# Patient Record
Sex: FEMALE | Race: WHITE | NOT HISPANIC OR LATINO | Employment: OTHER | URBAN - METROPOLITAN AREA
[De-identification: names, ages, dates, MRNs, and addresses within clinical notes are randomized per-mention and may not be internally consistent; named-entity substitution may affect disease eponyms.]

---

## 2017-05-01 ENCOUNTER — TRANSCRIBE ORDERS (OUTPATIENT)
Dept: ADMINISTRATIVE | Facility: HOSPITAL | Age: 82
End: 2017-05-01

## 2017-05-01 ENCOUNTER — ALLSCRIPTS OFFICE VISIT (OUTPATIENT)
Dept: OTHER | Facility: OTHER | Age: 82
End: 2017-05-01

## 2017-05-01 DIAGNOSIS — I65.29 OCCLUSION AND STENOSIS OF UNSPECIFIED CAROTID ARTERY: ICD-10-CM

## 2017-05-01 DIAGNOSIS — I25.10 ATHEROSCLEROTIC HEART DISEASE OF NATIVE CORONARY ARTERY WITHOUT ANGINA PECTORIS: ICD-10-CM

## 2017-05-01 DIAGNOSIS — E78.5 HYPERLIPIDEMIA, UNSPECIFIED HYPERLIPIDEMIA TYPE: Primary | ICD-10-CM

## 2017-05-01 DIAGNOSIS — I10 ESSENTIAL HYPERTENSION, BENIGN: ICD-10-CM

## 2017-05-09 ENCOUNTER — ALLSCRIPTS OFFICE VISIT (OUTPATIENT)
Dept: OTHER | Facility: OTHER | Age: 82
End: 2017-05-09

## 2017-05-11 ENCOUNTER — HOSPITAL ENCOUNTER (OUTPATIENT)
Dept: NON INVASIVE DIAGNOSTICS | Facility: CLINIC | Age: 82
Discharge: HOME | End: 2017-05-11
Payer: COMMERCIAL

## 2017-05-11 DIAGNOSIS — E78.5 HYPERLIPIDEMIA, UNSPECIFIED HYPERLIPIDEMIA TYPE: ICD-10-CM

## 2017-05-11 DIAGNOSIS — I10 ESSENTIAL HYPERTENSION, BENIGN: ICD-10-CM

## 2017-05-11 PROCEDURE — 93880 EXTRACRANIAL BILAT STUDY: CPT

## 2017-06-23 ENCOUNTER — GENERIC CONVERSION - ENCOUNTER (OUTPATIENT)
Dept: OTHER | Facility: OTHER | Age: 82
End: 2017-06-23

## 2017-06-23 LAB
LEFT EYE DIABETIC RETINOPATHY: NORMAL
RIGHT EYE DIABETIC RETINOPATHY: NORMAL

## 2017-07-18 ENCOUNTER — GENERIC CONVERSION - ENCOUNTER (OUTPATIENT)
Dept: OTHER | Facility: OTHER | Age: 82
End: 2017-07-18

## 2017-07-25 ENCOUNTER — GENERIC CONVERSION - ENCOUNTER (OUTPATIENT)
Dept: OTHER | Facility: OTHER | Age: 82
End: 2017-07-25

## 2017-08-21 ENCOUNTER — GENERIC CONVERSION - ENCOUNTER (OUTPATIENT)
Dept: OTHER | Facility: OTHER | Age: 82
End: 2017-08-21

## 2017-09-29 ENCOUNTER — HOSPITAL ENCOUNTER (INPATIENT)
Facility: HOSPITAL | Age: 82
LOS: 2 days | Discharge: HOME | DRG: 377 | End: 2017-10-02
Attending: EMERGENCY MEDICINE | Admitting: COLON & RECTAL SURGERY
Payer: COMMERCIAL

## 2017-09-29 ENCOUNTER — APPOINTMENT (EMERGENCY)
Dept: RADIOLOGY | Facility: HOSPITAL | Age: 82
DRG: 377 | End: 2017-09-29
Payer: COMMERCIAL

## 2017-09-29 DIAGNOSIS — J96.92 HYPERCAPNIC RESPIRATORY FAILURE (HCC): ICD-10-CM

## 2017-09-29 DIAGNOSIS — K92.2 GI BLEED: Primary | ICD-10-CM

## 2017-09-29 DIAGNOSIS — J44.9 CHRONIC OBSTRUCTIVE PULMONARY DISEASE, UNSPECIFIED COPD TYPE (HCC): ICD-10-CM

## 2017-09-29 DIAGNOSIS — R41.0 CONFUSION: ICD-10-CM

## 2017-09-29 DIAGNOSIS — K25.4 GASTROINTESTINAL HEMORRHAGE ASSOCIATED WITH GASTRIC ULCER: ICD-10-CM

## 2017-09-29 DIAGNOSIS — K92.2 GASTROINTESTINAL HEMORRHAGE, UNSPECIFIED GASTROINTESTINAL HEMORRHAGE TYPE: ICD-10-CM

## 2017-09-29 PROCEDURE — 85730 THROMBOPLASTIN TIME PARTIAL: CPT | Performed by: EMERGENCY MEDICINE

## 2017-09-29 PROCEDURE — 85025 COMPLETE CBC W/AUTO DIFF WBC: CPT | Performed by: EMERGENCY MEDICINE

## 2017-09-29 PROCEDURE — 83690 ASSAY OF LIPASE: CPT | Performed by: EMERGENCY MEDICINE

## 2017-09-29 PROCEDURE — 85610 PROTHROMBIN TIME: CPT | Performed by: EMERGENCY MEDICINE

## 2017-09-29 PROCEDURE — 86901 BLOOD TYPING SEROLOGIC RH(D): CPT | Performed by: EMERGENCY MEDICINE

## 2017-09-29 PROCEDURE — 93005 ELECTROCARDIOGRAM TRACING: CPT | Performed by: EMERGENCY MEDICINE

## 2017-09-29 PROCEDURE — 36415 COLL VENOUS BLD VENIPUNCTURE: CPT | Performed by: EMERGENCY MEDICINE

## 2017-09-29 PROCEDURE — 86850 RBC ANTIBODY SCREEN: CPT | Performed by: EMERGENCY MEDICINE

## 2017-09-29 PROCEDURE — 86923 COMPATIBILITY TEST ELECTRIC: CPT

## 2017-09-29 PROCEDURE — 84484 ASSAY OF TROPONIN QUANT: CPT

## 2017-09-29 PROCEDURE — 80053 COMPREHEN METABOLIC PANEL: CPT | Performed by: EMERGENCY MEDICINE

## 2017-09-29 PROCEDURE — 84484 ASSAY OF TROPONIN QUANT: CPT | Performed by: EMERGENCY MEDICINE

## 2017-09-29 PROCEDURE — 86900 BLOOD TYPING SEROLOGIC ABO: CPT | Performed by: EMERGENCY MEDICINE

## 2017-09-29 PROCEDURE — 71020 HB CHEST X-RAY 2VW FRONTAL&LATL: CPT

## 2017-09-30 ENCOUNTER — ANESTHESIA EVENT (INPATIENT)
Dept: PERIOP | Facility: HOSPITAL | Age: 82
DRG: 377 | End: 2017-09-30
Payer: COMMERCIAL

## 2017-09-30 ENCOUNTER — ANESTHESIA (INPATIENT)
Dept: PERIOP | Facility: HOSPITAL | Age: 82
DRG: 377 | End: 2017-09-30
Payer: COMMERCIAL

## 2017-09-30 ENCOUNTER — APPOINTMENT (EMERGENCY)
Dept: RADIOLOGY | Facility: HOSPITAL | Age: 82
DRG: 377 | End: 2017-09-30
Payer: COMMERCIAL

## 2017-09-30 PROBLEM — K92.1 HEMATOCHEZIA: Status: ACTIVE | Noted: 2017-09-30

## 2017-09-30 LAB
ABO GROUP BLD: NORMAL
ALBUMIN SERPL BCP-MCNC: 2.7 G/DL (ref 3.5–5)
ALP SERPL-CCNC: 129 U/L (ref 46–116)
ALT SERPL W P-5'-P-CCNC: <6 U/L (ref 12–78)
ANION GAP SERPL CALCULATED.3IONS-SCNC: 5 MMOL/L (ref 4–13)
APTT PPP: 29 SECONDS (ref 23–35)
AST SERPL W P-5'-P-CCNC: 10 U/L (ref 5–45)
ATRIAL RATE: 85 BPM
BASOPHILS # BLD AUTO: 0.07 THOUSANDS/ΜL (ref 0–0.1)
BASOPHILS NFR BLD AUTO: 1 % (ref 0–1)
BILIRUB SERPL-MCNC: 0.39 MG/DL (ref 0.2–1)
BLD GP AB SCN SERPL QL: NEGATIVE
BUN SERPL-MCNC: 26 MG/DL (ref 5–25)
CALCIUM SERPL-MCNC: 8.4 MG/DL (ref 8.3–10.1)
CHLORIDE SERPL-SCNC: 103 MMOL/L (ref 100–108)
CO2 SERPL-SCNC: 36 MMOL/L (ref 21–32)
CREAT SERPL-MCNC: 0.97 MG/DL (ref 0.6–1.3)
EOSINOPHIL # BLD AUTO: 0.41 THOUSAND/ΜL (ref 0–0.61)
EOSINOPHIL NFR BLD AUTO: 4 % (ref 0–6)
ERYTHROCYTE [DISTWIDTH] IN BLOOD BY AUTOMATED COUNT: 16.6 % (ref 11.6–15.1)
GFR SERPL CREATININE-BSD FRML MDRD: 55 ML/MIN/1.73SQ M
GLUCOSE SERPL-MCNC: 114 MG/DL (ref 65–140)
HCT VFR BLD AUTO: 30.9 % (ref 34.8–46.1)
HCT VFR BLD AUTO: 33.6 % (ref 34.8–46.1)
HCT VFR BLD AUTO: 33.8 % (ref 34.8–46.1)
HCT VFR BLD AUTO: 38 % (ref 34.8–46.1)
HCT VFR BLD AUTO: 41.4 % (ref 34.8–46.1)
HCT VFR BLD AUTO: 42.6 % (ref 34.8–46.1)
HGB BLD-MCNC: 10.7 G/DL (ref 11.5–15.4)
HGB BLD-MCNC: 11.9 G/DL (ref 11.5–15.4)
HGB BLD-MCNC: 12 G/DL (ref 11.5–15.4)
HGB BLD-MCNC: 8.8 G/DL (ref 11.5–15.4)
HGB BLD-MCNC: 9.6 G/DL (ref 11.5–15.4)
HGB BLD-MCNC: 9.6 G/DL (ref 11.5–15.4)
INR PPP: 1.04 (ref 0.86–1.16)
LACTATE SERPL-SCNC: 1.2 MMOL/L (ref 0.5–2)
LIPASE SERPL-CCNC: 123 U/L (ref 73–393)
LYMPHOCYTES # BLD AUTO: 1.82 THOUSANDS/ΜL (ref 0.6–4.47)
LYMPHOCYTES NFR BLD AUTO: 19 % (ref 14–44)
MCH RBC QN AUTO: 24.6 PG (ref 26.8–34.3)
MCHC RBC AUTO-ENTMCNC: 27.9 G/DL (ref 31.4–37.4)
MCV RBC AUTO: 88 FL (ref 82–98)
MONOCYTES # BLD AUTO: 0.65 THOUSAND/ΜL (ref 0.17–1.22)
MONOCYTES NFR BLD AUTO: 7 % (ref 4–12)
NEUTROPHILS # BLD AUTO: 6.62 THOUSANDS/ΜL (ref 1.85–7.62)
NEUTS SEG NFR BLD AUTO: 69 % (ref 43–75)
NRBC BLD AUTO-RTO: 0 /100 WBCS
P AXIS: 58 DEGREES
PLATELET # BLD AUTO: 395 THOUSANDS/UL (ref 149–390)
PMV BLD AUTO: 10.1 FL (ref 8.9–12.7)
POTASSIUM SERPL-SCNC: 4 MMOL/L (ref 3.5–5.3)
PR INTERVAL: 166 MS
PROT SERPL-MCNC: 6.6 G/DL (ref 6.4–8.2)
PROTHROMBIN TIME: 13.6 SECONDS (ref 12.1–14.4)
QRS AXIS: 109 DEGREES
QRSD INTERVAL: 148 MS
QT INTERVAL: 396 MS
QTC INTERVAL: 467 MS
RBC # BLD AUTO: 4.83 MILLION/UL (ref 3.81–5.12)
RH BLD: POSITIVE
SODIUM SERPL-SCNC: 144 MMOL/L (ref 136–145)
SPECIMEN EXPIRATION DATE: NORMAL
T WAVE AXIS: 39 DEGREES
TROPONIN I SERPL-MCNC: <0.02 NG/ML
VENTRICULAR RATE: 84 BPM
WBC # BLD AUTO: 9.59 THOUSAND/UL (ref 4.31–10.16)

## 2017-09-30 PROCEDURE — 74178 CT ABD&PLV WO CNTR FLWD CNTR: CPT

## 2017-09-30 PROCEDURE — 85014 HEMATOCRIT: CPT | Performed by: STUDENT IN AN ORGANIZED HEALTH CARE EDUCATION/TRAINING PROGRAM

## 2017-09-30 PROCEDURE — 99285 EMERGENCY DEPT VISIT HI MDM: CPT

## 2017-09-30 PROCEDURE — 85014 HEMATOCRIT: CPT | Performed by: EMERGENCY MEDICINE

## 2017-09-30 PROCEDURE — 85018 HEMOGLOBIN: CPT | Performed by: COLON & RECTAL SURGERY

## 2017-09-30 PROCEDURE — 85014 HEMATOCRIT: CPT | Performed by: COLON & RECTAL SURGERY

## 2017-09-30 PROCEDURE — 85018 HEMOGLOBIN: CPT | Performed by: EMERGENCY MEDICINE

## 2017-09-30 PROCEDURE — 85018 HEMOGLOBIN: CPT | Performed by: STUDENT IN AN ORGANIZED HEALTH CARE EDUCATION/TRAINING PROGRAM

## 2017-09-30 PROCEDURE — 96360 HYDRATION IV INFUSION INIT: CPT

## 2017-09-30 PROCEDURE — 83605 ASSAY OF LACTIC ACID: CPT | Performed by: STUDENT IN AN ORGANIZED HEALTH CARE EDUCATION/TRAINING PROGRAM

## 2017-09-30 PROCEDURE — 96365 THER/PROPH/DIAG IV INF INIT: CPT

## 2017-09-30 PROCEDURE — 0W3P8ZZ CONTROL BLEEDING IN GASTROINTESTINAL TRACT, VIA NATURAL OR ARTIFICIAL OPENING ENDOSCOPIC: ICD-10-PCS | Performed by: COLON & RECTAL SURGERY

## 2017-09-30 PROCEDURE — 36415 COLL VENOUS BLD VENIPUNCTURE: CPT | Performed by: EMERGENCY MEDICINE

## 2017-09-30 RX ORDER — ATORVASTATIN CALCIUM 80 MG/1
80 TABLET, FILM COATED ORAL
Status: DISCONTINUED | OUTPATIENT
Start: 2017-09-30 | End: 2017-10-02 | Stop reason: HOSPADM

## 2017-09-30 RX ORDER — ACETAMINOPHEN 325 MG/1
650 TABLET ORAL EVERY 6 HOURS PRN
Status: DISCONTINUED | OUTPATIENT
Start: 2017-09-30 | End: 2017-10-02 | Stop reason: HOSPADM

## 2017-09-30 RX ORDER — ONDANSETRON 2 MG/ML
4 INJECTION INTRAMUSCULAR; INTRAVENOUS EVERY 6 HOURS PRN
Status: DISCONTINUED | OUTPATIENT
Start: 2017-09-30 | End: 2017-10-02 | Stop reason: HOSPADM

## 2017-09-30 RX ORDER — PROPOFOL 10 MG/ML
INJECTION, EMULSION INTRAVENOUS AS NEEDED
Status: DISCONTINUED | OUTPATIENT
Start: 2017-09-30 | End: 2017-09-30 | Stop reason: SURG

## 2017-09-30 RX ORDER — PANTOPRAZOLE SODIUM 40 MG/1
40 TABLET, DELAYED RELEASE ORAL
Status: DISCONTINUED | OUTPATIENT
Start: 2017-09-30 | End: 2017-10-02 | Stop reason: HOSPADM

## 2017-09-30 RX ORDER — DIAZEPAM 5 MG/1
5 TABLET ORAL EVERY EVENING
Status: DISCONTINUED | OUTPATIENT
Start: 2017-09-30 | End: 2017-10-01

## 2017-09-30 RX ORDER — ZOLPIDEM TARTRATE 5 MG/1
10 TABLET ORAL
Status: DISCONTINUED | OUTPATIENT
Start: 2017-09-30 | End: 2017-10-01

## 2017-09-30 RX ORDER — PRIMIDONE 250 MG/1
250 TABLET ORAL
Status: DISCONTINUED | OUTPATIENT
Start: 2017-09-30 | End: 2017-10-01

## 2017-09-30 RX ORDER — LEVOTHYROXINE SODIUM 0.07 MG/1
75 TABLET ORAL
Status: DISCONTINUED | OUTPATIENT
Start: 2017-09-30 | End: 2017-10-02 | Stop reason: HOSPADM

## 2017-09-30 RX ORDER — GABAPENTIN 100 MG/1
100 CAPSULE ORAL 3 TIMES DAILY
Status: DISCONTINUED | OUTPATIENT
Start: 2017-09-30 | End: 2017-10-01

## 2017-09-30 RX ORDER — LABETALOL HYDROCHLORIDE 5 MG/ML
10 INJECTION, SOLUTION INTRAVENOUS EVERY 6 HOURS PRN
Status: DISCONTINUED | OUTPATIENT
Start: 2017-09-30 | End: 2017-10-02 | Stop reason: HOSPADM

## 2017-09-30 RX ORDER — SODIUM CHLORIDE 9 MG/ML
INJECTION, SOLUTION INTRAVENOUS CONTINUOUS PRN
Status: DISCONTINUED | OUTPATIENT
Start: 2017-09-30 | End: 2017-09-30 | Stop reason: SURG

## 2017-09-30 RX ORDER — SODIUM CHLORIDE 9 MG/ML
125 INJECTION, SOLUTION INTRAVENOUS CONTINUOUS
Status: DISCONTINUED | OUTPATIENT
Start: 2017-09-30 | End: 2017-10-01

## 2017-09-30 RX ORDER — VILAZODONE HYDROCHLORIDE 40 MG/1
40 TABLET ORAL
Status: DISCONTINUED | OUTPATIENT
Start: 2017-09-30 | End: 2017-10-02 | Stop reason: HOSPADM

## 2017-09-30 RX ADMIN — PROPOFOL 20 MG: 10 INJECTION, EMULSION INTRAVENOUS at 14:47

## 2017-09-30 RX ADMIN — SODIUM CHLORIDE 125 ML/HR: 0.9 INJECTION, SOLUTION INTRAVENOUS at 04:08

## 2017-09-30 RX ADMIN — GABAPENTIN 100 MG: 100 CAPSULE ORAL at 08:01

## 2017-09-30 RX ADMIN — GABAPENTIN 100 MG: 100 CAPSULE ORAL at 17:02

## 2017-09-30 RX ADMIN — SODIUM CHLORIDE 1000 ML: 0.9 INJECTION, SOLUTION INTRAVENOUS at 01:35

## 2017-09-30 RX ADMIN — PROPOFOL 20 MG: 10 INJECTION, EMULSION INTRAVENOUS at 14:42

## 2017-09-30 RX ADMIN — DESMOPRESSIN ACETATE 20 MCG: 4 SOLUTION INTRAVENOUS at 01:49

## 2017-09-30 RX ADMIN — PROPOFOL 20 MG: 10 INJECTION, EMULSION INTRAVENOUS at 14:55

## 2017-09-30 RX ADMIN — SODIUM CHLORIDE 125 ML/HR: 0.9 INJECTION, SOLUTION INTRAVENOUS at 12:30

## 2017-09-30 RX ADMIN — POLYETHYLENE GLYCOL 3350, SODIUM SULFATE ANHYDROUS, SODIUM BICARBONATE, SODIUM CHLORIDE, POTASSIUM CHLORIDE 4000 ML: 236; 22.74; 6.74; 5.86; 2.97 POWDER, FOR SOLUTION ORAL at 04:52

## 2017-09-30 RX ADMIN — PROPOFOL 40 MG: 10 INJECTION, EMULSION INTRAVENOUS at 14:34

## 2017-09-30 RX ADMIN — DIAZEPAM 5 MG: 5 TABLET ORAL at 18:09

## 2017-09-30 RX ADMIN — SODIUM CHLORIDE 125 ML/HR: 0.9 INJECTION, SOLUTION INTRAVENOUS at 22:55

## 2017-09-30 RX ADMIN — METOPROLOL TARTRATE 12.5 MG: 25 TABLET ORAL at 22:00

## 2017-09-30 RX ADMIN — IOHEXOL 100 ML: 350 INJECTION, SOLUTION INTRAVENOUS at 01:26

## 2017-09-30 RX ADMIN — PROPOFOL 20 MG: 10 INJECTION, EMULSION INTRAVENOUS at 14:51

## 2017-09-30 RX ADMIN — PANTOPRAZOLE SODIUM 40 MG: 40 TABLET, DELAYED RELEASE ORAL at 06:14

## 2017-09-30 RX ADMIN — GABAPENTIN 100 MG: 100 CAPSULE ORAL at 22:00

## 2017-09-30 RX ADMIN — METOPROLOL TARTRATE 12.5 MG: 25 TABLET ORAL at 08:01

## 2017-09-30 RX ADMIN — ATORVASTATIN CALCIUM 80 MG: 80 TABLET, FILM COATED ORAL at 22:03

## 2017-09-30 RX ADMIN — SODIUM CHLORIDE: 0.9 INJECTION, SOLUTION INTRAVENOUS at 14:25

## 2017-09-30 RX ADMIN — PRIMIDONE 250 MG: 250 TABLET ORAL at 22:01

## 2017-09-30 RX ADMIN — LEVOTHYROXINE SODIUM 75 MCG: 75 TABLET ORAL at 06:14

## 2017-09-30 NOTE — ED PROVIDER NOTES
History  Chief Complaint   Patient presents with    Rectal Bleeding     Pt present to ed with c/o episode of abdominal pain and rectal bleeding  80-year-old with history of prior MI, COPD on aspirin and Plavix presents complaining of rectal bleeding  The patient reports that earlier today she had 1 small episode of bleeding and that this evening she had a large bowel movement that was grossly bloody  Also reports having some mild periumbilical pain  Pain is nonradiating and dull in nature  Denies have any fevers, chills, vomiting  Denies history of prior GI bleed  She has never had a colonoscopy  Prior to Admission Medications   Prescriptions Last Dose Informant Patient Reported? Taking? Acetaminophen 650 MG TABS   Yes No   Sig: Take 650 mg by mouth  Cholecalciferol (VITAMIN D) 2000 UNITS tablet   Yes No   Sig: Take 2,000 Units by mouth daily  Olmesartan Medoxomil (BENICAR PO)   Yes No   Sig: Take 40 mg by mouth  Zolpidem Tartrate (AMBIEN PO)   Yes No   Sig: Take 10 mg by mouth daily at bedtime as needed  ascorbic acid (VITAMIN C) 500 mg tablet   Yes No   Sig: Take 500 mg by mouth 2 (two) times a day  aspirin 81 mg chewable tablet   Yes No   Sig: Chew 81 mg  To check with md re when to stop    atorvastatin (LIPITOR) 40 mg tablet   Yes No   Sig: Take 80 mg by mouth daily at bedtime  clopidogrel (PLAVIX) 75 mg tablet   Yes No   Sig: Take 75 mg by mouth  Pt instructed to stop 7 days prior to surgery    diazepam (VALIUM) 5 mg tablet   Yes No   Sig: Take 5 mg by mouth every evening  fluticasone-salmeterol (ADVAIR) 250-50 mcg/dose   Yes No   Sig: Inhale 1 puff every 12 (twelve) hours  gabapentin (NEURONTIN) 100 mg capsule   Yes No   Sig: Take 100 mg by mouth 3 (three) times a day  levothyroxine 75 mcg tablet   Yes No   Sig: Take by mouth    metoprolol tartrate (LOPRESSOR) 12 5 mg tablet   Yes No   Sig: Take 12 5 mg by mouth 2 (two) times a day     oxyCODONE (ROXICODONE) 5 mg immediate release tablet   No No   Si-3 tablets (5-15 mg) every 4 hours as needed for pain   pantoprazole (PROTONIX) 40 mg tablet   Yes No   Sig: Take 40 mg by mouth daily  primidone (MYSOLINE) 250 mg tablet   Yes No   Sig: Take 250 mg by mouth daily at bedtime  vilazodone (VIIBRYD) 40 mg tablet   Yes No   Sig: Take 40 mg by mouth daily with breakfast       Facility-Administered Medications: None       Past Medical History:   Diagnosis Date    Anxiety     Arthritis     COPD (chronic obstructive pulmonary disease)     Coronary artery disease     Essential tremor     Hx of arterial ischemic stroke     Hypertension     Hypothyroid     Myocardial infarction         Personal history of kidney stones     Use of cane as ambulatory aid     or walker    Wears glasses        Past Surgical History:   Procedure Laterality Date    CAROTID ENDARTERECTOMY Right     CATARACT EXTRACTION W/ INTRAOCULAR LENS  IMPLANT, BILATERAL      CORONARY ARTERY BYPASS GRAFT      CABG X3    JOINT REPLACEMENT      left TKR    KIDNEY STONE SURGERY      HI TOTAL KNEE ARTHROPLASTY Right 2016    Procedure: ARTHROPLASTY KNEE TOTAL;  Surgeon: Estelle Amor MD;  Location: [facility] OR;  Service: Orthopedics       History reviewed  No pertinent family history  I have reviewed and agree with the history as documented  Social History   Substance Use Topics    Smoking status: Former Smoker     Quit date: 2/3/1976    Smokeless tobacco: Never Used    Alcohol use No        Review of Systems   Constitutional: Negative for chills and fever  HENT: Negative for congestion and sore throat  Eyes: Negative for pain and redness  Respiratory: Negative for shortness of breath and wheezing  Cardiovascular: Negative for chest pain and palpitations  Gastrointestinal: Positive for abdominal pain, anal bleeding and blood in stool  Negative for diarrhea and vomiting  Endocrine: Negative for polydipsia and polyphagia  Genitourinary: Negative for dysuria and flank pain  Musculoskeletal: Negative for arthralgias and back pain  Skin: Negative for rash and wound  Neurological: Negative for seizures and headaches  Psychiatric/Behavioral: Negative for agitation and behavioral problems  All other systems reviewed and are negative  Physical Exam  ED Triage Vitals [09/29/17 2309]   Temperature Pulse Respirations Blood Pressure SpO2   97 9 °F (36 6 °C) 86 21 165/71 (!) 75 %      Temp Source Heart Rate Source Patient Position - Orthostatic VS BP Location FiO2 (%)   Oral Monitor Lying Right arm --      Pain Score       No Pain           Physical Exam   Constitutional: She is oriented to person, place, and time  She appears well-developed and well-nourished  HENT:   Head: Normocephalic and atraumatic  Right Ear: External ear normal    Left Ear: External ear normal    Mouth/Throat: Oropharynx is clear and moist    Eyes: EOM are normal  Pupils are equal, round, and reactive to light  Neck: Normal range of motion  Cardiovascular: Normal rate, regular rhythm and normal heart sounds  Exam reveals no friction rub  No murmur heard  Pulmonary/Chest: Effort normal  No respiratory distress  She has no wheezes  Abdominal: Soft  Bowel sounds are normal  She exhibits no distension  There is no tenderness  There is no rebound and no guarding  Genitourinary:   Genitourinary Comments: External hemorrhoids  Active rectal bleeding   Musculoskeletal: Normal range of motion  She exhibits no edema  Neurological: She is alert and oriented to person, place, and time  No cranial nerve deficit  Coordination normal    Skin: Skin is warm  No erythema  Psychiatric: She has a normal mood and affect  Her behavior is normal    Nursing note and vitals reviewed        ED Medications  Medications   atorvastatin (LIPITOR) tablet 80 mg (80 mg Oral Given 9/30/17 2203)   diazepam (VALIUM) tablet 5 mg (5 mg Oral Given 9/30/17 1809) fluticasone-salmeterol (ADVAIR) 250-50 mcg/dose inhaler 1 puff (1 puff Inhalation Not Given 10/1/17 0923)   gabapentin (NEURONTIN) capsule 100 mg (100 mg Oral Given 10/1/17 0923)   levothyroxine tablet 75 mcg (75 mcg Oral Given 10/1/17 0617)   metoprolol tartrate (LOPRESSOR) partial tablet 12 5 mg (12 5 mg Oral Given 10/1/17 0923)   pantoprazole (PROTONIX) EC tablet 40 mg (40 mg Oral Given 10/1/17 0617)   primidone (MYSOLINE) tablet 250 mg (250 mg Oral Given 9/30/17 2201)   vilazodone (VIIBRYD) tablet 40 mg (40 mg Oral Given 10/1/17 0923)   ondansetron (ZOFRAN) injection 4 mg ( Intravenous MAR Unhold 9/30/17 1619)   acetaminophen (TYLENOL) tablet 650 mg ( Oral MAR Unhold 9/30/17 1619)   labetalol (NORMODYNE) injection 10 mg (not administered)   zolpidem (AMBIEN) tablet 10 mg (0 mg Oral Return to [facility] 10/1/17 0256)   sodium chloride 0 9 % bolus 1,000 mL (0 mL Intravenous Stopped 9/30/17 0235)   desmopressin (DDAVP) 20 mcg in sodium chloride 0 9 % 50 mL IVPB (0 mcg Intravenous Stopped 9/30/17 0220)   iohexol (OMNIPAQUE) 350 MG/ML injection (MULTI-DOSE) 100 mL (100 mL Intravenous Given 9/30/17 0126)   polyethylene glycol (GOLYTELY) bowel prep 4,000 mL (4,000 mL Oral Given 9/30/17 0452)       Diagnostic Studies  Labs Reviewed   CBC AND DIFFERENTIAL - Abnormal        Result Value Ref Range Status    MCH 24 6 (*) 26 8 - 34 3 pg Final    MCHC 27 9 (*) 31 4 - 37 4 g/dL Final    RDW 16 6 (*) 11 6 - 15 1 % Final    Platelets 865 (*) 512 - 390 Thousands/uL Final    WBC 9 59  4 31 - 10 16 Thousand/uL Final    RBC 4 83  3 81 - 5 12 Million/uL Final    Hemoglobin 11 9  11 5 - 15 4 g/dL Final    Hematocrit 42 6  34 8 - 46 1 % Final    MCV 88  82 - 98 fL Final    MPV 10 1  8 9 - 12 7 fL Final    nRBC 0  /100 WBCs Final    Neutrophils Relative 69  43 - 75 % Final    Lymphocytes Relative 19  14 - 44 % Final    Monocytes Relative 7  4 - 12 % Final    Eosinophils Relative 4  0 - 6 % Final    Basophils Relative 1  0 - 1 % Final Neutrophils Absolute 6 62  1 85 - 7 62 Thousands/µL Final    Lymphocytes Absolute 1 82  0 60 - 4 47 Thousands/µL Final    Monocytes Absolute 0 65  0 17 - 1 22 Thousand/µL Final    Eosinophils Absolute 0 41  0 00 - 0 61 Thousand/µL Final    Basophils Absolute 0 07  0 00 - 0 10 Thousands/µL Final   COMPREHENSIVE METABOLIC PANEL - Abnormal     CO2 36 (*) 21 - 32 mmol/L Final    BUN 26 (*) 5 - 25 mg/dL Final    ALT <6 (*) 12 - 78 U/L Final    Comment: Verified by repeat analysis  Specimen collection should occur prior to Sulfasalazine and/or Sulfapyridine administration due to the potential for falsely depressed results  Alkaline Phosphatase 129 (*) 46 - 116 U/L Final    Albumin 2 7 (*) 3 5 - 5 0 g/dL Final    Sodium 144  136 - 145 mmol/L Final    Potassium 4 0  3 5 - 5 3 mmol/L Final    Chloride 103  100 - 108 mmol/L Final    Anion Gap 5  4 - 13 mmol/L Final    Creatinine 0 97  0 60 - 1 30 mg/dL Final    Comment: Standardized to IDMS reference method    Glucose 114  65 - 140 mg/dL Final    Comment:   If the patient is fasting, the ADA then defines impaired fasting glucose as > 100 mg/dL and diabetes as > or equal to 123 mg/dL  Specimen collection should occur prior to Sulfasalazine administration due to the potential for falsely depressed results  Specimen collection should occur prior to Sulfapyridine administration due to the potential for falsely elevated results  Calcium 8 4  8 3 - 10 1 mg/dL Final    AST 10  5 - 45 U/L Final    Comment:   Specimen collection should occur prior to Sulfasalazine administration due to the potential for falsely depressed results       Total Protein 6 6  6 4 - 8 2 g/dL Final    Total Bilirubin 0 39  0 20 - 1 00 mg/dL Final    eGFR 55  ml/min/1 73sq m Final    Narrative:     National Kidney Disease Education Program recommendations are as follows:  GFR calculation is accurate only with a steady state creatinine  Chronic Kidney disease less than 60 ml/min/1 73 sq  meters  Kidney failure less than 15 ml/min/1 73 sq  meters  PROTIME-INR - Normal    Protime 13 6  12 1 - 14 4 seconds Final    INR 1 04  0 86 - 1 16 Final   APTT - Normal    PTT 29  23 - 35 seconds Final    Narrative: Therapeutic Heparin Range = 60-90 seconds   LIPASE - Normal    Lipase 123  73 - 393 u/L Final   TROPONIN I - Normal    Troponin I <0 02  <=0 04 ng/mL Final    Narrative:     [employer] Chemistry analyzer 99% cutoff is > 0 04 ng/mL in network labs    o cTnI 99% cutoff is useful only when applied to patients in the clinical setting of myocardial ischemia  o cTnI 99% cutoff should be interpreted in the context of clinical history, ECG findings and possibly cardiac imaging to establish correct diagnosis  o cTnI 99% cutoff may be suggestive but clearly not indicative of a coronary event without the clinical setting of myocardial ischemia  HEMOGLOBIN AND HEMATOCRIT, BLOOD - Normal    Hemoglobin 12 0  11 5 - 15 4 g/dL Final    Hematocrit 41 4  34 8 - 46 1 % Final   LACTIC ACID, PLASMA - Normal    LACTIC ACID 1 2  0 5 - 2 0 mmol/L Final    Narrative:     Result may be elevated if tourniquet was used during collection  TYPE AND SCREEN    ABO Grouping A   Final    Rh Factor Positive   Final    Antibody Screen Negative   Final    Specimen Expiration Date [id number]   Final   PREPARE [facility]    Unit Product Code Q3407K26   Final    Unit Number [id number]   Final    Unit ABO A   Final    Unit [facility] POS   Final    Unit Dispense Status Crossmatched   Final    Unit Product Code Y0316S58   Final    Unit Number [id number]   Final    Unit ABO A   Final    Unit [facility] POS   Final    Unit Dispense Status Crossmatched   Final       XR chest 2 views   ED Interpretation   No acute findings      Final Result      No active pulmonary disease  As per comments in the PACS workstation, findings are concordant with preliminary interpretation provided by the emergency room physician           Workstation performed: [id number]         CT high volume lower GI bleed   Final Result      There is a focus of active contrast extravasation into the bowel lumen at the junction of the descending and sigmoid colon (series 3 image 106 and series 3 image 263 )  Exact etiology is unclear, although because there is diffuse left-sided colonic    diverticulosis, diverticular bleed would be most likely  Cholelithiasis without CT evidence of acute cholecystitis  Multiple nonobstructing renal calyceal stones bilaterally  There is a different interpretation from the [facility] preliminary report which was provided shortly after completion of the exam  The finding of active GI bleed was therefore verbally discussed with referring clinicians as soon as possible  ##cfslh   I personally discussed this result with Dr Renetta Mcgill on 9/30/2017 7:51 AM    ##         Workstation performed: [id number]             Procedures  Procedures      Phone Consults  ED Phone Contact    ED Course  ED Course                                MDM  Number of Diagnoses or Management Options  Diagnosis management comments: Impression:  GI bleed with active bleeding and abdominal pain  Hypoxia 75% improved to 97% with 2 L nasal cannula  Plan:  CT abdomen pelvis, labs, admission for endoscopy    CritCare Time    Disposition  Final diagnoses:   GI bleed     ED Disposition     ED Disposition Condition Comment    Admit  Case was discussed with colorectal surgery and the patient's admission status was agreed to be Admission Status: inpatient status to the service of Dr Gisel Sr   Follow-up Information    None       Current Discharge Medication List      CONTINUE these medications which have NOT CHANGED    Details   Acetaminophen 650 MG TABS Take 650 mg by mouth  ascorbic acid (VITAMIN C) 500 mg tablet Take 500 mg by mouth 2 (two) times a day  aspirin 81 mg chewable tablet Chew 81 mg   To check with md sung when to stop       atorvastatin (LIPITOR) 40 mg tablet Take 80 mg by mouth daily at bedtime  Cholecalciferol (VITAMIN D) 2000 UNITS tablet Take 2,000 Units by mouth daily  clopidogrel (PLAVIX) 75 mg tablet Take 75 mg by mouth  Pt instructed to stop 7 days prior to surgery       diazepam (VALIUM) 5 mg tablet Take 5 mg by mouth every evening  fluticasone-salmeterol (ADVAIR) 250-50 mcg/dose Inhale 1 puff every 12 (twelve) hours  gabapentin (NEURONTIN) 100 mg capsule Take 100 mg by mouth 3 (three) times a day  levothyroxine 75 mcg tablet Take by mouth       metoprolol tartrate (LOPRESSOR) 12 5 mg tablet Take 12 5 mg by mouth 2 (two) times a day  Olmesartan Medoxomil (BENICAR PO) Take 40 mg by mouth  oxyCODONE (ROXICODONE) 5 mg immediate release tablet 1-3 tablets (5-15 mg) every 4 hours as needed for pain  Qty: 60 tablet, Refills: 0      pantoprazole (PROTONIX) 40 mg tablet Take 40 mg by mouth daily  primidone (MYSOLINE) 250 mg tablet Take 250 mg by mouth daily at bedtime  vilazodone (VIIBRYD) 40 mg tablet Take 40 mg by mouth daily with breakfast       Zolpidem Tartrate (AMBIEN PO) Take 10 mg by mouth daily at bedtime as needed  No discharge procedures on file  ED Provider  Attending physically available and evaluated [address]  I managed the patient along with the ED Attending      Electronically Signed by       Clemente Parham MD  Resident  10/01/17 5599

## 2017-09-30 NOTE — PLAN OF CARE
CARDIOVASCULAR - ADULT     Maintains optimal cardiac output and hemodynamic stability Progressing        GASTROINTESTINAL - ADULT     Minimal or absence of nausea and/or vomiting Progressing     Maintains or returns to baseline bowel function Progressing        HEMATOLOGIC - ADULT     Maintains hematologic stability Progressing        Potential for Falls     Patient will remain free of falls Progressing        RESPIRATORY - ADULT     Achieves optimal ventilation and oxygenation Progressing

## 2017-09-30 NOTE — PROGRESS NOTES
Pt hgb dropped again from 10 7 to 9 6 in 4 hrs, pt is still having bloody BM, White Sx Gotsch notified, no new orders, pt will go for and EGD today, will continue to monitor pt

## 2017-09-30 NOTE — OP NOTE
**** GI/ENDOSCOPY REPORT ****     PATIENT NAME: ANA PAULA MIGUEL ------ VISIT ID:  Patient ID:   [id number] YOB: 1935     INTRODUCTION: Colonoscopy - A 80 female patient presents for an inpatient   Colonoscopy at [address] [place] [address]  PREVIOUS COLONOSCOPY:     INDICATIONS: Rectal bleeding  CONSENT:  The benefits, risks, and alternatives to the procedure were   discussed and informed consent was obtained from the patient  PREPARATION: EKG, pulse, pulse oximetry and blood pressure were monitored   throughout the procedure  The patient was identified by myself both   verbally and by visual inspection of ID band  MEDICATIONS: Anesthesia-check records     PROCEDURE:  The endoscope was passed without difficulty through the anus   under direct visualization and advanced to the cecum, confirmed by   appendiceal orifice and ileocecal valve  The scope was withdrawn and the   mucosa was carefully examined  The quality of the preparation was   sub-optimal  Cecal Intubation Time: Minute(s) Scope Withdrawal Time:   Minute(s)     RECTAL EXAM: Normal rectal exam      FINDINGS:  Old blood intoright colon  Activebleed   seenindiverticuluminproximal sigmoid  Controlledw [id number]  There was evidence of severe diverticulosis in the ascending colon,   transverse colon, descending colon, sigmoid colon, and rectosigmoid   junction  To control bleeding, clips was applied  Otherwise, the   colon appeared to be normal      COMPLICATIONS: There were no complications  IMPRESSIONS: Old blood intoright colon  Activebleed   seenindiverticuluminproximal sigmoid  Controlledw [id number]  Severe diverticulosis found in the ascending colon, transverse colon,   descending colon, sigmoid colon, and rectosigmoid junction  Clips were   applied to control bleeding  RECOMMENDATIONS: Colonoscopy recommended in as needed per clinical   indication in the future  Return to floor  No aspirin and coumadin     ESTIMATED BLOOD LOSS: None  PATHOLOGY SPECIMENS: No     PROCEDURE CODES: Colonoscopy with control of bleeding     ICD-9 Codes: 569 3 Hemorrhage of rectum and anus 562 10 Diverticulosis of   colon (without mention of hemorrhage)     ICD-10 Codes: K62 5 Hemorrhage of anus and rectum K57 Diverticular disease   of intestine     PERFORMED BY: ALEXI Huertas  on 09/30/2017  Version 1, electronically signed by ALEXI Mazariegos  on 09/30/2017   at 15:05

## 2017-09-30 NOTE — CASE MANAGEMENT
Initial Clinical Review    Admission: Date/Time/Statement: 9/30/17 @ 0231     Orders Placed This Encounter   Procedures    Inpatient Admission     Standing Status:   Standing     Number of Occurrences:   1     Order Specific Question:   Admitting Physician     Answer:   Bita Dangelo [[id number]]     Order Specific Question:   Level of Care     Answer:   Level 1 Stepdown [13]     Order Specific Question:   Estimated length of stay     Answer:   More than 2 Midnights     Order Specific Question:   Certification     Answer:   I certify that inpatient services are medically necessary for this patient for a duration of greater than two midnights  See H&P and MD Progress Notes for additional information about the patient's course of treatment  ED: Date/Time/Mode of Arrival:   ED Arrival Information     Expected Arrival Acuity Means of Arrival Escorted By Service Admission Type    - 9/29/2017 23:03 [facility]) Colorectal Emergency    Arrival Complaint    blood in stool           Chief Complaint:   Chief Complaint   Patient presents with    Rectal Bleeding     Pt present to ed with c/o episode of abdominal pain and rectal bleeding  History of Illness:   Edilberto Schwartz is a 80 y o  female who presents with bloody bowel movements  She was on her way home from [place] with her son when she felt some rectal pressure like she needed to have a bowel movement  They pulled over at a rest stop around 5:30pm and she went to the bathroom and had what she thought was a large liquidy bowel movement; however, when she looked in the toilet it was all red, with no stool/clot/melena  They arrived home around 7:00pm and she immediately had another bowel movement of all blood  She denies having any abdominal pain with these episodes  She also denies other associated symptoms including fevers/chills, shortness of breath, lightheadedness/dizziness    She has never had any blood in her stool previously  Of note, she has never had a colonoscopy  She is on aspirin and plavix for CAD  She denies any recent weight loss      Past medical history is significant for MI/CAD s/p CABG, nephrolithiasis, HTN, stroke, COPD, arthritis, anxiety/depression  Past surgical history is significant for CABG, right carotid endarterectomy, bilateral knee replacements  She is allergic to penicillin  Family history is positive for hypertension and cardiac disease, but negative for colon cancer or any other type of gastrointestinal disease  She does not smoke tobacco or drink alcohol at all      Additionally, the patient did have a mechanical fall 3 weeks ago where she tripped over a stool and fell on a hardwood floor  She is complaining of some left-sided chest wall tenderness  ED Vital Signs:   ED Triage Vitals [09/29/17 2309]   Temperature Pulse Respirations Blood Pressure SpO2   97 9 °F (36 6 °C) 86 21 165/71 (!) 75 %      Temp Source Heart Rate Source Patient Position - Orthostatic VS BP Location FiO2 (%)   Oral Monitor Lying Right arm --      Pain Score       No Pain        Wt Readings from Last 1 Encounters:   09/29/17 93 9 kg (207 lb)       Vital Signs (abnormal):    above    Abnormal Labs/Diagnostic Test Results:    Co2  36  BUN  26  Alk phos   129  Albumin   2 7  Platelets    046  H/H   11 9/42 6   ( adm)             10 7/38     (  9/30       0652)              9  6/33 6     (    9/30       1056)  CXR:  NAD  CT  Abd/pelvis: There is a focus of active contrast extravasation into the bowel lumen at the junction of the descending and sigmoid colon (series 3 image 106 and series 3 image 263 )  Exact etiology is unclear, although because there is diffuse left-sided colonic   diverticulosis, diverticular bleed would be most likely  Cholelithiasis without CT evidence of acute cholecystitis  Multiple nonobstructing renal calyceal stones bilaterally      ED Treatment:   Medication Administration from 09/29/2017 2303 to 09/30/2017 0358       Date/Time Order Dose Route Action Action by Comments     09/30/2017 0235 sodium chloride 0 9 % bolus 1,000 mL 0 mL Intravenous Stopped Afshin Hanna RN      09/30/2017 0135 sodium chloride 0 9 % bolus 1,000 mL 1,000 mL Intravenous New Bag Afshin Hanna RN      09/30/2017 0220 desmopressin (DDAVP) 20 mcg in sodium chloride 0 9 % 50 mL IVPB 0 mcg Intravenous Stopped Afshin Hanna RN      09/30/2017 0149 desmopressin (DDAVP) 20 mcg in sodium chloride 0 9 % 50 mL IVPB 20 mcg Intravenous [address] Afshin Hanna RN      09/30/2017 0126 iohexol (OMNIPAQUE) 350 MG/ML injection (MULTI-DOSE) 100 mL 100 mL Intravenous Given Dawson Irons           Past Medical/Surgical History: Active Ambulatory Problems     Diagnosis Date Noted    COPD (chronic obstructive pulmonary disease)     Anxiety     Arthritis     Coronary artery disease     Essential tremor     Hypertension     Hypothyroid     Myocardial infarction     Ischemic stroke     Hx of arterial ischemic stroke     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    Fall 02/06/2015    Hypoxia 06/28/2015    Primary localized osteoarthrosis 03/01/2016    Tremor 03/01/2016     Resolved Ambulatory Problems     Diagnosis Date Noted    No Resolved Ambulatory Problems     Past Medical History:   Diagnosis Date    Anxiety     Arthritis     COPD (chronic obstructive pulmonary disease)     Coronary artery disease     Essential tremor     Hx of arterial ischemic stroke     Hypertension     Hypothyroid     Myocardial infarction     Personal history of kidney stones     Use of cane as ambulatory aid     Wears glasses        Admitting Diagnosis: Blood in stool [K92 1]  GI bleed [K92 2]    Age/Sex: 80 y o  female    Assessment/Plan:    81F w/GI bleed   No active extravasation on CTA GI bleed protocol per [facility], our read still pending   - received 1u DDAVP for ASA reversal     Plan:  - admit to [facility] surgery SD1  - clears  - golytely for bowel prep  - likely c-scope tomorrow 10/1  - NS @ 125  - q4 h&h  - scds  - PT/OT    Admission Orders:   IP  9/30  @    0242  Scheduled Meds:   atorvastatin 80 mg Oral HS   diazepam 5 mg Oral QPM   fluticasone-salmeterol 1 puff Inhalation Q12H MODESTO   gabapentin 100 mg Oral TID   levothyroxine 75 mcg Oral Early Morning   metoprolol tartrate 12 5 mg Oral BID   pantoprazole 40 mg Oral Early Morning   primidone 250 mg Oral HS   vilazodone 40 mg Oral Daily With Breakfast     Continuous Infusions:   sodium chloride 125 mL/hr Last Rate: 125 mL/hr (09/30/17 0408)     PRN Meds:   acetaminophen    ondansetron     PT/OT  H/H  Q 4 hrs  Tele  Cl liq  Diet  Cons  Colon rectal surgery  NPO  9/30  Plan  Colonoscopy    Colon  Rectal  Note  9/30  [facility] read of CTA incorrect, per our radiologist there is an area of active contrast extravasation at the junction of the descending and sigmoid colon  We will make her NPO and plan to scope today  [address] [place] [address] in the [facility] by Eduar Cabezas for 2017  Network Utilization Review Department  Phone: [phone]; Fax [phone]  ATTENTION: The Network Utilization Review Department is now centralized for our 7 Facilities  Make a note that we have a new phone and fax numbers for our Department  Please call with any questions or concerns to [phone] and carefully follow the prompts so that you are directed to the right person  All voicemails are confidential  Fax any determinations, approvals, denials, and requests for initial or continue stay review clinical to [phone]  Due to HIGH CALL volume, it would be easier if you could please send faxed requests to expedite your requests and in part, help us provide discharge notifications faster

## 2017-09-30 NOTE — PROGRESS NOTES
[facility] read of CTA incorrect, per our radiologist there is an area of active contrast extravasation at the junction of the descending and sigmoid colon  We will make her NPO and plan to scope today

## 2017-09-30 NOTE — ED ATTENDING ATTESTATION
Antonette Sanchez MD, saw and evaluated the patient  I have discussed the patient with the resident/non-physician practitioner and agree with the resident's/non-physician practitioner's findings, Plan of Care, and MDM as documented in the resident's/non-physician practitioner's note, except where noted  All available labs and Radiology studies were reviewed  At this point I agree with the current assessment done in the Emergency Department  I have conducted an independent evaluation of this patient a history and physical is as follows:   Pt is on asa and plavix Pt noted small amount of rectal bleeding this am then tonight had an explosion of bleeding when had bm Pt has some periumbilical cramping Pt had recent fall with some chest wall pain none now  Per family at 4 pm had episode of bloody BM   Then drove to [place] and had large bloody bmno lightheadedness and some cramping of adb when about to have pmMDM: will check labs admit due to blood thinners large volume of bleeding reported    Critical Care Time  CritCare Time

## 2017-09-30 NOTE — H&P
H&P Exam - Colorectal Surgery  Mone Gonzales 80 y o  female MRN: [id number]  Unit/Bed#: ED 27 Encounter: [id number]    Assessment/Plan     Assessment:  81F w/GI bleed  No active extravasation on CTA GI bleed protocol per [facility], our read still pending   - received 1u DDAVP for ASA reversal    Plan:  - admit to [facility] surgery SD1  - clears  - golytely for bowel prep  - likely c-scope tomorrow 10/1  - NS @ 125  - q4 h&h  - scds  - PT/OT      History of Present Illness     HPI:  Nahum Sanon is a 80 y o  female who presents with bloody bowel movements  She was on her way home from [place] with her son when she felt some rectal pressure like she needed to have a bowel movement  They pulled over at a rest stop around 5:30pm and she went to the bathroom and had what she thought was a large liquidy bowel movement; however, when she looked in the toilet it was all red, with no stool/clot/melena  They arrived home around 7:00pm and she immediately had another bowel movement of all blood  She denies having any abdominal pain with these episodes  She also denies other associated symptoms including fevers/chills, shortness of breath, lightheadedness/dizziness  She has never had any blood in her stool previously  Of note, she has never had a colonoscopy  She is on aspirin and plavix for CAD  She denies any recent weight loss  Past medical history is significant for MI/CAD s/p CABG, nephrolithiasis, HTN, stroke, COPD, arthritis, anxiety/depression  Past surgical history is significant for CABG, right carotid endarterectomy, bilateral knee replacements  She is allergic to penicillin  Family history is positive for hypertension and cardiac disease, but negative for colon cancer or any other type of gastrointestinal disease  She does not smoke tobacco or drink alcohol at all  Additionally, the patient did have a mechanical fall 3 weeks ago where she tripped over a stool and fell on a hardwood floor    She is complaining of some left-sided chest wall tenderness  Review of Systems   Constitutional: Negative for chills and fever  HENT: Negative  Eyes: Negative  Respiratory: Negative for cough, shortness of breath and wheezing  Cardiovascular: Negative for chest pain and palpitations  Gastrointestinal: Positive for blood in stool (bright red blood)  Negative for abdominal pain, constipation, diarrhea, nausea and vomiting  Genitourinary: Negative for difficulty urinating and dysuria  Musculoskeletal:        L sided chest wall tenderness s/p fall 3 weeks ago     Skin: Negative for pallor  Neurological: Negative for dizziness, weakness, light-headedness and headaches  Historical Information   Past Medical History:   Diagnosis Date    Anxiety     Arthritis     COPD (chronic obstructive pulmonary disease)     Coronary artery disease     Essential tremor     Hx of arterial ischemic stroke     Hypertension     Hypothyroid     Myocardial infarction     2015    Personal history of kidney stones     Use of cane as ambulatory aid     or walker    Wears glasses      Past Surgical History:   Procedure Laterality Date    CAROTID ENDARTERECTOMY Right     CATARACT EXTRACTION W/ INTRAOCULAR LENS  IMPLANT, BILATERAL      CORONARY ARTERY BYPASS GRAFT      CABG X3    JOINT REPLACEMENT      left TKR    KIDNEY STONE SURGERY      FL TOTAL KNEE ARTHROPLASTY Right 2/25/2016    Procedure: ARTHROPLASTY KNEE TOTAL;  Surgeon: Ayden Hudson MD;  Location: [facility];  Service: Orthopedics     Social History   History   Alcohol Use No     History   Drug Use No     History   Smoking Status    Former Smoker    Quit date: 2/3/1976   Smokeless Tobacco    Never Used     Family History: non-contributory    Meds/Allergies   PTA meds:   Prior to Admission Medications   Prescriptions Last Dose Informant Patient Reported? Taking? Acetaminophen 650 MG TABS   Yes No   Sig: Take 650 mg by mouth     Cholecalciferol (VITAMIN D) 2000 UNITS tablet   Yes No   Sig: Take 2,000 Units by mouth daily  Olmesartan Medoxomil (BENICAR PO)   Yes No   Sig: Take 40 mg by mouth  Zolpidem Tartrate (AMBIEN PO)   Yes No   Sig: Take 10 mg by mouth daily at bedtime as needed  ascorbic acid (VITAMIN C) 500 mg tablet   Yes No   Sig: Take 500 mg by mouth 2 (two) times a day  aspirin 81 mg chewable tablet   Yes No   Sig: Chew 81 mg  To check with md sung when to stop    atorvastatin (LIPITOR) 40 mg tablet   Yes No   Sig: Take 80 mg by mouth daily at bedtime  clopidogrel (PLAVIX) 75 mg tablet   Yes No   Sig: Take 75 mg by mouth  Pt instructed to stop 7 days prior to surgery    diazepam (VALIUM) 5 mg tablet   Yes No   Sig: Take 5 mg by mouth every evening  fluticasone-salmeterol (ADVAIR) 250-50 mcg/dose   Yes No   Sig: Inhale 1 puff every 12 (twelve) hours  gabapentin (NEURONTIN) 100 mg capsule   Yes No   Sig: Take 100 mg by mouth 3 (three) times a day  levothyroxine 75 mcg tablet   Yes No   Sig: Take by mouth    metoprolol tartrate (LOPRESSOR) 12 5 mg tablet   Yes No   Sig: Take 12 5 mg by mouth 2 (two) times a day  oxyCODONE (ROXICODONE) 5 mg immediate release tablet   No No   Si-3 tablets (5-15 mg) every 4 hours as needed for pain   pantoprazole (PROTONIX) 40 mg tablet   Yes No   Sig: Take 40 mg by mouth daily  primidone (MYSOLINE) 250 mg tablet   Yes No   Sig: Take 250 mg by mouth daily at bedtime     vilazodone (VIIBRYD) 40 mg tablet   Yes No   Sig: Take 40 mg by mouth daily with breakfast       Facility-Administered Medications: None     Allergies   Allergen Reactions    Penicillins Rash       Objective   First Vitals:   Blood Pressure: 165/71 (17)  Pulse: 86 (17)  Temperature: 97 9 °F (36 6 °C) (17)  Temp Source: Oral (17)  Respirations: 21 (17)  Height: 5' 4" (162 6 cm) (17)  Weight - Scale: 93 9 kg (207 lb) (17 6939)  SpO2: (!) 75 % (pt placed on 3L O2 sats increased to 98%) (09/29/17 2309)    Current Vitals:   Blood Pressure: 166/71 (09/30/17 0145)  Pulse: 77 (09/30/17 0145)  Temperature: 97 9 °F (36 6 °C) (09/29/17 2309)  Temp Source: Oral (09/29/17 2309)  Respirations: 22 (09/30/17 0145)  Height: 5' 4" (162 6 cm) (09/29/17 2309)  Weight - Scale: 93 9 kg (207 lb) (09/29/17 2309)  SpO2: 99 % (09/30/17 0145)    No intake or output data in the 24 hours ending 09/30/17 0203    Invasive Devices     Peripheral Intravenous Line            Peripheral IV 09/29/17 Left Hand less than 1 day    Peripheral IV 09/29/17 Right Antecubital less than 1 day    Peripheral IV 09/30/17 Left Antecubital less than 1 day                Physical Exam   Constitutional: She is oriented to person, place, and time  She appears well-developed and well-nourished  No distress  HENT:   Head: Normocephalic and atraumatic  Eyes: EOM are normal  Pupils are equal, round, and reactive to light  Neck: No JVD present  Cardiovascular: Normal rate, regular rhythm, normal heart sounds and intact distal pulses  Exam reveals no gallop and no friction rub  No murmur heard  Pulmonary/Chest: Effort normal and breath sounds normal  No respiratory distress  She exhibits tenderness (min tender to palp at L lower ribs)  Abdominal: Soft  She exhibits no distension  There is no tenderness  There is no rebound and no guarding  Genitourinary:   Genitourinary Comments: Rectal exam with normal tone, +bright red blood, no stool   Neurological: She is alert and oriented to person, place, and time  No cranial nerve deficit  Skin: Skin is warm and dry  She is not diaphoretic  No pallor         Lab Results:   CBC:   Lab Results   Component Value Date    WBC 9 59 09/29/2017    HGB 12 0 09/30/2017    HCT 41 4 09/30/2017    MCV 88 09/29/2017     (H) 09/29/2017    MCH 24 6 (L) 09/29/2017    MCHC 27 9 (L) 09/29/2017    RDW 16 6 (H) 09/29/2017    MPV 10 1 09/29/2017    NRBC 0 09/29/2017   , CMP:   Lab Results   Component Value Date     09/29/2017    K 4 0 09/29/2017     09/29/2017    CO2 36 (H) 09/29/2017    ANIONGAP 5 09/29/2017    BUN 26 (H) 09/29/2017    CREATININE 0 97 09/29/2017    GLUCOSE 114 09/29/2017    CALCIUM 8 4 09/29/2017    AST 10 09/29/2017    ALT <6 (L) 09/29/2017    ALKPHOS 129 (H) 09/29/2017    PROT 6 6 09/29/2017    ALBUMIN 2 7 (L) 09/29/2017    BILITOT 0 39 09/29/2017    EGFR 55 09/29/2017   , Coagulation:   Lab Results   Component Value Date    INR 1 04 09/29/2017     Imaging: I have personally reviewed pertinent reports  EKG, Pathology, and Other Studies: I have personally reviewed pertinent reports  Code Status: Prior  Advance Directive and Living Will:      Power of :    POLST:      Counseling / Coordination of Care  Total floor / unit time spent today 30 minutes  Greater than 50% of total time was spent with the patient and / or family counseling and / or coordination of care  A description of the counseling / coordination of care: discussion with patient and ED care provider

## 2017-09-30 NOTE — PROGRESS NOTES
Colonoscopy completed the cecum  Old blood throughout  The worst in the transverse descending sigmoid and rectum  Bleeding diverticulum seen at proximal sigmoid distal descending  Controlled with epinephrine injection and clips  Marked a single injection of spot in the ink  No bleeding conclusion the procedure  Colon copiously irrigated with approximately 2 L of sterile saline  No other gross mass lesions noted, however prep would be considered inadequate for polyp detection  Patient was transferred to recovery room in stable condition    Will return to floor with plans for continued observation for the next 24-48 hours to monitor for rebleed

## 2017-10-01 LAB
HCT VFR BLD AUTO: 28 % (ref 34.8–46.1)
HCT VFR BLD AUTO: 31.6 % (ref 34.8–46.1)
HCT VFR BLD AUTO: 32 % (ref 34.8–46.1)
HGB BLD-MCNC: 7.9 G/DL (ref 11.5–15.4)
HGB BLD-MCNC: 8.8 G/DL (ref 11.5–15.4)
HGB BLD-MCNC: 9 G/DL (ref 11.5–15.4)

## 2017-10-01 PROCEDURE — 85018 HEMOGLOBIN: CPT | Performed by: COLON & RECTAL SURGERY

## 2017-10-01 PROCEDURE — 85014 HEMATOCRIT: CPT | Performed by: COLON & RECTAL SURGERY

## 2017-10-01 RX ORDER — ZOLPIDEM TARTRATE 5 MG/1
5 TABLET ORAL
Status: DISCONTINUED | OUTPATIENT
Start: 2017-10-01 | End: 2017-10-02 | Stop reason: HOSPADM

## 2017-10-01 RX ORDER — DIAZEPAM 2 MG/1
2 TABLET ORAL DAILY PRN
Status: DISCONTINUED | OUTPATIENT
Start: 2017-10-01 | End: 2017-10-02 | Stop reason: HOSPADM

## 2017-10-01 RX ADMIN — ATORVASTATIN CALCIUM 80 MG: 80 TABLET, FILM COATED ORAL at 20:58

## 2017-10-01 RX ADMIN — LEVOTHYROXINE SODIUM 75 MCG: 75 TABLET ORAL at 06:17

## 2017-10-01 RX ADMIN — METOPROLOL TARTRATE 12.5 MG: 25 TABLET ORAL at 20:57

## 2017-10-01 RX ADMIN — METOPROLOL TARTRATE 12.5 MG: 25 TABLET ORAL at 09:23

## 2017-10-01 RX ADMIN — GABAPENTIN 100 MG: 100 CAPSULE ORAL at 09:23

## 2017-10-01 RX ADMIN — SODIUM CHLORIDE 125 ML/HR: 0.9 INJECTION, SOLUTION INTRAVENOUS at 06:38

## 2017-10-01 RX ADMIN — VILAZODONE HYDROCHLORIDE 40 MG: 40 TABLET ORAL at 09:23

## 2017-10-01 RX ADMIN — PANTOPRAZOLE SODIUM 40 MG: 40 TABLET, DELAYED RELEASE ORAL at 06:17

## 2017-10-01 NOTE — CONSULTS
Inpatient Medical Consultation - [address] [place] [address] Internal Medicine    Patient Information: Juan Diego Gonzales 80 y o  female MRN: [id number]  Unit/Bed#: [facility] 510-01 Encounter: [id number]  PCP: Dru Watters DO  Date of Admission:  9/29/2017  Date of Consultation: 10/01/17  Requesting Physician: Evelyn Brown MD    Reason For Consultation:     Confusion    Assessment/Plan:    1  Mild acute metabolic encephalopathy , already improving- multifactorial secondary to GI bleed, anesthesia, worsening anemia , recent colonoscopy and polypharmacy- rule out underlying mild cognitive impairment  Neuro exam is nonfocal , no need for head imaging-  continue with supportive care,  change Valium to p r n  D/C Neurontin , continue to monitor  2  Benign essential tremor-patient not Primidone any more, she is on Neupro patch at home- stable- monitor  3  CAD s/p CABG ,aspirin and Plavix on hold due to diverticular bleed ,continue statin  4  Hypertension blood pressure is acceptable- monitor on beta-blocker  5  Hypothyroidism- continue levothyroxine-check TSH  6  COPD without exacerbation-negative chest x-ray, continue Advair  7  Depression on Viibryd  8  History of CVA status post right CEA  9  Acute hypoxic respiratory failure on admission, unclear etiology, improving     Thank you will follow        VTE Prophylaxis: Reason for no pharmacologic prophylaxis GI bleed  / sequential compression device         Counseling / Coordination of Care Time: 45 minutes  Greater than 50% of total time spent on patient counseling and coordination of care  Collaboration of Care: Were Recommendations Directly Discussed with Primary Treatment Team? - No     History of Present Illness:    Rodrick Kramer is a 80 y o  female who is originally admitted to colorectal service on 9/29/2017 due to GI bleed     We are consulted for confusion  Patient was admitted with GI bleed was found to have diverticular bleed status post colonoscopy yesterday with clipping, injection  Early this morning patient was found attempting to exit her bed while pulling at her IV line, she was slow to answer questions and  appeared forgetful and disoriented  Currently she is awake alert oriented x 3  I spoke with patient's son who she lives with, he reported that she has been more lethargic in the past couple of days with progressive dyspnea on exertion , no chest pain or headache no nausea vomiting no fever or chills no cough  Patient was taken off Primidone for benign essential tremor and was placed on in Neupro patch ,she only takes diazepam p r n  Review of Systems:    Review of Systems   Constitutional: Negative for chills and fever  HENT: Negative for sore throat  Respiratory: Positive for shortness of breath  Negative for chest tightness  Cardiovascular: Negative for chest pain and leg swelling  Gastrointestinal: Positive for blood in stool  Negative for abdominal pain, diarrhea, nausea and vomiting  Endocrine: Negative for polyuria  Genitourinary: Negative for difficulty urinating and dysuria  Neurological: Negative for dizziness, speech difficulty and headaches  Psychiatric/Behavioral: Positive for confusion, decreased concentration and sleep disturbance  All other systems reviewed and are negative        Past Medical and Surgical History:     Past Medical History:   Diagnosis Date    Anxiety     Arthritis     COPD (chronic obstructive pulmonary disease)     Coronary artery disease     Essential tremor     Hx of arterial ischemic stroke     Hypertension     Hypothyroid     Myocardial infarction     2015    Personal history of kidney stones     Use of cane as ambulatory aid     or walker    Wears glasses        Past Surgical History:   Procedure Laterality Date    CAROTID ENDARTERECTOMY Right     CATARACT EXTRACTION W/ INTRAOCULAR LENS  IMPLANT, BILATERAL      CORONARY ARTERY BYPASS GRAFT      CABG X3    JOINT REPLACEMENT      left TKR  KIDNEY STONE SURGERY      [place] TOTAL KNEE ARTHROPLASTY Right 2/25/2016    Procedure: ARTHROPLASTY KNEE TOTAL;  Surgeon: Sheila Kirkpatrick MD;  Location: [facility];  Service: Orthopedics       Meds/Allergies:    all medications and allergies reviewed    Allergies: Allergies   Allergen Reactions    Penicillins Rash       Social History:     Marital Status:     Substance Use History:   History   Alcohol Use No     History   Smoking Status    Former Smoker    Quit date: 2/3/1976   Smokeless Tobacco    Never Used     History   Drug Use No       Family History:    non-contributory    Physical Exam:     Vitals:   Blood Pressure: 107/53 (10/01/17 1133)  Pulse: 61 (10/01/17 1133)  Temperature: 98 7 °F (37 1 °C) (10/01/17 1133)  Temp Source: Oral (10/01/17 1133)  Respirations: 21 (10/01/17 1133)  Height: 5' 4" (162 6 cm) (09/29/17 2309)  Weight - Scale: 101 kg (222 lb) (10/01/17 0500)  SpO2: 99 % (10/01/17 1133)    Physical Exam   Constitutional: She is oriented to person, place, and time  She appears well-developed  HENT:   Head: Normocephalic and atraumatic  Mouth/Throat: Oropharynx is clear and moist  No oropharyngeal exudate  Eyes: Conjunctivae and EOM are normal  No scleral icterus  Neck: Normal range of motion  Neck supple  No JVD present  Cardiovascular: Normal rate, regular rhythm, normal heart sounds and intact distal pulses  No murmur heard  Pulmonary/Chest: Effort normal and breath sounds normal  No respiratory distress  She has no wheezes  Abdominal: Soft  Bowel sounds are normal  She exhibits no distension  There is no tenderness  There is no rebound  Obese   Musculoskeletal: Normal range of motion  She exhibits edema (Trace lower extremity edema)  She exhibits no tenderness  Lymphadenopathy:     She has no cervical adenopathy  Neurological: She is alert and oriented to person, place, and time  No cranial nerve deficit  Skin: Skin is warm and dry  No rash noted  Additional Data:     Lab Results: I have personally reviewed pertinent reports  Results from last 7 days  Lab Units 10/01/17  0749  09/29/17  2345   WBC Thousand/uL  --   --  9 59   HEMOGLOBIN g/dL 8 8*  < > 11 9   HEMATOCRIT % 31 6*  < > 42 6   PLATELETS Thousands/uL  --   --  395*   NEUTROS PCT %  --   --  69   LYMPHS PCT %  --   --  19   MONOS PCT %  --   --  7   EOS PCT %  --   --  4   < > = values in this interval not displayed  Results from last 7 days  Lab Units 09/29/17  2345   SODIUM mmol/L 144   POTASSIUM mmol/L 4 0   CHLORIDE mmol/L 103   CO2 mmol/L 36*   BUN mg/dL 26*   CREATININE mg/dL 0 97   CALCIUM mg/dL 8 4   TOTAL PROTEIN g/dL 6 6   BILIRUBIN TOTAL mg/dL 0 39   ALK PHOS U/L 129*   ALT U/L <6*   AST U/L 10   GLUCOSE RANDOM mg/dL 114       Results from last 7 days  Lab Units 09/29/17  2345   INR  1 04       Imaging: I have personally reviewed pertinent reports  Xr Chest 2 Views    Result Date: 9/30/2017  Narrative: CHEST - DUAL ENERGY INDICATION:  Left chest pain COMPARISON:  8/8/2015 VIEWS:  PA (including soft tissue/bone algorithms) and lateral projections IMAGES:  4 FINDINGS:     Heart shadow is enlarged but unchanged from prior exam   Sternotomy wires  Mildly increased density in the lower lung fields, likely related to positioning and scoliosis  Otherwise no consolidation  No pneumothorax or pleural effusion  Stable dextroscoliosis  Impression: No active pulmonary disease  As per comments in the PACS workstation, findings are concordant with preliminary interpretation provided by the emergency room physician  Workstation performed: [id number]     Ct High Volume Lower Gi Bleed    Result Date: 9/30/2017  Narrative: CT ABDOMEN AND PELVIS - WITHOUT AND WITH IV CONTRAST INDICATION:  Rectal bleeding, abdominal pain  COMPARISON: Relevant images of the chest CT from 6/28/2015   TECHNIQUE:  CT examination of the abdomen and pelvis was performed both prior to and after the administration of intravenous contrast      Reformatted images were created in axial, sagittal, and coronal planes  Radiation dose length product (DLP) for this visit:  2912  21 mGy-cm   This examination, like all CT scans performed in the [facility], was performed utilizing techniques to minimize radiation dose exposure, including the use of iterative reconstruction and automated exposure control  IV Contrast:  100 mL of iohexol (OMNIPAQUE)     Enteric Contrast:  Enteric contrast was not administered  FINDINGS: ABDOMEN  BOWEL:  There is a focus of active contrast extravasation into the bowel lumen at the junction of the descending and sigmoid colon (series 3 image 106 and series 3 image 263 )  Exact etiology is unclear, although because there is diffuse left-sided colonic diverticulosis, diverticular bleed would be most likely  LOWER CHEST:  No significant abnormality in the lung bases  LIVER/BILIARY TREE: One or more subcentimeter sharply circumscribed low-density hepatic lesion(s) are noted, too small to accurately characterize, but statistically most likely to represent subcentimeter hepatic cysts  No suspicious solid hepatic lesion  is identified  Hepatic contours are normal   No biliary dilatation  GALLBLADDER:  There is a 3 4 cm gallstone in the gallbladder  No CT evidence of acute cholecystitis  SPLEEN:  Unremarkable  PANCREAS:  Unremarkable  ADRENAL GLANDS: Unremarkable  KIDNEYS/URETERS: Multiple nonobstructing renal calyceal stones bilaterally  PELVIS REPRODUCTIVE ORGANS:  Unremarkable for patient's age  URINARY BLADDER:  Unremarkable  APPENDIX: No findings to suggest appendicitis  ADDITIONAL ABDOMINAL AND PELVIC STRUCTURES ABDOMINOPELVIC CAVITY:  No ascites or free intraperitoneal air  No lymphadenopathy  ABDOMINAL WALL/INGUINAL REGIONS:  Unremarkable  OSSEOUS STRUCTURES:  No acute fracture or destructive osseous lesion       Impression: There is a focus of active contrast extravasation into the bowel lumen at the junction of the descending and sigmoid colon (series 3 image 106 and series 3 image 263 )  Exact etiology is unclear, although because there is diffuse left-sided colonic diverticulosis, diverticular bleed would be most likely  Cholelithiasis without CT evidence of acute cholecystitis  Multiple nonobstructing renal calyceal stones bilaterally  There is a different interpretation from the [facility] preliminary report which was provided shortly after completion of the exam  The finding of active GI bleed was therefore verbally discussed with referring clinicians as soon as possible  ##cfslh I personally discussed this result with Dr Jim Gutiérrez on 9/30/2017 7:51 AM  ## Workstation performed: [id number]       EKG, Pathology, and Other Studies Reviewed on Admission:   · yes    ** Please Note: This note has been constructed using a voice recognition system   **

## 2017-10-01 NOTE — SOCIAL WORK
Cm met with pt to discuss DCP and cm role  Pt lives with family in a ranch home 1[place]  Prior to admission pt used a cane with ambulation and was independent with ADLS  Prior hx of [facility]  Pt's preference for pharmacy is [facility] [address]  CM reviewed d/c planning process including the following: identifying help at home, patient preference for d/c planning needs, Discharge Lounge, [facility] Meds to Bed program, availability of treatment team to discuss questions or concerns patient and/or family may have regarding understanding medications and recognizing signs and symptoms once discharged  CM also encouraged patient to follow up with all recommended appointments after discharge  Patient advised of importance for patient and family to participate in managing patients medical well being

## 2017-10-01 NOTE — PROGRESS NOTES
Patient awoke with urge to urinate and was found attempting to exit her bed while pulling at her IV lines  Multiple attempts were made on bedpan, patient unable to pass urine  Bladder scan showed 41mL in bladder despite 125mL continuous fluids since last urination at 9pm   Patient assessed to be slow to answer questions, preferring to gesture rather than speak  She appeared forgetful and slow to catch on to instructions--sitting up rather than rolling to her side when asked to roll, for example  This is a change from earlier in the evening when she was alert and oriented  [facility] team notified of change  Concern for supplemental O2 at 3-5L over the last day discussed, nursing communication ordered for OK to titrate supplemental O2 to keep patient's O2 level at 90%  Will continue to monitor

## 2017-10-01 NOTE — PROGRESS NOTES
Progress Note - Colorectal Surgery   Magali Jackson 80 y o  female MRN: [id number]  Unit/Bed#: [facility] 510-01 Encounter: [id number]    Assessment:  80 F with GI bleed, s/p colonoscopy with clipping, injection, and marking    Plan:  Serial H/H  Monitor for rebleeding  Continue clears  IVF  Hold ASA/plavix    Subjective/Objective     Subjective: No acute events  Unable to void overnight, but had large incontinent episode this morning  No bowel movements yet  Objective:    Blood pressure 134/61, pulse 71, temperature 98 8 °F (37 1 °C), resp  rate 18, height 5' 4" (1 626 m), weight 101 kg (222 lb), SpO2 93 %, not currently breastfeeding  ,Body mass index is 38 11 kg/m²  Intake/Output Summary (Last 24 hours) at 10/01/17 0650  Last data filed at 10/01/17 8489   Gross per 24 hour   Intake          4115 83 ml   Output              450 ml   Net          3665 83 ml       Invasive Devices     Peripheral Intravenous Line            Peripheral IV 09/29/17 Left Hand 1 day    Peripheral IV 09/30/17 Left Antecubital 1 day                Physical Exam:   General: NAD, AAOx3  CV: RRR +S1/S2  Chest: breath sounds bilaterally  Abdomen: soft, NT ND  Extremities: atraumatic, no edema        Results from last 7 days  Lab Units 10/01/17  0031 09/30/17  1640 09/30/17  1354  09/29/17  2345   WBC Thousand/uL  --   --   --   --  9 59   HEMOGLOBIN g/dL 9 0* 9 6* 8 8*  < > 11 9   HEMATOCRIT % 32 0* 33 8* 30 9*  < > 42 6   PLATELETS Thousands/uL  --   --   --   --  395*   < > = values in this interval not displayed      Results from last 7 days  Lab Units 09/29/17  2345   SODIUM mmol/L 144   POTASSIUM mmol/L 4 0   CHLORIDE mmol/L 103   CO2 mmol/L 36*   BUN mg/dL 26*   CREATININE mg/dL 0 97   GLUCOSE RANDOM mg/dL 114   CALCIUM mg/dL 8 4       Results from last 7 days  Lab Units 09/29/17  2345   INR  1 04   PTT seconds 29

## 2017-10-02 VITALS
HEART RATE: 60 BPM | SYSTOLIC BLOOD PRESSURE: 124 MMHG | HEIGHT: 64 IN | BODY MASS INDEX: 37.03 KG/M2 | WEIGHT: 216.9 LBS | TEMPERATURE: 98.5 F | RESPIRATION RATE: 18 BRPM | DIASTOLIC BLOOD PRESSURE: 54 MMHG | OXYGEN SATURATION: 96 %

## 2017-10-02 LAB
ANION GAP SERPL CALCULATED.3IONS-SCNC: 3 MMOL/L (ref 4–13)
ARTERIAL PATENCY WRIST A: YES
ARTERIAL PATENCY WRIST A: YES
BASE EXCESS BLDA CALC-SCNC: 5.4 MMOL/L
BASE EXCESS BLDA CALC-SCNC: 8.5 MMOL/L
BUN SERPL-MCNC: 18 MG/DL (ref 5–25)
CALCIUM SERPL-MCNC: 8.1 MG/DL (ref 8.3–10.1)
CHLORIDE SERPL-SCNC: 105 MMOL/L (ref 100–108)
CO2 SERPL-SCNC: 34 MMOL/L (ref 21–32)
CREAT SERPL-MCNC: 0.9 MG/DL (ref 0.6–1.3)
ERYTHROCYTE [DISTWIDTH] IN BLOOD BY AUTOMATED COUNT: 16.7 % (ref 11.6–15.1)
GFR SERPL CREATININE-BSD FRML MDRD: 60 ML/MIN/1.73SQ M
GLUCOSE SERPL-MCNC: 99 MG/DL (ref 65–140)
HCO3 BLDA-SCNC: 33.9 MMOL/L (ref 22–28)
HCO3 BLDA-SCNC: 36.6 MMOL/L (ref 22–28)
HCT VFR BLD AUTO: 33.5 % (ref 34.8–46.1)
HGB BLD-MCNC: 9.2 G/DL (ref 11.5–15.4)
MAGNESIUM SERPL-MCNC: 2.1 MG/DL (ref 1.6–2.6)
MCH RBC QN AUTO: 24.2 PG (ref 26.8–34.3)
MCHC RBC AUTO-ENTMCNC: 27.5 G/DL (ref 31.4–37.4)
MCV RBC AUTO: 88 FL (ref 82–98)
NASAL CANNULA: 2
NASAL CANNULA: 2
O2 CT BLDA-SCNC: 11.9 ML/DL (ref 16–23)
O2 CT BLDA-SCNC: 12.7 ML/DL (ref 16–23)
OXYHGB MFR BLDA: 91.1 % (ref 94–97)
OXYHGB MFR BLDA: 96.7 % (ref 94–97)
PCO2 BLDA: 76.3 MM HG (ref 36–44)
PCO2 BLDA: 76.5 MM HG (ref 36–44)
PH BLDA: 7.26 [PH] (ref 7.35–7.45)
PH BLDA: 7.3 [PH] (ref 7.35–7.45)
PLATELET # BLD AUTO: 367 THOUSANDS/UL (ref 149–390)
PMV BLD AUTO: 10.2 FL (ref 8.9–12.7)
PO2 BLDA: 118.5 MM HG (ref 75–129)
PO2 BLDA: 70.8 MM HG (ref 75–129)
POTASSIUM SERPL-SCNC: 4.1 MMOL/L (ref 3.5–5.3)
RBC # BLD AUTO: 3.8 MILLION/UL (ref 3.81–5.12)
SODIUM SERPL-SCNC: 142 MMOL/L (ref 136–145)
SPECIMEN SOURCE: ABNORMAL
SPECIMEN SOURCE: ABNORMAL
TSH SERPL DL<=0.05 MIU/L-ACNC: 0.69 UIU/ML (ref 0.36–3.74)
WBC # BLD AUTO: 10.59 THOUSAND/UL (ref 4.31–10.16)

## 2017-10-02 PROCEDURE — 80048 BASIC METABOLIC PNL TOTAL CA: CPT | Performed by: STUDENT IN AN ORGANIZED HEALTH CARE EDUCATION/TRAINING PROGRAM

## 2017-10-02 PROCEDURE — G8988 SELF CARE GOAL STATUS: HCPCS

## 2017-10-02 PROCEDURE — 36600 WITHDRAWAL OF ARTERIAL BLOOD: CPT

## 2017-10-02 PROCEDURE — 82805 BLOOD GASES W/O2 SATURATION: CPT | Performed by: SURGERY

## 2017-10-02 PROCEDURE — G8987 SELF CARE CURRENT STATUS: HCPCS

## 2017-10-02 PROCEDURE — 85027 COMPLETE CBC AUTOMATED: CPT | Performed by: STUDENT IN AN ORGANIZED HEALTH CARE EDUCATION/TRAINING PROGRAM

## 2017-10-02 PROCEDURE — G8979 MOBILITY GOAL STATUS: HCPCS

## 2017-10-02 PROCEDURE — 82805 BLOOD GASES W/O2 SATURATION: CPT | Performed by: INTERNAL MEDICINE

## 2017-10-02 PROCEDURE — 83735 ASSAY OF MAGNESIUM: CPT | Performed by: STUDENT IN AN ORGANIZED HEALTH CARE EDUCATION/TRAINING PROGRAM

## 2017-10-02 PROCEDURE — 97167 OT EVAL HIGH COMPLEX 60 MIN: CPT

## 2017-10-02 PROCEDURE — 84443 ASSAY THYROID STIM HORMONE: CPT | Performed by: INTERNAL MEDICINE

## 2017-10-02 PROCEDURE — G8978 MOBILITY CURRENT STATUS: HCPCS

## 2017-10-02 PROCEDURE — 97163 PT EVAL HIGH COMPLEX 45 MIN: CPT

## 2017-10-02 RX ADMIN — LEVOTHYROXINE SODIUM 75 MCG: 75 TABLET ORAL at 05:17

## 2017-10-02 RX ADMIN — METOPROLOL TARTRATE 12.5 MG: 25 TABLET ORAL at 08:22

## 2017-10-02 RX ADMIN — PANTOPRAZOLE SODIUM 40 MG: 40 TABLET, DELAYED RELEASE ORAL at 05:17

## 2017-10-02 RX ADMIN — VILAZODONE HYDROCHLORIDE 40 MG: 40 TABLET ORAL at 08:22

## 2017-10-02 NOTE — SOCIAL WORK
FARZANA made a DME referral to [facility] for home o2 with a request for a portable tank to be delivered to Pt's room prior her d/c today  FARZANA made [address] aware

## 2017-10-02 NOTE — CASE MANAGEMENT
Initial Clinical Review     Admission: Date/Time/Statement: 9/30/17 @ 0231            Orders Placed This Encounter   Procedures    Inpatient Admission       Standing Status:   Standing       Number of Occurrences:   1       Order Specific Question:   Admitting Physician       Answer:   Misbah Díaz [[id number]]       Order Specific Question:   Level of Care       Answer:   Level 1 Stepdown [13]       Order Specific Question:   Estimated length of stay       Answer:   More than 2 Midnights       Order Specific Question:   Certification       Answer:   I certify that inpatient services are medically necessary for this patient for a duration of greater than two midnights   See H&P and MD Progress Notes for additional information about the patient's course of treatment             ED: Date/Time/Mode of Arrival:             ED Arrival Information      Expected Arrival Acuity Means of Arrival Escorted By Service Admission Type     - 9/29/2017 23:03 Emergent Ambulance [facility]) Colorectal Emergency     Arrival Complaint     blood in stool              Chief Complaint:        Chief Complaint   Patient presents with    Rectal Bleeding       Pt present to ed with c/o episode of abdominal pain and rectal bleeding          History of Illness:   Carmen Gonzales is a 80 y o  female who presents with bloody bowel movements   She was on her way home from [place] with her son when she felt some rectal pressure like she needed to have a bowel movement   They pulled over at a rest stop around 5:30pm and she went to the bathroom and had what she thought was a large liquidy bowel movement; however, when she looked in the toilet it was all red, with no stool/clot/melena   They arrived home around 7:00pm and she immediately had another bowel movement of all blood   She denies having any abdominal pain with these episodes   She also denies other associated symptoms including fevers/chills, shortness of breath, lightheadedness/dizziness   She has never had any blood in her stool previously   Of note, she has never had a colonoscopy   She is on aspirin and plavix for CAD   She denies any recent weight loss      Past medical history is significant for MI/CAD s/p CABG, nephrolithiasis, HTN, stroke, COPD, arthritis, anxiety/depression   Past surgical history is significant for CABG, right carotid endarterectomy, bilateral knee replacements   She is allergic to penicillin   Family history is positive for hypertension and cardiac disease, but negative for colon cancer or any other type of gastrointestinal disease   She does not smoke tobacco or drink alcohol at all      Additionally, the patient did have a mechanical fall 3 weeks ago where she tripped over a stool and fell on a hardwood floor   She is complaining of some left-sided chest wall tenderness      ED Vital Signs:            ED Triage Vitals [09/29/17 2309]   Temperature Pulse Respirations Blood Pressure SpO2   97 9 °F (36 6 °C) 86 21 165/71 (!) 75 %       Temp Source Heart Rate Source Patient Position - Orthostatic VS BP Location FiO2 (%)   Oral Monitor Lying Right arm --       Pain Score           No Pain                Wt Readings from Last 1 Encounters:   09/29/17 93 9 kg (207 lb)         Vital Signs (abnormal):    above     Abnormal Labs/Diagnostic Test Results:    Co2  36  BUN  26  Alk phos   129  Albumin   2 7  Platelets    990  H/H   11 9/42 6   ( adm)             10 7/38     (  9/30       0652)              9  6/33 6     (    9/30       1056)  CXR:  NAD  CT  Abd/pelvis: There is a focus of active contrast extravasation into the bowel lumen at the junction of the descending and sigmoid colon (series 3 image 106 and series 3 image 263 )  Exact etiology is unclear, although because there is diffuse left-sided colonic   diverticulosis, diverticular bleed would be most likely  Cholelithiasis without CT evidence of acute cholecystitis      Multiple nonobstructing renal calyceal stones bilaterally      ED Treatment:              Medication Administration from 09/29/2017 2303 to 09/30/2017 5322        Date/Time Order Dose Route Action Action by Comments       09/30/2017 0235 sodium chloride 0 9 % bolus 1,000 mL 0 mL Intravenous Stopped Jacquelyn Nicole, RN         09/30/2017 0135 sodium chloride 0 9 % bolus 1,000 mL 1,000 mL Intravenous New Bag Jacquelyn Nicole, RN         09/30/2017 0220 desmopressin (DDAVP) 20 mcg in sodium chloride 0 9 % 50 mL IVPB 0 mcg Intravenous Stopped Jacquelyn Townsends, RN         09/30/2017 0149 desmopressin (DDAVP) 20 mcg in sodium chloride 0 9 % 50 mL IVPB 20 mcg Intravenous New Bag Jacquelyn Townsends, RN         09/30/2017 0126 iohexol (OMNIPAQUE) 350 MG/ML injection (MULTI-DOSE) 100 mL 100 mL Intravenous Given Mart Standard               Past Medical/Surgical History: Active Ambulatory Problems     Diagnosis Date Noted    COPD (chronic obstructive pulmonary disease)      Anxiety      Arthritis      Coronary artery disease      Essential tremor      Hypertension      Hypothyroid      Myocardial infarction      Ischemic stroke      Hx of arterial ischemic stroke      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    Fall 02/06/2015    Hypoxia 06/28/2015    Primary localized osteoarthrosis 03/01/2016    Tremor 03/01/2016           Resolved Ambulatory Problems     Diagnosis Date Noted    No Resolved Ambulatory Problems           Past Medical History:   Diagnosis Date    Anxiety      Arthritis      COPD (chronic obstructive pulmonary disease)      Coronary artery disease      Essential tremor      Hx of arterial ischemic stroke      Hypertension      Hypothyroid      Myocardial infarction      Personal history of kidney stones      Use of cane as ambulatory aid      Wears glasses           Admitting Diagnosis: Blood in stool [K92 1]  GI bleed [K92 2]     Age/Sex: 80 y o  female     Assessment/Plan:    81F w/GI bleed  No active extravasation on CTA GI bleed protocol per [facility], our read still pending   - received 1u DDAVP for ASA reversal     Plan:  - admit to [facility]  - clears  - golytely for bowel prep  - likely c-scope tomorrow 10/1  - NS @ 125  - q4 h&h  - scds  - PT/OT     Admission Orders:   IP  9/30  @    0242  Scheduled Meds:   atorvastatin 80 mg Oral HS   diazepam 5 mg Oral QPM   fluticasone-salmeterol 1 puff Inhalation Q12H [address]   gabapentin 100 mg Oral TID   levothyroxine 75 mcg Oral Early Morning   metoprolol tartrate 12 5 mg Oral BID   pantoprazole 40 mg Oral Early Morning   primidone 250 mg Oral HS   vilazodone 40 mg Oral Daily With Breakfast      Continuous Infusions:   sodium chloride 125 mL/hr Last Rate: 125 mL/hr (09/30/17 0408)      PRN Meds:   acetaminophen    ondansetron      PT/OT  H/H  Q 4 hrs  Tele  Cl liq  Diet  Cons  Colon rectal surgery  NPO  9/30  Plan  Colonoscopy     Colon  Rectal  Note  9/30  [facility] read of CTA incorrect, per our radiologist there is an area of active contrast extravasation at the junction of the descending and sigmoid colon  We will make her NPO and plan to scope today      [address] [place] [address] in the [facility] by Eduar Cabezas for 2017  Network Utilization Review Department  Phone: [phone]; Fax [phone]  ATTENTION: The Network Utilization Review Department is now centralized for our 7 Facilities  Make a note that we have a new phone and fax numbers for our Department  Please call with any questions or concerns to [phone] and carefully follow the prompts so that you are directed to the right person  All voicemails are confidential  Fax any determinations, approvals, denials, and requests for initial or continue stay review clinical to [phone]   Due to HIGH CALL volume, it would be easier if you could please send faxed requests to expedite your requests and in part, help us provide discharge notifications faster

## 2017-10-02 NOTE — DISCHARGE SUMMARY
Discharge Summary - Colorectal Surgery   Bruce Gonzales 80 y o  female MRN: [id number]  Unit/Bed#: [facility] 510-01 Encounter: [id number]        Admitting Diagnosis: Rectal bleeding    Admit Date: 9/30/17    Discharge Diagnosis: Proximal sigmoid colon diverticular bleed    Discharge Date: 10/2/17    HPI: Yessi Colby is a 80 y o  female who presents with bloody bowel movements  She was on her way home from [place] with her son when she felt some rectal pressure like she needed to have a bowel movement  They pulled over at a rest stop around 5:30pm and she went to the bathroom and had what she thought was a large liquidy bowel movement; however, when she looked in the toilet it was all red, with no stool/clot/melena  They arrived home around 7:00pm and she immediately had another bowel movement of all blood  She denies having any abdominal pain with these episodes  She also denies other associated symptoms including fevers/chills, shortness of breath, lightheadedness/dizziness  She has never had any blood in her stool previously  Of note, she has never had a colonoscopy  She is on aspirin and plavix for CAD  She denies any recent weight loss      Past medical history is significant for MI/CAD s/p CABG, nephrolithiasis, HTN, stroke, COPD, arthritis, anxiety/depression  Past surgical history is significant for CABG, right carotid endarterectomy, bilateral knee replacements  She is allergic to penicillin  Family history is positive for hypertension and cardiac disease, but negative for colon cancer or any other type of gastrointestinal disease  She does not smoke tobacco or drink alcohol at all  Procedures Performed: 9/30/17 Colonoscopy with injection and clipping of proximal sigmoid colon diverticular bleed - Dr Chaim Tirado: Patient was admitted and had a CTA performed revealing an active bleed at the junction of the descending colon and sigmoid colon   Patient them was immediately scoped by Dr Joe Downing and a bleeding diverticulum of the proximal sigmoid colon was seen, injected and clipped x 2  Patient has had a stable hemoglobin since  Patient has a Hbg of 9 2 on discharge  Her lowest Hgb was 8 8  Patient is tolerating a house diet and having bowel movements  No further rectal bleeding occurring  Prior to discharge, it was noted that the patient was hypoxic and required supplemental O2  SLIM was consulted and an arterial blood gas was obtained demonstrating hypercarbia  Pulmonology was subsequently consulted as patient has known COPD  They determined she was stable for discharge without oxygen  She will follow up with pulmonology as an outpatient  Complications: none      Condition at Discharge: good     Discharge instructions/Information to patient and family:   See after visit summary for information provided to patient and family  Provisions for Follow-Up Care:  See after visit summary for information related to follow-up care and any pertinent home health orders  Disposition: Home    Planned Readmission: No    Discharge Statement   I spent 30 minutes discharging the patient  This time was spent on the day of discharge  I had direct contact with the patient on the day of discharge  Additional documentation is required if more than 30 minutes were spent on discharge  Discharge Medications:  See after visit summary for reconciled discharge medications provided to patient and family

## 2017-10-02 NOTE — PLAN OF CARE
Problem: PHYSICAL THERAPY ADULT  Goal: Performs mobility at highest level of function for planned discharge setting  See evaluation for individualized goals  Treatment/Interventions: Functional transfer training, LE strengthening/ROM, Elevations, Therapeutic exercise, Endurance training, Equipment eval/education, Bed mobility, Gait training, Spoke to nursing, Spoke to case management, OT  Equipment Recommended: Jaida Licea (w/ (S))       See flowsheet documentation for full assessment, interventions and recommendations  Prognosis: Good  Problem List: Decreased strength, Decreased endurance, Impaired balance, Decreased mobility, Decreased safety awareness, Obesity  Assessment: Pt is 80 y o  admitted with hx of bloody bowel movements and Dx of Hematochezia; other Dx included: mild acute metabolic encephalopathy, acute hypoxic respiratory failure; pt underwent colonoscopy on 9/30/2017  Pt 's comorbidities affecting POC include:  MI/CAD s/p CABG, essential tremors, HTN, stroke, COPD, arthritis, (R) CEA, (B) TKR w/ self reported (R) knee issues, anxiety/depression and personal factors of: advanced age and DIAN  Pt's clinical presentation is currently unstable/unpredictable which is evident in need for input for task focus and mobility technique, need for stand by assist w/ all phases of observed OOB mobility on level surfaces when usually mobilizing independently, ongoing telemetry monitoring, decreased O2 sat to 80s % when mobilizing on RA, and occasional bouts of mild disorientation; ABGs pending  Pt presents w/ generalized weakness, incl decreased LE strength, decreased functional endurance, decreased activity tolerance, min impaired balance, min gait deviations and fall risk  Will cont to follow pt in PT for progressive mobilization to address above functional deficits and to max level of (I), endurance, and safety   Currently recommend to consider rehab upon D/C in light of limited support at home and especially if pt requires suppl O2 upon D/C; will otherwise cont to follow and make appropriate changes to D/C recommendations as needed based on progress  Will cont to follow until then  Barriers to Discharge: Decreased caregiver support     Recommendation: Post acute IP rehab (at this time)          See flowsheet documentation for full assessment

## 2017-10-02 NOTE — OCCUPATIONAL THERAPY NOTE
[address]  Evaluation     Patient Name: Estella Ortiz  [id number]'X Date: 10/2/2017  Problem List  Patient Active Problem List   Diagnosis    COPD (chronic obstructive pulmonary disease)    Anxiety    Arthritis    Coronary artery disease    Essential tremor    Hypertension    Hypothyroid    Myocardial infarction    Ischemic stroke    Hx of arterial ischemic stroke    Occluded coronary artery stent    Fall    Hypoxia    Primary localized osteoarthrosis    Tremor    Hematochezia     Past Medical History  Past Medical History:   Diagnosis Date    Anxiety     Arthritis     COPD (chronic obstructive pulmonary disease)     Coronary artery disease     Essential tremor     Hx of arterial ischemic stroke     Hypertension     Hypothyroid     Myocardial infarction     2015    Personal history of kidney stones     Use of cane as ambulatory aid     or walker    Wears glasses      Past Surgical History  Past Surgical History:   Procedure Laterality Date    CAROTID ENDARTERECTOMY Right     CATARACT EXTRACTION W/ INTRAOCULAR LENS  IMPLANT, BILATERAL      COLONOSCOPY N/A 9/30/2017    Procedure: COLONOSCOPY FOR CONTROL OF BLEEDING;  Surgeon: Laila Cutler MD;  Location:  MAIN OR;  Service: Colorectal    CORONARY ARTERY BYPASS GRAFT      CABG X3    JOINT REPLACEMENT      left TKR    KIDNEY STONE SURGERY      [place] TOTAL KNEE ARTHROPLASTY Right 2/25/2016    Procedure: ARTHROPLASTY KNEE TOTAL;  Surgeon: Carlyn No MD;  [facility];  Service: Orthopedics         10/02/17 1131   Note Type   Note type Eval/Treat   Restrictions/Precautions   Weight Bearing Precautions Per Order No   Other Precautions Cognitive; Chair Alarm; Bed Alarm; Impulsive;O2;Telemetry; Fall Risk;Pain   Pain Assessment   Pain Assessment No/denies pain   Pain Score No Pain   Home Living   Type of [address] [place] [address] One level;Stairs to enter with rails  (1 DIAN )   Bathroom Shower/Tub Tub/shower unit   [place] Toilet Standard   Bathroom Equipment Shower chair;Commode   Bathroom Accessibility Accessible   Home Equipment Walker   Additional Comments PT REPORTS USE OF ROLLATOR AT BASELINE  NOT USING SC PTA  Prior Function   Level of [place] Independent with ADLs and functional mobility   Lives With Son;Family   Receives Help From Family   ADL Assistance Independent   IADLs Needs assistance   Falls in the last 6 months (1+)   Vocational Retired   Lifestyle   Autonomy  [address] PTA  Reciprocal Relationships LIVES WITH SON AND 17 YO GRANDDAUGHTER HOWEVER, BOTH ARE OUT OF THE HOUSE DURING THE DAY AND UNABLE TO PROVIDE ASSISTANCE  Service to Others RETIRED   Intrinsic Gratification ENJOYS SPENDING TIME WITH FAMILY    Psychosocial   Psychosocial (WDL) WDL   ADL   Eating Assistance 7  Independent   Grooming Assistance 6  Modified Independent   UB Bathing Assistance 4  Minimal Assistance   LB Bathing Assistance 3  Moderate Assistance   UB Dressing Assistance 4  Minimal Assistance   LB Dressing Assistance 3  Moderate Assistance   Toileting Assistance  4  Minimal Assistance   Functional Assistance 4  Minimal Assistance   Additional Comments OVERALL MIN-MOD A FOR ADLS    Bed Mobility   Supine to Sit 4  Minimal assistance   Additional items Assist x 1;HOB elevated; Increased time required;Verbal cues   Sit to Supine Unable to assess  (PT LEFT OOB WITH CHAIR ALARM ON FOLLOWING SESSION )   Transfers   Sit to Stand 5  Supervision   Additional items Assist x 1;Verbal cues; Impulsive   Stand to Sit 5  Supervision   Additional items Assist x 1; Impulsive;Verbal cues   Additional Comments REQUESTED BY RN TO COLLECT O2 STATS ON RA  UPON ARRIVAL, PT ON 2L O2 AT 95% AT REST  PT DROPPED TO 83% ON RA WHILE SEATED EOB  PT AT 90-94% ON 2L O2 WITH ACTIVITY  WITH O2 REMOVED, PT DROPPED TO 80-83% WITH ACTIVITY  PT RETURNED TO 2L O2 AT 94% FOLLOWING OT SESSION  Hammad WHITMAN Mess  Functional Mobility   Functional Mobility 4  Minimal assistance   Additional Comments OVERALL SUPERVISION WITH +LOB, REQUIRING MIN A TO CORRECT  PT REQUIRED CUES FOR SAFE USE OF RW/SAFE TRANSFER TECHNIQUE  Additional items Rolling walker   Balance   Static Sitting Fair +   Static Standing Fair -   Ambulatory Fair -   Activity Tolerance   Activity Tolerance Patient limited by fatigue;Treatment limited secondary to medical complications (Comment)   Nurse Made Aware APPROPRIATE TO SEE PER RNRachel AWARE OF PT'S O2 STATS  RUE Assessment   RUE Assessment WFL   LUE Assessment   LUE Assessment WFL   Hand Function   Gross Motor Coordination Functional   Fine Motor Coordination Functional   Sensation   Light Touch No apparent deficits   Cognition   Overall Cognitive Status WFL  ((?) FURTHER ASSESSMENT RECOMMENDED )   Arousal/Participation Responsive; Cooperative   Attention Attends with cues to redirect   Orientation Level Oriented to person;Oriented to place;Oriented to situation   Memory Decreased recall of precautions;Decreased short term memory   Following Commands Follows one step commands without difficulty   Comments PT IS PLEASANT AND COOPERATIVE  SLOW TO PROCESS/RESPOND AT TIMES  LIMITED SAFETY AWARENESS/INSIGHT/JUDGEMENT  RECOMMEND ALARM ON PT  Assessment   Limitation Decreased ADL status; Decreased Safe judgement during ADL;Decreased cognition;Decreased endurance;Decreased high-level ADLs; Decreased self-care trans   Prognosis Good   Assessment 82 YO FEMALE SEEN FOR INITIAL OT EVAL FOLLOWING ADMISSION TO [facility] WITH GI BLEED S/P COLONOSCOPY WITH CLIPPING  PT WITH ACUTE METABOLIC ENCEPHALOPATHY  PT WITH PMH INCLUDING MI, CAD, HTN, STROKE, ARTHRITIS, COPD, AND ANXIETY/DEPRESSION  PT IS FROM HOME WITH FAMILY WHERE SHE REPORTS BEING I WITH ADLS PTA   PT CURRENTLY REQUIRES OVERALL MIN-MOD A FOR ADLS, SUPERVISION FOR TRANSFERS AND SUPERVISION-MIN A FOR FUNCTIONAL MOBILITY WITH USE OF RW  PT IS LIMITED 2' FATIGUE, SOB/LABORED BREATHING (SEE ABOVE FOR O2 STATS), IMPAIRED BALANCE WITH LOB, LIMITED SAFETY AWARENESS/INSIGHT/JUDGEMENT, SLOW TO PROCESS/RESPOND AT TIMES, CUES FOR CARRY OVER OF LEARNED PRECAUTIONS AND LIMITED ACTIVITY TOLERANCE  FROM AN OT PERSPECTIVE, PT WOULD BENEFIT FROM CONT OT SERVICES IN AN INPT REHAB SETTING UPON D/C  IT IS BELIEVED THAT IF PT WERE TO NEED HOME O2, THERE WOULD BE INCREASED CONCERNS FOR SAFETY WITH O2 LINE/MANAGING O2  WILL CONT TO FOLLOW TO ADDRESS THE BELOW DESCRIBED GOALS  Goals   Patient Goals NONE EXPRESSED    LTG Time Frame 7-10   Long Term Goal #1 SEE BELOW    Plan   Treatment Interventions ADL retraining;Functional transfer training; Endurance training;Cognitive reorientation;Patient/family training;Equipment evaluation/education; Compensatory technique education; Energy conservation; Activityengagement   Goal Expiration Date 10/12/17   OT Frequency 3-5x/wk   Recommendation   Discharge Recommendation Short Term Rehab   OT - OK to Discharge Yes  (WHEN MEDICALLY CLEARED  )   Barthel Index   Feeding 10   Bathing 0   Grooming Score 5   Dressing Score 5   Bladder Score 10   Bowels Score 10   Toilet Use Score 5   Transfers (Bed/Chair) Score 10   Mobility (Level Surface) Score 0   Stairs Score 5   Barthel Index Score 60   Modified [place] Scale   Modified [place] Scale 3       OCCUPATIONAL THERAPY GOALS TO BE MET WITHIN 7-10 DAYS:    -Pt will complete additional cognitive assessment with 100% attention to task in order to assist with safe d/c plan  -Pt will follow 100% simple 2-step commands and be A&O x4 consistently with environmental cues to increase participation in functional activities  -Pt will increase bed mobility to MOD I to participate in functional activities with G tolerance and balance    -Pt will improve functional mobility and transfers to MOD I on/off all surfaces w/ G balance/safety including toileting w/ G safety awareness of O2 line   -Pt will participate in lt grooming task with MOD I after set-up standing at sink ~3-5 minutes with G safety and balance  -Pt will increase independence in all ADLS to MOD I with G balance sitting upright in chair   -Pt will improve activity tolerance to G for 30 min txment sessions w/ G carry over of learned energy conservation techniques   -Pt will improve independence in lt homemaking activities to MOD I without requiring cues for safety   -Pt will demonstrate G carryover of learned safety techniques and proper body mechanics in functional and leisure activities with use of DME        Documentation completed by Sepideh Hall, MOT, OTR/L

## 2017-10-02 NOTE — CONSULTS
Consultation - Pulmonary Medicine   Malaika August Gonzales 80 y o  female MRN: [id number]  Unit/Bed#: [facility] 510-01 Encounter: [id number]      Assessment:  1  Overlap syndrome due to COPD and undiagnosed obstructive sleep apnea  2   Chronic hypercapnic respiratory failure with acute worsening most likely related to respiratory drive suppression after recent procedure  3   Obesity hypoventilation syndrome  Plan:   1  Minimize oxygen as the patient PO2 is 118 only on 2 liters oxygen  The patient respiratory drive has been already suppressed chronically  Further oxygen would result in further retention of CO2  This is mostly central rather than peripheral acute on chronic hypercapnia  2   No need for repeating ABG  3   Home sleep study which can be arranged as an outpatient  I have given the name to the office in that regard  4   Ambulate the patient to evaluate for oxygen need on the with exercise and sleep  5   Minimize sedatives  6   No need for BiPAP at this point  The patient has been mentating very well  She is chronically CO2 retainer  7   Continue with Advair, and albuterol on as needed basis  8   Patient can be discharged home to follow up with pulmonary as an outpatient  Physician Requesting Consult: Antonio Serrano MD  Reason for Consult / Principal Problem:  Acute on chronic hypercapnia  Hx and PE limited by: The patient was somewhat lethargic however her son was at the bedside and given all the information  He has excellent informant  HPI: Mart Soriano is a 80y o  year old female who presents with lower GI bleed underwent colonoscopy under moderate sedation  The patient however was noted to be lethargic for the past 24 hours  The patient does have morbid obesity with BMI above than 35    She does have history of COPD and she quit smoking at the age of 27 however she does have secondhand exposure to smoking via her  in the past   The patient recently returned to [facility] from a trip to [place]  The patient denies any feeling illness while she was in [place]  The patient denies any cough no sputum production  She does not feel any benefit from using Advair according to her  She has not been also adhered to the treatment with bronchodilators  She does have normalized at home  The patient did not have oxygen at home and she never used it  She has no history of COPD exacerbation requiring admissions to the hospital   No pulmonary function test done on her also  The patient was admitted to the hospital and treated for GI bleeding and underwent evaluation which revealed colonic source of GI bleed  To mention, the patient had a history of coronary artery disease status post CABG, history of CVA, hypertension in addition to morbid obesity and COPD  The rest of her review of system was unremarkable except for the above      Consults    Review of Systems    Historical Information   Past Medical History:   Diagnosis Date    Anxiety     Arthritis     COPD (chronic obstructive pulmonary disease)     Coronary artery disease     Essential tremor     Hx of arterial ischemic stroke     Hypertension     Hypothyroid     Myocardial infarction     2015    Personal history of kidney stones     Use of cane as ambulatory aid     or walker    Wears glasses      Past Surgical History:   Procedure Laterality Date    CAROTID ENDARTERECTOMY Right     CATARACT EXTRACTION W/ INTRAOCULAR LENS  IMPLANT, BILATERAL      COLONOSCOPY N/A 9/30/2017    Procedure: COLONOSCOPY FOR CONTROL OF BLEEDING;  Surgeon: Anais Romero MD;  Location:  MAIN OR;  Service: Colorectal    CORONARY ARTERY BYPASS GRAFT      CABG X3    JOINT REPLACEMENT      left TKR    KIDNEY STONE SURGERY      [place] TOTAL KNEE ARTHROPLASTY Right 2/25/2016    Procedure: ARTHROPLASTY KNEE TOTAL;  Surgeon: Polly Trinidad MD;  [facility];  Service: Orthopedics     Social History   History   Alcohol Use No     History Drug Use No     History   Smoking Status    Former Smoker    Quit date: 2/3/1976   Smokeless Tobacco    Never Used     Occupational History:  The patient has 15 pack year history of smoking, and 50  year history of secondhand exposure to smoking  Family History: Other family members with COPD  Meds/Allergies   all current active meds have been reviewed    Allergies   Allergen Reactions    Penicillins Rash       Objective   Vitals: Blood pressure 124/54, pulse 60, temperature 98 5 °F (36 9 °C), temperature source Oral, resp  rate 18, height 5' 4" (1 626 m), weight 98 4 kg (216 lb 14 4 oz), SpO2 96 %, not currently breastfeeding  ,Body mass index is 37 23 kg/m²  Intake/Output Summary (Last 24 hours) at 10/02/17 1727  Last data filed at 10/02/17 1359   Gross per 24 hour   Intake              118 ml   Output             1320 ml   Net            -1202 ml     Invasive Devices     Peripheral Intravenous Line            Peripheral IV 09/29/17 Left Hand 2 days    Peripheral IV 09/30/17 Left Antecubital 2 days                Physical Exam  her vital signs are stable  Her O2 saturation remains 99 percent  No JVP, her neck size is increased to 18 inches, heart examination S1-S2 regular rate and rhythm, distant breath sounds bilaterally, abdomen is benign, trace edema in the periphery  Neurologically she is the heart rate but she is able to answer questions and follow commands  She has no neurologic deficit  Lab Results: Her labs has been reviewed which showed CO2 elevated to 76 while her PO2 is 118 likely the oxygen is contributing to her exacerbation of CO2 retention  Her pH remains comparable  Imaging Studies: I have personally reviewed pertinent reports  Her chest x-ray was reviewed showing no significant volume loss to account for acute hypercapnia  EKG, Pathology, and Other Studies: I have personally reviewed pertinent reports      VTE Prophylaxis: Enoxaparin (Lovenox)    Code Status: Level 1 - Full Code  Advance Directive and Living Will:      Power of :    POLST:      None

## 2017-10-02 NOTE — SOCIAL WORK
Informed [facility], that patient's son would like to take her home and oxygen has been ordered  Pulmonary consult has been ordered and needs to clear patient for discharge

## 2017-10-02 NOTE — PHYSICAL THERAPY NOTE
Physical Therapy Evaluation    Patient's Name: Benedict Gonzales    Admitting Diagnosis  Blood in stool [K92 1]  GI bleed [K92 2]    Problem List  Patient Active Problem List   Diagnosis    COPD (chronic obstructive pulmonary disease)    Anxiety    Arthritis    Coronary artery disease    Essential tremor    Hypertension    Hypothyroid    Myocardial infarction    Ischemic stroke    Hx of arterial ischemic stroke    Occluded coronary artery stent    Fall    Hypoxia    Primary localized osteoarthrosis    Tremor    Hematochezia       Past Medical History  Past Medical History:   Diagnosis Date    Anxiety     Arthritis     COPD (chronic obstructive pulmonary disease)     Coronary artery disease     Essential tremor     Hx of arterial ischemic stroke     Hypertension     Hypothyroid     Myocardial infarction     2015    Personal history of kidney stones     Use of cane as ambulatory aid     or walker    Wears glasses        Past Surgical History  Past Surgical History:   Procedure Laterality Date    CAROTID ENDARTERECTOMY Right     CATARACT EXTRACTION W/ INTRAOCULAR LENS  IMPLANT, BILATERAL      COLONOSCOPY N/A 9/30/2017    Procedure: COLONOSCOPY FOR CONTROL OF BLEEDING;  Surgeon: Aniyah Castro MD;  Location:  MAIN OR;  Service: Colorectal    CORONARY ARTERY BYPASS GRAFT      CABG X3    JOINT REPLACEMENT      left TKR    KIDNEY STONE SURGERY      KS TOTAL KNEE ARTHROPLASTY Right 2/25/2016    Procedure: ARTHROPLASTY KNEE TOTAL;  Surgeon: Eduar Eagle MD;  Location: [facility];  Service: Orthopedics            10/02/17 1130   Note Type   Note type Eval only   Pain Assessment   Pain Assessment No/denies pain   Home Living   Type of [facility] One level  (1 DIAN)   Home Equipment Walker;Cane;Wheelchair-manual  [facility]; shower chair)   Prior Function   Level of [place] Independent with ADLs and functional mobility  (amb w/ rollator)   Lives With Son  (works)   Falls in the last 6 months (1 recent fall as per pt)   Restrictions/Precautions   Braces or Orthoses (denies)   Other Precautions Fall Risk;Telemetry;O2;Cognitive  (chair alarm placed and activated at the end of session)   General   Additional Pertinent History Cleared for assessment (spoke to nsg); nsg also requested to check pulse ox on RA; pt was initially on 2 L of O2 --> resting O2 sat = 95 %; on RA, pt's O2 sat was noted to be at 83 % after transition to edge of bed; pt was placed back on suppl O2 prior to OOB mobilization (nsg aware); during amb on 2 L of O2, O2 sat is at 94 % w/ decline to 90 % post steps; pt then ambulated on RA back to room and O2 sat was noted down to 80-83 %; pt was placed back on 2 L of O2 and O2 sat = 94 %; RN is aware  Cognition   Overall Cognitive Status WFL  (occasional bouts of diffuse disorientation)   Arousal/Participation Responsive   Orientation Level Oriented to person;Oriented to place;Oriented to situation  (near time)   Memory Decreased recall of recent events   Following Commands Follows one step commands without difficulty   Comments Pt is observed in bed; responds to questions appropriately; pleasant and cooperative; agreeable to try mobilization; appears to be somewhat fatigued;   RUE Assessment   RUE Assessment WFL  (AROM)   LUE Assessment   LUE Assessment WFL  (AROM)   RLE Assessment   RLE Assessment WFL  (AROM)   Strength RLE   RLE Overall Strength 3+/5   LLE Assessment   LLE Assessment WFL  (AROM)   Strength LLE   LLE Overall Strength 3+/5   Bed Mobility   Supine to Sit 4  Minimal assistance   Additional items Assist x 1   Transfers   Sit to Stand 5  Supervision   Additional items Assist x 1;Verbal cues   Stand to Sit 5  Supervision   Additional items Assist x 1;Verbal cues   Ambulation/Elevation   Gait pattern Excessively slow; Inconsistent fuentes; Short stride  (occasional lateral swaying requiring close guarding)   Gait Assistance 5  Supervision  (w/ occasional close guarding/min (A) required)   Additional items Assist x 1  (stand by (A) of 2nd for lines)   Assistive Device Rolling walker   Distance 2 x 50 ft w/ step negotiation in between   Stair Management Assistance 4  Minimal assist   Additional items Assist x 1;Verbal cues   Stair Management Technique One rail R;Foreward;Backward; Step to pattern;Nonreciprocal;Other (Comment)  ((L) hand hold (A) and (R) hand rail)   Number of Stairs 1   Balance   Static Sitting Fair +   Static Standing Fair -  (supported)   Ambulatory Fair -  (w/ rw)   Activity Tolerance   Activity Tolerance Patient limited by fatigue;Treatment limited secondary to medical complications (Comment)   Nurse Made Aware spoke to Denise Mitchell RN   Assessment   Prognosis Good   Problem List Decreased strength;Decreased endurance; Impaired balance;Decreased mobility; Decreased safety awareness; Obesity   Assessment Pt is 80 y o  admitted with hx of bloody bowel movements and Dx of Hematochezia; other Dx included: mild acute metabolic encephalopathy, acute hypoxic respiratory failure; pt underwent colonoscopy on 9/30/2017  Pt 's comorbidities affecting POC include:  MI/CAD s/p CABG, essential tremors, HTN, stroke, COPD, arthritis, (R) CEA, (B) TKR w/ self reported (R) knee issues, anxiety/depression and personal factors of: advanced age and DIAN  Pt's clinical presentation is currently unstable/unpredictable which is evident in need for input for task focus and mobility technique, need for stand by assist w/ all phases of observed OOB mobility on level surfaces when usually mobilizing independently, ongoing telemetry monitoring, decreased O2 sat to 80s % when mobilizing on RA, and occasional bouts of mild disorientation; ABGs pending  Pt presents w/ generalized weakness, incl decreased LE strength, decreased functional endurance, decreased activity tolerance, min impaired balance, min gait deviations and fall risk   Will cont to follow pt in PT for progressive mobilization to address above functional deficits and to max level of (I), endurance, and safety  Currently recommend to consider rehab upon D/C in light of limited support at home and especially if pt requires suppl O2 upon D/C; will otherwise cont to follow and make appropriate changes to D/C recommendations as needed based on progress  Will cont to follow until then  Barriers to Discharge Decreased caregiver support   Goals   Patient Goals none expressed   STG Expiration Date 10/12/17   Short Term Goal #1 7-10 days  Pt will amb 150 ft w/ rw, mod (I) and O2 sat remaining > 90 % on suppl O2 if required  Pt will negotiate 1 step w/ appropriate assistive device/technique, (S)x1  Pt will achieve (I) level w/ bed mob  Pt will perform transfers w/ mod (I)  Plan   Treatment/Interventions Functional transfer training;LE strengthening/ROM; Elevations; Therapeutic exercise; Endurance training;Equipment eval/education; Bed mobility;Gait training;Spoke to nursing;Spoke to case management;OT   PT Frequency 5x/wk   Recommendation   Recommendation Post acute IP rehab  (at this time)   Equipment Recommended Walker  (w/ (S))   Modified Shaun Scale   Modified Saxton Scale 3   Barthel Index   Feeding 10   Bathing 0   Grooming Score 5   Dressing Score 5   Bladder Score 10   Bowels Score 10   Toilet Use Score 5   Transfers (Bed/Chair) Score 10   Mobility (Level Surface) Score 0   Stairs Score 5   Barthel Index Score 60       John Hernandez, PT

## 2017-10-02 NOTE — PLAN OF CARE
Problem: OCCUPATIONAL THERAPY ADULT  Goal: Performs self-care activities at highest level of function for planned discharge setting  See evaluation for individualized goals  Treatment Interventions: ADL retraining, Functional transfer training, Endurance training, Cognitive reorientation, Patient/family training, Equipment evaluation/education, Compensatory technique education, Energy conservation, Activityengagement          See flowsheet documentation for full assessment, interventions and recommendations  Limitation: Decreased ADL status, Decreased Safe judgement during ADL, Decreased cognition, Decreased endurance, Decreased high-level ADLs, Decreased self-care trans  Prognosis: Good  Assessment: 80 YO FEMALE SEEN FOR INITIAL OT EVAL FOLLOWING ADMISSION TO \[facility]\"" WITH GI BLEED S/P COLONOSCOPY WITH CLIPPING  PT WITH ACUTE METABOLIC ENCEPHALOPATHY  PT WITH PMH INCLUDING MI, CAD, HTN, STROKE, ARTHRITIS, COPD, AND ANXIETY/DEPRESSION  PT IS FROM HOME WITH FAMILY WHERE SHE REPORTS BEING I WITH ADLS PTA  PT CURRENTLY REQUIRES OVERALL MIN-MOD A FOR ADLS, SUPERVISION FOR TRANSFERS AND SUPERVISION-MIN A FOR FUNCTIONAL MOBILITY WITH USE OF RW  PT IS LIMITED 2' FATIGUE, SOB/LABORED BREATHING (SEE ABOVE FOR O2 STATS), IMPAIRED BALANCE WITH LOB, LIMITED SAFETY AWARENESS/INSIGHT/JUDGEMENT, SLOW TO PROCESS/RESPOND AT TIMES, CUES FOR CARRY OVER OF LEARNED PRECAUTIONS AND LIMITED ACTIVITY TOLERANCE  FROM AN OT PERSPECTIVE, PT WOULD BENEFIT FROM CONT OT SERVICES IN AN INPT REHAB SETTING UPON D/C  IT IS BELIEVED THAT IF PT WERE TO NEED HOME O2, THERE WOULD BE INCREASED CONCERNS FOR SAFETY WITH O2 LINE/MANAGING O2  WILL CONT TO FOLLOW TO ADDRESS THE BELOW DESCRIBED GOALS        Discharge Recommendation: Short Term Rehab  OT - OK to Discharge: Yes ([address] [place] [address] Osteopathy  )

## 2017-10-02 NOTE — PROGRESS NOTES
[facility] Internal Medicine Progress Note  Patient: Evin Gonzales 80 y o  female   MRN: [id number]  PCP: Franck Pedro DO  Unit/Bed#: [facility] 510-01 Encounter: [id number]  Date Of Visit: 10/02/17    Assessment:    Principal Problem:    Hematochezia      Plan:    1  Mild acute metabolic encephalopathy, resolved- multifactorial secondary to GI bleed, anesthesia, worsening anemia , recent colonoscopy and polypharmacy-medications were adjusted,  continue with supportive care   2  Benign essential tremor-patient not Primidone any more, she is on Neupro patch at home- stable- monitor  3  CAD s/p CABG ,restart aspirin and Plavix when ok with Colorectal, continue statin  4  HTN, blood pressure is acceptable- monitor on beta-blocker  5  Hypothyroidism- continue levothyroxine  6  COPD without exacerbation-negative chest x-ray, continue Advair  7  Depression on Viibryd  8  History of CVA S/P right CEA  9  Acute hypoxic respiratory failure on admission, unclear etiology,  continued to desat during sleeping, negative chest x-ray, check ABG, may need outpatient sleep study       VTE Pharmacologic Prophylaxis:   Pharmacologic: Pharmacologic VTE Prophylaxis contraindicated due to GI bleed  Mechanical VTE Prophylaxis in Place: Yes    Patient Centered Rounds: I have performed bedside rounds with nursing staff today  Discussions with Specialists or Other Care Team Provider:  Colorectal    Education and Discussions with Family / Patient:  Patient    Time Spent for Care: 30 minutes  More than 50% of total time spent on counseling and coordination of care as described above      Current Length of Stay: 2 day(s)    Current Patient Status: Inpatient       Discharge Plan / Estimated Discharge Date: per colorectal    Code Status: Level 1 - Full Code      Subjective:   Patient seen and examined  Comfortable in bed  O2 desat during sleep  No chest pain or shortness of breath    Objective:     Vitals:   Temp (24hrs), Av 6 °F (37 °C), Min:98 1 °F (36 7 °C), Max:98 9 °F (37 2 °C)    HR:  [60-83] 83  Resp:  [20-24] 24  BP: (107-151)/(50-67) 151/67  SpO2:  [92 %-99 %] 92 %  Body mass index is 37 23 kg/m²  Input and Output Summary (last 24 hours): Intake/Output Summary (Last 24 hours) at 10/02/17 1028  Last data filed at 10/02/17 0921   Gross per 24 hour   Intake           551 33 ml   Output             1370 ml   Net          -818 67 ml       Physical Exam:     Physical Exam     Patient is awake alert in no acute distress  Lung clear to auscultation bilateral  Heart positive S1-S2 no murmur  Abdomen soft nontender positive bowel sounds  Lower extremity trace edema      Additional Data:     Labs:      Results from last 7 days  Lab Units 10/02/17  0442  09/29/17  2345   WBC Thousand/uL 10 59*  --  9 59   HEMOGLOBIN g/dL 9 2*  < > 11 9   HEMATOCRIT % 33 5*  < > 42 6   PLATELETS Thousands/uL 367  --  395*   NEUTROS PCT %  --   --  69   LYMPHS PCT %  --   --  19   MONOS PCT %  --   --  7   EOS PCT %  --   --  4   < > = values in this interval not displayed  Results from last 7 days  Lab Units 10/02/17  0442 09/29/17  2345   SODIUM mmol/L 142 144   POTASSIUM mmol/L 4 1 4 0   CHLORIDE mmol/L 105 103   CO2 mmol/L 34* 36*   BUN mg/dL 18 26*   CREATININE mg/dL 0 90 0 97   CALCIUM mg/dL 8 1* 8 4   TOTAL PROTEIN g/dL  --  6 6   BILIRUBIN TOTAL mg/dL  --  0 39   ALK PHOS U/L  --  129*   ALT U/L  --  <6*   AST U/L  --  10   GLUCOSE RANDOM mg/dL 99 114       Results from last 7 days  Lab Units 09/29/17  2345   INR  1 04       * I Have Reviewed All Lab Data Listed Above  * Additional Pertinent Lab Tests Reviewed:  Jessica Cavazos Admission Reviewed    Imaging:    Imaging Reports Reviewed Today Include:   Imaging Personally Reviewed by Myself Includes:      Recent Cultures (last 7 days):           Last 24 Hours Medication List:     atorvastatin 80 mg Oral HS   fluticasone-salmeterol 1 puff Inhalation Q12H [address]   levothyroxine 75 mcg Oral Early Morning   metoprolol tartrate 12 5 mg Oral BID   pantoprazole 40 mg Oral Early Morning   vilazodone 40 mg Oral Daily With Breakfast        Today, Patient Was Seen By: Hussein Corona DO    ** Please Note: This note has been constructed using a voice recognition system   **

## 2017-10-02 NOTE — PROGRESS NOTES
Progress Note - Colorectal   Mino Finder Christian 80 y o  female MRN: [id number]  Unit/Bed#: [facility] 510-01 Encounter: [id number]    Assessment:  80 F with GI bleed, s/p colonoscopy with clipping, injection, and marking    Plan:  - f/u AM Hgb  - regular diet  - med changes for encephalopathy per SLIM  - analgesia  - hold antiplatelets  - discharge planning    Subjective/Objective     Subjective: Patient had a non-bloody bowel movement yesterday  Feels that she needs to have another BM currently  Objective:     Vitals: Blood pressure 133/60, pulse 72, temperature 98 9 °F (37 2 °C), resp  rate 20, height 5' 4" (1 626 m), weight 98 4 kg (216 lb 14 4 oz), SpO2 97 %, not currently breastfeeding  ,Body mass index is 37 23 kg/m²  I/O       09/30 0701 - 10/01 0700 10/01 0701 - 10/02 0700    P  O  820 298    I V  (mL/kg) 3195 8 (31 6) 731 3 (7 4)    Total Intake(mL/kg) 4015 8 (39 8) 1029 3 (10 5)    Urine (mL/kg/hr) 0 (0) 1345 (0 6)    Stool 450 (0 2) 0 (0)    Total Output 450 1345    Net +3565 8 -315 8          Unmeasured Urine Occurrence 2 x     Unmeasured Stool Occurrence  1 x          Physical Exam:  GEN: NAD  HEENT: MMM  CV: RRR  Lung: Normal effort  Ab: Soft, NT/ND  Extrem: No CCE  Neuro:  A+Ox3    Lab, Imaging and other studies:   CBC with diff:   Lab Results   Component Value Date    HGB 7 9 (L) 10/01/2017    HCT 28 0 (L) 10/01/2017   , BMP/CMP: No results found for: NA, K, CL, CO2, ANIONGAP, BUN, CREATININE, GLUCOSE, CALCIUM, AST, ALT, ALKPHOS, PROT, ALBUMIN, BILITOT, EGFR, Magnesium: No components found for: MAG  VTE Pharmacologic Prophylaxis: Sequential compression device (Venodyne)   VTE Mechanical Prophylaxis: sequential compression device

## 2017-10-03 ENCOUNTER — TRANSCRIBE ORDERS (OUTPATIENT)
Dept: ADMINISTRATIVE | Facility: HOSPITAL | Age: 82
End: 2017-10-03

## 2017-10-03 DIAGNOSIS — E66.2 PICKWICKIAN SYNDROME (HCC): Primary | ICD-10-CM

## 2017-10-03 LAB
ABO GROUP BLD BPU: NORMAL
ABO GROUP BLD BPU: NORMAL
BPU ID: NORMAL
BPU ID: NORMAL
SPECIMEN SOURCE: NORMAL
TROPONIN I BLD-MCNC: 0 NG/ML (ref 0–0.08)
UNIT DISPENSE STATUS: NORMAL
UNIT DISPENSE STATUS: NORMAL
UNIT PRODUCT CODE: NORMAL
UNIT PRODUCT CODE: NORMAL
UNIT RH: NORMAL
UNIT RH: NORMAL

## 2017-10-04 ENCOUNTER — GENERIC CONVERSION - ENCOUNTER (OUTPATIENT)
Dept: OTHER | Facility: OTHER | Age: 82
End: 2017-10-04

## 2017-10-05 ENCOUNTER — GENERIC CONVERSION - ENCOUNTER (OUTPATIENT)
Dept: OTHER | Facility: OTHER | Age: 82
End: 2017-10-05

## 2017-10-10 ENCOUNTER — ALLSCRIPTS OFFICE VISIT (OUTPATIENT)
Dept: OTHER | Facility: OTHER | Age: 82
End: 2017-10-10

## 2017-10-14 DIAGNOSIS — I10 ESSENTIAL (PRIMARY) HYPERTENSION: ICD-10-CM

## 2017-10-14 DIAGNOSIS — E78.5 HYPERLIPIDEMIA: ICD-10-CM

## 2017-10-14 DIAGNOSIS — E03.9 HYPOTHYROIDISM: ICD-10-CM

## 2017-10-14 DIAGNOSIS — E11.9 TYPE 2 DIABETES MELLITUS WITHOUT COMPLICATIONS (HCC): ICD-10-CM

## 2017-11-21 ENCOUNTER — GENERIC CONVERSION - ENCOUNTER (OUTPATIENT)
Dept: OTHER | Facility: OTHER | Age: 82
End: 2017-11-21

## 2017-11-21 ENCOUNTER — GENERIC CONVERSION - ENCOUNTER (OUTPATIENT)
Dept: FAMILY MEDICINE CLINIC | Facility: CLINIC | Age: 82
End: 2017-11-21

## 2017-11-27 ENCOUNTER — HOSPITAL ENCOUNTER (OUTPATIENT)
Dept: SLEEP CENTER | Facility: CLINIC | Age: 82
Discharge: HOME | End: 2017-11-27
Payer: COMMERCIAL

## 2017-11-27 DIAGNOSIS — G47.33 OBSTRUCTIVE SLEEP APNEA: ICD-10-CM

## 2017-11-27 DIAGNOSIS — E66.2 PICKWICKIAN SYNDROME (HCC): ICD-10-CM

## 2017-11-27 PROCEDURE — G0399 HOME SLEEP TEST/TYPE 3 PORTA: HCPCS

## 2017-12-08 ENCOUNTER — GENERIC CONVERSION - ENCOUNTER (OUTPATIENT)
Dept: OTHER | Facility: OTHER | Age: 82
End: 2017-12-08

## 2017-12-12 ENCOUNTER — GENERIC CONVERSION - ENCOUNTER (OUTPATIENT)
Dept: OTHER | Facility: OTHER | Age: 82
End: 2017-12-12

## 2017-12-12 DIAGNOSIS — I50.32 CHRONIC DIASTOLIC HEART FAILURE (HCC): ICD-10-CM

## 2017-12-20 ENCOUNTER — APPOINTMENT (OUTPATIENT)
Dept: LAB | Facility: CLINIC | Age: 82
End: 2017-12-20
Payer: COMMERCIAL

## 2017-12-20 ENCOUNTER — TRANSCRIBE ORDERS (OUTPATIENT)
Dept: LAB | Facility: CLINIC | Age: 82
End: 2017-12-20

## 2017-12-20 DIAGNOSIS — I50.32 CHRONIC DIASTOLIC HEART FAILURE (HCC): ICD-10-CM

## 2017-12-20 LAB
ANION GAP SERPL CALCULATED.3IONS-SCNC: 1 MMOL/L (ref 4–13)
BUN SERPL-MCNC: 20 MG/DL (ref 5–25)
CALCIUM SERPL-MCNC: 9.3 MG/DL (ref 8.3–10.1)
CHLORIDE SERPL-SCNC: 98 MMOL/L (ref 100–108)
CO2 SERPL-SCNC: 42 MMOL/L (ref 21–32)
CREAT SERPL-MCNC: 0.96 MG/DL (ref 0.6–1.3)
GFR SERPL CREATININE-BSD FRML MDRD: 55 ML/MIN/1.73SQ M
GLUCOSE P FAST SERPL-MCNC: 102 MG/DL (ref 65–99)
MAGNESIUM SERPL-MCNC: 2.2 MG/DL (ref 1.6–2.6)
POTASSIUM SERPL-SCNC: 4.4 MMOL/L (ref 3.5–5.3)
SODIUM SERPL-SCNC: 141 MMOL/L (ref 136–145)

## 2017-12-20 PROCEDURE — 36415 COLL VENOUS BLD VENIPUNCTURE: CPT

## 2017-12-20 PROCEDURE — 83735 ASSAY OF MAGNESIUM: CPT

## 2017-12-20 PROCEDURE — 80048 BASIC METABOLIC PNL TOTAL CA: CPT

## 2018-01-10 NOTE — MISCELLANEOUS
Message   Recorded as Task   Date: 03/31/2016 11:06 AM, Created By: Doimnic Arreola   Task Name: Call Back   Assigned To: Dominic Arreola   Regarding Patient: Opal Nunez, Status: Active   Comment:    Dominic Arreola - 31 Mar 2016 11:06 AM     TASK CREATED  Caller: Nona Vasquez  Message left at [phone] requesting a call back for pathology results  The cyst excised from her left upper arm on 3/24/16 was negative for malignancy  The results confirmed a cyst    Dominic Arreola - 31 Mar 2016 4:38 PM     TASK EDITED  Patient's son called for the results and he was informed that the findings were negative for cancer  Active Problems    1  Abdominal wall bulge (789 30) (R19 00)   2  Acute URI (465 9) (J06 9)   3  Anxiety (300 00) (F41 9)   4  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)   5  Burn of abdomen wall (942 03) (T21 02XA)   6  Cerebral infarct (434 91) (I63 9)   7  Clinical depression (311) (F32 9)   8  COPD (chronic obstructive pulmonary disease) (496) (J44 9)   9  Coronary arteriosclerosis (414 00) (I25 10)   10  Edema (782 3) (R60 9)   11  Esophageal reflux (530 81) (K21 9)   12  Family disruption (V61 09) (Z63 8)   13  Female Stress Incontinence (625 6)   14  Generalized anxiety disorder (300 02) (F41 1)   15  Generalized osteoarthritis (715 00) (M15 9)   16  Glaucoma (365 9) (H40 9)   17  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) (Z95 1)   18  Hyperlipidemia (272 4) (E78 5)   19  Hypertension (401 9) (I10)   20  Hypothyroidism (244 9) (E03 9)   21  Infected open wound (879 9) (T14 8,L08 9)   22  Insomnia (780 52) (G47 00)   23  Need for influenza vaccination (V04 81) (Z23)   24  Nontraumatic hematoma of soft tissue (729 92) (M79 81)   25  Osteoarthritis of knee (715 36) (M17 9)   26  Postural imbalance (729 90) (R29 3)   27  Primary osteoarthritis of right knee (715 16) (M17 11)   28  Right knee DJD (715 96) (M17 9)   29  Sebaceous cyst (706 2) (L72 3)   30   Status post left knee replacement (V43 65) (Z96 652)   31  Stroke syndrome (434 91) (I63 9)   32  Type 2 diabetes mellitus (250 00) (E11 9)   33  Visit for pre-operative examination (V72 84) (Z01 818)   34  Vitamin D deficiency (268 9) (E55 9)    Current Meds   1  Advair Diskus 250-50 MCG/DOSE Inhalation Aerosol Powder Breath Activated; INHALE   1 PUFF TWICE DAILY  RINSE MOUTH AFTER USE; Therapy: 09TRJ1692 to (Geovany Noe)  Requested for: 37UPB3312; Last   Rx:22Jan2016 Ordered   2  Albuterol Sulfate (2 5 MG/3ML) 0 083% Inhalation Nebulization Solution; USE 1 UNIT   DOSE IN NEBULIZER EVERY 4 TO 6 HOURS AS NEEDED; Therapy: 29AGD3995 to (Evaluate:14Nov2014)  Requested for: 53CNP2861; Last   Rx:15Oct2014 Ordered   3  AmLODIPine Besylate 5 MG Oral Tablet; TAKE ONE TABLET BY MOUTH ONCE DAILY; Therapy: 00XPV1467 to (Last Rx:20Pun4832)  Requested for: 86Evz3907 Ordered   4  Aspirin 81 MG Oral Tablet; Therapy: (Recorded:12Jun2015) to Recorded   5  Atorvastatin Calcium 40 MG Oral Tablet; TAKE 1 TABLET DAILY AS DIRECTED; Therapy: 21CIY1872 to (Evaluate:14Hbd7894)  Requested for: 55KVX3211; Last   Rx:73Plr4125 Ordered   6  Atorvastatin Calcium 80 MG Oral Tablet; TAKE 1 TABLET AT BEDTIME; Therapy: 20OOG8449 to (Evaluate:21Feb2016)  Requested for: 52WXE8739; Last   Rx:41Qag6753; Status: ACTIVE - Renewal Denied Ordered   7  Azithromycin 250 MG Oral Tablet; TAKE 2 TABLETS ON DAY 1 THEN TAKE 1 TABLET A   DAY FOR 4 DAYS; Therapy: 73OKW0350 to (Last Rx:14Mar2016)  Requested for: 28ZLE0393 Ordered   8  Bactroban Nasal 2 % Nasal Ointment; USE AS DIRECTED; Therapy: 08JBY1869 to (Last Rx:22Jan2016)  Requested for: 77ZUV0628 Ordered   9  Clopidogrel Bisulfate 75 MG Oral Tablet; TAKE ONE TABLET BY MOUTH ONCE DAILY; Therapy: 75Bem3575 to (Last Rx:06Jan2016)  Requested for: 10PXG1012 Ordered   10  Diazepam 5 MG Oral Tablet; TAKE 1 TABLET 3 times daily PRN;     Therapy: 84TLF6921 to (Evaluate:18Mar2016)  Requested for: 71YZZ6747; Last    Rx:31Clv3769 Ordered   11  Ferrous Sulfate 325 (65 Fe) MG Oral Tablet; TAKE 1 TABLET TWICE DAILY WITH    MEALS; Therapy: 52SIL8198 to (Sarah Hart)  Requested for: 00LMT0801; Last    Rx:22Jan2016 Ordered   12  Folic Acid 1 MG Oral Tablet; TAKE 1 TABLET DAILY; Therapy: 92OUD4502 to (Evaluate:44Uof6298)  Requested for: 81WAR4906; Last    Rx:17Feb2016 Ordered   13  Gabapentin 100 MG Oral Capsule; TAKE THREE CAPSULE BY MOUTH DAILY AS    DIRECTED; Therapy: 97Vzp8538 to (Last Rx:51Jyq8978)  Requested for: 75JKE9616 Ordered   14  Levothyroxine Sodium 75 MCG Oral Tablet; TAKE ONE TABLET BY MOUTH ONCE    DAILY; Therapy: 61ZQO3653 to (Last Rx:25Nov2015)  Requested for: 24GTE8040 Ordered   15  Metoprolol Tartrate 25 MG Oral Tablet; 1/2 tab twice daily; Therapy: 89Bnx9587 to (Evaluate:99Kjg8937)  Requested for: 69EMF6171; Last    Rx:17Feb2016 Ordered   16  OxyCODONE HCl - 5 MG Oral Tablet; TAKE 1 TO 2 TABLETS EVERY 6 HOURS AS    NEEDED FOR PAIN;    Therapy: 70JZW5986 to (Evaluate:16Mar2016); Last Rx:08Mar2016 Ordered   17  Oxycodone-Acetaminophen 5-325 MG Oral Tablet; TAKE 1 TABLET Every 6 hours PRN    pain; Therapy: 12MGT4311 to (Evaluate:11Mar2016); Last Rx:47Egi1428 Ordered   18  Pantoprazole Sodium 40 MG Oral Tablet Delayed Release; TAKE ONE TABLET BY    MOUTH ONCE DAILY; Therapy: 65Dek2602 to (Last Rx:05Oct2015)  Requested for: 12Oct2015 Ordered   19  Primidone 250 MG Oral Tablet; TAKE 1 TABLET AT BEDTIME; Therapy: 47APY3976 to (Evaluate:33Gxt1478)  Requested for: 85Jcs7596; Last    Rx:29Giu6897 Ordered   20  Torsemide 10 MG Oral Tablet; TAKE ONE TABLET BY MOUTH DAILY AS NEEDED; Therapy: 95FYB7538 to (Last Rx:05Oct2015)  Requested for: 12Oct2015 Ordered   21  Venlafaxine HCl ER 37 5 MG Oral Capsule Extended Release 24 Hour; TAKE 1    CAPSULE DAILY; Therapy: 97ONQ8056 to Recorded   22  Viibryd 40 MG Oral Tablet; TAKE ONE TABLET BY MOUTH ONCE DAILY;     Therapy: 81TVZ3223 to (Last Rx:65Aoa9015)  Requested for: 37EGJ0665 Ordered   23  Vitamin C 500 MG Oral Tablet; Take 1 tablet twice daily; Therapy: 99LHA0531 to (Nikolay Arvizu)  Requested for: 62HDM0577; Last    Rx:22Jan2016 Ordered   24  Zolpidem Tartrate 10 MG Oral Tablet; TAKE 1 TABLET AT  BEDTIME AS NEEDED FOR    INSOMNIA; Therapy: 15VWV9700 to (Evaluate:51Lft6882); Last Rx:61Eaq8029 Ordered    Allergies    1   Penicillins    Signatures   Electronically signed by : Vero Howell, ; Mar 31 2016  4:39PM EST                       (Author)

## 2018-01-10 NOTE — MISCELLANEOUS
Message   Recorded as Task   Date: 03/31/2016 11:06 AM, Created By: Lyric Amor   Task Name: Call Back   Assigned To: Lyric Amor   Regarding Patient: Rajni Lovell, Status: Active   Comment:    Lyric Amor - 31 Mar 2016 11:06 AM     TASK CREATED  Caller: Chirag Galindo  Message left at [phone] requesting a call back for pathology results  The cyst excised from her left upper arm on 3/24/16 was negative for malignancy  The results confirmed a cyst    Lyric Amor - 31 Mar 2016 4:38 PM     TASK EDITED  Patient's son called for the results and he was informed that the findings were negative for cancer  Active Problems    1  Abdominal wall bulge (789 30) (R19 00)   2  Acute URI (465 9) (J06 9)   3  Anxiety (300 00) (F41 9)   4  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)   5  Burn of abdomen wall (942 03) (T21 02XA)   6  Cerebral infarct (434 91) (I63 9)   7  Clinical depression (311) (F32 9)   8  COPD (chronic obstructive pulmonary disease) (496) (J44 9)   9  Coronary arteriosclerosis (414 00) (I25 10)   10  Edema (782 3) (R60 9)   11  Esophageal reflux (530 81) (K21 9)   12  Family disruption (V61 09) (Z63 8)   13  Female Stress Incontinence (625 6)   14  Generalized anxiety disorder (300 02) (F41 1)   15  Generalized osteoarthritis (715 00) (M15 9)   16  Glaucoma (365 9) (H40 9)   17  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) (Z95 1)   18  Hyperlipidemia (272 4) (E78 5)   19  Hypertension (401 9) (I10)   20  Hypothyroidism (244 9) (E03 9)   21  Infected open wound (879 9) (T14 8,L08 9)   22  Insomnia (780 52) (G47 00)   23  Need for influenza vaccination (V04 81) (Z23)   24  Nontraumatic hematoma of soft tissue (729 92) (M79 81)   25  Osteoarthritis of knee (715 36) (M17 9)   26  Postural imbalance (729 90) (R29 3)   27  Primary osteoarthritis of right knee (715 16) (M17 11)   28  Right knee DJD (715 96) (M17 9)   29  Sebaceous cyst (706 2) (L72 3)   30   Status post left knee replacement (V43 65) (Z96 652)   31  Stroke syndrome (434 91) (I63 9)   32  Type 2 diabetes mellitus (250 00) (E11 9)   33  Visit for pre-operative examination (V72 84) (Z01 818)   34  Vitamin D deficiency (268 9) (E55 9)    Current Meds   1  Advair Diskus 250-50 MCG/DOSE Inhalation Aerosol Powder Breath Activated; INHALE   1 PUFF TWICE DAILY  RINSE MOUTH AFTER USE; Therapy: 92FLS2938 to (Elida Side)  Requested for: 51SVF1117; Last   Rx:22Jan2016 Ordered   2  Albuterol Sulfate (2 5 MG/3ML) 0 083% Inhalation Nebulization Solution; USE 1 UNIT   DOSE IN NEBULIZER EVERY 4 TO 6 HOURS AS NEEDED; Therapy: 80NXQ7976 to (Evaluate:14Nov2014)  Requested for: 67MFX7695; Last   Rx:15Oct2014 Ordered   3  AmLODIPine Besylate 5 MG Oral Tablet; TAKE ONE TABLET BY MOUTH ONCE DAILY; Therapy: 17SGP7315 to (Last Rx:57Kpa2325)  Requested for: 39Zwv1275 Ordered   4  Aspirin 81 MG Oral Tablet; Therapy: (Recorded:12Jun2015) to Recorded   5  Atorvastatin Calcium 40 MG Oral Tablet; TAKE 1 TABLET DAILY AS DIRECTED; Therapy: 29WWN6658 to (Evaluate:45Gdn3323)  Requested for: 29AQN4448; Last   Rx:09Khz9789 Ordered   6  Atorvastatin Calcium 80 MG Oral Tablet; TAKE 1 TABLET AT BEDTIME; Therapy: 21XDY4409 to (Evaluate:81Rdu1319)  Requested for: 36QKC5220; Last   Rx:08Zyw0364; Status: ACTIVE - Renewal Denied Ordered   7  Azithromycin 250 MG Oral Tablet; TAKE 2 TABLETS ON DAY 1 THEN TAKE 1 TABLET A   DAY FOR 4 DAYS; Therapy: 67VYE4954 to (Last Rx:14Mar2016)  Requested for: 50EDP5174 Ordered   8  Bactroban Nasal 2 % Nasal Ointment; USE AS DIRECTED; Therapy: 02BWE6499 to (Last Rx:22Jan2016)  Requested for: 82FNA5596 Ordered   9  Clopidogrel Bisulfate 75 MG Oral Tablet; TAKE ONE TABLET BY MOUTH ONCE DAILY; Therapy: 42Ekf1828 to (Last Rx:06Jan2016)  Requested for: 21KCX5935 Ordered   10  Diazepam 5 MG Oral Tablet; TAKE 1 TABLET 3 times daily PRN;     Therapy: 08MXS3880 to (Evaluate:18Mar2016)  Requested for: 18INY0529; Last    Rx:19Fbe1980 Ordered   11  Ferrous Sulfate 325 (65 Fe) MG Oral Tablet; TAKE 1 TABLET TWICE DAILY WITH    MEALS; Therapy: 49JHX6725 to (Joan Urbina)  Requested for: 75MJQ8820; Last    Rx:21Eof7800 Ordered   12  Folic Acid 1 MG Oral Tablet; TAKE 1 TABLET DAILY; Therapy: 60TQW6671 to (Evaluate:73Cog3744)  Requested for: 69LII7016; Last    Rx:31Stg5286 Ordered   13  Gabapentin 100 MG Oral Capsule; TAKE THREE CAPSULE BY MOUTH DAILY AS    DIRECTED; Therapy: 64Gze7997 to (Last Rx:83Hpt7337)  Requested for: 85JNZ5973 Ordered   14  Levothyroxine Sodium 75 MCG Oral Tablet; TAKE ONE TABLET BY MOUTH ONCE    DAILY; Therapy: 76UYM4140 to (Last Rx:25Nov2015)  Requested for: 31BHK4295 Ordered   15  Metoprolol Tartrate 25 MG Oral Tablet; 1/2 tab twice daily; Therapy: 50Yid1344 to (Evaluate:57Cip6876)  Requested for: 61VTD9761; Last    Rx:38Zxx3337 Ordered   16  OxyCODONE HCl - 5 MG Oral Tablet; TAKE 1 TO 2 TABLETS EVERY 6 HOURS AS    NEEDED FOR PAIN;    Therapy: 08PME6203 to (Evaluate:16Mar2016); Last Rx:08Mar2016 Ordered   17  Oxycodone-Acetaminophen 5-325 MG Oral Tablet; TAKE 1 TABLET Every 6 hours PRN    pain; Therapy: 55ZLR3649 to (Evaluate:11Mar2016); Last Rx:13Uvb7669 Ordered   18  Pantoprazole Sodium 40 MG Oral Tablet Delayed Release; TAKE ONE TABLET BY    MOUTH ONCE DAILY; Therapy: 59Udd2929 to (Last Rx:05Oct2015)  Requested for: 12Oct2015 Ordered   19  Primidone 250 MG Oral Tablet; TAKE 1 TABLET AT BEDTIME; Therapy: 23QGT1489 to (Evaluate:86Srp7545)  Requested for: 47Tpt8303; Last    Rx:32Caq5274 Ordered   20  Torsemide 10 MG Oral Tablet; TAKE ONE TABLET BY MOUTH DAILY AS NEEDED; Therapy: 22NGV9967 to (Last Rx:05Oct2015)  Requested for: 12Oct2015 Ordered   21  Venlafaxine HCl ER 37 5 MG Oral Capsule Extended Release 24 Hour; TAKE 1    CAPSULE DAILY; Therapy: 45VCO7250 to Recorded   22  Viibryd 40 MG Oral Tablet; TAKE ONE TABLET BY MOUTH ONCE DAILY;     Therapy: 02PMX0282 to (Last Rx:31Ckk5073)  Requested for: 54RUH4299 Ordered   23  Vitamin C 500 MG Oral Tablet; Take 1 tablet twice daily; Therapy: 11XWZ5628 to (Connie Hoang)  Requested for: 75EPI1500; Last    Rx:22Jan2016 Ordered   24  Zolpidem Tartrate 10 MG Oral Tablet; TAKE 1 TABLET AT  BEDTIME AS NEEDED FOR    INSOMNIA; Therapy: 95HXB4554 to (Evaluate:95Njq4225); Last Rx:22Paz5740 Ordered    Allergies    1   Penicillins    Signatures   Electronically signed by : Stacie Patterson, ; Mar 31 2016  4:38PM EST                       (Author)

## 2018-01-10 NOTE — MISCELLANEOUS
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)5      1 Amended By: Christopher Javed; Oct 11 2017 9:25 AM EST   2 Amended By: Christopher Javed; Oct 11 2017 9:25 AM EST   3 Amended By: Christopher Javed; Oct 11 2017 9:25 AM EST   4 Amended By: Christopher Javed; Oct 11 2017 9:26 AM EST   5 Amended By: Christopher Javed; Oct 11 2017 9:26 AM EST    Plan  Anxiety    · Continue: DiazePAM 5 MG Oral Tablet; TAKE 1 TABLET 3 times daily PRN  Rx By: Christopher Javed; Dispense: 30 Days ; #:90 Tablet; Refill: 0;For: Anxiety; CHRIS = N;   Call Rx   · Continue: Viibryd 40 MG Oral Tablet; TAKE ONE TABLET BY MOUTH ONCE DAILY  Rx By: Christopher Javed; Dispense: 0 Days ; #:30 Tablet; Refill: 3;For: Anxiety; CHRIS = N;   [facility]  Benign familial tremor    · Temporarily Stop: Neupro 4 MG/24HR Transdermal Patch 24 Hour  Rx By: Christopher Javed; Dispense: 0 Days ; #:30 Unit; Refill: 0;For: Benign familial tremor;   CHRIS = N; [facility]  Carotid stenosis    · Continue: AmLODIPine Besylate 5 MG Oral Tablet; Take 1 tablet daily  Rx By: Heriberto Ambriz; Dispense: 30 Days ; #:30 Tablet; Refill: 5;For: Carotid stenosis; CHRIS   = N; [facility]; Last Updated By: Christopher Javed;   43/55/5243 9:24:07 AM  Cerebral infarct, Coronary arteriosclerosis    · Continue: Clopidogrel Bisulfate 75 MG Oral Tablet; TAKE ONE TABLET BY MOUTH ONCE  DAILY  Rx By: Christopher Javed; Dispense: 0 Days ; #:30 Tablet;  Refill: 0;For: Cerebral infarct,   Coronary arteriosclerosis; CHRIS = N; [facility]  COPD (chronic obstructive pulmonary disease)    · Continue: Advair Diskus 250-50 MCG/DOSE Inhalation Aerosol Powder Breath Activated;  INHALE 1 PUFF TWICE DAILY  RINSE MOUTH AFTER USE  Rx By: Prabhjot Nova; Dispense: 30 Days ; #:1 X 60 Aerosol Powder Breath Activated   Disp Pack; Refill: 1;For: COPD (chronic obstructive pulmonary disease); CHRIS = N;   [facility]  Coronary arteriosclerosis, Stroke syndrome    · Continue: Aspirin 81 MG Oral Tablet Delayed Release; Take 1 tablet daily  Dispense: 90 Days ; #:90 Tablet Delayed Release; Refill: 3;For: Coronary   arteriosclerosis, Stroke syndrome; CHRIS = N; Record; Last Updated By: Prabhjot Nova;   75/77/8947 9:24:07 AM  Esophageal reflux    · Continue: Pantoprazole Sodium 40 MG Oral Tablet Delayed Release; TAKE ONE TABLET  BY MOUTH ONCE DAILY  Rx By: Prabhjot Nova; Dispense: 0 Days ; #:30 TAB; Refill: 4;For: Esophageal reflux;   CHRIS = N; [facility]  Flu vaccine need    · Administered: Fluzone High-Dose 0 5 ML Intramuscular Suspension Prefilled Syringe  For: Flu vaccine need; Ordered By:Clotilde Maharaj; Effective Date:10Oct2017;   Administered by: Gracia Mendez: 10/10/2017 4:36:00 PM  Generalized osteoarthritis    · Renew: Oxycodone-Acetaminophen 5-325 MG Oral Tablet; TAKE 1 TABLET Every 6 hours  PRN pain  Rx By: Prabhjot Nova; Dispense: 23 Days ; #:90 Tablet; Refill: 0;For: Generalized   osteoarthritis; CHRIS = N; Print Rx  Hyperlipidemia    · Continue: Atorvastatin Calcium 40 MG Oral Tablet; TAKE ONE TABLET BY MOUTH ONCE  DAILY AS DIRECTED  Rx By: Prabhjot Nova; Dispense: 0 Days ; #:30 TAB; Refill: 1;For: Hyperlipidemia; CHRIS =   N; [facility]  Hypothyroidism    · Continue: Levothyroxine Sodium 75 MCG Oral Tablet; TAKE ONE TABLET BY MOUTH  ONCE DAILY  Rx By: Donna Mathews; Dispense: 0 Days ; #:30 TAB;  Refill: 2;For: Hypothyroidism;   CHRIS = N; Verified Transmission to [address] [place] [address]; Last Updated By: Prabhjot Nova;   43/58/1453 9:24:07 AM  Insomnia    · Renew: Zolpidem Tartrate 10 MG Oral Tablet; TAKE 1 TABLET AT  BEDTIME AS NEEDED  FOR INSOMNIA  Rx By: Sindy Garber; Dispense: 30 Days ; #:30 Tablet; Refill: 1;For: Insomnia; CHRIS = N;   Print Rx  Right knee DJD    · Temporarily Stop: Gabapentin 100 MG Oral Capsule  Rx By: Sindy Garber; Dispense: 0 Days ; #:90 Capsule; Refill: 0;For: Right knee DJD;   CHRIS = N; [facility]    Discussion/Summary  Discussion Summary:   Patient was admitted with lower GI bleed which was addressed and no further bleeding noted  Patient was briefly taken off of her aspirin and Plavix but has since been restarted on same  There was some discussion with Pulmonary that O2 was not needed  The hospitalist however felt that it would be prudent  Patient is a chronic CO2 retainer  She is post being pulmonary exercises at home  She has the Advair and albuterol  He pulmonary follow-up with sleep study  Patient was evaluated here in 4-6 weeks  We will keep her off of the gabapentin and the Neupro patch for now and re-evaluate  She was put back on her metoprolol and we will keep that as is at 12 5 mg once daily  Patient with multiple other comorbidities including history of coronary artery disease status post bypass surgery, history of CVA, hypertension, and hypothyroidism  Flu shot given today  Counseling Documentation With Imm: The patient, patient's family was counseled regarding diagnostic results, instructions for management, prognosis, impressions, importance of compliance with treatment  total time of encounter was 30 minutes and 50% minutes was spent counseling  Medication SE Review and Pt Understands Tx: The treatment plan was reviewed with the patient/guardian  The patient/guardian understands and agrees with the treatment plan      Chief Complaint  Chief Complaint Free Text Note Form: pt was admitted at [facility] on September 29th until October 2nd   Pt had diverticulitis   stopped gabapentin and neupro patch   started back on metoprolol   hospital put pt on oxygen, sleep study scheduled for in home on Nov 27  following up with pulmonary in December [address]: Patient is here today for follow up of chronic conditions described in HPI  History of Present Illness  TCM Communication [facility]: The patient is being contacted for follow-up after hospitalization  She was hospitalized at [facility]  The date of admission: 9/30/2017, date of discharge: 10/02/2017  Diagnosis: proximal sigmoid colon diverticular bleed and exacerbation of COPD  She was discharged to home  Medications reviewed and updated today  She scheduled a follow up appointment  Follow-up appointments with other specialists: Pulmonary  Symptoms: fatigue and shortness of breath  Counseling was provided to patient's family  Son  Communication performed and completed by [facility]   HPI: Patient is evaluated for ATIYA  Was admitted at [facility] with a lower GI bleed  She was traveling back from [place] and had multiple bloody stools  She immediately went to the ER when she got back to [facility]  Colonoscopy was done by Dr Usman Berrios and the bleeding diverticuli were injected and clipped  She has had no further bleeding  She was discharged without oxygen initially but it has since been delivered to the house  She is using it mostly at night  She is a sleep study pending  Review of Systems  Complete-Female:   Constitutional: feeling tired  Eyes: no purulent discharge from the eyes  ENT: no nasal discharge  Cardiovascular: no chest pain and no palpitations  Respiratory: shortness of breath during exertion, but no cough  Gastrointestinal: no constipation, no diarrhea and no blood in stools  Genitourinary: Urge  Musculoskeletal: arthralgias and myalgias  Neurological: She is off all of her tremor meds and no real changes noted yet  , but as noted in HPI  Psychiatric: sleep disturbances  ROS Reviewed:   ROS reviewed  Active Problems   1   Acute URI (895 4) (J06 9)  2  Anxiety (300 00) (F41 9)  3  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)  4  Carotid stenosis (433 10) (I65 29)  5  Cerebral infarct (434 91) (I63 9)  6  Clinical depression (311) (F32 9)  7  COPD (chronic obstructive pulmonary disease) (496) (J44 9)  8  Coronary arteriosclerosis (414 00) (I25 10)  9  Edema (782 3) (R60 9)  10  Esophageal reflux (530 81) (K21 9)  11  Female Stress Incontinence (625 6)  12  Generalized anxiety disorder (300 02) (F41 1)  13  Generalized osteoarthritis (715 00) (M15 9)  14  Glaucoma (365 9) (H40 9)  15  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) (Z95 1)  16  Hyperlipidemia (272 4) (E78 5)  17  Hypertension (401 9) (I10)  18  Hypothyroidism (244 9) (E03 9)  19  Hypoventilation associated with obesity syndrome (278 03) (E66 2)  20  Insomnia (780 52) (G47 00)  21  Need for influenza vaccination (V04 81) (Z23)  22  Need for pneumococcal vaccination (V03 82) (Z23)  23  Need for zoster vaccination (V04 89) (Z23)  24  Osteoarthritis of knee (715 36) (M17 10)  25  Post-nasal drip (784 91) (R09 82)  26  Postural imbalance (729 90) (R29 3)  27  Primary osteoarthritis of right knee (715 16) (M17 11)  28  Right knee DJD (715 96) (M17 11)  29  Status post left knee replacement (V43 65) (Z96 652)  30  Status post total right knee replacement (V43 65) (Z96 651)  31  Stroke syndrome (434 91) (I63 9)  32  Type 2 diabetes mellitus (250 00) (E11 9)  33  Visit for pre-operative examination (V72 84) (Z01 818)  34  Vitamin D deficiency (268 9) (E55 9)    Past Medical History    1  History of Chest congestion (786 9) (R09 89)  2  History of Encounter for screening colonoscopy (V76 51) (Z12 11)  3  History of Glaucoma screening (V80 1) (Z13 5)  4  History of arthritis (V13 4) (Z87 39)  5  History of cataract (V12 49) (Z86 69)  6  History of depression (V11 8) (Z86 59)  7  History of low back pain (V13 59) (Z87 39)  8  History of sciatica (V12 49) (Z86 69)  9   History of sebaceous cyst (V13 3) (Z87 2)  10  History of stroke (V12 54) (Z86 73)  11  History of thyroid disease (V12 29) (Z86 39)  12  History of urinary stone (V13 01) (Z87 442)  13  History of Need for influenza vaccination (V04 81) (Z23)  14  History of Palpitations (785 1) (R00 2)  15  History of Right knee DJD (715 96) (M17 9)  16  History of Sore throat (462) (J02 9)  17  History of Visit for pre-operative examination (V72 84) (Z01 818)  18  History of Visit for pre-operative examination (V72 84) (Z01 818)  19  History of Visit for screening mammogram (V76 12) (Z12 31)    History of Hypoxia (799 02) (R09 02)     - s/p cataract          Surgical History   1  History of CABG  2  History of Carotid Thromboendarterectomy  3  History of Cataract Surgery  4  History of Nephrostomy With Drainage Irrigation  5  History of Total Knee Replacement Left  6  History of Total Knee Replacement Right  Surgical History Reviewed: The surgical history was reviewed and updated today  Family History  Mother   1  Family history of malignant neoplasm (V16 9) (Z80 9)  2  Family history of malignant neoplasm of cervix uteri (V16 49) (Z80 49)  Father   3  Family history of myocardial infarction (V17 3) (Z82 49)  Sibling   4  Family history of aortic aneurysm (V17 49) (Z82 49)  5  Family history of malignant neoplasm (V16 9) (Z80 9)  6  Family history of myocardial infarction (V17 3) (Z82 49)  Family History Reviewed: The family history was reviewed and updated today  Social History    · Denied: History of Alcohol Use (History)   · Completed 10th grade   · Denied: Drug use (305 90) (F19 90)   · Former smoker (V15 82) (I00 362)   · No caffeine use   ·   Social History Reviewed: The social history was reviewed and updated today  Current Meds  1  Advair Diskus 250-50 MCG/DOSE Inhalation Aerosol Powder Breath Activated; INHALE 1   PUFF TWICE DAILY  RINSE MOUTH AFTER USE;    Therapy: 53XIJ5987 to (Luiza Hines)  Requested for: 26VXM7513; Last   ZP:94QNZ3852 Ordered  2  AmLODIPine Besylate 5 MG Oral Tablet; Take 1 tablet daily; Therapy: 63SPD6196 to [phone])  Requested for: 82MWW4892; Last   EL:16VSE5198 Ordered  3  Aspirin 81 MG Oral Tablet Delayed Release; Take 1 tablet daily; Therapy: 14CKQ4626 to (Evaluate:26Apr2018) Recorded  4  Atorvastatin Calcium 40 MG Oral Tablet; TAKE ONE TABLET BY MOUTH ONCE DAILY AS   DIRECTED; Therapy: 83IGO8988 to (Last Rx:57Rri4736)  Requested for: 99Str3058 Ordered  5  Clopidogrel Bisulfate 75 MG Oral Tablet; TAKE ONE TABLET BY MOUTH ONCE DAILY; Therapy: 77Znf4882 to (Last Rx:41Mmj2870)  Requested for: 57Nhv3409 Ordered  6  DiazePAM 5 MG Oral Tablet; TAKE 1 TABLET 3 times daily PRN; Therapy: 33JGG7121 to [phone])  Requested for: 55Xan9293; Last   Rx:34Bjz7090 Ordered  7  Gabapentin 100 MG Oral Capsule; TAKE THREE CAPSULE BY MOUTH DAILY AS   DIRECTED; Therapy: 92Jjy3414 to (Last Rx:67Ghs1800)  Requested for: 20Dfc6181 Ordered  8  Levothyroxine Sodium 75 MCG Oral Tablet; TAKE ONE TABLET BY MOUTH ONCE DAILY; Therapy: 30LSC0537 to (Last Rx:04Jun2017)  Requested for: 36DMY2961 Ordered  9  Lumigan 0 01 % Ophthalmic Solution; INSTILL 1 DROP INTO BOTH EYES ONCE DAILY   IN THE EVENING; Therapy: 89Tvp0496 to Recorded  10  Neupro 4 MG/24HR Transdermal Patch 24 Hour; APPLY 1 PATCH DAILY AS DIRECTED; Therapy: 53MVJ4930 to (Last Rx:15Dpb7531)  Requested for: 96Ngt8933 Ordered  11  Oxycodone-Acetaminophen 5-325 MG Oral Tablet; TAKE 1 TABLET Every 6 hours PRN    pain; Therapy: 04NEV9203 to (Evaluate:43Hzq8377); Last Rx:22Mww3328 Ordered  12  Pantoprazole Sodium 40 MG Oral Tablet Delayed Release; TAKE ONE TABLET BY    MOUTH ONCE DAILY; Therapy: 24Ojp9844 to (Last Rx:19Jun2017)  Requested for: 01BFE5769 Ordered  13  Viibryd 40 MG Oral Tablet; TAKE ONE TABLET BY MOUTH ONCE DAILY; Therapy: 63OFJ9955 to (Last Rx:93Yfn8163)  Requested for: 38Hcm6723 Ordered  14   Zolpidem Tartrate 10 MG Oral Tablet; TAKE 1 TABLET AT  BEDTIME AS NEEDED FOR    INSOMNIA; Therapy: 33VOX8565 to (Evaluate:09Oct2017); Last Rx:10Aug2017 Ordered  Medication List Reviewed: The medication list was reviewed and updated today  Allergies   1  Penicillins    Vitals  Signs   Recorded: 54RZB6942 88:83DP   Systolic: 499  Diastolic: 76  Recorded: 61HHA3938 03:35PM   Height: 5 ft 3 in  Weight: 198 lb 8 oz  BMI Calculated: 35 16  BSA Calculated: 1 93    Physical Exam    Constitutional   General appearance: No acute distress, well appearing and well nourished  chronically ill, obese and appearance reflects stated age  Ears, Nose, Mouth, and Throat   Oropharynx: Normal with no erythema, edema, exudate or lesions  Pulmonary   Auscultation of lungs: Clear to auscultation  Auscultation of the lungs revealed decreased breath sounds diffusely and But clear  Cardiovascular   Auscultation of heart: Normal rate and rhythm, normal S1 and S2, without murmurs  Examination of extremities for edema and/or varicosities: Normal     Abdomen   Abdomen: Abnormal   soft nontender  Musculoskeletal   Gait and station: Abnormal   Not tested  Neurologic   Cranial nerves: Abnormal   Slight tremor noted  Speech is clear  Sensation: No sensory loss  Psychiatric   Orientation to person, place, and time: Normal     Mood and affect: Normal          Future Appointments    Date/Time Provider Specialty Site   12/12/2017 02:20 ALEXI Barrios   Pulmonary Medicine [facility] [place]   14/72/9874 65:18 PM Letty Maharaj DO Family Medicine [facility]     Signatures   Electronically signed by : Chang Aviles DO; Oct 11 5228  9:24AM EST                       (Author)    Electronically signed by : Chang Aviles DO; Oct 11 0788  9:26AM EST                       (Author)

## 2018-01-12 NOTE — MISCELLANEOUS
History of Present Illness  TCM Communication [facility]: The patient is being contacted for follow-up after hospitalization  Hospital records were reviewed  She was hospitalized at [facility]  The date of admission: 02/25/2016, date of discharge: 03/01/2016  Diagnosis: osteoarthritis knee - knee replacement surgery  She was discharged to home, Home Care ordered as well as in home physical therapy  Medications reviewed and updated today  She did not schedule a follow up appointment  Follow-up appointments with other specialists: Appt with Dr Shan Shaikh, follow up here after therapy completed and patient able to get out of the home  The patient is currently asymptomatic  Counseling was provided to patient's family  Spoke to son Nithya Tellez, he will call if any problems arise  Communication performed and completed by Arnold Marquis MA      Active Problems    1  Abdominal wall bulge (789 30) (R19 00)   2  Acute URI (465 9) (J06 9)   3  Anxiety (300 00) (F41 9)   4  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)   5  Burn of abdomen wall (942 03) (T21 02XA)   6  Cerebral infarct (434 91) (I63 9)   7  Clinical depression (311) (F32 9)   8  COPD (chronic obstructive pulmonary disease) (496) (J44 9)   9  Coronary arteriosclerosis (414 00) (I25 10)   10  Edema (782 3) (R60 9)   11  Esophageal reflux (530 81) (K21 9)   12  Family disruption (V61 09) (Z63 8)   13  Female Stress Incontinence (625 6)   14  Generalized anxiety disorder (300 02) (F41 1)   15  Generalized osteoarthritis (715 00) (M15 9)   16  Glaucoma (365 9) (H40 9)   17  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) (Z95 1)   18  Hyperlipidemia (272 4) (E78 5)   19  Hypertension (401 9) (I10)   20  Hypothyroidism (244 9) (E03 9)   21  Insomnia (780 52) (G47 00)   22  Need for influenza vaccination (V04 81) (Z23)   23  Nontraumatic hematoma of soft tissue (729 92) (M79 81)   24  Osteoarthritis of knee (715 36) (M17 9)   25   Postural imbalance (729 90) (R29 3)   26  Primary osteoarthritis of right knee (715 16) (M17 11)   27  Right knee DJD (715 96) (M17 9)   28  Status post left knee replacement (V43 65) (Z96 652)   29  Stroke syndrome (434 91) (I63 9)   30  Type 2 diabetes mellitus (250 00) (E11 9)   31  Visit for pre-operative examination (V72 84) (Z01 818)   32  Vitamin D deficiency (268 9) (E55 9)    Past Medical History    1  History of Chest congestion (786 9) (R09 89)   2  History of Encounter for screening colonoscopy (V76 51) (Z12 11)   3  History of Glaucoma screening (V80 1) (Z13 5)   4  History of arthritis (V13 4) (Z87 39)   5  History of cataract (V12 49) (Z86 69)   6  History of depression (V11 8) (Z86 59)   7  History of low back pain (V13 59) (Z87 39)   8  History of sciatica (V12 49) (Z86 69)   9  History of stroke (V12 54) (Z86 73)   10  History of thyroid disease (V12 29) (Z86 39)   11  History of urinary stone (V13 01) (Z87 442)   12  History of Hypoxia (799 02) (R09 02)   13  History of Need for influenza vaccination (V04 81) (Z23)   14  History of Palpitations (785 1) (R00 2)   15  History of Right knee DJD (715 96) (M17 9)   16  History of Sore throat (462) (J02 9)   17  History of Visit for pre-operative examination (V72 84) (Z01 818)   18  History of Visit for pre-operative examination (V72 84) (Z01 818)   19  History of Visit for screening mammogram (V76 12) (Z12 31)    Surgical History    1  History of CABG   2  History of Carotid Thromboendarterectomy   3  History of Cataract Surgery   4  History of Nephrostomy With Drainage Irrigation   5  History of Total Knee Replacement Left    Family History    1  Family history of malignant neoplasm (V16 9) (Z80 9)   2  Family history of malignant neoplasm of cervix uteri (V16 49) (Z80 49)    3  Family history of myocardial infarction (V17 3) (Z82 49)    4  Family history of aortic aneurysm (V17 49) (Z82 49)   5  Family history of malignant neoplasm (V16 9) (Z80 9)   6   Family history of myocardial infarction (V17 3) (Z82 49)    Social History    · Denied: History of Alcohol Use (History)   · Completed 10th grade   · Denied: Drug use (305 90) (F19 90)   · Family disruption (V61 09) (Z63 8)   · Former smoker (V15 82) (T70 644)   · No caffeine use   ·     Current Meds   1  Advair Diskus 250-50 MCG/DOSE Inhalation Aerosol Powder Breath Activated; INHALE 1   PUFF TWICE DAILY  RINSE MOUTH AFTER USE; Therapy: 83RVA7734 to (Any Hidalgo)  Requested for: 39SFH1962; Last   Rx:22Jan2016 Ordered   2  Albuterol Sulfate (2 5 MG/3ML) 0 083% Inhalation Nebulization Solution; USE 1 UNIT   DOSE IN NEBULIZER EVERY 4 TO 6 HOURS AS NEEDED; Therapy: 08HAM3814 to (Evaluate:14Nov2014)  Requested for: 61RRR0567; Last   Rx:15Oct2014 Ordered   3  AmLODIPine Besylate 5 MG Oral Tablet; TAKE ONE TABLET BY MOUTH ONCE DAILY; Therapy: 91FWF0566 to (Last Rx:33Gxc8764)  Requested for: 24Enp9212 Ordered   4  Aspirin 81 MG Oral Tablet; Therapy: (Recorded:12Jun2015) to Recorded   5  Atorvastatin Calcium 40 MG Oral Tablet; TAKE 1 TABLET DAILY AS DIRECTED; Therapy: 55PWG9818 to (Evaluate:68Ldl9306)  Requested for: 40DWB6809; Last   Rx:75Eaa1444 Ordered   6  Atorvastatin Calcium 80 MG Oral Tablet; TAKE 1 TABLET AT BEDTIME; Therapy: 38ZSQ1799 to (Evaluate:63Hpw3548)  Requested for: 40Qub7854; Last   Rx:99Pph0989; Status: ACTIVE - Renewal Denied Ordered   7  Azithromycin 250 MG Oral Tablet; TAKE 2 TABLETS ON DAY 1 THEN TAKE 1 TABLET A   DAY FOR 4 DAYS; Therapy: 57EQF7819 to (Last Rx:18Feb2016)  Requested for: 80ODW9338 Ordered   8  Bactroban Nasal 2 % Nasal Ointment; USE AS DIRECTED; Therapy: 27OVW8607 to (Last Rx:22Jan2016)  Requested for: 38LAC0995 Ordered   9  Clopidogrel Bisulfate 75 MG Oral Tablet; TAKE ONE TABLET BY MOUTH ONCE DAILY; Therapy: 71Hrj9374 to (Last Rx:06Jan2016)  Requested for: 84JKD2030 Ordered   10  Diazepam 5 MG Oral Tablet; TAKE 1 TABLET 3 times daily PRN;     Therapy: 19LQN1355 to (Evaluate:18Mar2016)  Requested for: 07HFJ5240; Last    Rx:51Mpu5491 Ordered   11  Ferrous Sulfate 325 (65 Fe) MG Oral Tablet; TAKE 1 TABLET TWICE DAILY WITH MEALS; Therapy: 18RSY3507 to (Alvaro Hector)  Requested for: 57YTW8418; Last    Rx:22Jan2016 Ordered   12  Folic Acid 1 MG Oral Tablet; TAKE 1 TABLET DAILY; Therapy: 57JVK2695 to (Evaluate:24Uhk8273)  Requested for: 58BRY9844; Last    Rx:85Igp1460 Ordered   13  Gabapentin 100 MG Oral Capsule; TAKE THREE CAPSULE BY MOUTH DAILY AS    DIRECTED; Therapy: 07Wyp5410 to (Last Rx:42Iou7373)  Requested for: 90YPU8441 Ordered   14  Levothyroxine Sodium 75 MCG Oral Tablet; TAKE ONE TABLET BY MOUTH ONCE DAILY; Therapy: 31IFY4191 to (Last Rx:25Nov2015)  Requested for: 30BOO4772 Ordered   15  Metoprolol Tartrate 25 MG Oral Tablet; 1/2 tab twice daily; Therapy: 07Mtu3790 to (Evaluate:54Vyp5194)  Requested for: 50RBB7816; Last    Rx:45Ryj4462 Ordered   16  Oxycodone-Acetaminophen 5-325 MG Oral Tablet; TAKE 1 TABLET Every 6 hours PRN    pain; Therapy: 72RJB3195 to (Evaluate:11Mar2016); Last Rx:66Qmj3977 Ordered   17  Pantoprazole Sodium 40 MG Oral Tablet Delayed Release; TAKE ONE TABLET BY    MOUTH ONCE DAILY; Therapy: 18Kbu5742 to (Last Rx:05Oct2015)  Requested for: 12Oct2015 Ordered   18  Primidone 250 MG Oral Tablet; TAKE 1 TABLET AT BEDTIME; Therapy: 18ZDR5100 to (Evaluate:53Flm4365)  Requested for: 78Ldo9030; Last    Rx:07Rse9235 Ordered   19  Torsemide 10 MG Oral Tablet; TAKE ONE TABLET BY MOUTH DAILY AS NEEDED; Therapy: 66PIX1457 to (Last Rx:05Oct2015)  Requested for: 12Oct2015 Ordered   20  Venlafaxine HCl ER 37 5 MG Oral Capsule Extended Release 24 Hour; TAKE 1    CAPSULE DAILY; Therapy: 02YQL9364 to Recorded   21  Viibryd 40 MG Oral Tablet; TAKE ONE TABLET BY MOUTH ONCE DAILY; Therapy: 36GQI5069 to (Last Rx:27Mxk9820)  Requested for: 65LGH1740 Ordered   22  Vitamin C 500 MG Oral Tablet; Take 1 tablet twice daily;     Therapy: 99TOR4513 to (Jani Thapa)  Requested for: 12WIY4207; Last    Rx:22Jan2016 Ordered   23  Zolpidem Tartrate 10 MG Oral Tablet; TAKE 1 TABLET AT  BEDTIME AS NEEDED FOR    INSOMNIA; Therapy: 95VAF4531 to (Evaluate:34Lka1654); Last Rx:27Bcs0619 Ordered    Allergies    1  Penicillins    Future Appointments    Date/Time Provider Specialty Site   03/08/2016 01:00 PM ALEXI Brown   Orthopedic Surgery [facility] [place]   04/20/2016 04:00 PM Ramon Randolph DO Cardiology  CARDIOLOGY  [place]   06/27/2016 03:45 PM Yaquelin Mendoza Neurology [address]     Signatures   Electronically signed by : Adebayo Cruz, ; Mar  2 2016 11:45AM EST                       (Author)    Electronically signed by : Davy Rivera DO; Mar  2 1863  5:14PM EST                       (Author)

## 2018-01-12 NOTE — MISCELLANEOUS
Message  Return to work or school:        Our records indicate that you are overdue for an eye evalation, foot evaluation, and bloodwork  Please contact your eye doctor and foot doctor to schedule an appointment in their office  Attached is a script for bloodwork please get this done as soon as possible  If you have any questions please contact our office at [phone]  Thank you     Ethan Anne Do/am       Signatures   Electronically signed by : Ivory Prater, ; Sep 21 2016  9:58AM EST                       (Author)

## 2018-01-13 VITALS
RESPIRATION RATE: 17 BRPM | SYSTOLIC BLOOD PRESSURE: 150 MMHG | BODY MASS INDEX: 36.76 KG/M2 | HEART RATE: 81 BPM | WEIGHT: 207.5 LBS | DIASTOLIC BLOOD PRESSURE: 80 MMHG

## 2018-01-13 NOTE — RESULT NOTES
Message  Our records indicate that you are overdue for a diabetic eye exam  Please contact your eye doctor and schedule an appointment with their office  If you do not have an eye doctor, please contact our office and we will recommend a physician to you  We can be reached at [phone]   Thank you      Signatures   Electronically signed by : Bita Clinton, ; Jul 18 2017  3:02PM EST                       (Author)

## 2018-01-14 VITALS
DIASTOLIC BLOOD PRESSURE: 76 MMHG | WEIGHT: 198.5 LBS | HEIGHT: 63 IN | BODY MASS INDEX: 35.17 KG/M2 | SYSTOLIC BLOOD PRESSURE: 128 MMHG

## 2018-01-14 NOTE — PROGRESS NOTES
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)    Plan  Benign familial tremor    · Neupro 1 MG/24HR Transdermal Patch 24 Hour; 1 patch daily  Generalized osteoarthritis    · Oxycodone-Acetaminophen 5-325 MG Oral Tablet; TAKE 1 TABLET Every 6  hours PRN pain  Hyperlipidemia, Hypertension, Hypothyroidism, Type 2 diabetes mellitus    · (1) COMPREHENSIVE METABOLIC PANEL; Status:Active - Retrospective By Protocol  Authorization; Requested for:14Oct2017;    · (1) HEMOGLOBIN A1C; Status:Active - Retrospective By Protocol Authorization; Requested for:14Oct2017;    · (1) LIPID PANEL, FASTING; Status:Active - Retrospective By Protocol Authorization; Requested for:14Oct2017;    · (1) MICROALBUMIN CREATININE RATIO, RANDOM URINE; Status:Active -  Retrospective By Protocol Authorization; Requested for:14Oct2017;    · (1) TSH; Status:Active - Retrospective By Protocol Authorization; Requested  for:14Oct2017;     Discussion/Summary    Start the Neupro patch 1 mg and stay on primidone for now with the idea of weaning OF primidone and increasing Neupro  Pt will call weekly with updates  Impression: Initial Annual Wellness Visit  Chief Complaint  AWV  History of Present Illness  HPI: Stress with son Dunia Gamez  Welcome to [employer] and Wellness Visits: The patient is being seen for the initial annual wellness visit  Medicare Screening and Risk Factors   Hospitalizations: no previous hospitalizations  Medicare Screening Tests Risk Questions   Drug and Alcohol Use: The patient is a former cigarette smoker  The patient reports never drinking alcohol  She has never used illicit drugs  Diet and Physical Activity: Current diet includes low carbohydrate food choices, low salt food choices, limited junk food, 3 servings of fruit per day, 3 servings of vegetables per day, 1 servings of whole grains per day, 1 cups of coffee per day, 1 cans of regular soda per day and 1 glass of water/daily  She exercises infrequently  Exercise: walking 30 minutes per day  Mood Disorder and Cognitive Impairment Screening: She denies feeling down, depressed, or hopeless over the past two weeks  She denies feeling little interest or pleasure in doing things over the past two weeks  Cognitive impairment screening: denies difficulty learning/retaining new information, denies difficulty handling complex tasks, denies difficulty with reasoning, denies difficulty with spatial ability and orientation, denies difficulty with language and denies difficulty with behavior  Functional Ability/Level of Safety: Hearing is normal bilaterally, normal in the right ear, normal in the left ear and a hearing aid is not used  She denies hearing difficulties  Activities of daily living details: transportation help needed, needs help shopping, meal preparation help needed and needs help doing housework, but does not need help using the phone, does not need help doing laundry, does not need help managing medications and does not need help managing money  Fall risk factors: The patient fell 1 times in the past 12 months  Home safety risk factors:  no unfamiliar surroundings, no loose rugs, no poor household lighting, no uneven floors, no household clutter, grab bars in the bathroom and handrails on the stairs  Advance Directives: Advance directives: durable power of  for health care directives, but no living will and no advance directives  Co-Managers and Medical Equipment/Suppliers: See Patient Care Team   Preventive Quality Program 65 and Older: Falls Risk: The patient fell 1 times in the past 12 months  The patient is currently experiencing urinary symptoms  Urinary Incontinence Symptoms includes: urinary incontinence   2014      Patient Care Team    Care Team Member Role Specialty Office Number   Marylou Lizama MD  Vascular Surgery [phone]   [facility] [phone]   Teresa HARVEY   Specialist Orthopedic Surgery [phone]   [address]  Cardiac Surgery [phone]   [facility]  Cardiology [phone]     Active Problems    1  Acute URI (465 9) (J06 9)   2  Anxiety (300 00) (F41 9)   3  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)   4  Carotid stenosis (433 10) (I65 29)   5  Cerebral infarct (434 91) (I63 9)   6  Clinical depression (311) (F32 9)   7  COPD (chronic obstructive pulmonary disease) (496) (J44 9)   8  Coronary arteriosclerosis (414 00) (I25 10)   9  Edema (782 3) (R60 9)   10  Esophageal reflux (530 81) (K21 9)   11  Female Stress Incontinence (625 6)   12  Generalized anxiety disorder (300 02) (F41 1)   13  Generalized osteoarthritis (715 00) (M15 9)   14  Glaucoma (365 9) (H40 9)   15  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) (Z95 1)   16  Hyperlipidemia (272 4) (E78 5)   17  Hypertension (401 9) (I10)   18  Hypothyroidism (244 9) (E03 9)   19  Insomnia (780 52) (G47 00)   20  Need for influenza vaccination (V04 81) (Z23)   21  Need for pneumococcal vaccination (V03 82) (Z23)   22  Need for zoster vaccination (V04 89) (Z23)   23  Osteoarthritis of knee (715 36) (M17 10)   24  Postural imbalance (729 90) (R29 3)   25  Primary osteoarthritis of right knee (715 16) (M17 11)   26  Right knee DJD (715 96) (M17 11)   27  Status post left knee replacement (V43 65) (Z96 652)   28  Status post total right knee replacement (V43 65) (Z96 651)   29  Stroke syndrome (434 91) (I63 9)   30  Type 2 diabetes mellitus (250 00) (E11 9)   31  Visit for pre-operative examination (V72 84) (Z01 818)   32   Vitamin D deficiency (268 9) (E55 9)    Past Medical History    · History of Chest congestion (786 9) (R09 89)   · History of Encounter for screening colonoscopy (V76 51) (Z12 11)   · History of Glaucoma screening (V80 1) (Z13 5)   · History of arthritis (V13 4) (Z87 39)   · History of cataract (V12 49) (Z86 69)   · History of depression (V11 8) (Z86 59)   · History of low back pain (V13 59) (Z87 39)   · History of sciatica (V12 49) (Z86 69)   · History of sebaceous cyst (V13 3) (Z87 2)   · History of stroke (V12 54) (Z86 73)   · History of thyroid disease (V12 29) (Z86 39)   · History of urinary stone (V13 01) (Z87 442)   · History of Hypoxia (799 02) (R09 02)   · History of Need for influenza vaccination (V04 81) (Z23)   · History of Palpitations (785 1) (R00 2)   · History of Right knee DJD (715 96) (M17 9)   · History of Sore throat (462) (J02 9)   · History of Visit for pre-operative examination (V72 84) (Z01 818)   · History of Visit for pre-operative examination (V72 84) (Z01 818)   · History of Visit for screening mammogram (V76 12) (Z12 31)    The active problems and past medical history were reviewed and updated today  Surgical History    · History of CABG   · History of Carotid Thromboendarterectomy   · History of Cataract Surgery   · History of Nephrostomy With Drainage Irrigation   · History of Total Knee Replacement Left   · History of Total Knee Replacement Right    The surgical history was reviewed and updated today  Family History  Mother    · Family history of malignant neoplasm (V16 9) (Z80 9)   · Family history of malignant neoplasm of cervix uteri (V16 49) (Z80 49)  Father    · Family history of myocardial infarction (V17 3) (Z82 49)  Sibling    · Family history of aortic aneurysm (V17 49) (Z82 49)   · Family history of malignant neoplasm (V16 9) (Z80 9)   · Family history of myocardial infarction (V17 3) (Z82 49)    The family history was reviewed and updated today  Social History    · Denied: History of Alcohol Use (History)   · Completed 10th grade   · Denied: Drug use (305 90) (F19 90)   · Former smoker (V15 82) (T54 680)   · No caffeine use   ·   The social history was reviewed and updated today  Current Meds   1  Advair Diskus 250-50 MCG/DOSE Inhalation Aerosol Powder Breath Activated; INHALE   1 PUFF TWICE DAILY  RINSE MOUTH AFTER USE;    Therapy: 88WYC4462 to (Angelkiki iMchel)  Requested for: 69JYJ5454; Last   Rx:22Jan2016 Ordered   2  AmLODIPine Besylate 5 MG Oral Tablet; Take 1 tablet daily; Therapy: 65UJW0489 to (21 )  Requested for: 91ZJT4943; Last   Polish:18IOE1651 Ordered   3  Aspirin 81 MG Oral Tablet Delayed Release; Take 1 tablet daily; Therapy: 68KKK7989 to (Evaluate:26Apr2018) Recorded   4  Atorvastatin Calcium 40 MG Oral Tablet; TAKE ONE TABLET BY MOUTH ONCE DAILY   AS DIRECTED; Therapy: 93FNZ2293 to (Last Rx:11Eyv2706)  Requested for: 18ORH0198 Ordered   5  Azithromycin 250 MG Oral Tablet; TAKE 2 TABLETS ON DAY 1 THEN TAKE 1 TABLET A   DAY FOR 4 DAYS; Therapy: 51EIW4191 to (Last Rx:14Fcx3089)  Requested for: 26Qqu9734 Ordered   6  Clopidogrel Bisulfate 75 MG Oral Tablet; TAKE ONE TABLET BY MOUTH ONCE DAILY; Therapy: 91Hks8427 to (Last Rx:29Sld0954)  Requested for: 29Xay4565 Ordered   7  DiazePAM 5 MG Oral Tablet; TAKE 1 TABLET 3 times daily PRN; Therapy: 18Lig0988 to (Evaluate:37Ala0599)  Requested for: 73Zvx2954; Last   Rx:00Snm4210; Status: ACTIVE - Retrospective By Protocol Authorization Ordered   8  Gabapentin 100 MG Oral Capsule; TAKE THREE CAPSULE BY MOUTH DAILY AS   DIRECTED; Therapy: 41Ewo5425 to (Last Rx:12Mar2017)  Requested for: 84SFB4776 Ordered   9  Levothyroxine Sodium 75 MCG Oral Tablet; TAKE ONE TABLET BY MOUTH ONCE   DAILY; Therapy: 15HZN4638 to (Last HR:25JHF0659)  Requested for: 75POX4922 Ordered   10  Oxycodone-Acetaminophen 5-325 MG Oral Tablet; TAKE 1 TABLET Every 6 hours PRN    pain; Therapy: 30WNL1381 to (Evaluate:10Mar2017); Last Rx:11Mdm4002 Ordered   11  Pantoprazole Sodium 40 MG Oral Tablet Delayed Release; TAKE ONE TABLET BY    MOUTH ONCE DAILY; Therapy: 57Rui8933 to (Last Rx:97Fss8323)  Requested for: 47XLD3556 Ordered   12  Primidone 250 MG Oral Tablet; TAKE 1 TABLET AT BEDTIME;     Therapy: 13DYW1573 to (Evaluate:27Pup1751)  Requested for: 45NXG2663; Last    Mireille Laundry Ordered   13  Primidone 50 MG Oral Tablet; Take 1 tablet at bedtime along with Primidone 250 mg    tablet; Therapy: 64Fbh0636 to (Evaluate:2017)  Requested for: 81BKO8693; Last    Rx:08Uya9741 Ordered   14  Viibryd 40 MG Oral Tablet; TAKE ONE TABLET BY MOUTH ONCE DAILY; Therapy: 37GEL8231 to (Last Rx:48Ccd8223)  Requested for: 11Tjy7989; Status:    ACTIVE - Retrospective By Protocol Authorization Ordered   15  Zolpidem Tartrate 10 MG Oral Tablet; TAKE 1 TABLET AT  BEDTIME AS NEEDED FOR    INSOMNIA; Therapy: 51DAM1545 to (Evaluate:50Tjp5965); Last Rx:19Zkk1945 Ordered   16  Zostavax 24111 UNT/0 65ML Subcutaneous Solution Reconstituted; inject subq; Therapy: 89IUY8352 to (Last XK:13KAW7210) Ordered    The medication list was reviewed and updated today  Allergies    1  Penicillins    Immunizations   ** Printed in Appendix #1 below       Vitals  Signs    Systolic: 905  Diastolic: 80   Height: 5 ft 3 in  Weight: 207 lb   BMI Calculated: 36 67  BSA Calculated: 1 96    Future Appointments    Date/Time Provider Specialty Site   2017 08:20 AM Karina Santos DO Cardiology  CARDIOLOGY  [place]    61:70 AM [facility]     Signatures   Electronically signed by : Ilan Obrien DO; May 10 5645  6:04AM EST                       (Author)    Appendix #1     Patient: Kal Westbrook ; : 1935; MRN: [id number]      4 2 1 2 3 6    Influenza  06-Dec-2011  (76y) 15-Oct-2012  (76y) 15-Oct-2013  (77y) 15-Oct-2014  (78y) 28-Oct-2015  (79y) 2016  (80y)    PCV  [id number]  (80y)         PPSV  2015  (79y)         Zoster  01-May-2017  (81y)

## 2018-01-17 NOTE — MISCELLANEOUS
Message  Rec'd call from Dr Mortimer Fonder office requesting appt ASAP for patient due to infected cyst on left arm  Appointment was scheduled for today, in the [place] office, with TB  Karlos Yoonz stated that she'd return call after she spoke to patients son for scheduling  Received return call from Dr Mortimer Fonder office - appointment has been rescheduled for Friday AM in the [place] office  In completing chart prep     there is no notation for cyst  (No office visit note, no telephone no, no task   Juju Cutting Juju Cutting )      Active Problems    1  Abdominal wall bulge (789 30) (R19 00)   2  Acute URI (465 9) (J06 9)   3  Anxiety (300 00) (F41 9)   4  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)   5  Burn of abdomen wall (942 03) (T21 02XA)   6  Cerebral infarct (434 91) (I63 9)   7  Clinical depression (311) (F32 9)   8  COPD (chronic obstructive pulmonary disease) (496) (J44 9)   9  Coronary arteriosclerosis (414 00) (I25 10)   10  Edema (782 3) (R60 9)   11  Esophageal reflux (530 81) (K21 9)   12  Family disruption (V61 09) (Z63 8)   13  Female Stress Incontinence (625 6)   14  Generalized anxiety disorder (300 02) (F41 1)   15  Generalized osteoarthritis (715 00) (M15 9)   16  Glaucoma (365 9) (H40 9)   17  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) (Z95 1)   18  Hyperlipidemia (272 4) (E78 5)   19  Hypertension (401 9) (I10)   20  Hypothyroidism (244 9) (E03 9)   21  Infected open wound (879 9) (T14 8,L08 9)   22  Insomnia (780 52) (G47 00)   23  Need for influenza vaccination (V04 81) (Z23)   24  Nontraumatic hematoma of soft tissue (729 92) (M79 81)   25  Osteoarthritis of knee (715 36) (M17 9)   26  Postural imbalance (729 90) (R29 3)   27  Primary osteoarthritis of right knee (715 16) (M17 11)   28  Right knee DJD (715 96) (M17 9)   29  Status post left knee replacement (V43 65) (Z96 652)   30  Stroke syndrome (434 91) (I63 9)   31  Type 2 diabetes mellitus (250 00) (E11 9)   32   Visit for pre-operative examination (V72 84) (Z01 818)   33  Vitamin D deficiency (268 9) (E55 9)    Current Meds   1  Advair Diskus 250-50 MCG/DOSE Inhalation Aerosol Powder Breath Activated; INHALE   1 PUFF TWICE DAILY  RINSE MOUTH AFTER USE; Therapy: 84OPH8414 to (53 583 09 76)  Requested for: 82PCY3180; Last   Rx:22Jan2016 Ordered   2  Albuterol Sulfate (2 5 MG/3ML) 0 083% Inhalation Nebulization Solution; USE 1 UNIT   DOSE IN NEBULIZER EVERY 4 TO 6 HOURS AS NEEDED; Therapy: 68TDK4411 to (Evaluate:14Nov2014)  Requested for: 72QZE5480; Last   Rx:15Oct2014 Ordered   3  AmLODIPine Besylate 5 MG Oral Tablet; TAKE ONE TABLET BY MOUTH ONCE DAILY; Therapy: 81DYA6541 to (Last Rx:41Wdl9456)  Requested for: 08Oba5330 Ordered   4  Aspirin 81 MG Oral Tablet; Therapy: (Recorded:12Jun2015) to Recorded   5  Atorvastatin Calcium 40 MG Oral Tablet; TAKE 1 TABLET DAILY AS DIRECTED; Therapy: 27NPC1696 to (Evaluate:09Jnd9432)  Requested for: 04VNK6342; Last   Rx:22Swd4161 Ordered   6  Atorvastatin Calcium 80 MG Oral Tablet; TAKE 1 TABLET AT BEDTIME; Therapy: 94AVE5614 to (Evaluate:20Yue6813)  Requested for: 55Uvd5217; Last   Rx:70Eah0692; Status: ACTIVE - Renewal Denied Ordered   7  Azithromycin 250 MG Oral Tablet; TAKE 2 TABLETS ON DAY 1 THEN TAKE 1 TABLET A   DAY FOR 4 DAYS; Therapy: 25NLV7214 to (Last Rx:14Mar2016)  Requested for: 71IUD0193 Ordered   8  Azithromycin 250 MG Oral Tablet; TAKE 2 TABLETS ON DAY 1 THEN TAKE 1 TABLET A   DAY FOR 4 DAYS; Therapy: 06RSE4196 to (Last Rx:18Feb2016)  Requested for: 86PZL3346 Ordered   9  Bactroban Nasal 2 % Nasal Ointment; USE AS DIRECTED; Therapy: 53EFF7196 to (Last Rx:22Jan2016)  Requested for: 19MWM8043 Ordered   10  Clopidogrel Bisulfate 75 MG Oral Tablet; TAKE ONE TABLET BY MOUTH ONCE DAILY; Therapy: 52Zfk4348 to (Last Rx:06Jan2016)  Requested for: 63PNU1983 Ordered   11  Diazepam 5 MG Oral Tablet; TAKE 1 TABLET 3 times daily PRN;     Therapy: 27Apr2015 to (Evaluate:18Mar2016)  Requested for: 22XWN3604; Last    Rx:38Zbw0897 Ordered   12  Ferrous Sulfate 325 (65 Fe) MG Oral Tablet; TAKE 1 TABLET TWICE DAILY WITH    MEALS; Therapy: 26XUS0567 to (Claudia Villeda)  Requested for: 17OXH1323; Last    Rx:22Jan2016 Ordered   13  Folic Acid 1 MG Oral Tablet; TAKE 1 TABLET DAILY; Therapy: 00RGS1504 to (Evaluate:98Jif5983)  Requested for: 26MSS7373; Last    Rx:82Wcn8258 Ordered   14  Gabapentin 100 MG Oral Capsule; TAKE THREE CAPSULE BY MOUTH DAILY AS    DIRECTED; Therapy: 73Crd9187 to (Last Rx:49Jin2175)  Requested for: 21ONL1777 Ordered   15  Levothyroxine Sodium 75 MCG Oral Tablet; TAKE ONE TABLET BY MOUTH ONCE    DAILY; Therapy: 45OPI7709 to (Last Rx:25Nov2015)  Requested for: 76TDX6943 Ordered   16  Metoprolol Tartrate 25 MG Oral Tablet; 1/2 tab twice daily; Therapy: 23Qfn2945 to (Evaluate:28Ciy7935)  Requested for: 26IDE3758; Last    Rx:51Czp2425 Ordered   17  OxyCODONE HCl - 5 MG Oral Tablet; TAKE 1 TO 2 TABLETS EVERY 6 HOURS AS    NEEDED FOR PAIN;    Therapy: 80CAG6354 to (Evaluate:16Mar2016); Last Rx:08Mar2016 Ordered   18  Oxycodone-Acetaminophen 5-325 MG Oral Tablet; TAKE 1 TABLET Every 6 hours PRN    pain; Therapy: 31XCD9634 to (Evaluate:11Mar2016); Last Rx:26Kwq2016 Ordered   19  Pantoprazole Sodium 40 MG Oral Tablet Delayed Release; TAKE ONE TABLET BY    MOUTH ONCE DAILY; Therapy: 92Rej7730 to (Last Rx:05Oct2015)  Requested for: 12Oct2015 Ordered   20  Primidone 250 MG Oral Tablet; TAKE 1 TABLET AT BEDTIME; Therapy: 59FOB5876 to (Evaluate:47Byq7108)  Requested for: 11Khl0530; Last    Rx:65Zrf3557 Ordered   21  Torsemide 10 MG Oral Tablet; TAKE ONE TABLET BY MOUTH DAILY AS NEEDED; Therapy: 51DID6742 to (Last Rx:05Oct2015)  Requested for: 84Gqb2318 Ordered   22  Venlafaxine HCl ER 37 5 MG Oral Capsule Extended Release 24 Hour; TAKE 1    CAPSULE DAILY; Therapy: 75YBY8858 to Recorded   23  Viibryd 40 MG Oral Tablet; TAKE ONE TABLET BY MOUTH ONCE DAILY;     Therapy: 66LPZ3288 to (Last Rx:50Eej3452)  Requested for: 21QNY8570 Ordered   24  Vitamin C 500 MG Oral Tablet; Take 1 tablet twice daily; Therapy: 01AYS7792 to (Andrew Stanley)  Requested for: 90MXK4556; Last    Rx:22Jan2016 Ordered   25  Zolpidem Tartrate 10 MG Oral Tablet; TAKE 1 TABLET AT  BEDTIME AS NEEDED FOR    INSOMNIA; Therapy: 28ATF7321 to (Evaluate:62Jkn1122); Last Rx:75Rhy1280 Ordered    Allergies    1   Penicillins    Signatures   Electronically signed by : Jaren Veloz, ; Mar 15 2016 11:12AM EST                       (Author)

## 2018-01-17 NOTE — PROGRESS NOTES
Assessment  Assessed    1  Coronary arteriosclerosis (414 00) (I25 10)   2  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) (Z95 1)   3  Hypertension (401 9) (I10)   4  Hyperlipidemia (272 4) (E78 5)   5  Stroke syndrome (434 91) (I63 9)    Plan  Stroke syndrome    · Follow-up visit in 3 months Evaluation and Treatment  Follow-up  Status: Hold For -  Scheduling  Requested for: 20Jan2016   Ordered; For: Stroke syndrome; Ordered By: Navi Pérez Performed:  Due: 13PJJ8608    Discussion/Summary  Cardiology Discussion Summary Free Text Note Form [facility]:   #1  Coronary disease status post CABG x3: Stable, no symptoms of angina, continue aspirin, statin, Plavix x one year post non-STEMI  Okay to hold Plavix 7 days prior to knee surgery, suspect patient will be low to intermediate cardiac risk, acceptable without need for further testing at this time  Left ventricular systolic function normal with no signs or symptoms of congestive heart failure or ongoing dysrhythmia  Patient and son very much prefer surgery, as she states her pain is uncontrolled, as is her mobility  #2  Hypertension: Controlled  #3  Carotid artery disease status post right CEA, less than 50% stenosis in the left ICA  Chief Complaint  Chief Complaint Free Text Note Form: 6 month follow up with ECHO      History of Present Illness  Cardiology HPI Free Text Note Form  Luke: This is a 51-year-old female with a significant history of CVA in February 2015 at which time she presented with fall and ambulatory dysfunction  MRI of the brain had revealed acute right temporoparietal infarction with MRA of the neck revealing greater than 70% bilateral internal carotid artery stenosis with diminished flow in the right MCA  CT revealed greater than 90% high-grade stenosis of the right internal carotid artery  The patient thereafter underwent right carotid endarterectomy with vascular surgery, and was thereafter discharged   Thereafter, on 6/22/15 she underwent left total knee replacement which she tolerated well  3 days after discharge she had chest pain and was admitted at [facility] with non-ST elevation myocardial infarction  Cardiac catheterization revealed three-vessel CAD including significant left main disease  thereafter, she underwent CABG x3 with LIMA to LAD, SVG to OM, and SVG to RCA  She had no post operative complications, and was discharged on 7/8/15 to inpatient rehabilitation, and thereafter went home to live with her son on 7/18/15  The patient presents today for follow up with her son  He states that she's been quite sluggish and fatigue since discharge  Specifically she does not have complaints of chest discomfort, shortness of breath, lower cavity edema, significant weight gain, orthopnea, palpitations, or lightheadedness since discharge  She has been on aspirin and Plavix since her stroke, and this has been continued since discharge from rehabilitation  She had one fall at home where she felt up to her wheelchair resulting in some bruising  She has decided not to do cardiac rehabilitation secondary to distance/transportation/financial issues, and has not continued physical therapy for the same reason  Her son tells me she has significant right knee pain, and is anticipating right total knee replacement in the near future and will followup with Dr Sandra simms  Review of Systems  Cardiology Female ROS:     Cardiac: No complaints of chest pain, no palpitations, no fainting  Skin: No complaints of nonhealing sores or skin rash  Genitourinary: No complaints of recurrent urinary tract infections, frequent urination at night, difficult urination, blood in urine, kidney stones, loss of bladder control, kidney problems, denies any birth control or hormone replacement, is not post menopausal, not currently pregnant     Psychological: No complaints of feeling depressed, anxiety, panic attacks, or difficulty concentrating  General: No complaints of trouble sleeping, lack of energy, fatigue, appetite changes, weight changes, fever, frequent infections, or night sweats  Respiratory: No complaints of shortness of breath, cough with sputum, or wheezing  HEENT: No complaints of serious problems, hearing problems, nose problems, throat problems, or snoring  Gastrointestinal: No complaints of liver problems, nausea, vomiting, heartburn, constipation, bloody stools, diarrhea, problems swallowing, adbominal pain, or rectal bleeding  Hematologic: No complaints of bleeding disorders, anemia, blood clots, or excessive brusing  Neurological: No complaints of numbness, tingling, dizziness, weakness, seizures, headaches, syncope or fainting, AM fatigue, daytime sleepiness, no witnessed apnea episodes  Musculoskeletal: No complaints of arthritis, back pain, or painfull swelling  Active Problems  Problems    1  Abdominal wall bulge (789 30) (R19 00)   2  Anxiety (300 00) (F41 9)   3  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)   4  Burn of abdomen wall (942 03) (T21 02XA)   5  Cerebral infarct (434 91) (I63 9)   6  Clinical depression (311) (F32 9)   7  COPD (chronic obstructive pulmonary disease) (496) (J44 9)   8  Coronary arteriosclerosis (414 00) (I25 10)   9  Edema (782 3) (R60 9)   10  Esophageal reflux (530 81) (K21 9)   11  Family disruption (V61 09) (Z63 8)   12  Female Stress Incontinence (625 6)   13  Generalized anxiety disorder (300 02) (F41 1)   14  Generalized osteoarthritis (715 00) (M15 9)   15  Glaucoma (365 9) (H40 9)   16  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) (Z95 1)   17  Hyperlipidemia (272 4) (E78 5)   18  Hypertension (401 9) (I10)   19  Hypothyroidism (244 9) (E03 9)   20  Insomnia (780 52) (G47 00)   21  Need for influenza vaccination (V04 81) (Z23)   22  Nontraumatic hematoma of soft tissue (729 92) (M79 81)   23  Osteoarthritis of knee (715 36) (M17 9)   24   Postural imbalance (729 90) (R29 3)   25  Right knee DJD (715 96) (M17 9)   26  Status post left knee replacement (V43 65) (Z96 652)   27  Stroke syndrome (434 91) (I63 9)   28  Type 2 diabetes mellitus (250 00) (E11 9)   29  Vitamin D deficiency (268 9) (E55 9)    Past Medical History  Problems    1  History of Chest congestion (786 9) (R09 89)   2  History of Encounter for screening colonoscopy (V76 51) (Z12 11)   3  History of Glaucoma screening (V80 1) (Z13 5)   4  History of arthritis (V13 4) (Z87 39)   5  History of cataract (V12 49) (Z86 69)   6  History of depression (V11 8) (Z86 59)   7  History of low back pain (V13 59) (Z87 39)   8  History of sciatica (V12 49) (Z86 69)   9  History of stroke (V12 54) (Z86 73)   10  History of thyroid disease (V12 29) (Z86 39)   11  History of urinary stone (V13 01) (Z87 442)   12  History of Hypoxia (799 02) (R09 02)   13  History of Need for influenza vaccination (V04 81) (Z23)   14  History of Palpitations (785 1) (R00 2)   15  History of Right knee DJD (715 96) (M17 9)   16  History of Sore throat (462) (J02 9)   17  History of Visit for pre-operative examination (V72 84) (Z01 818)   18  History of Visit for pre-operative examination (V72 84) (Z01 818)   19  History of Visit for screening mammogram (G06 03) (Z12 31)  Active Problems And Past Medical History Reviewed: The active problems and past medical history were reviewed and updated today  Surgical History  Problems    1  History of CABG   2  History of Carotid Thromboendarterectomy   3  History of Cataract Surgery   4  History of Nephrostomy With Drainage Irrigation    Family History  Mother    1  Family history of malignant neoplasm (V16 9) (Z80 9)   2  Family history of malignant neoplasm of cervix uteri (V16 49) (Z80 49)  Father    3  Family history of myocardial infarction (V17 3) (Z82 49)  Sibling    4  Family history of aortic aneurysm (V17 49) (Z82 49)   5  Family history of malignant neoplasm (V16 9) (Z80 9)   6  Family history of myocardial infarction (V17 3) (Z82 49)  Family History Reviewed: The family history was reviewed and updated today  Social History  Problems    · Denied: History of Alcohol Use (History)   · Completed 10th grade   · Denied: Drug use (305 90) (F19 90)   · Family disruption (V61 09) (Z63 8)   · Former smoker (A09 41) (G80 647)   · No caffeine use   ·   Social History Reviewed: The social history was reviewed and updated today  Current Meds   1  Advair Diskus 500-50 MCG/DOSE Inhalation Aerosol Powder Breath Activated; INHALE 1   PUFF TWICE DAILY; Therapy: 82CPR3129 to Recorded   2  Albuterol Sulfate (2 5 MG/3ML) 0 083% Inhalation Nebulization Solution; USE 1 UNIT   DOSE IN NEBULIZER EVERY 4 TO 6 HOURS AS NEEDED; Therapy: 03ZHA0318 to (Evaluate:14Nov2014)  Requested for: 49UCJ4924; Last   Rx:15Oct2014 Ordered   3  AmLODIPine Besylate 5 MG Oral Tablet; TAKE ONE TABLET BY MOUTH ONCE DAILY; Therapy: 61SMF7978 to (Last Rx:16Nts4605)  Requested for: 19Fdz3702 Ordered   4  Aspirin 81 MG Oral Tablet; Therapy: (Recorded:12Jun2015) to Recorded   5  Atorvastatin Calcium 80 MG Oral Tablet; TAKE 1 TABLET AT BEDTIME; Therapy: 61EXP4980 to (Evaluate:20Ctx1880)  Requested for: 03Dso2837; Last   Rx:00Jzu9749; Status: ACTIVE - Renewal Denied Ordered   6  Clopidogrel Bisulfate 75 MG Oral Tablet; TAKE ONE TABLET BY MOUTH ONCE DAILY; Therapy: 59Lak9319 to (Last Rx:06Jan2016)  Requested for: 27UIV7638 Ordered   7  Diazepam 5 MG Oral Tablet; TAKE 1 TABLET 3 times daily PRN; Therapy: 46RYG1259 to (Evaluate:07Jan2016)  Requested for: 14NVF8432; Last   Rx:35Hka6055 Ordered   8  Folic Acid 1 MG Oral Tablet; TAKE ONE TABLET BY MOUTH ONCE DAILY; Therapy: 18ZQQ4908 to (Last Rx:73Kke8537)  Requested for: 15HHV8174 Ordered   9  Gabapentin 100 MG Oral Capsule; TAKE THREE CAPSULE BY MOUTH DAILY AS   DIRECTED; Therapy: 62Xyu2526 to (Last Rx:05Oct2015)  Requested for: 12Oct2015 Ordered   10  Levothyroxine Sodium 75 MCG Oral Tablet; TAKE ONE TABLET BY MOUTH ONCE DAILY; Therapy: 71ZML9735 to (Last Rx:25Nov2015)  Requested for: 53JNW1597 Ordered   11  Metoprolol Tartrate 25 MG Oral Tablet; 1/2 tab twice daily; Therapy: 27Ctj0027 to (Jacey Arnett)  Requested for: 35Mur0441; Last    Rx:14Syx5133 Ordered   12  Oxycodone-Acetaminophen 5-325 MG Oral Tablet; TAKE 1 TABLET Every 6 hours PRN    pain; Therapy: 10PNO4795 to (Evaluate:20Nov2015); Last Rx:47Jep7788 Ordered   13  Pantoprazole Sodium 40 MG Oral Tablet Delayed Release; TAKE ONE TABLET BY    MOUTH ONCE DAILY; Therapy: 32Xbl0949 to (Last Rx:05Oct2015)  Requested for: 12Oct2015 Ordered   14  Primidone 250 MG Oral Tablet; TAKE 1 TABLET AT BEDTIME; Therapy: 38RFL6421 to (Evaluate:93Kfe6540)  Requested for: 02Gmf3308; Last    Rx:17Cek7757 Ordered   15  Torsemide 10 MG Oral Tablet; TAKE ONE TABLET BY MOUTH DAILY AS NEEDED; Therapy: 33ZEY4568 to (Last Rx:24Xsz5316)  Requested for: 19Moi9537 Ordered   16  Venlafaxine HCl ER 37 5 MG Oral Capsule Extended Release 24 Hour; TAKE 1    CAPSULE DAILY; Therapy: 85BNI5509 to Recorded   17  Viibryd 40 MG Oral Tablet; TAKE ONE TABLET BY MOUTH ONCE DAILY; Therapy: 55FID1893 to (Last Rx:25Nov2015)  Requested for: 00HAQ4929 Ordered   18  Zolpidem Tartrate 10 MG Oral Tablet; TAKE 1 TABLET AT  BEDTIME AS NEEDED FOR    INSOMNIA; Therapy: 83QUV6007 to (Evaluate:09Oct2015); Last Rx:70Qjx9384 Ordered  Medication List Reviewed: The medication list was reviewed and updated today  Allergies  Medication    1   Penicillins    Vitals  Vital Signs [Data Includes: Current Encounter]    Recorded: 50XTA4645 02:02PM   Heart Rate 64, R Radial   Pulse Quality Regular, R Radial   Systolic 430, RUE, Standing   Diastolic 80, RUE, Standing   BP Cuff Size Large   Height 5 ft 3 in   Weight 181 lb    BMI Calculated 32 06   BSA Calculated 1 85     Physical Exam    Constitutional   General appearance: No acute distress, well appearing and well nourished  Eyes   Conjunctiva and Sclera examination: Conjunctiva pink, sclera anicteric  Ears, Nose, Mouth, and Throat - External inspection of ears and nose: Normal without deformities or discharge  Neck   Neck and thyroid: Normal, supple, trachea midline, no thyromegaly  Pulmonary   Respiratory effort: No increased work of breathing or signs of respiratory distress  Auscultation of lungs: Clear to auscultation, no rales, no rhonchi, no wheezing, good air movement  Cardiovascular   Auscultation of heart: Normal rate and rhythm, normal S1 and S2, no murmurs  Carotid pulses: Abnormal   Left carotid bruit  Peripheral vascular exam: Abnormal   Diminished right radial pulse  Pedal pulses: Normal, 2+ bilaterally  Examination of extremities for edema and/or varicosities: Normal   No edema  Abdomen   Abdomen: Non-tender and no distention  Liver and spleen: No hepatomegaly or splenomegaly  Musculoskeletal Gait and station: Abnormal  Patient relates with cane/walker  Digits and nails: Abnormal    Skin - Skin and subcutaneous tissue: Abnormal  Arthritis changes in both hands  Neurologic - Cranial nerves: II - XII intact  Speech: Normal     Psychiatric - Orientation to person, place, and time: Abnormal  Flat affect, appears fatigued  Mood and affect: Abnormal       Results/Data  Diagnostic Studies Reviewed Cardio:   Lab Review: Lab data reviewed from lab data from 7/16/15 with WBC 13 5, hemoglobin 9 5, platelet 494, creatinine 1 0, BUN 32, potassium 3 9   Echocardiogram/TESS: TESS report reviewed from 7/2/15, ejection fraction preserved with mild apical hypokinesis transthoracic echocardiogram reviewed from 6/28/15 with ejection fraction 50%  Echocardiogram personally reviewed today, ejection fraction 60%, improved        Future Appointments    Date/Time Provider Specialty Site   04/20/2016 04:00 PM García Alvarado DO Cardiology [facility]  Claudia Mason 95/[birth year]60 07:11 PM Prabhjot Nova DO Family Medicine [facility]   06/27/2016 03:45 PM Randy Cassidy Neurology [address]     Signatures   Electronically signed by : Ellen Rodríguez DO; Jan 20 2016  2:38PM EST                       (Author)

## 2018-01-22 VITALS
BODY MASS INDEX: 36.68 KG/M2 | WEIGHT: 207 LBS | SYSTOLIC BLOOD PRESSURE: 140 MMHG | HEIGHT: 63 IN | DIASTOLIC BLOOD PRESSURE: 80 MMHG

## 2018-01-22 VITALS — DIASTOLIC BLOOD PRESSURE: 78 MMHG | SYSTOLIC BLOOD PRESSURE: 122 MMHG

## 2018-01-22 VITALS
HEART RATE: 60 BPM | RESPIRATION RATE: 16 BRPM | SYSTOLIC BLOOD PRESSURE: 132 MMHG | DIASTOLIC BLOOD PRESSURE: 68 MMHG | HEIGHT: 63 IN | BODY MASS INDEX: 37.23 KG/M2 | WEIGHT: 210.13 LBS

## 2018-01-22 VITALS — BODY MASS INDEX: 36.54 KG/M2 | WEIGHT: 206.25 LBS | HEIGHT: 63 IN

## 2018-01-23 VITALS
SYSTOLIC BLOOD PRESSURE: 144 MMHG | DIASTOLIC BLOOD PRESSURE: 66 MMHG | HEART RATE: 82 BPM | WEIGHT: 206 LBS | BODY MASS INDEX: 36.49 KG/M2 | RESPIRATION RATE: 18 BRPM | OXYGEN SATURATION: 82 %

## 2018-01-23 NOTE — MISCELLANEOUS
Message  Called patient and spoke with her son who is POA  We discussed the need for a second sleep study, due to the first test coming back positive for Sleep Apnea  Patient's son stated that his mother has an appointment next week and they would like to keep this appointment so they can talk with the doctor more in depth about the results of her sleep study  The son said he will schedule the second test once he comes in next week and talks to the doctor  Active Problems    1  Anxiety (300 00) (F41 9)   2  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)   3  Clinical depression (311) (F32 9)   4  COPD (chronic obstructive pulmonary disease) (496) (J44 9)   5  Coronary arteriosclerosis (414 00) (I25 10)   6  Edema (782 3) (R60 9)   7  Esophageal reflux (530 81) (K21 9)   8  Female Stress Incontinence (625 6)   9  Flu vaccine need (V04 81) (Z23)   10  Generalized anxiety disorder (300 02) (F41 1)   11  Generalized osteoarthritis (715 00) (M15 9)   12  Glaucoma (365 9) (H40 9)   13  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) (Z95 1)   14  History of gastrointestinal diverticular hemorrhage (V12 79) (Z87 19)   15  Hyperlipidemia (272 4) (E78 5)   16  Hypertension (401 9) (I10)   17  Hypothyroidism (244 9) (E03 9)   18  Hypoventilation associated with obesity syndrome (278 03) (E66 2)   19  Hypoxia (799 02) (R09 02)   20  Insomnia (780 52) (G47 00)   21  Need for influenza vaccination (V04 81) (Z23)   22  Need for pneumococcal vaccination (V03 82) (Z23)   23  Need for zoster vaccination (V04 89) (Z23)   24  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,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,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 9)   25  Osteoarthritis of knee (715 36) (M17 10)   26  Post-nasal drip (784 91) (R09 82)   27  Postural imbalance (729 90) (R29 3)   28  Primary osteoarthritis of right knee (715 16) (M17 11)   29  Right knee DJD (715 96) (M17 11)   30  S/P carotid endarterectomy (V45 89) (Z98 890)   31  Status post left knee replacement (V43 65) (Z96 657)   32   Status post total right knee replacement (V43 65) (Z96 651)   33  Stroke syndrome (434 91) (I63 9)   34  Type 2 diabetes mellitus (250 00) (E11 9)   35  Visit for pre-operative examination (V72 84) (Z01 818)   36  Vitamin D deficiency (268 9) (E55 9)    Current Meds   1  Advair Diskus 250-50 MCG/DOSE Inhalation Aerosol Powder Breath Activated; INHALE   1 PUFF TWICE DAILY  RINSE MOUTH AFTER USE; Therapy: 22LRU3044 to (Evaluate:49Uap5137)  Requested for: 61BQB8158; Last   Rx:84Xmj8142 Ordered   2  AmLODIPine Besylate 5 MG Oral Tablet; Take 1 tablet daily; Therapy: 36CRI6002 to (Last Rx:01Nov2017)  Requested for: 90WJM9715 Ordered   3  Aspirin 81 MG Oral Tablet Delayed Release; Take 1 tablet daily; Therapy: 42TUI0157 to (Evaluate:26Apr2018) Recorded   4  Atorvastatin Calcium 40 MG Oral Tablet; TAKE ONE TABLET BY MOUTH ONCE DAILY   AS DIRECTED; Therapy: 61GIT9426 to (Evaluate:80Zzb6844)  Requested for: 82KCZ6084; Last   Rx:01Nov2017 Ordered   5  Clopidogrel Bisulfate 75 MG Oral Tablet; TAKE ONE TABLET BY MOUTH ONCE DAILY; Therapy: 83Szi1924 to (Last Rx:21Nov2017)  Requested for: 22Nov2017 Ordered   6  DiazePAM 5 MG Oral Tablet; TAKE 1 TABLET 3 times daily PRN; Therapy: 51YQO1431 to (Evaluate:03Iuo5791)  Requested for: 82LTV5909; Last   Rx:21Nov2017 Ordered   7  Levothyroxine Sodium 75 MCG Oral Tablet; TAKE ONE TABLET BY MOUTH ONCE   DAILY; Therapy: 02KFT0809 to (Last Rx:21Nov2017)  Requested for: 22Nov2017 Ordered   8  Lumigan 0 01 % Ophthalmic Solution; INSTILL 1 DROP INTO BOTH EYES ONCE DAILY   IN THE EVENING; Therapy: 57Eik5155 to Recorded   9  Oxycodone-Acetaminophen 5-325 MG Oral Tablet; TAKE 1 TABLET Every 6 hours PRN   pain; Therapy: 16KND3540 to (Evaluate:74Wde8072); Last Rx:21Nov2017 Ordered   10  Pantoprazole Sodium 40 MG Oral Tablet Delayed Release; TAKE ONE TABLET BY    MOUTH ONCE DAILY; Therapy: 27Fgs7572 to (Last Rx:21Nov2017)  Requested for: 22Nov2017 Ordered   11   Viibryd 40 MG Oral Tablet; TAKE ONE TABLET BY MOUTH ONCE DAILY; Therapy: 25NKN3640 to (Last Rx:21Nov2017)  Requested for: 22Nov2017 Ordered   12  Zolpidem Tartrate 10 MG Oral Tablet; TAKE 1 TABLET AT  BEDTIME AS NEEDED FOR    INSOMNIA; Therapy: 55BOT2060 to (Evaluate:82Mnd7748); Last Rx:10Oct2017 Ordered    Allergies    1   Penicillins    Signatures   Electronically signed by : Dary Coronado, ; Dec  8 2017  2:27PM EST                       (Author)

## 2018-01-24 NOTE — PROGRESS NOTES
Assessment   1  Osteoarthritis of knee (715 36) (M17 9)    Plan  Osteoarthritis of knee    · Apply an ice pack as needed for pain twice a day for 20 minutes ; Status:Complete;    Done: 86GRX4228   · Follow Up After Surgery Evaluation and Treatment  Follow-up  Status: Hold For -  Scheduling  Requested for: 77NAZ0675   · Home Exercise Program Evaluation and Treatment  Follow-up  Status: Complete  Done:  78ORF0149    Discussion/Summary    Assessment  #1 right knee arthritis  #2 status post left total knee arthroscopy    Plan  #1  Reviewed risks and benefits of surgical treatment patient elected to proceed with right total knee consent was obtained  #2  Ice, heat and inflammatory as needed  #3, follow-up 2 weeks postop, call sooner if symptoms worsen  The treatment plan was reviewed with the patient/guardian  The patient/guardian understands and agrees with the treatment plan      Chief Complaint   1  Knee Pain    History of Present Illness  HPI: 77-year-old female here for followup of her left total knee arthroplasty  That was performed on 6/22/15 has been treating her right knee with cortisone injections that only last 1-2 weeks  She is interested in proceeding with total joint arthroplasty and now has clearance from her cardiologist following her CABG  I have personally reviewed and updated the patient's past medical history, past surgical history, family history, social history, current medications, allergies, and vital signs today  Review of Systems    Constitutional: No fever, no chills, feels well, no tiredness, no recent weight gain or loss  Eyes: No complaints of eyesight problems, no red eyes  ENT: no loss of hearing, no nosebleeds, no sore throat  Cardiovascular: No complaints of chest pain, no palpitations, no leg claudication or lower extremity edema  Respiratory: no compliants of shortness of breath, no wheezing, no cough     Gastrointestinal: no complaints of abdominal pain, no constipation, no nausea or diarrhea, no vomiting, no bloody stools  Genitourinary: no complaints of dysuria, no incontinence  Musculoskeletal: as noted in HPI  Integumentary: no complaints of skin rash or lesion, no itching or dry skin, no skin wounds  Neurological: no complaints of headache, no confusion, no numbness or tingling, no dizziness  Endocrine: No complaints of muscle weakness, no feelings of weakness, no frequent urination, no excessive thirst    Psychiatric: No suicidal thoughts, no anxiety, no feelings of depression  ROS reviewed  Active Problems   1  Abdominal wall bulge (789 30) (R19 00)  2  Anxiety (300 00) (F41 9)  3  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)  4  Burn of abdomen wall (942 03) (T21 02XA)  5  Cerebral infarct (434 91) (I63 9)  6  Clinical depression (311) (F32 9)  7  COPD (chronic obstructive pulmonary disease) (496) (J44 9)  8  Coronary arteriosclerosis (414 00) (I25 10)  9  Edema (782 3) (R60 9)  10  Esophageal reflux (530 81) (K21 9)  11  Family disruption (V61 09) (Z63 8)  12  Female Stress Incontinence (625 6)  13  Generalized anxiety disorder (300 02) (F41 1)  14  Generalized osteoarthritis (715 00) (M15 9)  15  Glaucoma (365 9) (H40 9)  16  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) (Z95 1)  17  Hyperlipidemia (272 4) (E78 5)  18  Hypertension (401 9) (I10)  19  Hypothyroidism (244 9) (E03 9)  20  Insomnia (780 52) (G47 00)  21  Need for influenza vaccination (V04 81) (Z23)  22  Nontraumatic hematoma of soft tissue (729 92) (M79 81)  23  Osteoarthritis of knee (715 36) (M17 9)  24  Postural imbalance (729 90) (R29 3)  25  Right knee DJD (715 96) (M17 9)  26  Status post left knee replacement (V43 65) (Z96 652)  27  Stroke syndrome (434 91) (I63 9)  28  Type 2 diabetes mellitus (250 00) (E11 9)  29   Vitamin D deficiency (268 9) (E55 9)    Past Medical History    · History of Chest congestion (786 9) (G73 67)   · History of Encounter for screening colonoscopy (V76 51) (Z12 11)   · History of Glaucoma screening (V80 1) (Z13 5)   · History of arthritis (V13 4) (Z87 39)   · History of cataract (V12 49) (Z86 69)   · History of depression (V11 8) (Z86 59)   · History of low back pain (V13 59) (Z87 39)   · History of sciatica (V12 49) (Z86 69)   · History of stroke (V12 54) (Z86 73)   · History of thyroid disease (V12 29) (Z86 39)   · History of urinary stone (V13 01) (Z87 442)   · History of Hypoxia (799 02) (R09 02)   · History of Need for influenza vaccination (V04 81) (Z23)   · History of Palpitations (785 1) (R00 2)   · History of Right knee DJD (715 96) (M17 9)   · History of Sore throat (462) (J02 9)   · History of Visit for pre-operative examination (V72 84) (Z01 818)   · History of Visit for pre-operative examination (V72 84) (Z01 818)   · History of Visit for screening mammogram (V76 12) (Z12 31)    Surgical History    · History of CABG   · History of Carotid Thromboendarterectomy   · History of Cataract Surgery   · History of Nephrostomy With Drainage Irrigation    Family History    · Family history of malignant neoplasm (V16 9) (Z80 9)   · Family history of malignant neoplasm of cervix uteri (V16 49) (Z80 49)    · Family history of myocardial infarction (V17 3) (Z82 49)    · Family history of aortic aneurysm (V17 49) (Z82 49)   · Family history of malignant neoplasm (V16 9) (Z80 9)   · Family history of myocardial infarction (V17 3) (Z82 49)    Social History    · Denied: History of Alcohol Use (History)   · Completed 10th grade   · Denied: Drug use (305 90) (F19 90)   · Family disruption (V61 09) (Z63 8)   · Former smoker (V15 82) (I39 797)   · No caffeine use   ·     Current Meds  1  Advair Diskus 250-50 MCG/DOSE Inhalation Aerosol Powder Breath Activated; INHALE   1 PUFF TWICE DAILY  RINSE MOUTH AFTER USE; Therapy: 17BGJ6426 to (Emy Aguirre)  Requested for: 91WSU6457; Last   Rx:22Jan2016 Ordered  2   Albuterol Sulfate (2 5 MG/3ML) 0 083% Inhalation Nebulization Solution; USE 1 UNIT   DOSE IN NEBULIZER EVERY 4 TO 6 HOURS AS NEEDED; Therapy: 91DYN3697 to (Evaluate:14Nov2014)  Requested for: 27AFH9475; Last   Rx:15Oct2014 Ordered  3  AmLODIPine Besylate 5 MG Oral Tablet; TAKE ONE TABLET BY MOUTH ONCE DAILY; Therapy: 36AUS7022 to (Last Rx:29Dec2014)  Requested for: 61Xzw9696 Ordered  4  Aspirin 81 MG Oral Tablet; Therapy: (Recorded:12Jun2015) to Recorded  5  Atorvastatin Calcium 80 MG Oral Tablet; TAKE 1 TABLET AT BEDTIME; Therapy: 74OHW9544 to (Evaluate:21Feb2016)  Requested for: 71Jpj8174; Last   Rx:99Omx2175; Status: ACTIVE - Renewal Denied Ordered  6  Bactroban Nasal 2 % Nasal Ointment; USE AS DIRECTED; Therapy: 17UKA9632 to (Last Rx:22Jan2016)  Requested for: 73MNO9611 Ordered  7  Clopidogrel Bisulfate 75 MG Oral Tablet; TAKE ONE TABLET BY MOUTH ONCE DAILY; Therapy: 64Mee1696 to (Last Rx:06Jan2016)  Requested for: 35CCA1872 Ordered  8  Diazepam 5 MG Oral Tablet; TAKE 1 TABLET 3 times daily PRN; Therapy: 88IHZ3442 to (Evaluate:07Jan2016)  Requested for: 93XTT0564; Last   Rx:69Xay8050 Ordered  9  Ferrous Sulfate 325 (65 Fe) MG Oral Tablet; TAKE 1 TABLET TWICE DAILY WITH   MEALS; Therapy: 61JEQ8992 to (David Rigvidal)  Requested for: 71UCJ6206; Last   Rx:22Jan2016 Ordered  10  Folic Acid 1 MG Oral Tablet; TAKE 1 TABLET DAILY; Therapy: 97BKC4148 to ([facility] Rig[facility]iance [facility])  Requested for: 20UEV9535; Last    Rx:22Jan2016 Ordered  11  Gabapentin 100 MG Oral Capsule; TAKE THREE CAPSULE BY MOUTH DAILY AS    DIRECTED; Therapy: 34Hrg1393 to (Last Rx:05Oct2015)  Requested for: 12Oct2015 Ordered  12  Levothyroxine Sodium 75 MCG Oral Tablet; TAKE ONE TABLET BY MOUTH ONCE    DAILY; Therapy: 52OQH0609 to (Last Rx:25Nov2015)  Requested for: 77AOI4602 Ordered  13  Metoprolol Tartrate 25 MG Oral Tablet; 1/2 tab twice daily; Therapy: 65Luh3417 to (Jackson Chand)  Requested for: 35Zfr3909; Last    Rx:53Jdz5912 Ordered  14  Oxycodone-Acetaminophen 5-325 MG Oral Tablet; TAKE 1 TABLET Every 6 hours PRN    pain; Therapy: 39OVB6860 to (Evaluate:20Nov2015); Last Rx:28Oct2015 Ordered  15  Pantoprazole Sodium 40 MG Oral Tablet Delayed Release; TAKE ONE TABLET BY    MOUTH ONCE DAILY; Therapy: 58Kjo6877 to (Last Rx:05Oct2015)  Requested for: 12Oct2015 Ordered  16  Primidone 250 MG Oral Tablet; TAKE 1 TABLET AT BEDTIME; Therapy: 28AXH4865 to (Evaluate:16Sep2016)  Requested for: 76Cqt9474; Last    Rx:06Bnl3630 Ordered  17  Torsemide 10 MG Oral Tablet; TAKE ONE TABLET BY MOUTH DAILY AS NEEDED; Therapy: 92GHI5319 to (Last Rx:05Oct2015)  Requested for: 12Oct2015 Ordered  18  Venlafaxine HCl ER 37 5 MG Oral Capsule Extended Release 24 Hour; TAKE 1    CAPSULE DAILY; Therapy: 04TKW9316 to Recorded  19  Viibryd 40 MG Oral Tablet; TAKE ONE TABLET BY MOUTH ONCE DAILY; Therapy: 07MTP4665 to (Last Rx:25Nov2015)  Requested for: 34NIT1097 Ordered  20  Vitamin C 500 MG Oral Tablet; Take 1 tablet twice daily; Therapy: 56MCK1208 to (Tianna Woodson)  Requested for: 18YQS1209; Last    Rx:22Jan2016 Ordered  21  Zolpidem Tartrate 10 MG Oral Tablet; TAKE 1 TABLET AT  BEDTIME AS NEEDED FOR    INSOMNIA; Therapy: 35QSU8609 to (Evaluate:09Oct2015); Last Rx:10Aug2015 Ordered    Allergies   1  Penicillins    Vitals  Signs [Data Includes: Current Encounter]    Heart Rate: 85  Systolic: 355, LUE, Sitting  Diastolic: 78, LUE, Sitting  Height: 5 ft 3 in  Weight: 181 lb   BMI Calculated: 32 06  BSA Calculated: 1 86    Physical Exam  (Midline incision appears well-healed  Range of motion 0-120 minimal opening with valgus or varus stress)    (Grade 2 effusion, to the right knee   Tender to the medial and lateral joint lines stable to valgus varus anterior, posterior, drawer)      Constitutional - General appearance: Normal    Musculoskeletal - Gait and station: Normal  Digits and nails: Normal  Muscle strength/tone: Normal    Cardiovascular - Pulses: Normal  Examination of extremities for edema and/or varicosities: Normal  Heart - RRR, no murmurs, no rubs, no gallops  Respiratory - Lungs - Clear to auscultation bilaterally, no rales, no rhonci, no wheezes  Skin - Skin and subcutaneous tissue: Normal    Neurologic - Sensation: Normal    Psychiatric - Orientation to person, place, and time: Normal  Mood and affect: Normal    Eyes   Conjunctiva and lids: Normal     Pupils and irises: Normal        Results/Data  I personally reviewed the films/images/results in the office today  My interpretation follows  X-ray Review Reviewed  Bone length scanogram for preoperative planning  Attending Note  Collaborating Physician Note: Collaborating Note: I interviewed and examined the patient, I supervised the Advanced Practitioner and I agree with the Advanced Practitioner note  Surgery Scheduling Form  Surgery Schedule Form [facility] Standard:   Location: [facility]   Confirmation Number:   Procedure Date:1 02/25/20161    Requested Time: No Preference   Surgeon: Marium Vaca   Co-Surgeon:   [facility] Required: No   Bed: Med/Surg Bed Required   Anesthesia: Spinal     PROCEDURE DETAILS   Procedure: Right total knee arthroplasty   Laterality/Level: Right  Anticipated frozen section: NO    Procedure Codes: 77271   Pre-op diagnosis: Right knee arthritis   Diagnosis Code(s): M17 9   Case Length: 120  Equipment:   Equipment Needs: Total knee arthroplasty tray   Implants: depuy sigma   Is the patient able to walk up a flight of stairs, walk up a hill or do heavy housework WITHOUT having chest pain or shortness of breath?  NO      REGISTRATION & FINANCIAL CLEARANCE   FA Initials:   Insurance:1  [email or web address]    Policy Number:1  [id number]  Group Number:[id number]  [id number]    Insurance Contact:Jim Wooten &/or Referral number:2  [id number]      PRE-ADMISSION TESTING/CLINICAL INFORMATION   PAT Location: [place]     CONSULTS NEEDED:   Anesthesia Consult: YES, Anesthesia consult with    Medical Consult: YES,Medical consult with    Cardiac Consult: YES, Cardiac consult with       ALLERGIES AND ALERTS   Latex Allergy: NO   Penicillin Allergy: YES   Malignant Hyperthermia: NO   Diabetic Patient: NO     COMMENTS   Scheduling Information Provided By:     CASE MANAGEMENT:       1 Amended By: Alyson Pratt; Jan 28 2016 11:16 AM EST   2 Amended By: Alyson Pratt; Feb 03 2016 10:16 AM EST    Future Appointments    Date/Time Provider Specialty Site   02/25/2016 07:30 AM ALEXI Adames   [employer]   04/20/2016 04:00 PM Mario Dominguez DO Cardiology  CARDIOLOGY  [place]   48/15/9570 94:50 AM Sindy Garber DO Family Medicine [facility]   06/27/2016 03:45 PM Wade Humphryes Neurology [facility] [address]     Signatures   Electronically signed by : Emily Merlos, [facility]; Jan 27 2016 12:06PM EST                       (Author)    Electronically signed by : ALEXI Soriano ; Feb  3 2016  2:10PM EST                       (Author)

## 2018-01-29 DIAGNOSIS — F41.8 ANXIETY WITH DEPRESSION: Primary | ICD-10-CM

## 2018-01-29 DIAGNOSIS — I25.118 CORONARY ARTERY DISEASE OF NATIVE HEART WITH STABLE ANGINA PECTORIS, UNSPECIFIED VESSEL OR LESION TYPE (HCC): Primary | ICD-10-CM

## 2018-01-29 DIAGNOSIS — E78.2 MIXED HYPERLIPIDEMIA: ICD-10-CM

## 2018-01-29 RX ORDER — VILAZODONE HYDROCHLORIDE 40 MG/1
TABLET ORAL
Qty: 30 TABLET | Refills: 2 | Status: SHIPPED | OUTPATIENT
Start: 2018-01-29 | End: 2018-01-30 | Stop reason: SDUPTHER

## 2018-01-29 RX ORDER — ATORVASTATIN CALCIUM 40 MG/1
TABLET, FILM COATED ORAL
Qty: 30 TABLET | Refills: 0 | Status: SHIPPED | OUTPATIENT
Start: 2018-01-29 | End: 2018-04-02 | Stop reason: SDUPTHER

## 2018-01-30 DIAGNOSIS — F41.8 ANXIETY WITH DEPRESSION: ICD-10-CM

## 2018-01-31 RX ORDER — VILAZODONE HYDROCHLORIDE 40 MG/1
40 TABLET ORAL DAILY
Qty: 30 TABLET | Refills: 3 | Status: SHIPPED | OUTPATIENT
Start: 2018-01-31 | End: 2018-05-30 | Stop reason: SDUPTHER

## 2018-02-13 DIAGNOSIS — F51.04 CHRONIC INSOMNIA: Primary | ICD-10-CM

## 2018-02-13 RX ORDER — ZOLPIDEM TARTRATE 10 MG/1
TABLET ORAL
Qty: 30 TABLET | Refills: 0 | Status: SHIPPED | OUTPATIENT
Start: 2018-02-13 | End: 2019-05-31 | Stop reason: SDUPTHER

## 2018-02-20 DIAGNOSIS — I73.9 PAD (PERIPHERAL ARTERY DISEASE) (HCC): ICD-10-CM

## 2018-02-20 DIAGNOSIS — E03.9 ACQUIRED HYPOTHYROIDISM: Primary | ICD-10-CM

## 2018-02-20 RX ORDER — CLOPIDOGREL BISULFATE 75 MG/1
75 TABLET ORAL DAILY
Qty: 30 TABLET | Refills: 3 | Status: SHIPPED | OUTPATIENT
Start: 2018-02-20 | End: 2018-05-30 | Stop reason: SDUPTHER

## 2018-02-20 RX ORDER — LEVOTHYROXINE SODIUM 0.07 MG/1
75 TABLET ORAL
Qty: 30 TABLET | Refills: 3 | Status: SHIPPED | OUTPATIENT
Start: 2018-02-20 | End: 2018-05-30 | Stop reason: SDUPTHER

## 2018-02-20 NOTE — TELEPHONE ENCOUNTER
Patient needs a refill of Levothyroxine 75 mcg and Clopidogrel 75 mg sent to [facility] on [address]

## 2018-02-21 RX ORDER — FUROSEMIDE 20 MG/1
1 TABLET ORAL AS NEEDED
COMMUNITY
Start: 2017-12-12 | End: 2020-05-29 | Stop reason: SDUPTHER

## 2018-02-21 RX ORDER — AMLODIPINE BESYLATE 5 MG/1
1 TABLET ORAL DAILY
COMMUNITY
Start: 2017-05-01 | End: 2018-04-16 | Stop reason: SDUPTHER

## 2018-02-21 RX ORDER — OXYCODONE HYDROCHLORIDE AND ACETAMINOPHEN 5; 325 MG/1; MG/1
1 TABLET ORAL EVERY 6 HOURS PRN
COMMUNITY
Start: 2015-07-27 | End: 2018-02-22 | Stop reason: SDUPTHER

## 2018-02-22 ENCOUNTER — OFFICE VISIT (OUTPATIENT)
Dept: FAMILY MEDICINE CLINIC | Facility: CLINIC | Age: 83
End: 2018-02-22
Payer: COMMERCIAL

## 2018-02-22 VITALS
DIASTOLIC BLOOD PRESSURE: 60 MMHG | WEIGHT: 200 LBS | HEIGHT: 64 IN | BODY MASS INDEX: 34.15 KG/M2 | SYSTOLIC BLOOD PRESSURE: 104 MMHG

## 2018-02-22 DIAGNOSIS — J96.11 CHRONIC RESPIRATORY FAILURE WITH HYPOXIA AND HYPERCAPNIA (HCC): ICD-10-CM

## 2018-02-22 DIAGNOSIS — E78.01 FAMILIAL HYPERCHOLESTEROLEMIA: ICD-10-CM

## 2018-02-22 DIAGNOSIS — M15.9 GENERALIZED OSTEOARTHRITIS: ICD-10-CM

## 2018-02-22 DIAGNOSIS — I10 ESSENTIAL HYPERTENSION: Primary | ICD-10-CM

## 2018-02-22 DIAGNOSIS — E03.9 ACQUIRED HYPOTHYROIDISM: ICD-10-CM

## 2018-02-22 DIAGNOSIS — R25.1 TREMOR: ICD-10-CM

## 2018-02-22 DIAGNOSIS — I50.32 CHRONIC DIASTOLIC CONGESTIVE HEART FAILURE (HCC): ICD-10-CM

## 2018-02-22 DIAGNOSIS — J96.12 CHRONIC RESPIRATORY FAILURE WITH HYPOXIA AND HYPERCAPNIA (HCC): ICD-10-CM

## 2018-02-22 DIAGNOSIS — G47.33 OSA (OBSTRUCTIVE SLEEP APNEA): ICD-10-CM

## 2018-02-22 DIAGNOSIS — E11.9 TYPE 2 DIABETES MELLITUS WITHOUT COMPLICATION, WITHOUT LONG-TERM CURRENT USE OF INSULIN (HCC): ICD-10-CM

## 2018-02-22 LAB — SL AMB POCT HEMOGLOBIN AIC: 5.9

## 2018-02-22 PROCEDURE — 83036 HEMOGLOBIN GLYCOSYLATED A1C: CPT | Performed by: FAMILY MEDICINE

## 2018-02-22 PROCEDURE — 99214 OFFICE O/P EST MOD 30 MIN: CPT | Performed by: FAMILY MEDICINE

## 2018-02-22 RX ORDER — DIAZEPAM 5 MG/1
5 TABLET ORAL EVERY EVENING
Qty: 90 TABLET | Refills: 0 | Status: SHIPPED | OUTPATIENT
Start: 2018-02-22 | End: 2018-05-30 | Stop reason: SDUPTHER

## 2018-02-22 RX ORDER — DIAZEPAM 5 MG/1
5 TABLET ORAL EVERY EVENING
Qty: 30 TABLET | Refills: 0 | Status: SHIPPED | OUTPATIENT
Start: 2018-02-22 | End: 2018-02-22 | Stop reason: SDUPTHER

## 2018-02-22 RX ORDER — OXYCODONE HYDROCHLORIDE AND ACETAMINOPHEN 5; 325 MG/1; MG/1
1 TABLET ORAL EVERY 6 HOURS PRN
Qty: 90 TABLET | Refills: 0 | Status: SHIPPED | OUTPATIENT
Start: 2018-02-22 | End: 2018-04-12 | Stop reason: SDUPTHER

## 2018-02-22 NOTE — PROGRESS NOTES
Assessment/Plan:  Patient Instructions    Patient is a 51-year-old female with multiple complex comorbidities including generalized osteoarthritis, hypothyroidism, chronic respiratory failure with hypoxia and hypercapnia, controlled chronic diastolic congestive heart failure hypertension, and pre diabetes  All parameters are discuss today refills are done  Lab work will be due in May  Generally the patient is stable  Continue current regimen and follow up here in 3 months      Hypertension  Blood pressure is actually slightly lower today  Patient is not on Benicar per the son  Currently is on amlodipine and metoprolol  Type 2 diabetes mellitus (HCC)    Hemoglobin A1c today 5 9% which is controlled patient is not on any  Diabetic meds  Discussed portion control carbohydrate controlled    Chronic respiratory failure with hypoxia and hypercapnia (HCC)   Patient currently on chronic oxygen  Patient is supposed to be on Atrovent inhalation nebulizer  Requested the meds again which will probably need prior authorization  Hypothyroidism    Current medications stable labs due in May    Generalized osteoarthritis   Patient is currently on Percocet p r n  Tremor   Patient uses p r n  diazepam    Chronic diastolic congestive heart failure [facility])   Patient needs to recheck in with Cardiology  Son will make an appointment  Continue current medication patient is stable with no signs today of CHF  Diagnoses and all orders for this visit:    Essential hypertension  -     Comprehensive metabolic panel; Future    Type 2 diabetes mellitus without complication, without long-term current use of insulin (HCC)  -     Comprehensive metabolic panel; Future  -     Microalbumin / creatinine urine ratio  -     POCT hemoglobin A1c    Chronic diastolic congestive heart failure (HCC)  -     Discontinue: ipratropium (ATROVENT) 0 02 % nebulizer solution;  Take 2 5 mL (0 5 mg total) by nebulization 4 (four) times a day  - ipratropium (ATROVENT) 0 02 % nebulizer solution; Take 2 5 mL (0 5 mg total) by nebulization 4 (four) times a day    JAMEEL (obstructive sleep apnea)  -     Discontinue: ipratropium (ATROVENT) 0 02 % nebulizer solution; Take 2 5 mL (0 5 mg total) by nebulization 4 (four) times a day  -     ipratropium (ATROVENT) 0 02 % nebulizer solution; Take 2 5 mL (0 5 mg total) by nebulization 4 (four) times a day    Chronic respiratory failure with hypoxia and hypercapnia (HCC)  -     Discontinue: ipratropium (ATROVENT) 0 02 % nebulizer solution; Take 2 5 mL (0 5 mg total) by nebulization 4 (four) times a day  -     ipratropium (ATROVENT) 0 02 % nebulizer solution; Take 2 5 mL (0 5 mg total) by nebulization 4 (four) times a day  -     CBC and Platelet; Future    Familial hypercholesterolemia  -     Lipid Panel with Direct LDL reflex; Future    Acquired hypothyroidism  -     TSH, 3rd generation; Future    Tremor  -     Discontinue: diazepam (VALIUM) 5 mg tablet; Take 1 tablet (5 mg total) by mouth every evening  -     diazepam (VALIUM) 5 mg tablet; Take 1 tablet (5 mg total) by mouth every evening    Generalized osteoarthritis  -     oxyCODONE-acetaminophen (PERCOCET) 5-325 mg per tablet; Take 1 tablet by mouth every 6 (six) hours as needed (pain) Max Daily Amount: 4 tablets    Other orders  -     amLODIPine (NORVASC) 5 mg tablet; Take 1 tablet by mouth daily  -     furosemide (LASIX) 20 mg tablet; Take 1 tablet by mouth daily  -     Discontinue: ipratropium (ATROVENT) 0 02 % nebulizer solution; Inhale 1 each 4 (four) times a day  -     bimatoprost (LUMIGAN) 0 01 % ophthalmic drops; Apply 1 drop to eye Daily  -     Discontinue: oxyCODONE-acetaminophen (PERCOCET) 5-325 mg per tablet; Take 1 tablet by mouth every 6 (six) hours as needed          Subjective:      Patient ID: Diya Johnston is a 80 y o  female  Subjective    Diya Johnston is an 80 y o  female who presents for follow up of diabetes    Pre diabetes Current symptoms include: none  Patient denies hyperglycemia   Evaluation to date has included: hemoglobin A1C  Fingerstick hemoglobin A1c today is 5  9%Home sugars: patient does not check sugars  Current treatments: no recent interventions  Last dilated eye exam  Patient has regular checkups with eye doctor due to "coma  The following portions of the patient's history were reviewed and updated as appropriate: allergies, current medications, past family history, past medical history, past social history, past surgical history and problem list     Review of Systems  A comprehensive review of systems was negative except for: Respiratory: positive for Symptoms;  Respiratory  Chronic O2    Objective    /60   Ht 5' 4" (1 626 m)   Wt 90 7 kg (200 lb)   BMI 34 33 kg/m²     General Appearance:    Alert, cooperative, no distress, appears stated age with elevated BMI  Head:    Normocephalic, without obvious abnormality, atraumatic  Eyes:    PERRL, conjunctiva/corneas clear, EOM's intact, fundi     benign, both eyes  Ears:    Normal TM's and external ear canals, both ears  Nose:   Nares normal, septum midline, mucosa normal, no drainage    or sinus tenderness  Throat:   Lips, mucosa, and tongue normal; teeth and gums normal  Neck:   Supple, symmetrical, trachea midline, no adenopathy;     thyroid:  no enlargement/tenderness/nodules; no carotid    bruit or JVD  Back:     Symmetric, no curvature, ROM normal, no CVA tenderness  Lungs:     Clear to auscultation bilaterally, respirations unlabored  Chest Wall:    No tenderness or deformity   Heart:    Regular rate and rhythm, S1 and S2 normal, no murmur, rub   or gallop  Abdomen:     Soft, non-tender, bowel sounds active all four quadrants,     no masses, no organomegaly  Extremities:   Extremities normal, atraumatic, negative edema  Pulses:   2+ and symmetric all extremities  Skin:   Skin color, texture, turgor normal, no rashes or lesions  Lymph nodes:   Cervical, supraclavicular, and axillary nodes normal  Neurologic:   CNII-XII intact, normal strength, sensation and reflexes     throughout  Lab  Lab Results rzkjkrhaosP7C 5 9% today       Component                Value               Date                       HGBA1C                   6 0                 10/14/2016                 HGBA1C                   6 1 (H)             06/29/2015              Assessment/Plan    Diabetes mellitus Type II, under good control  Pre diabetes    Discussed general issues about diabetes pathophysiology and management  Addressed ADA diet  Suggested low cholesterol diet  Discussed foot care  Reminded to get yearly retinal exam   Follow up in 3 month or as needed                 Review of Systems   See above    Objective:  /60   Ht 5' 4" (1 626 m)   Wt 90 7 kg (200 lb)   BMI 34 33 kg/m²        Physical Exam   See above

## 2018-02-22 NOTE — ASSESSMENT & PLAN NOTE
Patient needs to recheck in with Cardiology  Son will make an appointment  Continue current medication patient is stable with no signs today of CHF

## 2018-02-22 NOTE — PATIENT INSTRUCTIONS
Patient is a 59-year-old female with multiple complex comorbidities including generalized osteoarthritis, hypothyroidism, chronic respiratory failure with hypoxia and hypercapnia, controlled chronic diastolic congestive heart failure hypertension, and pre diabetes  All parameters are discuss today refills are done  Lab work will be due in May  Generally the patient is stable  Continue current regimen and follow up here in 3 months   patient here for greater than 25 minutes and greater than 50% counseling done

## 2018-02-22 NOTE — ASSESSMENT & PLAN NOTE
Blood pressure is actually slightly lower today  Patient is not on Benicar per the son  Currently is on amlodipine and metoprolol

## 2018-02-22 NOTE — ASSESSMENT & PLAN NOTE
Hemoglobin A1c today 5 9% which is controlled patient is not on any  Diabetic meds    Discussed portion control carbohydrate controlled

## 2018-02-22 NOTE — ASSESSMENT & PLAN NOTE
Patient currently on chronic oxygen  Patient is supposed to be on Atrovent inhalation nebulizer  Requested the meds again which will probably need prior authorization

## 2018-03-14 ENCOUNTER — OFFICE VISIT (OUTPATIENT)
Dept: PULMONOLOGY | Facility: CLINIC | Age: 83
End: 2018-03-14
Payer: COMMERCIAL

## 2018-03-14 VITALS
OXYGEN SATURATION: 99 % | DIASTOLIC BLOOD PRESSURE: 66 MMHG | HEIGHT: 64 IN | BODY MASS INDEX: 34.66 KG/M2 | SYSTOLIC BLOOD PRESSURE: 148 MMHG | WEIGHT: 203 LBS | TEMPERATURE: 97.6 F | RESPIRATION RATE: 18 BRPM | HEART RATE: 59 BPM

## 2018-03-14 DIAGNOSIS — J96.11 CHRONIC RESPIRATORY FAILURE WITH HYPOXIA AND HYPERCAPNIA (HCC): Primary | ICD-10-CM

## 2018-03-14 DIAGNOSIS — G47.33 OSA (OBSTRUCTIVE SLEEP APNEA): ICD-10-CM

## 2018-03-14 DIAGNOSIS — I50.32 CHRONIC DIASTOLIC CONGESTIVE HEART FAILURE (HCC): ICD-10-CM

## 2018-03-14 DIAGNOSIS — J44.9 CHRONIC OBSTRUCTIVE PULMONARY DISEASE, UNSPECIFIED COPD TYPE (HCC): ICD-10-CM

## 2018-03-14 DIAGNOSIS — J96.12 CHRONIC RESPIRATORY FAILURE WITH HYPOXIA AND HYPERCAPNIA (HCC): Primary | ICD-10-CM

## 2018-03-14 DIAGNOSIS — E66.2 HYPOVENTILATION ASSOCIATED WITH OBESITY SYNDROME (HCC): ICD-10-CM

## 2018-03-14 DIAGNOSIS — R09.02 HYPOXIA: ICD-10-CM

## 2018-03-14 PROCEDURE — 99214 OFFICE O/P EST MOD 30 MIN: CPT | Performed by: INTERNAL MEDICINE

## 2018-03-14 RX ORDER — LEVALBUTEROL INHALATION SOLUTION 0.63 MG/3ML
0.63 SOLUTION RESPIRATORY (INHALATION) EVERY 8 HOURS PRN
Qty: 300 ML | Refills: 6 | Status: SHIPPED | OUTPATIENT
Start: 2018-03-14 | End: 2018-03-16 | Stop reason: SDUPTHER

## 2018-03-14 NOTE — ASSESSMENT & PLAN NOTE
This is secondary to JAMEEL/OHS and also chronic diastolic CHF, I will continue oxygen and notified her to use the minimal oxygen to keep her pulse ox between 88 and 92%, usually she is on 1-2 L

## 2018-03-14 NOTE — PROGRESS NOTES
Progress note - Pulmonary Medicine   Maykel Gonzales 80 y o  female MRN: [id number]       Impression & Plan:     COPD (chronic obstructive pulmonary disease) ([facility] )  Although her spirometry is normal and she has minimal history of smoking remotely and I still believe she does not have significant COPD if she has any  I will prescribe a Spiriva Handihaler once daily because it is easier for her to use instead of other inhalers that require coordination  I also prescribed Xopenex at a lower dose for her nebulizer machine to use kaykya Burks She expressed in the past that albuterol makes her tremor worse and she does not want to do that  Atrovent was not approved by his insurance  Chronic respiratory failure with hypoxia and hypercapnia (HCC)  This is secondary to JAMEEL/OHS and also chronic diastolic CHF, I will continue oxygen and notified her to use the minimal oxygen to keep her pulse ox between 88 and 92%, usually she is on 1-2 L    Chronic diastolic congestive heart failure ([facility] )  Continue her daily Lasix and try to avoid salt    JAMEEL (obstructive sleep apnea)  She has mild obstructive sleep apnea but more she has obesity hypoventilation syndrome  We spoke about trying BiPAP auto titration but she declined completely and also her son insisted that she would never keep the mask on  She understands the risks of not treating this and she still does not want to use the BiPAP or CPAP at night  Hypoventilation associated with obesity syndrome (HCC)  Ideally patient should be on BiPAP at night and during the day with naps but she declines completely  ______________________________________________________________________    HPI:    Ruba Phillips presents today for follow-up of chronic hypoxic and hypercapnic respiratory failure on 2-3 L of oxygen nasal cannula, patient also has obstructive sleep apnea based on home study and has significant nocturnal hypoxia but she is not on CPAP yet    She has chronic diastolic CHF with improved legs edema on Lasix  In general she lives at home with her son, she does not go out the house but on rare occasions, she has her oxygen at home and she walks a little bit inside the house  She has mild dyspnea on exertion and denies any significant cough or chest pain, denies any fever or chills or night sweats  She did lose weight since and saw her last time probably 2 lb  She continues to have excessive daytime sleepiness but she is sleeping better at night currently with oxygen today she denies any wheezing or any exacerbation of COPD  As per her son she is still mildly forgetful  Review of Systems:  Review of Systems   Constitutional: Negative  HENT: Negative  Eyes: Negative  Respiratory:        As HPI   Cardiovascular: Negative  Gastrointestinal: Negative  Endocrine: Negative  Genitourinary: Negative  Musculoskeletal: Negative  Skin: Negative  Allergic/Immunologic: Negative  Neurological: Negative  Hematological: Negative  Psychiatric/Behavioral: Negative            Past medical history, surgical history, and family history were reviewed and updated as appropriate    Social history updates:  History   Smoking Status    Former Smoker    Quit date: 2/3/1976   Smokeless Tobacco    Never Used       PhysicalExamination:  Vitals:   /66   Pulse 59   Temp 97 6 °F (36 4 °C)   Resp 18   Ht 5' 4" (1 626 m)   Wt 92 1 kg (203 lb)   SpO2 99%   BMI 34 84 kg/m²     General: alert, not in acute distress  HEENT: PERRL, no icteric sclera or cyanosis, no thrush  Neck:  Supple, no lymphadenopathy or thyromegaly, no JVD  Lungs:  Equal breath sounds and clear auscultations bilaterally, no wheezing or crackles  Heart: S1S2 regular, no murmures or gallops  Abdomen: soft, non-tender, bowel sounds  present  Extrimities: no edema, no clubbing or cyanosis  Neuro: Alert and oriented x 3, no focal neurodeficits   Skin: intact, no rashes    Diagnostic Data:  Labs: I personally reviewed the most recent laboratory data pertinent to today's visit    Lab Results   Component Value Date    WBC 10 59 (H) 10/02/2017    HGB 9 2 (L) 10/02/2017    HCT 33 5 (L) 10/02/2017    MCV 88 10/02/2017     10/02/2017     Lab Results   Component Value Date    GLUCOSE 99 10/02/2017    CALCIUM 9 3 12/20/2017     12/20/2017    K 4 4 12/20/2017    CO2 42 (H) 12/20/2017    CL 98 (L) 12/20/2017    BUN 20 12/20/2017    CREATININE 0 96 12/20/2017     No results found for: IGE  Lab Results   Component Value Date    ALT <6 (L) 09/29/2017    AST 10 09/29/2017    ALKPHOS 129 (H) 09/29/2017    BILITOT 0 39 09/29/2017       PFT results: The most recent pulmonary function tests were reviewed  Last spirometry showed no obstruction, mild decrease in vital capacity and total lung capacity and residual volume indicating mild restrictive pattern, moderate decrease in diffusion capacity    Imaging:  I personally reviewed the images on the [facility] system pertinent to today's visit  Last chest x-ray from September 2017 reviewed on PACs:  Clear lungs    Other studies:  Home sleep study showed AHI 9 8 which is consistent with mild obstructive sleep apnea but patient also had significant nocturnal hypoxia unrelated to respiratory events consistent most likely with hypoventilation syndrome      Echocardiogram:  LVEF 81-53%, grade 1 diastolic dysfunction, normal RV    Ryan Beltran MD

## 2018-03-14 NOTE — ASSESSMENT & PLAN NOTE
Ideally patient should be on BiPAP at night and during the day with naps but she declines completely

## 2018-03-14 NOTE — ASSESSMENT & PLAN NOTE
Although her spirometry is normal and she has minimal history of smoking remotely and I still believe she does not have significant COPD if she has any  I will prescribe a Spiriva Handihaler once daily because it is easier for her to use instead of other inhalers that require coordination  I also prescribed Xopenex at a lower dose for her nebulizer machine to use p r n Karrie Nissen She expressed in the past that albuterol makes her tremor worse and she does not want to do that  Atrovent was not approved by his insurance

## 2018-03-14 NOTE — ASSESSMENT & PLAN NOTE
She has mild obstructive sleep apnea but more she has obesity hypoventilation syndrome  We spoke about trying BiPAP auto titration but she declined completely and also her son insisted that she would never keep the mask on  She understands the risks of not treating this and she still does not want to use the BiPAP or CPAP at night

## 2018-03-16 DIAGNOSIS — J44.9 CHRONIC OBSTRUCTIVE PULMONARY DISEASE, UNSPECIFIED COPD TYPE (HCC): ICD-10-CM

## 2018-03-16 NOTE — TELEPHONE ENCOUNTER
Patient's son Dayanna Fonseca called for pt: [employer]  Prescription for Levalbuterol Nebulizer cannot be filled because of wrong DX code  The correct Dx code should be COPD  Need new script   Please advise

## 2018-03-27 ENCOUNTER — TELEPHONE (OUTPATIENT)
Dept: FAMILY MEDICINE CLINIC | Facility: CLINIC | Age: 83
End: 2018-03-27

## 2018-03-27 NOTE — TELEPHONE ENCOUNTER
BRAT diet and vomiting protocol  Clear liquids , broth    NOT H2O   Can do immodium if stool frequency > 5-6/day

## 2018-03-27 NOTE — TELEPHONE ENCOUNTER
Pt called c/o diarrhea since Sunday and vomiting this morning, afebrile - -pt has been drinking water but after taking a few swallows has to go to the bathroom  Pt would like to know what she could do  Please advise

## 2018-03-28 RX ORDER — LEVALBUTEROL INHALATION SOLUTION 0.63 MG/3ML
0.63 SOLUTION RESPIRATORY (INHALATION)
Qty: 300 ML | Refills: 3 | Status: SHIPPED | OUTPATIENT
Start: 2018-03-28 | End: 2018-05-30 | Stop reason: SDUPTHER

## 2018-04-02 DIAGNOSIS — E78.2 MIXED HYPERLIPIDEMIA: ICD-10-CM

## 2018-04-02 DIAGNOSIS — I25.118 CORONARY ARTERY DISEASE OF NATIVE HEART WITH STABLE ANGINA PECTORIS, UNSPECIFIED VESSEL OR LESION TYPE (HCC): ICD-10-CM

## 2018-04-02 RX ORDER — ATORVASTATIN CALCIUM 40 MG/1
TABLET, FILM COATED ORAL
Qty: 30 TABLET | Refills: 0 | Status: SHIPPED | OUTPATIENT
Start: 2018-04-02 | End: 2018-04-24 | Stop reason: SDUPTHER

## 2018-04-12 DIAGNOSIS — M15.9 GENERALIZED OSTEOARTHRITIS: ICD-10-CM

## 2018-04-12 RX ORDER — OXYCODONE HYDROCHLORIDE AND ACETAMINOPHEN 5; 325 MG/1; MG/1
1 TABLET ORAL EVERY 6 HOURS PRN
Qty: 90 TABLET | Refills: 0 | Status: SHIPPED | OUTPATIENT
Start: 2018-04-12 | End: 2018-05-24 | Stop reason: SDUPTHER

## 2018-04-16 DIAGNOSIS — I65.29 STENOSIS OF CAROTID ARTERY, UNSPECIFIED LATERALITY: Primary | ICD-10-CM

## 2018-04-17 RX ORDER — AMLODIPINE BESYLATE 5 MG/1
5 TABLET ORAL DAILY
Qty: 30 TABLET | Refills: 8 | Status: SHIPPED | OUTPATIENT
Start: 2018-04-17 | End: 2018-12-18 | Stop reason: SDUPTHER

## 2018-04-24 ENCOUNTER — OFFICE VISIT (OUTPATIENT)
Dept: CARDIOLOGY CLINIC | Facility: CLINIC | Age: 83
End: 2018-04-24
Payer: COMMERCIAL

## 2018-04-24 VITALS
HEIGHT: 64 IN | BODY MASS INDEX: 34.49 KG/M2 | DIASTOLIC BLOOD PRESSURE: 68 MMHG | RESPIRATION RATE: 18 BRPM | HEART RATE: 72 BPM | WEIGHT: 202 LBS | SYSTOLIC BLOOD PRESSURE: 128 MMHG

## 2018-04-24 DIAGNOSIS — I10 BENIGN ESSENTIAL HTN: ICD-10-CM

## 2018-04-24 DIAGNOSIS — I25.118 CORONARY ARTERY DISEASE OF NATIVE HEART WITH STABLE ANGINA PECTORIS, UNSPECIFIED VESSEL OR LESION TYPE (HCC): ICD-10-CM

## 2018-04-24 DIAGNOSIS — E78.2 MIXED HYPERLIPIDEMIA: ICD-10-CM

## 2018-04-24 DIAGNOSIS — I25.10 CORONARY ARTERY DISEASE INVOLVING NATIVE CORONARY ARTERY OF NATIVE HEART WITHOUT ANGINA PECTORIS: Primary | ICD-10-CM

## 2018-04-24 DIAGNOSIS — E78.5 DYSLIPIDEMIA: ICD-10-CM

## 2018-04-24 PROCEDURE — 99214 OFFICE O/P EST MOD 30 MIN: CPT | Performed by: INTERNAL MEDICINE

## 2018-04-24 RX ORDER — ATORVASTATIN CALCIUM 40 MG/1
TABLET, FILM COATED ORAL
Qty: 30 TABLET | Refills: 0 | Status: SHIPPED | OUTPATIENT
Start: 2018-04-24 | End: 2018-05-22 | Stop reason: SDUPTHER

## 2018-04-24 RX ORDER — PEDI MULTIVIT NO.25/FOLIC ACID 300 MCG
1 TABLET,CHEWABLE ORAL DAILY
COMMUNITY

## 2018-04-24 NOTE — PROGRESS NOTES
Cardiology Follow Up        [facility]      1935      [id number]      Discussion/Summary:    1  CAD status post CABG x3:  No symptoms of angina, continue aspirin, Plavix, statin  No recent lipid panel which should be checked with routine blood per PCP in the near future  2   Hypertension:  Controlled, continue amlodipine, metoprolol    3  Dyslipidemia:  Continue atorvastatin 40 mg daily  Routine lipid panel per PCP with other blood work in the future    4  Bright red blood per rectum:  Patient states she saw this in her stool yesterday for the 1st time with no recurrence today  She has had no hematochezia otherwise  Her son states she did not tell her of this  Have cautioned her to look out for any recurrence, and if any new symptoms of fatigue worsening shortness of breath, or fatty recurrent hematochezia, have asked that she call her PCP and gastroenterologist     Follow-up in 6 months      Interval History: This is a very pleasant 66-year-old female with a history of CVA 02/2015 with MRA neck revealing greater than 70% bilateral internal carotid artery stenosis with diminished flow in the right MCA  CTA revealed greater than 90% high-grade stenosis on the right and she thereafter underwent right CEA  She had a NSTEMI 06/2015 with cardiac catheterization revealing three-vessel CAD  Thereafter she underwent CABG x3 with LIMA to LAD, SVG to OM, SVG to RCA  She lives at with her son  She presents today for follow-up  She complains of knee pain and fatigue  She has chronic but stable exertional dyspnea, denies chest pain, lower extremity edema, orthopnea  She does tell me that she saw 1 episode of very slight amount of blood in her stool yesterday which she has not noticed before and without recurrence today  Her son states he did not know of this           Vitals:  Vitals:    04/24/18 1541   BP: 128/68   BP Location: Right arm   Patient Position: Sitting   Cuff Size: Standard   Pulse: 72   Resp: 18   Weight: 91 6 kg (202 lb)   Height: 5' 4" (1 626 m)         Past Medical History:   Diagnosis Date    Anxiety     Arthritis     Cataract, bilateral     Last Assessed:3/19/2014    COPD (chronic obstructive pulmonary disease) (HCC)     Coronary artery disease     Depression     Essential tremor     Hx of arterial ischemic stroke     Hypertension     Hypothyroid     Kidney stone     Myocardial infarction [facility])     2015    Personal history of kidney stones     Right knee DJD     Resolved:8/25/2015    Stroke [facility])     Use of cane as ambulatory aid     or walker    Wears glasses      Social History     Social History    Marital status:      Spouse name: N/A    Number of children: N/A    Years of education: 8     Occupational History    Not on file  Social History Main Topics    Smoking status: Former Smoker     Quit date: 2/3/1976    Smokeless tobacco: Never Used    Alcohol use No    Drug use: No    Sexual activity: Not on file     Other Topics Concern    Not on file     Social History Narrative    No caffeine use      Family History   Problem Relation Age of Onset    Cancer Mother      malignant neoplasm    Heart attack Father     Aneurysm Other      Aortic    Cancer Other      malignant neoplasm     Past Surgical History:   Procedure Laterality Date    CAROTID ENDARTERECTOMY Right     CATARACT EXTRACTION W/ INTRAOCULAR LENS  IMPLANT, BILATERAL      COLONOSCOPY N/A 9/30/2017    Procedure: COLONOSCOPY FOR CONTROL OF BLEEDING;  Surgeon: Ronal López MD;  Location: [facility];  Service: Colorectal    CORONARY ARTERY BYPASS GRAFT      CABG X3 with LIMA to LAD,SVG to OM,SVG to distal  RCA ; Last Assessed:7/13/2015    JOINT REPLACEMENT      left TKR    KIDNEY STONE SURGERY      NEPHROSTOMY Left     with Drainage Irrigation ; [id number]    LA TOTAL KNEE ARTHROPLASTY Right 2/25/2016    Procedure: ARTHROPLASTY KNEE TOTAL;  Surgeon: Cassy Lindquist MD;  Location: [facility];  Service: Orthopedics       Current Outpatient Prescriptions:     Acetaminophen 650 MG TABS, Take 650 mg by mouth , Disp: , Rfl:     amLODIPine (NORVASC) 5 mg tablet, Take 1 tablet (5 mg total) by mouth daily, Disp: 30 tablet, Rfl: 8    ascorbic acid (VITAMIN C) 500 mg tablet, Take 500 mg by mouth 2 (two) times a day , Disp: , Rfl:     aspirin 81 mg chewable tablet, Chew 81 mg  To check with md re when to stop , Disp: , Rfl:     atorvastatin (LIPITOR) 40 mg tablet, TAKE ONE TABLET BY MOUTH ONCE DAILY AS DIRECTED, Disp: 30 tablet, Rfl: 0    bimatoprost (LUMIGAN) 0 01 % ophthalmic drops, Apply 1 drop to eye Daily, Disp: , Rfl:     clopidogrel (PLAVIX) 75 mg tablet, Take 1 tablet (75 mg total) by mouth daily, Disp: 30 tablet, Rfl: 3    diazepam (VALIUM) 5 mg tablet, Take 1 tablet (5 mg total) by mouth every evening, Disp: 90 tablet, Rfl: 0    furosemide (LASIX) 20 mg tablet, Take 1 tablet by mouth as needed  , Disp: , Rfl:     levothyroxine 75 mcg tablet, Take 1 tablet (75 mcg total) by mouth daily in the early morning, Disp: 30 tablet, Rfl: 3    metoprolol tartrate (LOPRESSOR) 12 5 mg tablet, Take 12 5 mg by mouth 2 (two) times a day , Disp: , Rfl:     oxyCODONE-acetaminophen (PERCOCET) 5-325 mg per tablet, Take 1 tablet by mouth every 6 (six) hours as needed (pain) Max Daily Amount: 4 tablets, Disp: 90 tablet, Rfl: 0    pantoprazole (PROTONIX) 40 mg tablet, Take 40 mg by mouth daily  , Disp: , Rfl:     Pediatric Multiple Vit-C-FA (PEDIATRIC MULTIVITAMIN) chewable tablet, Chew 1 tablet daily, Disp: , Rfl:     tiotropium (SPIRIVA) 18 mcg inhalation capsule, Place 1 capsule (18 mcg total) into inhaler and inhale daily, Disp: 30 capsule, Rfl: 6    vilazodone (VIIBRYD) 40 mg tablet, Take 1 tablet (40 mg total) by mouth daily for 120 days, Disp: 30 tablet, Rfl: 3    zolpidem (AMBIEN) 10 mg tablet, TAKE ONE TABLET BY MOUTH AT BEDTIME AS NEEDED FOR INSOMNIA, Disp: 30 tablet, Rfl: 0    Cholecalciferol (VITAMIN D) 2000 UNITS tablet, Take 2,000 Units by mouth daily  , Disp: , Rfl:     levalbuterol (XOPENEX) 0 63 mg/3 mL nebulizer solution, Take 3 mL (0 63 mg total) by nebulization 3 (three) times a day, Disp: 300 mL, Rfl: 3        Review of Systems:  Review of Systems   Constitutional: Positive for fatigue  Negative for activity change, fever and unexpected weight change  HENT: Negative for facial swelling, nosebleeds and voice change  Respiratory: Negative for chest tightness, shortness of breath and wheezing  Cardiovascular: Negative for chest pain, palpitations and leg swelling  Gastrointestinal: Negative for abdominal distention  Genitourinary: Negative for hematuria  Musculoskeletal: Positive for arthralgias  Skin: Negative for color change, pallor, rash and wound  Neurological: Negative for dizziness, seizures and syncope  Psychiatric/Behavioral: Negative for agitation  Physical Exam:  Physical Exam   Constitutional: She is oriented to person, place, and time  She appears well-developed and well-nourished  HENT:   Head: Normocephalic and atraumatic  Eyes: EOM are normal    Neck: Normal range of motion  Neck supple  Cardiovascular: Normal rate, regular rhythm, normal heart sounds and intact distal pulses  Pulmonary/Chest: Effort normal and breath sounds normal    Abdominal: Soft  Musculoskeletal: Normal range of motion  Neurological: She is alert and oriented to person, place, and time  Skin: Skin is warm and dry  Psychiatric: She has a normal mood and affect  Her behavior is normal  Judgment and thought content normal    Vitals reviewed

## 2018-05-14 DIAGNOSIS — R12 HEARTBURN: Primary | ICD-10-CM

## 2018-05-14 RX ORDER — PANTOPRAZOLE SODIUM 40 MG/1
40 TABLET, DELAYED RELEASE ORAL DAILY
Qty: 30 TABLET | Refills: 5 | Status: SHIPPED | OUTPATIENT
Start: 2018-05-14 | End: 2018-10-16 | Stop reason: SDUPTHER

## 2018-05-15 ENCOUNTER — DOCUMENTATION (OUTPATIENT)
Dept: PULMONOLOGY | Facility: CLINIC | Age: 83
End: 2018-05-15

## 2018-05-15 NOTE — PROGRESS NOTES
[facility]

## 2018-05-22 DIAGNOSIS — I25.118 CORONARY ARTERY DISEASE OF NATIVE HEART WITH STABLE ANGINA PECTORIS, UNSPECIFIED VESSEL OR LESION TYPE (HCC): ICD-10-CM

## 2018-05-22 DIAGNOSIS — E78.2 MIXED HYPERLIPIDEMIA: ICD-10-CM

## 2018-05-22 RX ORDER — ATORVASTATIN CALCIUM 40 MG/1
TABLET, FILM COATED ORAL
Qty: 30 TABLET | Refills: 5 | Status: SHIPPED | OUTPATIENT
Start: 2018-05-22 | End: 2018-10-16 | Stop reason: SDUPTHER

## 2018-05-24 DIAGNOSIS — M15.9 GENERALIZED OSTEOARTHRITIS: ICD-10-CM

## 2018-05-24 RX ORDER — OXYCODONE HYDROCHLORIDE AND ACETAMINOPHEN 5; 325 MG/1; MG/1
1 TABLET ORAL EVERY 6 HOURS PRN
Qty: 90 TABLET | Refills: 0 | Status: SHIPPED | OUTPATIENT
Start: 2018-05-24 | End: 2018-07-06 | Stop reason: SDUPTHER

## 2018-05-24 NOTE — TELEPHONE ENCOUNTER
Pt's son called in requesting a refill on Ashley's percocet 5-325 mg to [facility]# [phone]

## 2018-05-30 ENCOUNTER — OFFICE VISIT (OUTPATIENT)
Dept: FAMILY MEDICINE CLINIC | Facility: CLINIC | Age: 83
End: 2018-05-30
Payer: COMMERCIAL

## 2018-05-30 VITALS
DIASTOLIC BLOOD PRESSURE: 72 MMHG | SYSTOLIC BLOOD PRESSURE: 128 MMHG | HEIGHT: 64 IN | WEIGHT: 197.8 LBS | BODY MASS INDEX: 33.77 KG/M2

## 2018-05-30 DIAGNOSIS — E03.9 ACQUIRED HYPOTHYROIDISM: ICD-10-CM

## 2018-05-30 DIAGNOSIS — J44.9 CHRONIC OBSTRUCTIVE PULMONARY DISEASE, UNSPECIFIED COPD TYPE (HCC): ICD-10-CM

## 2018-05-30 DIAGNOSIS — I73.9 PAD (PERIPHERAL ARTERY DISEASE) (HCC): ICD-10-CM

## 2018-05-30 DIAGNOSIS — F41.8 ANXIETY WITH DEPRESSION: ICD-10-CM

## 2018-05-30 DIAGNOSIS — L98.9 SKIN LESION OF FACE: Primary | ICD-10-CM

## 2018-05-30 DIAGNOSIS — R25.1 TREMOR: ICD-10-CM

## 2018-05-30 PROCEDURE — 1101F PT FALLS ASSESS-DOCD LE1/YR: CPT | Performed by: FAMILY MEDICINE

## 2018-05-30 PROCEDURE — 17110 DESTRUCTION B9 LES UP TO 14: CPT | Performed by: FAMILY MEDICINE

## 2018-05-30 PROCEDURE — 99214 OFFICE O/P EST MOD 30 MIN: CPT | Performed by: FAMILY MEDICINE

## 2018-05-30 RX ORDER — LEVALBUTEROL INHALATION SOLUTION 0.63 MG/3ML
0.63 SOLUTION RESPIRATORY (INHALATION)
Qty: 300 ML | Refills: 0 | Status: ON HOLD | OUTPATIENT
Start: 2018-05-30 | End: 2021-02-15 | Stop reason: SDUPTHER

## 2018-05-30 RX ORDER — LEVOTHYROXINE SODIUM 0.07 MG/1
75 TABLET ORAL
Qty: 30 TABLET | Refills: 5 | Status: SHIPPED | OUTPATIENT
Start: 2018-05-30 | End: 2018-06-26 | Stop reason: SDUPTHER

## 2018-05-30 RX ORDER — VILAZODONE HYDROCHLORIDE 40 MG/1
40 TABLET ORAL DAILY
Qty: 30 TABLET | Refills: 3 | Status: SHIPPED | OUTPATIENT
Start: 2018-05-30 | End: 2018-10-09 | Stop reason: SDUPTHER

## 2018-05-30 RX ORDER — LEVALBUTEROL INHALATION SOLUTION 0.63 MG/3ML
0.63 SOLUTION RESPIRATORY (INHALATION)
Qty: 300 ML | Refills: 3 | Status: SHIPPED | OUTPATIENT
Start: 2018-05-30 | End: 2018-05-30 | Stop reason: SDUPTHER

## 2018-05-30 RX ORDER — DIAZEPAM 5 MG/1
TABLET ORAL
Qty: 90 TABLET | Refills: 0 | Status: SHIPPED | OUTPATIENT
Start: 2018-05-30 | End: 2018-07-23 | Stop reason: SDUPTHER

## 2018-05-30 RX ORDER — CLOPIDOGREL BISULFATE 75 MG/1
75 TABLET ORAL DAILY
Qty: 30 TABLET | Refills: 5 | Status: SHIPPED | OUTPATIENT
Start: 2018-05-30 | End: 2018-12-18 | Stop reason: SDUPTHER

## 2018-05-30 NOTE — PROGRESS NOTES
Assessment/Plan:       Diagnoses and all orders for this visit:    Skin lesion of face    Chronic obstructive pulmonary disease, unspecified COPD type ([facility] )  -     Discontinue: levalbuterol (XOPENEX) 0 63 mg/3 mL nebulizer solution; Take 3 mL (0 63 mg total) by nebulization 3 (three) times a day  -     levalbuterol (XOPENEX) 0 63 mg/3 mL nebulizer solution; Take 3 mL (0 63 mg total) by nebulization 3 (three) times a day    PAD (peripheral artery disease) ([facility]  -     clopidogrel (PLAVIX) 75 mg tablet; Take 1 tablet (75 mg total) by mouth daily    Tremor  -     diazepam (VALIUM) 5 mg tablet; May take 1 pill 3 times a day as needed for tremors    Acquired hypothyroidism  -     levothyroxine 75 mcg tablet; Take 1 tablet (75 mcg total) by mouth daily in the early morning    Anxiety with depression  -     vilazodone (VIIBRYD) 40 mg tablet; Take 1 tablet (40 mg total) by mouth daily for 120 days    Other orders  -     Lesion Destruction          Subjective:   Chief Complaint   Patient presents with    Follow-up    Procedure     shave biopsy mole on right side of face      Patient ID: Rocio Romero is a 80 y o  female      HPI    The following portions of the patient's history were reviewed and updated as appropriate: allergies, current medications, past family history, past medical history, past social history, past surgical history and problem list       Review of Systems      Objective:  /72   Ht 5' 4" (1 626 m)   Wt 89 7 kg (197 lb 12 8 oz)   BMI 33 95 kg/m²       Lesion Destruction  Date/Time: 5/30/2018 4:31 PM  Performed by: Freida Quesada by: Maurene Kehr     Procedure Details - Lesion Destruction:     Number of Lesions:  1  Lesion 1:     Body area:  [address]    Head/neck location:  R cheek    Initial size (mm):  5    Final defect size (mm):  5    Malignancy: benign lesion      Destruction method: electrosurgery    Lesion 6:       Patient is face was prepped with Hibiclens and alcohol  Lidocaine 1% 0 5 cc infiltrated subcu the lesion  Electrocautery was used to shave the lesion off  Hemostasis was obtained  Patient had no complications and tolerated the procedure well  Antibiotic ointment was placed and Band-Aid  Patient will keep it clean and dry for 48 hours and when she does wash she should do so with soap and water    No specimens sent to pathology       Physical Exam

## 2018-06-26 DIAGNOSIS — E03.9 ACQUIRED HYPOTHYROIDISM: ICD-10-CM

## 2018-06-26 RX ORDER — LEVOTHYROXINE SODIUM 0.07 MG/1
75 TABLET ORAL
Qty: 30 TABLET | Refills: 11 | Status: SHIPPED | OUTPATIENT
Start: 2018-06-26 | End: 2019-07-09 | Stop reason: SDUPTHER

## 2018-07-06 ENCOUNTER — TELEPHONE (OUTPATIENT)
Dept: FAMILY MEDICINE CLINIC | Facility: CLINIC | Age: 83
End: 2018-07-06

## 2018-07-06 DIAGNOSIS — M15.9 GENERALIZED OSTEOARTHRITIS: ICD-10-CM

## 2018-07-06 DIAGNOSIS — J06.9 ACUTE URI: Primary | ICD-10-CM

## 2018-07-06 RX ORDER — AZITHROMYCIN 250 MG/1
TABLET, FILM COATED ORAL
Qty: 6 TABLET | Refills: 0 | Status: SHIPPED | OUTPATIENT
Start: 2018-07-06 | End: 2018-07-10

## 2018-07-06 RX ORDER — OXYCODONE HYDROCHLORIDE AND ACETAMINOPHEN 5; 325 MG/1; MG/1
1 TABLET ORAL EVERY 6 HOURS PRN
Qty: 90 TABLET | Refills: 0 | Status: SHIPPED | OUTPATIENT
Start: 2018-07-06 | End: 2018-07-29

## 2018-07-06 NOTE — TELEPHONE ENCOUNTER
[facility] prescription drug monitoring program was checked and verified for refill

## 2018-07-06 NOTE — TELEPHONE ENCOUNTER
I can send in antibiotic  If she is using ventolin 3 times a day she is using it too often  Spiriva, oxygen, and ventolin are fine to be taken together  She should be on daily maintenance inhaler in addition to spiriva and only use ventolin PRN  Is she able to come in to be seen? Or to  sample?

## 2018-07-06 NOTE — TELEPHONE ENCOUNTER
Patient called and stated she has a sore throat and Dr Tommy Sanchez always prescribes her a zpack  Asking if she could get that called into her pharm  Also states she is taking albutrol 3 puffs daily, spirvia 1 puff, and on oxygen at night  Asking if she can take those 3 together

## 2018-07-23 DIAGNOSIS — R25.1 TREMOR: ICD-10-CM

## 2018-07-23 RX ORDER — DIAZEPAM 5 MG/1
TABLET ORAL
Qty: 90 TABLET | Refills: 0 | Status: SHIPPED | OUTPATIENT
Start: 2018-07-23 | End: 2018-08-20 | Stop reason: SDUPTHER

## 2018-08-17 ENCOUNTER — TELEPHONE (OUTPATIENT)
Dept: FAMILY MEDICINE CLINIC | Facility: CLINIC | Age: 83
End: 2018-08-17

## 2018-08-17 DIAGNOSIS — M15.9 GENERALIZED OSTEOARTHRITIS: Primary | ICD-10-CM

## 2018-08-17 RX ORDER — OXYCODONE HYDROCHLORIDE AND ACETAMINOPHEN 5; 325 MG/1; MG/1
1 TABLET ORAL EVERY 6 HOURS PRN
Qty: 90 TABLET | Refills: 0 | Status: SHIPPED | OUTPATIENT
Start: 2018-08-17 | End: 2018-09-09

## 2018-08-17 NOTE — TELEPHONE ENCOUNTER
Pt son, Jessica Shen left message on refill line requesting percoset 5-325 mg # 90 to [address] [facility] in behalf of pt  I do not see on current med list  [facility] verified last fill 7/6/18 ; 23 day supply  Please advise

## 2018-08-17 NOTE — TELEPHONE ENCOUNTER
Sorry I misunderstood that this is for the mother  [place] prescription drug monitoring program was checked and verified for refill  Please call patient herself that it is refilled

## 2018-08-20 DIAGNOSIS — R25.1 TREMOR: ICD-10-CM

## 2018-08-21 RX ORDER — DIAZEPAM 5 MG/1
TABLET ORAL
Qty: 90 TABLET | Refills: 0 | Status: SHIPPED | OUTPATIENT
Start: 2018-08-21 | End: 2018-10-01 | Stop reason: SDUPTHER

## 2018-09-19 DIAGNOSIS — M15.9 GENERALIZED OSTEOARTHRITIS: Primary | ICD-10-CM

## 2018-09-19 RX ORDER — OXYCODONE HYDROCHLORIDE AND ACETAMINOPHEN 5; 325 MG/1; MG/1
1 TABLET ORAL EVERY 4 HOURS PRN
Qty: 40 TABLET | Refills: 0 | Status: SHIPPED | OUTPATIENT
Start: 2018-09-19 | End: 2018-10-16 | Stop reason: SDUPTHER

## 2018-10-01 DIAGNOSIS — R25.1 TREMOR: ICD-10-CM

## 2018-10-01 RX ORDER — DIAZEPAM 5 MG/1
5 TABLET ORAL 3 TIMES DAILY
Qty: 90 TABLET | Refills: 0 | Status: SHIPPED | OUTPATIENT
Start: 2018-10-01 | End: 2018-11-19 | Stop reason: SDUPTHER

## 2018-10-01 NOTE — TELEPHONE ENCOUNTER
Patient needs a refill of Diazepam 5 mg #90 sent to [facility] on [address]

## 2018-10-09 DIAGNOSIS — F41.8 ANXIETY WITH DEPRESSION: ICD-10-CM

## 2018-10-09 RX ORDER — VILAZODONE HYDROCHLORIDE 40 MG/1
40 TABLET ORAL DAILY
Qty: 30 TABLET | Refills: 2 | Status: SHIPPED | OUTPATIENT
Start: 2018-10-09 | End: 2018-12-18 | Stop reason: SDUPTHER

## 2018-10-10 ENCOUNTER — APPOINTMENT (OUTPATIENT)
Dept: LAB | Facility: CLINIC | Age: 83
End: 2018-10-10
Payer: COMMERCIAL

## 2018-10-10 DIAGNOSIS — E03.9 ACQUIRED HYPOTHYROIDISM: ICD-10-CM

## 2018-10-10 DIAGNOSIS — J96.11 CHRONIC RESPIRATORY FAILURE WITH HYPOXIA AND HYPERCAPNIA (HCC): ICD-10-CM

## 2018-10-10 DIAGNOSIS — E11.9 TYPE 2 DIABETES MELLITUS WITHOUT COMPLICATION, WITHOUT LONG-TERM CURRENT USE OF INSULIN (HCC): ICD-10-CM

## 2018-10-10 DIAGNOSIS — E78.01 FAMILIAL HYPERCHOLESTEROLEMIA: ICD-10-CM

## 2018-10-10 DIAGNOSIS — J96.12 CHRONIC RESPIRATORY FAILURE WITH HYPOXIA AND HYPERCAPNIA (HCC): ICD-10-CM

## 2018-10-10 DIAGNOSIS — I10 ESSENTIAL HYPERTENSION: ICD-10-CM

## 2018-10-10 LAB
ALBUMIN SERPL BCP-MCNC: 2.8 G/DL (ref 3.5–5)
ALP SERPL-CCNC: 126 U/L (ref 46–116)
ALT SERPL W P-5'-P-CCNC: 10 U/L (ref 12–78)
ANION GAP SERPL CALCULATED.3IONS-SCNC: -1 MMOL/L (ref 4–13)
AST SERPL W P-5'-P-CCNC: 7 U/L (ref 5–45)
BILIRUB SERPL-MCNC: 0.42 MG/DL (ref 0.2–1)
BUN SERPL-MCNC: 26 MG/DL (ref 5–25)
CALCIUM SERPL-MCNC: 8.9 MG/DL (ref 8.3–10.1)
CHLORIDE SERPL-SCNC: 102 MMOL/L (ref 100–108)
CHOLEST SERPL-MCNC: 127 MG/DL (ref 50–200)
CO2 SERPL-SCNC: 36 MMOL/L (ref 21–32)
CREAT SERPL-MCNC: 1.12 MG/DL (ref 0.6–1.3)
CREAT UR-MCNC: 141 MG/DL
ERYTHROCYTE [DISTWIDTH] IN BLOOD BY AUTOMATED COUNT: 14.4 % (ref 11.6–15.1)
GFR SERPL CREATININE-BSD FRML MDRD: 46 ML/MIN/1.73SQ M
GLUCOSE P FAST SERPL-MCNC: 109 MG/DL (ref 65–99)
HCT VFR BLD AUTO: 49.9 % (ref 34.8–46.1)
HDLC SERPL-MCNC: 45 MG/DL (ref 40–60)
HGB BLD-MCNC: 14.1 G/DL (ref 11.5–15.4)
LDLC SERPL CALC-MCNC: 61 MG/DL (ref 0–100)
MCH RBC QN AUTO: 26.7 PG (ref 26.8–34.3)
MCHC RBC AUTO-ENTMCNC: 28.3 G/DL (ref 31.4–37.4)
MCV RBC AUTO: 95 FL (ref 82–98)
MICROALBUMIN UR-MCNC: 41.7 MG/L (ref 0–20)
MICROALBUMIN/CREAT 24H UR: 30 MG/G CREATININE (ref 0–30)
PLATELET # BLD AUTO: 322 THOUSANDS/UL (ref 149–390)
PMV BLD AUTO: 10.6 FL (ref 8.9–12.7)
POTASSIUM SERPL-SCNC: 4.5 MMOL/L (ref 3.5–5.3)
PROT SERPL-MCNC: 7.3 G/DL (ref 6.4–8.2)
RBC # BLD AUTO: 5.28 MILLION/UL (ref 3.81–5.12)
SODIUM SERPL-SCNC: 137 MMOL/L (ref 136–145)
TRIGL SERPL-MCNC: 104 MG/DL
TSH SERPL DL<=0.05 MIU/L-ACNC: 1.37 UIU/ML (ref 0.36–3.74)
WBC # BLD AUTO: 9.75 THOUSAND/UL (ref 4.31–10.16)

## 2018-10-10 PROCEDURE — 82570 ASSAY OF URINE CREATININE: CPT | Performed by: FAMILY MEDICINE

## 2018-10-10 PROCEDURE — 36415 COLL VENOUS BLD VENIPUNCTURE: CPT

## 2018-10-10 PROCEDURE — 80053 COMPREHEN METABOLIC PANEL: CPT

## 2018-10-10 PROCEDURE — 80061 LIPID PANEL: CPT

## 2018-10-10 PROCEDURE — 85027 COMPLETE CBC AUTOMATED: CPT

## 2018-10-10 PROCEDURE — 82043 UR ALBUMIN QUANTITATIVE: CPT | Performed by: FAMILY MEDICINE

## 2018-10-10 PROCEDURE — 84443 ASSAY THYROID STIM HORMONE: CPT

## 2018-10-16 ENCOUNTER — OFFICE VISIT (OUTPATIENT)
Dept: FAMILY MEDICINE CLINIC | Facility: CLINIC | Age: 83
End: 2018-10-16
Payer: COMMERCIAL

## 2018-10-16 VITALS
WEIGHT: 201.2 LBS | DIASTOLIC BLOOD PRESSURE: 58 MMHG | BODY MASS INDEX: 35.65 KG/M2 | SYSTOLIC BLOOD PRESSURE: 144 MMHG | HEIGHT: 63 IN

## 2018-10-16 DIAGNOSIS — Z23 NEED FOR INFLUENZA VACCINATION: ICD-10-CM

## 2018-10-16 DIAGNOSIS — N32.81 OVERACTIVE BLADDER: ICD-10-CM

## 2018-10-16 DIAGNOSIS — I10 ESSENTIAL HYPERTENSION: ICD-10-CM

## 2018-10-16 DIAGNOSIS — R12 HEARTBURN: ICD-10-CM

## 2018-10-16 DIAGNOSIS — I25.118 CORONARY ARTERY DISEASE OF NATIVE HEART WITH STABLE ANGINA PECTORIS, UNSPECIFIED VESSEL OR LESION TYPE (HCC): ICD-10-CM

## 2018-10-16 DIAGNOSIS — E03.9 ACQUIRED HYPOTHYROIDISM: ICD-10-CM

## 2018-10-16 DIAGNOSIS — Z99.81 OXYGEN DEPENDENT: ICD-10-CM

## 2018-10-16 DIAGNOSIS — M15.9 GENERALIZED OSTEOARTHRITIS: ICD-10-CM

## 2018-10-16 DIAGNOSIS — E78.2 MIXED HYPERLIPIDEMIA: ICD-10-CM

## 2018-10-16 DIAGNOSIS — R73.03 PRE-DIABETES: Primary | ICD-10-CM

## 2018-10-16 LAB — SL AMB POCT HEMOGLOBIN AIC: 6

## 2018-10-16 PROCEDURE — 83036 HEMOGLOBIN GLYCOSYLATED A1C: CPT | Performed by: FAMILY MEDICINE

## 2018-10-16 PROCEDURE — 1036F TOBACCO NON-USER: CPT | Performed by: FAMILY MEDICINE

## 2018-10-16 PROCEDURE — 90662 IIV NO PRSV INCREASED AG IM: CPT | Performed by: FAMILY MEDICINE

## 2018-10-16 PROCEDURE — 99214 OFFICE O/P EST MOD 30 MIN: CPT | Performed by: FAMILY MEDICINE

## 2018-10-16 PROCEDURE — G0008 ADMIN INFLUENZA VIRUS VAC: HCPCS | Performed by: FAMILY MEDICINE

## 2018-10-16 PROCEDURE — 3008F BODY MASS INDEX DOCD: CPT | Performed by: FAMILY MEDICINE

## 2018-10-16 PROCEDURE — 1160F RVW MEDS BY RX/DR IN RCRD: CPT | Performed by: FAMILY MEDICINE

## 2018-10-16 RX ORDER — ATORVASTATIN CALCIUM 40 MG/1
40 TABLET, FILM COATED ORAL DAILY
Qty: 30 TABLET | Refills: 11 | Status: SHIPPED | OUTPATIENT
Start: 2018-10-16 | End: 2019-10-05 | Stop reason: SDUPTHER

## 2018-10-16 RX ORDER — OXYCODONE HYDROCHLORIDE AND ACETAMINOPHEN 5; 325 MG/1; MG/1
1 TABLET ORAL EVERY 4 HOURS PRN
Qty: 90 TABLET | Refills: 0 | Status: SHIPPED | OUTPATIENT
Start: 2018-10-16 | End: 2018-11-19 | Stop reason: SDUPTHER

## 2018-10-16 RX ORDER — PANTOPRAZOLE SODIUM 40 MG/1
40 TABLET, DELAYED RELEASE ORAL DAILY
Qty: 30 TABLET | Refills: 11 | Status: SHIPPED | OUTPATIENT
Start: 2018-10-16 | End: 2019-10-02 | Stop reason: SDUPTHER

## 2018-10-16 NOTE — PROGRESS NOTES
Assessment/Plan:     Diagnoses and all orders for this visit:    Pre-diabetes  Comments:  POCT hemoglobin A1C:6    Orders:  -     POCT hemoglobin A1c    Essential hypertension  -     metoprolol tartrate (LOPRESSOR) 25 mg tablet; 1/2 tablet twice daily    Acquired hypothyroidism    Generalized osteoarthritis  -     oxyCODONE-acetaminophen (PERCOCET) 5-325 mg per tablet; Take 1 tablet by mouth every 4 (four) hours as needed for moderate pain Max Daily Amount: 6 tablets    Need for influenza vaccination  -     influenza vaccine, 2015-7450, high-dose, PF 0 5 mL, for patients 65 yr+ (FLUZONE HIGH-DOSE)    Heartburn  -     pantoprazole (PROTONIX) 40 mg tablet; Take 1 tablet (40 mg total) by mouth daily    Coronary artery disease of native heart with stable angina pectoris, unspecified vessel or lesion type (HCC)  -     atorvastatin (LIPITOR) 40 mg tablet; Take 1 tablet (40 mg total) by mouth daily    Mixed hyperlipidemia  -     atorvastatin (LIPITOR) 40 mg tablet; Take 1 tablet (40 mg total) by mouth daily    Overactive bladder  -     Mirabegron ER (MYRBETRIQ) 25 MG TB24; Take 25 mg by mouth once for 1 dose    Oxygen dependent  Comments:   JAMEEL/CHF      1  Pre-diabetes: Fasting glucose: 109 Hemoglobin A1C: 6  Will continue to monitor due to pre-diabetes state but no further interventions necessary at this time  Discussed this plan of care with patient and she agreed  2  Hypertension: At goal, will continue metoprolol  Script filled  3  Mixed hyperlipemia/ CAD: At goal, with Lipitor and within appropriate range for diagnosis of coronary artery disease  Patient to see cardiology in  November 4  Generalized arthritis: Continue same pain regimen, script filled  Pain controlled with percocet  5  Hypothyroidism: Within normal range, 1 37  Will continue with 75mcg of levothyroxine  6  Overactive bladder: Specifically occurring at night, with no pain with urination or foul smelling urine   We discussed therapy with Mirabegron ER bc of low side effect profile  Patient agrees to try it  7  Oxygen dependence likely due to JAMEEL and  Chronic CHF: Patient was seen by pulmonologist who noted a normal spirometry reading, and does not classify patient with COPD  Patient does take Spiriva daily and xopenex PRN  Patient does require 2L of nasal cannula oxygen  Previous xrays and CTs discussed today regarding diagnosis of COPD  Flu shot given in office  Meds filled  Return visit in late March at request of son  Certainly we are available at any time for any acute issues  Labs UTD  >25 minutes spent with patient with >50% counseling  I attest that the nurse practitioner student's  Documentation has been prepared under my direction and personally reviewed by me  I confirm that the note above accurately reflects the work and medical decision making performed by me  Jackelyn Kawasaki D O  Subjective:        Patient ID: Donavan Buchanan is a 80 y o  female  Chief Complaint   Patient presents with    Follow-up    Flu Vaccine     would like vaccine today       Patient is a pleasant 81 yo female who presents today with her son  Patient has been fairly healthy since seen in office last time and has had not acute complications requiring hospitalizations  Patient here today for follow up regarding her chronic diagnosis  Patient continues to use walker to ambulate, son assist patient at home with ADLs and lives with her, and patient is on oxygen 24/7 at 2L NC  Thyroid Problem   Presents for follow-up visit  Patient reports no cold intolerance, constipation, depressed mood, dry skin or heat intolerance  (Tremors are chronic and not related to thyroid ) The symptoms have been stable  Hypertension   This is a chronic problem  The current episode started more than 1 year ago  The problem is controlled  Pertinent negatives include no chest pain, headaches, neck pain or shortness of breath   There are no associated agents to hypertension  Risk factors for coronary artery disease include dyslipidemia (prediabetes )  Past treatments include beta blockers  The current treatment provides moderate improvement  There are no compliance problems  Hypertensive end-organ damage includes CAD/MI  Identifiable causes of hypertension include a thyroid problem  The following portions of the patient's history were reviewed and updated as appropriate: allergies, current medications, past family history, past social history and problem list     Review of Systems   Constitutional: Negative for appetite change and unexpected weight change  HENT: Negative for congestion and postnasal drip  Eyes: Positive for visual disturbance  Patient has a appointment with eye doctor, this vision change is gradual  Patient believes she needs a new prescription  Respiratory: Negative for cough, chest tightness, shortness of breath and wheezing  Cardiovascular: Negative for chest pain  Gastrointestinal: Negative for constipation  Endocrine: Negative for cold intolerance and heat intolerance  Genitourinary: Positive for urgency  Negative for dyspareunia, dysuria, flank pain and frequency  Urgency at night only  Musculoskeletal: Negative for neck pain  Skin: Negative for pallor  Neurological: Negative for headaches  Psychiatric/Behavioral:        Cognitive decline as verbalized by patient          Objective:  /58   Ht 5' 2 74" (1 594 m)   Wt 91 3 kg (201 lb 3 2 oz)   BMI 35 94 kg/m²      Physical Exam   Constitutional: She is oriented to person, place, and time  She appears well-developed and well-nourished  BMI 35 94   Cardiovascular: Normal rate  Pulses are no weak pulses  Pulses:       Dorsalis pedis pulses are 2+ on the right side, and 2+ on the left side  Posterior tibial pulses are 2+ on the right side, and 2+ on the left side     67 with occasional PVCs   Pulmonary/Chest: Effort normal and breath sounds normal  No respiratory distress  She has no wheezes  She has no rales  She exhibits no tenderness  97% SP02 on room air   Abdominal: Soft  Bowel sounds are normal  She exhibits no distension  Scar like density noted on left lower abdomen s/p abdominal procedure    Musculoskeletal: Normal range of motion  She exhibits no edema  Feet:   Right Foot:   Skin Integrity: Negative for ulcer, skin breakdown, erythema, warmth, callus or dry skin  Left Foot:   Skin Integrity: Negative for ulcer, skin breakdown, erythema, warmth, callus or dry skin  Neurological: She is alert and oriented to person, place, and time  Skin: Skin is warm and dry  No rash noted  Psychiatric: She has a normal mood and affect  Her behavior is normal  Judgment and thought content normal        Patient's shoes and socks removed  Right Foot/Ankle   Right Foot Inspection  Skin Exam: skin normal and skin intact no dry skin, no warmth, no callus, no erythema, no maceration, no abnormal color, no pre-ulcer, no ulcer and no callus                          Toe Exam: ROM and strength within normal limits  Sensory       Monofilament testing: diminished  Vascular    The right DP pulse is 2+  The right PT pulse is 2+  Left Foot/Ankle  Left Foot Inspection  Skin Exam: skin normal and skin intactno dry skin, no warmth, no erythema, no maceration, normal color, no pre-ulcer, no ulcer and no callus                         Toe Exam: ROM and strength within normal limits                   Sensory       Monofilament: diminished  Vascular    The left DP pulse is 2+  The left PT pulse is 2+  Assign Risk Category:  No deformity present; Loss of protective sensation;  No weak pulses       Risk: 1

## 2018-10-16 NOTE — PROGRESS NOTES
Assessment/Plan:   Diagnoses and all orders for this visit:    Pre-diabetes  -     POCT hemoglobin A1c    Essential hypertension  -     metoprolol tartrate (LOPRESSOR) 25 mg tablet; 1/2 tablet twice daily    Acquired hypothyroidism    Need for influenza vaccination  -     influenza vaccine, 0178-1624, high-dose, PF 0 5 mL, for patients 65 yr+ (FLUZONE HIGH-DOSE)    Generalized osteoarthritis  -     oxyCODONE-acetaminophen (PERCOCET) 5-325 mg per tablet; Take 1 tablet by mouth every 4 (four) hours as needed for moderate pain Max Daily Amount: 6 tablets    Heartburn  -     pantoprazole (PROTONIX) 40 mg tablet; Take 1 tablet (40 mg total) by mouth daily    Coronary artery disease of native heart with stable angina pectoris, unspecified vessel or lesion type (HCC)  -     atorvastatin (LIPITOR) 40 mg tablet; Take 1 tablet (40 mg total) by mouth daily    Mixed hyperlipidemia  -     atorvastatin (LIPITOR) 40 mg tablet; Take 1 tablet (40 mg total) by mouth daily    Overactive bladder  -     Mirabegron ER (MYRBETRIQ) 25 MG TB24; Take 25 mg by mouth once for 1 dose          Subjective:   Chief Complaint   Patient presents with    Follow-up    Flu Vaccine     would like vaccine today      Patient ID: Jose Rodriguez is a 80 y o  female  Patient is 81 yo female here for follow up for chronic diseases including diabetes, hypertension, hypothyroid, chronic pain due to arthritis and joint injuries, and breathing difficulties requiring 2L of oxygen at all times (which is referred to as COPD but with normal spirometry reading)  Patient has no complaints currently  Son accompanied her at her visit  Diabetes   She presents for her follow-up diabetic visit  She has type 2 diabetes mellitus         The following portions of the patient's history were reviewed and updated as appropriate: allergies, current medications, past family history, past medical history, past social history, past surgical history and problem list       Review of Systems      Objective:  /58   Ht 5' 2 74" (1 594 m)   Wt 91 3 kg (201 lb 3 2 oz)   BMI 35 94 kg/m²        Physical Exam

## 2018-11-19 DIAGNOSIS — M15.9 GENERALIZED OSTEOARTHRITIS: ICD-10-CM

## 2018-11-19 DIAGNOSIS — N32.81 OVERACTIVE BLADDER: ICD-10-CM

## 2018-11-19 DIAGNOSIS — R25.1 TREMOR: ICD-10-CM

## 2018-11-19 RX ORDER — DIAZEPAM 5 MG/1
5 TABLET ORAL 3 TIMES DAILY
Qty: 90 TABLET | Refills: 0 | Status: SHIPPED | OUTPATIENT
Start: 2018-11-19 | End: 2018-12-18 | Stop reason: SDUPTHER

## 2018-11-19 RX ORDER — OXYCODONE HYDROCHLORIDE AND ACETAMINOPHEN 5; 325 MG/1; MG/1
1 TABLET ORAL EVERY 4 HOURS PRN
Qty: 90 TABLET | Refills: 0 | Status: SHIPPED | OUTPATIENT
Start: 2018-11-19 | End: 2018-12-18 | Stop reason: SDUPTHER

## 2018-12-10 ENCOUNTER — TELEPHONE (OUTPATIENT)
Dept: FAMILY MEDICINE CLINIC | Facility: CLINIC | Age: 83
End: 2018-12-10

## 2018-12-10 DIAGNOSIS — J06.9 UPPER RESPIRATORY TRACT INFECTION, UNSPECIFIED TYPE: Primary | ICD-10-CM

## 2018-12-10 RX ORDER — AZITHROMYCIN 250 MG/1
TABLET, FILM COATED ORAL
Qty: 6 TABLET | Refills: 0 | Status: SHIPPED | OUTPATIENT
Start: 2018-12-10 | End: 2018-12-10

## 2018-12-10 NOTE — TELEPHONE ENCOUNTER
Patient has a sore throat and has tried OTC meds and they did not help    She is asking if a zpack can be called to [facility]

## 2018-12-11 ENCOUNTER — TELEPHONE (OUTPATIENT)
Dept: FAMILY MEDICINE CLINIC | Facility: CLINIC | Age: 83
End: 2018-12-11

## 2018-12-11 NOTE — TELEPHONE ENCOUNTER
Patient called - She has had a sore throat for the past week with coughing at night  She is asking if an antibiotic can be sent to [facility] on [facility] for her?

## 2018-12-12 ENCOUNTER — TELEPHONE (OUTPATIENT)
Dept: FAMILY MEDICINE CLINIC | Facility: CLINIC | Age: 83
End: 2018-12-12

## 2018-12-12 DIAGNOSIS — J06.9 UPPER RESPIRATORY TRACT INFECTION, UNSPECIFIED TYPE: Primary | ICD-10-CM

## 2018-12-12 NOTE — TELEPHONE ENCOUNTER
Patient still has a bad cough and yellow discharge  Suggestions/  She was just on the zpack      [facility]

## 2018-12-13 RX ORDER — PREDNISONE 10 MG/1
TABLET ORAL
Qty: 21 EACH | Refills: 0 | Status: SHIPPED | OUTPATIENT
Start: 2018-12-13 | End: 2019-01-25 | Stop reason: ALTCHOICE

## 2018-12-14 ENCOUNTER — OFFICE VISIT (OUTPATIENT)
Dept: CARDIOLOGY CLINIC | Facility: CLINIC | Age: 83
End: 2018-12-14
Payer: COMMERCIAL

## 2018-12-14 VITALS
SYSTOLIC BLOOD PRESSURE: 140 MMHG | HEIGHT: 64 IN | HEART RATE: 72 BPM | WEIGHT: 204.4 LBS | BODY MASS INDEX: 34.89 KG/M2 | DIASTOLIC BLOOD PRESSURE: 74 MMHG

## 2018-12-14 DIAGNOSIS — I25.10 CORONARY ARTERY DISEASE INVOLVING NATIVE CORONARY ARTERY OF NATIVE HEART WITHOUT ANGINA PECTORIS: Primary | ICD-10-CM

## 2018-12-14 DIAGNOSIS — E78.5 DYSLIPIDEMIA: ICD-10-CM

## 2018-12-14 DIAGNOSIS — I10 BENIGN ESSENTIAL HTN: ICD-10-CM

## 2018-12-14 PROCEDURE — 4040F PNEUMOC VAC/ADMIN/RCVD: CPT | Performed by: INTERNAL MEDICINE

## 2018-12-14 PROCEDURE — 99214 OFFICE O/P EST MOD 30 MIN: CPT | Performed by: INTERNAL MEDICINE

## 2018-12-14 RX ORDER — AZITHROMYCIN 250 MG/1
TABLET, FILM COATED ORAL
Refills: 0 | COMMUNITY
Start: 2018-12-10 | End: 2019-01-25 | Stop reason: ALTCHOICE

## 2018-12-14 NOTE — PROGRESS NOTES
Cardiology Follow Up        Joseph Nuñez      1935      [id number]      Discussion/Summary:    1  CAD status post CABG x3:  No symptoms of angina, continue aspirin, Plavix, statin  Lipid panel reviewed 10/10/2018, well controlled, continue atorvastatin  2   Hypertension:  Blood pressure mildly elevated, but patient does currently have an upper respiratory infection with a significant cough  Will maintain current regimen and see her in 3 months  Consideration given to purchasing blood pressure monitor and check blood pressures at home  3   Dyslipidemia:  Reviewed lipid panel 10/10/2018, well controlled, continue atorvastatin      Follow-up in 3 months      Interval History: This is a very pleasant 26-year-old female with a history of CVA 02/2015 with MRA neck revealing greater than 70% bilateral internal carotid artery stenosis with diminished flow in the right MCA  CTA revealed greater than 90% high-grade stenosis on the right and she thereafter underwent right CEA  She had a NSTEMI 06/2015 with cardiac catheterization revealing three-vessel CAD  Thereafter she underwent CABG x3 with LIMA to LAD, SVG to OM, SVG to RCA in 2015  She lives at with her son  She presents today for follow-up  She is currently battling an upper respiratory infection  She denies shortness of breath, chest tightness, lower extremity edema, orthopnea, hematochezia or melena               Vitals:  Vitals:    12/14/18 0815   BP: 140/74   BP Location: Right arm   Patient Position: Sitting   Cuff Size: Large   Pulse: 72   Weight: 92 7 kg (204 lb 6 4 oz)   Height: 5' 4" (1 626 m)         Past Medical History:   Diagnosis Date    Anxiety     Arthritis     Cataract, bilateral     Last Assessed:3/19/2014    COPD (chronic obstructive pulmonary disease) (HCC)     Coronary artery disease     Depression     Essential tremor     Hx of arterial ischemic stroke     Hypertension  Hypothyroid     Kidney stone     [facility]ocardial infarction [facility])     2015    Personal history of kidney stones     Right knee DJD     Resolved:8/25/2015    Stroke [facility])     Use of cane as ambulatory aid     or walker    Wears glasses      Social History     Social History    Marital status:      Spouse name: N/A    Number of children: N/A    Years of education: 8     Occupational History    Not on file  Social History Main Topics    Smoking status: Former Smoker     Quit date: 2/3/1976    Smokeless tobacco: Never Used    Alcohol use No    Drug use: No    Sexual activity: Not on file     Other Topics Concern    Not on file     Social History Narrative    No caffeine use      Family History   Problem Relation Age of Onset    Cancer Mother         malignant neoplasm    Heart attack Father     Aneurysm Other         Aortic    Cancer Other         malignant neoplasm     Past Surgical History:   Procedure Laterality Date    CAROTID ENDARTERECTOMY Right     CATARACT EXTRACTION W/ INTRAOCULAR LENS  IMPLANT, BILATERAL      COLONOSCOPY N/A 9/30/2017    Procedure: COLONOSCOPY FOR CONTROL OF BLEEDING;  Surgeon: Rebeka Dunham MD;  Location:  MAIN OR;  Service: Colorectal    CORONARY ARTERY BYPASS GRAFT      CABG X3 with LIMA to LAD,SVG to OM,SVG to distal  RCA ; Last Assessed:7/13/2015    JOINT REPLACEMENT      left TKR    KIDNEY STONE SURGERY      NEPHROSTOMY Left     with Drainage Irrigation ; [id number]    MT TOTAL KNEE ARTHROPLASTY Right 2/25/2016    Procedure: ARTHROPLASTY KNEE TOTAL;  Surgeon: Lloyd Purcell MD;  Location: [facility];  Service: Orthopedics       Current Outpatient Prescriptions:     Acetaminophen 650 MG TABS, Take 650 mg by mouth , Disp: , Rfl:     amLODIPine (NORVASC) 5 mg tablet, Take 1 tablet (5 mg total) by mouth daily, Disp: 30 tablet, Rfl: 8    ascorbic acid (VITAMIN C) 500 mg tablet, Take 500 mg by mouth 2 (two) times a day , Disp: , Rfl:     aspirin 81 mg chewable tablet, Chew 81 mg  To check with md re when to stop , Disp: , Rfl:     atorvastatin (LIPITOR) 40 mg tablet, Take 1 tablet (40 mg total) by mouth daily, Disp: 30 tablet, Rfl: 11    azithromycin (ZITHROMAX) 250 mg tablet, TAKE 2 TABLETS TODAY THEN 1 TABLET DAILY X 4 DAYS, Disp: , Rfl: 0    bimatoprost (LUMIGAN) 0 01 % ophthalmic drops, Apply 1 drop to eye Daily, Disp: , Rfl:     Cholecalciferol (VITAMIN D) 2000 UNITS tablet, Take 2,000 Units by mouth daily  , Disp: , Rfl:     clopidogrel (PLAVIX) 75 mg tablet, Take 1 tablet (75 mg total) by mouth daily, Disp: 30 tablet, Rfl: 5    diazepam (VALIUM) 5 mg tablet, Take 1 tablet (5 mg total) by mouth 3 (three) times a day, Disp: 90 tablet, Rfl: 0    furosemide (LASIX) 20 mg tablet, Take 1 tablet by mouth as needed  , Disp: , Rfl:     levalbuterol (XOPENEX) 0 63 mg/3 mL nebulizer solution, Take 3 mL (0 63 mg total) by nebulization 3 (three) times a day (Patient taking differently: Take 0 63 mg by nebulization as needed  ), Disp: 300 mL, Rfl: 0    levothyroxine 75 mcg tablet, TAKE 1 TABLET (75 MCG TOTAL) BY MOUTH DAILY IN THE EARLY MORNING, Disp: 30 tablet, Rfl: 11    metoprolol tartrate (LOPRESSOR) 25 mg tablet, 1/2 tablet twice daily, Disp: 30 tablet, Rfl: 11    Mirabegron ER (MYRBETRIQ) 25 MG TB24, Take one po daily, Disp: 30 tablet, Rfl: 11    oxyCODONE-acetaminophen (PERCOCET) 5-325 mg per tablet, Take 1 tablet by mouth every 4 (four) hours as needed for moderate pain Max Daily Amount: 6 tablets, Disp: 90 tablet, Rfl: 0    pantoprazole (PROTONIX) 40 mg tablet, Take 1 tablet (40 mg total) by mouth daily, Disp: 30 tablet, Rfl: 11    Pediatric Multiple Vit-C-FA (PEDIATRIC MULTIVITAMIN) chewable tablet, Chew 1 tablet daily, Disp: , Rfl:     PredniSONE 10 MG (21) TBPK, As directed take 6-5-4-3-2-1, Disp: 21 each, Rfl: 0    tiotropium (SPIRIVA) 18 mcg inhalation capsule, Place 1 capsule (18 mcg total) into inhaler and inhale daily, Disp: 30 capsule, Rfl: 6    vilazodone (VIIBRYD) 40 mg tablet, Take 1 tablet (40 mg total) by mouth daily for 120 days, Disp: 30 tablet, Rfl: 2    zolpidem (AMBIEN) 10 mg tablet, TAKE ONE TABLET BY MOUTH AT BEDTIME AS NEEDED FOR INSOMNIA, Disp: 30 tablet, Rfl: 0        Review of Systems:  Review of Systems   Constitutional: Positive for fatigue  Negative for activity change, fever and unexpected weight change  HENT: Negative for facial swelling, nosebleeds and voice change  Respiratory: Negative for chest tightness, shortness of breath and wheezing  Cardiovascular: Negative for chest pain, palpitations and leg swelling  Gastrointestinal: Negative for abdominal distention  Genitourinary: Negative for hematuria  Musculoskeletal: Positive for arthralgias  Skin: Negative for color change, pallor, rash and wound  Neurological: Negative for dizziness, seizures and syncope  Psychiatric/Behavioral: Negative for agitation  Physical Exam:  Physical Exam   Constitutional: She is oriented to person, place, and time  She appears well-developed and well-nourished  HENT:   Head: Normocephalic and atraumatic  Eyes: EOM are normal    Neck: Normal range of motion  Neck supple  Cardiovascular: Normal rate, regular rhythm, normal heart sounds and intact distal pulses  Pulmonary/Chest: Effort normal and breath sounds normal    Abdominal: Soft  Musculoskeletal: Normal range of motion  Neurological: She is alert and oriented to person, place, and time  Skin: Skin is warm and dry  Psychiatric: She has a normal mood and affect  Her behavior is normal  Judgment and thought content normal    Vitals reviewed

## 2018-12-18 DIAGNOSIS — I65.29 STENOSIS OF CAROTID ARTERY, UNSPECIFIED LATERALITY: ICD-10-CM

## 2018-12-18 DIAGNOSIS — M15.9 GENERALIZED OSTEOARTHRITIS: ICD-10-CM

## 2018-12-18 DIAGNOSIS — I73.9 PAD (PERIPHERAL ARTERY DISEASE) (HCC): ICD-10-CM

## 2018-12-18 DIAGNOSIS — R25.1 TREMOR: ICD-10-CM

## 2018-12-18 DIAGNOSIS — F41.8 ANXIETY WITH DEPRESSION: ICD-10-CM

## 2018-12-18 RX ORDER — OXYCODONE HYDROCHLORIDE AND ACETAMINOPHEN 5; 325 MG/1; MG/1
1 TABLET ORAL EVERY 4 HOURS PRN
Qty: 90 TABLET | Refills: 0 | Status: SHIPPED | OUTPATIENT
Start: 2018-12-18 | End: 2019-01-25 | Stop reason: SDUPTHER

## 2018-12-18 RX ORDER — DIAZEPAM 5 MG/1
5 TABLET ORAL 3 TIMES DAILY
Qty: 90 TABLET | Refills: 0 | Status: SHIPPED | OUTPATIENT
Start: 2018-12-18 | End: 2019-01-25 | Stop reason: SDUPTHER

## 2018-12-18 RX ORDER — VILAZODONE HYDROCHLORIDE 40 MG/1
40 TABLET ORAL DAILY
Qty: 30 TABLET | Refills: 0 | Status: SHIPPED | OUTPATIENT
Start: 2018-12-18 | End: 2019-02-11 | Stop reason: SDUPTHER

## 2018-12-18 RX ORDER — CLOPIDOGREL BISULFATE 75 MG/1
75 TABLET ORAL DAILY
Qty: 30 TABLET | Refills: 3 | Status: SHIPPED | OUTPATIENT
Start: 2018-12-18 | End: 2019-04-10 | Stop reason: SDUPTHER

## 2018-12-18 RX ORDER — AMLODIPINE BESYLATE 5 MG/1
5 TABLET ORAL DAILY
Qty: 30 TABLET | Refills: 3 | Status: SHIPPED | OUTPATIENT
Start: 2018-12-18 | End: 2019-04-22 | Stop reason: SDUPTHER

## 2019-01-25 ENCOUNTER — OFFICE VISIT (OUTPATIENT)
Dept: FAMILY MEDICINE CLINIC | Facility: CLINIC | Age: 84
End: 2019-01-25
Payer: COMMERCIAL

## 2019-01-25 VITALS
SYSTOLIC BLOOD PRESSURE: 142 MMHG | DIASTOLIC BLOOD PRESSURE: 74 MMHG | WEIGHT: 195 LBS | BODY MASS INDEX: 33.29 KG/M2 | HEIGHT: 64 IN

## 2019-01-25 DIAGNOSIS — R25.1 TREMOR: ICD-10-CM

## 2019-01-25 DIAGNOSIS — M15.9 GENERALIZED OSTEOARTHRITIS: ICD-10-CM

## 2019-01-25 DIAGNOSIS — T14.8XXA BLOOD BLISTER: Primary | ICD-10-CM

## 2019-01-25 PROCEDURE — 99213 OFFICE O/P EST LOW 20 MIN: CPT | Performed by: NURSE PRACTITIONER

## 2019-01-25 RX ORDER — DIAZEPAM 5 MG/1
5 TABLET ORAL 3 TIMES DAILY
Qty: 90 TABLET | Refills: 0 | Status: SHIPPED | OUTPATIENT
Start: 2019-01-25 | End: 2019-03-08 | Stop reason: SDUPTHER

## 2019-01-25 RX ORDER — OXYCODONE HYDROCHLORIDE AND ACETAMINOPHEN 5; 325 MG/1; MG/1
1 TABLET ORAL EVERY 4 HOURS PRN
Qty: 90 TABLET | Refills: 0 | Status: SHIPPED | OUTPATIENT
Start: 2019-01-25 | End: 2019-03-08 | Stop reason: SDUPTHER

## 2019-01-25 NOTE — PROGRESS NOTES
Assessment/Plan:    Blood Blister  All fluid drained without difficulty or any complications  Patient tolerated well  Antibacterial ointment applied along with non stick dressing and gauze  Advised to use Bactroban ointment 2-3 times a day and clean wound clean and dry and monitor closely for any signs of infection  Advised to call if any worsening pain/redness/swelling  Injury appears just soft tissue  If any worsening / persisting pain may need x ray of left great toe  Please call the office if you are experiencing any worsening of symptoms or no symptom improvement  Medications refilled  Please call the office if you are experiencing any worsening of symptoms or no symptom improvement  Diagnoses and all orders for this visit:    Tremor  -     diazepam (VALIUM) 5 mg tablet; Take 1 tablet (5 mg total) by mouth 3 (three) times a day    Generalized osteoarthritis  -     oxyCODONE-acetaminophen (PERCOCET) 5-325 mg per tablet; Take 1 tablet by mouth every 4 (four) hours as needed for moderate pain Max Daily Amount: 6 tablets      Patient verbalizes understand and agrees with treatment plan  Subjective:        Patient ID: Cameron Blizzard is a 80 y o  female  Chief Complaint   Patient presents with    Blood Blister     pt dropped a "small safe" on her left foot on Tuesday; blood blister formed on her L big toe       Presents to office for evaluation of left great toe blister/growth that occurred Tuesday  A small safe on her counter landed on her toe and then this blister formed  Toe feels okay but just painful and tender from pressure from blister  Denies fever or chills  Denies foot pain  Tenderness when putting pressure on toe  The following portions of the patient's history were reviewed and updated as appropriate: allergies, current medications, past family history, past social history and problem list     Review of Systems   Constitutional: Negative for chills and fever     HENT: Negative for congestion  Eyes: Negative for pain and visual disturbance  Respiratory: Negative for cough and shortness of breath  Cardiovascular: Negative for chest pain, palpitations and leg swelling  Gastrointestinal: Negative for abdominal pain, diarrhea, nausea and vomiting  Genitourinary: Negative for difficulty urinating and dysuria  Musculoskeletal: Negative for arthralgias and myalgias  Toe pain   Skin: Negative for color change and rash  Neurological: Negative for dizziness, syncope, numbness and headaches  Hematological: Negative for adenopathy  Psychiatric/Behavioral: Negative for agitation and behavioral problems  The patient is not nervous/anxious  Objective:  /74 (BP Location: Right arm, Patient Position: Sitting, Cuff Size: Large)   Ht 5' 4" (1 626 m)   Wt 88 5 kg (195 lb)   BMI 33 47 kg/m²   Incision and Drainage  Date/Time: 1/26/2019 3:52 PM  Performed by: Kellie Iyer  Authorized by: Kellie Iyer     Patient location:  Clinic  Other Assisting Provider: No    Consent:     Consent obtained:  Verbal    Consent given by:  Patient    Risks discussed:  Bleeding, damage to other organs, infection, incomplete drainage and pain  Universal protocol:     Procedure explained and questions answered to patient or proxy's satisfaction: yes      Patient identity confirmed:  Verbally with patient  Location:     Type:  Fluid collection    Size:  1 8 x 1 mm     Location: Left 1st toe  Pre-procedure details:     Procedure prep: alcohol  Procedure details:     Complexity:  Simple    Needle aspiration: no      Incision types:  Stab incision    Scalpel size: 27 guage needle  Approach:  Puncture    Incision depth:  Skin    Techniques: drained  Drainage:  Serosanguinous    Drainage amount: Moderate    Wound treatment:  Wound left open    Packing materials:  None  Post-procedure details:     Patient tolerance of procedure:   Tolerated well, no immediate complications  Comments: All fluid drained without difficulty or any complications  Patient tolerated well  Antibacterial ointment applied along with non stick dressing and gauze  Physical Exam   Constitutional: She is oriented to person, place, and time  She appears well-developed  No distress  HENT:   Head: Normocephalic and atraumatic  Right Ear: External ear normal    Left Ear: External ear normal    Nose: Nose normal    Eyes: Conjunctivae and lids are normal  Right eye exhibits no discharge  Left eye exhibits no discharge  Neck: Normal range of motion  Neck supple  No tracheal deviation present  Cardiovascular: Normal rate and regular rhythm  No murmur heard  Pulmonary/Chest: Effort normal and breath sounds normal  No respiratory distress  She has no wheezes  Abdominal: Soft  Bowel sounds are normal  She exhibits no distension  There is no tenderness  There is no guarding  Musculoskeletal: She exhibits edema and tenderness  She exhibits no deformity  Feet:    Lymphadenopathy:     She has no cervical adenopathy  Neurological: She is alert and oriented to person, place, and time  Skin: Skin is warm and dry  No rash noted  She is not diaphoretic  No erythema  Psychiatric: She has a normal mood and affect  Her speech is normal and behavior is normal  Judgment and thought content normal  Cognition and memory are normal    Nursing note and vitals reviewed

## 2019-01-25 NOTE — PATIENT INSTRUCTIONS
Keep toe clean and dry  Use topical Bactroban ointment  Monitor for any signs of infection and call if any increased redness/ swelling/ fever / or chills  Please call the office if you are experiencing any worsening of symptoms or no symptom improvement  Elevate your foot

## 2019-01-26 PROCEDURE — 10140 I&D HMTMA SEROMA/FLUID COLLJ: CPT | Performed by: NURSE PRACTITIONER

## 2019-02-11 DIAGNOSIS — F41.8 ANXIETY WITH DEPRESSION: ICD-10-CM

## 2019-02-11 RX ORDER — VILAZODONE HYDROCHLORIDE 40 MG/1
TABLET ORAL
Qty: 30 TABLET | Refills: 5 | Status: SHIPPED | OUTPATIENT
Start: 2019-02-11 | End: 2019-08-26 | Stop reason: SDUPTHER

## 2019-02-11 NOTE — TELEPHONE ENCOUNTER
Not sure what [facility] will allow (or insurace for that matter)  So I would call [facility] and ask what we need to do

## 2019-02-11 NOTE — TELEPHONE ENCOUNTER
Krunal Thao called and states that she is getting a lot of back pain from the hospital bed mattress  Can a new script for a better mattress be faxed to [facility]  The bed is fine and works  It is just the mattress      Krunal Thao [phone]

## 2019-02-12 DIAGNOSIS — Z99.81 OXYGEN DEPENDENT: ICD-10-CM

## 2019-02-12 DIAGNOSIS — G47.33 OSA (OBSTRUCTIVE SLEEP APNEA): ICD-10-CM

## 2019-02-12 DIAGNOSIS — R29.3 POSTURAL IMBALANCE: Primary | ICD-10-CM

## 2019-02-12 NOTE — TELEPHONE ENCOUNTER
[facility] no longer handle durable medical equipment  Who else should I try?      LANA

## 2019-02-12 NOTE — TELEPHONE ENCOUNTER
Spoke with  which is now taking over for [employer] and they informed me to fax new rx for [facility] to [phone]    Please auth to print and once signed will fax    LANA

## 2019-02-14 ENCOUNTER — TELEPHONE (OUTPATIENT)
Dept: FAMILY MEDICINE CLINIC | Facility: CLINIC | Age: 84
End: 2019-02-14

## 2019-02-14 NOTE — TELEPHONE ENCOUNTER
Spoke with Wally and he asked me if the patient wanted a firm mattress? I informed him that he could call the patient and verify this with her         MBD

## 2019-02-14 NOTE — TELEPHONE ENCOUNTER
Morena Mckeon from [facility] called and asked for a call back regarding the hospital bed that was ordered for pt      Cb# [phone] ext [phone]

## 2019-02-25 DIAGNOSIS — Z71.89 COMPLEX CARE COORDINATION: Primary | ICD-10-CM

## 2019-02-27 ENCOUNTER — PATIENT OUTREACH (OUTPATIENT)
Dept: FAMILY MEDICINE CLINIC | Facility: CLINIC | Age: 84
End: 2019-02-27

## 2019-02-27 NOTE — PROGRESS NOTES
Called and spoke with Ashley's son, Melvina Alba, introducing myself and my services as an [address], but he declined services   This is a [facility]-eligible patient and it was [facility] who identified this as a potential patient to be able to assist

## 2019-03-08 DIAGNOSIS — M15.9 GENERALIZED OSTEOARTHRITIS: ICD-10-CM

## 2019-03-08 DIAGNOSIS — R25.1 TREMOR: ICD-10-CM

## 2019-03-08 RX ORDER — DIAZEPAM 5 MG/1
5 TABLET ORAL 3 TIMES DAILY
Qty: 90 TABLET | Refills: 0 | Status: SHIPPED | OUTPATIENT
Start: 2019-03-08 | End: 2019-05-31 | Stop reason: ALTCHOICE

## 2019-03-08 RX ORDER — OXYCODONE HYDROCHLORIDE AND ACETAMINOPHEN 5; 325 MG/1; MG/1
1 TABLET ORAL EVERY 4 HOURS PRN
Qty: 90 TABLET | Refills: 0 | Status: SHIPPED | OUTPATIENT
Start: 2019-03-08 | End: 2019-04-23 | Stop reason: SDUPTHER

## 2019-03-08 NOTE — TELEPHONE ENCOUNTER
Rhona Magaña called the script line asking for refills       PMED last filled 1/25/19 by Franck Baxter

## 2019-03-15 ENCOUNTER — OFFICE VISIT (OUTPATIENT)
Dept: CARDIOLOGY CLINIC | Facility: CLINIC | Age: 84
End: 2019-03-15
Payer: COMMERCIAL

## 2019-03-15 VITALS
BODY MASS INDEX: 34.49 KG/M2 | HEIGHT: 64 IN | HEART RATE: 80 BPM | SYSTOLIC BLOOD PRESSURE: 140 MMHG | WEIGHT: 202 LBS | DIASTOLIC BLOOD PRESSURE: 60 MMHG

## 2019-03-15 DIAGNOSIS — I10 ESSENTIAL HYPERTENSION: ICD-10-CM

## 2019-03-15 PROCEDURE — 99214 OFFICE O/P EST MOD 30 MIN: CPT | Performed by: INTERNAL MEDICINE

## 2019-03-27 ENCOUNTER — OFFICE VISIT (OUTPATIENT)
Dept: FAMILY MEDICINE CLINIC | Facility: CLINIC | Age: 84
End: 2019-03-27
Payer: COMMERCIAL

## 2019-03-27 VITALS
WEIGHT: 199 LBS | HEIGHT: 64 IN | BODY MASS INDEX: 33.97 KG/M2 | DIASTOLIC BLOOD PRESSURE: 70 MMHG | SYSTOLIC BLOOD PRESSURE: 122 MMHG

## 2019-03-27 DIAGNOSIS — R73.03 PRE-DIABETES: ICD-10-CM

## 2019-03-27 DIAGNOSIS — I50.32 CHRONIC DIASTOLIC CONGESTIVE HEART FAILURE (HCC): ICD-10-CM

## 2019-03-27 DIAGNOSIS — Z00.00 MEDICARE ANNUAL WELLNESS VISIT, SUBSEQUENT: ICD-10-CM

## 2019-03-27 DIAGNOSIS — M17.11 PRIMARY OSTEOARTHRITIS OF RIGHT KNEE: ICD-10-CM

## 2019-03-27 DIAGNOSIS — I10 ESSENTIAL HYPERTENSION: Primary | ICD-10-CM

## 2019-03-27 PROCEDURE — 3074F SYST BP LT 130 MM HG: CPT | Performed by: FAMILY MEDICINE

## 2019-03-27 PROCEDURE — 1160F RVW MEDS BY RX/DR IN RCRD: CPT | Performed by: FAMILY MEDICINE

## 2019-03-27 PROCEDURE — 3078F DIAST BP <80 MM HG: CPT | Performed by: FAMILY MEDICINE

## 2019-03-27 PROCEDURE — G0439 PPPS, SUBSEQ VISIT: HCPCS | Performed by: FAMILY MEDICINE

## 2019-03-27 PROCEDURE — 99214 OFFICE O/P EST MOD 30 MIN: CPT | Performed by: FAMILY MEDICINE

## 2019-03-27 PROCEDURE — 1125F AMNT PAIN NOTED PAIN PRSNT: CPT | Performed by: FAMILY MEDICINE

## 2019-03-27 PROCEDURE — 1170F FXNL STATUS ASSESSED: CPT | Performed by: FAMILY MEDICINE

## 2019-04-03 ENCOUNTER — TELEPHONE (OUTPATIENT)
Dept: FAMILY MEDICINE CLINIC | Facility: CLINIC | Age: 84
End: 2019-04-03

## 2019-04-10 DIAGNOSIS — I73.9 PAD (PERIPHERAL ARTERY DISEASE) (HCC): ICD-10-CM

## 2019-04-10 RX ORDER — CLOPIDOGREL BISULFATE 75 MG/1
TABLET ORAL
Qty: 30 TABLET | Refills: 3 | Status: SHIPPED | OUTPATIENT
Start: 2019-04-10 | End: 2019-08-23 | Stop reason: SDUPTHER

## 2019-04-22 DIAGNOSIS — I65.29 STENOSIS OF CAROTID ARTERY, UNSPECIFIED LATERALITY: ICD-10-CM

## 2019-04-22 RX ORDER — AMLODIPINE BESYLATE 5 MG/1
TABLET ORAL
Qty: 30 TABLET | Refills: 11 | Status: SHIPPED | OUTPATIENT
Start: 2019-04-22 | End: 2020-02-06

## 2019-04-23 DIAGNOSIS — M15.9 GENERALIZED OSTEOARTHRITIS: ICD-10-CM

## 2019-04-23 RX ORDER — OXYCODONE HYDROCHLORIDE AND ACETAMINOPHEN 5; 325 MG/1; MG/1
1 TABLET ORAL EVERY 4 HOURS PRN
Qty: 90 TABLET | Refills: 0 | Status: SHIPPED | OUTPATIENT
Start: 2019-04-23 | End: 2019-05-31

## 2019-05-01 ENCOUNTER — HOSPITAL ENCOUNTER (INPATIENT)
Facility: HOSPITAL | Age: 84
LOS: 6 days | Discharge: HOME HEALTH | DRG: 551 | End: 2019-05-07
Attending: SURGERY | Admitting: SURGERY
Payer: COMMERCIAL

## 2019-05-01 ENCOUNTER — APPOINTMENT (EMERGENCY)
Dept: RADIOLOGY | Facility: HOSPITAL | Age: 84
DRG: 551 | End: 2019-05-01
Payer: COMMERCIAL

## 2019-05-01 ENCOUNTER — APPOINTMENT (OUTPATIENT)
Dept: RADIOLOGY | Facility: HOSPITAL | Age: 84
DRG: 551 | End: 2019-05-01
Payer: COMMERCIAL
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
## 2019-05-01 DIAGNOSIS — S12.9XXA CLOSED FRACTURE OF TRANSVERSE PROCESS OF CERVICAL VERTEBRA, INITIAL ENCOUNTER (HCC): ICD-10-CM

## 2019-05-01 DIAGNOSIS — S02.2XXA NASAL FRACTURE: ICD-10-CM

## 2019-05-01 DIAGNOSIS — S15.109A: ICD-10-CM

## 2019-05-01 DIAGNOSIS — S02.2XXA CLOSED FRACTURE OF NASAL BONE, INITIAL ENCOUNTER: Primary | ICD-10-CM

## 2019-05-01 DIAGNOSIS — J44.9 COPD (CHRONIC OBSTRUCTIVE PULMONARY DISEASE) (HCC): ICD-10-CM

## 2019-05-01 DIAGNOSIS — S12.9XXA: ICD-10-CM

## 2019-05-01 PROBLEM — R06.89 ACUTE RESPIRATORY INSUFFICIENCY: Status: ACTIVE | Noted: 2019-05-01

## 2019-05-01 PROBLEM — Z86.73 HISTORY OF STROKE: Status: ACTIVE | Noted: 2019-05-01

## 2019-05-01 PROBLEM — J96.12 CHRONIC HYPERCAPNIC RESPIRATORY FAILURE (HCC): Status: ACTIVE | Noted: 2019-05-01

## 2019-05-01 PROBLEM — R11.0 NAUSEA: Status: ACTIVE | Noted: 2019-05-01

## 2019-05-01 PROBLEM — Z95.1 S/P CABG (CORONARY ARTERY BYPASS GRAFT): Status: ACTIVE | Noted: 2019-05-01

## 2019-05-01 LAB
AEROSOL MASK ROOM AIR FIO2: 60 %
ALBUMIN SERPL BCP-MCNC: 3.1 G/DL (ref 3.5–5)
ALP SERPL-CCNC: 140 U/L (ref 46–116)
ALT SERPL W P-5'-P-CCNC: 10 U/L (ref 12–78)
ANION GAP SERPL CALCULATED.3IONS-SCNC: -1 MMOL/L (ref 4–13)
ARTERIAL PATENCY WRIST A: YES
AST SERPL W P-5'-P-CCNC: 13 U/L (ref 5–45)
BASE EXCESS BLDA CALC-SCNC: 12 MMOL/L (ref -2–3)
BASE EXCESS BLDA CALC-SCNC: 8.1 MMOL/L
BASOPHILS # BLD AUTO: 0.06 THOUSANDS/ΜL (ref 0–0.1)
BASOPHILS NFR BLD AUTO: 1 % (ref 0–1)
BILIRUB SERPL-MCNC: 0.57 MG/DL (ref 0.2–1)
BUN SERPL-MCNC: 21 MG/DL (ref 5–25)
CA-I BLD-SCNC: 1.12 MMOL/L (ref 1.12–1.32)
CA-I BLD-SCNC: 1.27 MMOL/L (ref 1.12–1.32)
CALCIUM SERPL-MCNC: 8.5 MG/DL (ref 8.3–10.1)
CHLORIDE SERPL-SCNC: 102 MMOL/L (ref 100–108)
CO2 SERPL-SCNC: 40 MMOL/L (ref 21–32)
CREAT SERPL-MCNC: 1.03 MG/DL (ref 0.6–1.3)
EOSINOPHIL # BLD AUTO: 0.14 THOUSAND/ΜL (ref 0–0.61)
EOSINOPHIL NFR BLD AUTO: 2 % (ref 0–6)
ERYTHROCYTE [DISTWIDTH] IN BLOOD BY AUTOMATED COUNT: 13.9 % (ref 11.6–15.1)
GFR SERPL CREATININE-BSD FRML MDRD: 50 ML/MIN/1.73SQ M
GLUCOSE SERPL-MCNC: 100 MG/DL (ref 65–140)
GLUCOSE SERPL-MCNC: 114 MG/DL (ref 65–140)
GLUCOSE SERPL-MCNC: 126 MG/DL (ref 65–140)
GLUCOSE SERPL-MCNC: 94 MG/DL (ref 65–140)
HCO3 BLDA-SCNC: 39.6 MMOL/L (ref 22–28)
HCO3 BLDA-SCNC: 43.2 MMOL/L (ref 24–30)
HCT VFR BLD AUTO: 49.5 % (ref 34.8–46.1)
HCT VFR BLD CALC: 42 % (ref 34.8–46.1)
HCT VFR BLD CALC: 48 % (ref 34.8–46.1)
HGB BLD-MCNC: 14 G/DL (ref 11.5–15.4)
HGB BLDA-MCNC: 14.3 G/DL (ref 11.5–15.4)
HGB BLDA-MCNC: 16.3 G/DL (ref 11.5–15.4)
HOLD SPECIMEN: NORMAL
IMM GRANULOCYTES # BLD AUTO: 0.03 THOUSAND/UL (ref 0–0.2)
IMM GRANULOCYTES NFR BLD AUTO: 0 % (ref 0–2)
LYMPHOCYTES # BLD AUTO: 1.29 THOUSANDS/ΜL (ref 0.6–4.47)
LYMPHOCYTES NFR BLD AUTO: 14 % (ref 14–44)
MAGNESIUM SERPL-MCNC: 1.9 MG/DL (ref 1.6–2.6)
MCH RBC QN AUTO: 27.1 PG (ref 26.8–34.3)
MCHC RBC AUTO-ENTMCNC: 28.3 G/DL (ref 31.4–37.4)
MCV RBC AUTO: 96 FL (ref 82–98)
MONOCYTES # BLD AUTO: 0.59 THOUSAND/ΜL (ref 0.17–1.22)
MONOCYTES NFR BLD AUTO: 7 % (ref 4–12)
NEUTROPHILS # BLD AUTO: 6.83 THOUSANDS/ΜL (ref 1.85–7.62)
NEUTS SEG NFR BLD AUTO: 76 % (ref 43–75)
NON VENT TYPE-AEROSOL MASK: ABNORMAL
NRBC BLD AUTO-RTO: 0 /100 WBCS
O2 CT BLDA-SCNC: 17.8 ML/DL (ref 16–23)
OXYHGB MFR BLDA: 94.2 % (ref 94–97)
PCO2 BLD: 85 MM HG (ref 42–50)
PCO2 BLD: >103 MM HG (ref 36–44)
PCO2 BLD: >45 MMOL/L (ref 21–32)
PCO2 BLDA: 99.5 MM HG (ref 36–44)
PH BLD: 7.2 [PH] (ref 7.35–7.45)
PH BLD: 7.31 [PH] (ref 7.3–7.4)
PH BLDA: 7.22 [PH] (ref 7.35–7.45)
PHOSPHATE SERPL-MCNC: 2.9 MG/DL (ref 2.3–4.1)
PLATELET # BLD AUTO: 171 THOUSANDS/UL (ref 149–390)
PMV BLD AUTO: 10.6 FL (ref 8.9–12.7)
PO2 BLD: 29 MM HG (ref 35–45)
PO2 BLD: 80 MM HG (ref 75–129)
PO2 BLDA: 91.1 MM HG (ref 75–129)
POTASSIUM BLD-SCNC: 3.8 MMOL/L (ref 3.5–5.3)
POTASSIUM BLD-SCNC: 4 MMOL/L (ref 3.5–5.3)
POTASSIUM SERPL-SCNC: 4.1 MMOL/L (ref 3.5–5.3)
PROT SERPL-MCNC: 7 G/DL (ref 6.4–8.2)
RBC # BLD AUTO: 5.16 MILLION/UL (ref 3.81–5.12)
SAO2 % BLD FROM PO2: 46 % (ref 95–98)
SODIUM BLD-SCNC: 143 MMOL/L (ref 136–145)
SODIUM BLD-SCNC: 144 MMOL/L (ref 136–145)
SODIUM SERPL-SCNC: 141 MMOL/L (ref 136–145)
SPECIMEN SOURCE: ABNORMAL
WBC # BLD AUTO: 8.94 THOUSAND/UL (ref 4.31–10.16)

## 2019-05-01 PROCEDURE — 85014 HEMATOCRIT: CPT

## 2019-05-01 PROCEDURE — G8988 SELF CARE GOAL STATUS: HCPCS

## 2019-05-01 PROCEDURE — 99222 1ST HOSP IP/OBS MODERATE 55: CPT | Performed by: INTERNAL MEDICINE

## 2019-05-01 PROCEDURE — 82330 ASSAY OF CALCIUM: CPT

## 2019-05-01 PROCEDURE — 82803 BLOOD GASES ANY COMBINATION: CPT

## 2019-05-01 PROCEDURE — 84295 ASSAY OF SERUM SODIUM: CPT

## 2019-05-01 PROCEDURE — 36600 WITHDRAWAL OF ARTERIAL BLOOD: CPT

## 2019-05-01 PROCEDURE — 82805 BLOOD GASES W/O2 SATURATION: CPT | Performed by: PHYSICIAN ASSISTANT

## 2019-05-01 PROCEDURE — 99222 1ST HOSP IP/OBS MODERATE 55: CPT | Performed by: NEUROLOGICAL SURGERY

## 2019-05-01 PROCEDURE — 84132 ASSAY OF SERUM POTASSIUM: CPT

## 2019-05-01 PROCEDURE — NC001 PR NO CHARGE: Performed by: EMERGENCY MEDICINE

## 2019-05-01 PROCEDURE — 83735 ASSAY OF MAGNESIUM: CPT | Performed by: PHYSICIAN ASSISTANT

## 2019-05-01 PROCEDURE — 70450 CT HEAD/BRAIN W/O DYE: CPT

## 2019-05-01 PROCEDURE — G8979 MOBILITY GOAL STATUS: HCPCS

## 2019-05-01 PROCEDURE — 99356 PR PROLONGED SVC I/P OR OBS SETTING 1ST HOUR: CPT | Performed by: SURGERY

## 2019-05-01 PROCEDURE — 72125 CT NECK SPINE W/O DYE: CPT

## 2019-05-01 PROCEDURE — 80053 COMPREHEN METABOLIC PANEL: CPT | Performed by: PHYSICIAN ASSISTANT

## 2019-05-01 PROCEDURE — NC001 PR NO CHARGE: Performed by: PHYSICIAN ASSISTANT

## 2019-05-01 PROCEDURE — 82947 ASSAY GLUCOSE BLOOD QUANT: CPT

## 2019-05-01 PROCEDURE — 82948 REAGENT STRIP/BLOOD GLUCOSE: CPT

## 2019-05-01 PROCEDURE — 97163 PT EVAL HIGH COMPLEX 45 MIN: CPT

## 2019-05-01 PROCEDURE — 82805 BLOOD GASES W/O2 SATURATION: CPT | Performed by: EMERGENCY MEDICINE

## 2019-05-01 PROCEDURE — 99291 CRITICAL CARE FIRST HOUR: CPT | Performed by: SURGERY

## 2019-05-01 PROCEDURE — G8978 MOBILITY CURRENT STATUS: HCPCS

## 2019-05-01 PROCEDURE — G8987 SELF CARE CURRENT STATUS: HCPCS

## 2019-05-01 PROCEDURE — 97167 OT EVAL HIGH COMPLEX 60 MIN: CPT

## 2019-05-01 PROCEDURE — 99204 OFFICE O/P NEW MOD 45 MIN: CPT | Performed by: INTERNAL MEDICINE

## 2019-05-01 PROCEDURE — 85025 COMPLETE CBC W/AUTO DIFF WBC: CPT | Performed by: PHYSICIAN ASSISTANT

## 2019-05-01 PROCEDURE — 94760 N-INVAS EAR/PLS OXIMETRY 1: CPT

## 2019-05-01 PROCEDURE — 70486 CT MAXILLOFACIAL W/O DYE: CPT

## 2019-05-01 PROCEDURE — 84100 ASSAY OF PHOSPHORUS: CPT | Performed by: PHYSICIAN ASSISTANT

## 2019-05-01 PROCEDURE — 99285 EMERGENCY DEPT VISIT HI MDM: CPT

## 2019-05-01 PROCEDURE — 94660 CPAP INITIATION&MGMT: CPT

## 2019-05-01 PROCEDURE — 36415 COLL VENOUS BLD VENIPUNCTURE: CPT | Performed by: SURGERY

## 2019-05-01 PROCEDURE — 99223 1ST HOSP IP/OBS HIGH 75: CPT | Performed by: SURGERY

## 2019-05-01 PROCEDURE — 70498 CT ANGIOGRAPHY NECK: CPT

## 2019-05-01 RX ORDER — ASPIRIN 81 MG/1
81 TABLET ORAL DAILY
COMMUNITY

## 2019-05-01 RX ORDER — ZOLPIDEM TARTRATE 10 MG/1
10 TABLET ORAL
COMMUNITY
End: 2019-05-07 | Stop reason: HOSPADM

## 2019-05-01 RX ORDER — ATORVASTATIN CALCIUM 40 MG/1
40 TABLET, FILM COATED ORAL
Status: DISCONTINUED | OUTPATIENT
Start: 2019-05-01 | End: 2019-05-07 | Stop reason: HOSPADM

## 2019-05-01 RX ORDER — DIAZEPAM 2 MG/1
2 TABLET ORAL
Status: DISCONTINUED | OUTPATIENT
Start: 2019-05-01 | End: 2019-05-07 | Stop reason: HOSPADM

## 2019-05-01 RX ORDER — AMLODIPINE BESYLATE 5 MG/1
5 TABLET ORAL DAILY
COMMUNITY
End: 2019-05-31 | Stop reason: SDUPTHER

## 2019-05-01 RX ORDER — LEVALBUTEROL 1.25 MG/.5ML
1.25 SOLUTION, CONCENTRATE RESPIRATORY (INHALATION)
Status: DISCONTINUED | OUTPATIENT
Start: 2019-05-01 | End: 2019-05-01

## 2019-05-01 RX ORDER — DIAZEPAM 5 MG/1
5 TABLET ORAL
Status: DISCONTINUED | OUTPATIENT
Start: 2019-05-01 | End: 2019-05-01

## 2019-05-01 RX ORDER — ONDANSETRON 4 MG/1
4 TABLET, ORALLY DISINTEGRATING ORAL EVERY 8 HOURS SCHEDULED
Status: DISCONTINUED | OUTPATIENT
Start: 2019-05-01 | End: 2019-05-02

## 2019-05-01 RX ORDER — VILAZODONE HYDROCHLORIDE 40 MG/1
40 TABLET ORAL DAILY
COMMUNITY
End: 2019-05-31 | Stop reason: SDUPTHER

## 2019-05-01 RX ORDER — OXYCODONE HYDROCHLORIDE AND ACETAMINOPHEN 5; 325 MG/1; MG/1
1 TABLET ORAL EVERY 6 HOURS PRN
COMMUNITY
End: 2019-05-07 | Stop reason: HOSPADM

## 2019-05-01 RX ORDER — ZOLPIDEM TARTRATE 5 MG/1
10 TABLET ORAL
Status: DISCONTINUED | OUTPATIENT
Start: 2019-05-01 | End: 2019-05-01

## 2019-05-01 RX ORDER — CLOPIDOGREL BISULFATE 75 MG/1
75 TABLET ORAL DAILY
Status: DISCONTINUED | OUTPATIENT
Start: 2019-05-01 | End: 2019-05-07 | Stop reason: HOSPADM

## 2019-05-01 RX ORDER — ALBUTEROL SULFATE 2.5 MG/3ML
2.5 SOLUTION RESPIRATORY (INHALATION) ONCE
Status: DISCONTINUED | OUTPATIENT
Start: 2019-05-01 | End: 2019-05-01

## 2019-05-01 RX ORDER — PANTOPRAZOLE SODIUM 40 MG/1
40 TABLET, DELAYED RELEASE ORAL
Status: DISCONTINUED | OUTPATIENT
Start: 2019-05-01 | End: 2019-05-07 | Stop reason: HOSPADM

## 2019-05-01 RX ORDER — VILAZODONE HYDROCHLORIDE 40 MG/1
40 TABLET ORAL DAILY
Status: DISCONTINUED | OUTPATIENT
Start: 2019-05-01 | End: 2019-05-07 | Stop reason: HOSPADM

## 2019-05-01 RX ORDER — ACETAMINOPHEN 325 MG/1
975 TABLET ORAL EVERY 8 HOURS SCHEDULED
Status: DISCONTINUED | OUTPATIENT
Start: 2019-05-01 | End: 2019-05-07

## 2019-05-01 RX ORDER — ATORVASTATIN CALCIUM 40 MG/1
40 TABLET, FILM COATED ORAL
COMMUNITY
End: 2019-05-31 | Stop reason: SDUPTHER

## 2019-05-01 RX ORDER — DIAZEPAM 5 MG/1
5 TABLET ORAL 3 TIMES DAILY
COMMUNITY
End: 2019-05-07 | Stop reason: HOSPADM

## 2019-05-01 RX ORDER — ASPIRIN 81 MG/1
81 TABLET ORAL DAILY
Status: DISCONTINUED | OUTPATIENT
Start: 2019-05-01 | End: 2019-05-07 | Stop reason: HOSPADM

## 2019-05-01 RX ORDER — CLOPIDOGREL BISULFATE 75 MG/1
75 TABLET ORAL DAILY
COMMUNITY
End: 2019-05-31 | Stop reason: SDUPTHER

## 2019-05-01 RX ORDER — PANTOPRAZOLE SODIUM 40 MG/1
40 TABLET, DELAYED RELEASE ORAL DAILY
COMMUNITY
End: 2019-05-31 | Stop reason: SDUPTHER

## 2019-05-01 RX ORDER — LEVOTHYROXINE SODIUM 0.07 MG/1
75 TABLET ORAL
Status: DISCONTINUED | OUTPATIENT
Start: 2019-05-01 | End: 2019-05-07 | Stop reason: HOSPADM

## 2019-05-01 RX ORDER — ALBUTEROL SULFATE 2.5 MG/3ML
2.5 SOLUTION RESPIRATORY (INHALATION) EVERY 4 HOURS PRN
Status: DISCONTINUED | OUTPATIENT
Start: 2019-05-01 | End: 2019-05-07 | Stop reason: HOSPADM

## 2019-05-01 RX ORDER — LEVOTHYROXINE SODIUM 0.07 MG/1
75 TABLET ORAL DAILY
COMMUNITY
End: 2019-05-31 | Stop reason: SDUPTHER

## 2019-05-01 RX ORDER — AMOXICILLIN 250 MG
1 CAPSULE ORAL
Status: DISCONTINUED | OUTPATIENT
Start: 2019-05-01 | End: 2019-05-07 | Stop reason: HOSPADM

## 2019-05-01 RX ORDER — ACETAMINOPHEN 325 MG/1
650 TABLET ORAL EVERY 6 HOURS PRN
Status: DISCONTINUED | OUTPATIENT
Start: 2019-05-01 | End: 2019-05-01

## 2019-05-01 RX ORDER — AMLODIPINE BESYLATE 5 MG/1
5 TABLET ORAL DAILY
Status: DISCONTINUED | OUTPATIENT
Start: 2019-05-01 | End: 2019-05-07 | Stop reason: HOSPADM

## 2019-05-01 RX ADMIN — IODIXANOL 85 ML: 320 INJECTION, SOLUTION INTRAVASCULAR at 11:53

## 2019-05-01 RX ADMIN — METOPROLOL TARTRATE 12.5 MG: 25 TABLET ORAL at 08:21

## 2019-05-01 RX ADMIN — ENOXAPARIN SODIUM 30 MG: 30 INJECTION SUBCUTANEOUS at 12:20

## 2019-05-01 RX ADMIN — ACETAMINOPHEN 650 MG: 325 TABLET ORAL at 05:06

## 2019-05-01 RX ADMIN — ACETAMINOPHEN 975 MG: 325 TABLET, FILM COATED ORAL at 14:54

## 2019-05-01 RX ADMIN — AMLODIPINE BESYLATE 5 MG: 5 TABLET ORAL at 08:21

## 2019-05-01 RX ADMIN — ASPIRIN 81 MG: 81 TABLET, COATED ORAL at 08:21

## 2019-05-01 RX ADMIN — LEVOTHYROXINE SODIUM 75 MCG: 75 TABLET ORAL at 08:21

## 2019-05-01 RX ADMIN — PANTOPRAZOLE SODIUM 40 MG: 40 TABLET, DELAYED RELEASE ORAL at 08:21

## 2019-05-01 RX ADMIN — CLOPIDOGREL BISULFATE 75 MG: 75 TABLET ORAL at 08:21

## 2019-05-01 RX ADMIN — VILAZODONE HYDROCHLORIDE 40 MG: 40 TABLET ORAL at 08:22

## 2019-05-01 RX ADMIN — ONDANSETRON 4 MG: 4 TABLET, ORALLY DISINTEGRATING ORAL at 14:54

## 2019-05-02 ENCOUNTER — APPOINTMENT (INPATIENT)
Dept: RADIOLOGY | Facility: HOSPITAL | Age: 84
DRG: 551 | End: 2019-05-02
Payer: COMMERCIAL

## 2019-05-02 LAB
ANION GAP SERPL CALCULATED.3IONS-SCNC: -3 MMOL/L (ref 4–13)
ARTERIAL PATENCY WRIST A: YES
BASE EXCESS BLDA CALC-SCNC: 10 MMOL/L
BASE EXCESS BLDA CALC-SCNC: 10.2 MMOL/L
BASE EXCESS BLDA CALC-SCNC: 10.2 MMOL/L
BASE EXCESS BLDA CALC-SCNC: 11.7 MMOL/L
BASE EXCESS BLDA CALC-SCNC: 13.7 MMOL/L
BASOPHILS # BLD AUTO: 0.04 THOUSANDS/ΜL (ref 0–0.1)
BASOPHILS NFR BLD AUTO: 1 % (ref 0–1)
BUN SERPL-MCNC: 19 MG/DL (ref 5–25)
CALCIUM SERPL-MCNC: 8.3 MG/DL (ref 8.3–10.1)
CHLORIDE SERPL-SCNC: 104 MMOL/L (ref 100–108)
CO2 SERPL-SCNC: 42 MMOL/L (ref 21–32)
CREAT SERPL-MCNC: 0.99 MG/DL (ref 0.6–1.3)
EOSINOPHIL # BLD AUTO: 0.21 THOUSAND/ΜL (ref 0–0.61)
EOSINOPHIL NFR BLD AUTO: 4 % (ref 0–6)
ERYTHROCYTE [DISTWIDTH] IN BLOOD BY AUTOMATED COUNT: 14.2 % (ref 11.6–15.1)
GFR SERPL CREATININE-BSD FRML MDRD: 53 ML/MIN/1.73SQ M
GLUCOSE SERPL-MCNC: 103 MG/DL (ref 65–140)
GLUCOSE SERPL-MCNC: 80 MG/DL (ref 65–140)
GLUCOSE SERPL-MCNC: 87 MG/DL (ref 65–140)
GLUCOSE SERPL-MCNC: 90 MG/DL (ref 65–140)
HCO3 BLDA-SCNC: 37.2 MMOL/L (ref 22–28)
HCO3 BLDA-SCNC: 40.6 MMOL/L (ref 22–28)
HCO3 BLDA-SCNC: 41.2 MMOL/L (ref 22–28)
HCO3 BLDA-SCNC: 41.4 MMOL/L (ref 22–28)
HCO3 BLDA-SCNC: 44 MMOL/L (ref 22–28)
HCT VFR BLD AUTO: 46.5 % (ref 34.8–46.1)
HGB BLD-MCNC: 13 G/DL (ref 11.5–15.4)
IMM GRANULOCYTES # BLD AUTO: 0.02 THOUSAND/UL (ref 0–0.2)
IMM GRANULOCYTES NFR BLD AUTO: 0 % (ref 0–2)
IPAP: 18
IPAP: 20
IPAP: 22
LYMPHOCYTES # BLD AUTO: 1.15 THOUSANDS/ΜL (ref 0.6–4.47)
LYMPHOCYTES NFR BLD AUTO: 19 % (ref 14–44)
MAGNESIUM SERPL-MCNC: 2 MG/DL (ref 1.6–2.6)
MCH RBC QN AUTO: 27.3 PG (ref 26.8–34.3)
MCHC RBC AUTO-ENTMCNC: 28 G/DL (ref 31.4–37.4)
MCV RBC AUTO: 98 FL (ref 82–98)
MONOCYTES # BLD AUTO: 0.66 THOUSAND/ΜL (ref 0.17–1.22)
MONOCYTES NFR BLD AUTO: 11 % (ref 4–12)
NEUTROPHILS # BLD AUTO: 3.91 THOUSANDS/ΜL (ref 1.85–7.62)
NEUTS SEG NFR BLD AUTO: 65 % (ref 43–75)
NON VENT- BIPAP: ABNORMAL
NRBC BLD AUTO-RTO: 0 /100 WBCS
O2 CT BLDA-SCNC: 16.9 ML/DL (ref 16–23)
O2 CT BLDA-SCNC: 17.7 ML/DL (ref 16–23)
O2 CT BLDA-SCNC: 18.6 ML/DL (ref 16–23)
OXYHGB MFR BLDA: 91.7 % (ref 94–97)
OXYHGB MFR BLDA: 93.6 % (ref 94–97)
OXYHGB MFR BLDA: 94.5 % (ref 94–97)
OXYHGB MFR BLDA: 95.6 % (ref 94–97)
OXYHGB MFR BLDA: 96.8 % (ref 94–97)
PCO2 BLDA: 60.3 MM HG (ref 36–44)
PCO2 BLDA: 83.4 MM HG (ref 36–44)
PCO2 BLDA: 89.2 MM HG (ref 36–44)
PCO2 BLDA: 91 MM HG (ref 36–44)
PCO2 BLDA: 95.7 MM HG (ref 36–44)
PEEP MAX SETTING VENT: 6 CM[H2O]
PH BLDA: 7.25 [PH] (ref 7.35–7.45)
PH BLDA: 7.27 [PH] (ref 7.35–7.45)
PH BLDA: 7.31 [PH] (ref 7.35–7.45)
PH BLDA: 7.31 [PH] (ref 7.35–7.45)
PH BLDA: 7.41 [PH] (ref 7.35–7.45)
PLATELET # BLD AUTO: 209 THOUSANDS/UL (ref 149–390)
PMV BLD AUTO: 10.9 FL (ref 8.9–12.7)
PO2 BLDA: 100.9 MM HG (ref 75–129)
PO2 BLDA: 126 MM HG (ref 75–129)
PO2 BLDA: 73.6 MM HG (ref 75–129)
PO2 BLDA: 79.5 MM HG (ref 75–129)
PO2 BLDA: 86 MM HG (ref 75–129)
POTASSIUM SERPL-SCNC: 4 MMOL/L (ref 3.5–5.3)
RBC # BLD AUTO: 4.76 MILLION/UL (ref 3.81–5.12)
SODIUM SERPL-SCNC: 143 MMOL/L (ref 136–145)
SPECIMEN SOURCE: ABNORMAL
VENT BIPAP FIO2: 35 %
VENT BIPAP FIO2: 40 %
VENT BIPAP FIO2: 40 %
VENT BIPAP FIO2: 45 %
VENT BIPAP FIO2: 45 %
WBC # BLD AUTO: 5.99 THOUSAND/UL (ref 4.31–10.16)

## 2019-05-02 PROCEDURE — 94660 CPAP INITIATION&MGMT: CPT

## 2019-05-02 PROCEDURE — 36600 WITHDRAWAL OF ARTERIAL BLOOD: CPT

## 2019-05-02 PROCEDURE — 94640 AIRWAY INHALATION TREATMENT: CPT

## 2019-05-02 PROCEDURE — 80048 BASIC METABOLIC PNL TOTAL CA: CPT | Performed by: PHYSICIAN ASSISTANT

## 2019-05-02 PROCEDURE — 99232 SBSQ HOSP IP/OBS MODERATE 35: CPT | Performed by: INTERNAL MEDICINE

## 2019-05-02 PROCEDURE — 82948 REAGENT STRIP/BLOOD GLUCOSE: CPT

## 2019-05-02 PROCEDURE — 83735 ASSAY OF MAGNESIUM: CPT | Performed by: PHYSICIAN ASSISTANT

## 2019-05-02 PROCEDURE — 99233 SBSQ HOSP IP/OBS HIGH 50: CPT | Performed by: SURGERY

## 2019-05-02 PROCEDURE — 82805 BLOOD GASES W/O2 SATURATION: CPT | Performed by: PHYSICIAN ASSISTANT

## 2019-05-02 PROCEDURE — 82805 BLOOD GASES W/O2 SATURATION: CPT | Performed by: EMERGENCY MEDICINE

## 2019-05-02 PROCEDURE — 94760 N-INVAS EAR/PLS OXIMETRY 1: CPT

## 2019-05-02 PROCEDURE — 99233 SBSQ HOSP IP/OBS HIGH 50: CPT | Performed by: INTERNAL MEDICINE

## 2019-05-02 PROCEDURE — 85025 COMPLETE CBC W/AUTO DIFF WBC: CPT | Performed by: PHYSICIAN ASSISTANT

## 2019-05-02 PROCEDURE — 71045 X-RAY EXAM CHEST 1 VIEW: CPT

## 2019-05-02 RX ORDER — LEVALBUTEROL 1.25 MG/.5ML
1.25 SOLUTION, CONCENTRATE RESPIRATORY (INHALATION) EVERY 6 HOURS PRN
Status: DISCONTINUED | OUTPATIENT
Start: 2019-05-02 | End: 2019-05-02

## 2019-05-02 RX ORDER — OLANZAPINE 10 MG/1
2.5 INJECTION, POWDER, LYOPHILIZED, FOR SOLUTION INTRAMUSCULAR EVERY 8 HOURS PRN
Status: DISCONTINUED | OUTPATIENT
Start: 2019-05-02 | End: 2019-05-07 | Stop reason: HOSPADM

## 2019-05-02 RX ORDER — ONDANSETRON 4 MG/1
4 TABLET, ORALLY DISINTEGRATING ORAL EVERY 8 HOURS PRN
Status: DISCONTINUED | OUTPATIENT
Start: 2019-05-02 | End: 2019-05-02

## 2019-05-02 RX ORDER — SODIUM CHLORIDE, SODIUM GLUCONATE, SODIUM ACETATE, POTASSIUM CHLORIDE, MAGNESIUM CHLORIDE, SODIUM PHOSPHATE, DIBASIC, AND POTASSIUM PHOSPHATE .53; .5; .37; .037; .03; .012; .00082 G/100ML; G/100ML; G/100ML; G/100ML; G/100ML; G/100ML; G/100ML
75 INJECTION, SOLUTION INTRAVENOUS CONTINUOUS
Status: DISCONTINUED | OUTPATIENT
Start: 2019-05-02 | End: 2019-05-02

## 2019-05-02 RX ORDER — LEVALBUTEROL INHALATION SOLUTION 0.63 MG/3ML
0.63 SOLUTION RESPIRATORY (INHALATION) 3 TIMES DAILY
COMMUNITY
End: 2019-05-07 | Stop reason: HOSPADM

## 2019-05-02 RX ORDER — ONDANSETRON 2 MG/ML
4 INJECTION INTRAMUSCULAR; INTRAVENOUS EVERY 8 HOURS PRN
Status: DISCONTINUED | OUTPATIENT
Start: 2019-05-02 | End: 2019-05-07 | Stop reason: HOSPADM

## 2019-05-02 RX ORDER — HYDRALAZINE HYDROCHLORIDE 20 MG/ML
5 INJECTION INTRAMUSCULAR; INTRAVENOUS EVERY 6 HOURS PRN
Status: DISCONTINUED | OUTPATIENT
Start: 2019-05-02 | End: 2019-05-07 | Stop reason: HOSPADM

## 2019-05-02 RX ORDER — FUROSEMIDE 10 MG/ML
20 INJECTION INTRAMUSCULAR; INTRAVENOUS ONCE
Status: COMPLETED | OUTPATIENT
Start: 2019-05-02 | End: 2019-05-02

## 2019-05-02 RX ORDER — LEVALBUTEROL 1.25 MG/.5ML
1.25 SOLUTION, CONCENTRATE RESPIRATORY (INHALATION)
Status: DISCONTINUED | OUTPATIENT
Start: 2019-05-02 | End: 2019-05-05

## 2019-05-02 RX ADMIN — IPRATROPIUM BROMIDE 0.5 MG: 0.5 SOLUTION RESPIRATORY (INHALATION) at 20:01

## 2019-05-02 RX ADMIN — ATORVASTATIN CALCIUM 40 MG: 40 TABLET, FILM COATED ORAL at 21:08

## 2019-05-02 RX ADMIN — SENNOSIDES AND DOCUSATE SODIUM 1 TABLET: 8.6; 5 TABLET ORAL at 21:07

## 2019-05-02 RX ADMIN — DIAZEPAM 2 MG: 2 TABLET ORAL at 21:07

## 2019-05-02 RX ADMIN — FUROSEMIDE 20 MG: 10 INJECTION, SOLUTION INTRAMUSCULAR; INTRAVENOUS at 13:26

## 2019-05-02 RX ADMIN — LEVALBUTEROL HYDROCHLORIDE 1.25 MG: 1.25 SOLUTION, CONCENTRATE RESPIRATORY (INHALATION) at 16:14

## 2019-05-02 RX ADMIN — ENOXAPARIN SODIUM 30 MG: 30 INJECTION SUBCUTANEOUS at 21:07

## 2019-05-02 RX ADMIN — ACETAMINOPHEN 975 MG: 325 TABLET, FILM COATED ORAL at 21:08

## 2019-05-02 RX ADMIN — ENOXAPARIN SODIUM 30 MG: 30 INJECTION SUBCUTANEOUS at 11:46

## 2019-05-02 RX ADMIN — SODIUM CHLORIDE, SODIUM GLUCONATE, SODIUM ACETATE, POTASSIUM CHLORIDE, MAGNESIUM CHLORIDE, SODIUM PHOSPHATE, DIBASIC, AND POTASSIUM PHOSPHATE 75 ML/HR: .53; .5; .37; .037; .03; .012; .00082 INJECTION, SOLUTION INTRAVENOUS at 09:55

## 2019-05-02 RX ADMIN — LEVALBUTEROL HYDROCHLORIDE 1.25 MG: 1.25 SOLUTION, CONCENTRATE RESPIRATORY (INHALATION) at 20:02

## 2019-05-02 RX ADMIN — IPRATROPIUM BROMIDE 0.5 MG: 0.5 SOLUTION RESPIRATORY (INHALATION) at 16:14

## 2019-05-03 ENCOUNTER — APPOINTMENT (INPATIENT)
Dept: RADIOLOGY | Facility: HOSPITAL | Age: 84
DRG: 551 | End: 2019-05-03
Payer: COMMERCIAL

## 2019-05-03 ENCOUNTER — TELEPHONE (OUTPATIENT)
Dept: NEUROSURGERY | Facility: CLINIC | Age: 84
End: 2019-05-03

## 2019-05-03 LAB
ANION GAP SERPL CALCULATED.3IONS-SCNC: 2 MMOL/L (ref 4–13)
ARTERIAL PATENCY WRIST A: YES
BASE EXCESS BLDA CALC-SCNC: 15.6 MMOL/L
BASE EXCESS BLDA CALC-SCNC: 16.9 MMOL/L
BASE EXCESS BLDA CALC-SCNC: 19.1 MMOL/L
BASOPHILS # BLD AUTO: 0.05 THOUSANDS/ΜL (ref 0–0.1)
BASOPHILS NFR BLD AUTO: 1 % (ref 0–1)
BUN SERPL-MCNC: 19 MG/DL (ref 5–25)
CALCIUM SERPL-MCNC: 8.4 MG/DL (ref 8.3–10.1)
CHLORIDE SERPL-SCNC: 97 MMOL/L (ref 100–108)
CO2 SERPL-SCNC: 43 MMOL/L (ref 21–32)
CREAT SERPL-MCNC: 0.92 MG/DL (ref 0.6–1.3)
EOSINOPHIL # BLD AUTO: 0.2 THOUSAND/ΜL (ref 0–0.61)
EOSINOPHIL NFR BLD AUTO: 4 % (ref 0–6)
ERYTHROCYTE [DISTWIDTH] IN BLOOD BY AUTOMATED COUNT: 14.2 % (ref 11.6–15.1)
GFR SERPL CREATININE-BSD FRML MDRD: 58 ML/MIN/1.73SQ M
GLUCOSE SERPL-MCNC: 111 MG/DL (ref 65–140)
GLUCOSE SERPL-MCNC: 115 MG/DL (ref 65–140)
GLUCOSE SERPL-MCNC: 79 MG/DL (ref 65–140)
HCO3 BLDA-SCNC: 44.2 MMOL/L (ref 22–28)
HCO3 BLDA-SCNC: 45.4 MMOL/L (ref 22–28)
HCO3 BLDA-SCNC: 48.1 MMOL/L (ref 22–28)
HCT VFR BLD AUTO: 45.5 % (ref 34.8–46.1)
HGB BLD-MCNC: 12.8 G/DL (ref 11.5–15.4)
IMM GRANULOCYTES # BLD AUTO: 0.02 THOUSAND/UL (ref 0–0.2)
IMM GRANULOCYTES NFR BLD AUTO: 0 % (ref 0–2)
IPAP: 18
LYMPHOCYTES # BLD AUTO: 1.13 THOUSANDS/ΜL (ref 0.6–4.47)
LYMPHOCYTES NFR BLD AUTO: 20 % (ref 14–44)
MCH RBC QN AUTO: 26.8 PG (ref 26.8–34.3)
MCHC RBC AUTO-ENTMCNC: 28.1 G/DL (ref 31.4–37.4)
MCV RBC AUTO: 95 FL (ref 82–98)
MONOCYTES # BLD AUTO: 0.7 THOUSAND/ΜL (ref 0.17–1.22)
MONOCYTES NFR BLD AUTO: 12 % (ref 4–12)
NASAL CANNULA: 3
NASAL CANNULA: 3
NEUTROPHILS # BLD AUTO: 3.66 THOUSANDS/ΜL (ref 1.85–7.62)
NEUTS SEG NFR BLD AUTO: 63 % (ref 43–75)
NON VENT- BIPAP: ABNORMAL
NRBC BLD AUTO-RTO: 0 /100 WBCS
O2 CT BLDA-SCNC: 16.9 ML/DL (ref 16–23)
O2 CT BLDA-SCNC: 17.2 ML/DL (ref 16–23)
O2 CT BLDA-SCNC: 18.3 ML/DL (ref 16–23)
OXYHGB MFR BLDA: 89 % (ref 94–97)
OXYHGB MFR BLDA: 92.3 % (ref 94–97)
OXYHGB MFR BLDA: 93.7 % (ref 94–97)
PCO2 BLDA: 73.8 MM HG (ref 36–44)
PCO2 BLDA: 74.9 MM HG (ref 36–44)
PCO2 BLDA: 76.4 MM HG (ref 36–44)
PEEP MAX SETTING VENT: 6 CM[H2O]
PH BLDA: 7.39 [PH] (ref 7.35–7.45)
PH BLDA: 7.41 [PH] (ref 7.35–7.45)
PH BLDA: 7.42 [PH] (ref 7.35–7.45)
PHOSPHATE SERPL-MCNC: 2.5 MG/DL (ref 2.3–4.1)
PLATELET # BLD AUTO: 203 THOUSANDS/UL (ref 149–390)
PMV BLD AUTO: 10.4 FL (ref 8.9–12.7)
PO2 BLDA: 61.7 MM HG (ref 75–129)
PO2 BLDA: 70.1 MM HG (ref 75–129)
PO2 BLDA: 77.9 MM HG (ref 75–129)
POTASSIUM SERPL-SCNC: 4.2 MMOL/L (ref 3.5–5.3)
RBC # BLD AUTO: 4.78 MILLION/UL (ref 3.81–5.12)
SODIUM SERPL-SCNC: 142 MMOL/L (ref 136–145)
SPECIMEN SOURCE: ABNORMAL
VENT BIPAP FIO2: 40 %
WBC # BLD AUTO: 5.76 THOUSAND/UL (ref 4.31–10.16)

## 2019-05-03 PROCEDURE — 94760 N-INVAS EAR/PLS OXIMETRY 1: CPT

## 2019-05-03 PROCEDURE — 80048 BASIC METABOLIC PNL TOTAL CA: CPT | Performed by: PHYSICIAN ASSISTANT

## 2019-05-03 PROCEDURE — 82805 BLOOD GASES W/O2 SATURATION: CPT | Performed by: PHYSICIAN ASSISTANT

## 2019-05-03 PROCEDURE — 36600 WITHDRAWAL OF ARTERIAL BLOOD: CPT

## 2019-05-03 PROCEDURE — 97116 GAIT TRAINING THERAPY: CPT

## 2019-05-03 PROCEDURE — 97535 SELF CARE MNGMENT TRAINING: CPT

## 2019-05-03 PROCEDURE — 82948 REAGENT STRIP/BLOOD GLUCOSE: CPT

## 2019-05-03 PROCEDURE — 85025 COMPLETE CBC W/AUTO DIFF WBC: CPT | Performed by: PHYSICIAN ASSISTANT

## 2019-05-03 PROCEDURE — 97530 THERAPEUTIC ACTIVITIES: CPT

## 2019-05-03 PROCEDURE — 94640 AIRWAY INHALATION TREATMENT: CPT

## 2019-05-03 PROCEDURE — 71045 X-RAY EXAM CHEST 1 VIEW: CPT

## 2019-05-03 PROCEDURE — 99232 SBSQ HOSP IP/OBS MODERATE 35: CPT | Performed by: SURGERY

## 2019-05-03 PROCEDURE — 99232 SBSQ HOSP IP/OBS MODERATE 35: CPT | Performed by: INTERNAL MEDICINE

## 2019-05-03 PROCEDURE — 84100 ASSAY OF PHOSPHORUS: CPT | Performed by: PHYSICIAN ASSISTANT

## 2019-05-03 PROCEDURE — 82805 BLOOD GASES W/O2 SATURATION: CPT | Performed by: OBSTETRICS & GYNECOLOGY

## 2019-05-03 PROCEDURE — 94660 CPAP INITIATION&MGMT: CPT

## 2019-05-03 RX ORDER — FUROSEMIDE 10 MG/ML
20 INJECTION INTRAMUSCULAR; INTRAVENOUS ONCE
Status: COMPLETED | OUTPATIENT
Start: 2019-05-03 | End: 2019-05-03

## 2019-05-03 RX ADMIN — LEVALBUTEROL HYDROCHLORIDE 1.25 MG: 1.25 SOLUTION, CONCENTRATE RESPIRATORY (INHALATION) at 01:01

## 2019-05-03 RX ADMIN — LEVALBUTEROL HYDROCHLORIDE 1.25 MG: 1.25 SOLUTION, CONCENTRATE RESPIRATORY (INHALATION) at 07:34

## 2019-05-03 RX ADMIN — ACETAMINOPHEN 975 MG: 325 TABLET, FILM COATED ORAL at 13:23

## 2019-05-03 RX ADMIN — IPRATROPIUM BROMIDE 0.5 MG: 0.5 SOLUTION RESPIRATORY (INHALATION) at 01:01

## 2019-05-03 RX ADMIN — AMLODIPINE BESYLATE 5 MG: 5 TABLET ORAL at 08:56

## 2019-05-03 RX ADMIN — DIAZEPAM 2 MG: 2 TABLET ORAL at 21:53

## 2019-05-03 RX ADMIN — CLOPIDOGREL BISULFATE 75 MG: 75 TABLET ORAL at 08:55

## 2019-05-03 RX ADMIN — IPRATROPIUM BROMIDE 0.5 MG: 0.5 SOLUTION RESPIRATORY (INHALATION) at 20:16

## 2019-05-03 RX ADMIN — ACETAMINOPHEN 975 MG: 325 TABLET, FILM COATED ORAL at 21:53

## 2019-05-03 RX ADMIN — VILAZODONE HYDROCHLORIDE 40 MG: 40 TABLET ORAL at 08:56

## 2019-05-03 RX ADMIN — METOPROLOL TARTRATE 12.5 MG: 25 TABLET ORAL at 08:55

## 2019-05-03 RX ADMIN — ENOXAPARIN SODIUM 30 MG: 30 INJECTION SUBCUTANEOUS at 08:56

## 2019-05-03 RX ADMIN — ATORVASTATIN CALCIUM 40 MG: 40 TABLET, FILM COATED ORAL at 21:53

## 2019-05-03 RX ADMIN — PANTOPRAZOLE SODIUM 40 MG: 40 TABLET, DELAYED RELEASE ORAL at 06:10

## 2019-05-03 RX ADMIN — LEVALBUTEROL HYDROCHLORIDE 1.25 MG: 1.25 SOLUTION, CONCENTRATE RESPIRATORY (INHALATION) at 20:15

## 2019-05-03 RX ADMIN — SENNOSIDES AND DOCUSATE SODIUM 1 TABLET: 8.6; 5 TABLET ORAL at 21:53

## 2019-05-03 RX ADMIN — IPRATROPIUM BROMIDE 0.5 MG: 0.5 SOLUTION RESPIRATORY (INHALATION) at 07:34

## 2019-05-03 RX ADMIN — IPRATROPIUM BROMIDE 0.5 MG: 0.5 SOLUTION RESPIRATORY (INHALATION) at 14:22

## 2019-05-03 RX ADMIN — LEVALBUTEROL HYDROCHLORIDE 1.25 MG: 1.25 SOLUTION, CONCENTRATE RESPIRATORY (INHALATION) at 14:22

## 2019-05-03 RX ADMIN — LEVOTHYROXINE SODIUM 75 MCG: 75 TABLET ORAL at 06:10

## 2019-05-03 RX ADMIN — FUROSEMIDE 20 MG: 10 INJECTION, SOLUTION INTRAMUSCULAR; INTRAVENOUS at 08:56

## 2019-05-03 RX ADMIN — ASPIRIN 81 MG: 81 TABLET, COATED ORAL at 08:56

## 2019-05-03 RX ADMIN — ACETAMINOPHEN 975 MG: 325 TABLET, FILM COATED ORAL at 06:10

## 2019-05-04 LAB
ANION GAP SERPL CALCULATED.3IONS-SCNC: -1 MMOL/L (ref 4–13)
ARTERIAL PATENCY WRIST A: YES
BASE EXCESS BLDA CALC-SCNC: 15.9 MMOL/L
BASOPHILS # BLD AUTO: 0.04 THOUSANDS/ΜL (ref 0–0.1)
BASOPHILS NFR BLD AUTO: 1 % (ref 0–1)
BUN SERPL-MCNC: 25 MG/DL (ref 5–25)
CA-I BLD-SCNC: 1.11 MMOL/L (ref 1.12–1.32)
CALCIUM SERPL-MCNC: 8.5 MG/DL (ref 8.3–10.1)
CHLORIDE SERPL-SCNC: 95 MMOL/L (ref 100–108)
CO2 SERPL-SCNC: 44 MMOL/L (ref 21–32)
CREAT SERPL-MCNC: 1.12 MG/DL (ref 0.6–1.3)
EOSINOPHIL # BLD AUTO: 0.27 THOUSAND/ΜL (ref 0–0.61)
EOSINOPHIL NFR BLD AUTO: 4 % (ref 0–6)
ERYTHROCYTE [DISTWIDTH] IN BLOOD BY AUTOMATED COUNT: 14.4 % (ref 11.6–15.1)
GFR SERPL CREATININE-BSD FRML MDRD: 46 ML/MIN/1.73SQ M
GLUCOSE SERPL-MCNC: 113 MG/DL (ref 65–140)
GLUCOSE SERPL-MCNC: 115 MG/DL (ref 65–140)
GLUCOSE SERPL-MCNC: 120 MG/DL (ref 65–140)
GLUCOSE SERPL-MCNC: 120 MG/DL (ref 65–140)
GLUCOSE SERPL-MCNC: 154 MG/DL (ref 65–140)
HCO3 BLDA-SCNC: 43.9 MMOL/L (ref 22–28)
HCT VFR BLD AUTO: 44.7 % (ref 34.8–46.1)
HGB BLD-MCNC: 12.9 G/DL (ref 11.5–15.4)
IMM GRANULOCYTES # BLD AUTO: 0.02 THOUSAND/UL (ref 0–0.2)
IMM GRANULOCYTES NFR BLD AUTO: 0 % (ref 0–2)
IPAP: 18
LYMPHOCYTES # BLD AUTO: 1.08 THOUSANDS/ΜL (ref 0.6–4.47)
LYMPHOCYTES NFR BLD AUTO: 16 % (ref 14–44)
MAGNESIUM SERPL-MCNC: 2 MG/DL (ref 1.6–2.6)
MCH RBC QN AUTO: 26.8 PG (ref 26.8–34.3)
MCHC RBC AUTO-ENTMCNC: 28.9 G/DL (ref 31.4–37.4)
MCV RBC AUTO: 93 FL (ref 82–98)
MONOCYTES # BLD AUTO: 0.76 THOUSAND/ΜL (ref 0.17–1.22)
MONOCYTES NFR BLD AUTO: 11 % (ref 4–12)
NEUTROPHILS # BLD AUTO: 4.54 THOUSANDS/ΜL (ref 1.85–7.62)
NEUTS SEG NFR BLD AUTO: 68 % (ref 43–75)
NON VENT- BIPAP: ABNORMAL
NRBC BLD AUTO-RTO: 0 /100 WBCS
O2 CT BLDA-SCNC: 18.3 ML/DL (ref 16–23)
OXYHGB MFR BLDA: 94.7 % (ref 94–97)
PCO2 BLDA: 69.4 MM HG (ref 36–44)
PEEP MAX SETTING VENT: 6 CM[H2O]
PH BLDA: 7.42 [PH] (ref 7.35–7.45)
PHOSPHATE SERPL-MCNC: 2.3 MG/DL (ref 2.3–4.1)
PLATELET # BLD AUTO: 224 THOUSANDS/UL (ref 149–390)
PMV BLD AUTO: 10.6 FL (ref 8.9–12.7)
PO2 BLDA: 87.4 MM HG (ref 75–129)
POTASSIUM SERPL-SCNC: 3.3 MMOL/L (ref 3.5–5.3)
RBC # BLD AUTO: 4.81 MILLION/UL (ref 3.81–5.12)
SODIUM SERPL-SCNC: 138 MMOL/L (ref 136–145)
SPECIMEN SOURCE: ABNORMAL
VENT BIPAP FIO2: 40 %
WBC # BLD AUTO: 6.71 THOUSAND/UL (ref 4.31–10.16)

## 2019-05-04 PROCEDURE — 36600 WITHDRAWAL OF ARTERIAL BLOOD: CPT

## 2019-05-04 PROCEDURE — 84100 ASSAY OF PHOSPHORUS: CPT | Performed by: OBSTETRICS & GYNECOLOGY

## 2019-05-04 PROCEDURE — 80048 BASIC METABOLIC PNL TOTAL CA: CPT | Performed by: OBSTETRICS & GYNECOLOGY

## 2019-05-04 PROCEDURE — 99232 SBSQ HOSP IP/OBS MODERATE 35: CPT | Performed by: INTERNAL MEDICINE

## 2019-05-04 PROCEDURE — 99232 SBSQ HOSP IP/OBS MODERATE 35: CPT | Performed by: SURGERY

## 2019-05-04 PROCEDURE — 82330 ASSAY OF CALCIUM: CPT | Performed by: OBSTETRICS & GYNECOLOGY

## 2019-05-04 PROCEDURE — 85025 COMPLETE CBC W/AUTO DIFF WBC: CPT | Performed by: OBSTETRICS & GYNECOLOGY

## 2019-05-04 PROCEDURE — 94640 AIRWAY INHALATION TREATMENT: CPT

## 2019-05-04 PROCEDURE — 82948 REAGENT STRIP/BLOOD GLUCOSE: CPT

## 2019-05-04 PROCEDURE — 83735 ASSAY OF MAGNESIUM: CPT | Performed by: OBSTETRICS & GYNECOLOGY

## 2019-05-04 PROCEDURE — 82805 BLOOD GASES W/O2 SATURATION: CPT | Performed by: OBSTETRICS & GYNECOLOGY

## 2019-05-04 PROCEDURE — 97116 GAIT TRAINING THERAPY: CPT

## 2019-05-04 PROCEDURE — 97530 THERAPEUTIC ACTIVITIES: CPT

## 2019-05-04 PROCEDURE — 94760 N-INVAS EAR/PLS OXIMETRY 1: CPT

## 2019-05-04 RX ADMIN — ENOXAPARIN SODIUM 30 MG: 30 INJECTION SUBCUTANEOUS at 08:11

## 2019-05-04 RX ADMIN — LEVALBUTEROL HYDROCHLORIDE 1.25 MG: 1.25 SOLUTION, CONCENTRATE RESPIRATORY (INHALATION) at 00:38

## 2019-05-04 RX ADMIN — ASPIRIN 81 MG: 81 TABLET, COATED ORAL at 08:11

## 2019-05-04 RX ADMIN — ACETAMINOPHEN 975 MG: 325 TABLET, FILM COATED ORAL at 05:39

## 2019-05-04 RX ADMIN — ATORVASTATIN CALCIUM 40 MG: 40 TABLET, FILM COATED ORAL at 21:22

## 2019-05-04 RX ADMIN — VILAZODONE HYDROCHLORIDE 40 MG: 40 TABLET ORAL at 11:21

## 2019-05-04 RX ADMIN — DIAZEPAM 2 MG: 2 TABLET ORAL at 21:22

## 2019-05-04 RX ADMIN — IPRATROPIUM BROMIDE 0.5 MG: 0.5 SOLUTION RESPIRATORY (INHALATION) at 00:37

## 2019-05-04 RX ADMIN — SENNOSIDES AND DOCUSATE SODIUM 1 TABLET: 8.6; 5 TABLET ORAL at 21:22

## 2019-05-04 RX ADMIN — ENOXAPARIN SODIUM 30 MG: 30 INJECTION SUBCUTANEOUS at 21:22

## 2019-05-04 RX ADMIN — LEVALBUTEROL HYDROCHLORIDE 1.25 MG: 1.25 SOLUTION, CONCENTRATE RESPIRATORY (INHALATION) at 08:39

## 2019-05-04 RX ADMIN — LEVOTHYROXINE SODIUM 75 MCG: 75 TABLET ORAL at 05:39

## 2019-05-04 RX ADMIN — CLOPIDOGREL BISULFATE 75 MG: 75 TABLET ORAL at 08:11

## 2019-05-04 RX ADMIN — METOPROLOL TARTRATE 12.5 MG: 25 TABLET ORAL at 08:11

## 2019-05-04 RX ADMIN — IPRATROPIUM BROMIDE 0.5 MG: 0.5 SOLUTION RESPIRATORY (INHALATION) at 19:37

## 2019-05-04 RX ADMIN — LEVALBUTEROL HYDROCHLORIDE 1.25 MG: 1.25 SOLUTION, CONCENTRATE RESPIRATORY (INHALATION) at 13:58

## 2019-05-04 RX ADMIN — ACETAMINOPHEN 975 MG: 325 TABLET, FILM COATED ORAL at 21:22

## 2019-05-04 RX ADMIN — PANTOPRAZOLE SODIUM 40 MG: 40 TABLET, DELAYED RELEASE ORAL at 05:39

## 2019-05-04 RX ADMIN — LEVALBUTEROL HYDROCHLORIDE 1.25 MG: 1.25 SOLUTION, CONCENTRATE RESPIRATORY (INHALATION) at 19:37

## 2019-05-04 RX ADMIN — IPRATROPIUM BROMIDE 0.5 MG: 0.5 SOLUTION RESPIRATORY (INHALATION) at 13:58

## 2019-05-04 RX ADMIN — IPRATROPIUM BROMIDE 0.5 MG: 0.5 SOLUTION RESPIRATORY (INHALATION) at 08:39

## 2019-05-04 RX ADMIN — AMLODIPINE BESYLATE 5 MG: 5 TABLET ORAL at 08:11

## 2019-05-05 LAB
ARTERIAL PATENCY WRIST A: YES
BASE EXCESS BLDA CALC-SCNC: 15.6 MMOL/L
GLUCOSE SERPL-MCNC: 125 MG/DL (ref 65–140)
GLUCOSE SERPL-MCNC: 224 MG/DL (ref 65–140)
HCO3 BLDA-SCNC: 44.6 MMOL/L (ref 22–28)
NASAL CANNULA: 2
O2 CT BLDA-SCNC: 17.5 ML/DL (ref 16–23)
OXYHGB MFR BLDA: 88.5 % (ref 94–97)
PCO2 BLDA: 75.7 MM HG (ref 36–44)
PH BLDA: 7.39 [PH] (ref 7.35–7.45)
PO2 BLDA: 60.2 MM HG (ref 75–129)
SPECIMEN SOURCE: ABNORMAL

## 2019-05-05 PROCEDURE — 97535 SELF CARE MNGMENT TRAINING: CPT

## 2019-05-05 PROCEDURE — 94760 N-INVAS EAR/PLS OXIMETRY 1: CPT | Performed by: SOCIAL WORKER

## 2019-05-05 PROCEDURE — 94762 N-INVAS EAR/PLS OXIMTRY CONT: CPT

## 2019-05-05 PROCEDURE — 94640 AIRWAY INHALATION TREATMENT: CPT | Performed by: SOCIAL WORKER

## 2019-05-05 PROCEDURE — 99232 SBSQ HOSP IP/OBS MODERATE 35: CPT | Performed by: INTERNAL MEDICINE

## 2019-05-05 PROCEDURE — 97530 THERAPEUTIC ACTIVITIES: CPT

## 2019-05-05 PROCEDURE — 82948 REAGENT STRIP/BLOOD GLUCOSE: CPT

## 2019-05-05 PROCEDURE — 99232 SBSQ HOSP IP/OBS MODERATE 35: CPT | Performed by: SURGERY

## 2019-05-05 PROCEDURE — 82805 BLOOD GASES W/O2 SATURATION: CPT | Performed by: INTERNAL MEDICINE

## 2019-05-05 PROCEDURE — 36600 WITHDRAWAL OF ARTERIAL BLOOD: CPT

## 2019-05-05 RX ORDER — LEVALBUTEROL 1.25 MG/.5ML
1.25 SOLUTION, CONCENTRATE RESPIRATORY (INHALATION) EVERY 6 HOURS PRN
Status: DISCONTINUED | OUTPATIENT
Start: 2019-05-05 | End: 2019-05-07 | Stop reason: HOSPADM

## 2019-05-05 RX ADMIN — PANTOPRAZOLE SODIUM 40 MG: 40 TABLET, DELAYED RELEASE ORAL at 06:17

## 2019-05-05 RX ADMIN — LEVOTHYROXINE SODIUM 75 MCG: 75 TABLET ORAL at 06:17

## 2019-05-05 RX ADMIN — ENOXAPARIN SODIUM 30 MG: 30 INJECTION SUBCUTANEOUS at 08:45

## 2019-05-05 RX ADMIN — IPRATROPIUM BROMIDE 0.5 MG: 0.5 SOLUTION RESPIRATORY (INHALATION) at 08:18

## 2019-05-05 RX ADMIN — ACETAMINOPHEN 975 MG: 325 TABLET, FILM COATED ORAL at 14:16

## 2019-05-05 RX ADMIN — ASPIRIN 81 MG: 81 TABLET, COATED ORAL at 08:45

## 2019-05-05 RX ADMIN — METOPROLOL TARTRATE 12.5 MG: 25 TABLET ORAL at 08:45

## 2019-05-05 RX ADMIN — ACETAMINOPHEN 975 MG: 325 TABLET, FILM COATED ORAL at 22:37

## 2019-05-05 RX ADMIN — ACETAMINOPHEN 975 MG: 325 TABLET, FILM COATED ORAL at 06:16

## 2019-05-05 RX ADMIN — SENNOSIDES AND DOCUSATE SODIUM 1 TABLET: 8.6; 5 TABLET ORAL at 22:37

## 2019-05-05 RX ADMIN — CLOPIDOGREL BISULFATE 75 MG: 75 TABLET ORAL at 08:45

## 2019-05-05 RX ADMIN — LEVALBUTEROL HYDROCHLORIDE 1.25 MG: 1.25 SOLUTION, CONCENTRATE RESPIRATORY (INHALATION) at 08:18

## 2019-05-05 RX ADMIN — ENOXAPARIN SODIUM 30 MG: 30 INJECTION SUBCUTANEOUS at 22:35

## 2019-05-05 RX ADMIN — ATORVASTATIN CALCIUM 40 MG: 40 TABLET, FILM COATED ORAL at 22:37

## 2019-05-05 RX ADMIN — VILAZODONE HYDROCHLORIDE 40 MG: 40 TABLET ORAL at 08:48

## 2019-05-05 RX ADMIN — DIAZEPAM 2 MG: 2 TABLET ORAL at 22:37

## 2019-05-05 RX ADMIN — AMLODIPINE BESYLATE 5 MG: 5 TABLET ORAL at 08:45

## 2019-05-06 PROBLEM — R11.0 NAUSEA: Status: RESOLVED | Noted: 2019-05-01 | Resolved: 2019-05-06

## 2019-05-06 LAB
ARTERIAL PATENCY WRIST A: YES
BASE EXCESS BLDA CALC-SCNC: 9.9 MMOL/L
HCO3 BLDA-SCNC: 37 MMOL/L (ref 22–28)
NASAL CANNULA: 5
O2 CT BLDA-SCNC: 19 ML/DL (ref 16–23)
OXYHGB MFR BLDA: 94.3 % (ref 94–97)
PCO2 BLDA: 60.6 MM HG (ref 36–44)
PH BLDA: 7.4 [PH] (ref 7.35–7.45)
PO2 BLDA: 87.6 MM HG (ref 75–129)
SPECIMEN SOURCE: ABNORMAL

## 2019-05-06 PROCEDURE — 97535 SELF CARE MNGMENT TRAINING: CPT

## 2019-05-06 PROCEDURE — 97116 GAIT TRAINING THERAPY: CPT

## 2019-05-06 PROCEDURE — 36600 WITHDRAWAL OF ARTERIAL BLOOD: CPT

## 2019-05-06 PROCEDURE — 99232 SBSQ HOSP IP/OBS MODERATE 35: CPT | Performed by: PHYSICIAN ASSISTANT

## 2019-05-06 PROCEDURE — 82805 BLOOD GASES W/O2 SATURATION: CPT | Performed by: PHYSICIAN ASSISTANT

## 2019-05-06 PROCEDURE — 94760 N-INVAS EAR/PLS OXIMETRY 1: CPT

## 2019-05-06 PROCEDURE — 99232 SBSQ HOSP IP/OBS MODERATE 35: CPT | Performed by: INTERNAL MEDICINE

## 2019-05-06 RX ADMIN — ACETAMINOPHEN 975 MG: 325 TABLET, FILM COATED ORAL at 21:45

## 2019-05-06 RX ADMIN — LEVOTHYROXINE SODIUM 75 MCG: 75 TABLET ORAL at 05:49

## 2019-05-06 RX ADMIN — ATORVASTATIN CALCIUM 40 MG: 40 TABLET, FILM COATED ORAL at 21:46

## 2019-05-06 RX ADMIN — ENOXAPARIN SODIUM 30 MG: 30 INJECTION SUBCUTANEOUS at 21:46

## 2019-05-06 RX ADMIN — PANTOPRAZOLE SODIUM 40 MG: 40 TABLET, DELAYED RELEASE ORAL at 05:49

## 2019-05-06 RX ADMIN — AMLODIPINE BESYLATE 5 MG: 5 TABLET ORAL at 08:12

## 2019-05-06 RX ADMIN — METOPROLOL TARTRATE 12.5 MG: 25 TABLET ORAL at 08:12

## 2019-05-06 RX ADMIN — ACETAMINOPHEN 975 MG: 325 TABLET, FILM COATED ORAL at 05:49

## 2019-05-06 RX ADMIN — DIAZEPAM 2 MG: 2 TABLET ORAL at 21:53

## 2019-05-06 RX ADMIN — ACETAMINOPHEN 975 MG: 325 TABLET, FILM COATED ORAL at 13:40

## 2019-05-06 RX ADMIN — VILAZODONE HYDROCHLORIDE 40 MG: 40 TABLET ORAL at 08:13

## 2019-05-06 RX ADMIN — ASPIRIN 81 MG: 81 TABLET, COATED ORAL at 08:12

## 2019-05-06 RX ADMIN — CLOPIDOGREL BISULFATE 75 MG: 75 TABLET ORAL at 08:12

## 2019-05-06 RX ADMIN — ENOXAPARIN SODIUM 30 MG: 30 INJECTION SUBCUTANEOUS at 08:12

## 2019-05-07 VITALS
OXYGEN SATURATION: 95 % | HEIGHT: 64 IN | WEIGHT: 196.43 LBS | DIASTOLIC BLOOD PRESSURE: 86 MMHG | RESPIRATION RATE: 16 BRPM | HEART RATE: 74 BPM | BODY MASS INDEX: 33.54 KG/M2 | TEMPERATURE: 99 F | SYSTOLIC BLOOD PRESSURE: 144 MMHG

## 2019-05-07 PROBLEM — F07.81 POST CONCUSSION SYNDROME: Status: ACTIVE | Noted: 2019-05-07

## 2019-05-07 PROBLEM — R06.89 ACUTE RESPIRATORY INSUFFICIENCY: Status: RESOLVED | Noted: 2019-05-01 | Resolved: 2019-05-07

## 2019-05-07 LAB
PLATELET # BLD AUTO: 237 THOUSANDS/UL (ref 149–390)
PMV BLD AUTO: 10.4 FL (ref 8.9–12.7)

## 2019-05-07 PROCEDURE — 85049 AUTOMATED PLATELET COUNT: CPT | Performed by: PHYSICIAN ASSISTANT

## 2019-05-07 PROCEDURE — 99238 HOSP IP/OBS DSCHRG MGMT 30/<: CPT | Performed by: PHYSICIAN ASSISTANT

## 2019-05-07 PROCEDURE — NC001 PR NO CHARGE: Performed by: PHYSICIAN ASSISTANT

## 2019-05-07 PROCEDURE — 94760 N-INVAS EAR/PLS OXIMETRY 1: CPT

## 2019-05-07 RX ORDER — ACETAMINOPHEN 325 MG/1
650 TABLET ORAL EVERY 6 HOURS PRN
Qty: 30 TABLET | Refills: 0 | Status: SHIPPED | OUTPATIENT
Start: 2019-05-07 | End: 2021-02-15 | Stop reason: HOSPADM

## 2019-05-07 RX ORDER — ALBUTEROL SULFATE 2.5 MG/3ML
2.5 SOLUTION RESPIRATORY (INHALATION) EVERY 4 HOURS PRN
Refills: 0 | Status: SHIPPED | OUTPATIENT
Start: 2019-05-07 | End: 2021-02-24

## 2019-05-07 RX ORDER — AMOXICILLIN 250 MG
1 CAPSULE ORAL
Refills: 0 | Status: SHIPPED | OUTPATIENT
Start: 2019-05-07 | End: 2020-04-15 | Stop reason: ALTCHOICE

## 2019-05-07 RX ORDER — ACETAMINOPHEN 325 MG/1
650 TABLET ORAL EVERY 6 HOURS PRN
Status: DISCONTINUED | OUTPATIENT
Start: 2019-05-07 | End: 2019-05-07 | Stop reason: HOSPADM

## 2019-05-07 RX ADMIN — VILAZODONE HYDROCHLORIDE 40 MG: 40 TABLET ORAL at 08:00

## 2019-05-07 RX ADMIN — CLOPIDOGREL BISULFATE 75 MG: 75 TABLET ORAL at 08:00

## 2019-05-07 RX ADMIN — METOPROLOL TARTRATE 12.5 MG: 25 TABLET ORAL at 08:00

## 2019-05-07 RX ADMIN — ACETAMINOPHEN 975 MG: 325 TABLET, FILM COATED ORAL at 06:27

## 2019-05-07 RX ADMIN — AMLODIPINE BESYLATE 5 MG: 5 TABLET ORAL at 08:00

## 2019-05-07 RX ADMIN — ASPIRIN 81 MG: 81 TABLET, COATED ORAL at 08:00

## 2019-05-07 RX ADMIN — PANTOPRAZOLE SODIUM 40 MG: 40 TABLET, DELAYED RELEASE ORAL at 06:28

## 2019-05-07 RX ADMIN — LEVOTHYROXINE SODIUM 75 MCG: 75 TABLET ORAL at 06:27

## 2019-05-07 RX ADMIN — ENOXAPARIN SODIUM 30 MG: 30 INJECTION SUBCUTANEOUS at 08:00

## 2019-05-08 ENCOUNTER — TRANSITIONAL CARE MANAGEMENT (OUTPATIENT)
Dept: FAMILY MEDICINE CLINIC | Facility: CLINIC | Age: 84
End: 2019-05-08

## 2019-05-21 ENCOUNTER — LAB REQUISITION (OUTPATIENT)
Dept: LAB | Facility: HOSPITAL | Age: 84
End: 2019-05-21
Payer: COMMERCIAL

## 2019-05-21 DIAGNOSIS — F07.81 POSTCONCUSSIONAL SYNDROME: ICD-10-CM

## 2019-05-21 DIAGNOSIS — S15.109A: ICD-10-CM

## 2019-05-21 LAB
ANION GAP SERPL CALCULATED.3IONS-SCNC: 5 MMOL/L (ref 4–13)
BUN SERPL-MCNC: 23 MG/DL (ref 5–25)
CALCIUM SERPL-MCNC: 9.3 MG/DL (ref 8.3–10.1)
CHLORIDE SERPL-SCNC: 104 MMOL/L (ref 100–108)
CO2 SERPL-SCNC: 36 MMOL/L (ref 21–32)
CREAT SERPL-MCNC: 1.1 MG/DL (ref 0.6–1.3)
GFR SERPL CREATININE-BSD FRML MDRD: 47 ML/MIN/1.73SQ M
GLUCOSE SERPL-MCNC: 106 MG/DL (ref 65–140)
POTASSIUM SERPL-SCNC: 4.9 MMOL/L (ref 3.5–5.3)
SODIUM SERPL-SCNC: 145 MMOL/L (ref 136–145)

## 2019-05-21 PROCEDURE — 80048 BASIC METABOLIC PNL TOTAL CA: CPT | Performed by: FAMILY MEDICINE

## 2019-05-31 ENCOUNTER — OFFICE VISIT (OUTPATIENT)
Dept: FAMILY MEDICINE CLINIC | Facility: CLINIC | Age: 84
End: 2019-05-31
Payer: COMMERCIAL

## 2019-05-31 VITALS
DIASTOLIC BLOOD PRESSURE: 70 MMHG | TEMPERATURE: 99.9 F | SYSTOLIC BLOOD PRESSURE: 160 MMHG | WEIGHT: 193.8 LBS | OXYGEN SATURATION: 93 % | HEIGHT: 64 IN | HEART RATE: 68 BPM | BODY MASS INDEX: 33.09 KG/M2

## 2019-05-31 DIAGNOSIS — J96.12 CHRONIC RESPIRATORY FAILURE WITH HYPOXIA AND HYPERCAPNIA (HCC): ICD-10-CM

## 2019-05-31 DIAGNOSIS — J96.11 CHRONIC RESPIRATORY FAILURE WITH HYPOXIA AND HYPERCAPNIA (HCC): ICD-10-CM

## 2019-05-31 DIAGNOSIS — F41.9 ANXIETY: ICD-10-CM

## 2019-05-31 DIAGNOSIS — F51.04 CHRONIC INSOMNIA: ICD-10-CM

## 2019-05-31 DIAGNOSIS — S02.2XXD CLOSED FRACTURE OF NASAL BONE WITH ROUTINE HEALING, SUBSEQUENT ENCOUNTER: ICD-10-CM

## 2019-05-31 DIAGNOSIS — S12.9XXD CLOSED FRACTURE OF TRANSVERSE PROCESS OF CERVICAL VERTEBRA, SUBSEQUENT ENCOUNTER: ICD-10-CM

## 2019-05-31 DIAGNOSIS — M15.9 GENERALIZED OSTEOARTHRITIS: ICD-10-CM

## 2019-05-31 DIAGNOSIS — Z09 HOSPITAL DISCHARGE FOLLOW-UP: Primary | ICD-10-CM

## 2019-05-31 DIAGNOSIS — J44.9 CHRONIC OBSTRUCTIVE PULMONARY DISEASE, UNSPECIFIED COPD TYPE (HCC): ICD-10-CM

## 2019-05-31 PROBLEM — G47.33 OSA AND COPD OVERLAP SYNDROME (HCC): Status: ACTIVE | Noted: 2019-05-31

## 2019-05-31 PROBLEM — E11.9 TYPE 2 DIABETES MELLITUS (HCC): Status: ACTIVE | Noted: 2019-05-31

## 2019-05-31 PROBLEM — Z87.19 HISTORY OF GASTROINTESTINAL DIVERTICULAR HEMORRHAGE: Status: ACTIVE | Noted: 2019-05-31

## 2019-05-31 PROBLEM — I63.9 CEREBRAL INFARCTION (HCC): Status: ACTIVE | Noted: 2019-05-31

## 2019-05-31 PROBLEM — Z96.659 HISTORY OF TOTAL KNEE REPLACEMENT: Status: ACTIVE | Noted: 2019-05-31

## 2019-05-31 PROCEDURE — 1111F DSCHRG MED/CURRENT MED MERGE: CPT | Performed by: FAMILY MEDICINE

## 2019-05-31 PROCEDURE — 99214 OFFICE O/P EST MOD 30 MIN: CPT | Performed by: FAMILY MEDICINE

## 2019-05-31 PROCEDURE — 1160F RVW MEDS BY RX/DR IN RCRD: CPT | Performed by: FAMILY MEDICINE

## 2019-05-31 RX ORDER — ZOLPIDEM TARTRATE 5 MG/1
5 TABLET ORAL
Qty: 30 TABLET | Refills: 0 | Status: SHIPPED | OUTPATIENT
Start: 2019-05-31 | End: 2019-08-27 | Stop reason: SDUPTHER

## 2019-05-31 RX ORDER — OXYCODONE HYDROCHLORIDE AND ACETAMINOPHEN 5; 325 MG/1; MG/1
1 TABLET ORAL EVERY 4 HOURS PRN
Qty: 90 TABLET | Refills: 0 | Status: SHIPPED | OUTPATIENT
Start: 2019-05-31 | End: 2019-07-19 | Stop reason: SDUPTHER

## 2019-05-31 RX ORDER — BUSPIRONE HYDROCHLORIDE 5 MG/1
5 TABLET ORAL 3 TIMES DAILY
Qty: 90 TABLET | Refills: 1 | Status: SHIPPED | OUTPATIENT
Start: 2019-05-31 | End: 2019-06-26 | Stop reason: SDUPTHER

## 2019-05-31 NOTE — PROGRESS NOTES
Assessment/Plan:     Diagnoses and all orders for this visit:    Hospital discharge follow-up    Closed fracture of transverse process of cervical vertebra, subsequent encounter    Closed fracture of nasal bone with routine healing, subsequent encounter    Chronic respiratory failure with hypoxia and hypercapnia (HCC)  -     tiotropium (SPIRIVA) 18 mcg inhalation capsule; Place 1 capsule (18 mcg total) into inhaler and inhale daily    Chronic obstructive pulmonary disease, unspecified COPD type (HCC)  -     tiotropium (SPIRIVA) 18 mcg inhalation capsule; Place 1 capsule (18 mcg total) into inhaler and inhale daily  -     Ambulatory referral to Pulmonology; Future    Chronic insomnia  -     zolpidem (AMBIEN) 5 mg tablet; Take 1 tablet (5 mg total) by mouth daily at bedtime as needed for sleep    Generalized osteoarthritis  -     oxyCODONE-acetaminophen (PERCOCET) 5-325 mg per tablet; Take 1 tablet by mouth every 4 (four) hours as needed for moderate painMax Daily Amount: 6 tablets    Anxiety  -     Discontinue: busPIRone (BUSPAR) 5 mg tablet; Take 1 tablet (5 mg total) by mouth 3 (three) times a day          Subjective:   Chief Complaint   Patient presents with    Transition of Care Management     patiamari seen at [facility] due to a fall 05/01/2019-05/07/2019    Medication Refill     needs refills also      Patient ID: Kailey Duke is a 80 y o  female  HPI  Patient is seen for hospital follow-up several weeks after  Had fall at home sustaining a C5 transverse process fracture and nasal bone fracture  Nonsurgical approach was recommended  Insurance denied acute facility rehab  She was discharged home with home PT/OT  Patient and son are not inclined to follow up with a CTA of the neck  Patient continued to refuse home BiPAP at night  Patient and son also declined pulmonary follow-up  They do have a follow-up with Cardiology in October    The following portions of the patient's history were reviewed and updated as appropriate: allergies, current medications, past family history, past medical history, past social history, past surgical history and problem list     Review of Systems      Objective:      /70   Pulse 68   Temp 99 9 °F (37 7 °C)   Ht 5' 4" (1 626 m)   Wt 87 9 kg (193 lb 12 8 oz)   SpO2 93%   BMI 33 27 kg/m²          Physical Exam

## 2019-05-31 NOTE — PROGRESS NOTES
TCM Call (since 4/30/2019)     Date and time call was made  5/9/2019 11:59 AM    Hospital care reviewed  Records reviewed    Patient was hospitialized at  [facility]    Date of Admission  05/01/19    Date of discharge  05/07/19    Diagnosis  fall    Disposition  Home    Were the patients medications reviewed and updated  Yes    Current Symptoms  None      TCM Call (since 4/30/2019)     Scheduled for follow up? Yes    Did you obtain your prescribed medications  Yes    Do you need help managing your prescriptions or medications  No    Is transportation to your appointment needed  No    I have advised the patient to call PCP with any new or worsening symptoms  Helene Billings Arrangements  Family members    Counseling  Family        BMI Counseling: Body mass index is 33 27 kg/m²  Discussed the patient's BMI with her  The BMI is above average  BMI counseling and education was provided to the patient  Nutrition recommendations include reducing portion sizes, decreasing overall calorie intake and 3-5 servings of fruits/vegetables daily  Exercise recommendations include exercising 3-5 times per week

## 2019-06-07 ENCOUNTER — TELEPHONE (OUTPATIENT)
Dept: FAMILY MEDICINE CLINIC | Facility: CLINIC | Age: 84
End: 2019-06-07

## 2019-06-26 DIAGNOSIS — F41.9 ANXIETY: ICD-10-CM

## 2019-06-26 RX ORDER — BUSPIRONE HYDROCHLORIDE 5 MG/1
TABLET ORAL
Qty: 90 TABLET | Refills: 1 | Status: SHIPPED | OUTPATIENT
Start: 2019-06-26 | End: 2019-07-22 | Stop reason: SDUPTHER

## 2019-07-09 DIAGNOSIS — E03.9 ACQUIRED HYPOTHYROIDISM: ICD-10-CM

## 2019-07-09 RX ORDER — LEVOTHYROXINE SODIUM 0.07 MG/1
TABLET ORAL
Qty: 90 TABLET | Refills: 2 | Status: SHIPPED | OUTPATIENT
Start: 2019-07-09 | End: 2020-03-27

## 2019-07-19 DIAGNOSIS — M15.9 GENERALIZED OSTEOARTHRITIS: ICD-10-CM

## 2019-07-19 RX ORDER — OXYCODONE HYDROCHLORIDE AND ACETAMINOPHEN 5; 325 MG/1; MG/1
1 TABLET ORAL EVERY 4 HOURS PRN
Qty: 90 TABLET | Refills: 0 | Status: SHIPPED | OUTPATIENT
Start: 2019-07-19 | End: 2019-08-26 | Stop reason: SDUPTHER

## 2019-07-20 DIAGNOSIS — F41.9 ANXIETY: ICD-10-CM

## 2019-07-22 DIAGNOSIS — F41.9 ANXIETY: ICD-10-CM

## 2019-07-22 RX ORDER — BUSPIRONE HYDROCHLORIDE 5 MG/1
TABLET ORAL
Qty: 90 TABLET | Refills: 1 | OUTPATIENT
Start: 2019-07-22

## 2019-07-22 RX ORDER — BUSPIRONE HYDROCHLORIDE 5 MG/1
5 TABLET ORAL 3 TIMES DAILY
Qty: 90 TABLET | Refills: 1 | Status: SHIPPED | OUTPATIENT
Start: 2019-07-22 | End: 2019-10-15 | Stop reason: ALTCHOICE

## 2019-08-22 DIAGNOSIS — F41.9 ANXIETY: ICD-10-CM

## 2019-08-22 RX ORDER — BUSPIRONE HYDROCHLORIDE 5 MG/1
TABLET ORAL
Qty: 90 TABLET | Refills: 1 | Status: SHIPPED | OUTPATIENT
Start: 2019-08-22 | End: 2019-09-18 | Stop reason: SDUPTHER

## 2019-08-23 DIAGNOSIS — I73.9 PAD (PERIPHERAL ARTERY DISEASE) (HCC): ICD-10-CM

## 2019-08-23 RX ORDER — CLOPIDOGREL BISULFATE 75 MG/1
TABLET ORAL
Qty: 90 TABLET | Refills: 1 | Status: SHIPPED | OUTPATIENT
Start: 2019-08-23 | End: 2020-02-21 | Stop reason: SDUPTHER

## 2019-08-26 DIAGNOSIS — M15.9 GENERALIZED OSTEOARTHRITIS: ICD-10-CM

## 2019-08-26 DIAGNOSIS — F41.8 ANXIETY WITH DEPRESSION: ICD-10-CM

## 2019-08-26 RX ORDER — OXYCODONE HYDROCHLORIDE AND ACETAMINOPHEN 5; 325 MG/1; MG/1
1 TABLET ORAL EVERY 4 HOURS PRN
Qty: 90 TABLET | Refills: 0 | Status: SHIPPED | OUTPATIENT
Start: 2019-08-26 | End: 2019-10-09 | Stop reason: SDUPTHER

## 2019-08-26 RX ORDER — VILAZODONE HYDROCHLORIDE 40 MG/1
40 TABLET ORAL DAILY
Qty: 90 TABLET | Refills: 1 | Status: SHIPPED | OUTPATIENT
Start: 2019-08-26 | End: 2020-02-26

## 2019-08-27 DIAGNOSIS — F51.04 CHRONIC INSOMNIA: ICD-10-CM

## 2019-08-27 RX ORDER — ZOLPIDEM TARTRATE 5 MG/1
5 TABLET ORAL
Qty: 30 TABLET | Refills: 0 | Status: SHIPPED | OUTPATIENT
Start: 2019-08-27 | End: 2019-09-25 | Stop reason: SDUPTHER

## 2019-08-29 DIAGNOSIS — J44.9 CHRONIC OBSTRUCTIVE PULMONARY DISEASE, UNSPECIFIED COPD TYPE (HCC): ICD-10-CM

## 2019-08-29 DIAGNOSIS — J96.11 CHRONIC RESPIRATORY FAILURE WITH HYPOXIA AND HYPERCAPNIA (HCC): ICD-10-CM

## 2019-08-29 DIAGNOSIS — J96.12 CHRONIC RESPIRATORY FAILURE WITH HYPOXIA AND HYPERCAPNIA (HCC): ICD-10-CM

## 2019-08-29 RX ORDER — TIOTROPIUM BROMIDE 18 UG/1
CAPSULE ORAL; RESPIRATORY (INHALATION)
Qty: 3 EACH | Refills: 3 | Status: SHIPPED | OUTPATIENT
Start: 2019-08-29 | End: 2020-08-23

## 2019-09-18 DIAGNOSIS — F41.9 ANXIETY: ICD-10-CM

## 2019-09-18 RX ORDER — BUSPIRONE HYDROCHLORIDE 5 MG/1
TABLET ORAL
Qty: 90 TABLET | Refills: 1 | Status: SHIPPED | OUTPATIENT
Start: 2019-09-18 | End: 2019-10-15 | Stop reason: ALTCHOICE

## 2019-09-25 DIAGNOSIS — F51.04 CHRONIC INSOMNIA: ICD-10-CM

## 2019-09-25 RX ORDER — ZOLPIDEM TARTRATE 5 MG/1
5 TABLET ORAL
Qty: 30 TABLET | Refills: 0 | Status: SHIPPED | OUTPATIENT
Start: 2019-09-25 | End: 2019-10-31 | Stop reason: SDUPTHER

## 2019-10-02 DIAGNOSIS — R12 HEARTBURN: ICD-10-CM

## 2019-10-02 RX ORDER — PANTOPRAZOLE SODIUM 40 MG/1
TABLET, DELAYED RELEASE ORAL
Qty: 90 TABLET | Refills: 3 | Status: SHIPPED | OUTPATIENT
Start: 2019-10-02 | End: 2020-09-11

## 2019-10-05 DIAGNOSIS — I25.118 CORONARY ARTERY DISEASE OF NATIVE HEART WITH STABLE ANGINA PECTORIS, UNSPECIFIED VESSEL OR LESION TYPE (HCC): ICD-10-CM

## 2019-10-05 DIAGNOSIS — E78.2 MIXED HYPERLIPIDEMIA: ICD-10-CM

## 2019-10-06 RX ORDER — ATORVASTATIN CALCIUM 40 MG/1
TABLET, FILM COATED ORAL
Qty: 90 TABLET | Refills: 3 | Status: SHIPPED | OUTPATIENT
Start: 2019-10-06 | End: 2020-09-11

## 2019-10-09 DIAGNOSIS — M15.9 GENERALIZED OSTEOARTHRITIS: ICD-10-CM

## 2019-10-10 RX ORDER — OXYCODONE HYDROCHLORIDE AND ACETAMINOPHEN 5; 325 MG/1; MG/1
1 TABLET ORAL EVERY 4 HOURS PRN
Qty: 90 TABLET | Refills: 0 | Status: SHIPPED | OUTPATIENT
Start: 2019-10-10 | End: 2019-12-11 | Stop reason: SDUPTHER

## 2019-10-15 ENCOUNTER — OFFICE VISIT (OUTPATIENT)
Dept: FAMILY MEDICINE CLINIC | Facility: CLINIC | Age: 84
End: 2019-10-15
Payer: COMMERCIAL

## 2019-10-15 VITALS
WEIGHT: 193.8 LBS | HEIGHT: 64 IN | HEART RATE: 61 BPM | SYSTOLIC BLOOD PRESSURE: 128 MMHG | OXYGEN SATURATION: 95 % | DIASTOLIC BLOOD PRESSURE: 72 MMHG | RESPIRATION RATE: 20 BRPM | BODY MASS INDEX: 33.09 KG/M2 | TEMPERATURE: 98.2 F

## 2019-10-15 DIAGNOSIS — I10 ESSENTIAL HYPERTENSION: Primary | ICD-10-CM

## 2019-10-15 DIAGNOSIS — Z23 ENCOUNTER FOR IMMUNIZATION: ICD-10-CM

## 2019-10-15 DIAGNOSIS — R73.03 PRE-DIABETES: ICD-10-CM

## 2019-10-15 DIAGNOSIS — F41.1 GENERALIZED ANXIETY DISORDER: ICD-10-CM

## 2019-10-15 DIAGNOSIS — E03.9 ACQUIRED HYPOTHYROIDISM: ICD-10-CM

## 2019-10-15 DIAGNOSIS — E11.9 TYPE 2 DIABETES MELLITUS WITHOUT COMPLICATION, WITHOUT LONG-TERM CURRENT USE OF INSULIN (HCC): Primary | ICD-10-CM

## 2019-10-15 DIAGNOSIS — G25.0 BENIGN FAMILIAL TREMOR: ICD-10-CM

## 2019-10-15 DIAGNOSIS — J44.9 CHRONIC OBSTRUCTIVE PULMONARY DISEASE, UNSPECIFIED COPD TYPE (HCC): ICD-10-CM

## 2019-10-15 LAB — SL AMB POCT HEMOGLOBIN AIC: 5.8 (ref ?–6.5)

## 2019-10-15 PROCEDURE — 83036 HEMOGLOBIN GLYCOSYLATED A1C: CPT | Performed by: FAMILY MEDICINE

## 2019-10-15 PROCEDURE — 99214 OFFICE O/P EST MOD 30 MIN: CPT | Performed by: FAMILY MEDICINE

## 2019-10-15 PROCEDURE — 3078F DIAST BP <80 MM HG: CPT | Performed by: FAMILY MEDICINE

## 2019-10-15 PROCEDURE — 90662 IIV NO PRSV INCREASED AG IM: CPT | Performed by: FAMILY MEDICINE

## 2019-10-15 PROCEDURE — 3074F SYST BP LT 130 MM HG: CPT | Performed by: FAMILY MEDICINE

## 2019-10-15 PROCEDURE — G0008 ADMIN INFLUENZA VIRUS VAC: HCPCS | Performed by: FAMILY MEDICINE

## 2019-10-15 RX ORDER — ALPRAZOLAM 0.5 MG/1
0.5 TABLET ORAL
Qty: 30 TABLET | Refills: 0 | Status: SHIPPED | OUTPATIENT
Start: 2019-10-15 | End: 2020-04-15 | Stop reason: ALTCHOICE

## 2019-10-15 NOTE — PROGRESS NOTES
Assessment/Plan:     Diagnoses and all orders for this visit:    Essential hypertension    Chronic obstructive pulmonary disease, unspecified COPD type ([facility] [address] )    Acquired hypothyroidism    Benign familial tremor  -     ALPRAZolam (XANAX) 0 5 mg tablet; Take 1 tablet (0 5 mg total) by mouth daily at bedtime as needed for anxiety    Generalized anxiety disorder  -     ALPRAZolam (XANAX) 0 5 mg tablet; Take 1 tablet (0 5 mg total) by mouth daily at bedtime as needed for anxiety    Encounter for immunization  -     influenza vaccine, [id number], high-dose, PF 0 5 mL (FLUZONE HIGH-DOSE)    Pre-diabetes  -     POCT hemoglobin A1c          Subjective:   Chief Complaint   Patient presents with    Follow-up     6 month followup       Patient ID: Diana Flores is a 80 y o  female  Patient is a pleasant 20-year-old female who is here for follow-up of blood pressure, blood sugar, lipidemia, COPD, thyroid, anxiety and tremor  Occasionally doing alprazolam   Lab work is due  Son is taking her tomorrow a m  Olga Garza The following portions of the patient's history were reviewed and updated as appropriate: allergies, current medications, past family history, past medical history, past social history, past surgical history and problem list     Review of Systems   Constitutional: Positive for fatigue and unexpected weight change  HENT: Positive for rhinorrhea  Eyes: Negative for visual disturbance  Respiratory: Negative for cough and shortness of breath  Gastrointestinal: Negative for constipation and diarrhea  Genitourinary: Negative for difficulty urinating  Musculoskeletal: Positive for arthralgias  Neurological: Positive for tremors  Negative for headaches  Psychiatric/Behavioral: Positive for sleep disturbance  The patient is nervous/anxious            Objective:  Hemoglobin A1c 5 8%    /72 (BP Location: Left arm)   Pulse 61   Temp 98 2 °F (36 8 °C) (Temporal)   Resp 20   Ht 5' 4" (1 626 m)   Wt 87 9 kg (193 lb 12 8 oz)   SpO2 95%   BMI 33 27 kg/m²          Physical Exam   Cardiovascular: Pulses are no weak pulses  Pulses:       Dorsalis pedis pulses are 2+ on the right side, and 2+ on the left side  Posterior tibial pulses are 2+ on the right side, and 2+ on the left side  Musculoskeletal:        Feet:    Feet:   Right Foot:   Skin Integrity: Negative for ulcer, skin breakdown, erythema, warmth, callus or dry skin  Left Foot:   Skin Integrity: Negative for ulcer, skin breakdown, erythema, warmth, callus or dry skin  Patient's shoes and socks removed  Right Foot/Ankle   Right Foot Inspection  Skin Exam: skin normal and skin intact no dry skin, no warmth, no callus, no erythema, no maceration, no abnormal color, no pre-ulcer, no ulcer and no callus                          Toe Exam: ROM and strength within normal limits  Sensory       Monofilament testing: diminished  Vascular    The right DP pulse is 2+  The right PT pulse is 2+  Left Foot/Ankle  Left Foot Inspection  Skin Exam: skin normal and skin intactno dry skin, no warmth, no erythema, no maceration, normal color, no pre-ulcer, no ulcer and no callus                         Toe Exam: ROM and strength within normal limits                   Sensory       Monofilament: diminished  Vascular    The left DP pulse is 2+  The left PT pulse is 2+  Assign Risk Category:  No deformity present; Loss of protective sensation;  No weak pulses       Risk: 1

## 2019-10-16 ENCOUNTER — TRANSCRIBE ORDERS (OUTPATIENT)
Dept: LAB | Facility: CLINIC | Age: 84
End: 2019-10-16

## 2019-10-16 ENCOUNTER — APPOINTMENT (OUTPATIENT)
Dept: LAB | Facility: CLINIC | Age: 84
End: 2019-10-16
Payer: COMMERCIAL

## 2019-10-16 DIAGNOSIS — R73.03 PRE-DIABETES: ICD-10-CM

## 2019-10-16 DIAGNOSIS — I10 ESSENTIAL HYPERTENSION: ICD-10-CM

## 2019-10-16 LAB
ALBUMIN SERPL BCP-MCNC: 3.3 G/DL (ref 3.5–5)
ALP SERPL-CCNC: 130 U/L (ref 46–116)
ALT SERPL W P-5'-P-CCNC: 9 U/L (ref 12–78)
ANION GAP SERPL CALCULATED.3IONS-SCNC: 4 MMOL/L (ref 4–13)
AST SERPL W P-5'-P-CCNC: 10 U/L (ref 5–45)
BILIRUB SERPL-MCNC: 0.55 MG/DL (ref 0.2–1)
BUN SERPL-MCNC: 22 MG/DL (ref 5–25)
CALCIUM SERPL-MCNC: 9.2 MG/DL (ref 8.3–10.1)
CHLORIDE SERPL-SCNC: 107 MMOL/L (ref 100–108)
CHOLEST SERPL-MCNC: 127 MG/DL (ref 50–200)
CO2 SERPL-SCNC: 33 MMOL/L (ref 21–32)
CREAT SERPL-MCNC: 0.99 MG/DL (ref 0.6–1.3)
CREAT UR-MCNC: 115 MG/DL
GFR SERPL CREATININE-BSD FRML MDRD: 53 ML/MIN/1.73SQ M
GLUCOSE P FAST SERPL-MCNC: 99 MG/DL (ref 65–99)
HDLC SERPL-MCNC: 44 MG/DL (ref 40–60)
LDLC SERPL CALC-MCNC: 63 MG/DL (ref 0–100)
MICROALBUMIN UR-MCNC: 41.9 MG/L (ref 0–20)
MICROALBUMIN/CREAT 24H UR: 36 MG/G CREATININE (ref 0–30)
POTASSIUM SERPL-SCNC: 4 MMOL/L (ref 3.5–5.3)
PROT SERPL-MCNC: 7 G/DL (ref 6.4–8.2)
SODIUM SERPL-SCNC: 144 MMOL/L (ref 136–145)
TRIGL SERPL-MCNC: 101 MG/DL
TSH SERPL DL<=0.05 MIU/L-ACNC: 0.49 UIU/ML (ref 0.36–3.74)

## 2019-10-16 PROCEDURE — 80053 COMPREHEN METABOLIC PANEL: CPT

## 2019-10-16 PROCEDURE — 82043 UR ALBUMIN QUANTITATIVE: CPT

## 2019-10-16 PROCEDURE — 84443 ASSAY THYROID STIM HORMONE: CPT

## 2019-10-16 PROCEDURE — 36415 COLL VENOUS BLD VENIPUNCTURE: CPT

## 2019-10-16 PROCEDURE — 80061 LIPID PANEL: CPT

## 2019-10-16 PROCEDURE — 82570 ASSAY OF URINE CREATININE: CPT

## 2019-10-18 DIAGNOSIS — F41.9 ANXIETY: ICD-10-CM

## 2019-10-18 RX ORDER — BUSPIRONE HYDROCHLORIDE 5 MG/1
TABLET ORAL
Qty: 90 TABLET | Refills: 1 | Status: SHIPPED | OUTPATIENT
Start: 2019-10-18 | End: 2019-12-31 | Stop reason: SDUPTHER

## 2019-10-22 ENCOUNTER — TELEPHONE (OUTPATIENT)
Dept: FAMILY MEDICINE CLINIC | Facility: CLINIC | Age: 84
End: 2019-10-22

## 2019-10-22 NOTE — TELEPHONE ENCOUNTER
----- Message from Catina Guzman DO sent at [phone]/[id number]  6:39 PM EDT -----  Recent labs are satisfactory for age gender and medical diagnoses

## 2019-10-25 ENCOUNTER — TELEPHONE (OUTPATIENT)
Dept: FAMILY MEDICINE CLINIC | Facility: CLINIC | Age: 84
End: 2019-10-25

## 2019-10-25 DIAGNOSIS — J01.90 ACUTE NON-RECURRENT SINUSITIS, UNSPECIFIED LOCATION: Primary | ICD-10-CM

## 2019-10-25 RX ORDER — AZITHROMYCIN 250 MG/1
TABLET, FILM COATED ORAL
Qty: 6 TABLET | Refills: 0 | Status: SHIPPED | OUTPATIENT
Start: 2019-10-25 | End: 2019-10-30

## 2019-10-25 NOTE — TELEPHONE ENCOUNTER
Patient is getting the same thing as her son  She has a sore throat, cough, and sneezing    Can something be sent to [facility]

## 2019-10-29 DIAGNOSIS — F51.04 CHRONIC INSOMNIA: ICD-10-CM

## 2019-10-29 RX ORDER — ZOLPIDEM TARTRATE 5 MG/1
5 TABLET ORAL
Qty: 30 TABLET | Refills: 0 | Status: CANCELLED | OUTPATIENT
Start: 2019-10-29

## 2019-10-31 DIAGNOSIS — F51.04 CHRONIC INSOMNIA: ICD-10-CM

## 2019-10-31 RX ORDER — ZOLPIDEM TARTRATE 5 MG/1
5 TABLET ORAL
Qty: 30 TABLET | Refills: 0 | Status: SHIPPED | OUTPATIENT
Start: 2019-10-31 | End: 2020-04-15 | Stop reason: ALTCHOICE

## 2019-11-04 ENCOUNTER — TELEPHONE (OUTPATIENT)
Dept: FAMILY MEDICINE CLINIC | Facility: CLINIC | Age: 84
End: 2019-11-04

## 2019-11-04 NOTE — TELEPHONE ENCOUNTER
Started prior auth through cover my meds:     [phone]   ID# [id number]  BIN# [id number]  VALENTE Talavera HCDJL

## 2019-11-12 DIAGNOSIS — F51.01 PRIMARY INSOMNIA: Primary | ICD-10-CM

## 2019-11-12 RX ORDER — DOXEPIN HYDROCHLORIDE 3 MG/1
TABLET ORAL
Qty: 30 TABLET | Refills: 0 | Status: SHIPPED | OUTPATIENT
Start: 2019-11-12 | End: 2019-12-20 | Stop reason: SDUPTHER

## 2019-11-12 NOTE — TELEPHONE ENCOUNTER
Spoke w/ pt's son Cindy Taylor and they are willing to try Silenor  Can you please send to [facility] ? They will call back in a week w/ an update of how pt is doing   Thanks

## 2019-11-12 NOTE — TELEPHONE ENCOUNTER
Spoke w/ pt's son Magnus Arellano and he states she tried Trazadone years ago and that did not help her

## 2019-11-20 DIAGNOSIS — N32.81 OVERACTIVE BLADDER: ICD-10-CM

## 2019-11-26 ENCOUNTER — OFFICE VISIT (OUTPATIENT)
Dept: CARDIOLOGY CLINIC | Facility: CLINIC | Age: 84
End: 2019-11-26
Payer: COMMERCIAL

## 2019-11-26 VITALS
HEART RATE: 80 BPM | WEIGHT: 202.2 LBS | BODY MASS INDEX: 34.71 KG/M2 | SYSTOLIC BLOOD PRESSURE: 130 MMHG | DIASTOLIC BLOOD PRESSURE: 68 MMHG

## 2019-11-26 DIAGNOSIS — I25.10 CORONARY ARTERY DISEASE INVOLVING NATIVE HEART, ANGINA PRESENCE UNSPECIFIED, UNSPECIFIED VESSEL OR LESION TYPE: Primary | ICD-10-CM

## 2019-11-26 PROCEDURE — 93000 ELECTROCARDIOGRAM COMPLETE: CPT | Performed by: INTERNAL MEDICINE

## 2019-11-26 PROCEDURE — 99214 OFFICE O/P EST MOD 30 MIN: CPT | Performed by: INTERNAL MEDICINE

## 2019-11-26 NOTE — PROGRESS NOTES
Cardiology Follow Up        lEina Patel      1935      [id number]      Discussion/Summary:    1  CAD status post CABG x3  2  Benign essential hypertension  3  Dyslipidemia  4  Chronic right bundle branch block    · No symptoms of angina, she has chronic but stable exertional dyspnea, continue aspirin, clopidogrel  · Blood pressure controlled, continue metoprolol, amlodipine  · Euvolemic by examination, continue low-dose furosemide as needed  · Prior lipid panel reviewed 10/16/2019, at goal, continue high-dose atorvastatin      Follow-up in 6 months      Interval History: This is a very pleasant 66-year-old female with a history of CVA 02/2015 with MRA neck revealing greater than 70% bilateral internal carotid artery stenosis with diminished flow in the right MCA   CTA revealed greater than 90% high-grade stenosis on the right and she thereafter underwent right CEA   She had a NSTEMI 06/2015 with cardiac catheterization revealing three-vessel CAD   Thereafter she underwent CABG x3 with LIMA to LAD, SVG to OM, SVG to RCA in 2015   She lives with her son  She was hospitalized with a fall 05/2019, and had a closed fracture of cervical vertebrae with non operative management  She presents today for follow-up  She states she had 1 fall about 2 weeks ago which was minor without significant trauma  She denies chest pain  She has chronic but stable exertional dyspnea  She denies abnormal bleeding or heavy bruising while on dual antiplatelet therapy          Vitals:  Vitals:    11/26/19 1625   BP: 130/68   BP Location: Left arm   Patient Position: Sitting   Cuff Size: Large   Pulse: 80   Weight: 91 7 kg (202 lb 3 2 oz)         Past Medical History:   Diagnosis Date    Anxiety     Arthritis     Cataract, bilateral     Last Assessed:3/19/2014    COPD (chronic obstructive pulmonary disease) (HCC)     Coronary artery disease     Depression     Essential tremor     Hx of arterial ischemic stroke     Hypertension     Hypothyroid     Kidney stone     Myocardial infarction [facility])     2015    Personal history of kidney stones     Right knee DJD     Resolved:8/25/2015    Stroke [facility])     Use of cane as ambulatory aid     or walker    Wears glasses      Social History     Socioeconomic History    Marital status:       Spouse name: Not on file    Number of children: Not on file    Years of education: 8    Highest education level: Not on file   Occupational History    Not on file   Social Needs    Financial resource strain: Not on file    Food insecurity:     Worry: Not on file     Inability: Not on file    Transportation needs:     Medical: Not on file     Non-medical: Not on file   Tobacco Use    Smoking status: Never Smoker    Smokeless tobacco: Never Used   Substance and Sexual Activity    Alcohol use: Never     Frequency: Never    Drug use: Never    Sexual activity: Not on file   Lifestyle    Physical activity:     Days per week: Not on file     Minutes per session: Not on file    Stress: Not on file   Relationships    Social connections:     Talks on phone: Not on file     Gets together: Not on file     Attends [employer] service: Not on file     Active member of club or organization: Not on file     Attends meetings of clubs or organizations: Not on file     Relationship status: Not on file    Intimate partner violence:     Fear of current or ex partner: Not on file     Emotionally abused: Not on file     Physically abused: Not on file     Forced sexual activity: Not on file   Other Topics Concern    Not on file   Social History Narrative    ** Merged History Encounter **         No caffeine use      Family History   Problem Relation Age of Onset    Cancer Mother         malignant neoplasm    Heart attack Father     Aneurysm Other         Aortic    Cancer Other         malignant neoplasm     Past Surgical History:   Procedure Laterality Date    CAROTID ENDARTERECTOMY Right     CATARACT EXTRACTION W/ INTRAOCULAR LENS  IMPLANT, BILATERAL      COLONOSCOPY N/A 9/30/2017    Procedure: COLONOSCOPY FOR CONTROL OF BLEEDING;  Surgeon: Danielito Parrish MD;  Location: [facility];  Service: Colorectal    CORONARY ARTERY BYPASS GRAFT      CABG X3 with LIMA to LAD,SVG to OM,SVG to distal  RCA ; Last Assessed:7/13/2015    JOINT REPLACEMENT      left TKR    KIDNEY STONE SURGERY      NEPHROSTOMY Left     with Drainage Irrigation ; [id number]    AR TOTAL KNEE ARTHROPLASTY Right 2/25/2016    Procedure: ARTHROPLASTY KNEE TOTAL;  Surgeon: Jameel Jarrett MD;  Location: [facility];  Service: Orthopedics       Current Outpatient Medications:     acetaminophen (TYLENOL) 325 mg tablet, Take 2 tablets (650 mg total) by mouth every 6 (six) hours as needed for mild pain, Disp: 30 tablet, Rfl: 0    albuterol (2 5 mg/3 mL) 0 083 % nebulizer solution, Take 1 vial (2 5 mg total) by nebulization every 4 (four) hours as needed for wheezing or shortness of breath, Disp: , Rfl: 0    ALPRAZolam (XANAX) 0 5 mg tablet, Take 1 tablet (0 5 mg total) by mouth daily at bedtime as needed for anxiety, Disp: 30 tablet, Rfl: 0    amLODIPine (NORVASC) 5 mg tablet, TAKE 1 TABLET BY MOUTH EVERY DAY, Disp: 30 tablet, Rfl: 11    ascorbic acid (VITAMIN C) 500 mg tablet, Take 500 mg by mouth daily , Disp: , Rfl:     aspirin (ECOTRIN LOW STRENGTH) 81 mg EC tablet, Take 81 mg by mouth daily, Disp: , Rfl:     atorvastatin (LIPITOR) 40 mg tablet, TAKE 1 TABLET BY MOUTH EVERY DAY, Disp: 90 tablet, Rfl: 3    bimatoprost (LUMIGAN) 0 01 % ophthalmic drops, Apply 1 drop to eye Daily, Disp: , Rfl:     busPIRone (BUSPAR) 5 mg tablet, TAKE 1 TABLET BY MOUTH THREE TIMES A DAY, Disp: 90 tablet, Rfl: 1    Cholecalciferol (VITAMIN D) 2000 UNITS tablet, Take 2,000 Units by mouth daily  , Disp: , Rfl:     clopidogrel (PLAVIX) 75 mg tablet, TAKE 1 TABLET BY MOUTH EVERY DAY, Disp: 90 tablet, Rfl: 1    Doxepin HCl 3 MG TABS, One PO at hs prn insomnia, Disp: 30 tablet, Rfl: 0    furosemide (LASIX) 20 mg tablet, Take 1 tablet by mouth as needed  , Disp: , Rfl:     levalbuterol (XOPENEX) 0 63 mg/3 mL nebulizer solution, Take 3 mL (0 63 mg total) by nebulization 3 (three) times a day (Patient taking differently: Take 0 63 mg by nebulization as needed  ), Disp: 300 mL, Rfl: 0    levothyroxine 75 mcg tablet, TAKE 1 TABLET BY MOUTH EVERY DAY IN THE MORNING, Disp: 90 tablet, Rfl: 2    metoprolol tartrate (LOPRESSOR) 25 mg tablet, Take 1 tablet (25 mg total) by mouth every 12 (twelve) hours, Disp: 180 tablet, Rfl: 2    Mirabegron ER (MYRBETRIQ) 25 MG TB24, TAKE 1 TABLET BY MOUTH EVERY DAY, Disp: 30 tablet, Rfl: 11    oxyCODONE-acetaminophen (PERCOCET) 5-325 mg per tablet, Take 1 tablet by mouth every 4 (four) hours as needed for moderate painMax Daily Amount: 6 tablets, Disp: 90 tablet, Rfl: 0    pantoprazole (PROTONIX) 40 mg tablet, TAKE 1 TABLET BY MOUTH EVERY DAY, Disp: 90 tablet, Rfl: 3    Pediatric Multiple Vit-C-FA (PEDIATRIC MULTIVITAMIN) chewable tablet, Chew 1 tablet daily, Disp: , Rfl:     SPIRIVA HANDIHALER 18 MCG inhalation capsule, INHALE 1 CAPSULE VIA HANDIHALER ONCE DAILY AT THE SAME TIME EVERY DAY, Disp: 3 each, Rfl: 3    vilazodone (VIIBRYD) 40 mg tablet, Take 1 tablet (40 mg total) by mouth daily, Disp: 90 tablet, Rfl: 1    senna-docusate sodium (SENOKOT S) 8 6-50 mg per tablet, Take 1 tablet by mouth daily at bedtime (Patient not taking: Reported on 5/31/2019), Disp: , Rfl: 0    zolpidem (AMBIEN) 5 mg tablet, Take 1 tablet (5 mg total) by mouth daily at bedtime as needed for sleep (Patient not taking: Reported on 11/26/2019), Disp: 30 tablet, Rfl: 0        Review of Systems:  Review of Systems   Constitutional: Negative for activity change, fever and unexpected weight change  HENT: Negative for facial swelling, nosebleeds and voice change      Respiratory: Negative for chest tightness, shortness of breath and wheezing  Cardiovascular: Negative for chest pain, palpitations and leg swelling  Gastrointestinal: Negative for abdominal distention  Genitourinary: Negative for hematuria  Musculoskeletal: Negative for arthralgias  Skin: Negative for color change, pallor, rash and wound  Neurological: Negative for dizziness, seizures and syncope  Psychiatric/Behavioral: Negative for agitation  Physical Exam:  Physical Exam   Constitutional: She is oriented to person, place, and time  She appears well-developed and well-nourished  HENT:   Head: Normocephalic and atraumatic  Eyes: EOM are normal    Neck: Normal range of motion  Neck supple  Cardiovascular: Normal rate, regular rhythm, normal heart sounds and intact distal pulses  Pulmonary/Chest: Effort normal and breath sounds normal    Abdominal: Soft  Musculoskeletal: Normal range of motion  Neurological: She is alert and oriented to person, place, and time  Skin: Skin is warm and dry  Psychiatric: She has a normal mood and affect  Her behavior is normal  Judgment and thought content normal    Vitals reviewed  This note was completed in part utilizing [employer] Direct Software  Grammatical errors, random word insertions, spelling mistakes, and incomplete sentences can be an occasional consequence of this system secondary to software limitations, ambient noise, and hardware issues  If you have any questions or concerns about the content, text, or information contained within the body of this dictation, please contact the provider for clarification

## 2019-12-11 DIAGNOSIS — M15.9 GENERALIZED OSTEOARTHRITIS: ICD-10-CM

## 2019-12-11 RX ORDER — OXYCODONE HYDROCHLORIDE AND ACETAMINOPHEN 5; 325 MG/1; MG/1
1 TABLET ORAL EVERY 4 HOURS PRN
Qty: 90 TABLET | Refills: 0 | Status: SHIPPED | OUTPATIENT
Start: 2019-12-11 | End: 2020-02-03 | Stop reason: SDUPTHER

## 2019-12-11 NOTE — TELEPHONE ENCOUNTER
Controlled Substance Review    [place] PDMP or [place]  reviewed: No red flags were identified; safe to proceed with prescription  Austen Perera

## 2019-12-20 DIAGNOSIS — F51.01 PRIMARY INSOMNIA: ICD-10-CM

## 2019-12-20 RX ORDER — DOXEPIN HYDROCHLORIDE 3 MG/1
TABLET ORAL
Qty: 30 TABLET | Refills: 3 | Status: SHIPPED | OUTPATIENT
Start: 2019-12-20 | End: 2020-04-15 | Stop reason: ALTCHOICE

## 2019-12-29 DIAGNOSIS — F51.01 PRIMARY INSOMNIA: ICD-10-CM

## 2019-12-30 ENCOUNTER — TELEPHONE (OUTPATIENT)
Dept: FAMILY MEDICINE CLINIC | Facility: CLINIC | Age: 84
End: 2019-12-30

## 2019-12-30 DIAGNOSIS — F41.9 ANXIETY: ICD-10-CM

## 2019-12-30 RX ORDER — DOXEPIN HYDROCHLORIDE 3 MG/1
TABLET ORAL
Qty: 30 TABLET | Refills: 0 | Status: SHIPPED | OUTPATIENT
Start: 2019-12-30 | End: 2020-01-26

## 2019-12-30 NOTE — TELEPHONE ENCOUNTER
Patients son called and stated patient does not feel the buspar was working for her so she stopped the medication   She wants to know if she could go back on the alprazolam 0 5mg

## 2019-12-31 RX ORDER — BUSPIRONE HYDROCHLORIDE 7.5 MG/1
7.5 TABLET ORAL 3 TIMES DAILY
Qty: 90 TABLET | Refills: 1 | Status: SHIPPED | OUTPATIENT
Start: 2019-12-31 | End: 2020-01-23

## 2020-01-05 DIAGNOSIS — I10 ESSENTIAL HYPERTENSION: ICD-10-CM

## 2020-01-23 DIAGNOSIS — F41.9 ANXIETY: ICD-10-CM

## 2020-01-23 RX ORDER — BUSPIRONE HYDROCHLORIDE 7.5 MG/1
7.5 TABLET ORAL 3 TIMES DAILY
Qty: 90 TABLET | Refills: 3 | Status: SHIPPED | OUTPATIENT
Start: 2020-01-23 | End: 2020-04-15 | Stop reason: ALTCHOICE

## 2020-01-26 DIAGNOSIS — F51.01 PRIMARY INSOMNIA: ICD-10-CM

## 2020-01-26 RX ORDER — DOXEPIN HYDROCHLORIDE 3 MG/1
TABLET ORAL
Qty: 30 TABLET | Refills: 5 | Status: SHIPPED | OUTPATIENT
Start: 2020-01-26 | End: 2020-03-03 | Stop reason: SDUPTHER

## 2020-02-03 DIAGNOSIS — M15.9 GENERALIZED OSTEOARTHRITIS: ICD-10-CM

## 2020-02-03 RX ORDER — OXYCODONE HYDROCHLORIDE AND ACETAMINOPHEN 5; 325 MG/1; MG/1
1 TABLET ORAL EVERY 4 HOURS PRN
Qty: 90 TABLET | Refills: 0 | Status: SHIPPED | OUTPATIENT
Start: 2020-02-03 | End: 2020-03-19 | Stop reason: SDUPTHER

## 2020-02-06 DIAGNOSIS — I65.29 STENOSIS OF CAROTID ARTERY, UNSPECIFIED LATERALITY: ICD-10-CM

## 2020-02-06 RX ORDER — AMLODIPINE BESYLATE 5 MG/1
TABLET ORAL
Qty: 90 TABLET | Refills: 3 | Status: ON HOLD | OUTPATIENT
Start: 2020-02-06 | End: 2021-02-07

## 2020-02-21 DIAGNOSIS — I73.9 PAD (PERIPHERAL ARTERY DISEASE) (HCC): ICD-10-CM

## 2020-02-21 RX ORDER — CLOPIDOGREL BISULFATE 75 MG/1
TABLET ORAL
Qty: 90 TABLET | Refills: 1 | Status: SHIPPED | OUTPATIENT
Start: 2020-02-21 | End: 2020-08-23

## 2020-02-26 DIAGNOSIS — F41.8 ANXIETY WITH DEPRESSION: ICD-10-CM

## 2020-02-26 RX ORDER — VILAZODONE HYDROCHLORIDE 40 MG/1
TABLET ORAL
Qty: 90 TABLET | Refills: 1 | Status: SHIPPED | OUTPATIENT
Start: 2020-02-26 | End: 2020-08-23

## 2020-03-03 DIAGNOSIS — F51.01 PRIMARY INSOMNIA: ICD-10-CM

## 2020-03-03 RX ORDER — DOXEPIN HYDROCHLORIDE 3 MG/1
TABLET ORAL
Qty: 30 TABLET | Refills: 5 | Status: SHIPPED | OUTPATIENT
Start: 2020-03-03 | End: 2020-04-02 | Stop reason: SDUPTHER

## 2020-03-19 DIAGNOSIS — M15.9 GENERALIZED OSTEOARTHRITIS: ICD-10-CM

## 2020-03-19 RX ORDER — OXYCODONE HYDROCHLORIDE AND ACETAMINOPHEN 5; 325 MG/1; MG/1
1 TABLET ORAL EVERY 4 HOURS PRN
Qty: 90 TABLET | Refills: 0 | Status: SHIPPED | OUTPATIENT
Start: 2020-03-19 | End: 2020-05-07 | Stop reason: SDUPTHER

## 2020-03-27 DIAGNOSIS — E03.9 ACQUIRED HYPOTHYROIDISM: ICD-10-CM

## 2020-03-27 RX ORDER — LEVOTHYROXINE SODIUM 0.07 MG/1
TABLET ORAL
Qty: 90 TABLET | Refills: 2 | Status: SHIPPED | OUTPATIENT
Start: 2020-03-27 | End: 2020-12-15

## 2020-04-01 DIAGNOSIS — I10 ESSENTIAL HYPERTENSION: ICD-10-CM

## 2020-04-02 DIAGNOSIS — F51.01 PRIMARY INSOMNIA: ICD-10-CM

## 2020-04-02 RX ORDER — DOXEPIN HYDROCHLORIDE 3 MG/1
TABLET ORAL
Qty: 30 TABLET | Refills: 5 | Status: SHIPPED | OUTPATIENT
Start: 2020-04-02 | End: 2020-04-15 | Stop reason: SINTOL

## 2020-04-09 ENCOUNTER — CLINICAL SUPPORT (OUTPATIENT)
Dept: FAMILY MEDICINE CLINIC | Facility: CLINIC | Age: 85
End: 2020-04-09
Payer: COMMERCIAL

## 2020-04-09 ENCOUNTER — TELEPHONE (OUTPATIENT)
Dept: FAMILY MEDICINE CLINIC | Facility: CLINIC | Age: 85
End: 2020-04-09

## 2020-04-09 DIAGNOSIS — R30.0 DYSURIA: Primary | ICD-10-CM

## 2020-04-09 LAB
SL AMB  POCT GLUCOSE, UA: ABNORMAL
SL AMB LEUKOCYTE ESTERASE,UA: ABNORMAL
SL AMB POCT BILIRUBIN,UA: ABNORMAL
SL AMB POCT BLOOD,UA: ABNORMAL
SL AMB POCT CLARITY,UA: CLEAR
SL AMB POCT COLOR,UA: YELLOW
SL AMB POCT KETONES,UA: ABNORMAL
SL AMB POCT NITRITE,UA: ABNORMAL
SL AMB POCT PH,UA: 7
SL AMB POCT SPECIFIC GRAVITY,UA: 1.01
SL AMB POCT URINE PROTEIN: POSITIVE
SL AMB POCT UROBILINOGEN: 0.2

## 2020-04-09 PROCEDURE — 87086 URINE CULTURE/COLONY COUNT: CPT | Performed by: FAMILY MEDICINE

## 2020-04-09 PROCEDURE — 81003 URINALYSIS AUTO W/O SCOPE: CPT | Performed by: FAMILY MEDICINE

## 2020-04-09 PROCEDURE — NC001 PR NO CHARGE

## 2020-04-10 LAB — BACTERIA UR CULT: NORMAL

## 2020-04-15 ENCOUNTER — TELEPHONE (OUTPATIENT)
Dept: FAMILY MEDICINE CLINIC | Facility: CLINIC | Age: 85
End: 2020-04-15

## 2020-04-15 ENCOUNTER — TELEMEDICINE (OUTPATIENT)
Dept: FAMILY MEDICINE CLINIC | Facility: CLINIC | Age: 85
End: 2020-04-15
Payer: COMMERCIAL

## 2020-04-15 VITALS — OXYGEN SATURATION: 91 %

## 2020-04-15 DIAGNOSIS — F32.A DEPRESSION, UNSPECIFIED DEPRESSION TYPE: ICD-10-CM

## 2020-04-15 DIAGNOSIS — G25.0 BENIGN FAMILIAL TREMOR: ICD-10-CM

## 2020-04-15 DIAGNOSIS — J44.9 CHRONIC OBSTRUCTIVE PULMONARY DISEASE, UNSPECIFIED COPD TYPE (HCC): Primary | ICD-10-CM

## 2020-04-15 DIAGNOSIS — F41.1 GENERALIZED ANXIETY DISORDER: ICD-10-CM

## 2020-04-15 DIAGNOSIS — H40.9 GLAUCOMA OF BOTH EYES, UNSPECIFIED GLAUCOMA TYPE: ICD-10-CM

## 2020-04-15 DIAGNOSIS — F51.01 PRIMARY INSOMNIA: ICD-10-CM

## 2020-04-15 DIAGNOSIS — I25.10 CORONARY ARTERIOSCLEROSIS: ICD-10-CM

## 2020-04-15 DIAGNOSIS — I50.32 CHRONIC DIASTOLIC CONGESTIVE HEART FAILURE (HCC): ICD-10-CM

## 2020-04-15 DIAGNOSIS — M19.91 PRIMARY LOCALIZED OSTEOARTHROSIS: ICD-10-CM

## 2020-04-15 DIAGNOSIS — I10 ESSENTIAL HYPERTENSION: ICD-10-CM

## 2020-04-15 PROBLEM — S12.9XXA CLOSED FRACTURE OF TRANSVERSE PROCESS OF CERVICAL VERTEBRA (HCC): Status: RESOLVED | Noted: 2019-05-01 | Resolved: 2020-04-15

## 2020-04-15 PROBLEM — F07.81 POST CONCUSSION SYNDROME: Status: RESOLVED | Noted: 2019-05-07 | Resolved: 2020-04-15

## 2020-04-15 PROBLEM — S02.2XXA NASAL FRACTURE: Status: RESOLVED | Noted: 2019-05-01 | Resolved: 2020-04-15

## 2020-04-15 PROBLEM — K92.1 HEMATOCHEZIA: Status: RESOLVED | Noted: 2017-09-30 | Resolved: 2020-04-15
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
## 2020-04-15 PROCEDURE — 99214 OFFICE O/P EST MOD 30 MIN: CPT | Performed by: FAMILY MEDICINE

## 2020-04-15 RX ORDER — ALPRAZOLAM 0.25 MG/1
0.25 TABLET ORAL DAILY
Qty: 30 TABLET | Refills: 0 | Status: SHIPPED | OUTPATIENT
Start: 2020-04-15 | End: 2020-05-12 | Stop reason: SDUPTHER

## 2020-04-15 RX ORDER — ZOLPIDEM TARTRATE 5 MG/1
5 TABLET ORAL
Qty: 30 TABLET | Refills: 0 | Status: SHIPPED | OUTPATIENT
Start: 2020-04-15 | End: 2020-05-12 | Stop reason: SDUPTHER

## 2020-05-07 DIAGNOSIS — M15.9 GENERALIZED OSTEOARTHRITIS: ICD-10-CM

## 2020-05-07 RX ORDER — OXYCODONE HYDROCHLORIDE AND ACETAMINOPHEN 5; 325 MG/1; MG/1
1 TABLET ORAL EVERY 4 HOURS PRN
Qty: 90 TABLET | Refills: 0 | Status: SHIPPED | OUTPATIENT
Start: 2020-05-07 | End: 2020-06-16 | Stop reason: SDUPTHER

## 2020-05-12 ENCOUNTER — TELEPHONE (OUTPATIENT)
Dept: FAMILY MEDICINE CLINIC | Facility: CLINIC | Age: 85
End: 2020-05-12

## 2020-05-12 DIAGNOSIS — F41.1 GENERALIZED ANXIETY DISORDER: ICD-10-CM

## 2020-05-12 DIAGNOSIS — F51.01 PRIMARY INSOMNIA: ICD-10-CM

## 2020-05-12 DIAGNOSIS — G25.0 BENIGN FAMILIAL TREMOR: Primary | ICD-10-CM

## 2020-05-12 RX ORDER — ALPRAZOLAM 0.25 MG/1
0.25 TABLET ORAL 2 TIMES DAILY PRN
Qty: 60 TABLET | Refills: 0 | Status: SHIPPED | OUTPATIENT
Start: 2020-05-12 | End: 2020-06-29 | Stop reason: SDUPTHER

## 2020-05-12 RX ORDER — ZOLPIDEM TARTRATE 5 MG/1
5 TABLET ORAL
Qty: 30 TABLET | Refills: 0 | Status: SHIPPED | OUTPATIENT
Start: 2020-05-12 | End: 2020-06-29 | Stop reason: SDUPTHER

## 2020-05-29 ENCOUNTER — TELEMEDICINE (OUTPATIENT)
Dept: FAMILY MEDICINE CLINIC | Facility: CLINIC | Age: 85
End: 2020-05-29
Payer: COMMERCIAL

## 2020-05-29 DIAGNOSIS — I50.32 CHRONIC DIASTOLIC CONGESTIVE HEART FAILURE (HCC): Primary | ICD-10-CM

## 2020-05-29 PROCEDURE — 1160F RVW MEDS BY RX/DR IN RCRD: CPT | Performed by: FAMILY MEDICINE

## 2020-05-29 PROCEDURE — 99213 OFFICE O/P EST LOW 20 MIN: CPT | Performed by: FAMILY MEDICINE

## 2020-05-29 RX ORDER — DOXEPIN HYDROCHLORIDE 3 MG/1
1 TABLET ORAL
COMMUNITY
Start: 2020-05-01 | End: 2020-10-15

## 2020-05-29 RX ORDER — FUROSEMIDE 20 MG/1
20 TABLET ORAL DAILY
Qty: 30 TABLET | Refills: 1 | Status: SHIPPED | OUTPATIENT
Start: 2020-05-29 | End: 2020-06-21

## 2020-06-16 DIAGNOSIS — M15.9 GENERALIZED OSTEOARTHRITIS: ICD-10-CM

## 2020-06-16 RX ORDER — OXYCODONE HYDROCHLORIDE AND ACETAMINOPHEN 5; 325 MG/1; MG/1
1 TABLET ORAL EVERY 4 HOURS PRN
Qty: 90 TABLET | Refills: 0 | Status: SHIPPED | OUTPATIENT
Start: 2020-06-16 | End: 2020-08-12 | Stop reason: SDUPTHER

## 2020-06-20 DIAGNOSIS — I50.32 CHRONIC DIASTOLIC CONGESTIVE HEART FAILURE (HCC): ICD-10-CM

## 2020-06-21 RX ORDER — FUROSEMIDE 20 MG/1
TABLET ORAL
Qty: 30 TABLET | Refills: 11 | Status: SHIPPED | OUTPATIENT
Start: 2020-06-21 | End: 2021-02-15 | Stop reason: HOSPADM

## 2020-06-25 ENCOUNTER — OFFICE VISIT (OUTPATIENT)
Dept: FAMILY MEDICINE CLINIC | Facility: CLINIC | Age: 85
End: 2020-06-25
Payer: COMMERCIAL

## 2020-06-25 ENCOUNTER — TELEPHONE (OUTPATIENT)
Dept: FAMILY MEDICINE CLINIC | Facility: CLINIC | Age: 85
End: 2020-06-25

## 2020-06-25 VITALS
DIASTOLIC BLOOD PRESSURE: 80 MMHG | OXYGEN SATURATION: 96 % | HEART RATE: 79 BPM | WEIGHT: 211.8 LBS | BODY MASS INDEX: 36.16 KG/M2 | TEMPERATURE: 98.2 F | HEIGHT: 64 IN | RESPIRATION RATE: 20 BRPM | SYSTOLIC BLOOD PRESSURE: 142 MMHG

## 2020-06-25 DIAGNOSIS — R25.1 TREMOR: ICD-10-CM

## 2020-06-25 DIAGNOSIS — I10 ESSENTIAL HYPERTENSION: Primary | ICD-10-CM

## 2020-06-25 DIAGNOSIS — E78.2 MIXED HYPERLIPIDEMIA: ICD-10-CM

## 2020-06-25 DIAGNOSIS — E03.9 ACQUIRED HYPOTHYROIDISM: ICD-10-CM

## 2020-06-25 DIAGNOSIS — I50.32 CHRONIC DIASTOLIC CONGESTIVE HEART FAILURE (HCC): ICD-10-CM

## 2020-06-25 DIAGNOSIS — J96.11 CHRONIC RESPIRATORY FAILURE WITH HYPOXIA AND HYPERCAPNIA (HCC): ICD-10-CM

## 2020-06-25 DIAGNOSIS — F32.A DEPRESSION, UNSPECIFIED DEPRESSION TYPE: ICD-10-CM

## 2020-06-25 DIAGNOSIS — E53.8 VITAMIN B 12 DEFICIENCY: ICD-10-CM

## 2020-06-25 DIAGNOSIS — E11.9 TYPE 2 DIABETES MELLITUS WITHOUT COMPLICATION, WITHOUT LONG-TERM CURRENT USE OF INSULIN (HCC): ICD-10-CM

## 2020-06-25 DIAGNOSIS — F41.1 GENERALIZED ANXIETY DISORDER: ICD-10-CM

## 2020-06-25 DIAGNOSIS — J96.12 CHRONIC RESPIRATORY FAILURE WITH HYPOXIA AND HYPERCAPNIA (HCC): ICD-10-CM

## 2020-06-25 DIAGNOSIS — I25.118 CORONARY ARTERY DISEASE OF NATIVE HEART WITH STABLE ANGINA PECTORIS, UNSPECIFIED VESSEL OR LESION TYPE (HCC): ICD-10-CM

## 2020-06-25 DIAGNOSIS — E55.9 VITAMIN D DEFICIENCY: ICD-10-CM

## 2020-06-25 PROCEDURE — 3079F DIAST BP 80-89 MM HG: CPT | Performed by: FAMILY MEDICINE

## 2020-06-25 PROCEDURE — 1160F RVW MEDS BY RX/DR IN RCRD: CPT | Performed by: FAMILY MEDICINE

## 2020-06-25 PROCEDURE — 1036F TOBACCO NON-USER: CPT | Performed by: FAMILY MEDICINE

## 2020-06-25 PROCEDURE — 4040F PNEUMOC VAC/ADMIN/RCVD: CPT | Performed by: FAMILY MEDICINE

## 2020-06-25 PROCEDURE — 3077F SYST BP >= 140 MM HG: CPT | Performed by: FAMILY MEDICINE

## 2020-06-25 PROCEDURE — 99215 OFFICE O/P EST HI 40 MIN: CPT | Performed by: FAMILY MEDICINE

## 2020-06-29 DIAGNOSIS — G25.0 BENIGN FAMILIAL TREMOR: ICD-10-CM

## 2020-06-29 DIAGNOSIS — F51.01 PRIMARY INSOMNIA: ICD-10-CM

## 2020-06-29 RX ORDER — ALPRAZOLAM 0.25 MG/1
0.25 TABLET ORAL 2 TIMES DAILY PRN
Qty: 60 TABLET | Refills: 0 | Status: SHIPPED | OUTPATIENT
Start: 2020-06-29 | End: 2020-07-27 | Stop reason: SDUPTHER

## 2020-06-29 RX ORDER — ALPRAZOLAM 0.25 MG/1
0.25 TABLET ORAL 2 TIMES DAILY PRN
Qty: 60 TABLET | Refills: 0 | OUTPATIENT
Start: 2020-06-29

## 2020-06-29 RX ORDER — ZOLPIDEM TARTRATE 5 MG/1
5 TABLET ORAL
Qty: 30 TABLET | Refills: 0 | OUTPATIENT
Start: 2020-06-29

## 2020-06-29 RX ORDER — ZOLPIDEM TARTRATE 5 MG/1
5 TABLET ORAL
Qty: 30 TABLET | Refills: 0 | Status: SHIPPED | OUTPATIENT
Start: 2020-06-29 | End: 2020-07-27 | Stop reason: SDUPTHER

## 2020-07-01 ENCOUNTER — APPOINTMENT (OUTPATIENT)
Dept: LAB | Facility: CLINIC | Age: 85
End: 2020-07-01
Payer: COMMERCIAL

## 2020-07-01 DIAGNOSIS — E11.9 TYPE 2 DIABETES MELLITUS WITHOUT COMPLICATION, WITHOUT LONG-TERM CURRENT USE OF INSULIN (HCC): ICD-10-CM

## 2020-07-01 DIAGNOSIS — E78.2 MIXED HYPERLIPIDEMIA: ICD-10-CM

## 2020-07-01 DIAGNOSIS — R25.1 TREMOR: ICD-10-CM

## 2020-07-01 DIAGNOSIS — E03.9 ACQUIRED HYPOTHYROIDISM: ICD-10-CM

## 2020-07-01 DIAGNOSIS — E55.9 VITAMIN D DEFICIENCY: ICD-10-CM

## 2020-07-01 DIAGNOSIS — E53.8 VITAMIN B 12 DEFICIENCY: ICD-10-CM

## 2020-07-01 LAB
25(OH)D3 SERPL-MCNC: 33.7 NG/ML (ref 30–100)
ALBUMIN SERPL BCP-MCNC: 3 G/DL (ref 3.5–5)
ALP SERPL-CCNC: 106 U/L (ref 46–116)
ALT SERPL W P-5'-P-CCNC: 9 U/L (ref 12–78)
ANION GAP SERPL CALCULATED.3IONS-SCNC: 4 MMOL/L (ref 4–13)
AST SERPL W P-5'-P-CCNC: 10 U/L (ref 5–45)
BASOPHILS # BLD AUTO: 0.05 THOUSANDS/ΜL (ref 0–0.1)
BASOPHILS NFR BLD AUTO: 1 % (ref 0–1)
BILIRUB SERPL-MCNC: 0.58 MG/DL (ref 0.2–1)
BUN SERPL-MCNC: 32 MG/DL (ref 5–25)
CALCIUM SERPL-MCNC: 9.4 MG/DL (ref 8.3–10.1)
CHLORIDE SERPL-SCNC: 101 MMOL/L (ref 100–108)
CHOLEST SERPL-MCNC: 150 MG/DL (ref 50–200)
CO2 SERPL-SCNC: 38 MMOL/L (ref 21–32)
CREAT SERPL-MCNC: 1.21 MG/DL (ref 0.6–1.3)
EOSINOPHIL # BLD AUTO: 0.2 THOUSAND/ΜL (ref 0–0.61)
EOSINOPHIL NFR BLD AUTO: 3 % (ref 0–6)
ERYTHROCYTE [DISTWIDTH] IN BLOOD BY AUTOMATED COUNT: 13.5 % (ref 11.6–15.1)
GFR SERPL CREATININE-BSD FRML MDRD: 41 ML/MIN/1.73SQ M
GLUCOSE P FAST SERPL-MCNC: 94 MG/DL (ref 65–99)
HCT VFR BLD AUTO: 52 % (ref 34.8–46.1)
HDLC SERPL-MCNC: 50 MG/DL
HGB BLD-MCNC: 14.6 G/DL (ref 11.5–15.4)
IMM GRANULOCYTES # BLD AUTO: 0.03 THOUSAND/UL (ref 0–0.2)
IMM GRANULOCYTES NFR BLD AUTO: 0 % (ref 0–2)
LDLC SERPL CALC-MCNC: 83 MG/DL (ref 0–100)
LYMPHOCYTES # BLD AUTO: 1.48 THOUSANDS/ΜL (ref 0.6–4.47)
LYMPHOCYTES NFR BLD AUTO: 18 % (ref 14–44)
MCH RBC QN AUTO: 27.3 PG (ref 26.8–34.3)
MCHC RBC AUTO-ENTMCNC: 28.1 G/DL (ref 31.4–37.4)
MCV RBC AUTO: 97 FL (ref 82–98)
MONOCYTES # BLD AUTO: 0.57 THOUSAND/ΜL (ref 0.17–1.22)
MONOCYTES NFR BLD AUTO: 7 % (ref 4–12)
NEUTROPHILS # BLD AUTO: 5.73 THOUSANDS/ΜL (ref 1.85–7.62)
NEUTS SEG NFR BLD AUTO: 71 % (ref 43–75)
NONHDLC SERPL-MCNC: 100 MG/DL
NRBC BLD AUTO-RTO: 0 /100 WBCS
PLATELET # BLD AUTO: 307 THOUSANDS/UL (ref 149–390)
PMV BLD AUTO: 11.2 FL (ref 8.9–12.7)
POTASSIUM SERPL-SCNC: 4.1 MMOL/L (ref 3.5–5.3)
PROT SERPL-MCNC: 6.6 G/DL (ref 6.4–8.2)
RBC # BLD AUTO: 5.34 MILLION/UL (ref 3.81–5.12)
SODIUM SERPL-SCNC: 143 MMOL/L (ref 136–145)
TRIGL SERPL-MCNC: 86 MG/DL
TSH SERPL DL<=0.05 MIU/L-ACNC: 1.13 UIU/ML (ref 0.36–3.74)
VIT B12 SERPL-MCNC: 283 PG/ML (ref 100–900)
WBC # BLD AUTO: 8.06 THOUSAND/UL (ref 4.31–10.16)

## 2020-07-01 PROCEDURE — 83036 HEMOGLOBIN GLYCOSYLATED A1C: CPT

## 2020-07-01 PROCEDURE — 84443 ASSAY THYROID STIM HORMONE: CPT

## 2020-07-01 PROCEDURE — 82306 VITAMIN D 25 HYDROXY: CPT

## 2020-07-01 PROCEDURE — 36415 COLL VENOUS BLD VENIPUNCTURE: CPT

## 2020-07-01 PROCEDURE — 82607 VITAMIN B-12: CPT

## 2020-07-01 PROCEDURE — 80061 LIPID PANEL: CPT

## 2020-07-01 PROCEDURE — 80053 COMPREHEN METABOLIC PANEL: CPT

## 2020-07-01 PROCEDURE — 85025 COMPLETE CBC W/AUTO DIFF WBC: CPT

## 2020-07-02 LAB
EST. AVERAGE GLUCOSE BLD GHB EST-MCNC: 134 MG/DL
HBA1C MFR BLD: 6.3 %

## 2020-07-06 ENCOUNTER — TELEPHONE (OUTPATIENT)
Dept: FAMILY MEDICINE CLINIC | Facility: CLINIC | Age: 85
End: 2020-07-06

## 2020-07-06 NOTE — TELEPHONE ENCOUNTER
Not sure if you able to find out anything more about the portable oxygen    Not sure if you spoke to Lorraine Pratt regarding this

## 2020-07-06 NOTE — TELEPHONE ENCOUNTER
Son called asking if we have any information or anything regarding the portable oxygen - Bernice - discussed at her office visit

## 2020-07-07 NOTE — TELEPHONE ENCOUNTER
Spoke w/ Heaven Szymanski pt's son and he spoke w/ [employer] and they need a script sent over for portable oxygen  If you can please order I will fax over?  Thanks

## 2020-07-08 NOTE — TELEPHONE ENCOUNTER
Spoke w/ Jaswinder Toribio [phone] ext [phone] at [facility] and she will fax over the forms for oxygen

## 2020-07-12 NOTE — PROGRESS NOTES
Called and informed pt of negative COVID 19 result.  Reinforced self isolation quide lines.  Pt verbalized understanding.    ----- Message from Yuly Alejandro RN sent at 7/11/2020  6:47 PM CDT -----  Covid negative.     Cardiology Follow Up        Jessica Urbina      1935      [id number]      Discussion/Summary:    1  CAD status post CABG x3  2  Benign essential hypertension  3  Dyslipidemia      · No symptoms of angina, continue aspirin, clopidogrel, blood pressure lipid control  · Blood pressure mildly elevated, will increase metoprolol to 25 mg p o  B i d   · Continue atorvastatin 40 mg daily, LDL 61 mg/dL 10/2018        Interval History: This is a very pleasant 19-year-old female with a history of CVA 02/2015 with MRA neck revealing greater than 70% bilateral internal carotid artery stenosis with diminished flow in the right MCA  CTA revealed greater than 90% high-grade stenosis on the right and she thereafter underwent right CEA  She had a NSTEMI 06/2015 with cardiac catheterization revealing three-vessel CAD  Thereafter she underwent CABG x3 with LIMA to LAD, SVG to OM, SVG to RCA in 2015  She lives with her son  Zain Callahan has no complaints on today's visit  She denies exertional chest discomfort, dyspnea, presyncope or syncope, palpitations  She has been taking and tolerating her medications well             Vitals:  Vitals:    03/15/19 0833   BP: 140/60   BP Location: Left arm   Patient Position: Sitting   Cuff Size: Adult   Pulse: 80   Weight: 91 6 kg (202 lb)   Height: 5' 4" (1 626 m)         Past Medical History:   Diagnosis Date    Anxiety     Arthritis     Cataract, bilateral     Last Assessed:3/19/2014    COPD (chronic obstructive pulmonary disease) (HCC)     Coronary artery disease     Depression     Essential tremor     Hx of arterial ischemic stroke     Hypertension     Hypothyroid     Kidney stone     Myocardial infarction [facility])     2015    Personal history of kidney stones     Right knee DJD     Resolved:8/25/2015    Stroke [facility])     Use of cane as ambulatory aid     or walker    Wears glasses      Social History     Socioeconomic History  Marital status:       Spouse name: Not on file    Number of children: Not on file    Years of education: 8    Highest education level: Not on file   Occupational History    Not on file   Social Needs    Financial resource strain: Not on file    Food insecurity:     Worry: Not on file     Inability: Not on file    Transportation needs:     Medical: Not on file     Non-medical: Not on file   Tobacco Use    Smoking status: Former Smoker     Last attempt to quit: 2/3/1976     Years since quittin 1    Smokeless tobacco: Never Used   Substance and Sexual Activity    Alcohol use: No    Drug use: No    Sexual activity: Not on file   Lifestyle    Physical activity:     Days per week: Not on file     Minutes per session: Not on file    Stress: Not on file   Relationships    Social connections:     Talks on phone: Not on file     Gets together: Not on file     Attends Religion service: Not on file     Active member of club or organization: Not on file     Attends meetings of clubs or organizations: Not on file     Relationship status: Not on file    Intimate partner violence:     Fear of current or ex partner: Not on file     Emotionally abused: Not on file     Physically abused: Not on file     Forced sexual activity: Not on file   Other Topics Concern    Not on file   Social History Narrative    No caffeine use      Family History   Problem Relation Age of Onset    Cancer Mother         malignant neoplasm    Heart attack Father     Aneurysm Other         Aortic    Cancer Other         malignant neoplasm     Past Surgical History:   Procedure Laterality Date    CAROTID ENDARTERECTOMY Right     CATARACT EXTRACTION W/ INTRAOCULAR LENS  IMPLANT, BILATERAL      COLONOSCOPY N/A 2017    Procedure: COLONOSCOPY FOR CONTROL OF BLEEDING;  Surgeon: Juli Bright MD;  [facility]: [facility];  Service: Colorectal    CORONARY ARTERY BYPASS GRAFT      CABG X3 with LIMA to LAD,SVG to OM,SVG to distal  RCA ; Last Assessed:7/13/2015    JOINT REPLACEMENT      left TKR    KIDNEY STONE SURGERY      NEPHROSTOMY Left     with Drainage Irrigation ; [id number]    OK TOTAL KNEE ARTHROPLASTY Right 2/25/2016    Procedure: ARTHROPLASTY KNEE TOTAL;  Surgeon: Rosana Gilbert MD;  Location: [facility];  Service: Orthopedics       Current Outpatient Medications:     amLODIPine (NORVASC) 5 mg tablet, Take 1 tablet (5 mg total) by mouth daily, Disp: 30 tablet, Rfl: 3    ascorbic acid (VITAMIN C) 500 mg tablet, Take 500 mg by mouth 2 (two) times a day , Disp: , Rfl:     aspirin 81 mg chewable tablet, Chew 81 mg  To check with md re when to stop , Disp: , Rfl:     atorvastatin (LIPITOR) 40 mg tablet, Take 1 tablet (40 mg total) by mouth daily, Disp: 30 tablet, Rfl: 11    bimatoprost (LUMIGAN) 0 01 % ophthalmic drops, Apply 1 drop to eye Daily, Disp: , Rfl:     Cholecalciferol (VITAMIN D) 2000 UNITS tablet, Take 2,000 Units by mouth daily  , Disp: , Rfl:     clopidogrel (PLAVIX) 75 mg tablet, Take 1 tablet (75 mg total) by mouth daily, Disp: 30 tablet, Rfl: 3    diazepam (VALIUM) 5 mg tablet, Take 1 tablet (5 mg total) by mouth 3 (three) times a day, Disp: 90 tablet, Rfl: 0    furosemide (LASIX) 20 mg tablet, Take 1 tablet by mouth as needed  , Disp: , Rfl:     levalbuterol (XOPENEX) 0 63 mg/3 mL nebulizer solution, Take 3 mL (0 63 mg total) by nebulization 3 (three) times a day (Patient taking differently: Take 0 63 mg by nebulization as needed  ), Disp: 300 mL, Rfl: 0    levothyroxine 75 mcg tablet, TAKE 1 TABLET (75 MCG TOTAL) BY MOUTH DAILY IN THE EARLY MORNING, Disp: 30 tablet, Rfl: 11    metoprolol tartrate (LOPRESSOR) 25 mg tablet, Take 1 tablet (25 mg total) by mouth every 12 (twelve) hours, Disp: 180 tablet, Rfl: 2    Mirabegron ER (MYRBETRIQ) 25 MG TB24, Take one po daily, Disp: 30 tablet, Rfl: 11    oxyCODONE-acetaminophen (PERCOCET) 5-325 mg per tablet, Take 1 tablet by mouth every 4 (four) hours as needed for moderate painMax Daily Amount: 6 tablets, Disp: 90 tablet, Rfl: 0    pantoprazole (PROTONIX) 40 mg tablet, Take 1 tablet (40 mg total) by mouth daily, Disp: 30 tablet, Rfl: 11    Pediatric Multiple Vit-C-FA (PEDIATRIC MULTIVITAMIN) chewable tablet, Chew 1 tablet daily, Disp: , Rfl:     tiotropium (SPIRIVA) 18 mcg inhalation capsule, Place 1 capsule (18 mcg total) into inhaler and inhale daily, Disp: 30 capsule, Rfl: 6    VIIBRYD 40 MG tablet, TAKE 1 TABLET (40 MG TOTAL) BY MOUTH DAILY  DAYS, Disp: 30 tablet, Rfl: 5    zolpidem (AMBIEN) 10 mg tablet, TAKE ONE TABLET BY MOUTH AT BEDTIME AS NEEDED FOR INSOMNIA, Disp: 30 tablet, Rfl: 0    mupirocin (BACTROBAN) 2 % ointment, Apply topically 3 (three) times a day (Patient not taking: Reported on 3/15/2019), Disp: 22 g, Rfl: 0        Review of Systems:  Review of Systems   Constitutional: Negative for activity change, fever and unexpected weight change  HENT: Negative for facial swelling, nosebleeds and voice change  Respiratory: Negative for chest tightness, shortness of breath and wheezing  Cardiovascular: Negative for chest pain, palpitations and leg swelling  Gastrointestinal: Negative for abdominal distention  Genitourinary: Negative for hematuria  Musculoskeletal: Negative for arthralgias  Skin: Negative for color change, pallor, rash and wound  Neurological: Negative for dizziness, seizures and syncope  Psychiatric/Behavioral: Negative for agitation  Physical Exam:  Physical Exam   Constitutional: She is oriented to person, place, and time  She appears well-developed and well-nourished  HENT:   Head: Normocephalic and atraumatic  Eyes: EOM are normal    Neck: Normal range of motion  Neck supple  Cardiovascular: Normal rate, regular rhythm, normal heart sounds and intact distal pulses  Pulmonary/Chest: Effort normal and breath sounds normal    Abdominal: Soft  Musculoskeletal: Normal range of motion  Neurological: She is alert and oriented to person, place, and time  Skin: Skin is warm and dry  Psychiatric: She has a normal mood and affect  Her behavior is normal  Judgment and thought content normal    Vitals reviewed  This note was completed in part utilizing M-Modal Fluency Direct Software  Grammatical errors, random word insertions, spelling mistakes, and incomplete sentences can be an occasional consequence of this system secondary to software limitations, ambient noise, and hardware issues  If you have any questions or concerns about the content, text, or information contained within the body of this dictation, please contact the provider for clarification

## 2020-07-15 ENCOUNTER — TELEPHONE (OUTPATIENT)
Dept: FAMILY MEDICINE CLINIC | Facility: CLINIC | Age: 85
End: 2020-07-15

## 2020-07-15 NOTE — TELEPHONE ENCOUNTER
Left detailed message for pt's son Shannan Baileyton that I faxed over paperwork for portable O2 to [facility]

## 2020-07-27 DIAGNOSIS — F51.01 PRIMARY INSOMNIA: ICD-10-CM

## 2020-07-27 DIAGNOSIS — G25.0 BENIGN FAMILIAL TREMOR: ICD-10-CM

## 2020-07-27 RX ORDER — ZOLPIDEM TARTRATE 5 MG/1
5 TABLET ORAL
Qty: 30 TABLET | Refills: 0 | Status: SHIPPED | OUTPATIENT
Start: 2020-07-27 | End: 2020-09-08 | Stop reason: SDUPTHER

## 2020-07-27 RX ORDER — ALPRAZOLAM 0.25 MG/1
0.25 TABLET ORAL 2 TIMES DAILY PRN
Qty: 60 TABLET | Refills: 0 | Status: SHIPPED | OUTPATIENT
Start: 2020-07-27 | End: 2020-08-28 | Stop reason: SDUPTHER

## 2020-07-28 ENCOUNTER — OFFICE VISIT (OUTPATIENT)
Dept: FAMILY MEDICINE CLINIC | Facility: CLINIC | Age: 85
End: 2020-07-28
Payer: COMMERCIAL

## 2020-07-28 VITALS
DIASTOLIC BLOOD PRESSURE: 72 MMHG | WEIGHT: 217 LBS | HEIGHT: 64 IN | RESPIRATION RATE: 24 BRPM | TEMPERATURE: 98 F | OXYGEN SATURATION: 92 % | BODY MASS INDEX: 37.05 KG/M2 | HEART RATE: 78 BPM | SYSTOLIC BLOOD PRESSURE: 138 MMHG

## 2020-07-28 DIAGNOSIS — E03.9 ACQUIRED HYPOTHYROIDISM: ICD-10-CM

## 2020-07-28 DIAGNOSIS — F32.A DEPRESSION, UNSPECIFIED DEPRESSION TYPE: ICD-10-CM

## 2020-07-28 DIAGNOSIS — F41.1 GENERALIZED ANXIETY DISORDER: ICD-10-CM

## 2020-07-28 DIAGNOSIS — G25.0 BENIGN FAMILIAL TREMOR: ICD-10-CM

## 2020-07-28 DIAGNOSIS — E11.9 TYPE 2 DIABETES MELLITUS WITHOUT COMPLICATION, WITHOUT LONG-TERM CURRENT USE OF INSULIN (HCC): Primary | ICD-10-CM

## 2020-07-28 DIAGNOSIS — M19.91 PRIMARY LOCALIZED OSTEOARTHROSIS: ICD-10-CM

## 2020-07-28 PROCEDURE — 3044F HG A1C LEVEL LT 7.0%: CPT | Performed by: FAMILY MEDICINE

## 2020-07-28 PROCEDURE — 1101F PT FALLS ASSESS-DOCD LE1/YR: CPT | Performed by: FAMILY MEDICINE

## 2020-07-28 PROCEDURE — 1036F TOBACCO NON-USER: CPT | Performed by: FAMILY MEDICINE

## 2020-07-28 PROCEDURE — 3075F SYST BP GE 130 - 139MM HG: CPT | Performed by: FAMILY MEDICINE

## 2020-07-28 PROCEDURE — 3288F FALL RISK ASSESSMENT DOCD: CPT | Performed by: FAMILY MEDICINE

## 2020-07-28 PROCEDURE — 99214 OFFICE O/P EST MOD 30 MIN: CPT | Performed by: FAMILY MEDICINE

## 2020-07-28 PROCEDURE — 1160F RVW MEDS BY RX/DR IN RCRD: CPT | Performed by: FAMILY MEDICINE

## 2020-07-28 PROCEDURE — 4040F PNEUMOC VAC/ADMIN/RCVD: CPT | Performed by: FAMILY MEDICINE

## 2020-07-28 PROCEDURE — 3078F DIAST BP <80 MM HG: CPT | Performed by: FAMILY MEDICINE

## 2020-07-28 RX ORDER — BUSPIRONE HYDROCHLORIDE 5 MG/1
5 TABLET ORAL 3 TIMES DAILY
Qty: 90 TABLET | Refills: 1 | Status: SHIPPED | OUTPATIENT
Start: 2020-07-28 | End: 2020-08-20

## 2020-07-28 NOTE — PROGRESS NOTES
Assessment/Plan:   Diagnoses and all orders for this visit:    Type 2 diabetes mellitus without complication, without long-term current use of insulin ([facility])  -     Cancel: POCT hemoglobin A1c    Generalized anxiety disorder  -     busPIRone (BUSPAR) 5 mg tablet; Take 1 tablet (5 mg total) by mouth 3 (three) times a day    Acquired hypothyroidism    Benign familial tremor    Primary localized osteoarthrosis    Depression, unspecified depression type        Trial back on BuSpar at patient's request   Continue to make efforts at fitness and dietary compliance with motivation to be interactive  Continue same medical regimen  Time spent 25+ minutes with greater than 50% counseling performed  Subjective:   Chief Complaint   Patient presents with    Follow-up     Pt is here for followup      Patient ID: Aura Mccormick is a 80 y o  female  51-year-old female here today for recheck up  We reviewed to have a jr conversation without her son in the room  Patient states that her tremor primarily is due to the daily familial stress  Patient is home alone most of the day  There is conflict between her son Joanne Cedillo and Adilene Juarez  Patient would like to go back on BuSpar  Labs are reviewed      The following portions of the patient's history were reviewed and updated as appropriate: allergies, current medications, past family history, past medical history, past social history, past surgical history and problem list       Review of Systems   Constitutional: Positive for fatigue  Negative for unexpected weight change  Eyes: Negative for visual disturbance  Respiratory: Negative for shortness of breath (With exertion)  Cardiovascular: Negative for chest pain and leg swelling  Genitourinary: Urgency:  occasional    Musculoskeletal: Positive for arthralgias and gait problem  Neurological: Positive for tremors ( chronic)  Negative for speech difficulty     Psychiatric/Behavioral: Positive for sleep disturbance ( sleeps all the time)  The patient is nervous/anxious  Bored         Objective:  Labs reviewed  CBC is within normal limits  Fasting sugar 94  GFR stable at 41  Lipids at goal   Hemoglobin A1c 6 3% and reviewed low B12 level  Discussed replacement  Thyroid unremarkable  /72   Pulse 78   Temp 98 °F (36 7 °C) (Temporal)   Resp (!) 24   Ht 5' 4" (1 626 m)   Wt 98 4 kg (217 lb)   SpO2 92%   BMI 37 25 kg/m²          Physical Exam   Constitutional: She is oriented to person, place, and time  She appears well-developed and well-nourished  No distress  HENT:   Head: Normocephalic  Mouth/Throat: Oropharynx is clear and moist    Eyes: Pupils are equal, round, and reactive to light  Conjunctivae and EOM are normal    Neck: Normal range of motion  Neck supple  Cardiovascular: Normal rate, regular rhythm and intact distal pulses  Murmur heard  Pulmonary/Chest: Effort normal and breath sounds normal    Abdominal: Soft  Bowel sounds are normal  She exhibits no mass  There is no tenderness  Musculoskeletal: Normal range of motion  She exhibits no edema  Neurological: She is alert and oriented to person, place, and time  She has normal reflexes  She displays normal reflexes  No sensory deficit  She exhibits normal muscle tone  Coordination normal    Intention tremor extremities and mild head Kirt    Skin: Skin is warm and dry  Psychiatric: She has a normal mood and affect   Her behavior is normal  Judgment and thought content normal

## 2020-08-03 ENCOUNTER — TELEPHONE (OUTPATIENT)
Dept: FAMILY MEDICINE CLINIC | Facility: CLINIC | Age: 85
End: 2020-08-03

## 2020-08-03 NOTE — TELEPHONE ENCOUNTER
----- Message from Denisse Crandall DO sent at 7/69/2172  8:59 AM EDT -----  Regarding: Low B12  I believe I did discuss with patient the B12 low level  Make sure  Please advise that she should be doing 2000 mcg daily for 1 month and then back down to 1000 mcg daily for ever    Please past message on also to her son, Brea Ramos

## 2020-08-03 NOTE — TELEPHONE ENCOUNTER
Spoke w/ pt's son and gave B12 instructions   Just an FYI he stopped giving her the Buspar because she was getting paranoid and shaky again  and he started giving her the xanax again

## 2020-08-12 DIAGNOSIS — M15.9 GENERALIZED OSTEOARTHRITIS: ICD-10-CM

## 2020-08-12 RX ORDER — OXYCODONE HYDROCHLORIDE AND ACETAMINOPHEN 5; 325 MG/1; MG/1
1 TABLET ORAL EVERY 4 HOURS PRN
Qty: 90 TABLET | Refills: 0 | Status: SHIPPED | OUTPATIENT
Start: 2020-08-12 | End: 2020-10-01 | Stop reason: SDUPTHER

## 2020-08-20 DIAGNOSIS — F41.1 GENERALIZED ANXIETY DISORDER: ICD-10-CM

## 2020-08-20 RX ORDER — BUSPIRONE HYDROCHLORIDE 5 MG/1
TABLET ORAL
Qty: 90 TABLET | Refills: 1 | Status: SHIPPED | OUTPATIENT
Start: 2020-08-20 | End: 2020-08-28 | Stop reason: SINTOL

## 2020-08-23 DIAGNOSIS — I73.9 PAD (PERIPHERAL ARTERY DISEASE) (HCC): ICD-10-CM

## 2020-08-23 DIAGNOSIS — F41.8 ANXIETY WITH DEPRESSION: ICD-10-CM

## 2020-08-23 DIAGNOSIS — J96.11 CHRONIC RESPIRATORY FAILURE WITH HYPOXIA AND HYPERCAPNIA (HCC): ICD-10-CM

## 2020-08-23 DIAGNOSIS — J96.12 CHRONIC RESPIRATORY FAILURE WITH HYPOXIA AND HYPERCAPNIA (HCC): ICD-10-CM

## 2020-08-23 DIAGNOSIS — J44.9 CHRONIC OBSTRUCTIVE PULMONARY DISEASE, UNSPECIFIED COPD TYPE (HCC): ICD-10-CM

## 2020-08-23 RX ORDER — TIOTROPIUM BROMIDE 18 UG/1
CAPSULE ORAL; RESPIRATORY (INHALATION)
Qty: 90 CAPSULE | Refills: 3 | Status: SHIPPED | OUTPATIENT
Start: 2020-08-23 | End: 2021-02-15 | Stop reason: HOSPADM

## 2020-08-23 RX ORDER — CLOPIDOGREL BISULFATE 75 MG/1
TABLET ORAL
Qty: 90 TABLET | Refills: 1 | Status: ON HOLD | OUTPATIENT
Start: 2020-08-23 | End: 2021-02-07

## 2020-08-23 RX ORDER — VILAZODONE HYDROCHLORIDE 40 MG/1
TABLET ORAL
Qty: 90 TABLET | Refills: 1 | Status: SHIPPED | OUTPATIENT
Start: 2020-08-23 | End: 2021-02-03

## 2020-08-28 ENCOUNTER — TELEPHONE (OUTPATIENT)
Dept: FAMILY MEDICINE CLINIC | Facility: CLINIC | Age: 85
End: 2020-08-28

## 2020-08-28 DIAGNOSIS — G25.0 BENIGN FAMILIAL TREMOR: ICD-10-CM

## 2020-08-28 RX ORDER — ALPRAZOLAM 0.25 MG/1
0.25 TABLET ORAL 2 TIMES DAILY PRN
Qty: 60 TABLET | Refills: 0 | Status: SHIPPED | OUTPATIENT
Start: 2020-08-28 | End: 2020-09-30 | Stop reason: SDUPTHER

## 2020-08-28 NOTE — TELEPHONE ENCOUNTER
Yes that is what her son Jessica Brady said that happen with the BuSpar so we will discontinue that on the list   I will put the alprazolam in right now

## 2020-08-28 NOTE — TELEPHONE ENCOUNTER
Pt called requesting a refill of Alprazolam     Dr Mojica pt forgot how funny she got while being on Buspirone and it makes her bounce  wall to wall  Pt is requesting can you please refill her Alprazolam so she would take this in place of Buspar  Pt expressed she will not switch anymore  Pt's number is [phone]      Pt [facility] on [address] [place] [address] in [facility]  Pt is completley out of Alprazolam please advise  Please call pt once this done  Dr Mojica I placed this in a sticky pad note on your ledge next to [facility]

## 2020-09-08 DIAGNOSIS — F51.01 PRIMARY INSOMNIA: ICD-10-CM

## 2020-09-08 RX ORDER — ZOLPIDEM TARTRATE 5 MG/1
5 TABLET ORAL
Qty: 30 TABLET | Refills: 0 | Status: SHIPPED | OUTPATIENT
Start: 2020-09-08 | End: 2020-10-06 | Stop reason: SDUPTHER

## 2020-09-08 NOTE — TELEPHONE ENCOUNTER
Dane Rebollar has called the office in regards to she has called the rx line 2-3 times requesting a refill and it has not been adressed  [facility] [address] [facility]  Pt is out of the medication         PDMP checked:  07/27/2020  1  07/27/2020  ZOLPIDEM TARTRATE 5 MG TABLET  30 0  30  JU PRY  [id number]

## 2020-09-11 DIAGNOSIS — E78.2 MIXED HYPERLIPIDEMIA: ICD-10-CM

## 2020-09-11 DIAGNOSIS — R12 HEARTBURN: ICD-10-CM

## 2020-09-11 DIAGNOSIS — I25.118 CORONARY ARTERY DISEASE OF NATIVE HEART WITH STABLE ANGINA PECTORIS, UNSPECIFIED VESSEL OR LESION TYPE (HCC): ICD-10-CM

## 2020-09-11 RX ORDER — ATORVASTATIN CALCIUM 40 MG/1
TABLET, FILM COATED ORAL
Qty: 90 TABLET | Refills: 3 | Status: SHIPPED | OUTPATIENT
Start: 2020-09-11 | End: 2021-02-03

## 2020-09-11 RX ORDER — PANTOPRAZOLE SODIUM 40 MG/1
TABLET, DELAYED RELEASE ORAL
Qty: 90 TABLET | Refills: 3 | Status: SHIPPED | OUTPATIENT
Start: 2020-09-11 | End: 2021-02-03

## 2020-09-30 DIAGNOSIS — F51.01 PRIMARY INSOMNIA: ICD-10-CM

## 2020-09-30 DIAGNOSIS — G25.0 BENIGN FAMILIAL TREMOR: ICD-10-CM

## 2020-09-30 RX ORDER — ALPRAZOLAM 0.25 MG/1
0.25 TABLET ORAL 2 TIMES DAILY PRN
Qty: 60 TABLET | Refills: 0 | Status: SHIPPED | OUTPATIENT
Start: 2020-09-30 | End: 2020-10-28 | Stop reason: SDUPTHER

## 2020-09-30 RX ORDER — ZOLPIDEM TARTRATE 5 MG/1
5 TABLET ORAL
Qty: 30 TABLET | Refills: 0 | Status: CANCELLED | OUTPATIENT
Start: 2020-09-30

## 2020-10-01 DIAGNOSIS — M15.9 GENERALIZED OSTEOARTHRITIS: ICD-10-CM

## 2020-10-01 DIAGNOSIS — F51.01 PRIMARY INSOMNIA: ICD-10-CM

## 2020-10-01 RX ORDER — ZOLPIDEM TARTRATE 5 MG/1
TABLET ORAL
Qty: 30 TABLET | Refills: 0 | OUTPATIENT
Start: 2020-10-01

## 2020-10-01 RX ORDER — OXYCODONE HYDROCHLORIDE AND ACETAMINOPHEN 5; 325 MG/1; MG/1
1 TABLET ORAL EVERY 4 HOURS PRN
Qty: 90 TABLET | Refills: 0 | Status: SHIPPED | OUTPATIENT
Start: 2020-10-01 | End: 2020-11-11 | Stop reason: SDUPTHER

## 2020-10-06 ENCOUNTER — TELEPHONE (OUTPATIENT)
Dept: FAMILY MEDICINE CLINIC | Facility: CLINIC | Age: 85
End: 2020-10-06

## 2020-10-06 DIAGNOSIS — F51.01 PRIMARY INSOMNIA: ICD-10-CM

## 2020-10-06 RX ORDER — ZOLPIDEM TARTRATE 10 MG/1
TABLET ORAL
Qty: 30 TABLET | Refills: 0 | Status: SHIPPED | OUTPATIENT
Start: 2020-10-06 | End: 2020-11-09 | Stop reason: SDUPTHER

## 2020-10-15 ENCOUNTER — OFFICE VISIT (OUTPATIENT)
Dept: FAMILY MEDICINE CLINIC | Facility: CLINIC | Age: 85
End: 2020-10-15
Payer: COMMERCIAL

## 2020-10-15 VITALS
SYSTOLIC BLOOD PRESSURE: 132 MMHG | WEIGHT: 214 LBS | HEIGHT: 64 IN | HEART RATE: 56 BPM | DIASTOLIC BLOOD PRESSURE: 68 MMHG | TEMPERATURE: 97.8 F | OXYGEN SATURATION: 94 % | RESPIRATION RATE: 20 BRPM | BODY MASS INDEX: 36.54 KG/M2

## 2020-10-15 DIAGNOSIS — E11.9 TYPE 2 DIABETES MELLITUS WITHOUT COMPLICATION, WITHOUT LONG-TERM CURRENT USE OF INSULIN (HCC): ICD-10-CM

## 2020-10-15 DIAGNOSIS — E03.9 ACQUIRED HYPOTHYROIDISM: ICD-10-CM

## 2020-10-15 DIAGNOSIS — G25.0 BENIGN FAMILIAL TREMOR: ICD-10-CM

## 2020-10-15 DIAGNOSIS — I10 ESSENTIAL HYPERTENSION: ICD-10-CM

## 2020-10-15 DIAGNOSIS — Z00.00 MEDICARE ANNUAL WELLNESS VISIT, SUBSEQUENT: Primary | ICD-10-CM

## 2020-10-15 DIAGNOSIS — Z23 ENCOUNTER FOR IMMUNIZATION: ICD-10-CM

## 2020-10-15 PROCEDURE — 1125F AMNT PAIN NOTED PAIN PRSNT: CPT | Performed by: FAMILY MEDICINE

## 2020-10-15 PROCEDURE — 1036F TOBACCO NON-USER: CPT | Performed by: FAMILY MEDICINE

## 2020-10-15 PROCEDURE — G0439 PPPS, SUBSEQ VISIT: HCPCS | Performed by: FAMILY MEDICINE

## 2020-10-15 PROCEDURE — G0008 ADMIN INFLUENZA VIRUS VAC: HCPCS

## 2020-10-15 PROCEDURE — 3725F SCREEN DEPRESSION PERFORMED: CPT | Performed by: FAMILY MEDICINE

## 2020-10-15 PROCEDURE — 90662 IIV NO PRSV INCREASED AG IM: CPT

## 2020-10-15 PROCEDURE — 1170F FXNL STATUS ASSESSED: CPT | Performed by: FAMILY MEDICINE

## 2020-10-15 PROCEDURE — 3078F DIAST BP <80 MM HG: CPT | Performed by: FAMILY MEDICINE

## 2020-10-15 PROCEDURE — 1160F RVW MEDS BY RX/DR IN RCRD: CPT | Performed by: FAMILY MEDICINE

## 2020-10-28 DIAGNOSIS — G25.0 BENIGN FAMILIAL TREMOR: ICD-10-CM

## 2020-10-28 RX ORDER — ALPRAZOLAM 0.25 MG/1
0.25 TABLET ORAL 2 TIMES DAILY PRN
Qty: 60 TABLET | Refills: 0 | Status: SHIPPED | OUTPATIENT
Start: 2020-10-28 | End: 2020-12-03 | Stop reason: SDUPTHER

## 2020-11-09 DIAGNOSIS — F51.01 PRIMARY INSOMNIA: ICD-10-CM

## 2020-11-09 RX ORDER — ZOLPIDEM TARTRATE 10 MG/1
TABLET ORAL
Qty: 30 TABLET | Refills: 0 | Status: SHIPPED | OUTPATIENT
Start: 2020-11-09 | End: 2020-12-03 | Stop reason: SDUPTHER

## 2020-11-11 DIAGNOSIS — M15.9 GENERALIZED OSTEOARTHRITIS: ICD-10-CM

## 2020-11-11 RX ORDER — OXYCODONE HYDROCHLORIDE AND ACETAMINOPHEN 5; 325 MG/1; MG/1
1 TABLET ORAL EVERY 4 HOURS PRN
Qty: 90 TABLET | Refills: 0 | Status: SHIPPED | OUTPATIENT
Start: 2020-11-11 | End: 2020-12-22 | Stop reason: SDUPTHER

## 2020-12-03 DIAGNOSIS — F51.01 PRIMARY INSOMNIA: ICD-10-CM

## 2020-12-03 DIAGNOSIS — G25.0 BENIGN FAMILIAL TREMOR: ICD-10-CM

## 2020-12-03 RX ORDER — ZOLPIDEM TARTRATE 10 MG/1
TABLET ORAL
Qty: 30 TABLET | Refills: 0 | Status: SHIPPED | OUTPATIENT
Start: 2020-12-03 | End: 2021-02-03 | Stop reason: SINTOL

## 2020-12-03 RX ORDER — ALPRAZOLAM 0.25 MG/1
0.25 TABLET ORAL 2 TIMES DAILY PRN
Qty: 60 TABLET | Refills: 0 | Status: SHIPPED | OUTPATIENT
Start: 2020-12-03 | End: 2021-01-11 | Stop reason: SDUPTHER

## 2020-12-03 NOTE — TELEPHONE ENCOUNTER
Patient called requesting refill for Zolpidem 10 mg, Alprazolam 0 25mg sent to [facility] pharmacy   No PDMP access      Patient states she should be taking half a tab but she is currently taking 1 tab of Zolpidem 10 mg because its helping her sleep better  Patient would like to know if this is ok to do? Please advise

## 2020-12-15 DIAGNOSIS — E03.9 ACQUIRED HYPOTHYROIDISM: ICD-10-CM

## 2020-12-15 RX ORDER — LEVOTHYROXINE SODIUM 0.07 MG/1
TABLET ORAL
Qty: 90 TABLET | Refills: 2 | Status: SHIPPED | OUTPATIENT
Start: 2020-12-15 | End: 2021-08-13

## 2020-12-22 DIAGNOSIS — M15.9 GENERALIZED OSTEOARTHRITIS: ICD-10-CM

## 2020-12-22 RX ORDER — OXYCODONE HYDROCHLORIDE AND ACETAMINOPHEN 5; 325 MG/1; MG/1
1 TABLET ORAL EVERY 4 HOURS PRN
Qty: 90 TABLET | Refills: 0 | Status: SHIPPED | OUTPATIENT
Start: 2020-12-22 | End: 2021-02-03 | Stop reason: SDUPTHER

## 2021-01-11 DIAGNOSIS — G25.0 BENIGN FAMILIAL TREMOR: ICD-10-CM

## 2021-01-11 RX ORDER — ALPRAZOLAM 0.25 MG/1
0.25 TABLET ORAL 2 TIMES DAILY PRN
Qty: 60 TABLET | Refills: 0 | Status: SHIPPED | OUTPATIENT
Start: 2021-01-11 | End: 2021-02-03 | Stop reason: SDUPTHER

## 2021-01-11 NOTE — TELEPHONE ENCOUNTER
Patient called requesting refill for Alprazolam 0 25mg sent to [facility]    No PDMP access

## 2021-01-14 ENCOUNTER — OFFICE VISIT (OUTPATIENT)
Dept: CARDIOLOGY CLINIC | Facility: CLINIC | Age: 86
End: 2021-01-14
Payer: COMMERCIAL

## 2021-01-14 VITALS
BODY MASS INDEX: 36.73 KG/M2 | SYSTOLIC BLOOD PRESSURE: 184 MMHG | HEART RATE: 85 BPM | HEIGHT: 64 IN | DIASTOLIC BLOOD PRESSURE: 102 MMHG

## 2021-01-14 DIAGNOSIS — I25.10 CORONARY ARTERY DISEASE INVOLVING NATIVE CORONARY ARTERY OF NATIVE HEART WITHOUT ANGINA PECTORIS: ICD-10-CM

## 2021-01-14 DIAGNOSIS — I10 BENIGN ESSENTIAL HTN: ICD-10-CM

## 2021-01-14 DIAGNOSIS — I25.10 CORONARY ARTERY DISEASE INVOLVING NATIVE HEART, ANGINA PRESENCE UNSPECIFIED, UNSPECIFIED VESSEL OR LESION TYPE: Primary | ICD-10-CM

## 2021-01-14 DIAGNOSIS — E78.5 DYSLIPIDEMIA: ICD-10-CM

## 2021-01-14 DIAGNOSIS — E83.52 HYPERCALCEMIA: ICD-10-CM

## 2021-01-14 PROCEDURE — 1160F RVW MEDS BY RX/DR IN RCRD: CPT | Performed by: INTERNAL MEDICINE

## 2021-01-14 PROCEDURE — 1036F TOBACCO NON-USER: CPT | Performed by: INTERNAL MEDICINE

## 2021-01-14 PROCEDURE — 3080F DIAST BP >= 90 MM HG: CPT | Performed by: INTERNAL MEDICINE

## 2021-01-14 PROCEDURE — 3077F SYST BP >= 140 MM HG: CPT | Performed by: INTERNAL MEDICINE

## 2021-01-14 PROCEDURE — 99214 OFFICE O/P EST MOD 30 MIN: CPT | Performed by: INTERNAL MEDICINE

## 2021-01-14 PROCEDURE — 93000 ELECTROCARDIOGRAM COMPLETE: CPT | Performed by: INTERNAL MEDICINE

## 2021-01-14 NOTE — PROGRESS NOTES
Cardiology   Diannah Hampshire, MD Janifer Moritz, MD Ather Mansoor, MD Areta Keel, DO, Brandy Collins DO, [facility] [place]  -------------------------------------------------------------------  Formerly [facility], [place] [address] [phone]  [address] [place] [address]                   1935                   [id number]          Assessment/Plan:    1  CAD status post CABG x3  2  PVCs  3  Benign essential hypertension  4  Dyslipidemia  5  Chronic right bundle branch block    · No symptoms of angina  She does have significant symptoms of anxiety, and the patient's son states that she will start panicking with even a bit of emotional distress  She will follow-up with her PCP regarding this  · Will continue aspirin, clopidogrel, atorvastatin for CAD  · Significant lower extremity edema at 2+ noted on today's exam   She has not been taking her furosemide regularly  Encouraged her to take furosemide 20 mg daily with over-the-counter potassium  Will check blood work including plasma metanephrines as she does get tremors with her "panic attacks "  · PVCs that seem to be coming from 2 different foci on today's ECG  She has no symptoms of exertional chest discomfort  I will get blood work on her, and increased diuresis as above  · Blood pressure elevated on today's visit, although patient's son states she had a significant panic attack in the waiting room, and has been up all night with anxiety  Follow-up telemedicine visit in 1 month      Interval History:      This is a very pleasant 42-year-old female with a history of CVA 02/2015 with MRA neck revealing greater than 70% bilateral internal carotid artery stenosis with diminished flow in the right MCA   CTA revealed greater than 90% high-grade stenosis on the right and she thereafter underwent right CEA   She had a NSTEMI 06/2015 with cardiac catheterization revealing three-vessel CAD   Thereafter she underwent CABG x3 with LIMA to LAD, SVG to OM, SVG to RCA in 2015   She lives with her son      She was hospitalized with a fall 05/2019, and had a closed fracture of cervical vertebrae with non operative management  She presents today for follow-up  Both she and her son state that she has been having symptoms of anxiety and panic as of late  She admits to shortness of breath only during these episodes, otherwise her shortness of breath has been stable  She does admit to lower extremity edema, but has not been taking her Lasix regularly  Her son states she has not been taking any Lasix over the past 4-5 days  Vitals:  Vitals:    01/14/21 1034   Height: 5' 4" (1 626 m)         Past Medical History:   Diagnosis Date    Anxiety     Cataract, bilateral     Last Assessed:3/19/2014    COPD (chronic obstructive pulmonary disease) ([facility] )     Coronary artery disease     Hx of arterial ischemic stroke     Hypertension     Stroke ([facility] )     Wears glasses      Social History     Socioeconomic History    Marital status:       Spouse name: Not on file    Number of children: Not on file    Years of education: 8    Highest education level: Not on file   Occupational History    Not on file   Social Needs    Financial resource strain: Not on file    Food insecurity     Worry: Not on file     Inability: Not on file    Transportation needs     Medical: Not on file     Non-medical: Not on file   Tobacco Use    Smoking status: Never Smoker    Smokeless tobacco: Never Used   Substance and Sexual Activity    Alcohol use: Never     Frequency: Never    Drug use: Never    Sexual activity: Not on file   Lifestyle    Physical activity     Days per week: Not on file     Minutes per session: Not on file    Stress: Not on file   Relationships    Social connections     Talks on phone: Not on file     Gets together: Not on file     Attends [employer] service: Not on file Active member of club or organization: Not on file     Attends meetings of clubs or organizations: Not on file     Relationship status: Not on file    Intimate partner violence     Fear of current or ex partner: Not on file     Emotionally abused: Not on file     Physically abused: Not on file     Forced sexual activity: Not on file   Other Topics Concern    Not on file   Social History Narrative    ** Merged History Encounter **         No caffeine use      Family History   Problem Relation Age of Onset    Cancer Mother         malignant neoplasm    Heart attack Father     Aneurysm Other         Aortic    Cancer Other         malignant neoplasm     Past Surgical History:   Procedure Laterality Date    CAROTID ENDARTERECTOMY Right     CATARACT EXTRACTION W/ INTRAOCULAR LENS  IMPLANT, BILATERAL      COLONOSCOPY N/A 9/30/2017    Procedure: COLONOSCOPY FOR CONTROL OF BLEEDING;  Surgeon: Ramona Aguilar MD;  [facility];  Service: Colorectal    CORONARY ARTERY BYPASS GRAFT      CABG X3 with LIMA to LAD,SVG to OM,SVG to distal  RCA ; Last Assessed:7/13/2015    JOINT REPLACEMENT      left TKR    KIDNEY STONE SURGERY      NEPHROSTOMY Left     with Drainage Irrigation ; [id number]    [place] TOTAL KNEE ARTHROPLASTY Right 2/25/2016    Procedure: ARTHROPLASTY KNEE TOTAL;  Surgeon: Jeannette Rush MD;  [facility];  Service: Orthopedics       Current Outpatient Medications:     acetaminophen (TYLENOL) 325 mg tablet, Take 2 tablets (650 mg total) by mouth every 6 (six) hours as needed for mild pain, Disp: 30 tablet, Rfl: 0    albuterol (2 5 mg/3 mL) 0 083 % nebulizer solution, Take 1 vial (2 5 mg total) by nebulization every 4 (four) hours as needed for wheezing or shortness of breath, Disp: , Rfl: 0    ALPRAZolam (XANAX) 0 25 mg tablet, Take 1 tablet (0 25 mg total) by mouth 2 (two) times a day as needed (TREMOR), Disp: 60 tablet, Rfl: 0    amLODIPine (NORVASC) 5 mg tablet, TAKE 1 TABLET BY MOUTH EVERY DAY, Disp: 90 tablet, Rfl: 3    ascorbic acid (VITAMIN C) 500 mg tablet, Take 500 mg by mouth daily , Disp: , Rfl:     aspirin (ECOTRIN LOW STRENGTH) 81 mg EC tablet, Take 81 mg by mouth daily, Disp: , Rfl:     atorvastatin (LIPITOR) 40 mg tablet, TAKE 1 TABLET BY MOUTH EVERY DAY, Disp: 90 tablet, Rfl: 3    bimatoprost (LUMIGAN) 0 01 % ophthalmic drops, Apply 1 drop to eye Daily, Disp: , Rfl:     Cholecalciferol (VITAMIN D) 2000 UNITS tablet, Take 2,000 Units by mouth daily  , Disp: , Rfl:     clopidogrel (PLAVIX) 75 mg tablet, TAKE 1 TABLET BY MOUTH EVERY DAY, Disp: 90 tablet, Rfl: 1    furosemide (LASIX) 20 mg tablet, TAKE 1 TABLET BY MOUTH EVERY DAY, Disp: 30 tablet, Rfl: 11    levalbuterol (XOPENEX) 0 63 mg/3 mL nebulizer solution, Take 3 mL (0 63 mg total) by nebulization 3 (three) times a day (Patient taking differently: Take 0 63 mg by nebulization as needed  ), Disp: 300 mL, Rfl: 0    levothyroxine 75 mcg tablet, TAKE 1 TABLET BY MOUTH EVERY DAY IN THE MORNING, Disp: 90 tablet, Rfl: 2    metoprolol tartrate (LOPRESSOR) 25 mg tablet, TAKE 1 TABLET (25 MG TOTAL) BY MOUTH EVERY 12 (TWELVE) HOURS, Disp: 180 tablet, Rfl: 3    Mirabegron ER (MYRBETRIQ) 25 MG TB24, TAKE 1 TABLET BY MOUTH EVERY DAY, Disp: 30 tablet, Rfl: 11    oxyCODONE-acetaminophen (PERCOCET) 5-325 mg per tablet, Take 1 tablet by mouth every 4 (four) hours as needed for moderate painMax Daily Amount: 6 tablets, Disp: 90 tablet, Rfl: 0    pantoprazole (PROTONIX) 40 mg tablet, TAKE 1 TABLET BY MOUTH EVERY DAY, Disp: 90 tablet, Rfl: 3    Pediatric Multiple Vit-C-FA (PEDIATRIC MULTIVITAMIN) chewable tablet, Chew 1 tablet daily, Disp: , Rfl:     Spiriva HandiHaler 18 MCG inhalation capsule, INHALE 1 CAPSULE VIA HANDIHALER ONCE DAILY AT THE SAME TIME EVERY DAY, Disp: 90 capsule, Rfl: 3    Viibryd 40 MG tablet, TAKE 1 TABLET BY MOUTH EVERY DAY, Disp: 90 tablet, Rfl: 1    zolpidem (AMBIEN) 10 mg tablet, ONE PO HS PRN, Disp: 30 tablet, Rfl: 0        Review of Systems:  Review of Systems   Constitutional: Negative for activity change, fever and unexpected weight change  HENT: Negative for facial swelling, nosebleeds and voice change  Respiratory: Positive for shortness of breath  Negative for chest tightness and wheezing  Cardiovascular: Positive for leg swelling  Negative for chest pain and palpitations  Gastrointestinal: Negative for abdominal distention  Genitourinary: Negative for hematuria  Musculoskeletal: Negative for arthralgias  Skin: Negative for color change, pallor, rash and wound  Neurological: Negative for dizziness, seizures and syncope  Psychiatric/Behavioral: Positive for agitation  +anxiety         Physical Exam:  Physical Exam  Vitals signs reviewed  Constitutional:       Appearance: She is well-developed  HENT:      Head: Normocephalic and atraumatic  Neck:      Musculoskeletal: Normal range of motion and neck supple  Cardiovascular:      Rate and Rhythm: Normal rate and regular rhythm  Heart sounds: Normal heart sounds  Pulmonary:      Effort: Pulmonary effort is normal       Breath sounds: Normal breath sounds  Abdominal:      Palpations: Abdomen is soft  Musculoskeletal: Normal range of motion  General: Swelling present  Skin:     General: Skin is warm and dry  Neurological:      Mental Status: She is alert and oriented to person, place, and time  Psychiatric:         Behavior: Behavior normal          Thought Content: Thought content normal          Judgment: Judgment normal          This note was completed in part utilizing [employer] Direct Software  Grammatical errors, random word insertions, spelling mistakes, and incomplete sentences can be an occasional consequence of this system secondary to software limitations, ambient noise, and hardware issues    If you have any questions or concerns about the content, text, or information contained within the body of this dictation, please contact the provider for clarification

## 2021-02-03 ENCOUNTER — TELEMEDICINE (OUTPATIENT)
Dept: FAMILY MEDICINE CLINIC | Facility: CLINIC | Age: 86
End: 2021-02-03
Payer: COMMERCIAL

## 2021-02-03 ENCOUNTER — TELEPHONE (OUTPATIENT)
Dept: FAMILY MEDICINE CLINIC | Facility: CLINIC | Age: 86
End: 2021-02-03

## 2021-02-03 ENCOUNTER — PATIENT OUTREACH (OUTPATIENT)
Dept: FAMILY MEDICINE CLINIC | Facility: CLINIC | Age: 86
End: 2021-02-03

## 2021-02-03 VITALS — OXYGEN SATURATION: 98 %

## 2021-02-03 DIAGNOSIS — F22 DELUSIONS (HCC): Primary | ICD-10-CM

## 2021-02-03 DIAGNOSIS — I50.32 CHRONIC DIASTOLIC CONGESTIVE HEART FAILURE (HCC): ICD-10-CM

## 2021-02-03 DIAGNOSIS — F51.01 PRIMARY INSOMNIA: ICD-10-CM

## 2021-02-03 DIAGNOSIS — F41.1 GENERALIZED ANXIETY DISORDER: ICD-10-CM

## 2021-02-03 DIAGNOSIS — G25.0 BENIGN FAMILIAL TREMOR: ICD-10-CM

## 2021-02-03 DIAGNOSIS — J44.9 CHRONIC OBSTRUCTIVE PULMONARY DISEASE, UNSPECIFIED COPD TYPE (HCC): ICD-10-CM

## 2021-02-03 DIAGNOSIS — M15.9 GENERALIZED OSTEOARTHRITIS: ICD-10-CM

## 2021-02-03 DIAGNOSIS — K21.9 GASTROESOPHAGEAL REFLUX DISEASE WITHOUT ESOPHAGITIS: ICD-10-CM

## 2021-02-03 DIAGNOSIS — F32.A DEPRESSION, UNSPECIFIED DEPRESSION TYPE: ICD-10-CM

## 2021-02-03 PROCEDURE — 1036F TOBACCO NON-USER: CPT | Performed by: FAMILY MEDICINE

## 2021-02-03 PROCEDURE — 99214 OFFICE O/P EST MOD 30 MIN: CPT | Performed by: FAMILY MEDICINE

## 2021-02-03 PROCEDURE — 1160F RVW MEDS BY RX/DR IN RCRD: CPT | Performed by: FAMILY MEDICINE

## 2021-02-03 RX ORDER — OXYCODONE HYDROCHLORIDE AND ACETAMINOPHEN 5; 325 MG/1; MG/1
1 TABLET ORAL EVERY 4 HOURS PRN
Qty: 90 TABLET | Refills: 0 | Status: SHIPPED | OUTPATIENT
Start: 2021-02-03 | End: 2021-03-30 | Stop reason: SDUPTHER

## 2021-02-03 RX ORDER — ALPRAZOLAM 0.5 MG/1
0.5 TABLET ORAL 3 TIMES DAILY PRN
Qty: 90 TABLET | Refills: 0 | Status: SHIPPED | OUTPATIENT
Start: 2021-02-03 | End: 2021-03-15 | Stop reason: SDUPTHER

## 2021-02-03 RX ORDER — FAMOTIDINE 20 MG/1
20 TABLET, FILM COATED ORAL 2 TIMES DAILY
Qty: 60 TABLET | Refills: 5 | Status: SHIPPED | OUTPATIENT
Start: 2021-02-03 | End: 2021-08-01

## 2021-02-03 NOTE — TELEPHONE ENCOUNTER
Pt's son Terrie Goldsmith called and is asking to speak with you sometime today  Pt has been very confused and lethargic  She has been homebound and needs help  Terrie Goldsmith states cardiology has order blood work for Pt to have done but is unable to leave the home  Terrie Goldsmith is asking for palliative care and home draws if possible  Terrie Goldsmith would really like to talk to, if you can please call him anytime today after 10:00 a m he would greatly appreciate it

## 2021-02-03 NOTE — TELEPHONE ENCOUNTER
IS THERE ANYWAY СЕРГЕЙ CAN SCHEDULE A VIRTUAL UNDER SURELY IS NAME AND WE COULD PHYSICALLY LOOK AT HER IF HE HAS GOT AN IPHONE AND CAN DO DOXIMITY  MAYBE WE CAN GET HOME DRAW AND INITIATE PALLIATIVE CARE  OR AT [address] [place] [address]

## 2021-02-03 NOTE — PROGRESS NOTES
Virtual Regular Visit      Assessment/Plan:    Problem List Items Addressed This Visit        Digestive    Esophageal reflux    Relevant Medications    famotidine (PEPCID) 20 mg tablet       Respiratory    COPD (chronic obstructive pulmonary disease) ([facility] )    Relevant Orders    Ambulatory referral to complex care management program       Cardiovascular and Mediastinum    Chronic diastolic congestive heart failure ([facility] )    Relevant Orders    Ambulatory referral to complex care management program       Nervous and Auditory    Benign familial tremor    Relevant Medications    ALPRAZolam (XANAX) 0 5 mg tablet       Musculoskeletal and Integument    Generalized osteoarthritis    Relevant Medications    oxyCODONE-acetaminophen (PERCOCET) 5-325 mg per tablet       Other    Clinical depression    Relevant Medications    ALPRAZolam (XANAX) 0 5 mg tablet    Other Relevant Orders    Ambulatory referral to complex care management program    Generalized anxiety disorder- panic    Relevant Medications    ALPRAZolam (XANAX) 0 5 mg tablet    Other Relevant Orders    Ambulatory referral to complex care management program    Insomnia    Relevant Orders    Ambulatory referral to complex care management program      Other Visit Diagnoses     Delusions [facility])    -  Primary    Relevant Orders    Ambulatory referral to complex care management program    Vitamin D 25 hydroxy    Vitamin B12        Have already reached out to our care coordinator Samanta Pérez RN  Will discuss with her patient's current needs and hopefully facilitate ASAP  outstanding lab orders are in the computer that include CBC comprehensive , metanephrines, thyroid BNP, vitamin-D and B12         Reason for visit is   Chief Complaint   Patient presents with    Virtual Regular Visit        Encounter provider Renald Goltz, DO    Provider located at [address] [address]      Recent Visits  No visits were found meeting these conditions  Showing recent visits within past 7 days and meeting all other requirements     Today's Visits  Date Type Provider Dept   02/03/21 Telemedicine Jacqualin Paget, DO [id number]   02/03/21 Telephone Elena Patel [facility] today's visits and meeting all other requirements     Future Appointments  No visits were found meeting these conditions  Showing future appointments within next 150 days and meeting all other requirements        The patient was identified by name and date of birth  Adilia Mix was informed that this is a telemedicine visit and that the visit is being conducted through [facility] and patient was informed that this is a secure, HIPAA-compliant platform  She agrees to proceed     My office door was closed  No one else was in the room  She acknowledged consent and understanding of privacy and security of the video platform  The patient has agreed to participate and understands they can discontinue the visit at any time  Patient is aware this is a billable service  Subjective  Adilia Mix is a 80 y o  female    77-year-old female with complex medical history who is declining both physically and behavioral wise  She has high anxiety and is having difficulty with sleep  She is afraid for her son to leave the house  She has expressed a fear of dying  She is having some nightmares and these lead to some delusions  During her panic attacks he does have hyperventilation and increased shortness of breath  She recently was to Cardiology but she now does not want to leave the house  Her son Sandra is looking for possible referral for palliative care  Also blood work is due and asking if lab can come to the house to draw  Patient has lifelong chronic anxiety due to dysfunctional family issues over the years  Anxiety, delusion   Panic  Past Medical History:   Diagnosis Date    Anxiety     Cataract, bilateral     Last Assessed:3/19/2014    COPD (chronic obstructive pulmonary disease) (HCC)     Coronary artery disease     Hx of arterial ischemic stroke     Hypertension     Stroke ([address] )     Wears glasses        Past Surgical History:   Procedure Laterality Date    CAROTID ENDARTERECTOMY Right     CATARACT EXTRACTION W/ INTRAOCULAR LENS  IMPLANT, BILATERAL      COLONOSCOPY N/A 9/30/2017    Procedure: COLONOSCOPY FOR CONTROL OF BLEEDING;  Surgeon: Agatha Hollins MD;  [facility];  Service: Colorectal    CORONARY ARTERY BYPASS GRAFT      CABG X3 with LIMA to LAD,SVG to OM,SVG to distal  RCA ; Last Assessed:7/13/2015    JOINT REPLACEMENT      left TKR    KIDNEY STONE SURGERY      NEPHROSTOMY Left     with Drainage Irrigation ; [id number]    [place] TOTAL KNEE ARTHROPLASTY Right 2/25/2016    Procedure: ARTHROPLASTY KNEE TOTAL;  Surgeon: Lavon Hammonds MD;  [facility];  Service: Orthopedics       Current Outpatient Medications   Medication Sig Dispense Refill    acetaminophen (TYLENOL) 325 mg tablet Take 2 tablets (650 mg total) by mouth every 6 (six) hours as needed for mild pain 30 tablet 0    albuterol (2 5 mg/3 mL) 0 083 % nebulizer solution Take 1 vial (2 5 mg total) by nebulization every 4 (four) hours as needed for wheezing or shortness of breath  0    ALPRAZolam (XANAX) 0 5 mg tablet Take 1 tablet (0 5 mg total) by mouth 3 (three) times a day as needed (TREMOR) 90 tablet 0    amLODIPine (NORVASC) 5 mg tablet TAKE 1 TABLET BY MOUTH EVERY DAY 90 tablet 3    ascorbic acid (VITAMIN C) 500 mg tablet Take 500 mg by mouth daily       aspirin (ECOTRIN LOW STRENGTH) 81 mg EC tablet Take 81 mg by mouth daily      bimatoprost (LUMIGAN) 0 01 % ophthalmic drops Apply 1 drop to eye Daily      Cholecalciferol (VITAMIN D) 2000 UNITS tablet Take 2,000 Units by mouth daily        clopidogrel (PLAVIX) 75 mg tablet TAKE 1 TABLET BY MOUTH EVERY DAY 90 tablet 1    famotidine (PEPCID) 20 mg tablet Take 1 tablet (20 mg total) by mouth 2 (two) times a day 60 tablet 5    furosemide (LASIX) 20 mg tablet TAKE 1 TABLET BY MOUTH EVERY DAY 30 tablet 11    levalbuterol (XOPENEX) 0 63 mg/3 mL nebulizer solution Take 3 mL (0 63 mg total) by nebulization 3 (three) times a day (Patient taking differently: Take 0 63 mg by nebulization as needed  ) 300 mL 0    levothyroxine 75 mcg tablet TAKE 1 TABLET BY MOUTH EVERY DAY IN THE MORNING 90 tablet 2    metoprolol tartrate (LOPRESSOR) 25 mg tablet TAKE 1 TABLET (25 MG TOTAL) BY MOUTH EVERY 12 (TWELVE) HOURS 180 tablet 3    oxyCODONE-acetaminophen (PERCOCET) 5-325 mg per tablet Take 1 tablet by mouth every 4 (four) hours as needed for moderate painMax Daily Amount: 6 tablets 90 tablet 0    Pediatric Multiple Vit-C-FA (PEDIATRIC MULTIVITAMIN) chewable tablet Chew 1 tablet daily      Spiriva HandiHaler 18 MCG inhalation capsule INHALE 1 CAPSULE VIA HANDIHALER ONCE DAILY AT THE SAME TIME EVERY DAY 90 capsule 3     No current facility-administered medications for this visit  Allergies   Allergen Reactions    Penicillins Itching    Penicillins Rash       Review of Systems   Constitutional: Positive for fatigue  Psychiatric/Behavioral: Positive for hallucinations and sleep disturbance  The patient is nervous/anxious  Video Exam    Vitals:    02/03/21 1159   SpO2: 98%       Physical Exam  Constitutional:       Appearance: She is ill-appearing  Comments: Patient currently is mentating normally  Speech is clear and logical    Neurological:      Mental Status: She is alert and oriented to person, place, and time  Psychiatric:         Mood and Affect: Mood is anxious  Patient unable to pinpoint for me why she can not sleep  She does note nightmares      I spent 20 minutes with patient today in which greater than 50% of the time was spent in counseling/coordination of care regarding As noted      [place] acknowledges that she has consented to an online visit or consultation  She understands that the online visit is based solely on information provided by her, and that, in the absence of a face-to-face physical evaluation by the physician, the diagnosis she receives is both limited and provisional in terms of accuracy and completeness  This is not intended to replace a full medical face-to-face evaluation by the physician  Kenzie Gonzales understands and accepts these terms

## 2021-02-03 NOTE — PROGRESS NOTES
Outpatient Care Management Note:  RE:Received referral from Dr Nickolas Navarro  Spoke with pts son who is concerned that her behavior has changed in the past week  She has had an increased difficulty in reorienting after awakening and has been hallucinating  She spoke about the Nazis chasing her etc  Pt's son lives with her since his father passed  He works part time in the winter and reports that in the past week she has been having more incidences of panic and anxiety has worsened  She is afraid to be alone and is on O2 @ 2L NC dt hx of COPD  E reports that she was out of the house for an appt a couple weeks ago to [address] but since, she has declined mentally and refuses to leave the home  Son is agreeable to mobile lab [facility] with [facility] and paying fee if he has to  Will look into insurance about.me's [facility] home visit program  Also, will have dept partners inquire about home help regarding qualifying for Waiver help in home with her care  Will f/u tomorrow

## 2021-02-03 NOTE — TELEPHONE ENCOUNTER
I spoke to the patient's son Thaddeus Walker, he does have a smart phone and is able to do a doximity call  He is available at any time today and will be scheduled for a virtual visit today

## 2021-02-04 ENCOUNTER — PATIENT OUTREACH (OUTPATIENT)
Dept: FAMILY MEDICINE CLINIC | Facility: CLINIC | Age: 86
End: 2021-02-04

## 2021-02-04 ENCOUNTER — DOCUMENTATION (OUTPATIENT)
Dept: FAMILY MEDICINE CLINIC | Facility: CLINIC | Age: 86
End: 2021-02-04

## 2021-02-04 ENCOUNTER — PATIENT OUTREACH (OUTPATIENT)
Dept: CASE MANAGEMENT | Facility: HOSPITAL | Age: 86
End: 2021-02-04

## 2021-02-04 ENCOUNTER — TELEPHONE (OUTPATIENT)
Dept: FAMILY MEDICINE CLINIC | Facility: CLINIC | Age: 86
End: 2021-02-04

## 2021-02-04 DIAGNOSIS — R62.7 ADULT FAILURE TO THRIVE: ICD-10-CM

## 2021-02-04 DIAGNOSIS — F41.1 GENERALIZED ANXIETY DISORDER: ICD-10-CM

## 2021-02-04 DIAGNOSIS — G25.0 BENIGN FAMILIAL TREMOR: ICD-10-CM

## 2021-02-04 DIAGNOSIS — J44.9 CHRONIC OBSTRUCTIVE PULMONARY DISEASE, UNSPECIFIED COPD TYPE (HCC): ICD-10-CM

## 2021-02-04 DIAGNOSIS — R64 INANITION (HCC): ICD-10-CM

## 2021-02-04 DIAGNOSIS — I50.32 CHRONIC DIASTOLIC CONGESTIVE HEART FAILURE (HCC): ICD-10-CM

## 2021-02-04 DIAGNOSIS — Z71.89 COMPLEX CARE COORDINATION: Primary | ICD-10-CM

## 2021-02-04 DIAGNOSIS — F22 DELUSIONS (HCC): Primary | ICD-10-CM

## 2021-02-04 DIAGNOSIS — F32.A DEPRESSION, UNSPECIFIED DEPRESSION TYPE: ICD-10-CM

## 2021-02-04 NOTE — PROGRESS NOTES
Outpatient Care Management Note:  RE: [facility] and inquired about gong to home to do lab work  Provided contact information for pt's son and lab staff will outreach for good time and date to set up appt

## 2021-02-04 NOTE — PROGRESS NOTES
Spoke with Fabien Jain, intake with [facility]  Described the situation with pt and her son  She is requesting an order to be placed for pt by PCP though Epic  Called and spoke with PCP office and Suzanne Mcclellan will have provider return call to this CM to discuss placing order  Then Spoke with Dr Anca Kwon and she will place order for evaluation

## 2021-02-04 NOTE — PROGRESS NOTES
Outpatient Care Management Note:  RE: received a call from pt's son and he stated that he need help sooner than he thought  It took him hours to get pt changed and out of bed this morning and she is 'incoherent'  He does not want pt taken to the hospital because of COVID rules of having to leave her  The children do not want her left alone  Yesterday was the anniversary of his father's death and he is very emotional now with how his mother is declining  [facility] is [facility] but has not hear anything yet  Will look into [facility] hospice to determine if pt would be able to be evaluated

## 2021-02-04 NOTE — TELEPHONE ENCOUNTER
SHAUN the referral to [facility] will be discontinued as hospice services will be initiated

## 2021-02-04 NOTE — TELEPHONE ENCOUNTER
Kory Boyle our care coordinator called the office requesting if you can please call her back at [phone] she did not want to interrupt you

## 2021-02-04 NOTE — PROGRESS NOTES
Outpatient Care Management Note:  RE: 1 \[facility]\"" as this CM has collaborated with Psych nurse through this agency in the past  Spoke with Levi Camacho from intake who will have clinical nurse, Zaida Adams call PCP office for referral order  Once agency is in home, they will also be able to determine if pt has other needs to be addressed  Received confirmation from Zadia Adams that office has been contacted  Provider sent an inbasket to make her aware of contacts made on behalf of pt

## 2021-02-04 NOTE — TELEPHONE ENCOUNTER
[facility] referral  Definitely a good idea  So I will put a order in although it will need to be "generic" because I am assuming [facility] will not come up as a provider  I will finish the most recent note which then can be fax

## 2021-02-04 NOTE — TELEPHONE ENCOUNTER
I called [facility] [address] [place] and spoke to Tien she is aware of your message and recommendations  They request the most recent office, note order ,med list and demographic page to be faxed  Dr Mojica sending back to you to please place order  if you can please respond back to me once done  I have all paper owrk to be faxed except for order

## 2021-02-04 NOTE — PROGRESS NOTES
Outpatient Care Management Note:  RE: Called [place] services through [facility] and there will be an intake for services as pt does qualify   They will contact the son for more information for pt enrollment

## 2021-02-04 NOTE — TELEPHONE ENCOUNTER
Lizetkaren Дмитрий called from [facility] in regards to they received some information form Latonya regarding Jose G Avila whom see's Dr Galina Preciado is reporting to them  Jose G Avila  has increased panic  attack,  A mental decline  Dementia and  Other health issues  Not seen psychiatry  [facility] Preciado was recommending that Jose G Avila wound benefit from mental  health nursing in the home care setting  Dr Mojica would you be agreeable for an order for eval and treat? If you are agreeable can they please have pt's most recent visit note and medication list faxed over with the order as well? Please call to let them know the response     Please call [phone]  Please fax order to [phone]

## 2021-02-04 NOTE — PROGRESS NOTES
Gagan Nicely our care coordinator called the office requesting if you can please call her back at [phone]        I did speak to Gagan Nicely just a few minutes ago  Unfortunately the patient is continuing to decline rapidly  The son had talked to Gagan Nicely and apparently took him "5 hours "to get her up and dressed for the day and she will not let him leave  She is weak delusional etc    He is at wit's end      Son has decided to initiate hospice services for more 24/7 assistance with patient's physical and mental decline

## 2021-02-04 NOTE — PROGRESS NOTES
CHW- printed Physician  Cert form for Dr Michell Carrel to sign  [facility]  CHW to contact Mol  CHW will continue to follow with OP FARZANA Tan

## 2021-02-05 ENCOUNTER — PATIENT OUTREACH (OUTPATIENT)
Dept: FAMILY MEDICINE CLINIC | Facility: CLINIC | Age: 86
End: 2021-02-05

## 2021-02-05 ENCOUNTER — APPOINTMENT (EMERGENCY)
Dept: RADIOLOGY | Facility: HOSPITAL | Age: 86
DRG: 291 | End: 2021-02-05
Payer: COMMERCIAL

## 2021-02-05 ENCOUNTER — HOSPITAL ENCOUNTER (INPATIENT)
Facility: HOSPITAL | Age: 86
LOS: 10 days | Discharge: HOME HEALTH | DRG: 291 | End: 2021-02-15
Attending: EMERGENCY MEDICINE | Admitting: INTERNAL MEDICINE
Payer: COMMERCIAL

## 2021-02-05 ENCOUNTER — TELEPHONE (OUTPATIENT)
Dept: CARDIOLOGY CLINIC | Facility: CLINIC | Age: 86
End: 2021-02-05

## 2021-02-05 ENCOUNTER — TELEPHONE (OUTPATIENT)
Dept: FAMILY MEDICINE CLINIC | Facility: CLINIC | Age: 86
End: 2021-02-05

## 2021-02-05 DIAGNOSIS — I50.9 CHF EXACERBATION (HCC): Primary | ICD-10-CM

## 2021-02-05 DIAGNOSIS — F41.8 ANXIETY WITH DEPRESSION: ICD-10-CM

## 2021-02-05 DIAGNOSIS — J44.9 CHRONIC OBSTRUCTIVE PULMONARY DISEASE, UNSPECIFIED COPD TYPE (HCC): ICD-10-CM

## 2021-02-05 DIAGNOSIS — I50.32 CHRONIC DIASTOLIC CONGESTIVE HEART FAILURE (HCC): ICD-10-CM

## 2021-02-05 DIAGNOSIS — R62.7 ADULT FAILURE TO THRIVE: ICD-10-CM

## 2021-02-05 DIAGNOSIS — E11.9 TYPE 2 DIABETES MELLITUS WITHOUT COMPLICATION, WITHOUT LONG-TERM CURRENT USE OF INSULIN (HCC): ICD-10-CM

## 2021-02-05 DIAGNOSIS — R06.00 DYSPNEA: ICD-10-CM

## 2021-02-05 DIAGNOSIS — J96.21 ACUTE ON CHRONIC RESPIRATORY FAILURE WITH HYPOXIA AND HYPERCAPNIA (HCC): ICD-10-CM

## 2021-02-05 DIAGNOSIS — R09.02 HYPOXIA: ICD-10-CM

## 2021-02-05 DIAGNOSIS — E66.2 OBESITY HYPOVENTILATION SYNDROME (HCC): ICD-10-CM

## 2021-02-05 DIAGNOSIS — E53.8 VITAMIN B 12 DEFICIENCY: ICD-10-CM

## 2021-02-05 DIAGNOSIS — E03.9 ACQUIRED HYPOTHYROIDISM: ICD-10-CM

## 2021-02-05 DIAGNOSIS — F22 DELUSIONS (HCC): Primary | ICD-10-CM

## 2021-02-05 DIAGNOSIS — I50.9 ACUTE ON CHRONIC CONGESTIVE HEART FAILURE, UNSPECIFIED HEART FAILURE TYPE (HCC): ICD-10-CM

## 2021-02-05 DIAGNOSIS — G47.33 OSA (OBSTRUCTIVE SLEEP APNEA): ICD-10-CM

## 2021-02-05 DIAGNOSIS — J96.22 ACUTE ON CHRONIC RESPIRATORY FAILURE WITH HYPOXIA AND HYPERCAPNIA (HCC): ICD-10-CM

## 2021-02-05 DIAGNOSIS — I65.29 STENOSIS OF CAROTID ARTERY, UNSPECIFIED LATERALITY: ICD-10-CM

## 2021-02-05 PROBLEM — J96.91 RESPIRATORY FAILURE WITH HYPOXIA (HCC): Status: ACTIVE | Noted: 2021-02-05

## 2021-02-05 PROBLEM — J90 PLEURAL EFFUSION: Status: ACTIVE | Noted: 2021-02-05

## 2021-02-05 LAB
ALBUMIN SERPL BCP-MCNC: 2.8 G/DL (ref 3.5–5)
ALP SERPL-CCNC: 104 U/L (ref 46–116)
ALT SERPL W P-5'-P-CCNC: 16 U/L (ref 12–78)
ANION GAP SERPL CALCULATED.3IONS-SCNC: -3 MMOL/L (ref 4–13)
AST SERPL W P-5'-P-CCNC: 18 U/L (ref 5–45)
ATRIAL RATE: 74 BPM
BASOPHILS # BLD AUTO: 0.05 THOUSANDS/ΜL (ref 0–0.1)
BASOPHILS NFR BLD AUTO: 1 % (ref 0–1)
BILIRUB SERPL-MCNC: 0.74 MG/DL (ref 0.2–1)
BUN SERPL-MCNC: 35 MG/DL (ref 5–25)
CALCIUM ALBUM COR SERPL-MCNC: 10.2 MG/DL (ref 8.3–10.1)
CALCIUM SERPL-MCNC: 9.2 MG/DL (ref 8.3–10.1)
CHLORIDE SERPL-SCNC: 103 MMOL/L (ref 100–108)
CO2 SERPL-SCNC: 44 MMOL/L (ref 21–32)
CREAT SERPL-MCNC: 1.19 MG/DL (ref 0.6–1.3)
EOSINOPHIL # BLD AUTO: 0.08 THOUSAND/ΜL (ref 0–0.61)
EOSINOPHIL NFR BLD AUTO: 1 % (ref 0–6)
ERYTHROCYTE [DISTWIDTH] IN BLOOD BY AUTOMATED COUNT: 14.2 % (ref 11.6–15.1)
FLUAV RNA RESP QL NAA+PROBE: NEGATIVE
FLUBV RNA RESP QL NAA+PROBE: NEGATIVE
GFR SERPL CREATININE-BSD FRML MDRD: 42 ML/MIN/1.73SQ M
GLUCOSE SERPL-MCNC: 107 MG/DL (ref 65–140)
HCT VFR BLD AUTO: 48.5 % (ref 34.8–46.1)
HGB BLD-MCNC: 13.5 G/DL (ref 11.5–15.4)
IMM GRANULOCYTES # BLD AUTO: 0.06 THOUSAND/UL (ref 0–0.2)
IMM GRANULOCYTES NFR BLD AUTO: 1 % (ref 0–2)
LYMPHOCYTES # BLD AUTO: 1.2 THOUSANDS/ΜL (ref 0.6–4.47)
LYMPHOCYTES NFR BLD AUTO: 13 % (ref 14–44)
MCH RBC QN AUTO: 27.8 PG (ref 26.8–34.3)
MCHC RBC AUTO-ENTMCNC: 27.8 G/DL (ref 31.4–37.4)
MCV RBC AUTO: 100 FL (ref 82–98)
MONOCYTES # BLD AUTO: 0.81 THOUSAND/ΜL (ref 0.17–1.22)
MONOCYTES NFR BLD AUTO: 9 % (ref 4–12)
NEUTROPHILS # BLD AUTO: 6.81 THOUSANDS/ΜL (ref 1.85–7.62)
NEUTS SEG NFR BLD AUTO: 75 % (ref 43–75)
NRBC BLD AUTO-RTO: 0 /100 WBCS
NT-PROBNP SERPL-MCNC: 4918 PG/ML
P AXIS: 111 DEGREES
PLATELET # BLD AUTO: 285 THOUSANDS/UL (ref 149–390)
PMV BLD AUTO: 10.4 FL (ref 8.9–12.7)
POTASSIUM SERPL-SCNC: 4.2 MMOL/L (ref 3.5–5.3)
PR INTERVAL: 204 MS
PROT SERPL-MCNC: 6.5 G/DL (ref 6.4–8.2)
QRS AXIS: 113 DEGREES
QRSD INTERVAL: 150 MS
QT INTERVAL: 450 MS
QTC INTERVAL: 499 MS
RBC # BLD AUTO: 4.86 MILLION/UL (ref 3.81–5.12)
RSV RNA RESP QL NAA+PROBE: NEGATIVE
SARS-COV-2 RNA RESP QL NAA+PROBE: NEGATIVE
SODIUM SERPL-SCNC: 144 MMOL/L (ref 136–145)
T WAVE AXIS: 39 DEGREES
TROPONIN I SERPL-MCNC: 0.03 NG/ML
TSH SERPL DL<=0.05 MIU/L-ACNC: 1.06 UIU/ML (ref 0.36–3.74)
VENTRICULAR RATE: 74 BPM
VIT B12 SERPL-MCNC: 526 PG/ML (ref 100–900)
WBC # BLD AUTO: 9.01 THOUSAND/UL (ref 4.31–10.16)

## 2021-02-05 PROCEDURE — 84484 ASSAY OF TROPONIN QUANT: CPT | Performed by: EMERGENCY MEDICINE

## 2021-02-05 PROCEDURE — 99285 EMERGENCY DEPT VISIT HI MDM: CPT | Performed by: EMERGENCY MEDICINE

## 2021-02-05 PROCEDURE — 93005 ELECTROCARDIOGRAM TRACING: CPT

## 2021-02-05 PROCEDURE — G1004 CDSM NDSC: HCPCS

## 2021-02-05 PROCEDURE — 93010 ELECTROCARDIOGRAM REPORT: CPT | Performed by: INTERNAL MEDICINE

## 2021-02-05 PROCEDURE — 85025 COMPLETE CBC W/AUTO DIFF WBC: CPT | Performed by: EMERGENCY MEDICINE

## 2021-02-05 PROCEDURE — 80053 COMPREHEN METABOLIC PANEL: CPT | Performed by: EMERGENCY MEDICINE

## 2021-02-05 PROCEDURE — 0241U HB NFCT DS VIR RESP RNA 4 TRGT: CPT | Performed by: EMERGENCY MEDICINE

## 2021-02-05 PROCEDURE — 94760 N-INVAS EAR/PLS OXIMETRY 1: CPT

## 2021-02-05 PROCEDURE — 72125 CT NECK SPINE W/O DYE: CPT

## 2021-02-05 PROCEDURE — 70450 CT HEAD/BRAIN W/O DYE: CPT

## 2021-02-05 PROCEDURE — 84443 ASSAY THYROID STIM HORMONE: CPT

## 2021-02-05 PROCEDURE — 71045 X-RAY EXAM CHEST 1 VIEW: CPT

## 2021-02-05 PROCEDURE — 93308 TTE F-UP OR LMTD: CPT | Performed by: EMERGENCY MEDICINE

## 2021-02-05 PROCEDURE — 96374 THER/PROPH/DIAG INJ IV PUSH: CPT

## 2021-02-05 PROCEDURE — 99223 1ST HOSP IP/OBS HIGH 75: CPT | Performed by: INTERNAL MEDICINE

## 2021-02-05 PROCEDURE — 82607 VITAMIN B-12: CPT

## 2021-02-05 PROCEDURE — 99285 EMERGENCY DEPT VISIT HI MDM: CPT

## 2021-02-05 PROCEDURE — 36415 COLL VENOUS BLD VENIPUNCTURE: CPT | Performed by: EMERGENCY MEDICINE

## 2021-02-05 PROCEDURE — 83880 ASSAY OF NATRIURETIC PEPTIDE: CPT | Performed by: EMERGENCY MEDICINE

## 2021-02-05 RX ORDER — ASPIRIN 81 MG/1
81 TABLET ORAL DAILY
Status: DISCONTINUED | OUTPATIENT
Start: 2021-02-06 | End: 2021-02-15 | Stop reason: HOSPADM

## 2021-02-05 RX ORDER — LEVOTHYROXINE SODIUM 0.07 MG/1
75 TABLET ORAL
Status: DISCONTINUED | OUTPATIENT
Start: 2021-02-06 | End: 2021-02-15 | Stop reason: HOSPADM

## 2021-02-05 RX ORDER — SODIUM CHLORIDE FOR INHALATION 0.9 %
3 VIAL, NEBULIZER (ML) INHALATION EVERY 4 HOURS PRN
Status: DISCONTINUED | OUTPATIENT
Start: 2021-02-06 | End: 2021-02-06

## 2021-02-05 RX ORDER — FUROSEMIDE 20 MG/1
20 TABLET ORAL DAILY
Status: DISCONTINUED | OUTPATIENT
Start: 2021-02-06 | End: 2021-02-06

## 2021-02-05 RX ORDER — ASCORBIC ACID 500 MG
500 TABLET ORAL DAILY
Status: DISCONTINUED | OUTPATIENT
Start: 2021-02-06 | End: 2021-02-15 | Stop reason: HOSPADM

## 2021-02-05 RX ORDER — CALCIUM CARBONATE 200(500)MG
1000 TABLET,CHEWABLE ORAL DAILY PRN
Status: DISCONTINUED | OUTPATIENT
Start: 2021-02-05 | End: 2021-02-15 | Stop reason: HOSPADM

## 2021-02-05 RX ORDER — SENNOSIDES 8.6 MG
1 TABLET ORAL DAILY
Status: DISCONTINUED | OUTPATIENT
Start: 2021-02-06 | End: 2021-02-15 | Stop reason: HOSPADM

## 2021-02-05 RX ORDER — BUDESONIDE 0.5 MG/2ML
0.5 INHALANT ORAL
Status: DISCONTINUED | OUTPATIENT
Start: 2021-02-05 | End: 2021-02-06

## 2021-02-05 RX ORDER — ALPRAZOLAM 0.5 MG/1
0.5 TABLET ORAL 3 TIMES DAILY PRN
Status: DISCONTINUED | OUTPATIENT
Start: 2021-02-05 | End: 2021-02-15 | Stop reason: HOSPADM

## 2021-02-05 RX ORDER — METHYLPREDNISOLONE SODIUM SUCCINATE 40 MG/ML
40 INJECTION, POWDER, LYOPHILIZED, FOR SOLUTION INTRAMUSCULAR; INTRAVENOUS EVERY 8 HOURS
Status: DISCONTINUED | OUTPATIENT
Start: 2021-02-05 | End: 2021-02-06

## 2021-02-05 RX ORDER — LEVALBUTEROL INHALATION SOLUTION 0.63 MG/3ML
0.63 SOLUTION RESPIRATORY (INHALATION)
Status: DISCONTINUED | OUTPATIENT
Start: 2021-02-05 | End: 2021-02-05

## 2021-02-05 RX ORDER — OXYCODONE HYDROCHLORIDE AND ACETAMINOPHEN 5; 325 MG/1; MG/1
1 TABLET ORAL EVERY 4 HOURS PRN
Status: DISCONTINUED | OUTPATIENT
Start: 2021-02-05 | End: 2021-02-15 | Stop reason: HOSPADM

## 2021-02-05 RX ORDER — FUROSEMIDE 10 MG/ML
40 INJECTION INTRAMUSCULAR; INTRAVENOUS ONCE
Status: COMPLETED | OUTPATIENT
Start: 2021-02-05 | End: 2021-02-05

## 2021-02-05 RX ORDER — FAMOTIDINE 20 MG/1
20 TABLET, FILM COATED ORAL 2 TIMES DAILY
Status: DISCONTINUED | OUTPATIENT
Start: 2021-02-05 | End: 2021-02-15 | Stop reason: HOSPADM

## 2021-02-05 RX ORDER — CLOPIDOGREL BISULFATE 75 MG/1
75 TABLET ORAL DAILY
Status: DISCONTINUED | OUTPATIENT
Start: 2021-02-06 | End: 2021-02-15 | Stop reason: HOSPADM

## 2021-02-05 RX ORDER — DOCUSATE SODIUM 100 MG/1
100 CAPSULE, LIQUID FILLED ORAL 2 TIMES DAILY
Status: DISCONTINUED | OUTPATIENT
Start: 2021-02-05 | End: 2021-02-15 | Stop reason: HOSPADM

## 2021-02-05 RX ORDER — AMLODIPINE BESYLATE 5 MG/1
5 TABLET ORAL DAILY
Status: DISCONTINUED | OUTPATIENT
Start: 2021-02-06 | End: 2021-02-15 | Stop reason: HOSPADM

## 2021-02-05 RX ORDER — SODIUM CHLORIDE FOR INHALATION 0.9 %
3 VIAL, NEBULIZER (ML) INHALATION
Status: DISCONTINUED | OUTPATIENT
Start: 2021-02-05 | End: 2021-02-05

## 2021-02-05 RX ORDER — ACETAMINOPHEN 325 MG/1
650 TABLET ORAL EVERY 6 HOURS PRN
Status: DISCONTINUED | OUTPATIENT
Start: 2021-02-05 | End: 2021-02-05 | Stop reason: SDUPTHER

## 2021-02-05 RX ORDER — MELATONIN
2000 DAILY
Status: DISCONTINUED | OUTPATIENT
Start: 2021-02-06 | End: 2021-02-15 | Stop reason: HOSPADM

## 2021-02-05 RX ORDER — ACETAMINOPHEN 325 MG/1
650 TABLET ORAL EVERY 6 HOURS PRN
Status: DISCONTINUED | OUTPATIENT
Start: 2021-02-05 | End: 2021-02-15 | Stop reason: HOSPADM

## 2021-02-05 RX ORDER — MAGNESIUM HYDROXIDE/ALUMINUM HYDROXICE/SIMETHICONE 120; 1200; 1200 MG/30ML; MG/30ML; MG/30ML
30 SUSPENSION ORAL EVERY 6 HOURS PRN
Status: DISCONTINUED | OUTPATIENT
Start: 2021-02-05 | End: 2021-02-15 | Stop reason: HOSPADM

## 2021-02-05 RX ORDER — ALBUTEROL SULFATE 2.5 MG/3ML
2.5 SOLUTION RESPIRATORY (INHALATION) EVERY 4 HOURS PRN
Status: DISCONTINUED | OUTPATIENT
Start: 2021-02-05 | End: 2021-02-15 | Stop reason: HOSPADM

## 2021-02-05 RX ORDER — LEVALBUTEROL 1.25 MG/.5ML
1.25 SOLUTION, CONCENTRATE RESPIRATORY (INHALATION)
Status: DISCONTINUED | OUTPATIENT
Start: 2021-02-05 | End: 2021-02-06

## 2021-02-05 RX ADMIN — FUROSEMIDE 40 MG: 10 INJECTION, SOLUTION INTRAMUSCULAR; INTRAVENOUS at 21:23

## 2021-02-05 RX ADMIN — FAMOTIDINE 20 MG: 20 TABLET, FILM COATED ORAL at 21:05

## 2021-02-05 RX ADMIN — DOCUSATE SODIUM 100 MG: 100 CAPSULE, LIQUID FILLED ORAL at 21:05

## 2021-02-05 RX ADMIN — LEVALBUTEROL HYDROCHLORIDE 1.25 MG: 1.25 SOLUTION, CONCENTRATE RESPIRATORY (INHALATION) at 21:08

## 2021-02-05 RX ADMIN — BUDESONIDE 0.5 MG: 0.5 INHALANT RESPIRATORY (INHALATION) at 21:08

## 2021-02-05 RX ADMIN — METOPROLOL TARTRATE 25 MG: 25 TABLET, FILM COATED ORAL at 21:05

## 2021-02-05 RX ADMIN — METHYLPREDNISOLONE SODIUM SUCCINATE 40 MG: 40 INJECTION, POWDER, FOR SOLUTION INTRAMUSCULAR; INTRAVENOUS at 21:06

## 2021-02-05 RX ADMIN — FUROSEMIDE 40 MG: 10 INJECTION, SOLUTION INTRAMUSCULAR; INTRAVENOUS at 15:07

## 2021-02-05 NOTE — ED PROCEDURE NOTE
Procedure  POC Cardiac US    Date/Time: 2/5/2021 3:06 PM  Performed by: Chrystine Cranker, MD  Authorized by: Chrystine Cranker, MD     Patient location:  ED  Other Assisting Provider: Yes (comment) (Dr Coolidge Ganser)    Procedure details:     Exam Type:  Diagnostic    Indications: dyspnea      Assessment / Evaluation for: cardiac function, pericardial effusion and right heart strain (suspected pulmonary embolism)      Exam Type: initial exam      Image quality: diagnostic      Image availability:  Images available in PACS  Patient Details:     Cardiac Rhythm:  Irregular    Mechanical ventilation: No    Cardiac findings:     Echo technique: limited 2D and color flow Doppler      Views obtained: parasternal long axis, parasternal short axis and apical      Pericardial effusion: absent      Tamponade physiology: absent      Wall motion: hypodynamic      LV systolic function: normal      RV dilation: none    Pulmonary findings:     Left Lung Findings: left lung sliding      Right lung findings: right lung sliding      B-lines: 1 to 3                       Gustavo Mathews MD  02/05/21 2667

## 2021-02-05 NOTE — TELEPHONE ENCOUNTER
Adelina spoke to the patient's son Praveen Cloud to make him aware the referral was received for hospice  Malena Walker stated his mother was very confused, she fell, it has been taking him a long time to do things with him  Adelina asked if he has a diagnosis of Alzheimer's or dementia which he stated no but somebody needs to diagnose her  Adelina explained the philosophy of hopisce, and Malena Walker states he never told anyone they wanted hospice  Adelina did explain to the patient a physician would not come out to the home to diagnose the patient  Malena Walker is very angry at this point, he wants to have her treated  Adelina did recommend he take his mother to the ER to be evaluated due all of the immediate issues which the son stated his mother is refusing  Adelina did tell Praveen Cloud if he is his mother's POA maybe he needs to make the decision for her if she is confused so a definitive diagnosis can be established and appropriate treatment can be done    Please advise, thanks

## 2021-02-05 NOTE — TELEPHONE ENCOUNTER
Erick Cavazos called saying his mother wasn't feeling well  Sob, dizzyness and cp  He wants you to know he took her to [facility]

## 2021-02-05 NOTE — TELEPHONE ENCOUNTER
Unfortunately, I do recall that John Muñoz did mention hospice concept when we had our virtual  I do not think patient has Alzheimer's The bloodwork orderes MAY help rule out "reversible" causes of her condition  BUT YE, to do a  PROPER work up, patient WOULD need to be evaluated FULLY! That would [facility] [place] ED and admission  That would be my advise recommendation

## 2021-02-05 NOTE — TELEPHONE ENCOUNTER
Latonya Patients care manager wanted to advise Dr Yessenia Del Rosario patients son Terrie Goldsmith at [phone] is requesting if Dr Yessenia Del Rosario may please notify ED that mother will be going in  Patients cyril Goldsmith is concerned he does not want mother to have a panic attack if she has to wait in the ER  Patient is willing to go to the ER to have a full evaluation

## 2021-02-05 NOTE — PROGRESS NOTES
Outpatient Care Management Note:  RE:Received call from pt's son, Malena Walker this morning  Hospice called him to "begin end of life care" as they will not do just an evaluation as per son  Son is upset because he was hoping for an evaluation and now realizes that her fall this morning(noninjury, he states) and her past few days of increased confusion warrants an ED eval Call placed to Dr Maharaj as per Son Suman's requesting provider to call the ER first to let them know pt is coming in  Also if or when he should call EMS to transport her as he would do so after provider speaks with him  Spoke with pt on speaker and she was willing to go and be evaluated  Son was going to help her get dressed

## 2021-02-05 NOTE — ED ATTENDING ATTESTATION
2/5/2021  IAmber MD, saw and evaluated the patient  I have discussed the patient with the resident/non-physician practitioner and agree with the resident's/non-physician practitioner's findings, Plan of Care, and MDM as documented in the resident's/non-physician practitioner's note, except where noted  All available labs and Radiology studies were reviewed  I was present for key portions of any procedure(s) performed by the resident/non-physician practitioner and I was immediately available to provide assistance  At this point I agree with the current assessment done in the Emergency Department  I have conducted an independent evaluation of this patient a history and physical is as follows:  Shortness of breath  Patient has history of COPD as well as CHF  On arrival to the ED, oxygen saturation in the 70s, tachypneic as well  Poor air movement with crackles bilaterally  Oxygen corrects with oxygen therapy, but still tachypneic    Will plan to give Lasix, cardiac evaluation, infectious evaluation, positive pressure ventilation  ED Course         Critical Care Time  CriticalCare Time  Performed by: Amber Martinez MD  Authorized by: Amber Martinez MD     Critical care provider statement:     Critical care time (minutes):  34    Critical care time was exclusive of:  Separately billable procedures and treating other patients and teaching time    Critical care was necessary to treat or prevent imminent or life-threatening deterioration of the following conditions:  Respiratory failure    Critical care was time spent personally by me on the following activities:  Blood draw for specimens, obtaining history from patient or surrogate, development of treatment plan with patient or surrogate, discussions with consultants, discussions with primary provider, evaluation of patient's response to treatment, examination of patient, re-evaluation of patient's condition, review of old charts, ordering and review of radiographic studies, ordering and review of laboratory studies and ordering and performing treatments and interventions

## 2021-02-06 ENCOUNTER — APPOINTMENT (INPATIENT)
Dept: NON INVASIVE DIAGNOSTICS | Facility: HOSPITAL | Age: 86
DRG: 291 | End: 2021-02-06
Payer: COMMERCIAL

## 2021-02-06 DIAGNOSIS — I73.9 PAD (PERIPHERAL ARTERY DISEASE) (HCC): ICD-10-CM

## 2021-02-06 DIAGNOSIS — I65.29 STENOSIS OF CAROTID ARTERY, UNSPECIFIED LATERALITY: ICD-10-CM

## 2021-02-06 LAB
ALBUMIN SERPL BCP-MCNC: 2.9 G/DL (ref 3.5–5)
ALP SERPL-CCNC: 105 U/L (ref 46–116)
ALT SERPL W P-5'-P-CCNC: 14 U/L (ref 12–78)
ARTERIAL PATENCY WRIST A: YES
AST SERPL W P-5'-P-CCNC: 11 U/L (ref 5–45)
BASE EXCESS BLDA CALC-SCNC: 11 MMOL/L
BASOPHILS # BLD AUTO: 0.02 THOUSANDS/ΜL (ref 0–0.1)
BASOPHILS NFR BLD AUTO: 0 % (ref 0–1)
BILIRUB SERPL-MCNC: 0.8 MG/DL (ref 0.2–1)
BUN SERPL-MCNC: 33 MG/DL (ref 5–25)
CALCIUM ALBUM COR SERPL-MCNC: 9.7 MG/DL (ref 8.3–10.1)
CALCIUM SERPL-MCNC: 8.8 MG/DL (ref 8.3–10.1)
CHLORIDE SERPL-SCNC: 99 MMOL/L (ref 100–108)
CO2 SERPL-SCNC: >45 MMOL/L (ref 21–32)
CREAT SERPL-MCNC: 1.22 MG/DL (ref 0.6–1.3)
EOSINOPHIL # BLD AUTO: 0 THOUSAND/ΜL (ref 0–0.61)
EOSINOPHIL NFR BLD AUTO: 0 % (ref 0–6)
ERYTHROCYTE [DISTWIDTH] IN BLOOD BY AUTOMATED COUNT: 14.2 % (ref 11.6–15.1)
GFR SERPL CREATININE-BSD FRML MDRD: 40 ML/MIN/1.73SQ M
GLUCOSE SERPL-MCNC: 139 MG/DL (ref 65–140)
HCO3 BLDA-SCNC: 40.7 MMOL/L (ref 22–28)
HCT VFR BLD AUTO: 49.3 % (ref 34.8–46.1)
HGB BLD-MCNC: 13.5 G/DL (ref 11.5–15.4)
IMM GRANULOCYTES # BLD AUTO: 0.03 THOUSAND/UL (ref 0–0.2)
IMM GRANULOCYTES NFR BLD AUTO: 0 % (ref 0–2)
INR PPP: 1.07 (ref 0.84–1.19)
LYMPHOCYTES # BLD AUTO: 0.61 THOUSANDS/ΜL (ref 0.6–4.47)
LYMPHOCYTES NFR BLD AUTO: 8 % (ref 14–44)
MCH RBC QN AUTO: 27.2 PG (ref 26.8–34.3)
MCHC RBC AUTO-ENTMCNC: 27.4 G/DL (ref 31.4–37.4)
MCV RBC AUTO: 99 FL (ref 82–98)
MONOCYTES # BLD AUTO: 0.11 THOUSAND/ΜL (ref 0.17–1.22)
MONOCYTES NFR BLD AUTO: 2 % (ref 4–12)
NEUTROPHILS # BLD AUTO: 6.8 THOUSANDS/ΜL (ref 1.85–7.62)
NEUTS SEG NFR BLD AUTO: 90 % (ref 43–75)
NRBC BLD AUTO-RTO: 0 /100 WBCS
O2 CT BLDA-SCNC: 17.6 ML/DL (ref 16–23)
OTHER FIO2: ABNORMAL %
OXYHGB MFR BLDA: 91.2 % (ref 94–97)
PCO2 BLDA: 80.9 MM HG (ref 36–44)
PH BLDA: 7.32 [PH] (ref 7.35–7.45)
PLATELET # BLD AUTO: 268 THOUSANDS/UL (ref 149–390)
PMV BLD AUTO: 10.2 FL (ref 8.9–12.7)
PO2 BLDA: 72.1 MM HG (ref 75–129)
POTASSIUM SERPL-SCNC: 3.9 MMOL/L (ref 3.5–5.3)
PROCALCITONIN SERPL-MCNC: <0.05 NG/ML
PROCALCITONIN SERPL-MCNC: <0.05 NG/ML
PROT SERPL-MCNC: 6.4 G/DL (ref 6.4–8.2)
PROTHROMBIN TIME: 13.9 SECONDS (ref 11.6–14.5)
RBC # BLD AUTO: 4.97 MILLION/UL (ref 3.81–5.12)
SODIUM SERPL-SCNC: 145 MMOL/L (ref 136–145)
WBC # BLD AUTO: 7.57 THOUSAND/UL (ref 4.31–10.16)

## 2021-02-06 PROCEDURE — 99223 1ST HOSP IP/OBS HIGH 75: CPT | Performed by: INTERNAL MEDICINE

## 2021-02-06 PROCEDURE — 84145 PROCALCITONIN (PCT): CPT | Performed by: INTERNAL MEDICINE

## 2021-02-06 PROCEDURE — 85025 COMPLETE CBC W/AUTO DIFF WBC: CPT | Performed by: INTERNAL MEDICINE

## 2021-02-06 PROCEDURE — 36415 COLL VENOUS BLD VENIPUNCTURE: CPT | Performed by: INTERNAL MEDICINE

## 2021-02-06 PROCEDURE — 94760 N-INVAS EAR/PLS OXIMETRY 1: CPT

## 2021-02-06 PROCEDURE — 93306 TTE W/DOPPLER COMPLETE: CPT

## 2021-02-06 PROCEDURE — 80053 COMPREHEN METABOLIC PANEL: CPT | Performed by: INTERNAL MEDICINE

## 2021-02-06 PROCEDURE — 99233 SBSQ HOSP IP/OBS HIGH 50: CPT | Performed by: NURSE PRACTITIONER

## 2021-02-06 PROCEDURE — 36600 WITHDRAWAL OF ARTERIAL BLOOD: CPT

## 2021-02-06 PROCEDURE — 85610 PROTHROMBIN TIME: CPT | Performed by: INTERNAL MEDICINE

## 2021-02-06 PROCEDURE — 94640 AIRWAY INHALATION TREATMENT: CPT

## 2021-02-06 PROCEDURE — 82805 BLOOD GASES W/O2 SATURATION: CPT | Performed by: INTERNAL MEDICINE

## 2021-02-06 RX ORDER — LEVALBUTEROL 1.25 MG/.5ML
1.25 SOLUTION, CONCENTRATE RESPIRATORY (INHALATION)
Status: DISCONTINUED | OUTPATIENT
Start: 2021-02-06 | End: 2021-02-15 | Stop reason: HOSPADM

## 2021-02-06 RX ORDER — FUROSEMIDE 10 MG/ML
40 INJECTION INTRAMUSCULAR; INTRAVENOUS DAILY
Status: DISCONTINUED | OUTPATIENT
Start: 2021-02-06 | End: 2021-02-10

## 2021-02-06 RX ADMIN — OXYCODONE HYDROCHLORIDE AND ACETAMINOPHEN 500 MG: 500 TABLET ORAL at 09:30

## 2021-02-06 RX ADMIN — FAMOTIDINE 20 MG: 20 TABLET, FILM COATED ORAL at 17:15

## 2021-02-06 RX ADMIN — DOCUSATE SODIUM 100 MG: 100 CAPSULE, LIQUID FILLED ORAL at 09:29

## 2021-02-06 RX ADMIN — LEVOTHYROXINE SODIUM 75 MCG: 75 TABLET ORAL at 06:36

## 2021-02-06 RX ADMIN — TIOTROPIUM BROMIDE 18 MCG: 18 CAPSULE ORAL; RESPIRATORY (INHALATION) at 09:30

## 2021-02-06 RX ADMIN — ACETAMINOPHEN 650 MG: 325 TABLET ORAL at 09:28

## 2021-02-06 RX ADMIN — LEVALBUTEROL HYDROCHLORIDE 1.25 MG: 1.25 SOLUTION, CONCENTRATE RESPIRATORY (INHALATION) at 19:22

## 2021-02-06 RX ADMIN — ASPIRIN 81 MG: 81 TABLET, COATED ORAL at 13:24

## 2021-02-06 RX ADMIN — DOCUSATE SODIUM 100 MG: 100 CAPSULE, LIQUID FILLED ORAL at 17:15

## 2021-02-06 RX ADMIN — FAMOTIDINE 20 MG: 20 TABLET, FILM COATED ORAL at 09:28

## 2021-02-06 RX ADMIN — METHYLPREDNISOLONE SODIUM SUCCINATE 40 MG: 40 INJECTION, POWDER, FOR SOLUTION INTRAMUSCULAR; INTRAVENOUS at 13:25

## 2021-02-06 RX ADMIN — ISODIUM CHLORIDE 3 ML: 0.03 SOLUTION RESPIRATORY (INHALATION) at 09:49

## 2021-02-06 RX ADMIN — FUROSEMIDE 40 MG: 10 INJECTION, SOLUTION INTRAMUSCULAR; INTRAVENOUS at 13:25

## 2021-02-06 RX ADMIN — METOPROLOL TARTRATE 25 MG: 25 TABLET, FILM COATED ORAL at 21:15

## 2021-02-06 RX ADMIN — ALPRAZOLAM 0.5 MG: 0.5 TABLET ORAL at 09:27

## 2021-02-06 RX ADMIN — LEVALBUTEROL HYDROCHLORIDE 1.25 MG: 1.25 SOLUTION, CONCENTRATE RESPIRATORY (INHALATION) at 09:49

## 2021-02-06 RX ADMIN — METOPROLOL TARTRATE 25 MG: 25 TABLET, FILM COATED ORAL at 09:28

## 2021-02-06 RX ADMIN — BIMATOPROST 1 DROP: 0.1 SOLUTION/ DROPS OPHTHALMIC at 22:52

## 2021-02-06 RX ADMIN — CLOPIDOGREL BISULFATE 75 MG: 75 TABLET ORAL at 09:32

## 2021-02-06 RX ADMIN — METHYLPREDNISOLONE SODIUM SUCCINATE 40 MG: 40 INJECTION, POWDER, FOR SOLUTION INTRAMUSCULAR; INTRAVENOUS at 06:53

## 2021-02-06 RX ADMIN — IPRATROPIUM BROMIDE 0.5 MG: 0.5 SOLUTION RESPIRATORY (INHALATION) at 19:22

## 2021-02-06 RX ADMIN — AMLODIPINE BESYLATE 5 MG: 5 TABLET ORAL at 13:24

## 2021-02-06 RX ADMIN — ENOXAPARIN SODIUM 40 MG: 40 INJECTION SUBCUTANEOUS at 09:27

## 2021-02-06 RX ADMIN — Medication 2000 UNITS: at 09:27

## 2021-02-06 NOTE — ASSESSMENT & PLAN NOTE
Lab Results   Component Value Date    HGBA1C 6 3 (H) 07/01/2020       No results for input(s): POCGLU in the last 72 hours      Blood Sugar Average: Last 72 hrs:     · Type 2 diabetes mellitus  · Continue with insulin  · Hold oral hypoglycemic medication  · Avoid hypoglycemia

## 2021-02-06 NOTE — ED NOTES
Pt removed oxygen, sat in 50's, ripping off EKG leads  Reconnected oxygen and leads at this time  Reoriented patient  Will continue to monitor   o2 back at 95 %     [facility] Dec, [address] [facility]  02/05/21 9976

## 2021-02-06 NOTE — ASSESSMENT & PLAN NOTE
· COPD does not appear to be exacerbation  · - pt with no wheezing on 6liters midflow as pt keeps pulling oxygen off   · - discussed with pulmonary  · Discontinue steroids   · Keep oxygen levels greater than 88- 94%  · Currently on midflow 6 liters 91% walked into the room pt was on room air 53%

## 2021-02-06 NOTE — ASSESSMENT & PLAN NOTE
· Pleural effusion bilateral  · Continue with Lasix 40mg iv daily per cardiology team  · - per pulmonary will possibly require Diamox secondary to patient's hypercarbia  · Monitor BMP    · Pulmonary to evaluate with POCUS Sunday for possible thoracentesis  · Monitor input output  · Daily weights  · Cancel IR consultation pulmonary to monitor

## 2021-02-06 NOTE — CONSULTS
Consultation - General Cardiology Team 2  Sherwin Gonzales 80 y o  female MRN: [id number]  Unit/Bed#: ED 22 Encounter: [id number]      Inpatient consult to Cardiology  Consult performed by: Dav Ferrer MD  Consult ordered by: Bryan Brunner MD              History of Present Illness   Physician Requesting Consult: Rosanne Aquino MD  Reason for Consult / Principal Problem: CHF    HPI: Royal Prieto is a 80y o  year old female with a history of DM2, HTN, HLD, Hypothyroidism, COPD, Previous CVA (2015) in setting of Carotid Stenosis >90% s/p Right CEA, CAD with NSTEMI (2015) s/p CABG (LIMA-LAD, SVG-OM, SVG-RCA) presented with several weeks of progressive dyspnea and lower extremity edema  On arrival work up including CXR and PE suggested fluid overload and patient was subsequently admitted for possible CHF exacerbation  At the time of my interview patient denies ay CP, Palpitations, Lightheadedness, Dizziness, Orthopnea, PND, Fever,s Chills or any other associated complaints  Denies any recent travels or sick contacts  COVID negative  Review of Systems  ROS as noted above  Historical Information   Past Medical History:   Diagnosis Date    Anxiety     Cataract, bilateral     Last Assessed:3/19/2014    COPD [facility]ch[facility] obstructive [facility] disease) [facility] [address] [facility]     Coronary artery disease     Hx of arterial ischemic stroke     Hypertension     Stroke ([facility] [address] )     Wears glasses      Past Surgical History:   Procedure Laterality Date    CAROTID ENDARTERECTOMY Right     CATARACT EXTRACTION W/ INTRAOCULAR LENS  IMPLANT, BILATERAL      COLONOSCOPY N/A 9/30/2017    Procedure: COLONOSCOPY FOR CONTROL OF BLEEDING;  Surgeon: Adam Perez MD;  Location: [facility];  Service: Colorectal    CORONARY ARTERY BYPASS GRAFT      CABG X3 with LIMA to LAD,SVG to OM,SVG to distal  RCA ;  Last Assessed:7/13/2015    JOINT REPLACEMENT      left TKR    KIDNEY STONE SURGERY      NEPHROSTOMY Left     with Drainage Irrigation ; [id number]    ME TOTAL KNEE ARTHROPLASTY Right 2/25/2016    Procedure: ARTHROPLASTY KNEE TOTAL;  Surgeon: Natalie Tan MD;  Location: [facility];  Service: Orthopedics     Social History     Substance and Sexual Activity   Alcohol Use Never    Frequency: Never     Social History     Substance and Sexual Activity   Drug Use Never     Social History     Tobacco Use   Smoking Status Never Smoker   Smokeless Tobacco Never Used     Family History:   Family History   Problem Relation Age of Onset    Cancer Mother         malignant neoplasm    Heart attack Father     Aneurysm Other         Aortic    Cancer Other         malignant neoplasm       Meds/Allergies   Hospital Medications:   Current Facility-Administered Medications   Medication Dose Route Frequency    acetaminophen (TYLENOL) tablet 650 mg  650 mg Oral Q6H PRN    albuterol inhalation solution 2 5 mg  2 5 mg Nebulization Q4H PRN    ALPRAZolam (XANAX) tablet 0 5 mg  0 5 mg Oral TID PRN    aluminum-magnesium hydroxide-simethicone (MYLANTA) oral suspension 30 mL  30 mL Oral Q6H PRN    amLODIPine (NORVASC) tablet 5 mg  5 mg Oral Daily    ascorbic acid (VITAMIN C) tablet 500 mg  500 mg Oral Daily    aspirin (ECOTRIN LOW STRENGTH) EC tablet 81 mg  81 mg Oral Daily    bimatoprost (LUMIGAN) 0 01 % ophthalmic solution 1 drop  1 drop Both Eyes HS    budesonide (PULMICORT) inhalation solution 0 5 mg  0 5 mg Nebulization Q12H    calcium carbonate (TUMS) chewable tablet 1,000 mg  1,000 mg Oral Daily PRN    cholecalciferol (VITAMIN D3) tablet 2,000 Units  2,000 Units Oral Daily    clopidogrel (PLAVIX) tablet 75 mg  75 mg Oral Daily    docusate sodium (COLACE) capsule 100 mg  100 mg Oral BID    enoxaparin (LOVENOX) subcutaneous injection 40 mg  40 mg Subcutaneous Daily    famotidine (PEPCID) tablet 20 mg  20 mg Oral BID    furosemide (LASIX) tablet 20 mg  20 mg Oral Daily    levalbuterol (XOPENEX) inhalation solution 1 25 mg  1 25 mg Nebulization TID    levothyroxine tablet 75 mcg  75 mcg Oral Early Morning    methylPREDNISolone sodium succinate (Solu-MEDROL) injection 40 mg  40 mg Intravenous Q8H    metoprolol tartrate (LOPRESSOR) tablet 25 mg  25 mg Oral Q12H [address]    oxyCODONE-acetaminophen (PERCOCET) 5-325 mg per tablet 1 tablet  1 tablet Oral Q4H PRN    senna (SENOKOT) tablet 8 6 mg  1 tablet Oral Daily    sodium chloride 0 9 % inhalation solution 3 mL  3 mL Nebulization Q4H PRN    tiotropium (SPIRIVA) capsule for inhaler 18 mcg  18 mcg Inhalation Daily     Home Medications: (Not in a hospital admission)      Allergies   Allergen Reactions    Penicillins Itching    Penicillins Rash       Objective   Vitals: Blood pressure 139/63, pulse 68, temperature 98 7 °F (37 1 °C), temperature source Oral, resp  rate 15, weight 97 1 kg (214 lb), SpO2 91 %, not currently breastfeeding    Orthostatic Blood Pressures      Most Recent Value   Blood Pressure  139/63 filed at 02/06/2021 8981   Patient Position - Orthostatic VS  Lying filed at 02/06/2021 0437            Invasive Devices     Peripheral Intravenous Line            Peripheral IV Right Antecubital -- days    Peripheral IV Right Hand -- days    Peripheral IV 05/03/19 Right Hand 645 days          Drain            External Urinary Catheter 645 days                Physical Exam  GEN: 29 Fani Porter appears well, alert and oriented x 3, pleasant and cooperative   HEENT:  Normocephalic, atraumatic, anicteric, moist mucous membranes  NECK: No JVD or carotid bruits   HEART: Regular rhythm, Regular rate, normal S1 and S2, no murmurs, clicks, gallops or rubs   LUNGS: Clear to auscultation bilaterally; no wheezes, rales, or rhonchi; respiration nonlabored   ABDOMEN:  Normoactive bowel sounds, soft, no tenderness, no distention  EXTREMITIES: peripheral pulses palpable; no edema  NEURO: no gross focal findings; cranial nerves grossly intact   SKIN:  Dry, intact, warm to touch    Lab Results:   CBC with diff:   Results from last 7 days   Lab Units 02/06/21  0433   WBC Thousand/uL 7 57   RBC Million/uL 4 97   HEMOGLOBIN g/dL 13 5   HEMATOCRIT % 49 3*   MCV fL 99*   MCH pg 27 2   MCHC g/dL 27 4*   RDW % 14 2   MPV fL 10 2   PLATELETS Thousands/uL 268     CMP:   Results from last 7 days   Lab Units 02/06/21  0433   SODIUM mmol/L 145   POTASSIUM mmol/L 3 9   CHLORIDE mmol/L 99*   CO2 mmol/L >45*   BUN mg/dL 33*   CREATININE mg/dL 1 22   CALCIUM mg/dL 8 8   AST U/L 11   ALT U/L 14   ALK PHOS U/L 105   EGFR ml/min/1 73sq m 40     Troponin:   0   Lab Value Date/Time    TROPONINI 0 03 02/05/2021 1444    TROPONINI <0 02 09/29/2017 2345    TROPONINI <0 02 02/29/2016 1130    TROPONINI 1 42 (H) 06/29/2015 0027    TROPONINI 1 50 (H) 06/28/2015 1532    TROPONINI <0 04 02/11/2015 0518    TROPONINI <0 04 02/10/2015 2327     BNP:   Results from last 7 days   Lab Units 02/06/21  0433   POTASSIUM mmol/L 3 9   CHLORIDE mmol/L 99*   CO2 mmol/L >45*   BUN mg/dL 33*   CREATININE mg/dL 1 22   CALCIUM mg/dL 8 8   EGFR ml/min/1 73sq m 40     Coags:   Results from last 7 days   Lab Units 02/06/21  0433   INR  1 07     TSH:   Results from last 7 days   Lab Units 02/05/21  1444   TSH 3RD GENERATON uIU/mL 1 060     Magnesium:     Lipid Profile:       Lab Results   Component Value Date    TROPONINI 0 03 02/05/2021    TROPONINI <0 02 09/29/2017    TROPONINI <0 02 02/29/2016       Lab Results   Component Value Date    GLUCOSE 100 05/01/2019    CALCIUM 8 8 02/06/2021     07/16/2015    K 3 9 02/06/2021    CO2 >45 (HH) 02/06/2021    CL 99 (L) 02/06/2021    BUN 33 (H) 02/06/2021    CREATININE 1 22 02/06/2021       Lab Results   Component Value Date    WBC 7 57 02/06/2021    HGB 13 5 02/06/2021    HCT 49 3 (H) 02/06/2021    MCV 99 (H) 02/06/2021     02/06/2021     Results from last 7 days   Lab Units 02/06/21  0433   INR  1 07       Lab Results   Component Value Date    CHOL 117 06/30/2015    CHOL 116 06/29/2015     Lab Results   Component Value Date    HDL 50 2020    HDL 44 10/16/2019     Lab Results   Component Value Date    LDLCALC 83 2020    LDLCALC 63 10/16/2019     Lab Results   Component Value Date    TRIG 86 2020    TRIG 101 10/16/2019       Lab Results   Component Value Date    ALT 14 2021    AST 11 2021       Imaging: I have personally reviewed pertinent reports  Telemetry:   Media Information       Document Information    Clinical Image - Mobile Device   Pvc   2021 11:35   Attached To: Hospital Encounter on 21   Source Information    Twila Delgado MD  [facility]         ECHO:   Results for orders placed during the hospital encounter of 16   Echo complete with contrast if indicated    Narrative [address]  [address]  [place], [address]  [phone]    Transthoracic Echocardiogram  2D, M-mode, Doppler, and Color Doppler    Study date:  2016    Patient: Sariah Velazco  MR number: [id number]  Account number: [de-identified]  : [id number]  Age: [de-identified] years  Gender: Female  Status: Outpatient  Location: [facility]  Height: 63 in  Weight: 179 5 lb  BP: 160/ 78 mmHg    Indications: CAD    Diagnoses: R60 9 - Edema, unspecified    Sonographer:  Matilde Dhaliwal RDCS  Primary Physician:  Senthil Lynch DO  Referring Physician:  Nanda Rangel DO  Group:  [facility]  Interpreting Physician:  Nanda Rangel DO    SUMMARY    LEFT VENTRICLE:  Systolic function was normal  Ejection fraction was estimated in the range of  55 % to 60 %  There were no regional wall motion abnormalities  Wall thickness was at the upper limits of normal   Doppler parameters were consistent with abnormal left ventricular relaxation  (grade 1 diastolic dysfunction)  LEFT ATRIUM:  The atrium was mildly dilated  RIGHT ATRIUM:  The atrium was mildly dilated  MITRAL VALVE:  There was moderate annular calcification      AORTIC VALVE:  There was mild regurgitation  AORTA:  The root exhibited normal size and mild fibrocalcific change  Ascending aorta  not visualized  HISTORY: PRIOR HISTORY: Abnormal Cath 6/15/CAD/CABG 2015, Hyperlipidemia,  Hypertension, Stroke, Tremors, Hypothyroid, COPD, DM2, Reflux, Edema    PROCEDURE: The study was performed in the [address] [place] [address]  This  was a routine study  The transthoracic approach was used  The study included  complete 2D imaging, M-mode, complete spectral Doppler, and color Doppler  Image quality was adequate  LEFT VENTRICLE: Size was normal  Systolic function was normal  Ejection  fraction was estimated in the range of 55 % to 60 %  There were no regional  wall motion abnormalities  Wall thickness was at the upper limits of normal   DOPPLER: Doppler parameters were consistent with abnormal left ventricular  relaxation (grade 1 diastolic dysfunction)  RIGHT VENTRICLE: The size was normal  Systolic function was normal  Wall  thickness was normal     LEFT ATRIUM: The atrium was mildly dilated  RIGHT ATRIUM: The atrium was mildly dilated  MITRAL VALVE: There was moderate annular calcification  DOPPLER: There was no  evidence for stenosis  There was no regurgitation  AORTIC VALVE: The valve was trileaflet  Leaflets exhibited mildly increased  thickness, mild calcification, and normal cuspal separation  DOPPLER:  Transaortic velocity was within the normal range  There was no evidence for  stenosis  There was mild regurgitation  TRICUSPID VALVE: The valve structure was normal  There was normal leaflet  separation  DOPPLER: The transtricuspid velocity was within the normal range  There was no evidence for stenosis  There was no regurgitation  PULMONIC VALVE: Not well visualized  PERICARDIUM: There was no pericardial effusion  AORTA: The root exhibited normal size and mild fibrocalcific change  Ascending  aorta not visualized      SYSTEMIC VEINS: IVC: The inferior vena cava was normal in size and course  Respirophasic changes were normal     SYSTEM MEASUREMENT TABLES    2D  %FS: 31 6 %  AV Diam: 3 3 cm  EDV(Teich): 78 6 ml  EF Biplane: 54 %  EF(Teich): 60 %  ESV(Cube): 23 7 ml  ESV(Teich): 31 4 ml  IVSd: 1 1 cm  LA Area: 23 9 cm2  LA Diam: 3 9 cm  LVEDV MOD A2C: 100 7 ml  LVEDV MOD A4C: 76 5 ml  LVEDV MOD BP: 88 3 ml  LVEF MOD A2C: 61 1 %  LVEF MOD A4C: 55 8 %  LVESV MOD A2C: 39 2 ml  LVESV MOD A4C: 33 8 ml  LVESV MOD BP: 40 7 ml  LVIDd: 4 2 cm  LVIDs: 2 9 cm  LVLd A2C: 7 8 cm  LVLd A4C: 7 5 cm  LVLs A2C: 6 5 cm  LVLs A4C: 5 2 cm  LVPWd: 1 2 cm  RA Area: 20 9 cm2  Rv Diam: 4 4 cm  SI(Cube): 27 3 ml/m2  SI(Teich): 25 5 ml/m2  SV MOD A2C: 61 5 ml  SV MOD A4C: 42 7 ml  SV(Cube): 50 4 ml  SV(Teich): 47 2 ml    CW  AR Dec Sherman: 1 m/s2  AR Dec Time: 2726 6 ms  AR PHT: 790 7 ms  AR Vmax: 2 6 m/s  AR maxP 9 mmHg  TR Vmax: 2 3 m/s  TR maxP 3 mmHg    MM  TAPSE: 1 9 cm    PW  E': 0 m/s  E/E': 15 8  MV A Merlin: 1 1 m/s  MV Dec Sherman: 2 5 m/s2  MV DecT: 288 1 ms  MV E Merlin: 0 7 m/s  MV E/A Ratio: 0 7    [employer] Accredited Echocardiography Laboratory    Prepared and electronically signed by    Angie Lemos DO  Signed 2016 15:29:38         CATH: [id number]  CORONARY VESSELS:   --  There was severe left main disease ( 85 % stenosis)  --  Left main: The vessel was normal sized  Angiography showed a single   discrete lesion  --  Distal left main: There was a discrete 85 % stenosis  --  LAD: The vessel was normal sized  Angiography showed moderate   atherosclerosis  --  Proximal LAD: There was a tubular 80 % stenosis just before D1    --  1st diagonal: The vessel was medium sized  Angiography showed moderate   atherosclerosis  There was a tubular 40 % stenosis in the proximal third of    the   vessel segment  --  Proximal circumflex: There was a diffuse 50 % stenosis in   the distal third of the vessel segment     --  1st obtuse marginal: Normal  The vessel was normal sized  --  RCA: The vessel was medium sized  Angiography showed moderate   atherosclerosis  --  Proximal RCA: There was a diffuse 70 % stenosis  EKG:   Date: 2/6/21  Interpretation: Sinus Rhythm with 1st Degree AV Block ()  PVCs  RBBB  VTE Prophylaxis: Heparin       Assessment/Plan     Assessment:    1  Possible CHF Exacerbation vs  COPD Exacerbation  --> Troponin Negative x1  --> proBNP 4,900 (No prior baseline)  2  CAD with NSTEMI (2015) s/p CABG (LIMA-LAD, SVG-OM, SVG-RCA)  3  HTN / HLD  4  DM2  5  Hypothyroidism  6  Previous CVA (2015) in setting of Carotid Stenosis >90% s/p Right CEA  7  Chronic RBBB    Plan:  - c/w Lasix 40mg IV Daily  - c/w Amlodipine 5mg PO Daily  - c/w Aspirin 81mg PO Daily  - c/w Plavix 75mg PO Daily  - c/w Metoprolol Tart  25mg PO BID  - c/w Steroid and Nebulizers for COPD  - Monitor I's//O's Q6H  - Monitor Daily Weight  - f/u TTE    Case discussed and reviewed with Dr Robert Castellanos who agrees with my assessment and plan  Thank you for involving us in the care of your patient  Lela Andersen MD  Cardiology Fellow PGY-4    ==========================================================================================    Counseling / Coordination of Care  Total floor / unit time spent today 45 minutes minutes  Greater than 50% of total time was spent with the patient and / or family counseling and / or coordination of care  A description of the counseling / coordination of care:         Epic/ Allscripts/Care Everywhere records reviewed:     ** Please Note: Fluency DirectDictation voice to text software may have been used in the creation of this document   **

## 2021-02-06 NOTE — ASSESSMENT & PLAN NOTE
Respiratory failure with hypoxia, on mid flow  Multifactorial including COPD, CHF exacerbation and pleural effusion  Continue with the diuresis  Keep oxygen levels 88-92 %

## 2021-02-06 NOTE — ASSESSMENT & PLAN NOTE
Pleural effusion bilateral  Continue with Lasix  Monitor input output  Daily weights  IR consult for tap  Hold anticoagulation  NPO past midnight

## 2021-02-06 NOTE — H&P
H&P- Betzaida Eric 1935, 80 y o  female MRN: [id number]    Unit/Bed#: ED 22 Encounter: [id number]    Primary Care Provider: Jeet Thompson DO   Date and time admitted to hospital: 2/5/2021  2:23 PM        Respiratory failure with hypoxia [facility])  Assessment & Plan  Respiratory failure with hypoxia, on mid flow  Multifactorial including COPD, CHF exacerbation and pleural effusion  Continue with the diuresis  Keep oxygen levels 88-92 %    Type 2 diabetes mellitus ([address] )  Assessment & Plan  Lab Results   Component Value Date    HGBA1C 6 3 (H) 07/01/2020       No results for input(s): POCGLU in the last 72 hours  Blood Sugar Average: Last 72 hrs:     Type 2 diabetes mellitus  Continue with insulin  Hold oral hypoglycemic medication  Avoid hypoglycemia    S/P CABG (coronary artery bypass graft)  Assessment & Plan  Aspirin and statin     Hyperlipidemia  Assessment & Plan  Continue with statin     Chronic diastolic congestive heart failure (HCC)  Assessment & Plan  Wt Readings from Last 3 Encounters:   02/05/21 97 1 kg (214 lb)   10/15/20 97 1 kg (214 lb)   07/28/20 98 4 kg (217 lb)       Continue diuresis  In and out monitoring  Fluid restriction   Daily weights        Hypothyroidism  Assessment & Plan  Hypothyroidism  Continue with levothyroxine    Hypertension  Assessment & Plan  Continue with metoprolol 25 mg [id number]   Lasix 20 mg daily   Will change to 40 mg bid    COPD (chronic obstructive pulmonary disease) (HCC)  Assessment & Plan  COPD exacerbation  Will start on steroids 60 mg b i d    Pulmonary consult  Keep oxygen levels greater than 92%  Currently on med flow    * Pleural effusion  Assessment & Plan  Pleural effusion bilateral  Continue with Lasix  Monitor input output  Daily weights  IR consult for tap  Hold anticoagulation  NPO past midnight          VTE Prophylaxis: Enoxaparin (Lovenox)  / sequential compression device   Code Status: full code   POLST: POLST is not applicable to this patient  Discussion with family:      Anticipated Length of Stay:  Patient will be admitted on an Inpatient basis with an anticipated length of stay of  More than 2 midnights  Justification for Hospital Stay: due to pleural effusions and hypoxia     Total Time for Visit, including Counseling / Coordination of Care: 45 minutes  Greater than 50% of this total time spent on direct patient counseling and coordination of care  Chief Complaint:   Sob     History of Present Illness:    Virgen Stacy is a 80-year-old female with a history of CVA 2/2015, COPD, CAD, status post CABG x3, lower extremity edema presented with several weeks of progressive dyspnea, lower extremity edema, no fever, COVID is negative, noted to have cardiomegaly and bilateral effusions on chest x-ray  She received 40 mg of IV Lasix and was placed on med flow for tachypnea  She has currently on med for saturating at 92%  She will be admitted for COPD exacerbation and CHF exacerbation with pleural effusion  Review of Systems:    Review of Systems    Past Medical and Surgical History:     Past Medical History:   Diagnosis Date    Anxiety     Cataract, bilateral     Last Assessed:3/19/2014    COPD [facility] obstructive [facility]) [facility]     Coronary artery disease     Hx of arterial ischemic stroke     Hypertension     Stroke [facility] )     Wears glasses        Past Surgical History:   Procedure Laterality Date    CAROTID ENDARTERECTOMY Right     CATARACT EXTRACTION W/ INTRAOCULAR LENS  IMPLANT, BILATERAL      COLONOSCOPY N/A 9/30/2017    Procedure: COLONOSCOPY FOR CONTROL OF BLEEDING;  Surgeon: Danyel East MD;  Location: [facility];  Service: Colorectal    CORONARY ARTERY BYPASS GRAFT      CABG X3 with LIMA to LAD,SVG to OM,SVG to distal  RCA ; Last Assessed:7/13/2015    JOINT REPLACEMENT      left TKR    KIDNEY STONE SURGERY      NEPHROSTOMY Left     with Drainage Irrigation ; Onset:1974    LA TOTAL KNEE ARTHROPLASTY Right 2/25/2016    Procedure: ARTHROPLASTY KNEE TOTAL;  Surgeon: Lou Loya MD;  Location: [facility];  Service: Orthopedics       Meds/Allergies:    Prior to Admission medications    Medication Sig Start Date End Date Taking? Authorizing Provider   albuterol (2 5 mg/3 mL) 0 083 % nebulizer solution Take 1 vial (2 5 mg total) by nebulization every 4 (four) hours as needed for wheezing or shortness of breath 5/7/19  Yes Shruthi Pichardo PA-C   ALPRAZolam Florestine Jennifer) 0 5 mg tablet Take 1 tablet (0 5 mg total) by mouth 3 (three) times a day as needed (TREMOR) 2/3/21  Yes Paz Maharaj DO   amLODIPine (NORVASC) 5 mg tablet TAKE 1 TABLET BY MOUTH EVERY DAY 2/6/20  Yes Verdis Matt Pryblicsara, DO   ascorbic acid (VITAMIN C) 500 mg tablet Take 500 mg by mouth daily    Yes Historical Provider, MD   aspirin (ECOTRIN LOW STRENGTH) 81 mg EC tablet Take 81 mg by mouth daily   Yes Historical Provider, MD   bimatoprost (LUMIGAN) 0 01 % ophthalmic drops Apply 1 drop to eye Daily 8/21/17  Yes Historical Provider, MD   Cholecalciferol (VITAMIN D) 2000 UNITS tablet Take 2,000 Units by mouth daily     Yes Historical Provider, MD   clopidogrel (PLAVIX) 75 mg tablet TAKE 1 TABLET BY MOUTH EVERY DAY 8/23/20  Yes Radha Lei DO   famotidine (PEPCID) 20 mg tablet Take 1 tablet (20 mg total) by mouth 2 (two) times a day 2/3/21  Yes Paz Maharaj DO   furosemide (LASIX) 20 mg tablet TAKE 1 TABLET BY MOUTH EVERY DAY 6/21/20  Yes Paz Maharaj, DO   levalbuterol (XOPENEX) 0 63 mg/3 mL nebulizer solution Take 3 mL (0 63 mg total) by nebulization 3 (three) times a day  Patient taking differently: Take 0 63 mg by nebulization as needed   5/30/18  Yes Radha Lei DO   levothyroxine 75 mcg tablet TAKE 1 TABLET BY MOUTH EVERY DAY IN THE MORNING 12/15/20  Yes Paz Maharaj DO   metoprolol tartrate (LOPRESSOR) 25 mg tablet TAKE 1 TABLET (25 MG TOTAL) BY MOUTH EVERY 12 (TWELVE) HOURS 4/1/20  Yes Ivory Gage,    oxyCODONE-acetaminophen (PERCOCET) 5-325 mg per tablet Take 1 tablet by mouth every 4 (four) hours as needed for moderate painMax Daily Amount: 6 tablets 2/3/21  Yes Kaleigh Palmer DO   Pediatric Multiple Vit-C-FA (PEDIATRIC MULTIVITAMIN) chewable tablet Chew 1 tablet daily   Yes Historical Provider, MD   Spiriva HandiHaler 18 MCG inhalation capsule INHALE 1 CAPSULE VIA HANDIHALER ONCE DAILY AT THE SAME TIME EVERY DAY 8/23/20  Yes Kaleigh Palmer DO   acetaminophen (TYLENOL) 325 mg tablet Take 2 tablets (650 mg total) by mouth every 6 (six) hours as needed for mild pain  Patient not taking: Reported on 2/5/2021 5/7/19   Don Gutiérrez PA-C     I have reviewed home medications with patient personally  Allergies: Allergies   Allergen Reactions    Penicillins Itching    Penicillins Rash       Social History:     Marital Status:    Occupation:    Patient Pre-hospital Living Situation: lives at home   Patient Pre-hospital Level of Mobility:   Patient Pre-hospital Diet Restrictions: diabetic and fluid restriction  Substance Use History:   Social History     Substance and Sexual Activity   Alcohol Use Never    Frequency: Never     Social History     Tobacco Use   Smoking Status Never Smoker   Smokeless Tobacco Never Used     Social History     Substance and Sexual Activity   Drug Use Never       Family History:    Family History   Problem Relation Age of Onset    Cancer Mother         malignant neoplasm    Heart attack Father     Aneurysm Other         Aortic    Cancer Other         malignant neoplasm       Physical Exam:     Vitals:   Blood Pressure: (!) 149/106 (02/05/21 2105)  Pulse: 67 (02/05/21 2105)  Temperature: 98 7 °F (37 1 °C) (02/05/21 1427)  Temp Source: Oral (02/05/21 1427)  Respirations: 19 (02/05/21 2101)  Weight - Scale: 97 1 kg (214 lb) (02/05/21 2101)  SpO2: 93 % (02/05/21 2111)    Physical Exam         Additional Data:     Lab Results: I have personally reviewed pertinent reports        Results from last 7 days   Lab Units 02/05/21  1444   WBC Thousand/uL 9 01   HEMOGLOBIN g/dL 13 5   HEMATOCRIT % 48 5*   PLATELETS Thousands/uL 285   NEUTROS PCT % 75   LYMPHS PCT % 13*   MONOS PCT % 9   EOS PCT % 1     Results from last 7 days   Lab Units 02/05/21  1444   SODIUM mmol/L 144   POTASSIUM mmol/L 4 2   CHLORIDE mmol/L 103   CO2 mmol/L 44*   BUN mg/dL 35*   CREATININE mg/dL 1 19   ANION GAP mmol/L -3*   CALCIUM mg/dL 9 2   ALBUMIN g/dL 2 8*   TOTAL BILIRUBIN mg/dL 0 74   ALK PHOS U/L 104   ALT U/L 16   AST U/L 18   GLUCOSE RANDOM mg/dL 107                       Imaging: I have personally reviewed pertinent reports  CT head without contrast   Final Result by Raza Gibson DO (02/05 1540)   No acute intracranial abnormality  Workstation performed: [id number]         CT spine cervical without contrast   Final Result by Raza Gibson DO (02/05 1600)   Exam limited by motion artifact  No cervical spine fracture or traumatic malalignment  The study was marked in EPIC for immediate notification because this is a "level C trauma"  Workstation performed: [id number]         XR chest portable   Final Result by Tamara Park DO (02/05 1538)      Central vascular congestion with suspected asymmetric pulmonary edema and bibasilar opacities as above  Workstation performed: [id number]             EKG, Pathology, and Other Studies Reviewed on Admission:   EKG: Sinus rhythm with marked sinus arrhythmia with frequent Premature ventricular complexes  Right bundle branch block    Allscripts / Epic Records Reviewed: Yes     ** Please Note: This note has been constructed using a voice recognition system   **

## 2021-02-06 NOTE — PROGRESS NOTES
Progress Note - Bing Campuzano 1935, 80 y o  female MRN: [id number]    Unit/Bed#: [address] Encounter: [id number]    Primary Care Provider: Taz Thomson DO   Date and time admitted to hospital: 2/5/2021  2:23 PM        * Pleural effusion  Assessment & Plan  · Pleural effusion bilateral  · Continue with Lasix 40mg iv daily per cardiology team  · - per pulmonary will possibly require Diamox secondary to patient's hypercarbia  · Monitor BMP    · Pulmonary to evaluate with POCUS Sunday for possible thoracentesis  · Monitor input output  · Daily weights  · Cancel IR consultation pulmonary to monitor     Respiratory failure with hypoxia ([facility] )  Assessment & Plan  · Acute on chronic hypercarbic respiratory failure  · Respiratory failure with hypoxia, on mid flow  · Multifactorial possible  COPD, CHF exacerbation and pleural effusion  · Continue with the diuresis   · Keep oxygen levels 88-92 %    COPD (chronic obstructive pulmonary disease) ([facility] )  Assessment & Plan  · COPD does not appear to be exacerbation  · - pt with no wheezing on 6liters midflow as pt keeps pulling oxygen off   · - discussed with pulmonary  · Discontinue steroids   · Keep oxygen levels greater than 88- 94%  · Currently on midflow 6 liters 91% walked into the room pt was on room air 53%     Type 2 diabetes mellitus ([facility] )  Assessment & Plan  Lab Results   Component Value Date    HGBA1C 6 3 (H) 07/01/2020       No results for input(s): POCGLU in the last 72 hours      Blood Sugar Average: Last 72 hrs:     · Type 2 diabetes mellitus  · Continue with insulin  · Hold oral hypoglycemic medication  · Avoid hypoglycemia    S/P CABG (coronary artery bypass graft)  Assessment & Plan  · Aspirin and statin     Hyperlipidemia  Assessment & Plan  · Continue with statin     Chronic diastolic congestive heart failure (HCC)  Assessment & Plan  Wt Readings from Last 3 Encounters:   02/05/21 97 1 kg (214 lb)   10/15/20 97 1 kg (214 lb)   07/28/20 98 4 kg (217 lb)       · Continue diuresis  · Strict I/o's  · Fluid restriction   · Daily weights  · Chest xray : Central vascular congestion with suspected asymmetric pulmonary edema and bibasilar opacities as above  Hypothyroidism  Assessment & Plan  · Hypothyroidism  · Continue with levothyroxine    Hypertension  Assessment & Plan  · Continue with metoprolol 25 mg [id number]   · Started on iv lasix  40mg daily per cards       VTE Pharmacologic Prophylaxis:   Pharmacologic: Enoxaparin (Lovenox)  Mechanical VTE Prophylaxis in Place: Yes    Patient Centered Rounds: I have performed bedside rounds with nursing staff today  Discussions with Specialists or Other Care Team Provider:  Nursing, IR , Dr Yvette Eisenberg pulmonary    Education and Discussions with Family / Patient:  Patient    Time Spent for Care: 1 hour  More than 50% of total time spent on counseling and coordination of care as described above  Current Length of Stay: 1 day(s)    Current Patient Status: Inpatient   Certification Statement: The patient will continue to require additional inpatient hospital stay due to Hypoxia and IV Lasix treatment    Discharge Plan: Will need PT OT evaluation likely rehab    Code Status: Level 1 - Full Code      Subjective:   Upon arrival to patient in the ED, she has removed her oxygen home oxygen alarm is ringing and oxygen saturations in mid 50s  Patient on mid flow oxygen returned back to 91% on 6 L mid flow  Patient is able to tell me that she is at [facility] in [facility] exactly where she lives and is oriented although at times appears as if she is not  She does not understand why she removes her oxygen  Family however with concerns that patient keeps doing this  Patient reports that she lives at home with son      Objective:     Vitals:   Temp (24hrs), Av 6 °F (37 °C), Min:98 6 °F (37 °C), Max:98 6 °F (37 °C)    Temp:  [98 6 °F (37 °C)] 98 6 °F (37 °C)  HR:  [58-80] 78  Resp:  [15-28] 16  BP: (104-167)/() 124/54  SpO2:  [91 %-98 %] 92 %  Body mass index is 36 73 kg/m²  Input and Output Summary (last 24 hours): Intake/Output Summary (Last 24 hours) at 2/6/2021 2204  Last data filed at 2/6/2021 1922  Gross per 24 hour   Intake 236 ml   Output 1700 ml   Net -1464 ml       Physical Exam:     Physical Exam  Constitutional:       General: She is not in acute distress  Appearance: She is not ill-appearing, toxic-appearing or diaphoretic  HENT:      Head: Normocephalic and atraumatic  Mouth/Throat:      Pharynx: Oropharynx is clear  Eyes:      Conjunctiva/sclera: Conjunctivae normal    Cardiovascular:      Rate and Rhythm: Normal rate  Heart sounds: No murmur  No friction rub  No gallop  Pulmonary:      Breath sounds: Rales (mild crackles ) present  Comments: Poor effort   Abdominal:      General: There is no distension  Palpations: There is no mass  Tenderness: There is no abdominal tenderness  There is no right CVA tenderness, left CVA tenderness, guarding or rebound  Hernia: No hernia is present  Skin:     Coloration: Skin is not jaundiced or pale  Findings: No bruising, erythema, lesion or rash  Neurological:      Mental Status: She is alert and oriented to person, place, and time     Psychiatric:      Comments: Emotional            Additional Data:     Labs:    Results from last 7 days   Lab Units 02/06/21  0433   WBC Thousand/uL 7 57   HEMOGLOBIN g/dL 13 5   HEMATOCRIT % 49 3*   PLATELETS Thousands/uL 268   NEUTROS PCT % 90*   LYMPHS PCT % 8*   MONOS PCT % 2*   EOS PCT % 0     Results from last 7 days   Lab Units 02/06/21  0433 02/05/21  1444   SODIUM mmol/L 145 144   POTASSIUM mmol/L 3 9 4 2   CHLORIDE mmol/L 99* 103   CO2 mmol/L >45* 44*   BUN mg/dL 33* 35*   CREATININE mg/dL 1 22 1 19   ANION GAP mmol/L  --  -3*   CALCIUM mg/dL 8 8 9 2   ALBUMIN g/dL 2 9* 2 8*   TOTAL BILIRUBIN mg/dL 0 80 0 74   ALK PHOS U/L 105 104   ALT U/L 14 16   AST U/L 11 18   GLUCOSE RANDOM mg/dL 139 107     Results from last 7 days   Lab Units 02/06/21  0433   INR  1 07             Results from last 7 days   Lab Units 02/06/21  0635 02/06/21  0433   PROCALCITONIN ng/ml <0 05 <0 05           * I Have Reviewed All Lab Data Listed Above  * Additional Pertinent Lab Tests Reviewed:  All Labs Within Last 24 Hours Reviewed    Imaging:    Imaging Reports Reviewed Today Include: reviewed     Recent Cultures (last 7 days):           Last 24 Hours Medication List:   Current Facility-Administered Medications   Medication Dose Route Frequency Provider Last Rate    acetaminophen  650 mg Oral Q6H PRN Marcos Flores MD      albuterol  2 5 mg Nebulization Q4H PRN Marcos Flores MD      ALPRAZolam  0 5 mg Oral TID PRN Marcos Flores MD      aluminum-magnesium hydroxide-simethicone  30 mL Oral Q6H PRN Marcos Flores MD      amLODIPine  5 mg Oral Daily Marcos Flores MD      ascorbic acid  500 mg Oral Daily Marcos Flores MD      aspirin  81 mg Oral Daily Marcos Flores MD      bimatoprost  1 drop Both Eyes HS Marcos Flores MD      calcium carbonate  1,000 mg Oral Daily PRN Marcos Flores MD      cholecalciferol  2,000 Units Oral Daily Marcos Flores MD      clopidogrel  75 mg Oral Daily Marcos Flores MD      docusate sodium  100 mg Oral BID Marcos Flores MD      enoxaparin  40 mg Subcutaneous Daily Marcos Flores MD      famotidine  20 mg Oral BID Marcos Flores MD      furosemide  40 mg Intravenous Daily Susan Ruiz MD      ipratropium  0 5 mg Nebulization TID Marcos Stevenson DO      levalbuterol  1 25 mg Nebulization TID Marcos Stevenson DO      levothyroxine  75 mcg Oral Early Morning Marcos Flores MD      metoprolol tartrate  25 mg Oral Q12H [address] Marcos Flores MD      oxyCODONE-acetaminophen  1 tablet Oral Q4H PRN Marcos Flores MD      senna  1 tablet Oral Daily Marcos Flores MD          Today, Patient Was Seen By: FLAKO Villa    ** Please Note: Dictation voice to text software may have been used in the creation of this document   **

## 2021-02-06 NOTE — ASSESSMENT & PLAN NOTE
Wt Readings from Last 3 Encounters:   02/05/21 97 1 kg (214 lb)   10/15/20 97 1 kg (214 lb)   07/28/20 98 4 kg (217 lb)       · Continue diuresis  · Strict I/o's  · Fluid restriction   · Daily weights  · Chest xray : Central vascular congestion with suspected asymmetric pulmonary edema and bibasilar opacities as above

## 2021-02-06 NOTE — ASSESSMENT & PLAN NOTE
· Acute on chronic hypercarbic respiratory failure  · Respiratory failure with hypoxia, on mid flow  · Multifactorial possible  COPD, CHF exacerbation and pleural effusion  · Continue with the diuresis   · Keep oxygen levels 88-92 %

## 2021-02-06 NOTE — ED NOTES
Patient had removed her oxygen, dropped her 02 sat down to 52%, pulled out both her IV's and was naked upon my arrival  I placed her back on o2, increased it to 12 liters on the mid-flow temporarily until her o2 sat improved to 92% within a few minutes        Eleazar Marrero RN  02/06/21 8882

## 2021-02-06 NOTE — CONSULTS
Consult Note - Pulmonary   Kossuth Sharee Gonzales 80 y o  female MRN: [id number]  Unit/Bed#: ED 22 Encounter: [id number]      Reason for consultation: pleural effusions    Requesting physician: Dr Adam Jewell & Recommendations:   1  Acute on chronic hypoxic respiratory failure, chronic hypercarbic respiratory failure   · Titrate O2 to keep SpO2 88-94%  · IS, OOB-Chair    2  Acute/chronic diastolic CHF with possible RH failure  · Follow up repeat TTE  · Continue with aggressive attempts at diuresis  · May need diamox given chronic hypercarbia  · Follow I/Os    3  JAMEEL/[facility] intolerant to CPAP - refuses BiPAP/CPAP trials    4  COPD w/o AE  · Stop solumedrol  · Start xopenex/atrovent nebs TID  · Stop spiriva for now    5  Morbid obesity    6  Bilateral pleural effusions  · Monitor response to diuresis  · Evaluate with POCUS tomorrow for possible thoracentesis    D/W FLAKO JEAN and Dr Venita Perez - [facility]      History of Present Illness   HPI:  Troy Durham is a 80 y o  female who has chronic diastolic CHF, CAD post CABG, HLP, DM2, HTN, CVA 2015, O[facility] with chronic hypercarbic respiratory failure not on CPAP, chronic hypoxic respiratory failure on 2L/min, and COPD on spiriva presented for fall  She was noted to be hypoxic and placed on oxygen  She reported mechanical fall but also reported feeling increasingly SOB and LH over the past few weeks  She is significantly limited and has difficulty with her ADLs and EADLs  She lives with her son  She has noted increased LE edema and some orthopnea but could not report weight changes  She denied fevers or chills, no chest pain, no pleurisy, no hemoptysis, no aspiration events  She denied dysuria  She reports using 2L/min at home  She reported she has not used CPAP or BiPAP and again refused use  Review of systems:  12 point review of systems was completed and was otherwise negative except as listed in HPI        Historical Information   Past Medical History:   Diagnosis Date    Anxiety     Cataract, bilateral     Last Assessed:3/19/2014    COPD (chronic obstructive pulmonary disease) (HCC)     Coronary artery disease     Hx of arterial ischemic stroke     Hypertension     Stroke ([address] )     Wears glasses      Past Surgical History:   Procedure Laterality Date    CAROTID ENDARTERECTOMY Right     CATARACT EXTRACTION W/ INTRAOCULAR LENS  IMPLANT, BILATERAL      COLONOSCOPY N/A 9/30/2017    Procedure: COLONOSCOPY FOR CONTROL OF BLEEDING;  Surgeon: Elaine Cherry MD;  Location: [facility];  Service: Colorectal    CORONARY ARTERY BYPASS GRAFT      CABG X3 with LIMA to LAD,SVG to OM,SVG to distal  RCA ; Last Assessed:7/13/2015    JOINT REPLACEMENT      left TKR    KIDNEY STONE SURGERY      NEPHROSTOMY Left     with Drainage Irrigation ; [id number]    NV TOTAL KNEE ARTHROPLASTY Right 2/25/2016    Procedure: ARTHROPLASTY KNEE TOTAL;  Surgeon: Jordyn Barrera MD;  Location: [facility];  Service: Orthopedics     Family History   Problem Relation Age of Onset    Cancer Mother         malignant neoplasm    Heart attack Father     Aneurysm Other         Aortic    Cancer Other         malignant neoplasm       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     Social History: remote smoker 1ppd x 20-30 years, quit 30 years ago, no exotic animal exposures    Meds/Allergies   Current Facility-Administered Medications   Medication Dose Route Frequency    acetaminophen (TYLENOL) tablet 650 mg  650 mg Oral Q6H PRN    albuterol inhalation solution 2 5 mg  2 5 mg Nebulization Q4H PRN    ALPRAZolam (XANAX) tablet 0 5 mg  0 5 mg Oral TID PRN    aluminum-magnesium hydroxide-simethicone (MYLANTA) oral suspension 30 mL  30 mL Oral Q6H PRN    amLODIPine (NORVASC) tablet 5 mg  5 mg Oral Daily    ascorbic acid (VITAMIN C) tablet 500 mg  500 mg Oral Daily    aspirin (ECOTRIN LOW STRENGTH) EC tablet 81 mg  81 mg Oral Daily    bimatoprost (LUMIGAN) 0 01 % ophthalmic solution 1 drop  1 drop Both Eyes HS    budesonide (PULMICORT) inhalation solution 0 5 mg  0 5 mg Nebulization Q12H    calcium carbonate (TUMS) chewable tablet 1,000 mg  1,000 mg Oral Daily PRN    cholecalciferol (VITAMIN D3) tablet 2,000 Units  2,000 Units Oral Daily    clopidogrel (PLAVIX) tablet 75 mg  75 mg Oral Daily    docusate sodium (COLACE) capsule 100 mg  100 mg Oral BID    enoxaparin (LOVENOX) subcutaneous injection 40 mg  40 mg Subcutaneous Daily    famotidine (PEPCID) tablet 20 mg  20 mg Oral BID    furosemide (LASIX) injection 40 mg  40 mg Intravenous Daily    levalbuterol (XOPENEX) inhalation solution 1 25 mg  1 25 mg Nebulization TID    levothyroxine tablet 75 mcg  75 mcg Oral Early Morning    methylPREDNISolone sodium succinate (Solu-MEDROL) injection 40 mg  40 mg Intravenous Q8H    metoprolol tartrate (LOPRESSOR) tablet 25 mg  25 mg Oral Q12H MODESTO    oxyCODONE-acetaminophen (PERCOCET) 5-325 mg per tablet 1 tablet  1 tablet Oral Q4H PRN    senna (SENOKOT) tablet 8 6 mg  1 tablet Oral Daily    sodium chloride 0 9 % inhalation solution 3 mL  3 mL Nebulization Q4H PRN    tiotropium (SPIRIVA) capsule for inhaler 18 mcg  18 mcg Inhalation Daily     (Not in a hospital admission)    Allergies   Allergen Reactions    Penicillins Itching    Penicillins Rash       Vitals: Blood pressure 158/66, pulse 72, temperature 98 7 °F (37 1 °C), temperature source Oral, resp  rate (!) 25, weight 97 1 kg (214 lb), SpO2 92 %, not currently breastfeeding , 6L/min NC, Body mass index is 36 73 kg/m²        Intake/Output Summary (Last 24 hours) at 2/6/2021 1437  Last data filed at 2/6/2021 0330  Gross per 24 hour   Intake --   Output 1600 ml   Net -1600 ml       Physical Exam  General: Older obese woman in bed, awake alert and oriented x 3, conversant without conversational dyspnea, NAD, normal affect  HEENT:  PERRL, Sclera noninjected, nonicteric OU, Nares patent, no nasal flaring, no nasal drainage, Mucous membranes moist, no oral lesionsn  NECK: Trachea midline, no accessory muscle use, no stridor, no cervical or supraclavicular adenopathy, unable to determine JVP  CARDIAC: Reg, single s1/S2, no m/r/g  PULM: diminished at bases with bibasilar rales, no wheeze, no central rhonchi  CHEST: No gross deformities, equal chest expansion on inspiration bilaterally  ABD: obese, normoactive bowel sounds, soft nontender, nondistended, no rebound, no rigidity, no guarding  : purewick in place with yellow urine  EXT: No cyanosis, no clubbing, 2+ edema b/l to thights, normal capillary refill  SKIN:  No rashes, no lesions  NEURO: no focal neurologic deficits, AAOx3, moving all extremities appropriately    Labs: I have personally reviewed pertinent lab results    Laboratory and Diagnostics  Results from last 7 days   Lab Units 02/06/21  0433 02/05/21  1444   WBC Thousand/uL 7 57 9 01   HEMOGLOBIN g/dL 13 5 13 5   HEMATOCRIT % 49 3* 48 5*   PLATELETS Thousands/uL 268 285   NEUTROS PCT % 90* 75   MONOS PCT % 2* 9     Results from last 7 days   Lab Units 02/06/21  0433 02/05/21  1444   SODIUM mmol/L 145 144   POTASSIUM mmol/L 3 9 4 2   CHLORIDE mmol/L 99* 103   CO2 mmol/L >45* 44*   ANION GAP mmol/L  --  -3*   BUN mg/dL 33* 35*   CREATININE mg/dL 1 22 1 19   CALCIUM mg/dL 8 8 9 2   GLUCOSE RANDOM mg/dL 139 107   ALT U/L 14 16   AST U/L 11 18   ALK PHOS U/L 105 104   ALBUMIN g/dL 2 9* 2 8*   TOTAL BILIRUBIN mg/dL 0 80 0 74          Results from last 7 days   Lab Units 02/06/21  0433   INR  1 07      Results from last 7 days   Lab Units 02/05/21  1444   TROPONIN I ng/mL 0 03                         Results from last 7 days   Lab Units 02/06/21  0635 02/06/21  0433   PROCALCITONIN ng/ml <0 05 <0 05       ABG:   Results from last 7 days   Lab Units 02/06/21  0626   PH ART  7 319*   PCO2 ART mm Hg 80 9*   PO2 ART mm Hg 72 1*   HCO3 ART mmol/L 40 7*   BASE EXC ART mmol/L 11 0       BNP 4900    MICRO  COVID-19 neg 2/5/21  FLU/RSV neg    Imaging and other studies: I have personally reviewed pertinent reports  and I have personally reviewed pertinent films in PACS  CXR 2/5/21 - suggestion of apical hyperinflation, vascular congestion/cephalization blunted CP angles, likely b/l effusions, no PTX    Pulmonary function testing:   PFTs 3/2015 - Ratio 69%, FVC 78%, FEV1 72%, TLC 76%, RV 79%, DLCO 50% - mild obstructive airflow defect, mildly reduced TLC, moderately reduced diffusion  Home PSG 11/27/17 - AHI 9 8 with significant desaturations, colby 56%    EKG, Pathology, and Other Studies: I have personally reviewed pertinent reports  TTE 1/2016 - EF 61-98%, grade I diastolic dysfunction, normal RV size/function    Code Status: Level 1 - Full Code      Eugene Castro DO, [facility]

## 2021-02-06 NOTE — ASSESSMENT & PLAN NOTE
Lab Results   Component Value Date    HGBA1C 6 3 (H) 07/01/2020       No results for input(s): POCGLU in the last 72 hours      Blood Sugar Average: Last 72 hrs:     Type 2 diabetes mellitus  Continue with insulin  Hold oral hypoglycemic medication  Avoid hypoglycemia

## 2021-02-06 NOTE — ED PROVIDER NOTES
Emergency Department Trauma Note  Mariah Gonzales 80 y o  female MRN: [id number]  Unit/Bed#: ED 22/ED 22 Encounter: [id number]      Trauma Alert: Trauma Acuity: C  Model of Arrival: Mode of Arrival: ALS via    Trauma Team: Current Providers  Attending Provider: Amanda Plascencia MD  Attending Provider: Chay Bunn MD  Attending Provider: Evens Valdovinos MD  Resident: Nacho Kruger MD  ED Technician: Gilson Norman  ED Technician: Cherylene Bamberger  Registered Nurse: Mary Elias RN  Resident: Cam Hammond MD  Resident: Enrique Manzo DO  ED Technician: Ana Wynn  Registered Nurse: Jack Metz RN  Resident: Nino Cooper MD  Cardiologist: Pedro Madison MD  Nurse Practitioner: FLAKO Mann  Consultants: None      History of Present Illness     Chief Complaint:   Chief Complaint   Patient presents with    Shortness of Breath     SOB with COPD history, uses 2 liters 02 OTC  c/o CP earlier from panic event     HPI:  Jeanne Cornell is a 80 y o  female history of COPD on 2 L home O2, CAD status post CABG x3, lower extremity edema but no formal diagnosis of heart failure presenting for evaluation of worsening bilateral lower extremity edema, orthopnea, dyspnea both on exertion and at rest, increased oxygen requirement  Patient states that symptoms have been progressive over the last few weeks  Patient denies cough, fevers, chills, recent travel or sick contacts  Patient is prescribed Lasix but is poorly compliant with this because she does not like how much it makes her urinate  Patient denies chest pain, palpitations, diaphoresis, syncope or near syncope  Mechanism:Details of Incident: fell and hit head on ASA          HPI  Review of Systems   Constitutional: Negative for chills, fatigue, fever and unexpected weight change  HENT: Negative for hearing loss  Eyes: Negative for visual disturbance  Respiratory: Positive for shortness of breath   Negative for apnea, cough, choking, chest tightness, wheezing and stridor  Cardiovascular: Positive for leg swelling  Negative for chest pain and palpitations  Gastrointestinal: Negative for abdominal distention, abdominal pain, constipation, diarrhea, nausea and vomiting  Endocrine: Negative for polydipsia and polyuria  Genitourinary: Negative for dysuria and hematuria  Musculoskeletal: Negative for arthralgias and myalgias  Skin: Negative for color change and rash  Neurological: Negative for dizziness and headaches  Psychiatric/Behavioral: Negative for confusion         Historical Information     Immunizations:   Immunization History   Administered Date(s) Administered    INFLUENZA 11/09/2016, 10/10/2017    Influenza Split High Dose Preservative Free IM 10/15/2012, 10/15/2013, 10/15/2014, 10/28/2015, 11/09/2016, 10/10/2017    Influenza, high dose seasonal 0 7 mL 10/16/2018, 10/15/2019, 10/15/2020    Influenza, seasonal, injectable 12/06/2011    Pneumococcal Conjugate 13-Valent 11/09/2016    Pneumococcal Polysaccharide PPV23 06/25/2015    Zoster 05/01/2017       Past Medical History:   Diagnosis Date    Anxiety     Cataract, bilateral     Last Assessed:3/19/2014    COPD [facility]chronic obstructive pulmonary disease) [facility] [address]     Coronary artery disease     Hx of arterial ischemic stroke     Hypertension     Stroke ([facility] [address] [facility]     Wears glasses        Family History   Problem Relation Age of Onset    Cancer Mother         malignant neoplasm    Heart attack Father     Aneurysm Other         Aortic    Cancer Other         malignant neoplasm     Past Surgical History:   Procedure Laterality Date    CAROTID ENDARTERECTOMY Right     CATARACT EXTRACTION W/ INTRAOCULAR LENS  IMPLANT, BILATERAL      COLONOSCOPY N/A 9/30/2017    Procedure: COLONOSCOPY FOR CONTROL OF BLEEDING;  Surgeon: Jered Penn MD;  Location: [facility];  Service: Colorectal    CORONARY ARTERY BYPASS GRAFT      CABG X3 with LIMA to LAD,SVG to OM,SVG to distal  RCA ; Last Assessed:7/13/2015    JOINT REPLACEMENT      left TKR    KIDNEY STONE SURGERY      NEPHROSTOMY Left     with Drainage Irrigation ; [id number]    CO TOTAL KNEE ARTHROPLASTY Right 2/25/2016    Procedure: ARTHROPLASTY KNEE TOTAL;  Surgeon: Andreas Del Valle MD;  Location: [facility];  Service: Orthopedics     Social History     Tobacco Use    Smoking status: Never Smoker    Smokeless tobacco: Never Used   Substance Use Topics    Alcohol use: Never     Frequency: Never    Drug use: Never     E-Cigarette/Vaping     E-Cigarette/Vaping Substances       Family History: non-contributory    Meds/Allergies   Prior to Admission Medications   Prescriptions Last Dose Informant Patient Reported? Taking? ALPRAZolam (XANAX) 0 5 mg tablet   No Yes   Sig: Take 1 tablet (0 5 mg total) by mouth 3 (three) times a day as needed (TREMOR)   Cholecalciferol (VITAMIN D) 2000 UNITS tablet  Child Yes Yes   Sig: Take 2,000 Units by mouth daily     Pediatric Multiple Vit-C-FA (PEDIATRIC MULTIVITAMIN) chewable tablet  Child Yes Yes   Sig: Chew 1 tablet daily   Spiriva HandiHaler 18 MCG inhalation capsule   No Yes   Sig: INHALE 1 CAPSULE VIA HANDIHALER ONCE DAILY AT THE SAME TIME EVERY DAY   acetaminophen (TYLENOL) 325 mg tablet Not Taking at Unknown time  No No   Sig: Take 2 tablets (650 mg total) by mouth every 6 (six) hours as needed for mild pain   Patient not taking: Reported on 2/5/2021   albuterol (2 5 mg/3 mL) 0 083 % nebulizer solution   No Yes   Sig: Take 1 vial (2 5 mg total) by nebulization every 4 (four) hours as needed for wheezing or shortness of breath   amLODIPine (NORVASC) 5 mg tablet   No Yes   Sig: TAKE 1 TABLET BY MOUTH EVERY DAY   ascorbic acid (VITAMIN C) 500 mg tablet  Child Yes Yes   Sig: Take 500 mg by mouth daily    aspirin (ECOTRIN LOW STRENGTH) 81 mg EC tablet   Yes Yes   Sig: Take 81 mg by mouth daily   bimatoprost (LUMIGAN) 0 01 % ophthalmic drops  Child Yes Yes   Sig: Apply 1 drop to eye Daily   clopidogrel (PLAVIX) 75 mg tablet   No Yes   Sig: TAKE 1 TABLET BY MOUTH EVERY DAY   famotidine (PEPCID) 20 mg tablet   No Yes   Sig: Take 1 tablet (20 mg total) by mouth 2 (two) times a day   furosemide (LASIX) 20 mg tablet   No Yes   Sig: TAKE 1 TABLET BY MOUTH EVERY DAY   levalbuterol (XOPENEX) 0 63 mg/3 mL nebulizer solution  Child No Yes   Sig: Take 3 mL (0 63 mg total) by nebulization 3 (three) times a day   Patient taking differently: Take 0 63 mg by nebulization as needed     levothyroxine 75 mcg tablet   No Yes   Sig: TAKE 1 TABLET BY MOUTH EVERY DAY IN THE MORNING   metoprolol tartrate (LOPRESSOR) 25 mg tablet   No Yes   Sig: TAKE 1 TABLET (25 MG TOTAL) BY MOUTH EVERY 12 (TWELVE) HOURS   oxyCODONE-acetaminophen (PERCOCET) 5-325 mg per tablet   No Yes   Sig: Take 1 tablet by mouth every 4 (four) hours as needed for moderate painMax Daily Amount: 6 tablets      Facility-Administered Medications: None       Allergies   Allergen Reactions    Penicillins Itching    Penicillins Rash       PHYSICAL EXAM    PE limited by: N/A    Objective   Vitals:   First set: Temperature: 98 7 °F (37 1 °C) (02/05/21 1427)  Pulse: 67 (02/05/21 1427)  Respirations: (!) 30 (02/05/21 1427)  Blood Pressure: 137/74 (02/05/21 1438)  SpO2: (!) 72 % (02/05/21 1427)    Primary Survey:   (A) Airway: Intact  (B) Breathing: Diminished breath sounds and rales bilaterally  (C) Circulation: Pulses:   pedal  2/4 and radial  2/4  (D) Disabliity:  GCS Total:  15  (E) Expose:  Completed    Secondary Survey: (Click on Physical Exam tab above)  Physical Exam  Vitals signs reviewed  Constitutional:       General: She is not in acute distress  Appearance: She is well-developed  She is obese  She is ill-appearing  She is not diaphoretic  HENT:      Head: Normocephalic and atraumatic        Right Ear: External ear normal       Left Ear: External ear normal       Nose: Nose normal       Mouth/Throat:      Pharynx: No oropharyngeal exudate  Eyes:      Pupils: Pupils are equal, round, and reactive to light  Neck:      Musculoskeletal: Normal range of motion  Cardiovascular:      Rate and Rhythm: Normal rate and regular rhythm  Heart sounds: Normal heart sounds  No murmur  No friction rub  No gallop  Comments: Profound lower extremity edema bilaterally, pitting  Pulmonary:      Effort: Tachypnea, accessory muscle usage and respiratory distress present  Breath sounds: Rales present  No wheezing  Comments: Rales in the lung bases bilaterally  Patient initially satting in the 70s on 2 L nasal cannula with improvement however continued increased work of with accessory muscle use, tachypneic into the high 20s  Chest:      Chest wall: No tenderness  Abdominal:      General: Bowel sounds are normal  There is no distension  Palpations: Abdomen is soft  There is no mass  Tenderness: There is no abdominal tenderness  There is no guarding  Musculoskeletal: Normal range of motion  General: No deformity  Lymphadenopathy:      Cervical: No cervical adenopathy  Skin:     General: Skin is warm and dry  Capillary Refill: Capillary refill takes less than 2 seconds  Neurological:      Mental Status: She is alert and oriented to person, place, and time  Comments: AAO x4         Cervical spine cleared by clinical criteria?    Yes     Invasive Devices     Peripheral Intravenous Line            Peripheral IV Right Antecubital -- days    Peripheral IV Right Hand -- days    Peripheral IV 05/03/19 Right Hand 645 days          Drain            External Urinary Catheter 645 days                Lab Results:   Results Reviewed     Procedure Component Value Units Date/Time    Procalcitonin Reflex [[id number]]  (Normal) Collected: 02/06/21 0635    Lab Status: Final result Specimen: Blood from Arm, Right Updated: 02/06/21 0828     Procalcitonin <0 05 ng/ml     CBC and differential [[id number]]  (Abnormal) Collected: 02/06/21 0433    Lab Status: Final result Specimen: Blood from Arm, Right Updated: 02/06/21 0704     WBC 7 57 Thousand/uL      RBC 4 97 Million/uL      Hemoglobin 13 5 g/dL      Hematocrit 49 3 %      MCV 99 fL      MCH 27 2 pg      MCHC 27 4 g/dL      RDW 14 2 %      MPV 10 2 fL      Platelets 872 Thousands/uL      nRBC 0 /100 WBCs      Neutrophils Relative 90 %      Immat GRANS % 0 %      Lymphocytes Relative 8 %      Monocytes Relative 2 %      Eosinophils Relative 0 %      Basophils Relative 0 %      Neutrophils Absolute 6 80 Thousands/µL      Immature Grans Absolute 0 03 Thousand/uL      Lymphocytes Absolute 0 61 Thousands/µL      Monocytes Absolute 0 11 Thousand/µL      Eosinophils Absolute 0 00 Thousand/µL      Basophils Absolute 0 02 Thousands/µL     Narrative: This is an appended report  These results have been appended to a previously verified report      Blood gas, arterial [[id number]]  (Abnormal) Resulted: 02/06/21 0626    Lab Status: Final result Specimen: Blood, Arterial Updated: 02/06/21 0626     pH, Arterial 7 319     pCO2, Arterial 80 9 mm Hg      pO2, Arterial 72 1 mm Hg      HCO3, Arterial 40 7 mmol/L      Base Excess, Arterial 11 0 mmol/L      O2 Content, Arterial 17 6 mL/dL      O2 HGB,Arterial  91 2 %      STACIA TEST Yes     Other FIO2 midflow 8lpm %     Procalcitonin with AM Reflex [[id number]]  (Normal) Collected: 02/06/21 0433    Lab Status: Final result Specimen: Blood from Arm, Right Updated: 02/06/21 0530     Procalcitonin <0 05 ng/ml     Comprehensive metabolic panel [[id number]]  (Abnormal) Collected: 02/06/21 0433    Lab Status: Final result Specimen: Blood from Arm, Right Updated: 02/06/21 0522     Sodium 145 mmol/L      Potassium 3 9 mmol/L      Chloride 99 mmol/L      CO2 >45 mmol/L      ANION GAP --     BUN 33 mg/dL      Creatinine 1 22 mg/dL      Glucose 139 mg/dL      Calcium 8 8 mg/dL      Corrected Calcium 9 7 mg/dL      AST 11 U/L      ALT 14 U/L      Alkaline Phosphatase 105 U/L      Total Protein 6 4 g/dL      Albumin 2 9 g/dL      Total Bilirubin 0 80 mg/dL      eGFR 40 ml/min/1 73sq m     Narrative:      [place] guidelines for Chronic Kidney Disease (CKD):     Stage 1 with normal or high GFR (GFR > 90 mL/min/1 73 square meters)    Stage 2 Mild CKD (GFR = 60-89 mL/min/1 73 square meters)    Stage 3A Moderate CKD (GFR = 45-59 mL/min/1 73 square meters)    Stage 3B Moderate CKD (GFR = 30-44 mL/min/1 73 square meters)    Stage 4 Severe CKD (GFR = 15-29 mL/min/1 73 square meters)    Stage 5 End Stage CKD (GFR <15 mL/min/1 73 square meters)  Note: GFR calculation is accurate only with a steady state creatinine    Protime-INR [[id number]]  (Normal) Collected: 02/06/21 0433    Lab Status: Final result Specimen: Blood from Arm, Right Updated: 02/06/21 0511     Protime 13 9 seconds      INR 1 07    Vitamin B12 [[id number]]  (Normal) Collected: 02/05/21 1444    Lab Status: Final result Specimen: Blood from Arm, Right Updated: 02/05/21 1915     Vitamin B-12 526 pg/mL     TSH, 3rd generation [[id number]]  (Normal) Collected: 02/05/21 1444    Lab Status: Final result Specimen: Blood from Arm, Right Updated: 02/05/21 1915     TSH 3RD GENERATON 1 060 uIU/mL     Narrative:      Patients undergoing fluorescein dye angiography may retain small amounts of fluorescein in the body for 48-72 hours post procedure  Samples containing fluorescein can produce falsely depressed TSH values  If the patient had this procedure,a specimen should be resubmitted post fluorescein clearance        NT-BNP PRO [[id number]]  (Abnormal) Collected: 02/05/21 1444    Lab Status: Final result Specimen: Blood from Arm, Right Updated: 02/05/21 1621     NT-proBNP 4,918 pg/mL     COVID19, Influenza A/B, RSV PCR, [facility] [[id number]]  (Normal) Collected: 02/05/21 1517    Lab Status: Final result Specimen: Nares from Nose Updated: 02/05/21 1619     SARS-CoV-2 Negative     INFLUENZA A PCR Negative INFLUENZA B PCR Negative     RSV PCR Negative    Narrative: This test has been authorized by FDA under an EUA (Emergency Use Assay) for use by authorized laboratories  Clinical caution and judgement should be used with the interpretation of these results with consideration of the clinical impression and other laboratory testing  Testing reported as "Positive" or "Negative" has been proven to be accurate according to standard laboratory validation requirements  All testing is performed with control materials showing appropriate reactivity at standard intervals      Comprehensive metabolic panel [[id number]]  (Abnormal) Collected: 02/05/21 1444    Lab Status: Final result Specimen: Blood from Arm, Right Updated: 02/05/21 1538     Sodium 144 mmol/L      Potassium 4 2 mmol/L      Chloride 103 mmol/L      CO2 44 mmol/L      ANION GAP -3 mmol/L      BUN 35 mg/dL      Creatinine 1 19 mg/dL      Glucose 107 mg/dL      Calcium 9 2 mg/dL      Corrected Calcium 10 2 mg/dL      AST 18 U/L      ALT 16 U/L      Alkaline Phosphatase 104 U/L      Total Protein 6 5 g/dL      Albumin 2 8 g/dL      Total Bilirubin 0 74 mg/dL      eGFR 42 ml/min/1 73sq m     Narrative:      [place] guidelines for Chronic Kidney Disease (CKD):     Stage 1 with normal or high GFR (GFR > 90 mL/min/1 73 square meters)    Stage 2 Mild CKD (GFR = 60-89 mL/min/1 73 square meters)    Stage 3A Moderate CKD (GFR = 45-59 mL/min/1 73 square meters)    Stage 3B Moderate CKD (GFR = 30-44 mL/min/1 73 square meters)    Stage 4 Severe CKD (GFR = 15-29 mL/min/1 73 square meters)    Stage 5 End Stage CKD (GFR <15 mL/min/1 73 square meters)  Note: GFR calculation is accurate only with a steady state creatinine    Troponin I [[id number]]  (Normal) Collected: 02/05/21 1444    Lab Status: Final result Specimen: Blood from Arm, Right Updated: 02/05/21 1521     Troponin I 0 03 ng/mL     CBC and differential [[id number]]  (Abnormal) Collected: 02/05/21 1444    Lab Status: Final result Specimen: Blood from Arm, Right Updated: 02/05/21 1501     WBC 9 01 Thousand/uL      RBC 4 86 Million/uL      Hemoglobin 13 5 g/dL      Hematocrit 48 5 %       fL      MCH 27 8 pg      MCHC 27 8 g/dL      RDW 14 2 %      MPV 10 4 fL      Platelets 056 Thousands/uL      nRBC 0 /100 WBCs      Neutrophils Relative 75 %      Immat GRANS % 1 %      Lymphocytes Relative 13 %      Monocytes Relative 9 %      Eosinophils Relative 1 %      Basophils Relative 1 %      Neutrophils Absolute 6 81 Thousands/µL      Immature Grans Absolute 0 06 Thousand/uL      Lymphocytes Absolute 1 20 Thousands/µL      Monocytes Absolute 0 81 Thousand/µL      Eosinophils Absolute 0 08 Thousand/µL      Basophils Absolute 0 05 Thousands/µL                  Imaging Studies:   Direct to CT: Yes  CT head without contrast   Final Result by Orlando Luna DO (02/05 1540)   No acute intracranial abnormality  Workstation performed: [id number]         CT spine cervical without contrast   Final Result by Orlando Luna DO (02/05 1600)   Exam limited by motion artifact  No cervical spine fracture or traumatic malalignment  The study was marked in EPIC for immediate notification because this is a "level C trauma"  Workstation performed: [id number]         XR chest portable   Final Result by Shanell Freitas DO (02/05 1538)      Central vascular congestion with suspected asymmetric pulmonary edema and bibasilar opacities as above                    Workstation performed: [id number]         IR IN-patient Thoracentesis    (Results Pending)         Procedures  Procedures         ED Course  ED Course as of Feb 06 1036   Fri Feb 05, 2021   1649 SLIM tigertexted 1628              MDM  Number of Diagnoses or Management Options  CHF exacerbation [facility]):   Dyspnea:   Hypoxia:   Diagnosis management comments: 20-year-old female presenting as a level C trauma after a fall on aspirin, atraumatic, C-spine clinically cleared, no bony tenderness  CT head and cervical spine performed and unremarkable  Patient with rales and profound lower extremity edema suggestive fluid overload    Chart review does not demonstrate a history of heart failure, most recent echo in 2016 with normal EF however at this point patient appears to have much more of a component of fluid overload than bronchospasm contributing to symptoms, treated with early diuresis, transitioned to midflow oxygen with improvement of sats and WOB, patient comfortable on reassessment, admitted to medicine service     Patient Progress  Patient progress: improved          Disposition  [facility] Transfer: No  Final diagnoses:   Dyspnea   Hypoxia   CHF exacerbation ([place] [address] )     Time reflects when diagnosis was documented in both MDM as applicable and the Disposition within this note     Time User Action Codes Description Comment    2/5/2021  5:06 PM Benjamen Savin R Add [J44 9] Chronic obstructive pulmonary disease, unspecified COPD type ([place] [address] )     2/5/2021  5:06 PM Benjamen Savin R Modify [J44 9] Chronic obstructive pulmonary disease, unspecified COPD type ([place] [address] )     2/5/2021  5:15 PM Charlcie Spurr Add [R06 00] Dyspnea     2/5/2021  5:15 PM Charlcie Spurr Add [R09 02] Hypoxia     2/5/2021  5:15 PM Charlcie Spurr Add [I50 9] CHF exacerbation ([place] [address] )     2/5/2021  5:16 PM Charlcie Spurr Modify [J44 9] Chronic obstructive pulmonary disease, unspecified COPD type ([place] [address] )     2/5/2021  5:16 PM Charlcie Spurr Modify [I50 9] CHF exacerbation ([place] [address] )     2/5/2021  6:34 PM David Grout Add [E53 8] Vitamin B 12 deficiency     2/5/2021  6:34 PM David Grout Add [E03 9] Acquired hypothyroidism     2/5/2021  9:51 PM Benjamen Savin R Modify [E53 8] Vitamin B 12 deficiency     2/5/2021  9:51 PM Benjamen Savin R Modify [E03 9] Acquired hypothyroidism     2/6/2021  6:06 AM Jim Nunez Add [I50 9] Acute on chronic congestive heart failure, unspecified heart failure type [facility])       ED Disposition     ED Disposition Condition Date/Time Comment    Admit Stable Fri Feb 5, 2021  5:15 PM Case was discussed with MIRANDA and the patient's admission status was agreed to be Admission Status: inpatient status to the service of Dr Shawnee Esqueda   Follow-up Information    None       Patient's Medications   Discharge Prescriptions    No medications on file     No discharge procedures on file      PDMP Review       Value Time User    PDMP Reviewed  Yes 2/3/2021 12:90 PM Renald Goltz, DO          ED Provider  Electronically Signed by         June Barbour MD  02/06/21 7633

## 2021-02-06 NOTE — ASSESSMENT & PLAN NOTE
Wt Readings from Last 3 Encounters:   02/05/21 97 1 kg (214 lb)   10/15/20 97 1 kg (214 lb)   07/28/20 98 4 kg (217 lb)       Continue diuresis  In and out monitoring  Fluid restriction   Daily weights

## 2021-02-06 NOTE — QUICK NOTE
IR contacted by primary service regarding possible thoracentesis    Imaging reviewed, possible small effusions  CHF  Pulmonary service has been consulted and has seen the patient, she may be responding to diuretics  Pulmonary service will continue to follow and contact IR if needed  We will defer any procedure at this time      D/w Dr Alissa Adams via tiger text    15 minutes time spent

## 2021-02-06 NOTE — ASSESSMENT & PLAN NOTE
COPD exacerbation  Will start on steroids 60 mg b i d    Pulmonary consult  Keep oxygen levels greater than 92%  Currently on med flow

## 2021-02-07 LAB
BASOPHILS # BLD AUTO: 0.02 THOUSANDS/ΜL (ref 0–0.1)
BASOPHILS NFR BLD AUTO: 0 % (ref 0–1)
BUN SERPL-MCNC: 38 MG/DL (ref 5–25)
CALCIUM SERPL-MCNC: 8.6 MG/DL (ref 8.3–10.1)
CHLORIDE SERPL-SCNC: 96 MMOL/L (ref 100–108)
CO2 SERPL-SCNC: >45 MMOL/L (ref 21–32)
CREAT SERPL-MCNC: 1.22 MG/DL (ref 0.6–1.3)
EOSINOPHIL # BLD AUTO: 0.01 THOUSAND/ΜL (ref 0–0.61)
EOSINOPHIL NFR BLD AUTO: 0 % (ref 0–6)
ERYTHROCYTE [DISTWIDTH] IN BLOOD BY AUTOMATED COUNT: 14.4 % (ref 11.6–15.1)
GFR SERPL CREATININE-BSD FRML MDRD: 40 ML/MIN/1.73SQ M
GLUCOSE SERPL-MCNC: 95 MG/DL (ref 65–140)
HCT VFR BLD AUTO: 46.2 % (ref 34.8–46.1)
HGB BLD-MCNC: 13 G/DL (ref 11.5–15.4)
IMM GRANULOCYTES # BLD AUTO: 0.03 THOUSAND/UL (ref 0–0.2)
IMM GRANULOCYTES NFR BLD AUTO: 0 % (ref 0–2)
LYMPHOCYTES # BLD AUTO: 1.12 THOUSANDS/ΜL (ref 0.6–4.47)
LYMPHOCYTES NFR BLD AUTO: 14 % (ref 14–44)
MCH RBC QN AUTO: 27.8 PG (ref 26.8–34.3)
MCHC RBC AUTO-ENTMCNC: 28.1 G/DL (ref 31.4–37.4)
MCV RBC AUTO: 99 FL (ref 82–98)
MONOCYTES # BLD AUTO: 0.92 THOUSAND/ΜL (ref 0.17–1.22)
MONOCYTES NFR BLD AUTO: 11 % (ref 4–12)
NEUTROPHILS # BLD AUTO: 6.11 THOUSANDS/ΜL (ref 1.85–7.62)
NEUTS SEG NFR BLD AUTO: 75 % (ref 43–75)
NRBC BLD AUTO-RTO: 0 /100 WBCS
PLATELET # BLD AUTO: 282 THOUSANDS/UL (ref 149–390)
PMV BLD AUTO: 10.2 FL (ref 8.9–12.7)
POTASSIUM SERPL-SCNC: 3.6 MMOL/L (ref 3.5–5.3)
RBC # BLD AUTO: 4.68 MILLION/UL (ref 3.81–5.12)
SODIUM SERPL-SCNC: 145 MMOL/L (ref 136–145)
WBC # BLD AUTO: 8.21 THOUSAND/UL (ref 4.31–10.16)

## 2021-02-07 PROCEDURE — 99232 SBSQ HOSP IP/OBS MODERATE 35: CPT | Performed by: NURSE PRACTITIONER

## 2021-02-07 PROCEDURE — 94664 DEMO&/EVAL PT USE INHALER: CPT

## 2021-02-07 PROCEDURE — 94760 N-INVAS EAR/PLS OXIMETRY 1: CPT

## 2021-02-07 PROCEDURE — 99232 SBSQ HOSP IP/OBS MODERATE 35: CPT | Performed by: INTERNAL MEDICINE

## 2021-02-07 PROCEDURE — 94640 AIRWAY INHALATION TREATMENT: CPT

## 2021-02-07 PROCEDURE — 93306 TTE W/DOPPLER COMPLETE: CPT | Performed by: INTERNAL MEDICINE

## 2021-02-07 PROCEDURE — NC001 PR NO CHARGE: Performed by: INTERNAL MEDICINE

## 2021-02-07 PROCEDURE — 99221 1ST HOSP IP/OBS SF/LOW 40: CPT | Performed by: INTERNAL MEDICINE

## 2021-02-07 PROCEDURE — 80048 BASIC METABOLIC PNL TOTAL CA: CPT | Performed by: NURSE PRACTITIONER

## 2021-02-07 PROCEDURE — 85025 COMPLETE CBC W/AUTO DIFF WBC: CPT | Performed by: NURSE PRACTITIONER

## 2021-02-07 RX ORDER — AMLODIPINE BESYLATE 5 MG/1
TABLET ORAL
Qty: 90 TABLET | Refills: 3 | Status: SHIPPED | OUTPATIENT
Start: 2021-02-07 | End: 2022-03-01

## 2021-02-07 RX ORDER — POTASSIUM CHLORIDE 20 MEQ/1
20 TABLET, EXTENDED RELEASE ORAL ONCE
Status: COMPLETED | OUTPATIENT
Start: 2021-02-07 | End: 2021-02-07

## 2021-02-07 RX ORDER — CLOPIDOGREL BISULFATE 75 MG/1
TABLET ORAL
Qty: 90 TABLET | Refills: 1 | Status: SHIPPED | OUTPATIENT
Start: 2021-02-07 | End: 2021-08-13

## 2021-02-07 RX ORDER — ACETAZOLAMIDE 500 MG/5ML
250 INJECTION, POWDER, LYOPHILIZED, FOR SOLUTION INTRAVENOUS EVERY 12 HOURS SCHEDULED
Status: DISCONTINUED | OUTPATIENT
Start: 2021-02-07 | End: 2021-02-10

## 2021-02-07 RX ADMIN — SENNOSIDES 8.6 MG: 8.6 TABLET, FILM COATED ORAL at 08:15

## 2021-02-07 RX ADMIN — IPRATROPIUM BROMIDE 0.5 MG: 0.5 SOLUTION RESPIRATORY (INHALATION) at 13:20

## 2021-02-07 RX ADMIN — LEVALBUTEROL HYDROCHLORIDE 1.25 MG: 1.25 SOLUTION, CONCENTRATE RESPIRATORY (INHALATION) at 19:58

## 2021-02-07 RX ADMIN — POTASSIUM CHLORIDE 20 MEQ: 1500 TABLET, EXTENDED RELEASE ORAL at 12:51

## 2021-02-07 RX ADMIN — BIMATOPROST 1 DROP: 0.1 SOLUTION/ DROPS OPHTHALMIC at 21:05

## 2021-02-07 RX ADMIN — IPRATROPIUM BROMIDE 0.5 MG: 0.5 SOLUTION RESPIRATORY (INHALATION) at 19:58

## 2021-02-07 RX ADMIN — AMLODIPINE BESYLATE 5 MG: 5 TABLET ORAL at 08:15

## 2021-02-07 RX ADMIN — OXYCODONE HYDROCHLORIDE AND ACETAMINOPHEN 500 MG: 500 TABLET ORAL at 08:15

## 2021-02-07 RX ADMIN — LEVALBUTEROL HYDROCHLORIDE 1.25 MG: 1.25 SOLUTION, CONCENTRATE RESPIRATORY (INHALATION) at 13:20

## 2021-02-07 RX ADMIN — CLOPIDOGREL BISULFATE 75 MG: 75 TABLET ORAL at 08:15

## 2021-02-07 RX ADMIN — FAMOTIDINE 20 MG: 20 TABLET, FILM COATED ORAL at 17:20

## 2021-02-07 RX ADMIN — METOPROLOL TARTRATE 25 MG: 25 TABLET, FILM COATED ORAL at 08:15

## 2021-02-07 RX ADMIN — WATER 5 ML: 1 INJECTION INTRAMUSCULAR; INTRAVENOUS; SUBCUTANEOUS at 20:58

## 2021-02-07 RX ADMIN — METOPROLOL TARTRATE 25 MG: 25 TABLET, FILM COATED ORAL at 20:57

## 2021-02-07 RX ADMIN — ACETAZOLAMIDE SODIUM 250 MG: 500 INJECTION, POWDER, LYOPHILIZED, FOR SOLUTION INTRAVENOUS at 20:57

## 2021-02-07 RX ADMIN — IPRATROPIUM BROMIDE 0.5 MG: 0.5 SOLUTION RESPIRATORY (INHALATION) at 08:50

## 2021-02-07 RX ADMIN — LEVALBUTEROL HYDROCHLORIDE 1.25 MG: 1.25 SOLUTION, CONCENTRATE RESPIRATORY (INHALATION) at 08:50

## 2021-02-07 RX ADMIN — FUROSEMIDE 40 MG: 10 INJECTION, SOLUTION INTRAMUSCULAR; INTRAVENOUS at 08:15

## 2021-02-07 RX ADMIN — FAMOTIDINE 20 MG: 20 TABLET, FILM COATED ORAL at 08:15

## 2021-02-07 RX ADMIN — LEVOTHYROXINE SODIUM 75 MCG: 75 TABLET ORAL at 06:00

## 2021-02-07 RX ADMIN — ALPRAZOLAM 0.5 MG: 0.5 TABLET ORAL at 02:33

## 2021-02-07 RX ADMIN — ALPRAZOLAM 0.5 MG: 0.5 TABLET ORAL at 22:41

## 2021-02-07 RX ADMIN — ENOXAPARIN SODIUM 40 MG: 40 INJECTION SUBCUTANEOUS at 08:15

## 2021-02-07 RX ADMIN — DOCUSATE SODIUM 100 MG: 100 CAPSULE, LIQUID FILLED ORAL at 08:15

## 2021-02-07 RX ADMIN — Medication 2000 UNITS: at 08:15

## 2021-02-07 RX ADMIN — ASPIRIN 81 MG: 81 TABLET, COATED ORAL at 08:15

## 2021-02-07 NOTE — CASE MANAGEMENT
PT IS NOT A 30 DAYS READMISSION  PT IS NOT A BUNDLE  CM met with pt to discuss DCP and cm role  Pt lives with son in ranch home with 1ste  Prior to admission pt used a RW with ambulation and was independent with ADLS  No prior hx of VNA  Pt's preference for pharmacy is [facility] in [place]  Pt uses 2L oxygen at home provided by [facility]  Pt denies any hx of drugs/ETOH or inpt psych stays  CM reviewed d/c planning process including the following: identifying help at home, patient preference for d/c planning needs, Discharge Lounge, [facility] Meds to Bed program, availability of treatment team to discuss questions or concerns patient and/or family may have regarding understanding medications and recognizing signs and symptoms once discharged  CM also encouraged patient to follow up with all recommended appointments after discharge  Patient advised of importance for patient and family to participate in managing patients medical well being

## 2021-02-07 NOTE — PROGRESS NOTES
Progress Note - Pulmonary   Verba Goltz Sagat 80 y o  female MRN: [id number]  Unit/Bed#: [facility] 522-01 Encounter: [id number]      Impressions & Recommendations:   1  Acute on chronic hypoxic respiratory failure, chronic hypercarbic respiratory failure   · Titrate O2 to keep SpO2 88-94%  · IS, OOB-Chair     2  Acute/chronic diastolic CHF with possible RH failure  · RV dilation with preserved function, diastolic dysfunction noted  · Continue with aggressive attempts at diuresis  · Will add diamox given chronic hypercarbia  · Follow I/Os  · Further care per cardiology     3  JAMEEL/OHVS intolerant to CPAP - refuses BiPAP/CPAP trials, may need to reconsider now that RV dilation has developed, will discuss further     4  COPD w/o AE  · Cont xopenex/atrovent nebs TID     5  Morbid obesity     6  Bilateral pleural effusions - too small for drainage, continue to monitor response to diuresis     Subjective:   Reports feeling improved slowly, notes good response to diuretics, denied purulent sputum production, no chest tightness, no pleurisy, no hemoptysis    Objective:       Vitals: Blood pressure 136/68, pulse 67, temperature 98 4 °F (36 9 °C), temperature source Oral, resp  rate 19, weight 97 9 kg (215 lb 12 8 oz), SpO2 96 %, not currently breastfeeding  , 4LNC during my exam, Body mass index is 37 04 kg/m²        Intake/Output Summary (Last 24 hours) at 2/7/2021 1403  Last data filed at 2/7/2021 1200  Gross per 24 hour   Intake 576 ml   Output 2250 ml   Net -1674 ml         Physical Exam  Gen: Obese older woman in bed, awake, alert, oriented x 3, no acute distress  HEENT: Mucous membranes moist, no oral lesions, no thrush  NECK: No accessory muscle use, unable to determine JVP  Cardiac: Regular, single S1, single S2, no murmurs, no rubs, no gallops  Lungs: diminished BS at bases, slight rales, no wheeze, no rhonchi  Abdomen: obese, normoactive bowel sounds, soft nontender, nondistended, no rebound or rigidity, no guarding  Extremities: no cyanosis, no clubbing, 2+ LE edema to thighs    Labs: I have personally reviewed pertinent lab results    Laboratory and Diagnostics  Results from last 7 days   Lab Units 02/07/21  0521 02/06/21  0433 02/05/21  1444   WBC Thousand/uL 8 21 7 57 9 01   HEMOGLOBIN g/dL 13 0 13 5 13 5   HEMATOCRIT % 46 2* 49 3* 48 5*   PLATELETS Thousands/uL 282 268 285   NEUTROS PCT % 75 90* 75   MONOS PCT % 11 2* 9     Results from last 7 days   Lab Units 02/07/21  0521 02/06/21  0433 02/05/21  1444   SODIUM mmol/L 145 145 144   POTASSIUM mmol/L 3 6 3 9 4 2   CHLORIDE mmol/L 96* 99* 103   CO2 mmol/L >45* >45* 44*   ANION GAP mmol/L  --   --  -3*   BUN mg/dL 38* 33* 35*   CREATININE mg/dL 1 22 1 22 1 19   CALCIUM mg/dL 8 6 8 8 9 2   GLUCOSE RANDOM mg/dL 95 139 107   ALT U/L  --  14 16   AST U/L  --  11 18   ALK PHOS U/L  --  105 104   ALBUMIN g/dL  --  2 9* 2 8*   TOTAL BILIRUBIN mg/dL  --  0 80 0 74          Results from last 7 days   Lab Units 02/06/21  0433   INR  1 07      Results from last 7 days   Lab Units 02/05/21  1444   TROPONIN I ng/mL 0 03                         Results from last 7 days   Lab Units 02/06/21  0635 02/06/21  0433   PROCALCITONIN ng/ml <0 05 <0 05       ABG:   Results from last 7 days   Lab Units 02/06/21  0626   PH ART  7 319*   PCO2 ART mm Hg 80 9*   PO2 ART mm Hg 72 1*   HCO3 ART mmol/L 40 7*   BASE EXC ART mmol/L 11 0     BNP 4900     MICRO  COVID-19 neg 2/5/21  FLU/RSV neg     Imaging and other studies:   POCUS - 2/7 - SEE IMAGES IN PACS - BILATERAL THORACIC ULTRASOUND WITH SMALL BILATERAL EFFUSIONS, FREELY MOBILE LUNG, <1CM FLUID POCKET WITH LUNG TOUCHING PLEURA DURING RESPIRATORY CYCLE    CXR 2/5/21 - suggestion of apical hyperinflation, vascular congestion/cephalization blunted CP angles, likely b/l effusions, no PTX     Pulmonary function testing:   PFTs 3/2015 - Ratio 69%, FVC 78%, FEV1 72%, TLC 76%, RV 79%, DLCO 50% - mild obstructive airflow defect, mildly reduced TLC, moderately reduced diffusion  Home PSG 11/27/17 - AHI 9 8 with significant desaturations, colby 56%     EKG, Pathology, and Other Studies: I have personally reviewed pertinent reports  TTE 2/2021 - EF 60%, grade II diastolic dysfunction, dilated RV with normal function    TTE 1/2016 - EF 99-75%, grade I diastolic dysfunction, normal RV size/function    Eugene Castro DO, [facility]

## 2021-02-07 NOTE — PLAN OF CARE
Problem: Potential for Falls  Goal: Patient will remain free of falls  Description: INTERVENTIONS:  - Assess patient frequently for physical needs  -  Identify cognitive and physical deficits and behaviors that affect risk of falls    -  [place] fall precautions as indicated by assessment   - Educate patient/family on patient safety including physical limitations  - Instruct patient to call for assistance with activity based on assessment  - Modify environment to reduce risk of injury  - Consider OT/PT consult to assist with strengthening/mobility  Outcome: Progressing     Problem: Prexisting or High Potential for Compromised Skin Integrity  Goal: Skin integrity is maintained or improved  Description: INTERVENTIONS:  - Identify patients at risk for skin breakdown  - Assess and monitor skin integrity  - Assess and monitor nutrition and hydration status  - Monitor labs   - Assess for incontinence   - Turn and reposition patient  - Assist with mobility/ambulation  - Relieve pressure over bony prominences  - Avoid friction and shearing  - Provide appropriate hygiene as needed including keeping skin clean and dry  - Evaluate need for skin moisturizer/barrier cream  - Collaborate with interdisciplinary team   - Patient/family teaching  - Consider wound care consult   Outcome: Progressing     Problem: RESPIRATORY - ADULT  Goal: Achieves optimal ventilation and oxygenation  Description: INTERVENTIONS:  - Assess for changes in respiratory status  - Assess for changes in mentation and behavior  - Position to facilitate oxygenation and minimize respiratory effort  - Oxygen administered by appropriate delivery if ordered  - Initiate smoking cessation education as indicated  - Encourage broncho-pulmonary hygiene including cough, deep breathe, Incentive Spirometry  - Assess the need for suctioning and aspirate as needed  - Assess and instruct to report SOB or any respiratory difficulty  - Respiratory Therapy support as indicated  Outcome: Progressing     Problem: PAIN - ADULT  Goal: Verbalizes/displays adequate comfort level or baseline comfort level  Description: Interventions:  - Encourage patient to monitor pain and request assistance  - Assess pain using appropriate pain scale  - Administer analgesics based on type and severity of pain and evaluate response  - Implement non-pharmacological measures as appropriate and evaluate response  - Consider cultural and social influences on pain and pain management  - Notify physician/advanced practitioner if interventions unsuccessful or patient reports new pain  Outcome: Progressing     Problem: INFECTION - ADULT  Goal: Absence or prevention of progression during hospitalization  Description: INTERVENTIONS:  - Assess and monitor for signs and symptoms of infection  - Monitor lab/diagnostic results  - Monitor all insertion sites, i e  indwelling lines, tubes, and drains  - Monitor endotracheal if appropriate and nasal secretions for changes in amount and color  - [place] appropriate cooling/warming therapies per order  - Administer medications as ordered  - Instruct and encourage patient and family to use good hand hygiene technique  - Identify and instruct in appropriate isolation precautions for identified infection/condition  Outcome: Progressing  Goal: Absence of fever/infection during neutropenic period  Description: INTERVENTIONS:  - Monitor WBC    Outcome: Progressing     Problem: SAFETY ADULT  Goal: Patient will remain free of falls  Description: INTERVENTIONS:  - Assess patient frequently for physical needs  -  Identify cognitive and physical deficits and behaviors that affect risk of falls    -  [place] fall precautions as indicated by assessment   - Educate patient/family on patient safety including physical limitations  - Instruct patient to call for assistance with activity based on assessment  - Modify environment to reduce risk of injury  - Consider OT/PT consult to assist with strengthening/mobility  Outcome: Progressing  Goal: Maintain or return to baseline ADL function  Description: INTERVENTIONS:  -  Assess patient's ability to carry out ADLs; assess patient's baseline for ADL function and identify physical deficits which impact ability to perform ADLs (bathing, care of mouth/teeth, toileting, grooming, dressing, etc )  - Assess/evaluate cause of self-care deficits   - Assess range of motion  - Assess patient's mobility; develop plan if impaired  - Assess patient's need for assistive devices and provide as appropriate  - Encourage maximum independence but intervene and supervise when necessary  - Involve family in performance of ADLs  - Assess for home care needs following discharge   - Consider OT consult to assist with ADL evaluation and planning for discharge  - Provide patient education as appropriate  Outcome: Progressing  Goal: Maintain or return mobility status to optimal level  Description: INTERVENTIONS:  - Assess patient's baseline mobility status (ambulation, transfers, stairs, etc )    - Identify cognitive and physical deficits and behaviors that affect mobility  - Identify mobility aids required to assist with transfers and/or ambulation (gait belt, sit-to-stand, lift, walker, cane, etc )  - [place] fall precautions as indicated by assessment  - Record patient progress and toleration of activity level on Mobility SBAR; progress patient to next Phase/Stage  - Instruct patient to call for assistance with activity based on assessment  - Consider rehabilitation consult to assist with strengthening/weightbearing, etc   Outcome: Progressing     Problem: DISCHARGE PLANNING  Goal: Discharge to home or other facility with appropriate resources  Description: INTERVENTIONS:  - Identify barriers to discharge w/patient and caregiver  - Arrange for needed discharge resources and transportation as appropriate  - Identify discharge learning needs (meds, wound care, etc )  - Arrange for interpretive services to assist at discharge as needed  - Refer to Case Management Department for coordinating discharge planning if the patient needs post-hospital services based on physician/advanced practitioner order or complex needs related to functional status, cognitive ability, or social support system  Outcome: Progressing     Problem: Knowledge Deficit  Goal: Patient/family/caregiver demonstrates understanding of disease process, treatment plan, medications, and discharge instructions  Description: Complete learning assessment and assess knowledge base    Interventions:  - Provide teaching at level of understanding  - Provide teaching via preferred learning methods  Outcome: Progressing

## 2021-02-07 NOTE — PROGRESS NOTES
Cardiology Progress Note - Nan Gonzales 80 y o  female MRN: [id number]    Unit/Bed#: [facility] 522-01 Encounter: [id number]      Assessment/Recommendations:  1  Acute on chronic diastolic CHF: likely in the setting of concomitant COPD exacerbation  Continued on IV diuresis with good response  Continue strict I/Os and daily weights  Reassess in AM for change to diuretic regimen  Repeat echo pending, will review once available  2   COPD: likely with exacerbation as she does have significant coarse breath sounds and a significantly elevated CO2 level on her BMP, which is likely compensating for lung CO2 retention  3   CAD: with prior NSTEMI and CABG  Stable and asymptomatic, continued on ASA, plavix, B-blocker  4   HLD: currently not on a statin  LDL 83 in July 2020  Would likely benefit from a statin considering CAD and prior CVA  5   H/o CVA: continued on ASA, plavix  6   Carotid stenosis: s/p R CEA  Continued on ASA, plavix  7   Frequent ventricular ectopy: continue B-blocker  Keep K>4 and Mg>2  Subjective:   Patient seen and examined  No significant events overnight   stable dyspnea on exertion, ; pertinent negatives - chest pain, chest pressure/discomfort, irregular heart beat, near-syncope and palpitations  Objective:     Vitals: Blood pressure 146/53, pulse 79, temperature 98 6 °F (37 °C), temperature source Oral, resp  rate 20, weight 97 9 kg (215 lb 12 8 oz), SpO2 93 %, not currently breastfeeding , Body mass index is 37 04 kg/m² ,   Orthostatic Blood Pressures      Most Recent Value   Blood Pressure  146/53 filed at 02/07/2021 0709   Patient Position - Orthostatic VS  Lying filed at 02/07/2021 0247            Intake/Output Summary (Last 24 hours) at 2/7/2021 1018  Last data filed at 2/7/2021 0601  Gross per 24 hour   Intake 236 ml   Output 2000 ml   Net -1764 ml       TELE: Frequent ventricular ectopy/couplets      Physical Exam:    GEN: 29 Fani Porter appears well, alert and oriented x 3, pleasant and cooperative   HEENT: pupils equal, round, and reactive to light; extraocular muscles intact  NECK: supple, no carotid bruits   HEART: regular rhythm, normal S1 and S2, no murmurs, clicks, gallops or rubs   LUNGS: +coarse breath sounds bilaterally   ABDOMEN: normal bowel sounds, soft, no tenderness, no distention  EXTREMITIES: peripheral pulses normal; no clubbing, cyanosis, + edema  NEURO: no focal findings   SKIN: normal without suspicious lesions on exposed skin    Medications:      Current Facility-Administered Medications:     acetaminophen (TYLENOL) tablet 650 mg, 650 mg, Oral, Q6H PRN, Yury Lee MD, 650 mg at 02/06/21 0928    albuterol inhalation solution 2 5 mg, 2 5 mg, Nebulization, Q4H PRN, Yury Lee MD    ALPRAZolam Layvonne Ala) tablet 0 5 mg, 0 5 mg, Oral, TID PRN, Yury Lee MD, 0 5 mg at 02/07/21 0233    aluminum-magnesium hydroxide-simethicone (MYLANTA) oral suspension 30 mL, 30 mL, Oral, Q6H PRN, Yury Lee MD    amLODIPine (NORVASC) tablet 5 mg, 5 mg, Oral, Daily, Yury Lee MD, 5 mg at 02/07/21 0815    ascorbic acid (VITAMIN C) tablet 500 mg, 500 mg, Oral, Daily, Yury Lee MD, 500 mg at 02/07/21 0815    aspirin (ECOTRIN LOW STRENGTH) EC tablet 81 mg, 81 mg, Oral, Daily, Yury Lee MD, 81 mg at 02/07/21 0815    bimatoprost (LUMIGAN) 0 01 % ophthalmic solution 1 drop, 1 drop, Both Eyes, HS, Yury Lee MD, 1 drop at 02/06/21 2252    calcium carbonate (TUMS) chewable tablet 1,000 mg, 1,000 mg, Oral, Daily PRN, Yury Lee MD    cholecalciferol (VITAMIN D3) tablet 2,000 Units, 2,000 Units, Oral, Daily, Yury Lee MD, 2,000 Units at 02/07/21 0815    clopidogrel (PLAVIX) tablet 75 mg, 75 mg, Oral, Daily, Yury Lee MD, 75 mg at 02/07/21 0815    docusate sodium (COLACE) capsule 100 mg, 100 mg, Oral, BID, Yury Lee MD, 100 mg at 02/07/21 0815    enoxaparin (LOVENOX) subcutaneous injection 40 mg, 40 mg, Subcutaneous, Daily, Kirk Duarte MD, 40 mg at 02/07/21 0815    famotidine (PEPCID) tablet 20 mg, 20 mg, Oral, BID, Kirk Duarte MD, 20 mg at 02/07/21 0815    furosemide (LASIX) injection 40 mg, 40 mg, Intravenous, Daily, Brisa Robles MD, 40 mg at 02/07/21 0815    ipratropium (ATROVENT) 0 02 % inhalation solution 0 5 mg, 0 5 mg, Nebulization, TID, Claribel Piña DO, 0 5 mg at 02/07/21 0850    levalbuterol (XOPENEX) inhalation solution 1 25 mg, 1 25 mg, Nebulization, TID, Claribel Piña DO, 1 25 mg at 02/07/21 0850    levothyroxine tablet 75 mcg, 75 mcg, Oral, Early Morning, Kirk Duarte MD, 75 mcg at 02/07/21 0600    metoprolol tartrate (LOPRESSOR) tablet 25 mg, 25 mg, Oral, Q12H [address], Kirk Duarte MD, 25 mg at 02/07/21 0815    oxyCODONE-acetaminophen (PERCOCET) 5-325 mg per tablet 1 tablet, 1 tablet, Oral, Q4H PRN, Kirk Duarte MD    senna (SENOKOT) tablet 8 6 mg, 1 tablet, Oral, Daily, Kirk Duarte MD, 8 6 mg at 02/07/21 0815     Labs & Results:    Results from last 7 days   Lab Units 02/05/21  1444   TROPONIN I ng/mL 0 03     Results from last 7 days   Lab Units 02/07/21  0521 02/06/21  0433 02/05/21  1444   WBC Thousand/uL 8 21 7 57 9 01   HEMOGLOBIN g/dL 13 0 13 5 13 5   HEMATOCRIT % 46 2* 49 3* 48 5*   PLATELETS Thousands/uL 282 268 285         Results from last 7 days   Lab Units 02/07/21  0521 02/06/21  0433 02/05/21  1444   POTASSIUM mmol/L 3 6 3 9 4 2   CHLORIDE mmol/L 96* 99* 103   CO2 mmol/L >45* >45* 44*   BUN mg/dL 38* 33* 35*   CREATININE mg/dL 1 22 1 22 1 19   CALCIUM mg/dL 8 6 8 8 9 2   ALK PHOS U/L  --  105 104   ALT U/L  --  14 16   AST U/L  --  11 18     Results from last 7 days   Lab Units 02/06/21  0433   INR  1 07           Echo: pending repeat     EKG personally reviewed by Sari Thomson MD

## 2021-02-08 LAB
ALBUMIN SERPL BCP-MCNC: 2.6 G/DL (ref 3.5–5)
ALP SERPL-CCNC: 90 U/L (ref 46–116)
ALT SERPL W P-5'-P-CCNC: 12 U/L (ref 12–78)
AST SERPL W P-5'-P-CCNC: 9 U/L (ref 5–45)
BASOPHILS # BLD AUTO: 0.05 THOUSANDS/ΜL (ref 0–0.1)
BASOPHILS NFR BLD AUTO: 1 % (ref 0–1)
BILIRUB SERPL-MCNC: 0.88 MG/DL (ref 0.2–1)
BUN SERPL-MCNC: 34 MG/DL (ref 5–25)
CALCIUM ALBUM COR SERPL-MCNC: 10.1 MG/DL (ref 8.3–10.1)
CALCIUM SERPL-MCNC: 9 MG/DL (ref 8.3–10.1)
CHLORIDE SERPL-SCNC: 98 MMOL/L (ref 100–108)
CO2 SERPL-SCNC: >45 MMOL/L (ref 21–32)
CREAT SERPL-MCNC: 1.06 MG/DL (ref 0.6–1.3)
EOSINOPHIL # BLD AUTO: 0.14 THOUSAND/ΜL (ref 0–0.61)
EOSINOPHIL NFR BLD AUTO: 2 % (ref 0–6)
ERYTHROCYTE [DISTWIDTH] IN BLOOD BY AUTOMATED COUNT: 14.4 % (ref 11.6–15.1)
GFR SERPL CREATININE-BSD FRML MDRD: 48 ML/MIN/1.73SQ M
GLUCOSE SERPL-MCNC: 115 MG/DL (ref 65–140)
HCT VFR BLD AUTO: 47.6 % (ref 34.8–46.1)
HGB BLD-MCNC: 13.1 G/DL (ref 11.5–15.4)
IMM GRANULOCYTES # BLD AUTO: 0.04 THOUSAND/UL (ref 0–0.2)
IMM GRANULOCYTES NFR BLD AUTO: 0 % (ref 0–2)
LYMPHOCYTES # BLD AUTO: 1.11 THOUSANDS/ΜL (ref 0.6–4.47)
LYMPHOCYTES NFR BLD AUTO: 12 % (ref 14–44)
MCH RBC QN AUTO: 27.3 PG (ref 26.8–34.3)
MCHC RBC AUTO-ENTMCNC: 27.5 G/DL (ref 31.4–37.4)
MCV RBC AUTO: 99 FL (ref 82–98)
MONOCYTES # BLD AUTO: 0.91 THOUSAND/ΜL (ref 0.17–1.22)
MONOCYTES NFR BLD AUTO: 10 % (ref 4–12)
NEUTROPHILS # BLD AUTO: 6.83 THOUSANDS/ΜL (ref 1.85–7.62)
NEUTS SEG NFR BLD AUTO: 75 % (ref 43–75)
NRBC BLD AUTO-RTO: 0 /100 WBCS
PLATELET # BLD AUTO: 298 THOUSANDS/UL (ref 149–390)
PMV BLD AUTO: 10.4 FL (ref 8.9–12.7)
POTASSIUM SERPL-SCNC: 3.8 MMOL/L (ref 3.5–5.3)
PROT SERPL-MCNC: 6.1 G/DL (ref 6.4–8.2)
RBC # BLD AUTO: 4.8 MILLION/UL (ref 3.81–5.12)
SODIUM SERPL-SCNC: 143 MMOL/L (ref 136–145)
WBC # BLD AUTO: 9.08 THOUSAND/UL (ref 4.31–10.16)

## 2021-02-08 PROCEDURE — 99232 SBSQ HOSP IP/OBS MODERATE 35: CPT | Performed by: INTERNAL MEDICINE

## 2021-02-08 PROCEDURE — 94640 AIRWAY INHALATION TREATMENT: CPT

## 2021-02-08 PROCEDURE — 85025 COMPLETE CBC W/AUTO DIFF WBC: CPT | Performed by: NURSE PRACTITIONER

## 2021-02-08 PROCEDURE — 97163 PT EVAL HIGH COMPLEX 45 MIN: CPT

## 2021-02-08 PROCEDURE — 97167 OT EVAL HIGH COMPLEX 60 MIN: CPT

## 2021-02-08 PROCEDURE — 99232 SBSQ HOSP IP/OBS MODERATE 35: CPT | Performed by: NURSE PRACTITIONER

## 2021-02-08 PROCEDURE — 94760 N-INVAS EAR/PLS OXIMETRY 1: CPT

## 2021-02-08 PROCEDURE — 80053 COMPREHEN METABOLIC PANEL: CPT | Performed by: NURSE PRACTITIONER

## 2021-02-08 RX ADMIN — DOCUSATE SODIUM 100 MG: 100 CAPSULE, LIQUID FILLED ORAL at 17:48

## 2021-02-08 RX ADMIN — LEVALBUTEROL HYDROCHLORIDE 1.25 MG: 1.25 SOLUTION, CONCENTRATE RESPIRATORY (INHALATION) at 07:28

## 2021-02-08 RX ADMIN — FAMOTIDINE 20 MG: 20 TABLET, FILM COATED ORAL at 09:07

## 2021-02-08 RX ADMIN — FUROSEMIDE 40 MG: 10 INJECTION, SOLUTION INTRAMUSCULAR; INTRAVENOUS at 09:06

## 2021-02-08 RX ADMIN — Medication 2000 UNITS: at 09:07

## 2021-02-08 RX ADMIN — ENOXAPARIN SODIUM 40 MG: 40 INJECTION SUBCUTANEOUS at 09:07

## 2021-02-08 RX ADMIN — METOPROLOL TARTRATE 25 MG: 25 TABLET, FILM COATED ORAL at 09:07

## 2021-02-08 RX ADMIN — ALPRAZOLAM 0.5 MG: 0.5 TABLET ORAL at 09:09

## 2021-02-08 RX ADMIN — AMLODIPINE BESYLATE 5 MG: 5 TABLET ORAL at 09:07

## 2021-02-08 RX ADMIN — ACETAZOLAMIDE SODIUM 250 MG: 500 INJECTION, POWDER, LYOPHILIZED, FOR SOLUTION INTRAVENOUS at 09:06

## 2021-02-08 RX ADMIN — BIMATOPROST 1 DROP: 0.1 SOLUTION/ DROPS OPHTHALMIC at 21:29

## 2021-02-08 RX ADMIN — CLOPIDOGREL BISULFATE 75 MG: 75 TABLET ORAL at 09:06

## 2021-02-08 RX ADMIN — OXYCODONE HYDROCHLORIDE AND ACETAMINOPHEN 500 MG: 500 TABLET ORAL at 09:07

## 2021-02-08 RX ADMIN — IPRATROPIUM BROMIDE 0.5 MG: 0.5 SOLUTION RESPIRATORY (INHALATION) at 19:05

## 2021-02-08 RX ADMIN — LEVALBUTEROL HYDROCHLORIDE 1.25 MG: 1.25 SOLUTION, CONCENTRATE RESPIRATORY (INHALATION) at 19:05

## 2021-02-08 RX ADMIN — SENNOSIDES 8.6 MG: 8.6 TABLET, FILM COATED ORAL at 09:06

## 2021-02-08 RX ADMIN — ASPIRIN 81 MG: 81 TABLET, COATED ORAL at 09:07

## 2021-02-08 RX ADMIN — IPRATROPIUM BROMIDE 0.5 MG: 0.5 SOLUTION RESPIRATORY (INHALATION) at 13:21

## 2021-02-08 RX ADMIN — FAMOTIDINE 20 MG: 20 TABLET, FILM COATED ORAL at 17:48

## 2021-02-08 RX ADMIN — LEVALBUTEROL HYDROCHLORIDE 1.25 MG: 1.25 SOLUTION, CONCENTRATE RESPIRATORY (INHALATION) at 13:21

## 2021-02-08 RX ADMIN — ACETAZOLAMIDE SODIUM 250 MG: 500 INJECTION, POWDER, LYOPHILIZED, FOR SOLUTION INTRAVENOUS at 21:29

## 2021-02-08 RX ADMIN — LEVOTHYROXINE SODIUM 75 MCG: 75 TABLET ORAL at 05:48

## 2021-02-08 RX ADMIN — DOCUSATE SODIUM 100 MG: 100 CAPSULE, LIQUID FILLED ORAL at 09:07

## 2021-02-08 RX ADMIN — IPRATROPIUM BROMIDE 0.5 MG: 0.5 SOLUTION RESPIRATORY (INHALATION) at 07:28

## 2021-02-08 NOTE — CASE MANAGEMENT
A post acute care recommendation was made by your care team for Alex Villegas, VN  Discussed Freedom of Choice with patient  Pt preferred [facility]  Referral for VN sent to [address] [place] [address] Ave via [facility]

## 2021-02-08 NOTE — PLAN OF CARE
Problem: OCCUPATIONAL THERAPY ADULT  Goal: Performs self-care activities at highest level of function for planned discharge setting  See evaluation for individualized goals  Description: Treatment Interventions: ADL retraining, Functional transfer training, UE strengthening/ROM, Cognitive reorientation, Patient/family training, Endurance training, Equipment evaluation/education, Fine motor coordination activities, Compensatory technique education, Activityengagement, Energy conservation          See flowsheet documentation for full assessment, interventions and recommendations  Note: Limitation: Decreased ADL status, Decreased UE strength, Decreased Safe judgement during ADL, Decreased cognition, Decreased endurance, Decreased fine motor control, Decreased high-level ADLs  Prognosis: Fair  Assessment: Pt is a 79 yo female seen for OT eval s/p adm to B on 2/5/21 w/ progressive dyspnea, LE edema dx'd w/ pleural effusion  Pt  has a past medical history of Anxiety, Cataract, bilateral, COPD (chronic obstructive pulmonary disease) ([facility] [address] ), Coronary artery disease, arterial ischemic stroke, Hypertension, [facility] and Wears glasses  Pt with active OT orders and up and OOB as tolerated orders  Pt is retired and resides w/ son in [facility] w/ 1STE  Pt's son work during the day and Pt is home alone  Pt reports son A w/ all ADLS and IADLS, does not drive, & required use of DME PTA, including rollator for mobility  Pt is currently demonstrating the following occupational deficits: Min A UB bathing/dressing, Max A LB bathing/dressing, Max A toileting, Min A STS, Mod A functional mobility w/ RW    These deficits that are impacting pt's baseline areas of occupation are a result of the following impairments: pain, endurance, activity tolerance, functional mobility, forward functional reach, balance, functional standing tolerance, unsupportive home environment, decreased I w/ ADLS/IADLS, strength, cognitive impairments, decreased safety awareness, decreased insight into deficits, impaired fine motor skills and coordination deficits  The following Occupational Performance Areas to address include: grooming, bathing/shower, toilet hygiene, dressing, health maintenance, functional mobility and clothing management  Based on the aforementioned OT evaluation, functional performance deficits, and assessments, pt has been identified as a high complexity evaluation  Recommend STR upon D/C  The patient's raw score on the AM-PAC Daily Activity inpatient short form is 15, standardized score is 34 69, less than 39 4  Patients at this level are likely to benefit from DC to post-acute rehabilitation services  Please refer to the recommendation of the Occupational Therapist for safe DC planning  Pt to continue to benefit from acute immediate OT services to address the following goals 3-5x/week to  w/in 7-10 days:      OT Discharge Recommendation: [address]  OT - OK to Discharge: Yes(when medically cleared to rehab)

## 2021-02-08 NOTE — ASSESSMENT & PLAN NOTE
Wt Readings from Last 3 Encounters:   02/07/21 97 9 kg (215 lb 12 8 oz)   10/15/20 97 1 kg (214 lb)   07/28/20 98 4 kg (217 lb)       · Continue diuresis  · Strict I/o's  · Fluid restriction   · Daily weights  · Chest xray : Central vascular congestion with suspected asymmetric pulmonary edema and bibasilar opacities as above

## 2021-02-08 NOTE — PROGRESS NOTES
Progress Note - Rico Ours 1935, 80 y o  female MRN: [id number]    Unit/Bed#: [address] 522[address]01 Encounter: [id number]    Primary Care Provider: Sandra Barton DO   Date and time admitted to hospital: 2/5/2021  2:23 PM        * Pleural effusion  Assessment & Plan  · Pleural effusion bilateral  · Continue with Lasix 40mg iv daily per cardiology team  · - per pulmonary added Diamox secondary to patient's hypercarbia  · Monitor BMP  due to ongoing hypercarbia  · Pulmonary evaluated POCUS and determined that no thoracentesis needed  · Monitor input output  · Daily weights  · Cancel IR consultation pulmonary to monitor     Respiratory failure with hypoxia ([facility] )  Assessment & Plan  · Acute on chronic hypercarbic respiratory failure  · Respiratory failure with hypoxia, on mid flow  · Multifactorial possible  COPD, CHF exacerbation and pleural effusion  · Continue with the diuresis   · Keep oxygen levels 88-92 %    COPD (chronic obstructive pulmonary disease) (HCC)  Assessment & Plan  · COPD does not appear to be exacerbation  · - pt with no wheezing on 6liters midflow as pt keeps pulling oxygen off   · - discussed with pulmonary  · Discontinue steroids   · Keep oxygen levels greater than 88- 94%  · Currently on midflow 6 liters 91% walked into the room pt was on room air 53%     Type 2 diabetes mellitus ([facility] )  Assessment & Plan  Lab Results   Component Value Date    HGBA1C 6 3 (H) 07/01/2020       No results for input(s): POCGLU in the last 72 hours      Blood Sugar Average: Last 72 hrs:     · Type 2 diabetes mellitus  · Continue with insulin  · Hold oral hypoglycemic medication  · Avoid hypoglycemia    S/P CABG (coronary artery bypass graft)  Assessment & Plan  · Aspirin and statin     Hyperlipidemia  Assessment & Plan  · Continue with statin     Chronic diastolic congestive heart failure (HCC)  Assessment & Plan  Wt Readings from Last 3 Encounters:   02/07/21 97 9 kg (215 lb 12 8 oz)   10/15/20 97 1 kg (214 lb) 20 98 4 kg (217 lb)       · Continue diuresis  · Strict I/o's  · Fluid restriction   · Daily weights  · Chest xray : Central vascular congestion with suspected asymmetric pulmonary edema and bibasilar opacities as above  Hypothyroidism  Assessment & Plan  · Hypothyroidism  · Continue with levothyroxine    Hypertension  Assessment & Plan  · Continue with metoprolol 25 mg [id number]   · Started on iv lasix  40mg daily per cards, pulmonary added Diamox       VTE Pharmacologic Prophylaxis:   Pharmacologic: Enoxaparin (Lovenox)  Mechanical VTE Prophylaxis in Place: Yes    Patient Centered Rounds: I have performed bedside rounds with nursing staff today  Discussions with Specialists or Other Care Team Provider:  Nursing    Education and Discussions with Family / Patient:  Nursing and call patient's son to update    Time Spent for Care: 45 minutes  More than 50% of total time spent on counseling and coordination of care as described above  Current Length of Stay: 2 day(s)    Current Patient Status: Inpatient   Certification Statement: The patient will continue to require additional inpatient hospital stay due to IV diuresis    Discharge Plan:  Next 48 hours    Code Status: Level 1 - Full Code      Subjective:   Extensive discussion had with patient as she sits in chair eating lunch  She reports it was good  She explains that she has been having severe ever episodes of anxiety  She does not correlated to removal of oxygen  But she states she does have anxiety about not having oxygen  No nausea vomiting or diarrhea no current shortness of breath    Objective:     Vitals:   Temp (24hrs), Av 9 °F (37 2 °C), Min:98 4 °F (36 9 °C), Max:99 3 °F (37 4 °C)    Temp:  [98 4 °F (36 9 °C)-99 3 °F (37 4 °C)] 98 7 °F (37 1 °C)  HR:  [67-79] 76  Resp:  [16-21] 20  BP: (111-146)/(48-68) 111/51  SpO2:  [91 %-96 %] 92 %  Body mass index is 37 04 kg/m²       Input and Output Summary (last 24 hours): Intake/Output Summary (Last 24 hours) at 2/7/2021 2335  Last data filed at 2/7/2021 1840  Gross per 24 hour   Intake 1030 ml   Output 1600 ml   Net -570 ml       Physical Exam:     Physical Exam  Constitutional:       General: She is not in acute distress  Appearance: She is obese  She is not ill-appearing, toxic-appearing or diaphoretic  HENT:      Head: Normocephalic  Nose: No congestion  Mouth/Throat:      Pharynx: Oropharynx is clear  Eyes:      Conjunctiva/sclera: Conjunctivae normal    Cardiovascular:      Rate and Rhythm: Normal rate  Heart sounds: No murmur  No friction rub  No gallop  Pulmonary:      Effort: No respiratory distress  Breath sounds: No stridor  Rales (Fine bilateral crackles with poor effort) present  No wheezing or rhonchi  Chest:      Chest wall: No tenderness  Abdominal:      General: There is no distension  Palpations: There is no mass  Tenderness: There is no abdominal tenderness  There is no right CVA tenderness, left CVA tenderness, guarding or rebound  Hernia: No hernia is present  Musculoskeletal:         General: No swelling, tenderness, deformity or signs of injury  Right lower leg: No edema  Left lower leg: No edema  Skin:     Coloration: Skin is not jaundiced or pale  Findings: No bruising, erythema, lesion or rash  Neurological:      Mental Status: She is alert and oriented to person, place, and time     Psychiatric:         Behavior: Behavior normal            Additional Data:     Labs:    Results from last 7 days   Lab Units 02/07/21  0521   WBC Thousand/uL 8 21   HEMOGLOBIN g/dL 13 0   HEMATOCRIT % 46 2*   PLATELETS Thousands/uL 282   NEUTROS PCT % 75   LYMPHS PCT % 14   MONOS PCT % 11   EOS PCT % 0     Results from last 7 days   Lab Units 02/07/21  0521 02/06/21  0433 02/05/21  1444   SODIUM mmol/L 145 145 144   POTASSIUM mmol/L 3 6 3 9 4 2   CHLORIDE mmol/L 96* 99* 103   CO2 mmol/L >45* >45* 44* BUN mg/dL 38* 33* 35*   CREATININE mg/dL 1 22 1 22 1 19   ANION GAP mmol/L  --   --  -3*   CALCIUM mg/dL 8 6 8 8 9 2   ALBUMIN g/dL  --  2 9* 2 8*   TOTAL BILIRUBIN mg/dL  --  0 80 0 74   ALK PHOS U/L  --  105 104   ALT U/L  --  14 16   AST U/L  --  11 18   GLUCOSE RANDOM mg/dL 95 139 107     Results from last 7 days   Lab Units 02/06/21  0433   INR  1 07             Results from last 7 days   Lab Units 02/06/21  0635 02/06/21  0433   PROCALCITONIN ng/ml <0 05 <0 05           * I Have Reviewed All Lab Data Listed Above  * Additional Pertinent Lab Tests Reviewed:  All Labs Within Last 24 Hours Reviewed    Imaging:    Imaging Reports Reviewed Today Include:  Reviewed     Recent Cultures (last 7 days):           Last 24 Hours Medication List:   Current Facility-Administered Medications   Medication Dose Route Frequency Provider Last Rate    acetaminophen  650 mg Oral Q6H PRN Nikki Coats MD      acetaZOLAMIDE  250 mg Intravenous Q12H [address] Nini Hand, DO      albuterol  2 5 mg Nebulization Q4H PRN Nikki Coats MD      ALPRAZolam  0 5 mg Oral TID PRN Nikki Coats MD      aluminum-magnesium hydroxide-simethicone  30 mL Oral Q6H PRN Nikki Coast MD      amLODIPine  5 mg Oral Daily Nikki Coats MD      ascorbic acid  500 mg Oral Daily Nikki Coats MD      aspirin  81 mg Oral Daily Nikki Coats MD      bimatoprost  1 drop Both Eyes HS Nikki Coats MD      calcium carbonate  1,000 mg Oral Daily PRN Nikki Coats MD      cholecalciferol  2,000 Units Oral Daily Nikki Coats MD      clopidogrel  75 mg Oral Daily Nikki Coats MD      docusate sodium  100 mg Oral BID Nikki Coats MD      enoxaparin  40 mg Subcutaneous Daily Nikki Coats MD      famotidine  20 mg Oral BID Nikki Coats MD      furosemide  40 mg Intravenous Daily Brittney Ch MD      ipratropium  0 5 mg Nebulization TID Ninimarkie Hyman,       levalbuterol  1 25 mg Nebulization TID Sidra Vivianaco, DO      levothyroxine  75 mcg Oral Early Morning Johny Leonard MD      metoprolol tartrate  25 mg Oral Q12H [address] Johny Leonard MD      oxyCODONE-acetaminophen  1 tablet Oral Q4H PRN Johny Leonard MD      senna  1 tablet Oral Daily Johny Leonard MD          Today, Patient Was Seen By: FLAKO Aldridge    ** Please Note: Dictation voice to text software may have been used in the creation of this document   **

## 2021-02-08 NOTE — ASSESSMENT & PLAN NOTE
· Pleural effusion bilateral  · Continue with Lasix 40mg iv daily per cardiology team  · - per pulmonary added Diamox secondary to patient's hypercarbia  · Monitor BMP  due to ongoing hypercarbia  · Pulmonary evaluated POCUS and determined that no thoracentesis needed  · Monitor input output  · Daily weights  · Cancel IR consultation pulmonary to monitor

## 2021-02-08 NOTE — PHYSICAL THERAPY NOTE
Physical Therapy Evaluation     Patient's Name: Joel Mercer    Admitting Diagnosis  Acquired hypothyroidism [E03 9]  Vitamin B 12 deficiency [E53 8]  Dyspnea [R06 00]  SOB (shortness of breath) [R06 02]  Hypoxia [R09 02]  CHF exacerbation (HCC) [I50 9]  Chronic obstructive pulmonary disease, unspecified COPD type ([facility] ) [J44 9]  Acute on chronic congestive heart failure, unspecified heart failure type ([facility] ) [I50 9]    Problem List  Patient Active Problem List   Diagnosis    COPD (chronic obstructive pulmonary disease) ([facility] )    Benign familial tremor    Hypertension    Hypothyroidism    Hx of arterial ischemic stroke    Chronic diastolic congestive heart failure ([facility] )    Clinical depression    Esophageal reflux    Female stress incontinence    Generalized anxiety disorder- panic    Generalized osteoarthritis    Glaucoma    Hyperlipidemia    Insomnia    JAMEEL (obstructive sleep apnea)    Osteoarthritis of knee    Postural imbalance    S/P CABG (coronary artery bypass graft)    History of total knee replacement    [facility]    Pleural effusion    Respiratory failure with hypoxia ([facility] )       Past Medical History  Past Medical History:   Diagnosis Date    Anxiety     Cataract, bilateral     Last Assessed:3/19/2014    COPD (chronic obstructive pulmonary disease) ([facility] )     Coronary artery disease     Hx of arterial ischemic stroke     Hypertension     Stroke ([facility] )     Wears glasses        Past Surgical History  Past Surgical History:   Procedure Laterality Date    CAROTID ENDARTERECTOMY Right     CATARACT EXTRACTION W/ INTRAOCULAR LENS  IMPLANT, BILATERAL      COLONOSCOPY N/A 9/30/2017    Procedure: COLONOSCOPY FOR CONTROL OF BLEEDING;  Surgeon: Tali Arellano MD;  Location: [facility];  Service: Colorectal    CORONARY ARTERY BYPASS GRAFT      CABG X3 with LIMA to LAD,SVG to OM,SVG to distal  RCA ;  Last Assessed:7/13/2015    JOINT REPLACEMENT      left TKR    KIDNEY STONE SURGERY      NEPHROSTOMY Left     with Drainage Irrigation ; [id number]    OR TOTAL KNEE ARTHROPLASTY Right 2/25/2016    Procedure: ARTHROPLASTY KNEE TOTAL;  Surgeon: Sasha Steward MD;  Location: [facility];  Service: Orthopedics        02/08/21 1025   PT Last Visit   PT Visit Date 02/08/21   Note Type   Note type Evaluation   Pain Assessment   Pain Assessment Tool Pain Assessment not indicated - pt denies pain   Home Living   Type of [address] One level  (1STE)   Bathroom Shower/Tub Tub/shower unit   Bathroom Toilet Raised   Bathroom Equipment Shower chair;Grab bars in shower;Grab bars around toilet   Home Equipment Other (Comment)  (rollator, "cart that you can ride around in", 2L home O2)   Additional Comments Pt reports use of rollator for mobility PTA  Prior Function   Level of [place] Needs assistance with ADLs and functional mobility; Needs assistance with IADLs   Lives With Son   Receives Help From Family   ADL Assistance Needs assistance   IADLs Needs assistance   Falls in the last 6 months 1 to 4  (4 per pt report)   Comments Pt reports son works during the day and she is alone most days  Restrictions/Precautions   Weight Bearing Precautions Per Order No   Other Precautions Cognitive; Chair Alarm; Bed Alarm; Fall Risk;Pain;Multiple lines;Telemetry;O2;Impulsive  (6L O2 NC)   General   Family/Caregiver Present No   Cognition   Overall Cognitive Status Impaired   Arousal/Participation Alert   Orientation Level Oriented X4  (date with visual cues)   Memory Decreased recall of precautions;Decreased recall of recent events   Following Commands Follows one step commands with increased time or repetition   Comments Pt presents impulsive  Pt with decreased safety awareness and insight into deficits     RLE Assessment   RLE Assessment   (functionally 3/5)   LLE Assessment   LLE Assessment   (functionally 3/5)   Bed Mobility   Additional Comments OOB in chiar upon PT arrival   Pt left upright in bedside chair with chair alarm intact and all needs in reach at end of therapy session  Transfers   Sit to Stand 4  Minimal assistance   Additional items Assist x 1; Increased time required;Verbal cues   Stand to Sit 4  Minimal assistance   Additional items Assist x 1; Increased time required;Verbal cues   Toilet transfer 3  Moderate assistance   Additional items Assist x 1; Increased time required;Verbal cues;Standard toilet  (grab bars)   Additional Comments Transfers with RW   VC for hand placement and safety  STS performed 3x throughout session 2/2 bladder incontinence  Ambulation/Elevation   Gait pattern Excessively slow; Short stride;Decreased foot clearance  (unsteady, impulsive)   Gait Assistance 3  Moderate assist   Additional items Assist x 1;Verbal cues; Tactile cues   Assistive Device Rolling walker   Distance 6 ft x 2   Balance   Static Sitting Fair   Dynamic Sitting Fair   Static Standing Poor +   Dynamic Standing Poor   Ambulatory Poor   Endurance Deficit   Endurance Deficit Yes   Endurance Deficit Description fatigue, weakness, cog   Activity Tolerance   Activity Tolerance Patient limited by fatigue; Other (Comment)  (cog)   Medical Staff Made Aware OT Alanna   Nurse Made Aware RN cleared pt to be seen by PT   Assessment   Prognosis Good   Problem List Decreased strength;Decreased endurance; Impaired balance;Decreased mobility; Decreased cognition;Decreased safety awareness   Assessment Pt seen for high complexity PT evaluation due to decrease in functional mobility status compared to baseline  Pt with active PT eval/treat and up and OOB as tolerated orders at this time  Pt is a 80 y o  F who [facility] on 2/5/21  Pt primary dx is pleural effusion    Pt  has a past medical history of Anxiety, Cataract, bilateral, COPD (chronic obstructive pulmonary disease) ([facility] ), Coronary artery disease, arterial ischemic stroke, Hypertension, Stroke ([facility] ), and Wears glasses  Pt resides with son in [facility] with 1STE  Pt presents with decreased strength, balance, endurance that contribute to limitations in bed mobility, functional transfers, functional mobility  Pt requires Min A for STS and Mod A for ambulation with RW at this time  Pt left upright in bedside chair with chair alarm intact and with all needs in reach  Pt will benefit from skilled therapy in order to address current impairments and functional limitations  PT to follow pt and recommending rehab once medically cleared  The patient's AM-PAC Basic Mobility Inpatient Short Form Raw Score is 16, Standardized Score is 38 32  A standardized score less than 42 9 suggests the patient may benefit from discharge to post-acute rehabilitation services  Please also refer to the recommendation of the Physical Therapist for safe discharge planning  Barriers to Discharge Inaccessible home environment;Decreased caregiver support   Goals   Patient Goals to go to the bathroom   STG Expiration Date 02/22/21   Short Term Goal #1 1  Pt will demonstrate ability to perform all aspects of bed mobility with supervision A in order to increase independence and decrease burden on caregivers  2  Pt will demonstrate ability to perform functional transfers with supervision A in order to increase independence and decrease burden on caregivers  3  Pt will demonstrate ability to ambulate 200 ft with least restrictive AD with supervision A in order to return to mobility safely  4  Pt will demonstrate ability to negotiate 1 steps with/without HR and supervision A in order to return to household/community mobility safely  5  Pt will demonstrate improved balance by one grade order to decrease risk of falls  6  Pt will increase b/l LE strength by 1 grade in order to increase ease of functional mobility and transfers  Plan   Treatment/Interventions Functional transfer training;LE strengthening/ROM; Therapeutic exercise; Endurance training;Patient/family training;Cognitive reorientation;Equipment eval/education; Bed mobility;Gait training;Spoke to nursing   PT Frequency Other (Comment)  (3-5x/wk)   Recommendation   PT Discharge Recommendation Post-Acute Rehabilitation Services   Equipment Recommended Walker   PT - OK to Discharge Yes  (to rehab once medically cleared)   [address] Mobility Inpatient   Turning in Bed Without Bedrails 3   Lying on Back to Sitting on Edge of Flat Bed 3   Moving Bed to Chair 3   Standing Up From Chair 3   Walk in Room 2   Climb 3-5 Stairs 2   Basic Mobility Inpatient Raw Score 16   Basic Mobility Standardized Score 38 32   Modified Shaun Scale   Modified Hooker Scale 4           Leila Floyd, PT, DPT

## 2021-02-08 NOTE — ASSESSMENT & PLAN NOTE
· Pleural effusion bilateral  · Continue with Lasix 40mg iv daily per cardiology team  · - per pulmonary added Diamox secondary to patient's hypercarbia, with very scant improvement -   · - however pt is rather confused today not at all how she was yesterday   · - while in the room changed pt 3 liters however dropped rapidly to 87% so increased to 3 5liters to keep her at 88% minimum   · Monitor BMP  due to ongoing hypercarbia  · Pulmonary evaluated POCUS and determined that no thoracentesis was needed  · Monitor input output  · Daily weights  · Cancel IR consultation pulmonary to monitor

## 2021-02-08 NOTE — ASSESSMENT & PLAN NOTE
· Acute on chronic hypercarbic respiratory failure  · Respiratory failure with hypoxia, now on 3 5 liters oxygen (desats very quickly )   · Multifactorial possible  COPD, CHF exacerbation and pleural effusion  · Continue with the diuresis added diamox 2/7  · Keep oxygen levels 88-92 %

## 2021-02-08 NOTE — OCCUPATIONAL THERAPY NOTE
Occupational Therapy Evaluation      Regan Goldsmith    2/8/2021    Principal Problem:    Pleural effusion  Active Problems:    COPD (chronic obstructive pulmonary disease) (HCC)    Hypertension    Hypothyroidism    Chronic diastolic congestive heart failure (HCC)    Hyperlipidemia    S/P CABG (coronary artery bypass graft)    Type 2 diabetes mellitus (HCC)    Respiratory failure with hypoxia (HCC)      Past Medical History:   Diagnosis Date    Anxiety     Cataract, bilateral     Last Assessed:3/19/2014    [facility]ch[facility] obstructive [facility] disease) ([facility] [address] )     Coronary artery disease     Hx of arterial ischemic stroke     Hypertension     Stroke ([facility] [address] )     Wears glasses        Past Surgical History:   Procedure Laterality Date    CAROTID ENDARTERECTOMY Right     CATARACT EXTRACTION W/ INTRAOCULAR LENS  IMPLANT, BILATERAL      COLONOSCOPY N/A 9/30/2017    Procedure: COLONOSCOPY FOR CONTROL OF BLEEDING;  Surgeon: Francesca Corbin MD;  [facility]: [facility];  Service: Colorectal    CORONARY ARTERY BYPASS GRAFT      CABG X3 with LIMA to LAD,SVG to OM,SVG to distal  RCA ; Last Assessed:7/13/2015    JOINT REPLACEMENT      left TKR    KIDNEY STONE SURGERY      NEPHROSTOMY Left     with Drainage Irrigation ; FVAUS:1012    [place] TOTAL KNEE ARTHROPLASTY Right 2/25/2016    Procedure: ARTHROPLASTY KNEE TOTAL;  Surgeon: Sasha Steward MD;  Location: [facility];  Service: Orthopedics        02/08/21 1024   OT Last Visit   OT Visit Date 02/08/21   Note Type   Note type Evaluation   Restrictions/Precautions   Weight Bearing Precautions Per Order No   Other Precautions Impulsive;Cognitive; Bed Alarm; Chair Alarm;Multiple lines;Telemetry;O2;Fall Risk;Pain;Hard of hearing  (6L NC O2)   Pain Assessment   Pain Assessment Tool Pain Assessment not indicated - pt denies pain   Home Living   Type of [address] One level;Performs ADLs on one level;Stairs to enter with rails  [facility] w/ 1STE)   Bathroom Shower/Tub Tub/shower unit   [employer] Raised   Bathroom Equipment Grab bars in shower; Shower chair;Grab bars around toilet   [address] Other (Comment)  (rollator; "a cart you can ride around in")   Additional Comments Pt reports use of rollator for all mobility PTA; Pt wears 2L NC home O2   Prior Function   Level of [place] Needs assistance with ADLs and functional mobility; Needs assistance with IADLs   Lives With Son   Receives Help From Family   ADL Assistance Needs assistance   IADLs Needs assistance   Falls in the last 6 months 1 to 4  (4, per Pt)   Vocational Retired   Lifestyle   Autonomy Pt reports son A w/ all ADLS (bathing, dressing, toileting) and son A w/ all IADLS; (-) drives; ambulates w/ rollator PTA   Reciprocal Relationships Pt lives w/ son who is her primary caregiver, however Pt reports he works during the day and she is often home alone  Service to Others Pt is retired   Intrinsic Gratification Pt reports enjoying spending time w/ her son   Psychosocial   Psychosocial (WDL) WDL   ADL   Where Assessed Chair   Eating Assistance 5  Supervision/Setup   Grooming Assistance 5  Supervision/Setup   UB Bathing Assistance 4  Minimal Assistance   LB [address] 2  Maximal Assistance    [address] 4  Minimal Assistance    [facility] 2  Maximal [address]  2  Maximal Assistance   Bed Mobility   Supine to Sit Unable to assess   Sit to Supine Unable to assess   Additional Comments Pt seated OOB in chair upon OT arrival  Pt seated OOB in chair w/ chair alarm activated s/p OT Session  Transfers   Sit to Stand 4  Minimal assistance   Additional items Assist x 1; Increased time required;Verbal cues;Armrests; Impulsive   Stand to Sit 4  Minimal assistance   Additional items Assist x 1; Increased time required;Verbal cues; Impulsive;Armrests   Toilet transfer 3  Moderate assistance   Additional items Assist x 1; Increased time required;Standard toilet  (grab bar use)   Additional Comments Transfers w/ RW  VC and TC required for safety and hand placement  Pt highly impulsive during transfers and w/ poor carryover of safety awareness t/o session  Functional Mobility   Functional Mobility 3  Moderate assistance   Additional Comments Pt demonstrated short distance household mobility chair<>toilet w/ RW at Mod A level  Pt impulsive during mobility and required VC/TC for slow of pace, safe RW management, and O2 tubing management  Additional items Rolling walker   Balance   Static Sitting Fair   Dynamic Sitting Fair -   Static Standing Poor +   Dynamic Standing Poor   Ambulatory Poor   Activity Tolerance   Activity Tolerance Patient limited by fatigue;Patient limited by pain;Treatment limited secondary to medical complications (Comment)  (cog deficits)   Medical Staff Made Aware PT [place]   Nurse Made Aware RN celared Pt for OT Session   RUE Assessment   RUE Assessment   (generalized weakness)   LUE Assessment   LUE Assessment   (generalized weakness)   Hand Function   Gross Motor Coordination Functional   Fine Motor Coordination Impaired   Cognition   Overall Cognitive Status Impaired   Arousal/Participation Alert; Cooperative   Attention Attends with cues to redirect   Orientation Level Oriented to person;Oriented to place;Oriented to time;Disoriented to situation   Memory Decreased recall of precautions;Decreased recall of recent events;Decreased short term memory   Following Commands Follows one step commands with increased time or repetition   Comments Pt is pleasant and cooperative to participate in therapy this day  Pt highly impulsive t/o session w/ poor insight into deficits and decreased safety awareness  Pt required frequent repeat of commands for increased follow through of functional tasks  Pt is also easily distractable and required VC for redirection to task  Assessment   Limitation Decreased ADL status; Decreased UE strength;Decreased Safe judgement during ADL;Decreased cognition;Decreased endurance;Decreased fine motor control;Decreased high-level ADLs   Prognosis Fair   Assessment Pt is a 79 yo female seen for OT eval s/p adm to [facility] on 2/5/21 w/ progressive dyspnea, LE edema dx'd w/ pleural effusion  Pt  has a past medical history of Anxiety, Cataract, bilateral, COPD (chronic obstructive pulmonary disease) ([facility] [address] ), Coronary artery disease, arterial ischemic stroke, Hypertension, Stroke ([facility] [address] ), and Wears glasses  Pt with active OT orders and up and OOB as tolerated orders  Pt is retired and resides w/ son in [facility] w/ 1STE  Pt's son work during the day and Pt is home alone  Pt reports son A w/ all ADLS and IADLS, does not drive, & required use of DME PTA, including rollator for mobility  Pt is currently demonstrating the following occupational deficits: Min A UB bathing/dressing, Max A LB bathing/dressing, Max A toileting, Min A STS, Mod A functional mobility w/ RW  These deficits that are impacting pt's baseline areas of occupation are a result of the following impairments: pain, endurance, activity tolerance, functional mobility, forward functional reach, balance, functional standing tolerance, unsupportive home environment, decreased I w/ ADLS/IADLS, strength, cognitive impairments, decreased safety awareness, decreased insight into deficits, impaired fine motor skills and coordination deficits  The following Occupational Performance Areas to address include: grooming, bathing/shower, toilet hygiene, dressing, health maintenance, functional mobility and clothing management  Based on the aforementioned OT evaluation, functional performance deficits, and assessments, pt has been identified as a high complexity evaluation  Recommend STR upon D/C  The patient's raw score on the AM-PAC Daily Activity inpatient short form is 15, standardized score is 34 69, less than 39 4   Patients at this level are likely to benefit from DC to post-acute rehabilitation services  Please refer to the recommendation of the Occupational Therapist for safe DC planning  Pt to continue to benefit from acute immediate OT services to address the following goals 3-5x/week to  w/in 7-10 days:    Goals   Patient Goals To go to the bathroom   LTG Time Frame 7-10   Long Term Goal #1 refer to goals below   Plan   Treatment Interventions ADL retraining;Functional transfer training;UE strengthening/ROM; Cognitive reorientation;Patient/family training; Endurance training;Equipment evaluation/education; Fine motor coordination activities; Compensatory technique education; Activityengagement; Energy conservation   Goal Expiration Date 21   OT Frequency 3-5x/wk   Recommendation   OT Discharge Recommendation Post-Acute Rehabilitation Services   OT - OK to Discharge Yes  (when medically cleared to rehab)   AM-PAC Daily Activity Inpatient   Lower Body Dressing 2   Bathing 2   Toileting 2   Upper Body Dressing 3   Grooming 3   Eating 3   Daily Activity Raw Score 15   Daily Activity Standardized Score (Calc for Raw Score >=11) 34 69   AM-PAC Applied Cognition Inpatient   Following a Speech/Presentation 2   Understanding Ordinary Conversation 3   Taking Medications 2   Remembering Where Things Are Placed or Put Away 2   Remembering List of 4-5 Errands 2   Taking Care of Complicated Tasks 2   Applied Cognition Raw Score 13   Applied Cognition Standardized Score 30 46   Modified Shaun Scale   Modified [place] Scale 4       GOALS    1) Pt will improve activity tolerance to G for min 30 min txment sessions for increase engagement in functional tasks    2) Pt will complete UB/LB dressing/self care w/ S using adaptive device and DME as needed    3) Pt will complete bathing w/ S w/ use of AE and DME as needed    4) Pt will complete toileting w/ S w/ G hygiene/thoroughness using DME as needed    5) Pt will improve functional transfers to S on/off all surfaces using DME as needed w/ G balance/safety     6) Pt will improve functional mobility during ADL/IADL/leisure tasks to S using DME as needed w/ G balance/safety     7) Pt will participate in simulated IADL management task to increase independence to Min A w/ G safety and endurance    8) Pt will be attentive 100% of the time during ongoing cognitive assessment w/ G participation to assist w/ safe d/c planning/recommendations    9) Pt will demonstrate G carryover of pt/caregiver education and training as appropriate w/o cues w/ good tolerance to increase safety during functional tasks    10) Pt will demonstrate 100% carryover of energy conservation techniques t/o functional I/ADL/leisure tasks w/o cues s/p skilled education to increase endurance during functional tasks           Maggie Melgoza, MS, OTR/L

## 2021-02-08 NOTE — UTILIZATION REVIEW
Initial Clinical Review    Admission: Date/Time/Statement:   Admission Orders (From admission, onward)     Ordered        02/05/21 1712  INPATIENT ADMISSION  Once                   Orders Placed This Encounter   Procedures    INPATIENT ADMISSION     Standing Status:   Standing     Number of Occurrences:   1     Order Specific Question:   Level of Care     Answer:   Level 2 Stepdown / HOT [14]     Order Specific Question:   Estimated length of stay     Answer:   More than 2 Midnights     Order Specific Question:   Certification     Answer:   I certify that inpatient services are medically necessary for this patient for a duration of greater than two midnights  See H&P and MD Progress Notes for additional information about the patient's course of treatment  ED Arrival Information     Expected Arrival Acuity Means of Arrival Escorted By Service Admission Type    2/5/2021 2/5/2021 14:22 [facility]    Arrival Complaint    fall, confusion        Chief Complaint   Patient presents with    Shortness of Breath     SOB with COPD history, uses 2 liters 02 OTC  c/o CP earlier from panic event     Assessment/Plan:      80year old female presents to ed from home via ems for evaluation and treatment of shortness of breath  fall on aspirin  PMHX CABG, COPD 2L O2NC, CVA  Clinical assessment significant for blle edema, ra sat 72%, tachypnea, bilateral rales, accessory muscle use  ABG:  p 7 319, pco2 80 9, po2 72 1, hco3 40 7  Imaging shows central vascular congestion, pulmonary edema, and bibasilar opacities  Treated in ed with iv lasix, sq lovenox, iv methylprednisolone, supplemental oxygen 7L  Admit to inpatient medical surgical for pleural effusion  Plan includes consult pulmonology, consult cardiology, PT/OT evaluation, intake/output   Daily weight, supplemental oxygen, 2 gm sodium restriction, levalbuterol tid, iv acetazolamide,iv lasix daily,     Consult pulmonology    Impressions & Recommendations:   1  Acute on chronic hypoxic respiratory failure, chronic hypercarbic respiratory failure   · Titrate O2 to keep SpO2 88-94%  · IS, OOB-Chair     2  Acute/chronic diastolic CHF with possible RH failure  · Follow up repeat TTE  · Continue with aggressive attempts at diuresis  · May need diamox given chronic hypercarbia  · Follow I/Os     3  JAMEEL/OHVS intolerant to CPAP - refuses BiPAP/CPAP trials     4  COPD w/o AE  · Stop solumedrol  · Start xopenex/atrovent nebs TID  · Stop spiriva for now     5  Morbid obesity     6  Bilateral pleural effusions  · Monitor response to diuresis  · Evaluate with POCUS tomorrow for possible thoracentesis    Consult cardiology    Assessment:     1  Possible CHF Exacerbation vs  COPD Exacerbation  --> Troponin Negative x1  --> proBNP 4,900 (No prior baseline)  2  CAD with NSTEMI (2015) s/p CABG (LIMA-LAD, SVG-OM, SVG-RCA)  3  HTN / HLD  4  DM2  5  Hypothyroidism  6  Previous CVA (2015) in setting of Carotid Stenosis >90% s/p Right CEA  7  Chronic RBBB     Plan:  - c/w Lasix 40mg IV Daily  - c/w Amlodipine 5mg PO Daily  - c/w Aspirin 81mg PO Daily  - c/w Plavix 75mg PO Daily  - c/w Metoprolol Tart  25mg PO BID  - c/w Steroid and Nebulizers for COPD  - Monitor I's//O's Q6H  - Monitor Daily Weight  - f/u TTE    2-6  Continue iv diuresis  Continue present cardiac medications  Pleural effusions are too small for drainage                ED Triage Vitals   02/05/21 1427 02/05/21 1427 02/05/21 1427 02/05/21 1438 02/05/21 1427   98 7 °F (37 1 °C) 67 (!) 30 137/74 (!) 72 %      Oral Monitor         No Pain          02/08/21 97 1 kg (214 lb 1 1 oz)     Additional Vital Signs:      Date/Time  Temp  Pulse  Resp  BP  MAP (mmHg)  SpO2    Nasal Cannula O2 Flow Rate (L/min)  O2 Device    02/08/21 0729  --  --  --  --  --  94 %    --  --    02/08/21 06:07:25  98 6 °F (37 °C)  72  20  126/55  79  92 %    --  Nasal cannula    02/08/21 0400  --  --  --  --  -- 94 %    6 L/min  Nasal cannula    02/08/21 02:59:06  98 6 °F (37 °C)  73  20  103/69  80  93 %    --  Nasal cannula    02/08/21 0000  --  --  --  --  --  96 %    6 L/min  Nasal cannula    02/07/21 23:11:06  98 7 °F (37 1 °C)  72  20  111/51  71  92 %    5 L/min  Nasal cannula    02/07/21 2057  --  76  --  136/57  --  --    --  --    02/07/21 2045  --  --  --  --  --  92 %    5 L/min  Nasal cannula    02/07/21 2000  --  --  --  --  --  94 %    --  --    02/07/21 19:41:26  99 1 °F (37 3 °C)  73  16  113/48  Abnormal   70  95 %    --  --    02/07/21 15:58:47  99 3 °F (37 4 °C)  72  17  134/52  79  92 %    --  --    02/07/21 1320  --  --  --  --  --  96 %    --  --    02/07/21 1055  98 4 °F (36 9 °C)  67  19  136/68  --  94 %    --  Nasal cannula    02/07/21 0850  --  --  --  --  --  93 %    5 L/min  Nasal cannula    02/07/21 07:09:33  98 6 °F (37 °C)  79  20  146/53  84  93 %    --  Nasal cannula    02/07/21 0400  --  --  --  --  --  91 %    5 L/min  Nasal cannula    02/07/21 02:47:37  99 °F (37 2 °C)  74  21  115/48  Abnormal   70  96 %    --  Nasal cannula    02/07/21 0000  --  --  --  --  --  94 %    5 L/min  Nasal cannula    02/06/21 23:15:33  99 °F (37 2 °C)  74  20  114/57  76  93 %    6 L/min  Nasal cannula    02/06/21 2115  --  78  --  --  --  --    --  --    02/06/21 21:11:43  --  --  --  124/54  77  --    --  --    02/06/21 2000  --  --  --  --  --  93 %    6 L/min  Nasal cannula    02/06/21 1922  --  --  --  --  --  92 %    --  --    02/06/21 1827  98 6 °F (37 °C)  --  16  167/76  106  93 %    --  --    02/06/21 1658  --  --  --  --  --  93 %    6 L/min  Nasal cannula    02/06/21 16:03:29  98 6 °F (37 °C)  --  16  143/62  89  95 %    --  --    02/06/21 1549  --  --  --  --  --  98 %    6 L/min  Mid flow nasal cannula    02/06/21 1401  --  --  --  --  --  --    8 L/min  Mid flow nasal cannula    02/06/21 1100  --  72  25Abnormal   158/66  95  92 %    --  --    02/06/21 0952  --  --  --  --  --  98 %    8 L/min  Mid flow nasal cannula    02/06/21 0928  --  80  --  161/104  Abnormal   --  --    --  --    02/06/21 0437  --  68  15  139/63  --  91 %    7 L/min  Mid flow nasal cannula    02/06/21 0256  --  66  27Abnormal   142/64  92  95 %    7 L/min  Mid flow nasal cannula            Pertinent Labs/Diagnostic Test Results:     Collection Time Result Time Vent R Atrial R NV Int  QRSD Int  QT Int  QTC Int  P Axis QRS Axis T Wave Ax    02/05/21 14:53:39 02/05/21 15:55:35 74 74 204 150 450 499 111 113 39       Final result                Narrative:    Suspect arm lead reversal, interpretation assumes no reversal   Sinus rhythm with marked sinus arrhythmia with frequent Premature ventricular complexes   Right bundle branch block   Abnormal ECG   When compared with ECG of 29-SEP-2017 23:36,   Premature ventricular complexes are now Present   Criteria for Septal infarct are no longer Present                   CT head without contrast    (02/05 1540)   No acute intracranial abnormality  CT spine cervical without contrast    (02/05 1600)   Exam limited by motion artifact  No cervical spine fracture or traumatic malalignment  XR chest portable    (02/05 1538)      Central vascular congestion with suspected asymmetric pulmonary edema and bibasilar opacities as above          Results from last 7 days   Lab Units 02/05/21  1517   SARS-COV-2  Negative     Results from last 7 days   Lab Units 02/08/21 0514 02/07/21 0521 02/06/21 0433 02/05/21  1444   WBC Thousand/uL 9 08 8 21 7 57 9 01   HEMOGLOBIN g/dL 13 1 13 0 13 5 13 5   HEMATOCRIT % 47 6* 46 2* 49 3* 48 5*   PLATELETS Thousands/uL 298 282 268 285   NEUTROS ABS Thousands/µL 6 83 6 11 6 80 6 81         Results from last 7 days   Lab Units 02/08/21  0514 02/07/21  0521 02/06/21  0433 02/05/21  1444   SODIUM mmol/L 143 145 145 144   POTASSIUM mmol/L 3 8 3 6 3 9 4 2   CHLORIDE mmol/L 98* 96* 99* 103   CO2 mmol/L >45* >45* >45* 44*   ANION GAP mmol/L  --   --   -- -3*   BUN mg/dL 34* 38* 33* 35*   CREATININE mg/dL 1 06 1 22 1 22 1 19   EGFR ml/min/1 73sq m 48 40 40 42   CALCIUM mg/dL 9 0 8 6 8 8 9 2     Results from last 7 days   Lab Units 02/08/21  0514 02/06/21  0433 02/05/21  1444   AST U/L 9 11 18   ALT U/L 12 14 16   ALK PHOS U/L 90 105 104   TOTAL PROTEIN g/dL 6 1* 6 4 6 5   ALBUMIN g/dL 2 6* 2 9* 2 8*   TOTAL BILIRUBIN mg/dL 0 88 0 80 0 74         Results from last 7 days   Lab Units 02/08/21  0514 02/07/21  0521 02/06/21  0433 02/05/21  1444   GLUCOSE RANDOM mg/dL 115 95 139 107       Results from last 7 days   Lab Units 02/06/21  0626   PH ART  7 319*   PCO2 ART mm Hg 80 9*   PO2 ART mm Hg 72 1*   HCO3 ART mmol/L 40 7*   BASE EXC ART mmol/L 11 0   O2 CONTENT ART mL/dL 17 6   O2 HGB, ARTERIAL % 91 2*         Results from last 7 days   Lab Units 02/05/21  1444   TROPONIN I ng/mL 0 03         Results from last 7 days   Lab Units 02/06/21  0433   PROTIME seconds 13 9   INR  1 07     Results from last 7 days   Lab Units 02/05/21  1444   TSH 3RD GENERATON uIU/mL 1 060     Results from last 7 days   Lab Units 02/06/21  0635 02/06/21  0433   PROCALCITONIN ng/ml <0 05 <0 05       Results from last 7 days   Lab Units 02/05/21  1444   NT-PRO BNP pg/mL 4,918*       Results from last 7 days   Lab Units 02/05/21  1517   INFLUENZA A PCR  Negative   INFLUENZA B PCR  Negative   RSV PCR  Negative       ED Treatment:   Medication Administration from 02/05/2021 1354 to 02/06/2021 1538       Date/Time Order Dose Route Action     02/05/2021 1507 furosemide (LASIX) injection 40 mg 40 mg Intravenous Given     02/06/2021 0927 ALPRAZolam (XANAX) tablet 0 5 mg 0 5 mg Oral Given     02/06/2021 1324 amLODIPine (NORVASC) tablet 5 mg 5 mg Oral Given     02/06/2021 0932 clopidogrel (PLAVIX) tablet 75 mg 75 mg Oral Given     02/06/2021 0928 famotidine (PEPCID) tablet 20 mg 20 mg Oral Given     02/05/2021 2105 famotidine (PEPCID) tablet 20 mg 20 mg Oral Given     02/06/2021 0636 levothyroxine tablet 75 mcg 75 mcg Oral Given     02/06/2021 0928 metoprolol tartrate (LOPRESSOR) tablet 25 mg 25 mg Oral Given     02/05/2021 2105 metoprolol tartrate (LOPRESSOR) tablet 25 mg 25 mg Oral Given     02/06/2021 0930 tiotropium (SPIRIVA) capsule for inhaler 18 mcg 18 mcg Inhalation Given     02/06/2021 0930 ascorbic acid (VITAMIN C) tablet 500 mg 500 mg Oral Given     02/06/2021 1324 aspirin (ECOTRIN LOW STRENGTH) EC tablet 81 mg 81 mg Oral Given     02/06/2021 0927 cholecalciferol (VITAMIN D3) tablet 2,000 Units 2,000 Units Oral Given     02/06/2021 0928 acetaminophen (TYLENOL) tablet 650 mg 650 mg Oral Given     02/06/2021 0929 docusate sodium (COLACE) capsule 100 mg 100 mg Oral Given     02/05/2021 2105 docusate sodium (COLACE) capsule 100 mg 100 mg Oral Given     02/06/2021 0949 levalbuterol (XOPENEX) inhalation solution 1 25 mg 1 25 mg Nebulization Given     02/05/2021 2108 levalbuterol (XOPENEX) inhalation solution 1 25 mg 1 25 mg Nebulization Given     02/05/2021 2108 budesonide (PULMICORT) inhalation solution 0 5 mg 0 5 mg Nebulization Given     02/06/2021 1325 methylPREDNISolone sodium succinate (Solu-MEDROL) injection 40 mg 40 mg Intravenous Given     02/06/2021 0653 methylPREDNISolone sodium succinate (Solu-MEDROL) injection 40 mg 40 mg Intravenous Given     02/05/2021 2106 methylPREDNISolone sodium succinate (Solu-MEDROL) injection 40 mg 40 mg Intravenous Given     02/06/2021 0927 enoxaparin (LOVENOX) subcutaneous injection 40 mg 40 mg Subcutaneous Given     02/05/2021 2123 furosemide (LASIX) injection 40 mg 40 mg Intravenous Given     02/06/2021 0949 sodium chloride 0 9 % inhalation solution 3 mL 3 mL Nebulization Given     02/06/2021 1325 furosemide (LASIX) injection 40 mg 40 mg Intravenous Given        Past Medical History:   Diagnosis    Anxiety    Cataract, bilateral    Last Assessed:3/19/2014    COPD [facility]chronic obstructive pulmonary disease) ([facility] [address] )    Coronary artery disease    Hx of arterial ischemic stroke    Hypertension    Stroke [facility])    Wears glasses     Present on Admission:   Pleural effusion   Type 2 diabetes mellitus (HCC)   Hypothyroidism   Hypertension   Hyperlipidemia   Chronic diastolic congestive heart failure (HCC)   [facility]) ([facility])   Respiratory failure with hypoxia ([facility])      Admitting Diagnosis:     Acquired hypothyroidism [E03 9]  Vitamin B 12 deficiency [E53 8]  Dyspnea [R06 00]  SOB (shortness of breath) [R06 02]  Hypoxia [R09 02]  CHF exacerbation ([facility]) [I50 9]  Chronic obstructive pulmonary disease, unspecified COPD type ([facility] [address] ) [J44 9]  Acute on chronic congestive heart failure, unspecified heart failure type ([facility] [address] ) [I50 9]    Age/Sex: 80 y o  female     Admission Orders:    acetaZOLAMIDE, 250 mg, Intravenous, Q12H [address]  amLODIPine, 5 mg, Oral, Daily  ascorbic acid, 500 mg, Oral, Daily  aspirin, 81 mg, Oral, Daily  bimatoprost, 1 drop, Both Eyes, HS  cholecalciferol, 2,000 Units, Oral, Daily  clopidogrel, 75 mg, Oral, Daily  docusate sodium, 100 mg, Oral, BID  enoxaparin, 40 mg, Subcutaneous, Daily  famotidine, 20 mg, Oral, BID  furosemide, 40 mg, Intravenous, Daily  ipratropium, 0 5 mg, Nebulization, TID  levalbuterol, 1 25 mg, Nebulization, TID  levothyroxine, 75 mcg, Oral, Early Morning  metoprolol tartrate, 25 mg, Oral, Q12H [place]  senna, 1 tablet, Oral, Daily      Continuous IV Infusions:     PRN Meds:  acetaminophen, 650 mg, Oral, Q6H PRN  albuterol, 2 5 mg, Nebulization, Q4H PRN  ALPRAZolam, 0 5 mg, Oral, TID PRN  aluminum-magnesium hydroxide-simethicone, 30 mL, Oral, Q6H PRN  calcium carbonate, 1,000 mg, Oral, Daily PRN  oxyCODONE-acetaminophen, 1 tablet, Oral, Q4H PRN        IP CONSULT TO PULMONOLOGY  IP CONSULT TO CARDIOLOGY  IP CONSULT TO NUTRITION SERVICES    Network Utilization Review Department  ATTENTION: Please call with any questions or concerns to [phone] and carefully listen to the prompts so that you are directed to the right person  All voicemails are confidential   Juani Pelaez all requests for admission clinical reviews, approved or denied determinations and any other requests to dedicated fax number below belonging to the campus where the patient is receiving treatment   List of dedicated fax numbers for the Facilities:  [address] DENIALS (Administrative/Medical Necessity) [phone]   [address] (Maternity/NICU/Pediatrics) [phone] [address] Lauryn Barragan 6428 (  [facility]) [address] [place] [address] [address]lee [address]ones [address] [place] [address] [phone]

## 2021-02-08 NOTE — PLAN OF CARE
Problem: Prexisting or High Potential for Compromised Skin Integrity  Goal: Skin integrity is maintained or improved  Description: INTERVENTIONS:  - Identify patients at risk for skin breakdown  - Assess and monitor skin integrity  - Assess and monitor nutrition and hydration status  - Monitor labs   - Assess for incontinence   - Turn and reposition patient  - Assist with mobility/ambulation  - Relieve pressure over bony prominences  - Avoid friction and shearing  - Provide appropriate hygiene as needed including keeping skin clean and dry  - Evaluate need for skin moisturizer/barrier cream  - Collaborate with interdisciplinary team   - Patient/family teaching  - Consider wound care consult   Outcome: Progressing     Problem: RESPIRATORY - ADULT  Goal: Achieves optimal ventilation and oxygenation  Description: INTERVENTIONS:  - Assess for changes in respiratory status  - Assess for changes in mentation and behavior  - Position to facilitate oxygenation and minimize respiratory effort  - Oxygen administered by appropriate delivery if ordered  - Initiate smoking cessation education as indicated  - Encourage broncho-pulmonary hygiene including cough, deep breathe, Incentive Spirometry  - Assess the need for suctioning and aspirate as needed  - Assess and instruct to report SOB or any respiratory difficulty  - Respiratory Therapy support as indicated  Outcome: Progressing     Problem: SAFETY ADULT  Goal: Maintain or return to baseline ADL function  Description: INTERVENTIONS:  -  Assess patient's ability to carry out ADLs; assess patient's baseline for ADL function and identify physical deficits which impact ability to perform ADLs (bathing, care of mouth/teeth, toileting, grooming, dressing, etc )  - Assess/evaluate cause of self-care deficits   - Assess range of motion  - Assess patient's mobility; develop plan if impaired  - Assess patient's need for assistive devices and provide as appropriate  - Encourage maximum independence but intervene and supervise when necessary  - Involve family in performance of ADLs  - Assess for home care needs following discharge   - Consider OT consult to assist with ADL evaluation and planning for discharge  - Provide patient education as appropriate  Outcome: Progressing

## 2021-02-08 NOTE — PROGRESS NOTES
Cardiology Progress Note - Zachery Gonzales 80 y o  female MRN: [id number]    Unit/Bed#: [facility] 522-01 Encounter: [id number]      Assessment:  Principal Problem:    Pleural effusion  Active Problems:    COPD (chronic obstructive pulmonary disease) ([facility])    Hypertension    Hypothyroidism    Chronic diastolic congestive heart failure (HCC)    Hyperlipidemia    S/P CABG (coronary artery bypass graft)    Type 2 diabetes mellitus ([address] )    Respiratory failure with hypoxia ([facility] [address] )      Plan:  Patient is comfortable this morning  She has no chest pain or significant dyspnea  She continues on intravenous furosemide  Echocardiogram done Saturday February 6th demonstrated preserved LV systolic function with mild LVH  Grade 2 diastolic dysfunction is suggested  There is no significant functional valvular heart disease noted  Moderate MAC is noted  BMP today with potassium of 3 8 and creatinine of 1 06   CO2 is greater than 45  Patient continues on Diamox IV  Hemoglobin is 13 1  Will continue present cardiac medications  Subjective:   Patient seen and examined  No significant events overnight   negative  Objective:     Vitals: Blood pressure 126/55, pulse 72, temperature 98 6 °F (37 °C), resp  rate 20, weight 97 1 kg (214 lb 1 1 oz), SpO2 94 %, not currently breastfeeding , Body mass index is 36 74 kg/m² ,   Orthostatic Blood Pressures      Most Recent Value   Blood Pressure  126/55 filed at 02/08/2021 0607   Patient Position - Orthostatic VS  Lying filed at 02/08/2021 0607      ,      Intake/Output Summary (Last 24 hours) at 2/8/2021 0740  Last data filed at 2/8/2021 0645  Gross per 24 hour   Intake 1030 ml   Output 1350 ml   Net -320 ml       No significant arrhythmias seen on telemetry review    Sinus rhythm with PVCs      Physical Exam:    GEN: Indu Flynn appears well, alert and oriented x 3, pleasant and cooperative   NECK: supple, no carotid bruits, no JVD or HJR  HEART: normal rate, regular rhythm, normal S1 and S2, no murmurs, clicks, gallops or rubs   LUNGS: clear to auscultation bilaterally; reduced breath sounds  ABDOMEN: normal bowel sounds, soft, no tenderness, no distention  EXTREMITIES: peripheral pulses normal; no clubbing, cyanosis, or edema  SKIN: warm and well perfused, no suspicious lesions on exposed skin    Labs & Results:    Admission on 02/05/2021   Component Date Value    WBC 02/05/2021 9 01     RBC 02/05/2021 4 86     Hemoglobin 02/05/2021 13 5     Hematocrit 02/05/2021 48 5*    MCV 02/05/2021 100*    MCH 02/05/2021 27 8     MCHC 02/05/2021 27 8*    RDW 02/05/2021 14 2     MPV 02/05/2021 10 4     Platelets 85/83/5573 285     nRBC 02/05/2021 0     Neutrophils Relative 02/05/2021 75     Immat GRANS % 02/05/2021 1     Lymphocytes Relative 02/05/2021 13*    Monocytes Relative 02/05/2021 9     Eosinophils Relative 02/05/2021 1     Basophils Relative 02/05/2021 1     Neutrophils Absolute 02/05/2021 6 81     Immature Grans Absolute 02/05/2021 0 06     Lymphocytes Absolute 02/05/2021 1 20     Monocytes Absolute 02/05/2021 0 81     Eosinophils Absolute 02/05/2021 0 08     Basophils Absolute 02/05/2021 0 05     Sodium 02/05/2021 144     Potassium 02/05/2021 4 2     Chloride 02/05/2021 103     CO2 02/05/2021 44*    ANION GAP 02/05/2021 -3*    BUN 02/05/2021 35*    Creatinine 02/05/2021 1 19     Glucose 02/05/2021 107     Calcium 02/05/2021 9 2     Corrected Calcium 02/05/2021 10 2*    AST 02/05/2021 18     ALT 02/05/2021 16     Alkaline Phosphatase 02/05/2021 104     Total Protein 02/05/2021 6 5     Albumin 02/05/2021 2 8*    Total Bilirubin 02/05/2021 0 74     eGFR 02/05/2021 42     Troponin I 02/05/2021 0 03     SARS-CoV-2 02/05/2021 Negative     INFLUENZA A PCR 02/05/2021 Negative     INFLUENZA B PCR 02/05/2021 Negative     RSV PCR 02/05/2021 Negative     NT-proBNP 02/05/2021 4,918*    Ventricular Rate 02/05/2021 74     Atrial Rate 02/05/2021 74     LA Interval 02/05/2021 204     QRSD Interval 02/05/2021 150     QT Interval 02/05/2021 450     QTC Interval 02/05/2021 499     P Axis 02/05/2021 111     QRS Axis 02/05/2021 113     T Wave Axis 02/05/2021 39     Vitamin B-12 02/05/2021 526     TSH 3RD GENERATON 02/05/2021 1 060     Procalcitonin 02/06/2021 <0 05     Protime 02/06/2021 13 9     INR 02/06/2021 1 07     WBC 02/06/2021 7 57     RBC 02/06/2021 4 97     Hemoglobin 02/06/2021 13 5     Hematocrit 02/06/2021 49 3*    MCV 02/06/2021 99*    MCH 02/06/2021 27 2     MCHC 02/06/2021 27 4*    RDW 02/06/2021 14 2     MPV 02/06/2021 10 2     Platelets 87/76/6651 268     nRBC 02/06/2021 0     Neutrophils Relative 02/06/2021 90*    Immat GRANS % 02/06/2021 0     Lymphocytes Relative 02/06/2021 8*    Monocytes Relative 02/06/2021 2*    Eosinophils Relative 02/06/2021 0     Basophils Relative 02/06/2021 0     Neutrophils Absolute 02/06/2021 6 80     Immature Grans Absolute 02/06/2021 0 03     Lymphocytes Absolute 02/06/2021 0 61     Monocytes Absolute 02/06/2021 0 11*    Eosinophils Absolute 02/06/2021 0 00     Basophils Absolute 02/06/2021 0 02     Sodium 02/06/2021 145     Potassium 02/06/2021 3 9     Chloride 02/06/2021 99*    CO2 02/06/2021 >45*    ANION GAP 02/06/2021      BUN 02/06/2021 33*    Creatinine 02/06/2021 1 22     Glucose 02/06/2021 139     Calcium 02/06/2021 8 8     Corrected Calcium 02/06/2021 9 7     AST 02/06/2021 11     ALT 02/06/2021 14     Alkaline Phosphatase 02/06/2021 105     Total Protein 02/06/2021 6 4     Albumin 02/06/2021 2 9*    Total Bilirubin 02/06/2021 0 80     eGFR 02/06/2021 40     pH, Arterial 02/06/2021 7 319*    pCO2, Arterial 02/06/2021 80 9*    pO2, Arterial 02/06/2021 72 1*    HCO3, Arterial 02/06/2021 40 7*    Base Excess, Arterial 02/06/2021 11 0     O2 Content, Arterial 02/06/2021 17 6     O2 HGB,Arterial  02/06/2021 91 2*    STACIA TEST 02/06/2021 Yes     Other FIO2 02/06/2021 midflow 8lpm     Procalcitonin 02/06/2021 <0 05     Sodium 02/07/2021 145     Potassium 02/07/2021 3 6     Chloride 02/07/2021 96*    CO2 02/07/2021 >45*    ANION GAP 02/07/2021      BUN 02/07/2021 38*    Creatinine 02/07/2021 1 22     Glucose 02/07/2021 95     Calcium 02/07/2021 8 6     eGFR 02/07/2021 40     WBC 02/07/2021 8 21     RBC 02/07/2021 4 68     Hemoglobin 02/07/2021 13 0     Hematocrit 02/07/2021 46 2*    MCV 02/07/2021 99*    MCH 02/07/2021 27 8     MCHC 02/07/2021 28 1*    RDW 02/07/2021 14 4     MPV 02/07/2021 10 2     Platelets 32/67/3772 282     nRBC 02/07/2021 0     Neutrophils Relative 02/07/2021 75     Immat GRANS % 02/07/2021 0     Lymphocytes Relative 02/07/2021 14     Monocytes Relative 02/07/2021 11     Eosinophils Relative 02/07/2021 0     Basophils Relative 02/07/2021 0     Neutrophils Absolute 02/07/2021 6 11     Immature Grans Absolute 02/07/2021 0 03     Lymphocytes Absolute 02/07/2021 1 12     Monocytes Absolute 02/07/2021 0 92     Eosinophils Absolute 02/07/2021 0 01     Basophils Absolute 02/07/2021 0 02     Sodium 02/08/2021 143     Potassium 02/08/2021 3 8     Chloride 02/08/2021 98*    CO2 02/08/2021 >45*    ANION GAP 02/08/2021      BUN 02/08/2021 34*    Creatinine 02/08/2021 1 06     Glucose 02/08/2021 115     Calcium 02/08/2021 9 0     Corrected Calcium 02/08/2021 10 1     AST 02/08/2021 9     ALT 02/08/2021 12     Alkaline Phosphatase 02/08/2021 90     Total Protein 02/08/2021 6 1*    Albumin 02/08/2021 2 6*    Total Bilirubin 02/08/2021 0 88     eGFR 02/08/2021 48     WBC 02/08/2021 9 08     RBC 02/08/2021 4 80     Hemoglobin 02/08/2021 13 1     Hematocrit 02/08/2021 47 6*    MCV 02/08/2021 99*    MCH 02/08/2021 27 3     MCHC 02/08/2021 27 5*    RDW 02/08/2021 14 4     MPV 02/08/2021 10 4     Platelets 37/94/6514 298     nRBC 02/08/2021 0     Neutrophils Relative 02/08/2021 75     Immat GRANS % 02/08/2021 0     Lymphocytes Relative 02/08/2021 12*    Monocytes Relative 02/08/2021 10     Eosinophils Relative 02/08/2021 2     Basophils Relative 02/08/2021 1     Neutrophils Absolute 02/08/2021 6 83     Immature Grans Absolute 02/08/2021 0 04     Lymphocytes Absolute 02/08/2021 1 11     Monocytes Absolute 02/08/2021 0 91     Eosinophils Absolute 02/08/2021 0 14     Basophils Absolute 02/08/2021 0 05        Xr Chest Portable    Result Date: 2/5/2021  Narrative: CHEST INDICATION:  Shortness of breath, history includes COPD as well as CHF  COMPARISON:  5/3/2019 EXAM PERFORMED/VIEWS:  XR CHEST PORTABLE FINDINGS: Heart shadow is partially obscured by adjacent opacity  Status post median sternotomy  Central vascular congestive changes with right perihilar groundglass opacity may represent asymmetric pulmonary edema  Bibasilar opacities likely represent combination of atelectasis and pleural effusions  Mild left apical pleural thickening  No pneumothorax  Surgical clips right neck  Degenerative changes of the shoulders and spine with lower thoracic dextroscoliosis  Impression: Central vascular congestion with suspected asymmetric pulmonary edema and bibasilar opacities as above  Workstation performed: [id number]     Ct Head Without Contrast    Result Date: 2/5/2021  Narrative: CT BRAIN - WITHOUT CONTRAST INDICATION:   Head trauma, minor (Age => 65y) FALL ON ASA  COMPARISON:  May 1, 2019 TECHNIQUE:  CT examination of the brain was performed  In addition to axial images, sagittal and coronal 2D reformatted images were created and submitted for interpretation  Radiation dose length product (DLP) for this visit:  907 64 mGy-cm   This examination, like all CT scans performed in the [facility], was performed utilizing techniques to minimize radiation dose exposure, including the use of iterative  reconstruction and automated exposure control  IMAGE QUALITY:  Diagnostic   FINDINGS: PARENCHYMA: Decreased attenuation is noted in periventricular and subcortical white matter demonstrating an appearance that is statistically most likely to represent mild microangiopathic change  Old right parietal and occipital encephalomalacia No CT signs of acute infarction  No intracranial mass, mass effect or midline shift  No acute parenchymal hemorrhage  VENTRICLES AND EXTRA-AXIAL SPACES:  Normal for the patient's age  VISUALIZED ORBITS AND PARANASAL SINUSES:  Unremarkable  CALVARIUM AND EXTRACRANIAL SOFT TISSUES:  Mild soft tissue swelling left forehead  No underlying calvarial fracture  Partially calcified sebaceous cyst right posterior parietal/occipital scalp  Impression: No acute intracranial abnormality  Workstation performed: [id number]     Ct Spine Cervical Without Contrast    Result Date: 2/5/2021  Narrative: CT CERVICAL SPINE - WITHOUT CONTRAST INDICATION:   Neck trauma (Age => 65y) fall on asa  COMPARISON:  May 1, 2019 TECHNIQUE:  CT examination of the cervical spine was performed without intravenous contrast   Contiguous axial images were obtained  Sagittal and coronal reconstructions were performed  Radiation dose length product (DLP) for this visit:  852 87 mGy-cm   This examination, like all CT scans performed in the [facility], was performed utilizing techniques to minimize radiation dose exposure, including the use of iterative  reconstruction and automated exposure control  IMAGE QUALITY:  Exam limited by motion artifact  FINDINGS: ALIGNMENT:  Slight reversal of lordotic curvature  Grade 1 anterolisthesis of C3 on C4 and C4 on C5 appearing stable  Concave left cervical thoracic scoliotic curvature  VERTEBRAL BODIES:  Previous left side C5 transverse process fracture has healed  No new fractures are found  DEGENERATIVE CHANGES:  Moderate multilevel cervical degenerative changes are noted  No critical central canal stenosis   PREVERTEBRAL AND PARASPINAL SOFT TISSUES: Unremarkable  THORACIC INLET:  Bilateral pleural effusions  Impression: Exam limited by motion artifact  No cervical spine fracture or traumatic malalignment  The study was marked in EPIC for immediate notification because this is a "level C trauma"  Workstation performed: [id number]      Bedside Procedure    Result Date: 2/5/2021  Narrative: 1 [phone] [id number] [phone] [id number]  [id number]      EKG personally reviewed by Aileen Blackman MD      Counseling / Coordination of Care  Total floor / unit time spent today 30 minutes  Greater than 50% of total time was spent with the patient and / or family counseling and / or coordination of care

## 2021-02-08 NOTE — ASSESSMENT & PLAN NOTE
· Continue with metoprolol 25 mg [id number]   · Started on iv lasix  40mg daily per cards, pulmonary added Diamox

## 2021-02-08 NOTE — ASSESSMENT & PLAN NOTE
· Continue with metoprolol 25 mg [id number]   · Started on iv lasix 40 mg daily per cards, pulmonary added Diamox 2/7

## 2021-02-08 NOTE — ASSESSMENT & PLAN NOTE
· COPD does not appear to be exacerbation  · - pt with no wheezing on 6 liters midflow as pt keeps pulling oxygen off   · - discussed with pulmonary  · Discontinue steroids   · Keep oxygen levels greater than 88- 94%  · Pt transitioned

## 2021-02-08 NOTE — PROGRESS NOTES
Progress Note - Pulmonary   Tatiana Lockett Sagat 80 y o  female MRN: [id number]  Unit/Bed#: [facility] 522-01 Encounter: [id number]      Assessment:  · Acute on chronic hypoxemic respiratory failure  · Appears to be slowly improving, on 3 L supplemental oxygen via nasal cannula this afternoon  · At baseline on 2 L via nasal cannula  · Acute diastolic heart failure  · -320 mL for the last 24 hours,-3 0 6 L since admission  · JAMEEL/ohs  · COPD   · Morbid obesity  · Bilateral pleural effusion    Plan:  · Continue to encourage getting out of bed, frequent incentive spirometer   · Continue diuretics as tolerated, Lasix/Diamox  · Continue to refuse BiPAP/CPAP trials  · Continue nebulizer t i d  Xopenex/ipratropium  · We will continue to follow    Subjective:   No overnight events  Found sitting in chair, reports improvement of the cough  Vitals: Blood pressure 99/53, pulse 72, temperature 99 °F (37 2 °C), resp  rate 16, height 5' 4" (1 626 m), weight 97 1 kg (214 lb 1 1 oz), SpO2 (!) 88 %, not currently breastfeeding , Body mass index is 36 74 kg/m²  Intake/Output Summary (Last 24 hours) at 2/8/2021 1745  Last data filed at 2/8/2021 1401  Gross per 24 hour   Intake 1210 ml   Output 1701 ml   Net -491 ml       Physical Exam  Gen: not in acute distress, Body mass index is 36 74 kg/m²  HEENT:supple, no accessory muscle use, no JVD appreciated  Cardiac: RRR, no murmurs appreciated  Lungs: normal respiratory effort, diminished breath sounds at the bases, clear at the apices  Abdomen: soft, nontender, normoactive bowel sounds  Extremities:  +1 pitting, below knee nontender edema  Neuro: AAO X3, no focal motor deficit    Labs: I have personally reviewed pertinent lab results          Pamela Mulligan MD

## 2021-02-08 NOTE — PLAN OF CARE
Problem: PHYSICAL THERAPY ADULT  Goal: Performs mobility at highest level of function for planned discharge setting  See evaluation for individualized goals  Description: Treatment/Interventions: Functional transfer training, LE strengthening/ROM, Therapeutic exercise, Endurance training, Patient/family training, Cognitive reorientation, Equipment eval/education, Bed mobility, Gait training, Spoke to nursing  Equipment Recommended: Amber Cifuentes       See flowsheet documentation for full assessment, interventions and recommendations  Note: Prognosis: Good  Problem List: Decreased strength, Decreased endurance, Impaired balance, Decreased mobility, Decreased cognition, Decreased safety awareness  Assessment: Pt seen for high complexity PT evaluation due to decrease in functional mobility status compared to baseline  Pt with active PT eval/treat and up and OOB as tolerated orders at this time  Pt is a 80 y o  F who presented to [facility] with progressive dyspnea on 2/5/21  Pt primary dx is pleural effusion  Pt  has a past medical history of Anxiety, Cataract, bilateral, COPD (chronic obstructive pulmonary disease) ([facility] [address] ), Coronary artery disease, arterial ischemic stroke, Hypertension, Stroke ([facility] [address] ), and Wears glasses  Pt resides with son in [facility] with 1STE  Pt presents with decreased strength, balance, endurance that contribute to limitations in bed mobility, functional transfers, functional mobility  Pt requires Min A for STS and Mod A for ambulation with RW at this time  Pt left upright in bedside chair with chair alarm intact and with all needs in reach  Pt will benefit from skilled therapy in order to address current impairments and functional limitations  PT to follow pt and recommending rehab once medically cleared    The patient's AM-PAC Basic Mobility Inpatient Short Form Raw Score is 16, Standardized Score is 38 32  A standardized score less than 42 9 suggests the patient may benefit from discharge to post-acute rehabilitation services  Please also refer to the recommendation of the Physical Therapist for safe discharge planning  Barriers to Discharge: Inaccessible home environment, Decreased caregiver support     PT Discharge Recommendation: [id number]     PT - OK to Discharge: Yes(to rehab once medically cleared)    See flowsheet documentation for full assessment

## 2021-02-08 NOTE — ASSESSMENT & PLAN NOTE
Wt Readings from Last 3 Encounters:   02/08/21 97 1 kg (214 lb 1 1 oz)   10/15/20 97 1 kg (214 lb)   07/28/20 98 4 kg (217 lb)       · Continue diuresis  · Strict I/o's  · Fluid restriction   · Daily weights  · Chest xray : Central vascular congestion with suspected asymmetric pulmonary edema and bibasilar opacities as above

## 2021-02-09 ENCOUNTER — PATIENT OUTREACH (OUTPATIENT)
Dept: FAMILY MEDICINE CLINIC | Facility: CLINIC | Age: 86
End: 2021-02-09

## 2021-02-09 ENCOUNTER — APPOINTMENT (INPATIENT)
Dept: RADIOLOGY | Facility: HOSPITAL | Age: 86
DRG: 291 | End: 2021-02-09
Attending: INTERNAL MEDICINE
Payer: COMMERCIAL

## 2021-02-09 LAB
ANION GAP SERPL CALCULATED.3IONS-SCNC: 1 MMOL/L (ref 4–13)
BASOPHILS # BLD AUTO: 0.05 THOUSANDS/ΜL (ref 0–0.1)
BASOPHILS NFR BLD AUTO: 1 % (ref 0–1)
BUN SERPL-MCNC: 29 MG/DL (ref 5–25)
CALCIUM SERPL-MCNC: 8.8 MG/DL (ref 8.3–10.1)
CHLORIDE SERPL-SCNC: 98 MMOL/L (ref 100–108)
CO2 SERPL-SCNC: 43 MMOL/L (ref 21–32)
CREAT SERPL-MCNC: 1.04 MG/DL (ref 0.6–1.3)
EOSINOPHIL # BLD AUTO: 0.18 THOUSAND/ΜL (ref 0–0.61)
EOSINOPHIL NFR BLD AUTO: 2 % (ref 0–6)
ERYTHROCYTE [DISTWIDTH] IN BLOOD BY AUTOMATED COUNT: 14.4 % (ref 11.6–15.1)
GFR SERPL CREATININE-BSD FRML MDRD: 49 ML/MIN/1.73SQ M
GLUCOSE SERPL-MCNC: 116 MG/DL (ref 65–140)
HCT VFR BLD AUTO: 47.6 % (ref 34.8–46.1)
HGB BLD-MCNC: 13.2 G/DL (ref 11.5–15.4)
IMM GRANULOCYTES # BLD AUTO: 0.03 THOUSAND/UL (ref 0–0.2)
IMM GRANULOCYTES NFR BLD AUTO: 0 % (ref 0–2)
LYMPHOCYTES # BLD AUTO: 1.08 THOUSANDS/ΜL (ref 0.6–4.47)
LYMPHOCYTES NFR BLD AUTO: 14 % (ref 14–44)
MCH RBC QN AUTO: 27.3 PG (ref 26.8–34.3)
MCHC RBC AUTO-ENTMCNC: 27.7 G/DL (ref 31.4–37.4)
MCV RBC AUTO: 98 FL (ref 82–98)
MONOCYTES # BLD AUTO: 0.93 THOUSAND/ΜL (ref 0.17–1.22)
MONOCYTES NFR BLD AUTO: 12 % (ref 4–12)
NEUTROPHILS # BLD AUTO: 5.58 THOUSANDS/ΜL (ref 1.85–7.62)
NEUTS SEG NFR BLD AUTO: 71 % (ref 43–75)
NRBC BLD AUTO-RTO: 0 /100 WBCS
PLATELET # BLD AUTO: 255 THOUSANDS/UL (ref 149–390)
PMV BLD AUTO: 10 FL (ref 8.9–12.7)
POTASSIUM SERPL-SCNC: 4 MMOL/L (ref 3.5–5.3)
RBC # BLD AUTO: 4.84 MILLION/UL (ref 3.81–5.12)
SODIUM SERPL-SCNC: 142 MMOL/L (ref 136–145)
WBC # BLD AUTO: 7.85 THOUSAND/UL (ref 4.31–10.16)

## 2021-02-09 PROCEDURE — 94760 N-INVAS EAR/PLS OXIMETRY 1: CPT | Performed by: SOCIAL WORKER

## 2021-02-09 PROCEDURE — 85025 COMPLETE CBC W/AUTO DIFF WBC: CPT | Performed by: NURSE PRACTITIONER

## 2021-02-09 PROCEDURE — 94640 AIRWAY INHALATION TREATMENT: CPT | Performed by: SOCIAL WORKER

## 2021-02-09 PROCEDURE — 80048 BASIC METABOLIC PNL TOTAL CA: CPT | Performed by: NURSE PRACTITIONER

## 2021-02-09 PROCEDURE — 71045 X-RAY EXAM CHEST 1 VIEW: CPT

## 2021-02-09 PROCEDURE — 99232 SBSQ HOSP IP/OBS MODERATE 35: CPT | Performed by: FAMILY MEDICINE

## 2021-02-09 PROCEDURE — 99232 SBSQ HOSP IP/OBS MODERATE 35: CPT | Performed by: INTERNAL MEDICINE

## 2021-02-09 RX ADMIN — ACETAZOLAMIDE SODIUM 250 MG: 500 INJECTION, POWDER, LYOPHILIZED, FOR SOLUTION INTRAVENOUS at 08:20

## 2021-02-09 RX ADMIN — ACETAZOLAMIDE SODIUM 250 MG: 500 INJECTION, POWDER, LYOPHILIZED, FOR SOLUTION INTRAVENOUS at 21:29

## 2021-02-09 RX ADMIN — DOCUSATE SODIUM 100 MG: 100 CAPSULE, LIQUID FILLED ORAL at 08:22

## 2021-02-09 RX ADMIN — METOPROLOL TARTRATE 25 MG: 25 TABLET, FILM COATED ORAL at 21:28

## 2021-02-09 RX ADMIN — DOCUSATE SODIUM 100 MG: 100 CAPSULE, LIQUID FILLED ORAL at 17:27

## 2021-02-09 RX ADMIN — AMLODIPINE BESYLATE 5 MG: 5 TABLET ORAL at 08:30

## 2021-02-09 RX ADMIN — Medication 2000 UNITS: at 08:20

## 2021-02-09 RX ADMIN — CLOPIDOGREL BISULFATE 75 MG: 75 TABLET ORAL at 08:21

## 2021-02-09 RX ADMIN — ALPRAZOLAM 0.5 MG: 0.5 TABLET ORAL at 13:52

## 2021-02-09 RX ADMIN — LEVALBUTEROL HYDROCHLORIDE 1.25 MG: 1.25 SOLUTION, CONCENTRATE RESPIRATORY (INHALATION) at 07:25

## 2021-02-09 RX ADMIN — LEVOTHYROXINE SODIUM 75 MCG: 75 TABLET ORAL at 05:31

## 2021-02-09 RX ADMIN — METOPROLOL TARTRATE 25 MG: 25 TABLET, FILM COATED ORAL at 08:21

## 2021-02-09 RX ADMIN — FAMOTIDINE 20 MG: 20 TABLET, FILM COATED ORAL at 17:27

## 2021-02-09 RX ADMIN — OXYCODONE HYDROCHLORIDE AND ACETAMINOPHEN 500 MG: 500 TABLET ORAL at 08:21

## 2021-02-09 RX ADMIN — IPRATROPIUM BROMIDE 0.5 MG: 0.5 SOLUTION RESPIRATORY (INHALATION) at 07:25

## 2021-02-09 RX ADMIN — ASPIRIN 81 MG: 81 TABLET, COATED ORAL at 08:21

## 2021-02-09 RX ADMIN — FAMOTIDINE 20 MG: 20 TABLET, FILM COATED ORAL at 08:21

## 2021-02-09 RX ADMIN — BIMATOPROST 1 DROP: 0.1 SOLUTION/ DROPS OPHTHALMIC at 21:29

## 2021-02-09 RX ADMIN — WATER 10 ML: 1 INJECTION INTRAMUSCULAR; INTRAVENOUS; SUBCUTANEOUS at 08:22

## 2021-02-09 RX ADMIN — ALPRAZOLAM 0.5 MG: 0.5 TABLET ORAL at 21:45

## 2021-02-09 RX ADMIN — ENOXAPARIN SODIUM 40 MG: 40 INJECTION SUBCUTANEOUS at 08:20

## 2021-02-09 RX ADMIN — FUROSEMIDE 40 MG: 10 INJECTION, SOLUTION INTRAMUSCULAR; INTRAVENOUS at 08:20

## 2021-02-09 NOTE — PROGRESS NOTES
Pastoral Care Progress Note    2021  Patient: Suzanne Gandara : 1935  Admission Date & Time: 2021 1423  MRN: [id number] CSN: [id number]           Giuliana Hole visited Pt                 21 1400   Clinical Encounter Type   Visited With Patient   Sacramental Encounters   Sacrament of Sick-Anointing Anointed

## 2021-02-09 NOTE — PROGRESS NOTES
Cardiology Progress Note - Pirncess Gonzales 80 y o  female MRN: [id number]    Unit/Bed#: [facility] 522-01 Encounter: [id number]      Assessment:  Principal Problem:    Pleural effusion  Active Problems:    COPD (chronic obstructive pulmonary disease) ([facility])    Hypertension    Hypothyroidism    Chronic diastolic congestive heart failure (HCC)    Hyperlipidemia    S/P CABG (coronary artery bypass graft)    Type 2 diabetes mellitus ([address] )    Respiratory failure with hypoxia ([facility] [address] )      Plan:  Patient is comfortable this morning  She has no chest pain or significant dyspnea  She continues on intravenous furosemide  Echocardiogram done Saturday February 6th demonstrated preserved LV systolic function with mild LVH  Grade 2 diastolic dysfunction is suggested  There is no significant functional valvular heart disease noted  Moderate MAC is noted  BMP today with potassium of 4 0 and creatinine of 1 04   CO2 is 43  Patient continues on Diamox IV  Hemoglobin is 13 2  Pulmonary note appreciated  Will continue present cardiac medications  Will repeat chest x-ray to assess for improvement in pulmonary congestion  Telemetry demonstrates sinus rhythm with PACs and PVCs  Subjective:   Patient seen and examined  Objective:     Vitals: Blood pressure 134/67, pulse 77, temperature 99 1 °F (37 3 °C), resp  rate 18, height 5' 4" (1 626 m), weight 94 7 kg (208 lb 11 2 oz), SpO2 93 %, not currently breastfeeding , Body mass index is 35 82 kg/m² ,   Orthostatic Blood Pressures      Most Recent Value   Blood Pressure  134/67 filed at 02/09/2021 0727   Patient Position - Orthostatic VS  Lying filed at 02/08/2021 0607      ,      Intake/Output Summary (Last 24 hours) at 2/9/2021 1024  Last data filed at 2/9/2021 0834  Gross per 24 hour   Intake 400 ml   Output 1651 ml   Net -1251 ml       No significant arrhythmias seen on telemetry review         Physical Exam:    GEN: Ashley Gonzales   NECK: supple, no carotid bruits, no JVD or HJR  HEART: normal rate, regular rhythm, normal S1 and S2, no murmurs, clicks, gallops or rubs   LUNGS: clear to auscultation bilaterally; reduced breath sounds at the bases  ABDOMEN: normal bowel sounds, soft, no tenderness, no distention  EXTREMITIES: edema  SKIN: warm and well perfused, no suspicious lesions on exposed skin    Labs & Results:    No results displayed because visit has over 200 results  Xr Chest Portable    Result Date: 2/5/2021  Narrative: CHEST INDICATION:  Shortness of breath, history includes COPD as well as CHF  COMPARISON:  5/3/2019 EXAM PERFORMED/VIEWS:  XR CHEST PORTABLE FINDINGS: Heart shadow is partially obscured by adjacent opacity  Status post median sternotomy  Central vascular congestive changes with right perihilar groundglass opacity may represent asymmetric pulmonary edema  Bibasilar opacities likely represent combination of atelectasis and pleural effusions  Mild left apical pleural thickening  No pneumothorax  Surgical clips right neck  Degenerative changes of the shoulders and spine with lower thoracic dextroscoliosis  Impression: Central vascular congestion with suspected asymmetric pulmonary edema and bibasilar opacities as above  Workstation performed: [id number]     Ct Head Without Contrast    Result Date: 2/5/2021  Narrative: CT BRAIN - WITHOUT CONTRAST INDICATION:   Head trauma, minor (Age => 65y) FALL ON ASA  COMPARISON:  May 1, 2019 TECHNIQUE:  CT examination of the brain was performed  In addition to axial images, sagittal and coronal 2D reformatted images were created and submitted for interpretation  Radiation dose length product (DLP) for this visit:  907 64 mGy-cm   This examination, like all CT scans performed in the [facility], was performed utilizing techniques to minimize radiation dose exposure, including the use of iterative  reconstruction and automated exposure control  IMAGE QUALITY:  Diagnostic   FINDINGS: PARENCHYMA: Decreased attenuation is noted in periventricular and subcortical white matter demonstrating an appearance that is statistically most likely to represent mild microangiopathic change  Old right parietal and occipital encephalomalacia No CT signs of acute infarction  No intracranial mass, mass effect or midline shift  No acute parenchymal hemorrhage  VENTRICLES AND EXTRA-AXIAL SPACES:  Normal for the patient's age  VISUALIZED ORBITS AND PARANASAL SINUSES:  Unremarkable  CALVARIUM AND EXTRACRANIAL SOFT TISSUES:  Mild soft tissue swelling left forehead  No underlying calvarial fracture  Partially calcified sebaceous cyst right posterior parietal/occipital scalp  Impression: No acute intracranial abnormality  Workstation performed: [id number]     Ct Spine Cervical Without Contrast    Result Date: 2/5/2021  Narrative: CT CERVICAL SPINE - WITHOUT CONTRAST INDICATION:   Neck trauma (Age => 65y) fall on asa  COMPARISON:  May 1, 2019 TECHNIQUE:  CT examination of the cervical spine was performed without intravenous contrast   Contiguous axial images were obtained  Sagittal and coronal reconstructions were performed  Radiation dose length product (DLP) for this visit:  852 87 mGy-cm   This examination, like all CT scans performed in the [facility], was performed utilizing techniques to minimize radiation dose exposure, including the use of iterative  reconstruction and automated exposure control  IMAGE QUALITY:  Exam limited by motion artifact  FINDINGS: ALIGNMENT:  Slight reversal of lordotic curvature  Grade 1 anterolisthesis of C3 on C4 and C4 on C5 appearing stable  Concave left cervical thoracic scoliotic curvature  VERTEBRAL BODIES:  Previous left side C5 transverse process fracture has healed  No new fractures are found  DEGENERATIVE CHANGES:  Moderate multilevel cervical degenerative changes are noted  No critical central canal stenosis   PREVERTEBRAL AND PARASPINAL SOFT TISSUES: Unremarkable  THORACIC INLET:  Bilateral pleural effusions  Impression: Exam limited by motion artifact  No cervical spine fracture or traumatic malalignment  The study was marked in EPIC for immediate notification because this is a "level C trauma"  Workstation performed: [id number]     Us Bedside Procedure    Result Date: 2/5/2021  Narrative: [phone] [id number] [phone] [id number]  [id number]      EKG personally reviewed by Wilber Navarro MD      Counseling / Coordination of Care  Total floor / unit time spent today 30 minutes  Greater than 50% of total time was spent with the patient and / or family counseling and / or coordination of care

## 2021-02-09 NOTE — CASE MANAGEMENT
CM spoke with pt made her aware STR is recommended by therapy  Pt wanted CM to call pt son Margarito Sotelo and was agreeable for him to decide her discharge plans  CM spoke with Margarito Sotelo made him a aware   A post acute care recommendation was made by his mothers care team for STR  Discussed Freedom of Choice with caregiver  Margarito Sotelo  said he does not want his mother to go to a SNF for STR due to covid  He was agreeable fo [facility] PT , OT and SN  I made him aware a referral was placed to [facility] for SN and she was accepted  He said he would like to stay with [facility] and CM updated the referral with PT, OT and SN

## 2021-02-09 NOTE — PROGRESS NOTES
Outpatient Care Management Note:  RE:Called to speak with son Margi Chong  Provided emotional support that pt is receiving the care she needs and inquired how he feels about his decision to send her to the hospital alone  He then reported that he was happy he did so  Chart review done   Son is looking forward to his mother coming home from hospital

## 2021-02-09 NOTE — PROGRESS NOTES
Progress Note - Arthur Jean 1935, 80 y o  female MRN: [id number]    Unit/Bed#: [address] 522-01 Encounter: [id number]    Primary Care Provider: Gabe Fofana DO   Date and time admitted to hospital: 2/5/2021  2:23 PM        * Pleural effusion  Assessment & Plan  · Pleural effusion bilateral  · Continue with Lasix 40mg iv daily per cardiology team  · - per pulmonary added Diamox secondary to patient's hypercarbia, with very scant improvement -   · - however pt is rather confused today not at all how she was yesterday   · - while in the room changed pt 3 liters however dropped rapidly to 87% so increased to 3 5liters to keep her at 88% minimum   · Monitor BMP  due to ongoing hypercarbia  · Pulmonary evaluated POCUS and determined that no thoracentesis was needed  · Monitor input output  · Daily weights  · Cancel IR consultation pulmonary to monitor     Respiratory failure with hypoxia ([facility] [address] )  Assessment & Plan  · Acute on chronic hypercarbic respiratory failure  · Respiratory failure with hypoxia, now on 3 5 liters oxygen (desats very quickly )   · Multifactorial possible  COPD, CHF exacerbation and pleural effusion  · Continue with the diuresis added diamox 2/7  · Keep oxygen levels 88-92 %    COPD (chronic obstructive pulmonary disease) (HCC)  Assessment & Plan  · COPD does not appear to be exacerbation  · - pt with no wheezing on 6 liters midflow as pt keeps pulling oxygen off   · - discussed with pulmonary  · Discontinue steroids   · Keep oxygen levels greater than 88- 94%  · Pt transitioned     S/P CABG (coronary artery bypass graft)  Assessment & Plan  · Aspirin and statin     Hyperlipidemia  Assessment & Plan  · Continue with statin     Chronic diastolic congestive heart failure (HCC)  Assessment & Plan  Wt Readings from Last 3 Encounters:   02/08/21 97 1 kg (214 lb 1 1 oz)   10/15/20 97 1 kg (214 lb)   07/28/20 98 4 kg (217 lb)       · Continue diuresis  · Strict I/o's  · Fluid restriction   · Daily weights  · Chest xray : Central vascular congestion with suspected asymmetric pulmonary edema and bibasilar opacities as above  Hypothyroidism  Assessment & Plan  · Hypothyroidism  · Continue with levothyroxine    Hypertension  Assessment & Plan  · Continue with metoprolol 25 mg [id number]   · Started on iv lasix 40 mg daily per cards, pulmonary added Diamox         VTE Pharmacologic Prophylaxis:   Pharmacologic: Enoxaparin (Lovenox)  Mechanical VTE Prophylaxis in Place: Yes    Patient Centered Rounds: I have performed bedside rounds with nursing staff today  Discussions with Specialists or Other Care Team Provider: nursing    Education and Discussions with Family / Patient: patient    Time Spent for Care: 45 minutes  More than 50% of total time spent on counseling and coordination of care as described above  Current Length of Stay: 4 day(s)    Current Patient Status: Inpatient   Certification Statement: The patient will continue to require additional inpatient hospital stay due to ongoing diuretics     Discharge Plan: recommend rehab per pt     Code Status: Level 1 - Full Code      Subjective:   Upon arrival to room pt sleeping awoken and a little disoriented she is on 4 liters titrated to 3 liters sat then started dropping to 87 % moved up to 3 5 liters to keep at min of 88%  Pt is funny but today not as clear as she was yesterday  She is forgetful doesn't remember me but had rem me day prior from day prior to that  She has no pain  She denies sob and doesn't feel anxious right now    Objective:     Vitals:   Temp (24hrs), Av 8 °F (37 1 °C), Min:98 6 °F (37 °C), Max:99 1 °F (37 3 °C)    Temp:  [98 6 °F (37 °C)-99 1 °F (37 3 °C)] 99 1 °F (37 3 °C)  HR:  [72-80] 80  Resp:  [16-20] 18  BP: ()/(53-78) 154/71  SpO2:  [88 %-95 %] 91 %  Body mass index is 36 74 kg/m²  Input and Output Summary (last 24 hours):        Intake/Output Summary (Last 24 hours) at 2021 0038  Last data filed at 2/9/2021 0033  Gross per 24 hour   Intake 520 ml   Output 2251 ml   Net -1731 ml       Physical Exam:     Physical Exam  Constitutional:       General: She is not in acute distress  Appearance: She is obese  She is not ill-appearing, toxic-appearing or diaphoretic  HENT:      Head: Atraumatic  Mouth/Throat:      Pharynx: Oropharynx is clear  Cardiovascular:      Rate and Rhythm: Normal rate  Heart sounds: Murmur present  No friction rub  No gallop  Pulmonary:      Effort: No respiratory distress  Breath sounds: No stridor  No wheezing, rhonchi or rales  Comments: Poor effort  Chest:      Chest wall: No tenderness  Abdominal:      General: There is no distension  Palpations: There is no mass  Tenderness: There is no abdominal tenderness  There is no right CVA tenderness, left CVA tenderness, guarding or rebound  Hernia: No hernia is present  Musculoskeletal:         General: No swelling, tenderness, deformity or signs of injury  Right lower leg: No edema  Left lower leg: No edema  Skin:     Coloration: Skin is not jaundiced or pale  Findings: No bruising, erythema, lesion or rash  Neurological:      Mental Status: She is alert  She is disoriented        Comments: Aware at [facility] and Monday but unclear time and some conversation not making sense    Psychiatric:      Comments: humorous and friendly            Additional Data:     Labs:    Results from last 7 days   Lab Units 02/08/21  0514   WBC Thousand/uL 9 08   HEMOGLOBIN g/dL 13 1   HEMATOCRIT % 47 6*   PLATELETS Thousands/uL 298   NEUTROS PCT % 75   LYMPHS PCT % 12*   MONOS PCT % 10   EOS PCT % 2     Results from last 7 days   Lab Units 02/08/21  0514  02/05/21  1444   SODIUM mmol/L 143   < > 144   POTASSIUM mmol/L 3 8   < > 4 2   CHLORIDE mmol/L 98*   < > 103   CO2 mmol/L >45*   < > 44*   BUN mg/dL 34*   < > 35*   CREATININE mg/dL 1 06   < > 1 19   ANION GAP mmol/L  --   --  -3*   CALCIUM mg/dL 9 0   < > 9 2   ALBUMIN g/dL 2 6*   < > 2 8*   TOTAL BILIRUBIN mg/dL 0 88   < > 0 74   ALK PHOS U/L 90   < > 104   ALT U/L 12   < > 16   AST U/L 9   < > 18   GLUCOSE RANDOM mg/dL 115   < > 107    < > = values in this interval not displayed  Results from last 7 days   Lab Units 02/06/21  0433   INR  1 07             Results from last 7 days   Lab Units 02/06/21  0635 02/06/21  0433   PROCALCITONIN ng/ml <0 05 <0 05           * I Have Reviewed All Lab Data Listed Above  * Additional Pertinent Lab Tests Reviewed:  All Labs Within Last 24 Hours Reviewed    Imaging:    Imaging Reports Reviewed Today Include: reviewed    Recent Cultures (last 7 days):           Last 24 Hours Medication List:   Current Facility-Administered Medications   Medication Dose Route Frequency Provider Last Rate    acetaminophen  650 mg Oral Q6H PRN Ulises Werner MD      acetaZOLAMIDE  250 mg Intravenous Q12H [address] Cleophus Safe, DO      albuterol  2 5 mg Nebulization Q4H PRN Ulises Werner MD      ALPRAZolam  0 5 mg Oral TID PRN Ulises Werner MD      aluminum-magnesium hydroxide-simethicone  30 mL Oral Q6H PRN Ulises Werner MD      amLODIPine  5 mg Oral Daily Ulises Werner MD      ascorbic acid  500 mg Oral Daily Ulises Werner MD      aspirin  81 mg Oral Daily Ulises Werner MD      bimatoprost  1 drop Both Eyes HS Ulises Werner MD      calcium carbonate  1,000 mg Oral Daily PRN Uliess Werner MD      cholecalciferol  2,000 Units Oral Daily Ulises Werner MD      clopidogrel  75 mg Oral Daily Ulises Werner MD      docusate sodium  100 mg Oral BID Ulises Werner MD      enoxaparin  40 mg Subcutaneous Daily Ulises Werner MD      famotidine  20 mg Oral BID Ulises Werner MD      furosemide  40 mg Intravenous Daily Ophelia Cooper MD      ipratropium  0 5 mg Nebulization TID Cleophus Safe, DO      levalbuterol  1 25 mg Nebulization TID Cleophus Safe, DO      levothyroxine  75 mcg Oral Early Morning Kirk Duarte MD      metoprolol tartrate  25 mg Oral Q12H [facility] Kirk Duarte MD      oxyCODONE-acetaminophen  1 tablet Oral Q4H PRN Kirk Duarte MD      senna  1 tablet Oral Daily Kirk Duarte MD          Today, Patient Was Seen By: FLAKO Belle    ** Please Note: Dictation voice to text software may have been used in the creation of this document   **

## 2021-02-09 NOTE — PLAN OF CARE
Problem: Potential for Falls  Goal: Patient will remain free of falls  Description: INTERVENTIONS:  - Assess patient frequently for physical needs  -  Identify cognitive and physical deficits and behaviors that affect risk of falls    -  [place] fall precautions as indicated by assessment   - Educate patient/family on patient safety including physical limitations  - Instruct patient to call for assistance with activity based on assessment  - Modify environment to reduce risk of injury  - Consider OT/PT consult to assist with strengthening/mobility  2/9/2021 0003 by Caden Hyatt RN  Outcome: Progressing  2/9/2021 0003 by Caden Hyatt RN  Outcome: Progressing     Problem: Prexisting or High Potential for Compromised Skin Integrity  Goal: Skin integrity is maintained or improved  Description: INTERVENTIONS:  - Identify patients at risk for skin breakdown  - Assess and monitor skin integrity  - Assess and monitor nutrition and hydration status  - Monitor labs   - Assess for incontinence   - Turn and reposition patient  - Assist with mobility/ambulation  - Relieve pressure over bony prominences  - Avoid friction and shearing  - Provide appropriate hygiene as needed including keeping skin clean and dry  - Evaluate need for skin moisturizer/barrier cream  - Collaborate with interdisciplinary team   - Patient/family teaching  - Consider wound care consult   2/9/2021 0003 by Caden Hyatt RN  Outcome: Progressing  2/9/2021 0003 by Caden Hyatt RN  Outcome: Progressing     Problem: RESPIRATORY - ADULT  Goal: Achieves optimal ventilation and oxygenation  Description: INTERVENTIONS:  - Assess for changes in respiratory status  - Assess for changes in mentation and behavior  - Position to facilitate oxygenation and minimize respiratory effort  - Oxygen administered by appropriate delivery if ordered  - Initiate smoking cessation education as indicated  - Encourage broncho-pulmonary hygiene including cough, deep breathe, Incentive Spirometry  - Assess the need for suctioning and aspirate as needed  - Assess and instruct to report SOB or any respiratory difficulty  - Respiratory Therapy support as indicated  2/9/2021 0003 by Brianne Mclaughlin RN  Outcome: Progressing  2/9/2021 0003 by Brianne Mclaughlin RN  Outcome: Progressing     Problem: PAIN - ADULT  Goal: Verbalizes/displays adequate comfort level or baseline comfort level  Description: Interventions:  - Encourage patient to monitor pain and request assistance  - Assess pain using appropriate pain scale  - Administer analgesics based on type and severity of pain and evaluate response  - Implement non-pharmacological measures as appropriate and evaluate response  - Consider cultural and social influences on pain and pain management  - Notify physician/advanced practitioner if interventions unsuccessful or patient reports new pain  2/9/2021 0003 by Brianne Mclaughlin RN  Outcome: Progressing  2/9/2021 0003 by Brianne Mclaughlin RN  Outcome: Progressing     Problem: INFECTION - ADULT  Goal: Absence or prevention of progression during hospitalization  Description: INTERVENTIONS:  - Assess and monitor for signs and symptoms of infection  - Monitor lab/diagnostic results  - Monitor all insertion sites, i e  indwelling lines, tubes, and drains  - Monitor endotracheal if appropriate and nasal secretions for changes in amount and color  - [place] appropriate cooling/warming therapies per order  - Administer medications as ordered  - Instruct and encourage patient and family to use good hand hygiene technique  - Identify and instruct in appropriate isolation precautions for identified infection/condition  2/9/2021 0003 by Brianne Mclaughlin RN  Outcome: Progressing  2/9/2021 0003 by Brianne Mclaughlin RN  Outcome: Progressing  Goal: Absence of fever/infection during neutropenic period  Description: INTERVENTIONS:  - Monitor WBC    2/9/2021 0003 by Brianne Mclaughlin RN  Outcome: Progressing  2/9/2021 0003 by Sergio Mccarthy RN  Outcome: Progressing     Problem: SAFETY ADULT  Goal: Patient will remain free of falls  Description: INTERVENTIONS:  - Assess patient frequently for physical needs  -  Identify cognitive and physical deficits and behaviors that affect risk of falls    -  [place] fall precautions as indicated by assessment   - Educate patient/family on patient safety including physical limitations  - Instruct patient to call for assistance with activity based on assessment  - Modify environment to reduce risk of injury  - Consider OT/PT consult to assist with strengthening/mobility  2/9/2021 0003 by Sergio Mccarthy RN  Outcome: Progressing  2/9/2021 0003 by Sergio Mccarthy RN  Outcome: Progressing  Goal: Maintain or return to baseline ADL function  Description: INTERVENTIONS:  -  Assess patient's ability to carry out ADLs; assess patient's baseline for ADL function and identify physical deficits which impact ability to perform ADLs (bathing, care of mouth/teeth, toileting, grooming, dressing, etc )  - Assess/evaluate cause of self-care deficits   - Assess range of motion  - Assess patient's mobility; develop plan if impaired  - Assess patient's need for assistive devices and provide as appropriate  - Encourage maximum independence but intervene and supervise when necessary  - Involve family in performance of ADLs  - Assess for home care needs following discharge   - Consider OT consult to assist with ADL evaluation and planning for discharge  - Provide patient education as appropriate  2/9/2021 0003 by Sergio Mccarthy RN  Outcome: Progressing  2/9/2021 0003 by Sergio Mccarthy RN  Outcome: Progressing  Goal: Maintain or return mobility status to optimal level  Description: INTERVENTIONS:  - Assess patient's baseline mobility status (ambulation, transfers, stairs, etc )    - Identify cognitive and physical deficits and behaviors that affect mobility  - Identify mobility aids required to assist with transfers and/or ambulation (gait belt, sit-to-stand, lift, walker, cane, etc )  - [place] fall precautions as indicated by assessment  - Record patient progress and toleration of activity level on Mobility SBAR; progress patient to next Phase/Stage  - Instruct patient to call for assistance with activity based on assessment  - Consider rehabilitation consult to assist with strengthening/weightbearing, etc   2/9/2021 0003 by Matt Robles RN  Outcome: Progressing  2/9/2021 0003 by Matt Robles RN  Outcome: Progressing     Problem: DISCHARGE PLANNING  Goal: Discharge to home or other facility with appropriate resources  Description: INTERVENTIONS:  - Identify barriers to discharge w/patient and caregiver  - Arrange for needed discharge resources and transportation as appropriate  - Identify discharge learning needs (meds, wound care, etc )  - Arrange for interpretive services to assist at discharge as needed  - Refer to Case Management Department for coordinating discharge planning if the patient needs post-hospital services based on physician/advanced practitioner order or complex needs related to functional status, cognitive ability, or social support system  2/9/2021 0003 by Matt Robles RN  Outcome: Progressing  2/9/2021 0003 by Matt Robles RN  Outcome: Progressing     Problem: Knowledge Deficit  Goal: Patient/family/caregiver demonstrates understanding of disease process, treatment plan, medications, and discharge instructions  Description: Complete learning assessment and assess knowledge base    Interventions:  - Provide teaching at level of understanding  - Provide teaching via preferred learning methods  2/9/2021 0003 by Matt Robles RN  Outcome: Progressing  2/9/2021 0003 by Matt Robles RN  Outcome: Progressing     Problem: Knowledge Deficit  Goal: Patient/family/caregiver demonstrates understanding of disease process, treatment plan, medications, and discharge instructions  Description: Complete learning assessment and assess knowledge base    Interventions:  - Provide teaching at level of understanding  - Provide teaching via preferred learning methods  2/9/2021 0003 by Sheron Russell RN  Outcome: Progressing  2/9/2021 0003 by Sheron Russell RN  Outcome: Progressing

## 2021-02-10 LAB
ALBUMIN SERPL BCP-MCNC: 2.7 G/DL (ref 3.5–5)
ALP SERPL-CCNC: 95 U/L (ref 46–116)
ALT SERPL W P-5'-P-CCNC: 13 U/L (ref 12–78)
ANION GAP SERPL CALCULATED.3IONS-SCNC: 1 MMOL/L (ref 4–13)
AST SERPL W P-5'-P-CCNC: 16 U/L (ref 5–45)
BILIRUB SERPL-MCNC: 1.05 MG/DL (ref 0.2–1)
BUN SERPL-MCNC: 35 MG/DL (ref 5–25)
CALCIUM ALBUM COR SERPL-MCNC: 10 MG/DL (ref 8.3–10.1)
CALCIUM SERPL-MCNC: 9 MG/DL (ref 8.3–10.1)
CHLORIDE SERPL-SCNC: 98 MMOL/L (ref 100–108)
CO2 SERPL-SCNC: 42 MMOL/L (ref 21–32)
CREAT SERPL-MCNC: 1.03 MG/DL (ref 0.6–1.3)
ERYTHROCYTE [DISTWIDTH] IN BLOOD BY AUTOMATED COUNT: 14.3 % (ref 11.6–15.1)
GFR SERPL CREATININE-BSD FRML MDRD: 50 ML/MIN/1.73SQ M
GLUCOSE SERPL-MCNC: 113 MG/DL (ref 65–140)
HCT VFR BLD AUTO: 50.2 % (ref 34.8–46.1)
HGB BLD-MCNC: 13.9 G/DL (ref 11.5–15.4)
MCH RBC QN AUTO: 27.3 PG (ref 26.8–34.3)
MCHC RBC AUTO-ENTMCNC: 27.7 G/DL (ref 31.4–37.4)
MCV RBC AUTO: 99 FL (ref 82–98)
PLATELET # BLD AUTO: 251 THOUSANDS/UL (ref 149–390)
PMV BLD AUTO: 10 FL (ref 8.9–12.7)
POTASSIUM SERPL-SCNC: 4 MMOL/L (ref 3.5–5.3)
PROT SERPL-MCNC: 6.3 G/DL (ref 6.4–8.2)
RBC # BLD AUTO: 5.09 MILLION/UL (ref 3.81–5.12)
SODIUM SERPL-SCNC: 141 MMOL/L (ref 136–145)
WBC # BLD AUTO: 7.22 THOUSAND/UL (ref 4.31–10.16)

## 2021-02-10 PROCEDURE — 85027 COMPLETE CBC AUTOMATED: CPT | Performed by: FAMILY MEDICINE

## 2021-02-10 PROCEDURE — 80053 COMPREHEN METABOLIC PANEL: CPT | Performed by: FAMILY MEDICINE

## 2021-02-10 PROCEDURE — 94640 AIRWAY INHALATION TREATMENT: CPT

## 2021-02-10 PROCEDURE — 94760 N-INVAS EAR/PLS OXIMETRY 1: CPT

## 2021-02-10 PROCEDURE — 99232 SBSQ HOSP IP/OBS MODERATE 35: CPT | Performed by: INTERNAL MEDICINE

## 2021-02-10 PROCEDURE — 97535 SELF CARE MNGMENT TRAINING: CPT

## 2021-02-10 PROCEDURE — 99232 SBSQ HOSP IP/OBS MODERATE 35: CPT | Performed by: FAMILY MEDICINE

## 2021-02-10 RX ORDER — ACETAZOLAMIDE 250 MG/1
250 TABLET ORAL EVERY 12 HOURS SCHEDULED
Status: DISCONTINUED | OUTPATIENT
Start: 2021-02-10 | End: 2021-02-13

## 2021-02-10 RX ORDER — FUROSEMIDE 40 MG/1
40 TABLET ORAL DAILY
Status: DISCONTINUED | OUTPATIENT
Start: 2021-02-10 | End: 2021-02-15 | Stop reason: HOSPADM

## 2021-02-10 RX ADMIN — ACETAZOLAMIDE 250 MG: 250 TABLET ORAL at 20:12

## 2021-02-10 RX ADMIN — FAMOTIDINE 20 MG: 20 TABLET, FILM COATED ORAL at 17:06

## 2021-02-10 RX ADMIN — IPRATROPIUM BROMIDE 0.5 MG: 0.5 SOLUTION RESPIRATORY (INHALATION) at 13:33

## 2021-02-10 RX ADMIN — ASPIRIN 81 MG: 81 TABLET, COATED ORAL at 09:25

## 2021-02-10 RX ADMIN — LEVALBUTEROL HYDROCHLORIDE 1.25 MG: 1.25 SOLUTION, CONCENTRATE RESPIRATORY (INHALATION) at 06:57

## 2021-02-10 RX ADMIN — AMLODIPINE BESYLATE 5 MG: 5 TABLET ORAL at 09:25

## 2021-02-10 RX ADMIN — CLOPIDOGREL BISULFATE 75 MG: 75 TABLET ORAL at 09:25

## 2021-02-10 RX ADMIN — OXYCODONE HYDROCHLORIDE AND ACETAMINOPHEN 500 MG: 500 TABLET ORAL at 09:26

## 2021-02-10 RX ADMIN — IPRATROPIUM BROMIDE 0.5 MG: 0.5 SOLUTION RESPIRATORY (INHALATION) at 06:57

## 2021-02-10 RX ADMIN — Medication 2000 UNITS: at 09:25

## 2021-02-10 RX ADMIN — LEVALBUTEROL HYDROCHLORIDE 1.25 MG: 1.25 SOLUTION, CONCENTRATE RESPIRATORY (INHALATION) at 19:50

## 2021-02-10 RX ADMIN — FAMOTIDINE 20 MG: 20 TABLET, FILM COATED ORAL at 09:39

## 2021-02-10 RX ADMIN — FUROSEMIDE 40 MG: 10 INJECTION, SOLUTION INTRAMUSCULAR; INTRAVENOUS at 09:26

## 2021-02-10 RX ADMIN — DOCUSATE SODIUM 100 MG: 100 CAPSULE, LIQUID FILLED ORAL at 09:25

## 2021-02-10 RX ADMIN — DOCUSATE SODIUM 100 MG: 100 CAPSULE, LIQUID FILLED ORAL at 17:06

## 2021-02-10 RX ADMIN — LEVOTHYROXINE SODIUM 75 MCG: 75 TABLET ORAL at 05:28

## 2021-02-10 RX ADMIN — IPRATROPIUM BROMIDE 0.5 MG: 0.5 SOLUTION RESPIRATORY (INHALATION) at 19:50

## 2021-02-10 RX ADMIN — ACETAZOLAMIDE SODIUM 250 MG: 500 INJECTION, POWDER, LYOPHILIZED, FOR SOLUTION INTRAVENOUS at 09:26

## 2021-02-10 RX ADMIN — LEVALBUTEROL HYDROCHLORIDE 1.25 MG: 1.25 SOLUTION, CONCENTRATE RESPIRATORY (INHALATION) at 13:33

## 2021-02-10 RX ADMIN — METOPROLOL TARTRATE 25 MG: 25 TABLET, FILM COATED ORAL at 09:25

## 2021-02-10 RX ADMIN — METOPROLOL TARTRATE 25 MG: 25 TABLET, FILM COATED ORAL at 20:12

## 2021-02-10 RX ADMIN — BIMATOPROST 1 DROP: 0.1 SOLUTION/ DROPS OPHTHALMIC at 21:03

## 2021-02-10 RX ADMIN — SENNOSIDES 8.6 MG: 8.6 TABLET, FILM COATED ORAL at 09:25

## 2021-02-10 RX ADMIN — ENOXAPARIN SODIUM 40 MG: 40 INJECTION SUBCUTANEOUS at 09:25

## 2021-02-10 NOTE — ASSESSMENT & PLAN NOTE
· COPD does not appear to be exacerbation  · - pt with no wheezing on 6 liters midflow as pt keeps pulling oxygen off   · - discussed with pulmonary  · Discontinue steroids   · Keep oxygen levels greater than 88- 94%  ·

## 2021-02-10 NOTE — UTILIZATION REVIEW
Continued Stay Review    Date: 2/10/2021                       Current Patient Class: INPT Current Level of Care: [de-identified]    HPI:85 y o  female initially admitted INPT on 2/5 D/T Pleural effusion  Assessment/Plan: no chest pain or significant dyspnea  IV diuresis  Echo 2/6 demonstrated preserved LV systolic function with mild LVH   Grade 2 diastolic dysfunction is suggested  Imani Fay is no significant functional valvular heart disease noted   Moderate MAC is noted  CXR 2/9 with improvement in vascular congestion   Telemetry demonstrates sinus rhythm with PACs and PVCs   K4 0 today  Creat 1 03 CO2 43  Hgb 13 9  IV diamox  D/c IV diuresis and start po diuresis      Pertinent Labs/Diagnostic Results:   Results from last 7 days   Lab Units 02/05/21  1517   SARS-COV-2  Negative     Results from last 7 days   Lab Units 02/10/21  0500 02/09/21  0448 02/08/21  0514 02/07/21  0521 02/06/21  0433   WBC Thousand/uL 7 22 7 85 9 08 8 21 7 57   HEMOGLOBIN g/dL 13 9 13 2 13 1 13 0 13 5   HEMATOCRIT % 50 2* 47 6* 47 6* 46 2* 49 3*   PLATELETS Thousands/uL 251 255 298 282 268   NEUTROS ABS Thousands/µL  --  5 58 6 83 6 11 6 80         Results from last 7 days   Lab Units 02/10/21  0500 02/09/21  0448 02/08/21  0514 02/07/21  0521 02/06/21  0433 02/05/21  1444   SODIUM mmol/L 141 142 143 145 145 144   POTASSIUM mmol/L 4 0 4 0 3 8 3 6 3 9 4 2   CHLORIDE mmol/L 98* 98* 98* 96* 99* 103   CO2 mmol/L 42* 43* >45* >45* >45* 44*   ANION GAP mmol/L 1* 1*  --   --   --  -3*   BUN mg/dL 35* 29* 34* 38* 33* 35*   CREATININE mg/dL 1 03 1 04 1 06 1 22 1 22 1 19   EGFR ml/min/1 73sq m 50 49 48 40 40 42   CALCIUM mg/dL 9 0 8 8 9 0 8 6 8 8 9 2     Results from last 7 days   Lab Units 02/10/21  0500 02/08/21  0514 02/06/21  0433 02/05/21  1444   AST U/L 16 9 11 18   ALT U/L 13 12 14 16   ALK PHOS U/L 95 90 105 104   TOTAL PROTEIN g/dL 6 3* 6 1* 6 4 6 5   ALBUMIN g/dL 2 7* 2 6* 2 9* 2 8*   TOTAL BILIRUBIN mg/dL 1 05* 0 88 0 80 0 74         Results from last 7 days   Lab Units 02/10/21  0500 02/09/21  0448 02/08/21  0514 02/07/21  0521 02/06/21  0433 02/05/21  1444   GLUCOSE RANDOM mg/dL 113 116 115 95 139 107       Results from last 7 days   Lab Units 02/06/21  0626   PH ART  7 319*   PCO2 ART mm Hg 80 9*   PO2 ART mm Hg 72 1*   HCO3 ART mmol/L 40 7*   BASE EXC ART mmol/L 11 0   O2 CONTENT ART mL/dL 17 6   O2 HGB, ARTERIAL % 91 2*       Results from last 7 days   Lab Units 02/05/21  1444   TROPONIN I ng/mL 0 03         Results from last 7 days   Lab Units 02/06/21  0433   PROTIME seconds 13 9   INR  1 07     Results from last 7 days   Lab Units 02/05/21  1444   TSH 3RD GENERATON uIU/mL 1 060     Results from last 7 days   Lab Units 02/06/21  0635 02/06/21  0433   PROCALCITONIN ng/ml <0 05 <0 05       Results from last 7 days   Lab Units 02/05/21  1444   NT-PRO BNP pg/mL 4,918*       Results from last 7 days   Lab Units 02/05/21  1517   INFLUENZA A PCR  Negative   INFLUENZA B PCR  Negative   RSV PCR  Negative     2/9 cxr:   Improving pulmonary venous congestion with decrease in small effusions and bibasilar atelectasis      Vital Signs:   02/10/21 1100  98 9 °F (37 2 °C)  72  18  104/80  --  99 %  --  --  --  --   02/10/21 0700  98 4 °F (36 9 °C)  74  18  122/58  --  93 %  32  3 L/min  Nasal cannula  --   02/10/21 0659  --  --  --  --  --  94 %  32  3 L/min  Nasal cannula  --   02/09/21 22:50:10  98 6 °F (37 °C)  68  18  127/57  80  93 %  --  --  --  --   02/09/21 2000  --  --  --  --  --  --  32  3 L/min  Nasal cannula  --                          02/09/21 15:18:33  98 7 °F (37 1 °C)  69  20  126/60  82  92 %  --  --  --  --   02/09/21 0727  --  77  18  134/67  89  93 %  32  3 L/min  Nasal cannula  --   02/09/21 0400  --  --  --  --  --  --  32  3 L/min  Nasal cannula  --   02/09/21 0000  --  --  --  --  --  91 %  32  3 L/min  Nasal cannula  --                          02/08/21 2225  --  --  --  --  --  90 %  32  3 L/min  Nasal cannula  --   02/08/21 2129  --  80 --  101/71  --  --  --  --  --  --   02/08/21 1905  --  --  --  --  --  93 %  40  5 L/min  Nasal cannula  --   02/08/21 18:16:47  --  --  16  112/78  89  89 %Abnormal   --  --  --  --   02/08/21 15:31:06  99 °F (37 2 °C)  --  16  99/53  68  88 %Abnormal   --  --  --  --   02/08/21 1400  --  --  --  --  --  95 %  32  3 L/min  Nasal cannula  --   02/08/21 1322  --  --  --  --  --  93 %  --  --             Medications:   Scheduled Medications:  acetaZOLAMIDE, 250 mg, Oral, Q12H MODESTO  amLODIPine, 5 mg, Oral, Daily  ascorbic acid, 500 mg, Oral, Daily  aspirin, 81 mg, Oral, Daily  bimatoprost, 1 drop, Both Eyes, HS  cholecalciferol, 2,000 Units, Oral, Daily  clopidogrel, 75 mg, Oral, Daily  docusate sodium, 100 mg, Oral, BID  enoxaparin, 40 mg, Subcutaneous, Daily  famotidine, 20 mg, Oral, BID  furosemide, 40 mg, Oral, Daily  ipratropium, 0 5 mg, Nebulization, TID  levalbuterol, 1 25 mg, Nebulization, TID  levothyroxine, 75 mcg, Oral, Early Morning  metoprolol tartrate, 25 mg, Oral, Q12H MODESTO  senna, 1 tablet, Oral, Daily      PRN Meds:  acetaminophen, 650 mg, Oral, Q6H PRN  albuterol, 2 5 mg, Nebulization, Q4H PRN  ALPRAZolam, 0 5 mg, Oral, TID PRN 2/9 X 2  aluminum-magnesium hydroxide-simethicone, 30 mL, Oral, Q6H PRN  calcium carbonate, 1,000 mg, Oral, Daily PRN  oxyCODONE-acetaminophen, 1 tablet, Oral, Q4H PRN    2/8:  TELE  2/7:  Pt/ot    Discharge Plan: TBD    Network Utilization Review Department  ATTENTION: Please call with any questions or concerns to [phone] and carefully listen to the prompts so that you are directed to the right person  All voicemails are confidential   Magali Umanzor all requests for admission clinical reviews, approved or denied determinations and any other requests to dedicated fax number below belonging to the campus where the patient is receiving treatment   List of dedicated fax numbers for the Facilities:  FACILITY NAME UR FAX NUMBER   ADMISSION DENIALS (Administrative/Medical Necessity) [facility] (Maternity/NICU/Pediatrics) [address] [place] [address],[address]  [phone]   Hank Barragan 5687 (  [address]) [address] [address] [place] [address] [phone]   [phone]

## 2021-02-10 NOTE — ASSESSMENT & PLAN NOTE
Wt Readings from Last 3 Encounters:   02/09/21 94 7 kg (208 lb 11 2 oz)   10/15/20 97 1 kg (214 lb)   07/28/20 98 4 kg (217 lb)       · Continue diuresis  · Strict I/o's  · Fluid restriction   · Daily weights  · Chest xray : Central vascular congestion with suspected asymmetric pulmonary edema and bibasilar opacities as above    · Patient had another chest x-ray done showed improving vascular congestion  · Continue diuresis per cardiology

## 2021-02-10 NOTE — PROGRESS NOTES
Progress Note - Pulmonary   Elsi Garcia Sagat 80 y o  female MRN: [id number]  Unit/Bed#: [facility] 522-01 Encounter: [id number]      Assessment:  · Acute on chronic hypoxemic respiratory failure   · Likely mixed etiology, diastolic CHF, hypoventilation/morbid obesity, baseline COPD  · Continues to improve clinically  · Now on 3 L supplemental oxygen via nasal cannula  · Improving congestion on chest x-ray  · Acute diastolic heart failure   · On diuretics, Lasix and Diamox IV  · -1 0 0 L for the last 24 hours  · JAMEEL/ohs   · COPD  · Morbid obesity   · Small Bilateral pleural effusion    Plan:  · Switched diuretics to oral, continue Diamox and Lasix  Improving HC03  · Continues to refuse BiPAP/CPAP trial  · Continue nebulizer Xopenex/ipratropium t i d  Subjective:   No overnight events no dyspnea at rest   Minimal productive cough  Vitals: Blood pressure 104/80, pulse 72, temperature 98 9 °F (37 2 °C), temperature source Oral, resp  rate 18, height 5' 4" (1 626 m), weight 93 3 kg (205 lb 11 oz), SpO2 97 %, not currently breastfeeding , Body mass index is 35 31 kg/m²  Intake/Output Summary (Last 24 hours) at 2/10/2021 1417  Last data filed at 2/10/2021 0920  Gross per 24 hour   Intake 360 ml   Output 800 ml   Net -440 ml       Physical Exam  Gen: not in acute distress, obese Body mass index is 35 31 kg/m²  HEENT:supple, no accessory muscle use, no JVD appreciated  Cardiac: no murmurs appreciated  Lungs: normal respiratory effort, c fine posterior/peripheral crackles bilaterally  Abdomen: soft, nontender, normoactive bowel sounds  Extremities:  1+ edema  Neuro: AAO X3, no focal motor deficit    Labs: I have personally reviewed pertinent lab results          Nora Ram MD

## 2021-02-10 NOTE — ASSESSMENT & PLAN NOTE
· Pleural effusion bilateral  · Continue with Lasix 40mg iv daily per cardiology team  · - per pulmonary added Diamox secondary to patient's hypercarbia, with very scant improvement -   · - however pt is rather confused today not at all how she was yesterday   · - while in the room changed pt 3 liters however dropped rapidly to 87% so increased to 3 5liters to keep her at 88% minimum   · Monitor BMP  due to ongoing hypercarbia  · Pulmonary evaluated POCUS and determined that no thoracentesis was needed  · Monitor input output  · Daily weights  · Pulmonary to monitor effusion

## 2021-02-10 NOTE — ASSESSMENT & PLAN NOTE
· Continue with metoprolol 25 mg t06badrs   · Started on iv lasix 40 mg daily per cards, pulmonary added Diamox 2/7  · Monitor blood pressure

## 2021-02-10 NOTE — ASSESSMENT & PLAN NOTE
Wt Readings from Last 3 Encounters:   02/10/21 93 3 kg (205 lb 11 oz)   10/15/20 97 1 kg (214 lb)   07/28/20 98 4 kg (217 lb)       · Continue diuresis  · Strict I/o's  · Fluid restriction   · Daily weights  · Chest xray : Central vascular congestion with suspected asymmetric pulmonary edema and bibasilar opacities as above  · Patient had another chest x-ray done showed improving vascular congestion  · Continue diuresis per cardiology-transition to p o  Diuretics  · Continue with Diamox-transition to p o

## 2021-02-10 NOTE — PROGRESS NOTES
Cardiology Progress Note - Bob Schilder Sagat 80 y o  female MRN: [id number]    [facility] Encounter: [id number]      Assessment:  Principal Problem:    Pleural effusion  Active Problems:    COPD (chronic obstructive pulmonary disease) (HCC)    Hypertension    Hypothyroidism    Chronic diastolic congestive heart failure (HCC)    Hyperlipidemia    S/P CABG (coronary artery bypass graft)    Type 2 diabetes mellitus ([address] )    Respiratory failure with hypoxia ([facility] [address] [address] )      Plan:  Patient is comfortable this morning  She has no chest pain or significant dyspnea   She continues on intravenous furosemide   Echocardiogram done  February 6th demonstrated preserved LV systolic function with mild LVH   Grade 2 diastolic dysfunction is suggested  Samson Brochure is no significant functional valvular heart disease noted   Moderate MAC is noted   BMP today with potassium of 4 0 and creatinine of 1 03   CO2 is 43  Patient continues on Diamox IV   Hemoglobin is 13 2  Pulmonary note appreciated  Chest x-ray yesterday with improvement in vascular congestion  Telemetry demonstrates sinus rhythm with PACs and PVCs  Will discontinue intravenous furosemide and start oral therapy  If okay with Pulmonary service could convert IV Diamox to oral as well  Subjective:   Patient seen and examined  No significant events overnight   negative  Objective:     Vitals: Blood pressure 122/58, pulse 74, temperature 98 4 °F (36 9 °C), temperature source Oral, resp   rate 18, height 5' 4" (1 626 m), weight 93 3 kg (205 lb 11 oz), SpO2 93 %, not currently breastfeeding , Body mass index is 35 31 kg/m² ,   Orthostatic Blood Pressures      Most Recent Value   Blood Pressure  122/58 filed at 02/10/2021 0700   Patient Position - Orthostatic VS  Lying filed at 02/08/2021 0607      ,      Intake/Output Summary (Last 24 hours) at 2/10/2021 0939  Last data filed at 2/10/2021 0920  Gross per 24 hour   Intake 360 ml   Output 1505 ml   Net -1145 ml       No significant arrhythmias seen on telemetry review  Sinus rhythm with PVCs      Physical Exam:    GEN: Ashley FALCON Crhistian   NECK: supple, no carotid bruits, no JVD or HJR  HEART: normal rate, regular rhythm, normal S1 and S2, no murmurs, clicks, gallops or rubs   LUNGS: clear to auscultation bilaterally; no wheezes, rales, or rhonchi   ABDOMEN: normal bowel sounds, soft, no tenderness, no distention  EXTREMITIES: edema  SKIN: warm and well perfused, no suspicious lesions on exposed skin    Labs & Results:    No results displayed because visit has over 200 results  Xr Chest Portable    Result Date: 2/9/2021  Narrative: CHEST INDICATION:   Compare to prior study and assess for improvement in pulmonary congestion  COMPARISON:  Chest radiograph from 2/5/2021 and 5/3/2019  EXAM PERFORMED/VIEWS:  XR CHEST PORTABLE FINDINGS: Mild cardiomegaly  CABG  Right carotid endarterectomy  Improving pulmonary venous congestion with decrease in small effusions and bibasilar atelectasis  Mild curvature of the spine  Impression: Improving pulmonary venous congestion with decrease in small effusions and bibasilar atelectasis  Workstation performed: [id number]     Xr Chest Portable    Result Date: 2/5/2021  Narrative: CHEST INDICATION:  Shortness of breath, history includes COPD as well as CHF  COMPARISON:  5/3/2019 EXAM PERFORMED/VIEWS:  XR CHEST PORTABLE FINDINGS: Heart shadow is partially obscured by adjacent opacity  Status post median sternotomy  Central vascular congestive changes with right perihilar groundglass opacity may represent asymmetric pulmonary edema  Bibasilar opacities likely represent combination of atelectasis and pleural effusions  Mild left apical pleural thickening  No pneumothorax  Surgical clips right neck  Degenerative changes of the shoulders and spine with lower thoracic dextroscoliosis       Impression: Central vascular congestion with suspected asymmetric pulmonary edema and bibasilar opacities as above  Workstation performed: [id number]     Ct Head Without Contrast    Result Date: 2/5/2021  Narrative: CT BRAIN - WITHOUT CONTRAST INDICATION:   Head trauma, minor (Age => 65y) FALL ON ASA  COMPARISON:  May 1, 2019 TECHNIQUE:  CT examination of the brain was performed  In addition to axial images, sagittal and coronal 2D reformatted images were created and submitted for interpretation  Radiation dose length product (DLP) for this visit:  907 64 mGy-cm   This examination, like all CT scans performed in the [facility], was performed utilizing techniques to minimize radiation dose exposure, including the use of iterative  reconstruction and automated exposure control  IMAGE QUALITY:  Diagnostic  FINDINGS: PARENCHYMA: Decreased attenuation is noted in periventricular and subcortical white matter demonstrating an appearance that is statistically most likely to represent mild microangiopathic change  Old right parietal and occipital encephalomalacia No CT signs of acute infarction  No intracranial mass, mass effect or midline shift  No acute parenchymal hemorrhage  VENTRICLES AND EXTRA-AXIAL SPACES:  Normal for the patient's age  VISUALIZED ORBITS AND PARANASAL SINUSES:  Unremarkable  CALVARIUM AND EXTRACRANIAL SOFT TISSUES:  Mild soft tissue swelling left forehead  No underlying calvarial fracture  Partially calcified sebaceous cyst right posterior parietal/occipital scalp  Impression: No acute intracranial abnormality  Workstation performed: [id number]     Ct Spine Cervical Without Contrast    Result Date: 2/5/2021  Narrative: CT CERVICAL SPINE - WITHOUT CONTRAST INDICATION:   Neck trauma (Age => 65y) fall on asa  COMPARISON:  May 1, 2019 TECHNIQUE:  CT examination of the cervical spine was performed without intravenous contrast   Contiguous axial images were obtained  Sagittal and coronal reconstructions were performed    Radiation dose length product (DLP) for this visit: 852 87 mGy-cm   This examination, like all CT scans performed in the [facility], was performed utilizing techniques to minimize radiation dose exposure, including the use of iterative  reconstruction and automated exposure control  IMAGE QUALITY:  Exam limited by motion artifact  FINDINGS: ALIGNMENT:  Slight reversal of lordotic curvature  Grade 1 anterolisthesis of C3 on C4 and C4 on C5 appearing stable  Concave left cervical thoracic scoliotic curvature  VERTEBRAL BODIES:  Previous left side C5 transverse process fracture has healed  No new fractures are found  DEGENERATIVE CHANGES:  Moderate multilevel cervical degenerative changes are noted  No critical central canal stenosis  PREVERTEBRAL AND PARASPINAL SOFT TISSUES:  Unremarkable  THORACIC INLET:  Bilateral pleural effusions  Impression: Exam limited by motion artifact  No cervical spine fracture or traumatic malalignment  The study was marked in EPIC for immediate notification because this is a "level C trauma"  Workstation performed: [id number]      Bedside Procedure    Result Date: 2/5/2021  Narrative: [phone] [id number] [phone] [id number]  [id number]      EKG personally reviewed by Lizy Rogers MD      Counseling / Coordination of Care  Total floor / unit time spent today 30 minutes  Greater than 50% of total time was spent with the patient and / or family counseling and / or coordination of care

## 2021-02-10 NOTE — ASSESSMENT & PLAN NOTE
· Acute on chronic hypercarbic respiratory failure  · Respiratory failure with hypoxia, now on 3 5 liters oxygen (desats very quickly )   · Multifactorial possible  COPD, CHF exacerbation and pleural effusion  · Continue with the diuresis added diamox 2/7  · Keep oxygen levels 88-92 %  · On 2l nc

## 2021-02-10 NOTE — PLAN OF CARE
Problem: Potential for Falls  Goal: Patient will remain free of falls  Description: INTERVENTIONS:  - Assess patient frequently for physical needs  -  Identify cognitive and physical deficits and behaviors that affect risk of falls    -  [place] fall precautions as indicated by assessment   - Educate patient/family on patient safety including physical limitations  - Instruct patient to call for assistance with activity based on assessment  - Modify environment to reduce risk of injury  - Consider OT/PT consult to assist with strengthening/mobility  Outcome: Progressing

## 2021-02-10 NOTE — PROGRESS NOTES
Progress Note - Joe Basilio 1935, 80 y o  female MRN: [id number]    Unit/Bed#: [address] Encounter: [id number]    Primary Care Provider: Familia Cobb DO   Date and time admitted to hospital: 2/5/2021  2:23 PM        Respiratory failure with hypoxia [facility])  Assessment & Plan  · Acute on chronic hypercarbic respiratory failure  · Respiratory failure with hypoxia, now on 3 5 liters oxygen (desats very quickly )   · Multifactorial possible  COPD, CHF exacerbation and pleural effusion  · Continue with the diuresis added diamox 2/7  · Keep oxygen levels 88-92 %    Type 2 diabetes mellitus ([facility] [address] )  Assessment & Plan  Lab Results   Component Value Date    HGBA1C 6 3 (H) 07/01/2020       No results for input(s): POCGLU in the last 72 hours  Blood Sugar Average: Last 72 hrs:     · Type 2 diabetes mellitus  · Continue with insulin  · Hold oral hypoglycemic medication  · Avoid hypoglycemia    S/P CABG (coronary artery bypass graft)  Assessment & Plan  · Aspirin and statin     Hyperlipidemia  Assessment & Plan  · Continue with statin     Chronic diastolic congestive heart failure (HCC)  Assessment & Plan  Wt Readings from Last 3 Encounters:   02/09/21 94 7 kg (208 lb 11 2 oz)   10/15/20 97 1 kg (214 lb)   07/28/20 98 4 kg (217 lb)       · Continue diuresis  · Strict I/o's  · Fluid restriction   · Daily weights  · Chest xray : Central vascular congestion with suspected asymmetric pulmonary edema and bibasilar opacities as above    · Patient had another chest x-ray done showed improving vascular congestion  · Continue diuresis per cardiology        Hypothyroidism  Assessment & Plan  · Hypothyroidism  · Continue with levothyroxine    Hypertension  Assessment & Plan  · Continue with metoprolol 25 mg [id number]   · Started on iv lasix 40 mg daily per cards, pulmonary added Diamox 2/7  · Monitor blood pressure    [facility]D [facility]chronic obstructive [facility] disease[facility] ([facility] )  Assessment & Plan  · COPD does not appear to be exacerbation  · - pt with no wheezing on 6 liters midflow as pt keeps pulling oxygen off   · - discussed with pulmonary  · Discontinue steroids   · Keep oxygen levels greater than 88- 94%  ·     * Pleural effusion  Assessment & Plan  · Pleural effusion bilateral  · Continue with Lasix 40mg iv daily per cardiology team  · - per pulmonary added Diamox secondary to patient's hypercarbia, with very scant improvement -   · - however pt is rather confused today not at all how she was yesterday   · - while in the room changed pt 3 liters however dropped rapidly to 87% so increased to 3 5liters to keep her at 88% minimum   · Monitor BMP  due to ongoing hypercarbia  · Pulmonary evaluated POCUS and determined that no thoracentesis was needed  · Monitor input output  · Daily weights  · Pulmonary to monitor effusion        VTE Pharmacologic Prophylaxis:   Pharmacologic: Enoxaparin (Lovenox)  Mechanical VTE Prophylaxis in Place: Yes    Patient Centered Rounds: I have performed bedside rounds with nursing staff today  Discussions with Specialists or Other Care Team Provider:     Education and Discussions with Family / Patient: son updated    Time Spent for Care: 30 minutes  More than 50% of total time spent on counseling and coordination of care as described above  Current Length of Stay: 4 day(s)    Current Patient Status: Inpatient   Certification Statement: The patient will continue to require additional inpatient hospital stay due to diuresis    Discharge Plan:     Code Status: Level 1 - Full Code      Subjective:   Patient seen and examined  Breathing better, leg edema is improving    Objective:     Vitals:   Temp (24hrs), Av °F (37 2 °C), Min:98 7 °F (37 1 °C), Max:99 1 °F (37 3 °C)    Temp:  [98 7 °F (37 1 °C)-99 1 °F (37 3 °C)] 99 1 °F (37 3 °C)  HR:  [69-80] 74  Resp:  [18-20] 20  BP: (101-154)/(55-71) 129/55  SpO2:  [90 %-94 %] 94 %  Body mass index is 35 82 kg/m²       Input and Output Summary (last 24 hours): Intake/Output Summary (Last 24 hours) at 2/9/2021 1947  Last data filed at 2/9/2021 1807  Gross per 24 hour   Intake 240 ml   Output 1455 ml   Net -1215 ml       Physical Exam:     Physical Exam  Constitutional:       General: She is not in acute distress  Appearance: Normal appearance  HENT:      Head: Normocephalic  Nose: Nose normal    Eyes:      General: No scleral icterus  Cardiovascular:      Rate and Rhythm: Normal rate and regular rhythm  Pulmonary:      Comments: decreased  Breath sounds bilateral  Abdominal:      General: Abdomen is flat  Musculoskeletal: Normal range of motion  Right lower leg: Edema present  Left lower leg: Edema present  Skin:     General: Skin is warm  Neurological:      General: No focal deficit present  Mental Status: She is alert  Additional Data:     Labs:    Results from last 7 days   Lab Units 02/09/21  0448   WBC Thousand/uL 7 85   HEMOGLOBIN g/dL 13 2   HEMATOCRIT % 47 6*   PLATELETS Thousands/uL 255   NEUTROS PCT % 71   LYMPHS PCT % 14   MONOS PCT % 12   EOS PCT % 2     Results from last 7 days   Lab Units 02/09/21  0448 02/08/21  0514   POTASSIUM mmol/L 4 0 3 8   CHLORIDE mmol/L 98* 98*   CO2 mmol/L 43* >45*   BUN mg/dL 29* 34*   CREATININE mg/dL 1 04 1 06   CALCIUM mg/dL 8 8 9 0   ALK PHOS U/L  --  90   ALT U/L  --  12   AST U/L  --  9     Results from last 7 days   Lab Units 02/06/21  0433   INR  1 07       * I Have Reviewed All Lab Data Listed Above  * Additional Pertinent Lab Tests Reviewed:  Jessica 66 Admission Reviewed    Imaging:    Imaging Reports Reviewed Today Include:   Imaging Personally Reviewed by Myself Includes:     Recent Cultures (last 7 days):           Last 24 Hours Medication List:   Current Facility-Administered Medications   Medication Dose Route Frequency Provider Last Rate    acetaminophen  650 mg Oral Q6H PRN Lauro Abreu MD      acetaZOLAMIDE  250 mg Intravenous Q12H [address] Cleophus Safe, DO      albuterol  2 5 mg Nebulization Q4H PRN Ulises Werner MD      ALPRAZolam  0 5 mg Oral TID PRN Ulises Werner MD      aluminum-magnesium hydroxide-simethicone  30 mL Oral Q6H PRN Ulises Werner MD      amLODIPine  5 mg Oral Daily Ulises Werner MD      ascorbic acid  500 mg Oral Daily Ulises Werner MD      aspirin  81 mg Oral Daily Ulises Werner MD      bimatoprost  1 drop Both Eyes HS Ulises Werner MD      calcium carbonate  1,000 mg Oral Daily PRN Ulises Werner MD      cholecalciferol  2,000 Units Oral Daily Ulises Werner MD      clopidogrel  75 mg Oral Daily Ulises Werner MD      docusate sodium  100 mg Oral BID Ulises Werner MD      enoxaparin  40 mg Subcutaneous Daily Ulises Werner MD      famotidine  20 mg Oral BID Ulises Werner MD      furosemide  40 mg Intravenous Daily Ophelia Cooper MD      ipratropium  0 5 mg Nebulization TID Cleophus Safe, DO      levalbuterol  1 25 mg Nebulization TID Cleophus Safe, DO      levothyroxine  75 mcg Oral Early Morning Ulises Werner MD      metoprolol tartrate  25 mg Oral Q12H [address] Ulises Werner MD      oxyCODONE-acetaminophen  1 tablet Oral Q4H PRN Ulises Werner MD      senna  1 tablet Oral Daily Ulises Werner MD          Today, Patient Was Seen By: Sharlene Contreras MD    ** Please Note: Dictation voice to text software may have been used in the creation of this document   **

## 2021-02-10 NOTE — PROGRESS NOTES
Progress Note - Jeanne Cornell 1935, 80 y o  female MRN: [id number]    Unit/Bed#: [address] Encounter: [id number]    Primary Care Provider: Ean Padgett DO   Date and time admitted to hospital: 2/5/2021  2:23 PM        Respiratory failure with hypoxia [facility])  Assessment & Plan  · Acute on chronic hypercarbic respiratory failure  · Respiratory failure with hypoxia, now on 3 5 liters oxygen (desats very quickly )   · Multifactorial possible  COPD, CHF exacerbation and pleural effusion  · Continue with the diuresis added diamox 2/7  · Keep oxygen levels 88-92 %  · On 2l nc    Type 2 diabetes mellitus ([facility] [address] )  Assessment & Plan  Lab Results   Component Value Date    HGBA1C 6 3 (H) 07/01/2020       No results for input(s): POCGLU in the last 72 hours  Blood Sugar Average: Last 72 hrs:     · Type 2 diabetes mellitus  · Continue with insulin  · Hold oral hypoglycemic medication  · Avoid hypoglycemia    S/P CABG (coronary artery bypass graft)  Assessment & Plan  · Aspirin and statin     Hyperlipidemia  Assessment & Plan  · Continue with statin     Chronic diastolic congestive heart failure (HCC)  Assessment & Plan  Wt Readings from Last 3 Encounters:   02/10/21 93 3 kg (205 lb 11 oz)   10/15/20 97 1 kg (214 lb)   07/28/20 98 4 kg (217 lb)       · Continue diuresis  · Strict I/o's  · Fluid restriction   · Daily weights  · Chest xray : Central vascular congestion with suspected asymmetric pulmonary edema and bibasilar opacities as above  · Patient had another chest x-ray done showed improving vascular congestion  · Continue diuresis per cardiology-transition to p o  Diuretics  · Continue with Diamox-transition to p o          Hypothyroidism  Assessment & Plan  · Hypothyroidism  · Continue with levothyroxine    Hypertension  Assessment & Plan  · Continue with metoprolol 25 mg [id number]   · Started on iv lasix 40 mg daily per cards, pulmonary added Diamox 2/7  · Monitor blood pressure    COPD (chronic obstructive pulmonary disease) ([facility] [address] )  Assessment & Plan  · COPD does not appear to be exacerbation  · - pt with no wheezing on 6 liters midflow as pt keeps pulling oxygen off   · - discussed with pulmonary  · Discontinue steroids   · Keep oxygen levels greater than 88- 94%  ·     * Pleural effusion  Assessment & Plan  · Pleural effusion bilateral  · Continue with Lasix 40mg iv daily per cardiology team  · - per pulmonary added Diamox secondary to patient's hypercarbia, with very scant improvement -   · - however pt is rather confused today not at all how she was yesterday   · - while in the room changed pt 3 liters however dropped rapidly to 87% so increased to 3 5liters to keep her at 88% minimum   · Monitor BMP  due to ongoing hypercarbia  · Pulmonary evaluated POCUS and determined that no thoracentesis was needed  · Monitor input output  · Daily weights  · Pulmonary to monitor effusion      VTE Pharmacologic Prophylaxis:   Pharmacologic: Enoxaparin (Lovenox)  Mechanical VTE Prophylaxis in Place: Yes    Patient Centered Rounds: I have performed bedside rounds with nursing staff today  Discussions with Specialists or Other Care Team Provider:     Education and Discussions with Family / Patient: patient and son  Time Spent for Care: 30 minutes  More than 50% of total time spent on counseling and coordination of care as described above  Current Length of Stay: 5 day(s)    Current Patient Status: Inpatient   Certification Statement: The patient will continue to require additional inpatient hospital stay due to Diuresis-transition to p o  Discharge Plan:     Code Status: Level 1 - Full Code      Subjective:   Patient seen and examined  No events overnight  Denies any specific complaints  Reported that shortness of breath is improving    Patient and family wants discharge home with VNA rather than going to rehab    Objective:     Vitals:   Temp (24hrs), Av 7 °F (37 1 °C), Min:98 4 °F (36 9 °C), Max:99 1 °F (37 3 °C)    Temp:  [98 4 °F (36 9 °C)-99 1 °F (37 3 °C)] 98 7 °F (37 1 °C)  HR:  [68-74] 73  Resp:  [18-24] 24  BP: (104-129)/(55-80) 117/59  SpO2:  [93 %-99 %] 95 %  Body mass index is 35 31 kg/m²  Input and Output Summary (last 24 hours): Intake/Output Summary (Last 24 hours) at 2/10/2021 1752  Last data filed at 2/10/2021 1400  Gross per 24 hour   Intake 360 ml   Output 1300 ml   Net -940 ml       Physical Exam:     Physical Exam  Constitutional:       General: She is not in acute distress  HENT:      Head: Normocephalic  Nose: Nose normal       Mouth/Throat:      Pharynx: No oropharyngeal exudate  Eyes:      General: No scleral icterus  Neck:      Musculoskeletal: Normal range of motion  Cardiovascular:      Rate and Rhythm: Normal rate  Pulmonary:      Comments: Decreased breath sounds bilateral  Abdominal:      General: Abdomen is flat  Skin:     General: Skin is warm  Neurological:      General: No focal deficit present  Mental Status: She is alert  Additional Data:     Labs:    Results from last 7 days   Lab Units 02/10/21  0500 02/09/21  0448   WBC Thousand/uL 7 22 7 85   HEMOGLOBIN g/dL 13 9 13 2   HEMATOCRIT % 50 2* 47 6*   PLATELETS Thousands/uL 251 255   NEUTROS PCT %  --  71   LYMPHS PCT %  --  14   MONOS PCT %  --  12   EOS PCT %  --  2     Results from last 7 days   Lab Units 02/10/21  0500   POTASSIUM mmol/L 4 0   CHLORIDE mmol/L 98*   CO2 mmol/L 42*   BUN mg/dL 35*   CREATININE mg/dL 1 03   CALCIUM mg/dL 9 0   ALK PHOS U/L 95   ALT U/L 13   AST U/L 16     Results from last 7 days   Lab Units 02/06/21  0433   INR  1 07       * I Have Reviewed All Lab Data Listed Above    * Additional Pertinent Lab Tests Reviewed:   Imaging:    Imaging Reports Reviewed Today Include:   Imaging Personally Reviewed by Myself Includes:      Recent Cultures (last 7 days):           Last 24 Hours Medication List:   Current Facility-Administered Medications   Medication Dose Route Frequency Provider Last Rate    acetaminophen  650 mg Oral Q6H PRN Marcos Flores MD      acetaZOLAMIDE  250 mg Oral Q12H [address] Dionicio Vargas MD      albuterol  2 5 mg Nebulization Q4H PRN Marcos Flores MD      ALPRAZolam  0 5 mg Oral TID PRN Marcos Flores MD      aluminum-magnesium hydroxide-simethicone  30 mL Oral Q6H PRN Marcos Flores MD      amLODIPine  5 mg Oral Daily Marcos Flores MD      ascorbic acid  500 mg Oral Daily Marcos Flores MD      aspirin  81 mg Oral Daily Marcos Flores MD      bimatoprost  1 drop Both Eyes HS Marcos Flores MD      calcium carbonate  1,000 mg Oral Daily PRN Marcos Flores MD      cholecalciferol  2,000 Units Oral Daily Marcos Flores MD      clopidogrel  75 mg Oral Daily Marcos Flores MD      docusate sodium  100 mg Oral BID Marcos Flores MD      enoxaparin  40 mg Subcutaneous Daily Marcos Flores MD      famotidine  20 mg Oral BID Marcos Flores MD      furosemide  40 mg Oral Daily Gayle Allison MD      ipratropium  0 5 mg Nebulization TID Marcos Stevenson DO      levalbuterol  1 25 mg Nebulization TID Marcos Stevenson DO      levothyroxine  75 mcg Oral Early Morning Marcos Flores MD      metoprolol tartrate  25 mg Oral Q12H [address] Marcos Flores MD      oxyCODONE-acetaminophen  1 tablet Oral Q4H PRN Marcos Flores MD      senna  1 tablet Oral Daily Marcos Flores MD          Today, Patient Was Seen By: Steffany Gustafson MD    ** Please Note: Dictation voice to text software may have been used in the creation of this document   **

## 2021-02-10 NOTE — OCCUPATIONAL THERAPY NOTE
Occupational Therapy Treatment Note      Adilia Dominguez    2/10/2021    Principal Problem:    Pleural effusion  Active Problems:    COPD (chronic obstructive pulmonary disease) (HCC)    Hypertension    Hypothyroidism    Chronic diastolic congestive heart failure (HCC)    Hyperlipidemia    S/P CABG (coronary artery bypass graft)    Type 2 diabetes mellitus (HCC)    Respiratory failure with hypoxia (HCC)      Past Medical History:   Diagnosis Date    Anxiety     Cataract, bilateral     Last Assessed:3/19/2014    COPD (chronic obstructive [facility] disease) ([facility] [address] )     Coronary artery disease     Hx of arterial ischemic stroke     Hypertension     Stroke ([facility] [address] )     Wears glasses        Past Surgical History:   Procedure Laterality Date    CAROTID ENDARTERECTOMY Right     CATARACT EXTRACTION W/ INTRAOCULAR LENS  IMPLANT, BILATERAL      COLONOSCOPY N/A 9/30/2017    Procedure: COLONOSCOPY FOR CONTROL OF BLEEDING;  Surgeon: Niko Angela MD;  Location:  MAIN OR;  Service: Colorectal    CORONARY ARTERY BYPASS GRAFT      CABG X3 with LIMA to LAD,SVG to OM,SVG to distal  RCA ; Last Assessed:7/13/2015    JOINT REPLACEMENT      left TKR    KIDNEY STONE SURGERY      NEPHROSTOMY Left     with Drainage Irrigation ; [id number]    DE TOTAL KNEE ARTHROPLASTY Right 2/25/2016    Procedure: ARTHROPLASTY KNEE TOTAL;  Surgeon: Lou Samayoa MD;  Location: [facility] Main OR;  Service: Orthopedics        02/10/21 1330   OT Last Visit   OT Visit Date 02/10/21   Note Type   Note Type Treatment   Restrictions/Precautions   Weight Bearing Precautions Per Order No   Other Precautions Cognitive; Chair Alarm; Bed Alarm;Multiple lines;Telemetry;O2;Fall Risk;Pain;Hard of hearing  (6L NC O2)   Lifestyle   Autonomy Pt reports son A w/ all ADLS (bathing, dressing, toileting) and son A w/ all IADLS; (-) drives; ambulates w/ rollator PTA   Reciprocal Relationships Pt lives w/ son who is her primary caregiver, however Pt reports he works during the day and she is often home alone  Service to Others Pt is retired   Intrinsic Gratification Pt reports enjoying spending time w/ her son   Pain Assessment   Pain Assessment Tool 0-10   Pain Score No Pain   ADL   Where Assessed Chair   UB Dressing Assistance 3  Moderate Assistance   UB Dressing Deficit Setup;Verbal cueing;Supervision/safety; Increased time to complete; Thread RUE; Thread LUE;Pull around back; Fasteners   UB Dressing Comments [place]/donning gown   LB Dressing Assistance 2  Maximal Assistance   LB Dressing Deficit Setup;Verbal cueing;Supervision/safety; Increased time to complete; Don/doff R sock; Don/doff L sock   LB Dressing Comments doffing/donning B/L socks   Toileting Assistance  2  Maximal Assistance   Toileting Deficit Setup;Verbal cueing;Supervison/safety; Increased time to complete;Clothing management up;Clothing management down;Perineal hygiene   Toileting Comments Pt grossly incontinent of bladder upon STS from chair  Pt required A for all CM and hygiene  Bed Mobility   Supine to Sit Unable to assess   Sit to Supine 3  Moderate assistance   Additional items Assist x 1; Increased time required;LE management;Verbal cues; Bedrails   Additional Comments Pt seated OOB in chair upon OT arrival  Pt requesting to return supine in bed  Pt laying supine in bed w/ HOB elevated, alarm activated, and all needs in reach s/p OT session  Transfers   Sit to Stand 4  Minimal assistance   Additional items Assist x 1; Increased time required;Verbal cues;Armrests   Stand to Sit 4  Minimal assistance   Additional items Assist x 1; Increased time required;Verbal cues;Armrests   Additional Comments Transfers w/ RW  VC and TC required for safety and hand placement  Functional Mobility   Functional Mobility 4  Minimal assistance   Additional Comments Pt demonstrated short distance mobility chair>EOB w/ RW at Min A level      Additional items Rolling walker   Cognition   Overall Cognitive Status Impaired Arousal/Participation Alert   Attention Attends with cues to redirect   Orientation Level Oriented to person;Oriented to place;Oriented to time;Disoriented to situation   Memory Decreased recall of precautions;Decreased recall of recent events;Decreased short term memory   Following Commands Follows one step commands with increased time or repetition   Comments Pt is cooperative to participate w/ increased encouragement  Pt continues to present w/ decreased safety awareness and decreased insight into deficits  Activity Tolerance   Activity Tolerance Patient limited by fatigue;Patient limited by pain;Treatment limited secondary to medical complications (Comment)   Medical Staff Made Aware RN cleared Pt for OT session   Assessment   Assessment Patient participated in Skilled OT session this date with interventions consisting of ADL re training with the use of correct body mechnaics, Energy Conservation techniques, deep breathing technique, safety awareness and fall prevention techniques,  therapeutic activities to: increase activity tolerance and increase OOB/ sitting tolerance   Patient agreeable to OT treatment session, upon arrival patient was found seated OOB to Chair  Pt participated in transfers, mobility, toileting, and dressing  Please refer to chart for functional levels  Patient requiring frequent re direction, verbal cues for safety, verbal cues for correct technique, verbal cues for pacing thru activity steps, cognitive assistance to anticipate next step, one step directives and frequent rest periods  Patient continues to be functioning below baseline level, occupational performance remains limited secondary to factors listed above and increased risk for falls and injury  From OT standpoint, recommendation at time of d/c would be Short Term Rehab     Patient to benefit from continued Occupational Therapy treatment while in the hospital to address deficits as defined above and maximize level of functional independence with ADLs and functional mobility  Plan   Treatment Interventions ADL retraining;Functional transfer training;UE strengthening/ROM; Endurance training;Patient/family training;Cognitive reorientation;Equipment evaluation/education; Fine motor coordination activities; Compensatory technique education; Activityengagement; Energy conservation   Goal Expiration Date 02/18/21   OT Treatment Day 1   OT Frequency 3-5x/wk   Recommendation   OT Discharge Recommendation Post-Acute Rehabilitation Services   OT - OK to Discharge Yes  (when medically cleared)   AM-PAC Daily Activity Inpatient   Lower Body Dressing 2   Bathing 2   Toileting 2   Upper Body Dressing 3   Grooming 3   Eating 3   Daily Activity Raw Score 15   Daily Activity Standardized Score (Calc for Raw Score >=11) 34 69   AM-PAC Applied Cognition Inpatient   Following a Speech/Presentation 2   Understanding Ordinary Conversation 3   Taking Medications 2   Remembering Where Things Are Placed or Put Away 2   Remembering List of 4-5 Errands 2   Taking Care of Complicated Tasks 2   Applied Cognition Raw Score 13   Applied Cognition Standardized Score 30 46   Modified [place] Scale   Modified [place] Scale 4         Vernon Conn MS, OTR/L

## 2021-02-10 NOTE — RESTORATIVE TECHNICIAN NOTE
Restorative Specialist Mobility Note       Activity: Ambulate in amor     Assistive Device: Front wheel walker        Repositioned: Sitting, Up in chair

## 2021-02-10 NOTE — PLAN OF CARE
Problem: OCCUPATIONAL THERAPY ADULT  Goal: Performs self-care activities at highest level of function for planned discharge setting  See evaluation for individualized goals  Description: Treatment Interventions: ADL retraining, Functional transfer training, UE strengthening/ROM, Cognitive reorientation, Patient/family training, Endurance training, Equipment evaluation/education, Fine motor coordination activities, Compensatory technique education, Activityengagement, Energy conservation          See flowsheet documentation for full assessment, interventions and recommendations  Outcome: Progressing  Note: Limitation: Decreased ADL status, Decreased UE strength, Decreased Safe judgement during ADL, Decreased cognition, Decreased endurance, Decreased fine motor control, Decreased high-level ADLs  Prognosis: Fair  Assessment: Patient participated in Skilled OT session this date with interventions consisting of ADL re training with the use of correct body mechnaics, Energy Conservation techniques, deep breathing technique, safety awareness and fall prevention techniques,  therapeutic activities to: increase activity tolerance and increase OOB/ sitting tolerance   Patient agreeable to OT treatment session, upon arrival patient was found seated OOB to Chair  Pt participated in transfers, mobility, toileting, and dressing  Please refer to chart for functional levels  Patient requiring frequent re direction, verbal cues for safety, verbal cues for correct technique, verbal cues for pacing thru activity steps, cognitive assistance to anticipate next step, one step directives and frequent rest periods  Patient continues to be functioning below baseline level, occupational performance remains limited secondary to factors listed above and increased risk for falls and injury  From OT standpoint, recommendation at time of d/c would be Short Term Rehab     Patient to benefit from continued Occupational Therapy treatment while in the hospital to address deficits as defined above and maximize level of functional independence with ADLs and functional mobility        OT Discharge Recommendation: Post-Acute Rehabilitation Services  OT - OK to Discharge: Yes(when medically cleared)

## 2021-02-11 LAB
ALBUMIN SERPL BCP-MCNC: 2.5 G/DL (ref 3.5–5)
ALP SERPL-CCNC: 91 U/L (ref 46–116)
ALT SERPL W P-5'-P-CCNC: 14 U/L (ref 12–78)
ANION GAP SERPL CALCULATED.3IONS-SCNC: -1 MMOL/L (ref 4–13)
AST SERPL W P-5'-P-CCNC: 14 U/L (ref 5–45)
BILIRUB SERPL-MCNC: 0.92 MG/DL (ref 0.2–1)
BUN SERPL-MCNC: 38 MG/DL (ref 5–25)
CALCIUM ALBUM COR SERPL-MCNC: 10.3 MG/DL (ref 8.3–10.1)
CALCIUM SERPL-MCNC: 9.1 MG/DL (ref 8.3–10.1)
CHLORIDE SERPL-SCNC: 96 MMOL/L (ref 100–108)
CO2 SERPL-SCNC: 44 MMOL/L (ref 21–32)
CREAT SERPL-MCNC: 1.1 MG/DL (ref 0.6–1.3)
ERYTHROCYTE [DISTWIDTH] IN BLOOD BY AUTOMATED COUNT: 14.2 % (ref 11.6–15.1)
FLUAV RNA RESP QL NAA+PROBE: NEGATIVE
FLUBV RNA RESP QL NAA+PROBE: NEGATIVE
GFR SERPL CREATININE-BSD FRML MDRD: 46 ML/MIN/1.73SQ M
GLUCOSE SERPL-MCNC: 110 MG/DL (ref 65–140)
HCT VFR BLD AUTO: 47.6 % (ref 34.8–46.1)
HGB BLD-MCNC: 13.3 G/DL (ref 11.5–15.4)
MCH RBC QN AUTO: 27.4 PG (ref 26.8–34.3)
MCHC RBC AUTO-ENTMCNC: 27.9 G/DL (ref 31.4–37.4)
MCV RBC AUTO: 98 FL (ref 82–98)
PLATELET # BLD AUTO: 236 THOUSANDS/UL (ref 149–390)
PMV BLD AUTO: 10.4 FL (ref 8.9–12.7)
POTASSIUM SERPL-SCNC: 4.5 MMOL/L (ref 3.5–5.3)
PROT SERPL-MCNC: 6.2 G/DL (ref 6.4–8.2)
RBC # BLD AUTO: 4.86 MILLION/UL (ref 3.81–5.12)
RSV RNA RESP QL NAA+PROBE: NEGATIVE
SARS-COV-2 RNA RESP QL NAA+PROBE: NEGATIVE
SODIUM SERPL-SCNC: 139 MMOL/L (ref 136–145)
WBC # BLD AUTO: 8.34 THOUSAND/UL (ref 4.31–10.16)

## 2021-02-11 PROCEDURE — 85027 COMPLETE CBC AUTOMATED: CPT | Performed by: FAMILY MEDICINE

## 2021-02-11 PROCEDURE — 99232 SBSQ HOSP IP/OBS MODERATE 35: CPT | Performed by: INTERNAL MEDICINE

## 2021-02-11 PROCEDURE — 94660 CPAP INITIATION&MGMT: CPT

## 2021-02-11 PROCEDURE — 94760 N-INVAS EAR/PLS OXIMETRY 1: CPT

## 2021-02-11 PROCEDURE — 94640 AIRWAY INHALATION TREATMENT: CPT

## 2021-02-11 PROCEDURE — 80053 COMPREHEN METABOLIC PANEL: CPT | Performed by: FAMILY MEDICINE

## 2021-02-11 PROCEDURE — 97530 THERAPEUTIC ACTIVITIES: CPT

## 2021-02-11 PROCEDURE — 0241U HB NFCT DS VIR RESP RNA 4 TRGT: CPT | Performed by: INTERNAL MEDICINE

## 2021-02-11 RX ADMIN — Medication 2000 UNITS: at 08:21

## 2021-02-11 RX ADMIN — IPRATROPIUM BROMIDE 0.5 MG: 0.5 SOLUTION RESPIRATORY (INHALATION) at 19:36

## 2021-02-11 RX ADMIN — ASPIRIN 81 MG: 81 TABLET, COATED ORAL at 08:21

## 2021-02-11 RX ADMIN — METOPROLOL TARTRATE 25 MG: 25 TABLET, FILM COATED ORAL at 21:32

## 2021-02-11 RX ADMIN — ALPRAZOLAM 0.5 MG: 0.5 TABLET ORAL at 21:35

## 2021-02-11 RX ADMIN — ACETAZOLAMIDE 250 MG: 250 TABLET ORAL at 08:22

## 2021-02-11 RX ADMIN — ALPRAZOLAM 0.5 MG: 0.5 TABLET ORAL at 05:15

## 2021-02-11 RX ADMIN — FAMOTIDINE 20 MG: 20 TABLET, FILM COATED ORAL at 17:18

## 2021-02-11 RX ADMIN — LEVALBUTEROL HYDROCHLORIDE 1.25 MG: 1.25 SOLUTION, CONCENTRATE RESPIRATORY (INHALATION) at 19:36

## 2021-02-11 RX ADMIN — LEVALBUTEROL HYDROCHLORIDE 1.25 MG: 1.25 SOLUTION, CONCENTRATE RESPIRATORY (INHALATION) at 14:05

## 2021-02-11 RX ADMIN — OXYCODONE HYDROCHLORIDE AND ACETAMINOPHEN 500 MG: 500 TABLET ORAL at 08:21

## 2021-02-11 RX ADMIN — IPRATROPIUM BROMIDE 0.5 MG: 0.5 SOLUTION RESPIRATORY (INHALATION) at 14:05

## 2021-02-11 RX ADMIN — DOCUSATE SODIUM 100 MG: 100 CAPSULE, LIQUID FILLED ORAL at 08:21

## 2021-02-11 RX ADMIN — LEVALBUTEROL HYDROCHLORIDE 1.25 MG: 1.25 SOLUTION, CONCENTRATE RESPIRATORY (INHALATION) at 06:57

## 2021-02-11 RX ADMIN — FAMOTIDINE 20 MG: 20 TABLET, FILM COATED ORAL at 08:21

## 2021-02-11 RX ADMIN — BIMATOPROST 1 DROP: 0.1 SOLUTION/ DROPS OPHTHALMIC at 21:33

## 2021-02-11 RX ADMIN — CLOPIDOGREL BISULFATE 75 MG: 75 TABLET ORAL at 08:21

## 2021-02-11 RX ADMIN — ACETAZOLAMIDE 250 MG: 250 TABLET ORAL at 21:32

## 2021-02-11 RX ADMIN — LEVOTHYROXINE SODIUM 75 MCG: 75 TABLET ORAL at 05:12

## 2021-02-11 RX ADMIN — METOPROLOL TARTRATE 25 MG: 25 TABLET, FILM COATED ORAL at 08:21

## 2021-02-11 RX ADMIN — SENNOSIDES 8.6 MG: 8.6 TABLET, FILM COATED ORAL at 08:22

## 2021-02-11 RX ADMIN — ENOXAPARIN SODIUM 40 MG: 40 INJECTION SUBCUTANEOUS at 08:21

## 2021-02-11 RX ADMIN — AMLODIPINE BESYLATE 5 MG: 5 TABLET ORAL at 08:21

## 2021-02-11 RX ADMIN — FUROSEMIDE 40 MG: 40 TABLET ORAL at 08:21

## 2021-02-11 RX ADMIN — IPRATROPIUM BROMIDE 0.5 MG: 0.5 SOLUTION RESPIRATORY (INHALATION) at 06:57

## 2021-02-11 RX ADMIN — DOCUSATE SODIUM 100 MG: 100 CAPSULE, LIQUID FILLED ORAL at 17:18

## 2021-02-11 NOTE — ASSESSMENT & PLAN NOTE
· COPD does not appear to be exacerbation, discussed with pulmonology, steroids were discontinued 2/6  · Keep oxygen levels greater than 88- 94%

## 2021-02-11 NOTE — ASSESSMENT & PLAN NOTE
Wt Readings from Last 3 Encounters:   02/11/21 93 kg (205 lb)   10/15/20 97 1 kg (214 lb)   07/28/20 98 4 kg (217 lb)       · Continue diuresis  · Strict I/o's  · Fluid restriction   · Daily weights  · Chest xray 2/5 : Central vascular congestion with suspected asymmetric pulmonary edema and bibasilar opacities as above    · Chest x-ray 2/9 showed improving vascular congestion with decrease in small effusions and bibasilar atelectasis  · Transitioned to Lasix 40 mg daily on 02/10  · Transitioned to PO Diamox 2/10, today worsening HC03

## 2021-02-11 NOTE — PLAN OF CARE
Problem: PHYSICAL THERAPY ADULT  Goal: Performs mobility at highest level of function for planned discharge setting  See evaluation for individualized goals  Description: Treatment/Interventions: Functional transfer training, LE strengthening/ROM, Therapeutic exercise, Endurance training, Patient/family training, Cognitive reorientation, Equipment eval/education, Bed mobility, Gait training, Spoke to nursing  Equipment Recommended: Too [place]       See flowsheet documentation for full assessment, interventions and recommendations  Outcome: Progressing  Note: Prognosis: Good  Problem List: Decreased strength, Decreased endurance, Impaired balance, Decreased mobility, Pain  Assessment: Patient seen for session for setup, bed mobility, time spent on side of bed, transfers, standing trial, gait training, repositioning  Patient cooperative with session, limited by low back pain, generalized weakness  Needs a bit less assistance today with mobility tasks, but still fatigues very quickly  Due to continued mobility decline, continue to recommend rehab at discharge  Barriers to Discharge: Inaccessible home environment, Decreased caregiver support     PT Discharge Recommendation: [id number]     PT - OK to Discharge: (S) Yes(When stable to rehab)    See flowsheet documentation for full assessment

## 2021-02-11 NOTE — PROGRESS NOTES
Cardiology Progress Note - Elsi Garcia Sagat 80 y o  female MRN: [id number]    Unit/Bed#: -01 Encounter: [id number]      Assessment:  Principal Problem:    Pleural effusion  Active Problems:    COPD (chronic obstructive pulmonary disease) ([facility])    Hypertension    Hypothyroidism    Chronic diastolic congestive heart failure (HCC)    Hyperlipidemia    S/P CABG (coronary artery bypass graft)    Type 2 diabetes mellitus ([address] )    Respiratory failure with hypoxia ([facility] )      Plan:  Patient is comfortable this morning  She has no chest pain or significant dyspnea   Echocardiogram done  February 6th demonstrated preserved LV systolic function with mild LVH   Grade 2 diastolic dysfunction is suggested  Quin Daley is no significant functional valvular heart disease noted   Moderate MAC is noted   BMP today with potassium of 4 5 and creatinine of 1 10   CO2 is 44  Patient continues on Diamox  Hemoglobin is 13 3   Pulmonary note appreciated  patient on supplemental oxygen Telemetry demonstrates sinus rhythm with PACs and PVCs  Will discontinue intravenous furosemide and start oral therapy  Subjective:   Patient seen and examined  No significant events overnight   negative  Objective:     Vitals: Blood pressure 135/68, pulse 78, temperature 98 3 °F (36 8 °C), resp  rate 18, height 5' 4" (1 626 m), weight 93 kg (205 lb), SpO2 91 %, not currently breastfeeding , Body mass index is 35 19 kg/m² ,   Orthostatic Blood Pressures      Most Recent Value   Blood Pressure  135/68 filed at 02/11/2021 0816   Patient Position - Orthostatic VS  Lying filed at 02/11/2021 0256      ,      Intake/Output Summary (Last 24 hours) at 2/11/2021 0850  Last data filed at 2/11/2021 0801  Gross per 24 hour   Intake 0 ml   Output 1000 ml   Net -1000 ml       No significant arrhythmias seen on telemetry review         Physical Exam:    GEN: Ashley A Sagat   NECK: supple, no carotid bruits, no JVD or HJR  HEART: normal rate, regular rhythm, normal S1 and S2, no murmurs, clicks, gallops or rubs   LUNGS: clear to auscultation bilaterally; no wheezes, rales, or rhonchi   ABDOMEN: normal bowel sounds, soft, no tenderness, no distention  EXTREMITIES: edema  SKIN: warm and well perfused, no suspicious lesions on exposed skin    Labs & Results:    No results displayed because visit has over 200 results  Xr Chest Portable    Result Date: 2/9/2021  Narrative: CHEST INDICATION:   Compare to prior study and assess for improvement in pulmonary congestion  COMPARISON:  Chest radiograph from 2/5/2021 and 5/3/2019  EXAM PERFORMED/VIEWS:  XR CHEST PORTABLE FINDINGS: Mild cardiomegaly  CABG  Right carotid endarterectomy  Improving pulmonary venous congestion with decrease in small effusions and bibasilar atelectasis  Mild curvature of the spine  Impression: Improving pulmonary venous congestion with decrease in small effusions and bibasilar atelectasis  Workstation performed: [id number]     Xr Chest Portable    Result Date: 2/5/2021  Narrative: CHEST INDICATION:  Shortness of breath, history includes COPD as well as CHF  COMPARISON:  5/3/2019 EXAM PERFORMED/VIEWS:  XR CHEST PORTABLE FINDINGS: Heart shadow is partially obscured by adjacent opacity  Status post median sternotomy  Central vascular congestive changes with right perihilar groundglass opacity may represent asymmetric pulmonary edema  Bibasilar opacities likely represent combination of atelectasis and pleural effusions  Mild left apical pleural thickening  No pneumothorax  Surgical clips right neck  Degenerative changes of the shoulders and spine with lower thoracic dextroscoliosis  Impression: Central vascular congestion with suspected asymmetric pulmonary edema and bibasilar opacities as above  Workstation performed: [id number]     Ct Head Without Contrast    Result Date: 2/5/2021  Narrative: CT BRAIN - WITHOUT CONTRAST INDICATION:   Head trauma, minor (Age => 65y) FALL ON ASA   COMPARISON: May 1, 2019 TECHNIQUE:  CT examination of the brain was performed  In addition to axial images, sagittal and coronal 2D reformatted images were created and submitted for interpretation  Radiation dose length product (DLP) for this visit:  907 64 mGy-cm   This examination, like all CT scans performed in the [facility], was performed utilizing techniques to minimize radiation dose exposure, including the use of iterative  reconstruction and automated exposure control  IMAGE QUALITY:  Diagnostic  FINDINGS: PARENCHYMA: Decreased attenuation is noted in periventricular and subcortical white matter demonstrating an appearance that is statistically most likely to represent mild microangiopathic change  Old right parietal and occipital encephalomalacia No CT signs of acute infarction  No intracranial mass, mass effect or midline shift  No acute parenchymal hemorrhage  VENTRICLES AND EXTRA-AXIAL SPACES:  Normal for the patient's age  VISUALIZED ORBITS AND PARANASAL SINUSES:  Unremarkable  CALVARIUM AND EXTRACRANIAL SOFT TISSUES:  Mild soft tissue swelling left forehead  No underlying calvarial fracture  Partially calcified sebaceous cyst right posterior parietal/occipital scalp  Impression: No acute intracranial abnormality  Workstation performed: [id number]     Ct Spine Cervical Without Contrast    Result Date: 2/5/2021  Narrative: CT CERVICAL SPINE - WITHOUT CONTRAST INDICATION:   Neck trauma (Age => 65y) fall on asa  COMPARISON:  May 1, 2019 TECHNIQUE:  CT examination of the cervical spine was performed without intravenous contrast   Contiguous axial images were obtained  Sagittal and coronal reconstructions were performed  Radiation dose length product (DLP) for this visit:  852 87 mGy-cm     This examination, like all CT scans performed in the [facility], was performed utilizing techniques to minimize radiation dose exposure, including the use of iterative reconstruction and automated exposure control  IMAGE QUALITY:  Exam limited by motion artifact  FINDINGS: ALIGNMENT:  Slight reversal of lordotic curvature  Grade 1 anterolisthesis of C3 on C4 and C4 on C5 appearing stable  Concave left cervical thoracic scoliotic curvature  VERTEBRAL BODIES:  Previous left side C5 transverse process fracture has healed  No new fractures are found  DEGENERATIVE CHANGES:  Moderate multilevel cervical degenerative changes are noted  No critical central canal stenosis  PREVERTEBRAL AND PARASPINAL SOFT TISSUES:  Unremarkable  THORACIC INLET:  Bilateral pleural effusions  Impression: Exam limited by motion artifact  No cervical spine fracture or traumatic malalignment  The study was marked in EPIC for immediate notification because this is a "level C trauma"  Workstation performed: [id number]      Bedside Procedure    Result Date: 2/5/2021  Narrative: 9 1 208 [id number] [phone] [id number]  886      EKG personally reviewed by Saleem Craven MD      Counseling / Coordination of Care  Total floor / unit time spent today 30 minutes  Greater than 50% of total time was spent with the patient and / or family counseling and / or coordination of care

## 2021-02-11 NOTE — PROGRESS NOTES
Progress Note - Pulmonary   Verba Goltz Sagat 80 y o  female MRN: [id number]  Unit/Bed#: -01 Encounter: [id number]      Assessment:  · Acute on chronic hypoxemic respiratory failure  · dCHF, hypoventilation/restrictive lung disease in the settings of morbid obesity  · Improving with ongoing diuresis  · Acute diastolic heart failure - negative 700 ml last 24 hours  · JAMEEL/OHS  · Morbid obesity  · COPD    Plan:  · Continue PO diamox 250mg q12 and lasix 40 PO  · Maintain accurate I/O, daily weight  · Continue nebs xopenex and ipratropium  · Started BiPAP (nocturnal and with naps), pt agreeable    Subjective:   Agreeable to start BiPAP    Vitals: Blood pressure 135/68, pulse 78, temperature 98 3 °F (36 8 °C), resp  rate 18, height 5' 4" (1 626 m), weight 93 kg (205 lb), SpO2 90 %, not currently breastfeeding , Body mass index is 35 19 kg/m²  Intake/Output Summary (Last 24 hours) at 2/11/2021 1027  Last data filed at 2/11/2021 0801  Gross per 24 hour   Intake 0 ml   Output 800 ml   Net -800 ml       Physical Exam  Gen: not in acute distress, Body mass index is 35 19 kg/m²  HEENT:no JVD appreciated  Cardiac: distant heart sounds  Lungs: normal respiratory effort, scattered crackles mainly posteriorly, moderate air movements  Abdomen: soft, nontender  Extremities: 1+ pitting non tender edema  Neuro: AAO X3, no focal motor deficit    Labs: I have personally reviewed pertinent lab results          Shashank Tobar MD

## 2021-02-11 NOTE — ASSESSMENT & PLAN NOTE
· Acute on chronic hypoxic and hypercarbic respiratory failure, multifactorial secondary to diastolic heart failure, pleural effusions, COPD, OHS/JAMEEL  · Continue diuresis per Cardiology and pulmonology  · Keep oxygen levels 88-92 %  · Start BiPAP HS per pulm  · On 4l nc

## 2021-02-11 NOTE — PROGRESS NOTES
Progress Note - Arthur Jean 1935, 80 y o  female MRN: [id number]    Unit/Bed#: -01 Encounter: [id number]    Primary Care Provider: Gabe Fofana DO   Date and time admitted to hospital: 2/5/2021  2:23 PM        Res[facility] with [facility] ([facility] )  Assessment & Plan  · Acute on chronic hypoxic and hypercarbic respiratory failure, multifactorial secondary to diastolic heart failure, pleural effusions, COPD, OHS/JAMEEL  · Continue diuresis per Cardiology and pulmonology  · Keep oxygen levels 88-92 %  · Start BiPAP HS per pulm  · On 4l nc    Chronic diastolic congestive heart failure (HCC)  Assessment & Plan  Wt Readings from Last 3 Encounters:   02/11/21 93 kg (205 lb)   10/15/20 97 1 kg (214 lb)   07/28/20 98 4 kg (217 lb)       · Continue diuresis  · Strict I/o's  · Fluid restriction   · Daily weights  · Chest xray 2/5 : Central vascular congestion with suspected asymmetric pulmonary edema and bibasilar opacities as above  · Chest x-ray 2/9 showed improving vascular congestion with decrease in small effusions and bibasilar atelectasis  · Transitioned to Lasix 40 mg daily on 02/10  · Transitioned to PO Diamox 2/10, today worsening HC03        COPD (chronic obstructive pulmonary disease) ([facility] )  Assessment & Plan  · COPD does not appear to be exacerbation, discussed with pulmonology, steroids were discontinued 2/6  · Keep oxygen levels greater than 88- 94%      * Pleural effusion  Assessment & Plan  · Pleural effusion bilateral, continue Lasix 40 mg p o   Per Cardiology  · IV Diamox added due to hypercarbia, transitioned to p o with slight worsening of hypercarbia today  · Currently on 4 L of oxygen  · Monitor BMP  due to ongoing hypercarbia  · Pulmonary evaluated POCUS and determined that no thoracentesis was needed  · Monitor input output  · Daily weights    Type 2 diabetes mellitus [facility])  Assessment & Plan  Lab Results   Component Value Date    HGBA1C 6 3 (H) 07/01/2020       No results for input(s): POCGLU in the last 72 hours  Blood Sugar Average: Last 72 hrs:     · Type 2 diabetes mellitus  · Continue with insulin  · Hold oral hypoglycemic medication  · Avoid hypoglycemia    S/P CABG (coronary artery bypass graft)  Assessment & Plan  · Aspirin and statin     Hyperlipidemia  Assessment & Plan  · Continue with statin     Hypothyroidism  Assessment & Plan  · Hypothyroidism  · Continue with levothyroxine    Hypertension  Assessment & Plan  · Continue with metoprolol 25 mg [id number]   · Monitor on Lasix 40 mg daily per cards, pulmonary added Diamox   · Monitor blood pressure      VTE Pharmacologic Prophylaxis:   Pharmacologic: Enoxaparin (Lovenox)  Mechanical VTE Prophylaxis in Place: Yes    Patient Centered Rounds: I have performed bedside rounds with nursing staff today  Discussions with Specialists or Other Care Team Provider: pulmology    Education and Discussions with Family / Patient:  Patient, left a voicemail to her son    Time Spent for Care: 30 minutes  More than 50% of total time spent on counseling and coordination of care as described above  Current Length of Stay: 6 day(s)    Current Patient Status: Inpatient   Certification Statement: The patient will continue to require additional inpatient hospital stay due to Respiratory failure    Discharge Plan: To be determined    Code Status: Level 1 - Full Code      Subjective:   Patient was seen and evaluated bedside, she is awake alert and oriented  No overnight events per patient or nursing staff "I am feeling well"    Objective:     Vitals:   Temp (24hrs), Av 9 °F (37 2 °C), Min:98 3 °F (36 8 °C), Max:100 1 °F (37 8 °C)    Temp:  [98 3 °F (36 8 °C)-100 1 °F (37 8 °C)] 98 3 °F (36 8 °C)  HR:  [40-78] 43  Resp:  [16-24] 16  BP: (101-141)/(54-68) 133/65  SpO2:  [89 %-97 %] 93 %  Body mass index is 35 19 kg/m²  Input and Output Summary (last 24 hours):        Intake/Output Summary (Last 24 hours) at 2021 1259  Last data filed at 2/11/2021 0801  Gross per 24 hour   Intake 0 ml   Output 800 ml   Net -800 ml       Physical Exam:     Physical Exam  Constitutional:       General: She is not in acute distress  Appearance: She is obese  HENT:      Head: Atraumatic  Neck:      Musculoskeletal: Neck supple  Cardiovascular:      Rate and Rhythm: Normal rate and regular rhythm  Heart sounds: No murmur  No friction rub  No gallop  Pulmonary:      Effort: Pulmonary effort is normal       Comments: Decreased breath sounds bilaterally, no wheezing, on 4 L oxygen via nasal cannula, no rhonchi  Abdominal:      General: Bowel sounds are normal  There is no distension  Palpations: Abdomen is soft  Musculoskeletal:      Comments: 1+ pitting edema bilateral lower extremity   Skin:     General: Skin is warm and dry  Neurological:      General: No focal deficit present  Mental Status: She is alert  Psychiatric:         Mood and Affect: Mood normal          Additional Data:     Labs:    Results from last 7 days   Lab Units 02/11/21  0453  02/09/21  0448   WBC Thousand/uL 8 34   < > 7 85   HEMOGLOBIN g/dL 13 3   < > 13 2   HEMATOCRIT % 47 6*   < > 47 6*   PLATELETS Thousands/uL 236   < > 255   NEUTROS PCT %  --   --  71   LYMPHS PCT %  --   --  14   MONOS PCT %  --   --  12   EOS PCT %  --   --  2    < > = values in this interval not displayed  Results from last 7 days   Lab Units 02/11/21  0453   SODIUM mmol/L 139   POTASSIUM mmol/L 4 5   CHLORIDE mmol/L 96*   CO2 mmol/L 44*   BUN mg/dL 38*   CREATININE mg/dL 1 10   ANION GAP mmol/L -1*   CALCIUM mg/dL 9 1   ALBUMIN g/dL 2 5*   TOTAL BILIRUBIN mg/dL 0 92   ALK PHOS U/L 91   ALT U/L 14   AST U/L 14   GLUCOSE RANDOM mg/dL 110     Results from last 7 days   Lab Units 02/06/21  0433   INR  1 07             Results from last 7 days   Lab Units 02/06/21  0635 02/06/21  0433   PROCALCITONIN ng/ml <0 05 <0 05           * I Have Reviewed All Lab Data Listed Above    * Additional Pertinent Lab Tests Reviewed: [address] Admission Reviewed    Imaging:    Imaging Reports Reviewed Today Include: all  Imaging Personally Reviewed by Myself Includes:      Recent Cultures (last 7 days):           Last 24 Hours Medication List:   Current Facility-Administered Medications   Medication Dose Route Frequency Provider Last Rate    acetaminophen  650 mg Oral Q6H PRN Sarah Anthony MD      acetaZOLAMIDE  250 mg Oral Q12H [facility] Julien Crystal MD      albuterol  2 5 mg Nebulization Q4H PRN Sarah Anthony MD      ALPRAZolam  0 5 mg Oral TID PRN Sarah Anthony MD      aluminum-magnesium hydroxide-simethicone  30 mL Oral Q6H PRN Sarah Anthony MD      amLODIPine  5 mg Oral Daily Sarah Anthony MD      ascorbic acid  500 mg Oral Daily Sarah Anthony MD      aspirin  81 mg Oral Daily Sarah Anthony MD      bimatoprost  1 drop Both Eyes HS Sarah Anthony MD      calcium carbonate  1,000 mg Oral Daily PRN Sarah Anthony MD      cholecalciferol  2,000 Units Oral Daily Sarah Anthony MD      clopidogrel  75 mg Oral Daily Sarah Anthony MD      docusate sodium  100 mg Oral BID Sarah Anthony MD      enoxaparin  40 mg Subcutaneous Daily Sarah Anthony MD      famotidine  20 mg Oral BID Sarah Anthony MD      furosemide  40 mg Oral Daily Ty Rouse MD      ipratropium  0 5 mg Nebulization TID Graylon Police, DO      levalbuterol  1 25 mg Nebulization TID Graylon Police, DO      levothyroxine  75 mcg Oral Early Morning Sarah Anthony MD      metoprolol tartrate  25 mg Oral Q12H [facility] Sarah Anthony MD      oxyCODONE-acetaminophen  1 tablet Oral Q4H PRN Sarah Anthony MD      senna  1 tablet Oral Daily Sarah Anthony MD          Today, Patient Was Seen By: Rose Lopez MD    ** Please Note: Dictation voice to text software may have been used in the creation of this document   **

## 2021-02-11 NOTE — PHYSICAL THERAPY NOTE
Physical Therapy Treatment Note       02/11/21 1120   PT Last Visit   PT Visit Date 02/11/21   Note Type   Note Type Treatment   Pain Assessment   Pain Assessment Tool 0-10   Pain Score 5   Pain Location/Orientation Location: Back   Patient's Stated Pain Goal No pain   Hospital Pain Intervention(s) Ambulation/increased activity;Repositioned   Restrictions/Precautions   Weight Bearing Precautions Per Order No   Other Precautions Cognitive; Chair Alarm; Bed Alarm;Multiple lines;Telemetry; Fall Risk;Pain;O2   General   Chart Reviewed Yes   Family/Caregiver Present No   Cognition   Overall Cognitive Status Impaired   Arousal/Participation Responsive   Attention Attends with cues to redirect   Orientation Level Oriented to person   Memory Unable to assess   Following Commands Follows one step commands without difficulty   Subjective   Subjective States he feels okay  Has some back pain in bed, and willing to try to mobilize in order to alleviate low back pain  Bed Mobility   Supine to Sit 4  Minimal assistance   Additional items Assist x 1   Additional Comments Patient sat on side of bed times 10 minutes prior to transitioning to standing  Maintains with close supervision  Transfers   Sit to Stand 4  Minimal assistance   Additional items Assist x 1   Stand to Sit 4  Minimal assistance   Additional items Assist x 1   Additional Comments Stood at edge of bed times several minutes prior to gait  Ambulation/Elevation   Gait pattern   (Slow, antalgic, short step length)   Gait Assistance 4  Minimal assist   Additional items Assist x 1   Assistive Device Rolling walker   Distance 6 ft x 1 from bed to bedside chair  Time spent to reposition patient to comfort in chair     Balance   Static Sitting Fair +   Dynamic Sitting Fair -   Static Standing Poor +   Dynamic Standing Poor   Ambulatory Poor   Endurance Deficit   Endurance Deficit Yes   Endurance Deficit Description Fatigue, weakness, pain   Activity Tolerance Activity Tolerance Patient limited by fatigue;Patient limited by pain;Treatment limited secondary to medical complications (Comment)   Nurse Made Aware Yes   Assessment   Prognosis Good   Problem List Decreased strength;Decreased endurance; Impaired balance;Decreased mobility;Pain   Assessment Patient seen for session for setup, bed mobility, time spent on side of bed, transfers, standing trial, gait training, repositioning  Patient cooperative with session, limited by low back pain, generalized weakness  Needs a bit less assistance today with mobility tasks, but still fatigues very quickly  Due to continued mobility decline, continue to recommend rehab at discharge  Goals   Patient Goals To get out of bed   STG Expiration Date 02/22/21   PT Treatment Day 1   Plan   Treatment/Interventions Functional transfer training;LE strengthening/ROM; Therapeutic exercise; Endurance training;Patient/family training;Equipment eval/education; Bed mobility;Gait training   Progress Progressing toward goals   PT Frequency Other (Comment)  (3-5 times per week)   Recommendation   PT Discharge Recommendation Post-Acute Rehabilitation Services   Equipment Recommended Walker   PT - OK to Discharge Yes  (When stable to rehab)   Nancy 8 in Bed Without Bedrails 3   Lying on Back to Sitting on Edge of Flat Bed 3   Moving Bed to Chair 3   Standing Up From Chair 3   Walk in Room 3   Climb 3-5 Stairs 2   Basic Mobility Inpatient Raw Score 17   Basic Mobility Standardized Score 39 67   Oral Holiday PT, DPT CSRS

## 2021-02-11 NOTE — ASSESSMENT & PLAN NOTE
· Pleural effusion bilateral, continue Lasix 40 mg p o   Per Cardiology  · IV Diamox added due to hypercarbia, transitioned to p o with slight worsening of hypercarbia today  · Currently on 4 L of oxygen  · Monitor BMP  due to ongoing hypercarbia  · Pulmonary evaluated POCUS and determined that no thoracentesis was needed  · Monitor input output  · Daily weights

## 2021-02-11 NOTE — ASSESSMENT & PLAN NOTE
· Continue with metoprolol 25 mg [id number]   · Monitor on Lasix 40 mg daily per cards, pulmonary added Diamox 2/7  · Monitor blood pressure

## 2021-02-12 ENCOUNTER — TELEPHONE (OUTPATIENT)
Dept: CARDIOLOGY CLINIC | Facility: CLINIC | Age: 86
End: 2021-02-12

## 2021-02-12 LAB
ANION GAP SERPL CALCULATED.3IONS-SCNC: 0 MMOL/L (ref 4–13)
BASOPHILS # BLD AUTO: 0.04 THOUSANDS/ΜL (ref 0–0.1)
BASOPHILS NFR BLD AUTO: 1 % (ref 0–1)
BUN SERPL-MCNC: 39 MG/DL (ref 5–25)
CALCIUM SERPL-MCNC: 9.1 MG/DL (ref 8.3–10.1)
CHLORIDE SERPL-SCNC: 101 MMOL/L (ref 100–108)
CO2 SERPL-SCNC: 40 MMOL/L (ref 21–32)
CREAT SERPL-MCNC: 1.09 MG/DL (ref 0.6–1.3)
EOSINOPHIL # BLD AUTO: 0.26 THOUSAND/ΜL (ref 0–0.61)
EOSINOPHIL NFR BLD AUTO: 3 % (ref 0–6)
ERYTHROCYTE [DISTWIDTH] IN BLOOD BY AUTOMATED COUNT: 14.3 % (ref 11.6–15.1)
GFR SERPL CREATININE-BSD FRML MDRD: 46 ML/MIN/1.73SQ M
GLUCOSE SERPL-MCNC: 133 MG/DL (ref 65–140)
HCT VFR BLD AUTO: 46 % (ref 34.8–46.1)
HGB BLD-MCNC: 13 G/DL (ref 11.5–15.4)
IMM GRANULOCYTES # BLD AUTO: 0.04 THOUSAND/UL (ref 0–0.2)
IMM GRANULOCYTES NFR BLD AUTO: 1 % (ref 0–2)
LYMPHOCYTES # BLD AUTO: 1.09 THOUSANDS/ΜL (ref 0.6–4.47)
LYMPHOCYTES NFR BLD AUTO: 13 % (ref 14–44)
MCH RBC QN AUTO: 27.4 PG (ref 26.8–34.3)
MCHC RBC AUTO-ENTMCNC: 28.3 G/DL (ref 31.4–37.4)
MCV RBC AUTO: 97 FL (ref 82–98)
MONOCYTES # BLD AUTO: 0.98 THOUSAND/ΜL (ref 0.17–1.22)
MONOCYTES NFR BLD AUTO: 11 % (ref 4–12)
NEUTROPHILS # BLD AUTO: 6.23 THOUSANDS/ΜL (ref 1.85–7.62)
NEUTS SEG NFR BLD AUTO: 71 % (ref 43–75)
NRBC BLD AUTO-RTO: 0 /100 WBCS
PLATELET # BLD AUTO: 248 THOUSANDS/UL (ref 149–390)
PMV BLD AUTO: 10.2 FL (ref 8.9–12.7)
POTASSIUM SERPL-SCNC: 3.9 MMOL/L (ref 3.5–5.3)
RBC # BLD AUTO: 4.74 MILLION/UL (ref 3.81–5.12)
SODIUM SERPL-SCNC: 141 MMOL/L (ref 136–145)
WBC # BLD AUTO: 8.64 THOUSAND/UL (ref 4.31–10.16)

## 2021-02-12 PROCEDURE — 80048 BASIC METABOLIC PNL TOTAL CA: CPT | Performed by: FAMILY MEDICINE

## 2021-02-12 PROCEDURE — 94640 AIRWAY INHALATION TREATMENT: CPT | Performed by: SOCIAL WORKER

## 2021-02-12 PROCEDURE — 99232 SBSQ HOSP IP/OBS MODERATE 35: CPT | Performed by: INTERNAL MEDICINE

## 2021-02-12 PROCEDURE — 94760 N-INVAS EAR/PLS OXIMETRY 1: CPT

## 2021-02-12 PROCEDURE — 94640 AIRWAY INHALATION TREATMENT: CPT

## 2021-02-12 PROCEDURE — 94760 N-INVAS EAR/PLS OXIMETRY 1: CPT | Performed by: SOCIAL WORKER

## 2021-02-12 PROCEDURE — 94660 CPAP INITIATION&MGMT: CPT

## 2021-02-12 PROCEDURE — 97535 SELF CARE MNGMENT TRAINING: CPT

## 2021-02-12 PROCEDURE — 85025 COMPLETE CBC W/AUTO DIFF WBC: CPT | Performed by: FAMILY MEDICINE

## 2021-02-12 RX ADMIN — FUROSEMIDE 40 MG: 40 TABLET ORAL at 08:45

## 2021-02-12 RX ADMIN — IPRATROPIUM BROMIDE 0.5 MG: 0.5 SOLUTION RESPIRATORY (INHALATION) at 09:38

## 2021-02-12 RX ADMIN — LEVALBUTEROL HYDROCHLORIDE 1.25 MG: 1.25 SOLUTION, CONCENTRATE RESPIRATORY (INHALATION) at 14:30

## 2021-02-12 RX ADMIN — OXYCODONE HYDROCHLORIDE AND ACETAMINOPHEN 500 MG: 500 TABLET ORAL at 08:44

## 2021-02-12 RX ADMIN — FAMOTIDINE 20 MG: 20 TABLET, FILM COATED ORAL at 17:34

## 2021-02-12 RX ADMIN — DOCUSATE SODIUM 100 MG: 100 CAPSULE, LIQUID FILLED ORAL at 08:44

## 2021-02-12 RX ADMIN — Medication 2000 UNITS: at 08:45

## 2021-02-12 RX ADMIN — IPRATROPIUM BROMIDE 0.5 MG: 0.5 SOLUTION RESPIRATORY (INHALATION) at 14:30

## 2021-02-12 RX ADMIN — BIMATOPROST 1 DROP: 0.1 SOLUTION/ DROPS OPHTHALMIC at 21:55

## 2021-02-12 RX ADMIN — IPRATROPIUM BROMIDE 0.5 MG: 0.5 SOLUTION RESPIRATORY (INHALATION) at 19:00

## 2021-02-12 RX ADMIN — LEVOTHYROXINE SODIUM 75 MCG: 75 TABLET ORAL at 05:18

## 2021-02-12 RX ADMIN — METOPROLOL TARTRATE 25 MG: 25 TABLET, FILM COATED ORAL at 20:27

## 2021-02-12 RX ADMIN — ENOXAPARIN SODIUM 40 MG: 40 INJECTION SUBCUTANEOUS at 08:45

## 2021-02-12 RX ADMIN — LEVALBUTEROL HYDROCHLORIDE 1.25 MG: 1.25 SOLUTION, CONCENTRATE RESPIRATORY (INHALATION) at 09:38

## 2021-02-12 RX ADMIN — ACETAZOLAMIDE 250 MG: 250 TABLET ORAL at 08:46

## 2021-02-12 RX ADMIN — DOCUSATE SODIUM 100 MG: 100 CAPSULE, LIQUID FILLED ORAL at 17:34

## 2021-02-12 RX ADMIN — ASPIRIN 81 MG: 81 TABLET, COATED ORAL at 08:44

## 2021-02-12 RX ADMIN — LEVALBUTEROL HYDROCHLORIDE 1.25 MG: 1.25 SOLUTION, CONCENTRATE RESPIRATORY (INHALATION) at 19:00

## 2021-02-12 RX ADMIN — AMLODIPINE BESYLATE 5 MG: 5 TABLET ORAL at 08:44

## 2021-02-12 RX ADMIN — SENNOSIDES 8.6 MG: 8.6 TABLET, FILM COATED ORAL at 08:45

## 2021-02-12 RX ADMIN — ALPRAZOLAM 0.5 MG: 0.5 TABLET ORAL at 14:21

## 2021-02-12 RX ADMIN — ACETAZOLAMIDE 250 MG: 250 TABLET ORAL at 20:27

## 2021-02-12 RX ADMIN — CLOPIDOGREL BISULFATE 75 MG: 75 TABLET ORAL at 08:45

## 2021-02-12 RX ADMIN — FAMOTIDINE 20 MG: 20 TABLET, FILM COATED ORAL at 08:45

## 2021-02-12 NOTE — OCCUPATIONAL THERAPY NOTE
OccupationalTherapy Progress Note     Patient Name: Rico Calderon  JLZYO'F Date: 2/12/2021  Problem List  Principal Problem:    Pleural effusion  Active Problems:    COPD (chronic obstructive pulmonary disease) (HCC)    Hypertension    Hypothyroidism    Chronic diastolic congestive heart failure (HCC)    Hyperlipidemia    S/P CABG (coronary artery bypass graft)    Type 2 diabetes mellitus ([facility] )    Respiratory failure with hypoxia ([facility] )         02/12/21 1538   OT Last Visit   OT Visit Date 02/12/21   Note Type   Note Type Treatment   Restrictions/Precautions   Weight Bearing Precautions Per Order No   Other Precautions Cognitive; Chair Alarm; Bed Alarm; Impulsive;Multiple lines;Telemetry;O2;Fall Risk;Hard of hearing   Pain Assessment   Pain Assessment Tool Pain Assessment not indicated - pt denies pain   Pain Score No Pain   Hospital Pain Intervention(s) Repositioned; Ambulation/increased activity   ADL   Grooming Assistance 4  Minimal Assistance   Grooming Deficit Brushing hair;Teeth care;Wash/dry face;Setup;Supervision/safety; Increased time to complete   LB Dressing Assistance 3  Moderate Assistance   LB Dressing Deficit Setup;Verbal cueing;Supervision/safety; Increased time to complete; Thread RLE into pants; Thread LLE into pants; Impulsive;Steadying   Functional Standing Tolerance   Time 1 min   Activity PULLING UP PANTS   Comments W/RW   Transfers   Sit to Stand 4  Minimal assistance   Additional items Assist x 1; Increased time required;Verbal cues;Armrests   Stand to Sit 4  Minimal assistance   Additional items Assist x 1; Increased time required;Verbal cues;Armrests   Cognition   Overall Cognitive Status Impaired   Arousal/Participation Alert; Cooperative   Attention Attends with cues to redirect   Orientation Level Oriented X4   Memory Decreased recall of recent events;Decreased short term memory   Following Commands Follows one step commands with increased time or repetition   Comments PT IS PLEASANT AND COOPERATIVE TO PARTICIPATE IN THE SESSION  PT WAS MILDLY IMPULSIVE, ATTEMPTING TO STAND WITHOUT ASSISTANCE  PT DISPLAYS POOR INSIGHT INTO DEFICITS AND DECREASED SAFETY AWARENESS  Activity Tolerance   Activity Tolerance Patient limited by fatigue   Medical Staff Made Aware PT APPROPRIATE TO SEE PER NURSING  Assessment   Assessment PT SEEN FOR OT TREATMENT SESSION FOCUSING ON ADLS, COGNITION, AND FUNCTIONAL TRANSFERS  PT WAS OOB SEATED IN CHAIR UPON ARRIVAL  PT WAS ABLE TO IDENTIFY ALL ITEMS NEEDED FOR ORAL HYGIENE TASK AND PROCEEDED TO BRUSH TEETH AT S LEVEL WHILE SEATED  PT THEN BRUSHED HER HAIR WITH MIN A TO DETANGLE WHILE SEATED  PT PUT HAIR UP IN A PONYTAIL WITH MIN A TO ASSIST DUE TO UE FATIGUE  PT WASHED FACE WITH S  PT WAS MOD A FOR LB DRESSING WITH ASSISTANCE FOR STEADYING, SET-UP, VC, AND SAFETY AWARENESS  PT DONNED PANTS, THREADING WEAKER L SIDE FIRST, AND THEN THREADING R LE  PT WAS MIN A SIT<-> STAND TO PULL UP PANTS  PT ABLE TO PULL PANTS UP OVER B/L HIPS  PT DOFFED PANTS WHILE SEATED USING OPPOSITE LE TO PUSH THE PANTS OFF DUE TO BENDING LIMITATION DUE TO HERNIA  HOWEVER, PT THEN BENT OVER TO ADJUST SOCKS  PT EDUCATED ON ENERGY CONSERVATION, COMPENSATORY STRATEGIES, DME MANAGEMENT, AND BODY MECHANICS  PT PROGRESSING, HOWEVER LIMITED DUE TO FATIGUE, TREMORS, IMPAIRED COGNITION, DECREASED INSIGHT, AND DECREASED SAFETY AWARENESS  FROM OT PERSPECTIVE, D/C RECOMMENDATION REHAB  PT WOULD CONTINUE TO BENEFIT FROM CONTINUED INPATIENT OT 3-5X/WEEK TO ADDRESS DEFICITS AND IMPROVE FUNCTIONAL INDEPENDENCE  Plan   Treatment Interventions ADL retraining;Functional transfer training; Compensatory technique education; Energy conservation; Activityengagement;Cognitive reorientation;Patient/family training;Equipment evaluation/education; Fine motor coordination activities   Goal Expiration Date 02/18/21   OT Treatment Day 2   OT Frequency 3-5x/wk   Recommendation   OT Discharge Recommendation Post-Acute Rehabilitation Services   OT - OK to Discharge Yes   AM-PAC Daily Activity Inpatient   Lower Body Dressing 2   Bathing 2   Toileting 2   Upper Body Dressing 3   Grooming 3   Eating 3   Daily Activity Raw Score 15   Daily Activity Standardized Score (Calc for Raw Score >=11) 34 69   AM-PAC Applied Cognition Inpatient   Following a Speech/Presentation 2   Understanding Ordinary Conversation 4   Taking Medications 2   Remembering Where Things Are Placed or Put Away 2   Remembering List of 4-5 Errands 2   Taking Care of Complicated Tasks 2   Applied Cognition Raw Score 14   Applied Cognition Standardized Score 32 02   Modified Shaun Scale   Modified Troy Scale 4       Enedina Began, OTS

## 2021-02-12 NOTE — PROGRESS NOTES
Progress Note - Pulmonary   Gilberto Gonzales 80 y o  female MRN: [id number]  Unit/Bed#: -01 Encounter: [id number]      Assessment:  · Acute on chronic hypoxemic respiratory failure  ? dCHF, hypoventilation/restrictive lung disease in the settings of morbid obesity  ? Improving with ongoing diuresis  ? Improving bicarbonate from 44 yesterday to 40 today  · Acute diastolic heart failure - negative about 5-6 L since admission, no accurate intake for the last 24 hours  · JAMEEL/OHS-tolerated BiPAP overnight, 10/5  · Morbid obesity  · COPD-unknown severity, not in acute exacerbation currently       Plan:  · Continue poor or Diamox 250 mg q 12 hours, this can be discontinued tomorrow   · Continue Lasix 40 mg p o  daily   · Will need a strict intake/output, daily weight on discharge, discussed with the bedside nurse, no intake charted for the last 24 hours   · Continue nebs Xopenex/Atrovent, will need to switch to LAMA/LABA preparation on discharge such as Still to Respimat or Anoro Ellipta  · Continue BiPAP (nocturnal/with the naps)  · Will obtain nocturnal oximetry, ABG in the a m  to qualify  · Will need outpatient follow-up with Pulmonary after discharge        Subjective:   No overnight events  Tolerated BiPAP    Vitals: Blood pressure 104/52, pulse 71, temperature 99 4 °F (37 4 °C), resp  rate 18, height 5' 4" (1 626 m), weight 92 8 kg (204 lb 9 6 oz), SpO2 93 %, not currently breastfeeding , Body mass index is 35 12 kg/m²  Intake/Output Summary (Last 24 hours) at 2/12/2021 1547  Last data filed at 2/12/2021 0530  Gross per 24 hour   Intake --   Output 1242 ml   Net -1242 ml       Physical Exam  Gen: not in acute distress, Body mass index is 35 12 kg/m²     HEENT:supple, no accessory muscle use, no JVD appreciated  Cardiac: RRR, no murmurs appreciated  Lungs: normal respiratory effort,+ fine crackles posterior/basally  Abdomen: soft, nontender  Extremities:  Softer lower extremity, +1 edema below knee  Neuro: AAO X3, no focal motor deficit    Labs: I have personally reviewed pertinent lab results          Quynh Mohan MD

## 2021-02-12 NOTE — ASSESSMENT & PLAN NOTE
· Continue with metoprolol 25 mg [id number]   · Monitor on Lasix 40 mg daily per cards  · Monitor blood pressure

## 2021-02-12 NOTE — ASSESSMENT & PLAN NOTE
Wt Readings from Last 3 Encounters:   02/12/21 92 8 kg (204 lb 9 6 oz)   10/15/20 97 1 kg (214 lb)   07/28/20 98 4 kg (217 lb)       Continue Lasix 40 p o   Daily  Continue metoprolol  Monitor I/O, daily weights  Cardiology input noted

## 2021-02-12 NOTE — TELEPHONE ENCOUNTER
Spoke with pt's son regarding virtual visit with the cardiologist on 02/15  Son stated his mom is in the hospital in [place]  He asked to cancel the appointment & stated he will call back to reschedule

## 2021-02-12 NOTE — ASSESSMENT & PLAN NOTE
· Acute on chronic hypoxic and hypercarbic respiratory failure, multifactorial secondary to diastolic heart failure, pleural effusions, COPD, OHS/JAMEEL  · Continue supplemental oxygen wean as tolerated to keep O2 sats more than 88-90%  · Continue Lasix  · Diamox per pulmonary  · Discussed with Pulmonary - patient is being evaluated for by Pulmonary for BiPAP candidacy at discharge  · Monitor O2 sats

## 2021-02-12 NOTE — PROGRESS NOTES
Progress Note - Manuelito Covarrubias 1935, 80 y o  female MRN: [id number]    Unit/Bed#: -01 Encounter: [id number]    Primary Care Provider: Samia Raymundo DO   Date and time admitted to hospital: 2/5/2021  2:23 PM        Respiratory failure with hypoxia ([facility] )  Assessment & Plan  · Acute on chronic hypoxic and hypercarbic respiratory failure, multifactorial secondary to diastolic heart failure, pleural effusions, COPD, OHS/JAMEEL  · Continue supplemental oxygen wean as tolerated to keep O2 sats more than 88-90%  · Continue Lasix  · Diamox per pulmonary  · Discussed with Pulmonary - patient is being evaluated for by Pulmonary for BiPAP candidacy at discharge  · Monitor O2 sats      Type 2 diabetes mellitus ([facility] )  Assessment & Plan  Lab Results   Component Value Date    HGBA1C 6 3 (H) 07/01/2020       No results for input(s): POCGLU in the last 72 hours  Blood Sugar Average: Last 72 hrs:     · Type 2 diabetes mellitus  · Continue with insulin  · Hold oral hypoglycemic medication  · Avoid hypoglycemia    S/P CABG (coronary artery bypass graft)  Assessment & Plan  · Aspirin and statin   · Continue metoprolol    Hyperlipidemia  Assessment & Plan  · Continue with statin     Chronic diastolic congestive heart failure (HCC)  Assessment & Plan  Wt Readings from Last 3 Encounters:   02/12/21 92 8 kg (204 lb 9 6 oz)   10/15/20 97 1 kg (214 lb)   07/28/20 98 4 kg (217 lb)       Continue Lasix 40 p o   Daily  Continue metoprolol  Monitor I/O, daily weights  Cardiology input noted          Hypothyroidism  Assessment & Plan  · Hypothyroidism  · Continue with levothyroxine    Hypertension  Assessment & Plan  · Continue with metoprolol 25 mg [id number]   · Monitor on Lasix 40 mg daily per cards  · Monitor blood pressure    COPD (chronic obstructive pulmonary disease) ([facility] )  Assessment & Plan  · COPD does not appear to be exacerbation, discussed with pulmonology, steroids were discontinued 2/6  · Keep oxygen levels greater than 88- 94%    * Pleural effusion  Assessment & Plan  Continue Lasix 40 p o  Daily  Monitor I/O, daily weights        VTE Pharmacologic Prophylaxis:   Pharmacologic: Enoxaparin (Lovenox)  Mechanical VTE Prophylaxis in Place: Yes    Patient Centered Rounds: I have performed bedside rounds with nursing staff today  Discussions with Specialists or Other Care Team Provider:  Pulmonary    Education and Discussions with Family / Patient:  Patient, updated Son Margarito Sotelo in detail     Time Spent for Care: 30 minutes  More than 50% of total time spent on counseling and coordination of care as described above  Current Length of Stay: 7 day(s)    Current Patient Status: Inpatient   Certification Statement: The patient will continue to require additional inpatient hospital stay due to as outlined    Discharge Plan: awaiting clinical and symptomatic improvement     Code Status: Level 1 - Full Code      Subjective:     Sitting up in bed  Reports feeling okay  History chart labs medications reviewed  Discussed with RN and Case Management      Objective:     Vitals:   Temp (24hrs), Av 4 °F (36 9 °C), Min:97 7 °F (36 5 °C), Max:98 7 °F (37 1 °C)    Temp:  [97 7 °F (36 5 °C)-98 7 °F (37 1 °C)] 97 7 °F (36 5 °C)  HR:  [39-78] 48  Resp:  [18-24] 18  BP: (111-150)/(51-91) 113/51  SpO2:  [90 %-96 %] 94 %  Body mass index is 35 12 kg/m²  Input and Output Summary (last 24 hours):        Intake/Output Summary (Last 24 hours) at 2021 1315  Last data filed at 2021 0530  Gross per 24 hour   Intake --   Output 1242 ml   Net -1242 ml       Physical Exam:     Physical Exam    Comfortably sitting up in bed  Obese  Short thick neck  Lungs diminished breath sounds bilateral  Heart sounds S1-S2 noted  Abdomen soft  Awake obeys simple commands  Pulses noted  No rash    Additional Data:     Labs:    Results from last 7 days   Lab Units 21  0401   WBC Thousand/uL 8 64   HEMOGLOBIN g/dL 13 0   HEMATOCRIT % 46 0   PLATELETS Thousands/uL 248   NEUTROS PCT % 71   LYMPHS PCT % 13*   MONOS PCT % 11   EOS PCT % 3     Results from last 7 days   Lab Units 02/12/21  0401 02/11/21  0453   SODIUM mmol/L 141 139   POTASSIUM mmol/L 3 9 4 5   CHLORIDE mmol/L 101 96*   CO2 mmol/L 40* 44*   BUN mg/dL 39* 38*   CREATININE mg/dL 1 09 1 10   ANION GAP mmol/L 0* -1*   CALCIUM mg/dL 9 1 9 1   ALBUMIN g/dL  --  2 5*   TOTAL BILIRUBIN mg/dL  --  0 92   ALK PHOS U/L  --  91   ALT U/L  --  14   AST U/L  --  14   GLUCOSE RANDOM mg/dL 133 110     Results from last 7 days   Lab Units 02/06/21  0433   INR  1 07             Results from last 7 days   Lab Units 02/06/21  0635 02/06/21  0433   PROCALCITONIN ng/ml <0 05 <0 05           * I Have Reviewed All Lab Data Listed Above  * Additional Pertinent Lab Tests Reviewed:  All Labs Within Last 24 Hours Reviewed    Imaging:    Imaging Reports Reviewed Today Include:  Imaging studies noted  Imaging Personally Reviewed by Myself Includes:     Recent Cultures (last 7 days):           Last 24 Hours Medication List:   Current Facility-Administered Medications   Medication Dose Route Frequency Provider Last Rate    acetaminophen  650 mg Oral Q6H PRN Lauro Abreu MD      acetaZOLAMIDE  250 mg Oral Q12H [address] Margaret Hernandez MD      albuterol  2 5 mg Nebulization Q4H PRN Lauro Abreu MD      ALPRAZolam  0 5 mg Oral TID PRN Lauro Abreu MD      aluminum-magnesium hydroxide-simethicone  30 mL Oral Q6H PRN Lauro Abreu MD      amLODIPine  5 mg Oral Daily Lauro Abreu MD      ascorbic acid  500 mg Oral Daily Lauro Abreu MD      aspirin  81 mg Oral Daily Lauro Abreu MD      bimatoprost  1 drop Both Eyes HS Lauro Abreu MD      calcium carbonate  1,000 mg Oral Daily PRN Lauro Abreu MD      cholecalciferol  2,000 Units Oral Daily Lauro Abreu MD      clopidogrel  75 mg Oral Daily Lauro Abreu MD      docusate sodium  100 mg Oral BID Lauro Abreu MD  enoxaparin  40 mg Subcutaneous Daily Bryan Brunner MD      famotidine  20 mg Oral BID Bryan Brunner MD      furosemide  40 mg Oral Daily Dylan Correa MD      ipratropium  0 5 mg Nebulization TID [facility], DO      levalbuterol  1 25 mg Nebulization TID [facility], DO      levothyroxine  75 mcg Oral Early Morning Bryan Brunner MD      metoprolol tartrate  25 mg Oral Q12H [address] Bryan Brunner MD      oxyCODONE-acetaminophen  1 tablet Oral Q4H PRN Bryan Brunner MD      senna  1 tablet Oral Daily Bryan Brunner MD          Today, Patient Was Seen By: Carolynn Romberg, MD    ** Please Note: Dictation voice to text software may have been used in the creation of this document   **

## 2021-02-12 NOTE — PLAN OF CARE
Problem: OCCUPATIONAL THERAPY ADULT  Goal: Performs self-care activities at highest level of function for planned discharge setting  See evaluation for individualized goals  Description: Treatment Interventions: ADL retraining, Functional transfer training, UE strengthening/ROM, Cognitive reorientation, Patient/family training, Endurance training, Equipment evaluation/education, Fine motor coordination activities, Compensatory technique education, Activityengagement, Energy conservation          See flowsheet documentation for full assessment, interventions and recommendations  Note: Limitation: Decreased ADL status, Decreased UE strength, Decreased Safe judgement during ADL, Decreased cognition, Decreased endurance, Decreased fine motor control, Decreased high-level ADLs  Prognosis: Fair  Assessment: PT SEEN FOR OT TREATMENT SESSION FOCUSING ON ADLS, COGNITION, AND FUNCTIONAL TRANSFERS  PT WAS OOB SEATED IN CHAIR UPON ARRIVAL  PT WAS ABLE TO IDENTIFY ALL ITEMS NEEDED FOR ORAL HYGIENE TASK AND PROCEEDED TO BRUSH TEETH AT S LEVEL WHILE SEATED  PT THEN BRUSHED HER HAIR WITH MIN A TO DETANGLE WHILE SEATED  PT PUT HAIR UP IN A PONYTAIL WITH MIN A TO ASSIST DUE TO UE FATIGUE  PT WASHED FACE WITH S  PT WAS MOD A FOR LB DRESSING WITH ASSISTANCE FOR STEADYING, SET-UP, VC, AND SAFETY AWARENESS  PT DONNED PANTS, THREADING WEAKER L SIDE FIRST, AND THEN THREADING R LE  PT WAS MIN A SIT<-> STAND TO PULL UP PANTS  PT ABLE TO PULL PANTS UP OVER B/L HIPS  PT DOFFED PANTS WHILE SEATED USING OPPOSITE LE TO PUSH THE PANTS OFF DUE TO BENDING LIMITATION DUE TO HERNIA  HOWEVER, PT THEN BENT OVER TO ADJUST SOCKS  PT EDUCATED ON ENERGY CONSERVATION, COMPENSATORY STRATEGIES, DME MANAGEMENT, AND BODY MECHANICS  PT PROGRESSING, HOWEVER LIMITED DUE TO FATIGUE, TREMORS, IMPAIRED COGNITION, DECREASED INSIGHT, AND DECREASED SAFETY AWARENESS  FROM OT PERSPECTIVE, D/C RECOMMENDATION REHAB   PT WOULD CONTINUE TO BENEFIT FROM CONTINUED INPATIENT OT 3-5X/WEEK TO ADDRESS DEFICITS AND IMPROVE FUNCTIONAL INDEPENDENCE  OT Discharge Recommendation: Post-Acute Rehabilitation Services  OT - OK to Discharge:  Yes

## 2021-02-12 NOTE — PROGRESS NOTES
Cardiology Progress Note - Kailash Gonzales 80 y o  female MRN: [id number]    Unit/Bed#: -01 Encounter: [id number]      Assessment:  Principal Problem:    Pleural effusion  Active Problems:    COPD (chronic obstructive pulmonary disease) (HCC)    Hypertension    Hypothyroidism    Chronic diastolic congestive heart failure (HCC)    Hyperlipidemia    S/P CABG (coronary artery bypass graft)    Type 2 diabetes mellitus ([address] )    Respiratory failure with hypoxia ([facility] [address] )      Plan:  Patient is comfortable this morning  She has no chest pain or significant dyspnea   BMP today with potassium of 3 9 and creatinine of 1 09   CO2 is 40  Patient continues on Diamox  Hemoglobin is 13   Pulmonary note appreciated   Patient on supplemental oxygen  Telemetry demonstrates sinus rhythm with PACs and PVCs and episodes of ventricular bigeminy   Agree with present medical management  Subjective:   Patient seen and examined  No significant events overnight   negative  Objective:     Vitals: Blood pressure 113/51, pulse (!) 40, temperature 97 7 °F (36 5 °C), resp  rate 18, height 5' 4" (1 626 m), weight 92 8 kg (204 lb 9 6 oz), SpO2 92 %, not currently breastfeeding , Body mass index is 35 12 kg/m² ,   Orthostatic Blood Pressures      Most Recent Value   Blood Pressure  113/51 filed at 02/12/2021 0804   Patient Position - Orthostatic VS  Lying filed at 02/11/2021 0256      ,      Intake/Output Summary (Last 24 hours) at 2/12/2021 0836  Last data filed at 2/12/2021 0530  Gross per 24 hour   Intake --   Output 1242 ml   Net -1242 ml       No significant arrhythmias seen on telemetry review    Ventricular bigeminy      Physical Exam:    GEN: Ashley A Sagat   NECK: supple, no carotid bruits, no JVD or HJR  HEART: normal rate, regular rhythm, normal S1 and S2, no murmurs, clicks, gallops or rubs   LUNGS: clear to auscultation bilaterally; decreased breath sounds  ABDOMEN: normal bowel sounds, soft, no tenderness, no distention  EXTREMITIES:  edema  SKIN: warm and well perfused, no suspicious lesions on exposed skin    Labs & Results:    No results displayed because visit has over 200 results  Xr Chest Portable    Result Date: 2/9/2021  Narrative: CHEST INDICATION:   Compare to prior study and assess for improvement in pulmonary congestion  COMPARISON:  Chest radiograph from 2/5/2021 and 5/3/2019  EXAM PERFORMED/VIEWS:  XR CHEST PORTABLE FINDINGS: Mild cardiomegaly  CABG  Right carotid endarterectomy  Improving pulmonary venous congestion with decrease in small effusions and bibasilar atelectasis  Mild curvature of the spine  Impression: Improving pulmonary venous congestion with decrease in small effusions and bibasilar atelectasis  Workstation performed: [id number]     Xr Chest Portable    Result Date: 2/5/2021  Narrative: CHEST INDICATION:  Shortness of breath, history includes COPD as well as CHF  COMPARISON:  5/3/2019 EXAM PERFORMED/VIEWS:  XR CHEST PORTABLE FINDINGS: Heart shadow is partially obscured by adjacent opacity  Status post median sternotomy  Central vascular congestive changes with right perihilar groundglass opacity may represent asymmetric pulmonary edema  Bibasilar opacities likely represent combination of atelectasis and pleural effusions  Mild left apical pleural thickening  No pneumothorax  Surgical clips right neck  Degenerative changes of the shoulders and spine with lower thoracic dextroscoliosis  Impression: Central vascular congestion with suspected asymmetric pulmonary edema and bibasilar opacities as above  Workstation performed: [id number]     Ct Head Without Contrast    Result Date: 2/5/2021  Narrative: CT BRAIN - WITHOUT CONTRAST INDICATION:   Head trauma, minor (Age => 65y) FALL ON ASA  COMPARISON:  May 1, 2019 TECHNIQUE:  CT examination of the brain was performed    In addition to axial images, sagittal and coronal 2D reformatted images were created and submitted for interpretation  Radiation dose length product (DLP) for this visit:  907 64 mGy-cm   This examination, like all CT scans performed in the [facility], was performed utilizing techniques to minimize radiation dose exposure, including the use of iterative  reconstruction and automated exposure control  IMAGE QUALITY:  Diagnostic  FINDINGS: PARENCHYMA: Decreased attenuation is noted in periventricular and subcortical white matter demonstrating an appearance that is statistically most likely to represent mild microangiopathic change  Old right parietal and occipital encephalomalacia No CT signs of acute infarction  No intracranial mass, mass effect or midline shift  No acute parenchymal hemorrhage  VENTRICLES AND EXTRA-AXIAL SPACES:  Normal for the patient's age  VISUALIZED ORBITS AND PARANASAL SINUSES:  Unremarkable  CALVARIUM AND EXTRACRANIAL SOFT TISSUES:  Mild soft tissue swelling left forehead  No underlying calvarial fracture  Partially calcified sebaceous cyst right posterior parietal/occipital scalp  Impression: No acute intracranial abnormality  Workstation performed: [id number]     Ct Spine Cervical Without Contrast    Result Date: 2/5/2021  Narrative: CT CERVICAL SPINE - WITHOUT CONTRAST INDICATION:   Neck trauma (Age => 65y) fall on asa  COMPARISON:  May 1, 2019 TECHNIQUE:  CT examination of the cervical spine was performed without intravenous contrast   Contiguous axial images were obtained  Sagittal and coronal reconstructions were performed  Radiation dose length product (DLP) for this visit:  852 87 mGy-cm   This examination, like all CT scans performed in the [facility], was performed utilizing techniques to minimize radiation dose exposure, including the use of iterative  reconstruction and automated exposure control  IMAGE QUALITY:  Exam limited by motion artifact  FINDINGS: ALIGNMENT:  Slight reversal of lordotic curvature    Grade 1 anterolisthesis of C3 on C4 and C4 on C5 appearing stable  Concave left cervical thoracic scoliotic curvature  VERTEBRAL BODIES:  Previous left side C5 transverse process fracture has healed  No new fractures are found  DEGENERATIVE CHANGES:  Moderate multilevel cervical degenerative changes are noted  No critical central canal stenosis  PREVERTEBRAL AND PARASPINAL SOFT TISSUES:  Unremarkable  THORACIC INLET:  Bilateral pleural effusions  Impression: Exam limited by motion artifact  No cervical spine fracture or traumatic malalignment  The study was marked in EPIC for immediate notification because this is a "level C trauma"  Workstation performed: [id number]      Bedside Procedure    Result Date: 2/5/2021  Narrative: [phone] [id number] [phone] [id number]  886      EKG personally reviewed by Sammy Connor MD      Counseling / Coordination of Care  Total floor / unit time spent today 30 minutes  Greater than 50% of total time was spent with the patient and / or family counseling and / or coordination of care

## 2021-02-12 NOTE — PLAN OF CARE
Problem: Potential for Falls  Goal: Patient will remain free of falls  Description: INTERVENTIONS:  - Assess patient frequently for physical needs  -  Identify cognitive and physical deficits and behaviors that affect risk of falls    -  [place] fall precautions as indicated by assessment   - Educate patient/family on patient safety including physical limitations  - Instruct patient to call for assistance with activity based on assessment  - Modify environment to reduce risk of injury  - Consider OT/PT consult to assist with strengthening/mobility  Outcome: Progressing     Problem: Prexisting or High Potential for Compromised Skin Integrity  Goal: Skin integrity is maintained or improved  Description: INTERVENTIONS:  - Identify patients at risk for skin breakdown  - Assess and monitor skin integrity  - Assess and monitor nutrition and hydration status  - Monitor labs   - Assess for incontinence   - Turn and reposition patient  - Assist with mobility/ambulation  - Relieve pressure over bony prominences  - Avoid friction and shearing  - Provide appropriate hygiene as needed including keeping skin clean and dry  - Evaluate need for skin moisturizer/barrier cream  - Collaborate with interdisciplinary team   - Patient/family teaching  - Consider wound care consult   Outcome: Progressing     Problem: RESPIRATORY - ADULT  Goal: Achieves optimal ventilation and oxygenation  Description: INTERVENTIONS:  - Assess for changes in respiratory status  - Assess for changes in mentation and behavior  - Position to facilitate oxygenation and minimize respiratory effort  - Oxygen administered by appropriate delivery if ordered  - Initiate smoking cessation education as indicated  - Encourage broncho-pulmonary hygiene including cough, deep breathe, Incentive Spirometry  - Assess the need for suctioning and aspirate as needed  - Assess and instruct to report SOB or any respiratory difficulty  - Respiratory Therapy support as indicated  Outcome: Progressing     Problem: PAIN - ADULT  Goal: Verbalizes/displays adequate comfort level or baseline comfort level  Description: Interventions:  - Encourage patient to monitor pain and request assistance  - Assess pain using appropriate pain scale  - Administer analgesics based on type and severity of pain and evaluate response  - Implement non-pharmacological measures as appropriate and evaluate response  - Consider cultural and social influences on pain and pain management  - Notify physician/advanced practitioner if interventions unsuccessful or patient reports new pain  Outcome: Progressing     Problem: INFECTION - ADULT  Goal: Absence or prevention of progression during hospitalization  Description: INTERVENTIONS:  - Assess and monitor for signs and symptoms of infection  - Monitor lab/diagnostic results  - Monitor all insertion sites, i e  indwelling lines, tubes, and drains  - Monitor endotracheal if appropriate and nasal secretions for changes in amount and color  - [place] appropriate cooling/warming therapies per order  - Administer medications as ordered  - Instruct and encourage patient and family to use good hand hygiene technique  - Identify and instruct in appropriate isolation precautions for identified infection/condition  Outcome: Progressing  Goal: Absence of fever/infection during neutropenic period  Description: INTERVENTIONS:  - Monitor WBC    Outcome: Progressing     Problem: SAFETY ADULT  Goal: Patient will remain free of falls  Description: INTERVENTIONS:  - Assess patient frequently for physical needs  -  Identify cognitive and physical deficits and behaviors that affect risk of falls    -  [place] fall precautions as indicated by assessment   - Educate patient/family on patient safety including physical limitations  - Instruct patient to call for assistance with activity based on assessment  - Modify environment to reduce risk of injury  - Consider OT/PT consult to assist with strengthening/mobility  Outcome: Progressing  Goal: Maintain or return to baseline ADL function  Description: INTERVENTIONS:  -  Assess patient's ability to carry out ADLs; assess patient's baseline for ADL function and identify physical deficits which impact ability to perform ADLs (bathing, care of mouth/teeth, toileting, grooming, dressing, etc )  - Assess/evaluate cause of self-care deficits   - Assess range of motion  - Assess patient's mobility; develop plan if impaired  - Assess patient's need for assistive devices and provide as appropriate  - Encourage maximum independence but intervene and supervise when necessary  - Involve family in performance of ADLs  - Assess for home care needs following discharge   - Consider OT consult to assist with ADL evaluation and planning for discharge  - Provide patient education as appropriate  Outcome: Progressing  Goal: Maintain or return mobility status to optimal level  Description: INTERVENTIONS:  - Assess patient's baseline mobility status (ambulation, transfers, stairs, etc )    - Identify cognitive and physical deficits and behaviors that affect mobility  - Identify mobility aids required to assist with transfers and/or ambulation (gait belt, sit-to-stand, lift, walker, cane, etc )  - [place] fall precautions as indicated by assessment  - Record patient progress and toleration of activity level on Mobility SBAR; progress patient to next Phase/Stage  - Instruct patient to call for assistance with activity based on assessment  - Consider rehabilitation consult to assist with strengthening/weightbearing, etc   Outcome: Progressing     Problem: DISCHARGE PLANNING  Goal: Discharge to home or other facility with appropriate resources  Description: INTERVENTIONS:  - Identify barriers to discharge w/patient and caregiver  - Arrange for needed discharge resources and transportation as appropriate  - Identify discharge learning needs (meds, wound care, etc )  - Arrange for interpretive services to assist at discharge as needed  - Refer to Case Management Department for coordinating discharge planning if the patient needs post-hospital services based on physician/advanced practitioner order or complex needs related to functional status, cognitive ability, or social support system  Outcome: Progressing     Problem: Knowledge Deficit  Goal: Patient/family/caregiver demonstrates understanding of disease process, treatment plan, medications, and discharge instructions  Description: Complete learning assessment and assess knowledge base    Interventions:  - Provide teaching at level of understanding  - Provide teaching via preferred learning methods  Outcome: Progressing

## 2021-02-13 LAB
ARTERIAL PATENCY WRIST A: YES
BASE EXCESS BLDA CALC-SCNC: 9.1 MMOL/L
HCO3 BLDA-SCNC: 38.1 MMOL/L (ref 22–28)
NASAL CANNULA: 2
O2 CT BLDA-SCNC: 16.8 ML/DL (ref 16–23)
OXYHGB MFR BLDA: 90.2 % (ref 94–97)
PCO2 BLDA: 76.2 MM HG (ref 36–44)
PH BLDA: 7.32 [PH] (ref 7.35–7.45)
PO2 BLDA: 65.8 MM HG (ref 75–129)
SPECIMEN SOURCE: ABNORMAL

## 2021-02-13 PROCEDURE — 36600 WITHDRAWAL OF ARTERIAL BLOOD: CPT

## 2021-02-13 PROCEDURE — 94640 AIRWAY INHALATION TREATMENT: CPT

## 2021-02-13 PROCEDURE — 82805 BLOOD GASES W/O2 SATURATION: CPT | Performed by: INTERNAL MEDICINE

## 2021-02-13 PROCEDURE — 94760 N-INVAS EAR/PLS OXIMETRY 1: CPT

## 2021-02-13 PROCEDURE — 99232 SBSQ HOSP IP/OBS MODERATE 35: CPT | Performed by: INTERNAL MEDICINE

## 2021-02-13 PROCEDURE — 94762 N-INVAS EAR/PLS OXIMTRY CONT: CPT

## 2021-02-13 PROCEDURE — 94660 CPAP INITIATION&MGMT: CPT

## 2021-02-13 RX ADMIN — ENOXAPARIN SODIUM 40 MG: 40 INJECTION SUBCUTANEOUS at 08:56

## 2021-02-13 RX ADMIN — ACETAZOLAMIDE 250 MG: 250 TABLET ORAL at 08:57

## 2021-02-13 RX ADMIN — LEVALBUTEROL HYDROCHLORIDE 1.25 MG: 1.25 SOLUTION, CONCENTRATE RESPIRATORY (INHALATION) at 20:13

## 2021-02-13 RX ADMIN — SENNOSIDES 8.6 MG: 8.6 TABLET, FILM COATED ORAL at 08:56

## 2021-02-13 RX ADMIN — DOCUSATE SODIUM 100 MG: 100 CAPSULE, LIQUID FILLED ORAL at 17:28

## 2021-02-13 RX ADMIN — LEVOTHYROXINE SODIUM 75 MCG: 75 TABLET ORAL at 05:59

## 2021-02-13 RX ADMIN — FAMOTIDINE 20 MG: 20 TABLET, FILM COATED ORAL at 08:56

## 2021-02-13 RX ADMIN — ALPRAZOLAM 0.5 MG: 0.5 TABLET ORAL at 17:28

## 2021-02-13 RX ADMIN — CLOPIDOGREL BISULFATE 75 MG: 75 TABLET ORAL at 08:56

## 2021-02-13 RX ADMIN — LEVALBUTEROL HYDROCHLORIDE 1.25 MG: 1.25 SOLUTION, CONCENTRATE RESPIRATORY (INHALATION) at 08:33

## 2021-02-13 RX ADMIN — AMLODIPINE BESYLATE 5 MG: 5 TABLET ORAL at 08:56

## 2021-02-13 RX ADMIN — FAMOTIDINE 20 MG: 20 TABLET, FILM COATED ORAL at 17:28

## 2021-02-13 RX ADMIN — FUROSEMIDE 40 MG: 40 TABLET ORAL at 08:56

## 2021-02-13 RX ADMIN — ASPIRIN 81 MG: 81 TABLET, COATED ORAL at 08:56

## 2021-02-13 RX ADMIN — IPRATROPIUM BROMIDE 0.5 MG: 0.5 SOLUTION RESPIRATORY (INHALATION) at 08:33

## 2021-02-13 RX ADMIN — Medication 2000 UNITS: at 08:56

## 2021-02-13 RX ADMIN — IPRATROPIUM BROMIDE 0.5 MG: 0.5 SOLUTION RESPIRATORY (INHALATION) at 20:13

## 2021-02-13 RX ADMIN — METOPROLOL TARTRATE 25 MG: 25 TABLET, FILM COATED ORAL at 08:56

## 2021-02-13 RX ADMIN — DOCUSATE SODIUM 100 MG: 100 CAPSULE, LIQUID FILLED ORAL at 08:56

## 2021-02-13 RX ADMIN — OXYCODONE HYDROCHLORIDE AND ACETAMINOPHEN 500 MG: 500 TABLET ORAL at 08:56

## 2021-02-13 NOTE — PROGRESS NOTES
Cardiology Progress Note - Sherwin Gonzales 80 y o  female MRN: [id number]    Unit/Bed#: -01 Encounter: [id number]      Assessment:  Principal Problem:    Pleural effusion  Active Problems:    COPD (chronic obstructive pulmonary disease) (HCC)    Hypertension    Hypothyroidism    Chronic diastolic congestive heart failure (HCC)    Hyperlipidemia    S/P CABG (coronary artery bypass graft)    Type 2 diabetes mellitus ([address] )    Respiratory failure with hypoxia ([facility] [address] )      Plan:  Patient is comfortable this morning  She has no chest pain or significant dyspnea   Pulmonary note appreciated   Patient on supplemental oxygen, 2 L nasal cannula  Telemetry demonstrates sinus rhythm with PACs and PVCs and episodes of ventricular bigeminy   Agree with present medical management  Subjective:   Patient seen and examined  No significant events overnight   negative  Objective:     Vitals: Blood pressure 112/54, pulse 68, temperature 99 8 °F (37 7 °C), resp  rate 16, height 5' 4" (1 626 m), weight 92 8 kg (204 lb 9 6 oz), SpO2 94 %, not currently breastfeeding , Body mass index is 35 12 kg/m² ,   Orthostatic Blood Pressures      Most Recent Value   Blood Pressure  112/54 filed at 02/13/2021 9367   Patient Position - Orthostatic VS  Lying filed at 02/11/2021 0256      ,      Intake/Output Summary (Last 24 hours) at 2/13/2021 0857  Last data filed at 2/13/2021 0843  Gross per 24 hour   Intake 40903 ml   Output --   Net 01609 ml       No significant arrhythmias seen on telemetry review         Physical Exam:    GEN: Ashley A Sagat   NECK: supple, no carotid bruits, no JVD or HJR  HEART: normal rate, regular rhythm, normal S1 and S2, no murmurs, clicks, gallops or rubs   LUNGS: clear to auscultation bilaterally  ABDOMEN: normal bowel sounds, soft, no tenderness, no distention  EXTREMITIES:  edema  SKIN: warm and well perfused, no suspicious lesions on exposed skin    Labs & Results:    No results displayed because visit has over 200 results  Xr Chest Portable    Result Date: 2/9/2021  Narrative: CHEST INDICATION:   Compare to prior study and assess for improvement in pulmonary congestion  COMPARISON:  Chest radiograph from 2/5/2021 and 5/3/2019  EXAM PERFORMED/VIEWS:  XR CHEST PORTABLE FINDINGS: Mild cardiomegaly  CABG  Right carotid endarterectomy  Improving pulmonary venous congestion with decrease in small effusions and bibasilar atelectasis  Mild curvature of the spine  Impression: Improving pulmonary venous congestion with decrease in small effusions and bibasilar atelectasis  Workstation performed: [id number]     Xr Chest Portable    Result Date: 2/5/2021  Narrative: CHEST INDICATION:  Shortness of breath, history includes COPD as well as CHF  COMPARISON:  5/3/2019 EXAM PERFORMED/VIEWS:  XR CHEST PORTABLE FINDINGS: Heart shadow is partially obscured by adjacent opacity  Status post median sternotomy  Central vascular congestive changes with right perihilar groundglass opacity may represent asymmetric pulmonary edema  Bibasilar opacities likely represent combination of atelectasis and pleural effusions  Mild left apical pleural thickening  No pneumothorax  Surgical clips right neck  Degenerative changes of the shoulders and spine with lower thoracic dextroscoliosis  Impression: Central vascular congestion with suspected asymmetric pulmonary edema and bibasilar opacities as above  Workstation performed: [id number]     Ct Head Without Contrast    Result Date: 2/5/2021  Narrative: CT BRAIN - WITHOUT CONTRAST INDICATION:   Head trauma, minor (Age => 65y) FALL ON ASA  COMPARISON:  May 1, 2019 TECHNIQUE:  CT examination of the brain was performed  In addition to axial images, sagittal and coronal 2D reformatted images were created and submitted for interpretation  Radiation dose length product (DLP) for this visit:  907 64 mGy-cm     This examination, like all CT scans performed in the [facility], was performed utilizing techniques to minimize radiation dose exposure, including the use of iterative  reconstruction and automated exposure control  IMAGE QUALITY:  Diagnostic  FINDINGS: PARENCHYMA: Decreased attenuation is noted in periventricular and subcortical white matter demonstrating an appearance that is statistically most likely to represent mild microangiopathic change  Old right parietal and occipital encephalomalacia No CT signs of acute infarction  No intracranial mass, mass effect or midline shift  No acute parenchymal hemorrhage  VENTRICLES AND EXTRA-AXIAL SPACES:  Normal for the patient's age  VISUALIZED ORBITS AND PARANASAL SINUSES:  Unremarkable  CALVARIUM AND EXTRACRANIAL SOFT TISSUES:  Mild soft tissue swelling left forehead  No underlying calvarial fracture  Partially calcified sebaceous cyst right posterior parietal/occipital scalp  Impression: No acute intracranial abnormality  Workstation performed: [id number]     Ct Spine Cervical Without Contrast    Result Date: 2/5/2021  Narrative: CT CERVICAL SPINE - WITHOUT CONTRAST INDICATION:   Neck trauma (Age => 65y) fall on asa  COMPARISON:  May 1, 2019 TECHNIQUE:  CT examination of the cervical spine was performed without intravenous contrast   Contiguous axial images were obtained  Sagittal and coronal reconstructions were performed  Radiation dose length product (DLP) for this visit:  852 87 mGy-cm   This examination, like all CT scans performed in the [facility], was performed utilizing techniques to minimize radiation dose exposure, including the use of iterative  reconstruction and automated exposure control  IMAGE QUALITY:  Exam limited by motion artifact  FINDINGS: ALIGNMENT:  Slight reversal of lordotic curvature  Grade 1 anterolisthesis of C3 on C4 and C4 on C5 appearing stable  Concave left cervical thoracic scoliotic curvature   VERTEBRAL BODIES:  Previous left side C5 transverse process fracture has healed  No new fractures are found  DEGENERATIVE CHANGES:  Moderate multilevel cervical degenerative changes are noted  No critical central canal stenosis  PREVERTEBRAL AND PARASPINAL SOFT TISSUES:  Unremarkable  THORACIC INLET:  Bilateral pleural effusions  Impression: Exam limited by motion artifact  No cervical spine fracture or traumatic malalignment  The study was marked in EPIC for immediate notification because this is a "level C trauma"  Workstation performed: [id number]     Us Bedside Procedure    Result Date: 2/5/2021  Narrative: 2 [phone] [id number] [phone] [id number]  886      EKG personally reviewed by Starr Schulte MD      Counseling / Coordination of Care  Total floor / unit time spent today 30 minutes  Greater than 50% of total time was spent with the patient and / or family counseling and / or coordination of care

## 2021-02-13 NOTE — CASE MANAGEMENT
Script for BIPAP has been sent to [facility] with supporting documents  Request made for [employer] to inform CM when bipap is approved and has been delivered to patient's home

## 2021-02-13 NOTE — ASSESSMENT & PLAN NOTE
· Acute on chronic hypoxic and hypercarbic respiratory failure, multifactorial secondary to diastolic heart failure, pleural effusions, COPD, OHS/JAMEEL  · Continue supplemental oxygen wean as tolerated to keep O2 sats more than 88-90%  · Continue Lasix  · Diamox per pulmonary  · Discussed with pulmonary - patient is deemed appropriate for BiPAP, prescription for BiPAP given by pulmonary to case management, arrangements for discharge with BiPAP underway by case management

## 2021-02-13 NOTE — PROGRESS NOTES
Progress Note - Virgen Stacy 1935, 80 y o  female MRN: [id number]    Unit/Bed#: -01 Encounter: [id number]    Primary Care Provider: Steven Anders DO   Date and time admitted to hospital: 2/5/2021  2:23 PM        Respiratory failure with hypoxia ([facility] )  Assessment & Plan  · Acute on chronic hypoxic and hypercarbic respiratory failure, multifactorial secondary to diastolic heart failure, pleural effusions, COPD, OHS/JAMEEL  · Continue supplemental oxygen wean as tolerated to keep O2 sats more than 88-90%  · Continue Lasix  · Diamox per pulmonary  · Discussed with pulmonary - patient is deemed appropriate for BiPAP, prescription for BiPAP given by pulmonary to case management, arrangements for discharge with BiPAP underway by case management    Type 2 diabetes mellitus ([place] [address] )  Assessment & Plan  Lab Results   Component Value Date    HGBA1C 6 3 (H) 07/01/2020       No results for input(s): POCGLU in the last 72 hours  Blood Sugar Average: Last 72 hrs:     · Type 2 diabetes mellitus  · Continue with insulin  · Hold oral hypoglycemic medication  · Avoid hypoglycemia    S/P CABG (coronary artery bypass graft)  Assessment & Plan  · Aspirin and statin   · Continue metoprolol    Hyperlipidemia  Assessment & Plan  · Continue with statin     Chronic diastolic congestive heart failure (HCC)  Assessment & Plan  Wt Readings from Last 3 Encounters:   02/12/21 92 8 kg (204 lb 9 6 oz)   10/15/20 97 1 kg (214 lb)   07/28/20 98 4 kg (217 lb)       Continue Lasix 40 p o   Daily  Continue metoprolol  Monitor I/O, daily weights  Cardiology input noted          Hypothyroidism  Assessment & Plan  · Hypothyroidism  · Continue with levothyroxine    Hypertension  Assessment & Plan  · Continue with metoprolol 25 mg [id number]   · Monitor on Lasix 40 mg daily per cards  · Monitor blood pressure    COPD (chronic obstructive pulmonary disease) ([place] [address] )  Assessment & Plan  · COPD does not appear to be exacerbation, discussed with pulmonology, steroids were discontinued /  · Keep oxygen levels greater than 88- 94%    * Pleural effusion  Assessment & Plan  Continue Lasix 40 p o  Daily  Monitor I/O, daily weights      VTE Pharmacologic Prophylaxis:   Pharmacologic: Enoxaparin (Lovenox)  Mechanical VTE Prophylaxis in Place: Yes    Patient Centered Rounds: I have performed bedside rounds with nursing staff today  Discussions with Specialists or Other Care Team Provider:  Pulmonary    Education and Discussions with Family / Patient:  Discussed with the patient, updated Son Rach Burton    Time Spent for Care: 30 minutes  More than 50% of total time spent on counseling and coordination of care as described above  Current Length of Stay: 8 day(s)    Current Patient Status: Inpatient   Certification Statement: The patient will continue to require additional inpatient hospital stay due to As outlined    Discharge Plan:  Patient deemed appropriate for BiPAP at discharge, pulmonary and case management input noted arrangements for BiPAP at home underway    Code Status: Level 1 - Full Code      Subjective:     Sitting up in chair  Reports feeling okay  Encouraged out of bed into chair and ambulation as able  Encourage incentive spirometry    Objective:     Vitals:   Temp (24hrs), Av 2 °F (37 3 °C), Min:98 4 °F (36 9 °C), Max:99 8 °F (37 7 °C)    Temp:  [98 4 °F (36 9 °C)-99 8 °F (37 7 °C)] 99 8 °F (37 7 °C)  HR:  [68-78] 68  Resp:  [15-18] 16  BP: (104-151)/(52-67) 112/54  SpO2:  [90 %-94 %] 94 %  Body mass index is 35 12 kg/m²  Input and Output Summary (last 24 hours):        Intake/Output Summary (Last 24 hours) at 2021 1313  Last data filed at 2021 1230  Gross per 24 hour   Intake 720 ml   Output --   Net 720 ml       Physical Exam:     Physical Exam    Comfortably sitting up in chair  Obese  Short thick neck  Lungs diminished breath sounds bilateral  Heart sounds S1-S2 noted  Abdomen soft  Awake obeys simple commands  Pulses noted  No rash    Additional Data:     Labs:    Results from last 7 days   Lab Units 02/12/21  0401   WBC Thousand/uL 8 64   HEMOGLOBIN g/dL 13 0   HEMATOCRIT % 46 0   PLATELETS Thousands/uL 248   NEUTROS PCT % 71   LYMPHS PCT % 13*   MONOS PCT % 11   EOS PCT % 3     Results from last 7 days   Lab Units 02/12/21  0401 02/11/21  0453   SODIUM mmol/L 141 139   POTASSIUM mmol/L 3 9 4 5   CHLORIDE mmol/L 101 96*   CO2 mmol/L 40* 44*   BUN mg/dL 39* 38*   CREATININE mg/dL 1 09 1 10   ANION GAP mmol/L 0* -1*   CALCIUM mg/dL 9 1 9 1   ALBUMIN g/dL  --  2 5*   TOTAL BILIRUBIN mg/dL  --  0 92   ALK PHOS U/L  --  91   ALT U/L  --  14   AST U/L  --  14   GLUCOSE RANDOM mg/dL 133 110         * I Have Reviewed All Lab Data Listed Above  * Additional Pertinent Lab Tests Reviewed:  All Labs Within Last 24 Hours Reviewed    Imaging:    Imaging Reports Reviewed Today Include:   Imaging Personally Reviewed by Myself Includes:     Recent Cultures (last 7 days):           Last 24 Hours Medication List:   Current Facility-Administered Medications   Medication Dose Route Frequency Provider Last Rate    acetaminophen  650 mg Oral Q6H PRN Alvaro Steward MD      albuterol  2 5 mg Nebulization Q4H PRN Alvaro Steward MD      ALPRAZolam  0 5 mg Oral TID PRN Alvaro Steward MD      aluminum-magnesium hydroxide-simethicone  30 mL Oral Q6H PRN Alvaro Steward MD      amLODIPine  5 mg Oral Daily Alvaro Steward MD      ascorbic acid  500 mg Oral Daily Alvaro Steward MD      aspirin  81 mg Oral Daily Alvaro Steward MD      bimatoprost  1 drop Both Eyes HS Alvaro Steward MD      calcium carbonate  1,000 mg Oral Daily PRN Alvaro Steward MD      cholecalciferol  2,000 Units Oral Daily Alvaro Steward MD      clopidogrel  75 mg Oral Daily Alvaro Steward MD      docusate sodium  100 mg Oral BID Alvaro Steward MD      enoxaparin  40 mg Subcutaneous Daily Alvaro Steward MD      famotidine  20 mg Oral BID Ella Zavala MD      furosemide  40 mg Oral Daily Aarti Cosme MD      ipratropium  0 5 mg Nebulization TID Cierra Farr DO      levalbuterol  1 25 mg Nebulization TID Cierra Farr DO      levothyroxine  75 mcg Oral Early Morning Ella Zavala MD      metoprolol tartrate  25 mg Oral Q12H [address] Ella Zavala MD      oxyCODONE-acetaminophen  1 tablet Oral Q4H PRN Ella Zavala MD      senna  1 tablet Oral Daily Ella Zavala MD          Today, Patient Was Seen By: Sharita Covington MD    ** Please Note: Dictation voice to text software may have been used in the creation of this document   **

## 2021-02-13 NOTE — ASSESSMENT & PLAN NOTE
· Continue with metoprolol 25 mg [id number]   · Monitor on Lasix 40 mg daily per cards  · Monitor blood pressure

## 2021-02-13 NOTE — PROGRESS NOTES
PULMONOLOGY PROGRESS NOTE     Name: Leopold Donate   Age & Sex: 80 y o  female   MRN: [id number]  Unit/Bed#: -01   Encounter: [id number]    PATIENT INFORMATION     Name: Leopold Donate   Age & Sex: 80 y o  female   MRN: [id number]  Hospital Stay Days: 8    ASSESSMENT/PLAN     Principal Problem:    Pleural effusion  Active Problems:    COPD (chronic obstructive pulmonary disease) ([address] )    Hypertension    Hypothyroidism    Chronic diastolic congestive heart failure (HCC)    Hyperlipidemia    S/P CABG (coronary artery bypass graft)    Type 2 diabetes mellitus (HCC)    Respiratory failure with hypoxia (HCC)    Assessment/plan:    1  Acute on chronic hypoxic respiratory failure - multifactorial:  Diastolic CHF, obesity hypoventilation/restrictive lung disease  2  Acute diastolic heart failure  3  JAMEEL/OHS  4  COPD - unknown severity - not in exacerbation  5  Morbid obesity    -continue supplemental oxygen titrate as tolerated; goal SpO2 greater than 88%  -continue Lasix p o  40 mg daily; diuretics and volume management per Cardiology  -discontinue Diamox  -continue Xopenex and Atrovent and discharged with Stiolto  -patient qualified for BiPAP; spent greater than 4 hours out of 7 hours with oxygen saturation less than 80% and morning ABG with CO2 76 2  -continue BiPAP q h s  10/5  -ordered auto BiPAP on discharge  -placement pending  -outpatient follow-up with Pulmonary    -pulmonary will sign off please call with any questions or concerns    SUBJECTIVE     Patient seen and examined  No acute events overnight  Patient resting comfortably in no acute distress  Reports that she tolerates BiPAP well  Anxious to return home    No other major complaints    OBJECTIVE     Vitals:    02/12/21 2227 02/13/21 0723 02/13/21 1510 02/13/21 1510   BP: 108/52 112/54 129/87 129/87   BP Location:    Left arm   Pulse: 68  67 (!) 43   Resp: 16      Temp: 98 4 °F (36 9 °C) 99 8 °F (37 7 °C) 98 9 °F (37 2 °C) 98 9 °F (37 2 °C) TempSrc:    Oral   SpO2: 94%  94% 95%   Weight:       Height:          Temperature:   Temp (24hrs), Av °F (37 2 °C), Min:98 4 °F (36 9 °C), Max:99 8 °F (37 7 °C)    Temperature: 98 9 °F (37 2 °C)  Intake & Output:  I/O        0701 -  0700  07 -  0700  07 -  0700    P  O   600 540    Total Intake(mL/kg)  600 (6 5) 540 (5 8)    Urine (mL/kg/hr) 1542 (0 7)  500 (0 5)    Total Output 1542  500    Net -1542 +600 +40           Unmeasured Urine Occurrence  3 x         Weights:   IBW: 54 7 kg    Body mass index is 35 12 kg/m²  Weight (last 2 days)     Date/Time   Weight    21 0559   92 8 (204 6)    21 0600   93 (205)            Physical Exam  Constitutional:       General: She is not in acute distress  Appearance: She is obese  She is not ill-appearing, toxic-appearing or diaphoretic  HENT:      Right Ear: External ear normal       Left Ear: External ear normal    Eyes:      General: No scleral icterus  Right eye: No discharge  Left eye: No discharge  Extraocular Movements: Extraocular movements intact  Conjunctiva/sclera: Conjunctivae normal    Cardiovascular:      Rate and Rhythm: Normal rate and regular rhythm  Pulses: Normal pulses  Heart sounds: Normal heart sounds  No murmur  No friction rub  No gallop  Pulmonary:      Effort: Pulmonary effort is normal  No respiratory distress  Breath sounds: No stridor  No wheezing, rhonchi or rales  Comments: Diminished breath sounds bilateral mid to lower lung fields  Chest:      Chest wall: No tenderness  Abdominal:      General: Bowel sounds are normal  There is no distension  Palpations: Abdomen is soft  Tenderness: There is no abdominal tenderness  There is no guarding or rebound  Musculoskeletal:      Right lower leg: No edema  Left lower leg: No edema  Skin:     General: Skin is warm and dry     Neurological:      Mental Status: She is alert and oriented to person, place, and time  LABORATORY DATA     Labs: I have personally reviewed pertinent reports  Results from last 7 days   Lab Units 02/12/21  0401 02/11/21 0453 02/10/21  0500 02/09/21  0448 02/08/21  0514   WBC Thousand/uL 8 64 8 34 7 22 7 85 9 08   HEMOGLOBIN g/dL 13 0 13 3 13 9 13 2 13 1   HEMATOCRIT % 46 0 47 6* 50 2* 47 6* 47 6*   PLATELETS Thousands/uL 248 236 251 255 298   NEUTROS PCT % 71  --   --  71 75   MONOS PCT % 11  --   --  12 10      Results from last 7 days   Lab Units 02/12/21  0401 02/11/21  0453 02/10/21  0500  02/08/21  0514   POTASSIUM mmol/L 3 9 4 5 4 0   < > 3 8   CHLORIDE mmol/L 101 96* 98*   < > 98*   CO2 mmol/L 40* 44* 42*   < > >45*   BUN mg/dL 39* 38* 35*   < > 34*   CREATININE mg/dL 1 09 1 10 1 03   < > 1 06   CALCIUM mg/dL 9 1 9 1 9 0   < > 9 0   ALK PHOS U/L  --  91 95  --  90   ALT U/L  --  14 13  --  12   AST U/L  --  14 16  --  9    < > = values in this interval not displayed  ABG:   Results from last 7 days   Lab Units 02/13/21  0514   PH ART  7 317*   PCO2 ART mm Hg 76 2*   PO2 ART mm Hg 65 8*   HCO3 ART mmol/L 38 1*   BASE EXC ART mmol/L 9 1   ABG SOURCE  Radial, Left       IMAGING & DIAGNOSTIC TESTING     Radiology Results: I have personally reviewed pertinent reports  Xr Chest Portable    Result Date: 2/9/2021  Impression: Improving pulmonary venous congestion with decrease in small effusions and bibasilar atelectasis  Workstation performed: [id number]     Xr Chest Portable    Result Date: 2/5/2021  Impression: Central vascular congestion with suspected asymmetric pulmonary edema and bibasilar opacities as above  Workstation performed: [id number]     Ct Head Without Contrast    Result Date: 2/5/2021  Impression: No acute intracranial abnormality  Workstation performed: [id number]     Ct Spine Cervical Without Contrast    Result Date: 2/5/2021  Impression: Exam limited by motion artifact  No cervical spine fracture or traumatic malalignment  The study was marked in EPIC for immediate notification because this is a "level C trauma"  Workstation performed: [id number]     Other Diagnostic Testing: I have personally reviewed pertinent reports  ACTIVE MEDICATIONS     Current Facility-Administered Medications   Medication Dose Route Frequency    acetaminophen (TYLENOL) tablet 650 mg  650 mg Oral Q6H PRN    albuterol inhalation solution 2 5 mg  2 5 mg Nebulization Q4H PRN    ALPRAZolam (XANAX) tablet 0 5 mg  0 5 mg Oral TID PRN    aluminum-magnesium hydroxide-simethicone (MYLANTA) oral suspension 30 mL  30 mL Oral Q6H PRN    amLODIPine (NORVASC) tablet 5 mg  5 mg Oral Daily    ascorbic acid (VITAMIN C) tablet 500 mg  500 mg Oral Daily    aspirin (ECOTRIN LOW STRENGTH) EC tablet 81 mg  81 mg Oral Daily    bimatoprost (LUMIGAN) 0 01 % ophthalmic solution 1 drop  1 drop Both Eyes HS    calcium carbonate (TUMS) chewable tablet 1,000 mg  1,000 mg Oral Daily PRN    cholecalciferol (VITAMIN D3) tablet 2,000 Units  2,000 Units Oral Daily    clopidogrel (PLAVIX) tablet 75 mg  75 mg Oral Daily    docusate sodium (COLACE) capsule 100 mg  100 mg Oral BID    enoxaparin (LOVENOX) subcutaneous injection 40 mg  40 mg Subcutaneous Daily    famotidine (PEPCID) tablet 20 mg  20 mg Oral BID    furosemide (LASIX) tablet 40 mg  40 mg Oral Daily    ipratropium (ATROVENT) 0 02 % inhalation solution 0 5 mg  0 5 mg Nebulization TID    levalbuterol (XOPENEX) inhalation solution 1 25 mg  1 25 mg Nebulization TID    levothyroxine tablet 75 mcg  75 mcg Oral Early Morning    metoprolol tartrate (LOPRESSOR) tablet 25 mg  25 mg Oral Q12H [address]    oxyCODONE-acetaminophen (PERCOCET) 5-325 mg per tablet 1 tablet  1 tablet Oral Q4H PRN    senna (SENOKOT) tablet 8 6 mg  1 tablet Oral Daily         Portions of the record may have been created with voice recognition software    Occasional wrong word or "sound a like" substitutions may have occurred due to the inherent limitations of voice recognition software    Read the chart carefully and recognize, using context, where substitutions have occurred   ==  Siva Storm, [address] [facility] PGY-4

## 2021-02-14 PROCEDURE — 99232 SBSQ HOSP IP/OBS MODERATE 35: CPT | Performed by: INTERNAL MEDICINE

## 2021-02-14 PROCEDURE — 94640 AIRWAY INHALATION TREATMENT: CPT | Performed by: SOCIAL WORKER

## 2021-02-14 PROCEDURE — 94760 N-INVAS EAR/PLS OXIMETRY 1: CPT

## 2021-02-14 PROCEDURE — 94760 N-INVAS EAR/PLS OXIMETRY 1: CPT | Performed by: SOCIAL WORKER

## 2021-02-14 PROCEDURE — 94640 AIRWAY INHALATION TREATMENT: CPT

## 2021-02-14 RX ADMIN — METOPROLOL TARTRATE 25 MG: 25 TABLET, FILM COATED ORAL at 09:04

## 2021-02-14 RX ADMIN — FAMOTIDINE 20 MG: 20 TABLET, FILM COATED ORAL at 09:04

## 2021-02-14 RX ADMIN — SENNOSIDES 8.6 MG: 8.6 TABLET, FILM COATED ORAL at 09:04

## 2021-02-14 RX ADMIN — IPRATROPIUM BROMIDE 0.5 MG: 0.5 SOLUTION RESPIRATORY (INHALATION) at 20:40

## 2021-02-14 RX ADMIN — DOCUSATE SODIUM 100 MG: 100 CAPSULE, LIQUID FILLED ORAL at 09:04

## 2021-02-14 RX ADMIN — IPRATROPIUM BROMIDE 0.5 MG: 0.5 SOLUTION RESPIRATORY (INHALATION) at 07:14

## 2021-02-14 RX ADMIN — LEVALBUTEROL HYDROCHLORIDE 1.25 MG: 1.25 SOLUTION, CONCENTRATE RESPIRATORY (INHALATION) at 07:14

## 2021-02-14 RX ADMIN — METOPROLOL TARTRATE 25 MG: 25 TABLET, FILM COATED ORAL at 21:16

## 2021-02-14 RX ADMIN — LEVOTHYROXINE SODIUM 75 MCG: 75 TABLET ORAL at 05:02

## 2021-02-14 RX ADMIN — AMLODIPINE BESYLATE 5 MG: 5 TABLET ORAL at 09:04

## 2021-02-14 RX ADMIN — CLOPIDOGREL BISULFATE 75 MG: 75 TABLET ORAL at 09:04

## 2021-02-14 RX ADMIN — FAMOTIDINE 20 MG: 20 TABLET, FILM COATED ORAL at 17:48

## 2021-02-14 RX ADMIN — IPRATROPIUM BROMIDE 0.5 MG: 0.5 SOLUTION RESPIRATORY (INHALATION) at 14:19

## 2021-02-14 RX ADMIN — ALPRAZOLAM 0.5 MG: 0.5 TABLET ORAL at 17:50

## 2021-02-14 RX ADMIN — LEVALBUTEROL HYDROCHLORIDE 1.25 MG: 1.25 SOLUTION, CONCENTRATE RESPIRATORY (INHALATION) at 20:40

## 2021-02-14 RX ADMIN — ASPIRIN 81 MG: 81 TABLET, COATED ORAL at 09:04

## 2021-02-14 RX ADMIN — ENOXAPARIN SODIUM 40 MG: 40 INJECTION SUBCUTANEOUS at 09:04

## 2021-02-14 RX ADMIN — FUROSEMIDE 40 MG: 40 TABLET ORAL at 09:04

## 2021-02-14 RX ADMIN — BIMATOPROST 1 DROP: 0.1 SOLUTION/ DROPS OPHTHALMIC at 21:16

## 2021-02-14 RX ADMIN — Medication 2000 UNITS: at 09:04

## 2021-02-14 RX ADMIN — OXYCODONE HYDROCHLORIDE AND ACETAMINOPHEN 500 MG: 500 TABLET ORAL at 09:04

## 2021-02-14 RX ADMIN — LEVALBUTEROL HYDROCHLORIDE 1.25 MG: 1.25 SOLUTION, CONCENTRATE RESPIRATORY (INHALATION) at 14:20

## 2021-02-14 NOTE — ASSESSMENT & PLAN NOTE
· Continue with metoprolol 25 mg [id number]   · Monitor on Lasix 40 mg daily per cards  · Monitor blood pressure

## 2021-02-14 NOTE — PROGRESS NOTES
Progress Note - Bryce Watters 1935, 80 y o  female MRN: [id number]    Unit/Bed#: -01 Encounter: [id number]    Primary Care Provider: Renald Goltz, DO   Date and time admitted to hospital: 2/5/2021  2:23 PM        Respiratory failure with hypoxia ([facility] )  Assessment & Plan  · Acute on chronic hypoxic and hypercarbic respiratory failure, multifactorial secondary to diastolic heart failure, pleural effusions, COPD, OHS/JAMEEL  · Continue supplemental oxygen wean as tolerated to keep O2 sats more than 88-90%  · Continue Lasix  · Diamox per pulmonary  · Discussed with pulmonary - patient is deemed appropriate for BiPAP, prescription for BiPAP given by pulmonary to case management, arrangements for discharge with BiPAP underway by case management    Type 2 diabetes mellitus ([place] [address] )  Assessment & Plan  Lab Results   Component Value Date    HGBA1C 6 3 (H) 07/01/2020       No results for input(s): POCGLU in the last 72 hours  Blood Sugar Average: Last 72 hrs:     · Type 2 diabetes mellitus  · Continue with insulin  · Hold oral hypoglycemic medication  · Avoid hypoglycemia    S/P CABG (coronary artery bypass graft)  Assessment & Plan  · Aspirin and statin   · Continue metoprolol    Hyperlipidemia  Assessment & Plan  · Continue with statin     Chronic diastolic congestive heart failure (HCC)  Assessment & Plan  Wt Readings from Last 3 Encounters:   02/12/21 92 8 kg (204 lb 9 6 oz)   10/15/20 97 1 kg (214 lb)   07/28/20 98 4 kg (217 lb)       Continue Lasix 40 p o   Daily  Continue metoprolol  Monitor I/O, daily weights  Cardiology input noted          Hypothyroidism  Assessment & Plan  · Hypothyroidism  · Continue with levothyroxine    Hypertension  Assessment & Plan  · Continue with metoprolol 25 mg [id number]   · Monitor on Lasix 40 mg daily per cards  · Monitor blood pressure    COPD (chronic obstructive pulmonary disease) ([place] [address] )  Assessment & Plan  · COPD does not appear to be exacerbation, discussed with pulmonology, steroids were discontinued /  · Keep oxygen levels greater than 88- 94%    * Pleural effusion  Assessment & Plan  Continue Lasix 40 p o  Daily  Monitor I/O, daily weights        VTE Pharmacologic Prophylaxis:   Pharmacologic: Enoxaparin (Lovenox)  Mechanical VTE Prophylaxis in Place: Yes    Patient Centered Rounds: I have performed bedside rounds with nursing staff today  Discussions with Specialists or Other Care Team Provider:     Education and Discussions with Family / Patient:  Patient, updated Son Christina Gutierrez - questions answered    Time Spent for Care: 30 minutes  More than 50% of total time spent on counseling and coordination of care as described above  Current Length of Stay: 9 day(s)    Current Patient Status: Inpatient   Certification Statement: The patient will continue to require additional inpatient hospital stay due to as outlined    Discharge Plan: awaiting BiPAP delivery to home, case management following    Code Status: Level 1 - Full Code      Subjective:     Sitting up in chair  Reports feeling better  Encouraged out of bed into chair and ambulation as able  Encourage incentive spirometry    Objective:     Vitals:   Temp (24hrs), Av °F (37 2 °C), Min:98 9 °F (37 2 °C), Max:99 2 °F (37 3 °C)    Temp:  [98 9 °F (37 2 °C)-99 2 °F (37 3 °C)] 99 2 °F (37 3 °C)  HR:  [43-70] 70  Resp:  [22] 22  BP: (110-138)/(49-87) 115/51  SpO2:  [91 %-95 %] 93 %  Body mass index is 35 12 kg/m²  Input and Output Summary (last 24 hours):        Intake/Output Summary (Last 24 hours) at 2021 1225  Last data filed at 2021 2353  Gross per 24 hour   Intake 420 ml   Output 400 ml   Net 20 ml       Physical Exam:     Physical Exam    Comfortably sitting up in chair  Neck supple  Lungs diminished breath sounds bilateral  Heart sounds S1-S2 noted  Abdomen soft  Awake obeys simple commands  Pulses noted  No rash      Additional Data:     Labs:    Results from last 7 days   Lab Units 02/12/21  0401   WBC Thousand/uL 8 64   HEMOGLOBIN g/dL 13 0   HEMATOCRIT % 46 0   PLATELETS Thousands/uL 248   NEUTROS PCT % 71   LYMPHS PCT % 13*   MONOS PCT % 11   EOS PCT % 3     Results from last 7 days   Lab Units 02/12/21  0401 02/11/21  0453   SODIUM mmol/L 141 139   POTASSIUM mmol/L 3 9 4 5   CHLORIDE mmol/L 101 96*   CO2 mmol/L 40* 44*   BUN mg/dL 39* 38*   CREATININE mg/dL 1 09 1 10   ANION GAP mmol/L 0* -1*   CALCIUM mg/dL 9 1 9 1   ALBUMIN g/dL  --  2 5*   TOTAL BILIRUBIN mg/dL  --  0 92   ALK PHOS U/L  --  91   ALT U/L  --  14   AST U/L  --  14   GLUCOSE RANDOM mg/dL 133 110                           * I Have Reviewed All Lab Data Listed Above  * Additional Pertinent Lab Tests Reviewed:  All Labs Within Last 24 Hours Reviewed    Imaging:    Imaging Reports Reviewed Today Include:   Imaging Personally Reviewed by Myself Includes:     Recent Cultures (last 7 days):           Last 24 Hours Medication List:   Current Facility-Administered Medications   Medication Dose Route Frequency Provider Last Rate    acetaminophen  650 mg Oral Q6H PRN Dariela Hernandez MD      albuterol  2 5 mg Nebulization Q4H PRN Dariela Hernandez MD      ALPRAZolam  0 5 mg Oral TID PRN Dariela Hernandez MD      aluminum-magnesium hydroxide-simethicone  30 mL Oral Q6H PRN Dariela Hernandez MD      amLODIPine  5 mg Oral Daily Dariela Hernandez MD      ascorbic acid  500 mg Oral Daily Dariela Hernandez MD      aspirin  81 mg Oral Daily Dariela Hernandez MD      bimatoprost  1 drop Both Eyes HS Dariela Hernandez MD      calcium carbonate  1,000 mg Oral Daily PRN Dariela Hernandez MD      cholecalciferol  2,000 Units Oral Daily Dariela Hernandez MD      clopidogrel  75 mg Oral Daily Dariela Hernandez MD      docusate sodium  100 mg Oral BID Dariela Hernandez MD      enoxaparin  40 mg Subcutaneous Daily Dariela Hernandez MD      famotidine  20 mg Oral BID Dariela Hernandez MD      furosemide  40 mg Oral Daily Thersia Spar Lexa Steen MD      ipratropium  0 5 mg Nebulization TID Adia Weinberg,       levalbuterol  1 25 mg Nebulization TID Adia Weinberg,       levothyroxine  75 mcg Oral Early Morning Coleman Swan MD      metoprolol tartrate  25 mg Oral Q12H [address] Coleman Swan MD      oxyCODONE-acetaminophen  1 tablet Oral Q4H PRN Coleman Swan MD      senna  1 tablet Oral Daily Coleman Swan MD          Today, Patient Was Seen By: Collette Hire, MD    ** Please Note: Dictation voice to text software may have been used in the creation of this document   **

## 2021-02-14 NOTE — PROGRESS NOTES
Cardiology Progress Note - Conor Gonzales 80 y o  female MRN: [id number]    Unit/Bed#: -01 Encounter: [id number]      Assessment:  Principal Problem:    Pleural effusion  Active Problems:    COPD (chronic obstructive pulmonary disease) ([facility])    Hypertension    Hypothyroidism    Chronic diastolic congestive heart failure (HCC)    Hyperlipidemia    S/P CABG (coronary artery bypass graft)    Type 2 diabetes mellitus ([address] )    Respiratory failure with hypoxia ([facility] [address] )      Plan:  Patient is comfortable this morning  She has no chest pain or significant dyspnea   Pulmonary note appreciated   Patient on supplemental oxygen   Looks better today  Patient out of bed to a chair eating breakfast   Off Diamox  I agree with her current medical regimen  Okay for discharge planning from a cardiac point of view  Will follow with her primary cardiologist Dr Corinne Hoar  Please call if I can be of further assistance  Subjective:   Patient seen and examined  No significant events overnight   negative  Objective:     Vitals: Blood pressure 115/51, pulse 70, temperature 99 2 °F (37 3 °C), temperature source Oral, resp   rate 22, height 5' 4" (1 626 m), weight 92 8 kg (204 lb 9 6 oz), SpO2 93 %, not currently breastfeeding , Body mass index is 35 12 kg/m² ,   Orthostatic Blood Pressures      Most Recent Value   Blood Pressure  115/51 filed at 02/14/2021 4841   Patient Position - Orthostatic VS  Lying filed at 02/13/2021 2353      ,      Intake/Output Summary (Last 24 hours) at 2/14/2021 0831  Last data filed at 2/13/2021 2353  Gross per 24 hour   Intake 540 ml   Output 900 ml   Net -360 ml             Physical Exam:    GEN: Ashley Gonzales appears well, alert and oriented x 3, pleasant and cooperative   NECK: supple, no carotid bruits, no JVD or HJR  HEART: normal rate, regular rhythm, normal S1 and S2, no murmurs, clicks, gallops or rubs   LUNGS: clear to auscultation bilaterally; no wheezes, rales, or rhonchi   ABDOMEN: normal bowel sounds, soft, no tenderness, no distention  EXTREMITIES:  edema  SKIN: warm and well perfused, no suspicious lesions on exposed skin    Labs & Results:    No results displayed because visit has over 200 results  Xr Chest Portable    Result Date: 2/9/2021  Narrative: CHEST INDICATION:   Compare to prior study and assess for improvement in pulmonary congestion  COMPARISON:  Chest radiograph from 2/5/2021 and 5/3/2019  EXAM PERFORMED/VIEWS:  XR CHEST PORTABLE FINDINGS: Mild cardiomegaly  CABG  Right carotid endarterectomy  Improving pulmonary venous congestion with decrease in small effusions and bibasilar atelectasis  Mild curvature of the spine  Impression: Improving pulmonary venous congestion with decrease in small effusions and bibasilar atelectasis  Workstation performed: [id number]     Xr Chest Portable    Result Date: 2/5/2021  Narrative: CHEST INDICATION:  Shortness of breath, history includes COPD as well as CHF  COMPARISON:  5/3/2019 EXAM PERFORMED/VIEWS:  XR CHEST PORTABLE FINDINGS: Heart shadow is partially obscured by adjacent opacity  Status post median sternotomy  Central vascular congestive changes with right perihilar groundglass opacity may represent asymmetric pulmonary edema  Bibasilar opacities likely represent combination of atelectasis and pleural effusions  Mild left apical pleural thickening  No pneumothorax  Surgical clips right neck  Degenerative changes of the shoulders and spine with lower thoracic dextroscoliosis  Impression: Central vascular congestion with suspected asymmetric pulmonary edema and bibasilar opacities as above  Workstation performed: [id number]     Ct Head Without Contrast    Result Date: 2/5/2021  Narrative: CT BRAIN - WITHOUT CONTRAST INDICATION:   Head trauma, minor (Age => 65y) FALL ON ASA  COMPARISON:  May 1, 2019 TECHNIQUE:  CT examination of the brain was performed    In addition to axial images, sagittal and coronal 2D reformatted images were created and submitted for interpretation  Radiation dose length product (DLP) for this visit:  907 64 mGy-cm   This examination, like all CT scans performed in the [facility], was performed utilizing techniques to minimize radiation dose exposure, including the use of iterative  reconstruction and automated exposure control  IMAGE QUALITY:  Diagnostic  FINDINGS: PARENCHYMA: Decreased attenuation is noted in periventricular and subcortical white matter demonstrating an appearance that is statistically most likely to represent mild microangiopathic change  Old right parietal and occipital encephalomalacia No CT signs of acute infarction  No intracranial mass, mass effect or midline shift  No acute parenchymal hemorrhage  VENTRICLES AND EXTRA-AXIAL SPACES:  Normal for the patient's age  VISUALIZED ORBITS AND PARANASAL SINUSES:  Unremarkable  CALVARIUM AND EXTRACRANIAL SOFT TISSUES:  Mild soft tissue swelling left forehead  No underlying calvarial fracture  Partially calcified sebaceous cyst right posterior parietal/occipital scalp  Impression: No acute intracranial abnormality  Workstation performed: [id number]     Ct Spine Cervical Without Contrast    Result Date: 2/5/2021  Narrative: CT CERVICAL SPINE - WITHOUT CONTRAST INDICATION:   Neck trauma (Age => 65y) fall on asa  COMPARISON:  May 1, 2019 TECHNIQUE:  CT examination of the cervical spine was performed without intravenous contrast   Contiguous axial images were obtained  Sagittal and coronal reconstructions were performed  Radiation dose length product (DLP) for this visit:  852 87 mGy-cm   This examination, like all CT scans performed in the [facility], was performed utilizing techniques to minimize radiation dose exposure, including the use of iterative  reconstruction and automated exposure control  IMAGE QUALITY:  Exam limited by motion artifact   FINDINGS: ALIGNMENT:  Slight reversal of lordotic curvature  Grade 1 anterolisthesis of C3 on C4 and C4 on C5 appearing stable  Concave left cervical thoracic scoliotic curvature  VERTEBRAL BODIES:  Previous left side C5 transverse process fracture has healed  No new fractures are found  DEGENERATIVE CHANGES:  Moderate multilevel cervical degenerative changes are noted  No critical central canal stenosis  PREVERTEBRAL AND PARASPINAL SOFT TISSUES:  Unremarkable  THORACIC INLET:  Bilateral pleural effusions  Impression: Exam limited by motion artifact  No cervical spine fracture or traumatic malalignment  The study was marked in EPIC for immediate notification because this is a "level C trauma"  Workstation performed: [id number]      Bedside Procedure    Result Date: 2/5/2021  Narrative: [phone] [id number] [phone] [id number]  [id number]      EKG personally reviewed by Jeni Deal MD      Counseling / Coordination of Care  Total floor / unit time spent today 30 minutes  Greater than 50% of total time was spent with the patient and / or family counseling and / or coordination of care

## 2021-02-15 ENCOUNTER — PATIENT OUTREACH (OUTPATIENT)
Dept: CASE MANAGEMENT | Facility: HOSPITAL | Age: 86
End: 2021-02-15

## 2021-02-15 VITALS
HEIGHT: 64 IN | BODY MASS INDEX: 34.93 KG/M2 | HEART RATE: 80 BPM | WEIGHT: 204.6 LBS | SYSTOLIC BLOOD PRESSURE: 121 MMHG | DIASTOLIC BLOOD PRESSURE: 54 MMHG | TEMPERATURE: 99.6 F | OXYGEN SATURATION: 91 % | RESPIRATION RATE: 18 BRPM

## 2021-02-15 PROBLEM — J96.21 ACUTE ON CHRONIC RESPIRATORY FAILURE WITH HYPOXIA AND HYPERCAPNIA (HCC): Status: ACTIVE | Noted: 2021-02-05

## 2021-02-15 PROBLEM — I50.33 ACUTE ON CHRONIC DIASTOLIC CONGESTIVE HEART FAILURE (HCC): Status: ACTIVE | Noted: 2017-12-12

## 2021-02-15 PROBLEM — J96.22 ACUTE ON CHRONIC RESPIRATORY FAILURE WITH HYPOXIA AND HYPERCAPNIA (HCC): Status: ACTIVE | Noted: 2021-02-05

## 2021-02-15 PROCEDURE — 94760 N-INVAS EAR/PLS OXIMETRY 1: CPT

## 2021-02-15 PROCEDURE — 94660 CPAP INITIATION&MGMT: CPT

## 2021-02-15 PROCEDURE — 99239 HOSP IP/OBS DSCHRG MGMT >30: CPT | Performed by: PHYSICIAN ASSISTANT

## 2021-02-15 PROCEDURE — 94640 AIRWAY INHALATION TREATMENT: CPT

## 2021-02-15 RX ORDER — LEVALBUTEROL INHALATION SOLUTION 0.63 MG/3ML
0.63 SOLUTION RESPIRATORY (INHALATION) AS NEEDED
Start: 2021-02-15 | End: 2021-02-24

## 2021-02-15 RX ORDER — FUROSEMIDE 40 MG/1
40 TABLET ORAL DAILY
Qty: 30 TABLET | Refills: 0 | Status: SHIPPED | OUTPATIENT
Start: 2021-02-16 | End: 2021-06-01 | Stop reason: SDUPTHER

## 2021-02-15 RX ADMIN — LEVALBUTEROL HYDROCHLORIDE 1.25 MG: 1.25 SOLUTION, CONCENTRATE RESPIRATORY (INHALATION) at 07:38

## 2021-02-15 RX ADMIN — METOPROLOL TARTRATE 25 MG: 25 TABLET, FILM COATED ORAL at 09:52

## 2021-02-15 RX ADMIN — AMLODIPINE BESYLATE 5 MG: 5 TABLET ORAL at 09:52

## 2021-02-15 RX ADMIN — ALPRAZOLAM 0.5 MG: 0.5 TABLET ORAL at 14:36

## 2021-02-15 RX ADMIN — CLOPIDOGREL BISULFATE 75 MG: 75 TABLET ORAL at 09:52

## 2021-02-15 RX ADMIN — LEVALBUTEROL HYDROCHLORIDE 1.25 MG: 1.25 SOLUTION, CONCENTRATE RESPIRATORY (INHALATION) at 13:27

## 2021-02-15 RX ADMIN — LEVOTHYROXINE SODIUM 75 MCG: 75 TABLET ORAL at 05:19

## 2021-02-15 RX ADMIN — ASPIRIN 81 MG: 81 TABLET, COATED ORAL at 09:52

## 2021-02-15 RX ADMIN — FUROSEMIDE 40 MG: 40 TABLET ORAL at 09:52

## 2021-02-15 RX ADMIN — FAMOTIDINE 20 MG: 20 TABLET, FILM COATED ORAL at 09:50

## 2021-02-15 RX ADMIN — IPRATROPIUM BROMIDE 0.5 MG: 0.5 SOLUTION RESPIRATORY (INHALATION) at 13:27

## 2021-02-15 RX ADMIN — OXYCODONE HYDROCHLORIDE AND ACETAMINOPHEN 500 MG: 500 TABLET ORAL at 09:52

## 2021-02-15 RX ADMIN — ENOXAPARIN SODIUM 40 MG: 40 INJECTION SUBCUTANEOUS at 09:52

## 2021-02-15 RX ADMIN — IPRATROPIUM BROMIDE 0.5 MG: 0.5 SOLUTION RESPIRATORY (INHALATION) at 07:38

## 2021-02-15 RX ADMIN — Medication 2000 UNITS: at 09:52

## 2021-02-15 NOTE — CASE MANAGEMENT
[facility] unable to obtain bi-pap, insurance rejected with too many codes  CM made pulmonologist aware  And new script obtained  Script uploaded and sent to [facility]  Son updated on progress  Pts son will call once bi-pap is delivered   CM department to continue to follow

## 2021-02-15 NOTE — PLAN OF CARE
Problem: Potential for Falls  Goal: Patient will remain free of falls  Description: INTERVENTIONS:  - Assess patient frequently for physical needs  -  Identify cognitive and physical deficits and behaviors that affect risk of falls    -  [place] fall precautions as indicated by assessment   - Educate patient/family on patient safety including physical limitations  - Instruct patient to call for assistance with activity based on assessment  - Modify environment to reduce risk of injury  - Consider OT/PT consult to assist with strengthening/mobility  Outcome: Adequate for Discharge     Problem: Prexisting or High Potential for Compromised Skin Integrity  Goal: Skin integrity is maintained or improved  Description: INTERVENTIONS:  - Identify patients at risk for skin breakdown  - Assess and monitor skin integrity  - Assess and monitor nutrition and hydration status  - Monitor labs   - Assess for incontinence   - Turn and reposition patient  - Assist with mobility/ambulation  - Relieve pressure over bony prominences  - Avoid friction and shearing  - Provide appropriate hygiene as needed including keeping skin clean and dry  - Evaluate need for skin moisturizer/barrier cream  - Collaborate with interdisciplinary team   - Patient/family teaching  - Consider wound care consult   Outcome: Adequate for Discharge     Problem: RESPIRATORY - ADULT  Goal: Achieves optimal ventilation and oxygenation  Description: INTERVENTIONS:  - Assess for changes in respiratory status  - Assess for changes in mentation and behavior  - Position to facilitate oxygenation and minimize respiratory effort  - Oxygen administered by appropriate delivery if ordered  - Initiate smoking cessation education as indicated  - Encourage broncho-pulmonary hygiene including cough, deep breathe, Incentive Spirometry  - Assess the need for suctioning and aspirate as needed  - Assess and instruct to report SOB or any respiratory difficulty  - Respiratory Therapy support as indicated  Outcome: Adequate for Discharge     Problem: PAIN - ADULT  Goal: Verbalizes/displays adequate comfort level or baseline comfort level  Description: Interventions:  - Encourage patient to monitor pain and request assistance  - Assess pain using appropriate pain scale  - Administer analgesics based on type and severity of pain and evaluate response  - Implement non-pharmacological measures as appropriate and evaluate response  - Consider cultural and social influences on pain and pain management  - Notify physician/advanced practitioner if interventions unsuccessful or patient reports new pain  Outcome: Adequate for Discharge     Problem: INFECTION - ADULT  Goal: Absence or prevention of progression during hospitalization  Description: INTERVENTIONS:  - Assess and monitor for signs and symptoms of infection  - Monitor lab/diagnostic results  - Monitor all insertion sites, i e  indwelling lines, tubes, and drains  - Monitor endotracheal if appropriate and nasal secretions for changes in amount and color  - [place] appropriate cooling/warming therapies per order  - Administer medications as ordered  - Instruct and encourage patient and family to use good hand hygiene technique  - Identify and instruct in appropriate isolation precautions for identified infection/condition  Outcome: Adequate for Discharge  Goal: Absence of fever/infection during neutropenic period  Description: INTERVENTIONS:  - Monitor WBC    Outcome: Adequate for Discharge     Problem: SAFETY ADULT  Goal: Patient will remain free of falls  Description: INTERVENTIONS:  - Assess patient frequently for physical needs  -  Identify cognitive and physical deficits and behaviors that affect risk of falls    -  [place] fall precautions as indicated by assessment   - Educate patient/family on patient safety including physical limitations  - Instruct patient to call for assistance with activity based on assessment  - Modify environment to reduce risk of injury  - Consider OT/PT consult to assist with strengthening/mobility  Outcome: Adequate for Discharge  Goal: Maintain or return to baseline ADL function  Description: INTERVENTIONS:  -  Assess patient's ability to carry out ADLs; assess patient's baseline for ADL function and identify physical deficits which impact ability to perform ADLs (bathing, care of mouth/teeth, toileting, grooming, dressing, etc )  - Assess/evaluate cause of self-care deficits   - Assess range of motion  - Assess patient's mobility; develop plan if impaired  - Assess patient's need for assistive devices and provide as appropriate  - Encourage maximum independence but intervene and supervise when necessary  - Involve family in performance of ADLs  - Assess for home care needs following discharge   - Consider OT consult to assist with ADL evaluation and planning for discharge  - Provide patient education as appropriate  Outcome: Adequate for Discharge  Goal: Maintain or return mobility status to optimal level  Description: INTERVENTIONS:  - Assess patient's baseline mobility status (ambulation, transfers, stairs, etc )    - Identify cognitive and physical deficits and behaviors that affect mobility  - Identify mobility aids required to assist with transfers and/or ambulation (gait belt, sit-to-stand, lift, walker, cane, etc )  - [place] fall precautions as indicated by assessment  - Record patient progress and toleration of activity level on Mobility SBAR; progress patient to next Phase/Stage  - Instruct patient to call for assistance with activity based on assessment  - Consider rehabilitation consult to assist with strengthening/weightbearing, etc   Outcome: Adequate for Discharge     Problem: DISCHARGE PLANNING  Goal: Discharge to home or other facility with appropriate resources  Description: INTERVENTIONS:  - Identify barriers to discharge w/patient and caregiver  - Arrange for needed discharge resources and transportation as appropriate  - Identify discharge learning needs (meds, wound care, etc )  - Arrange for interpretive services to assist at discharge as needed  - Refer to Case Management Department for coordinating discharge planning if the patient needs post-hospital services based on physician/advanced practitioner order or complex needs related to functional status, cognitive ability, or social support system  Outcome: Adequate for Discharge     Problem: Knowledge Deficit  Goal: Patient/family/caregiver demonstrates understanding of disease process, treatment plan, medications, and discharge instructions  Description: Complete learning assessment and assess knowledge base    Interventions:  - Provide teaching at level of understanding  - Provide teaching via preferred learning methods  Outcome: Adequate for Discharge

## 2021-02-15 NOTE — PLAN OF CARE
Problem: Potential for Falls  Goal: Patient will remain free of falls  Description: INTERVENTIONS:  - Assess patient frequently for physical needs  -  Identify cognitive and physical deficits and behaviors that affect risk of falls    -  [place] fall precautions as indicated by assessment   - Educate patient/family on patient safety including physical limitations  - Instruct patient to call for assistance with activity based on assessment  - Modify environment to reduce risk of injury  - Consider OT/PT consult to assist with strengthening/mobility  Outcome: Progressing     Problem: Prexisting or High Potential for Compromised Skin Integrity  Goal: Skin integrity is maintained or improved  Description: INTERVENTIONS:  - Identify patients at risk for skin breakdown  - Assess and monitor skin integrity  - Assess and monitor nutrition and hydration status  - Monitor labs   - Assess for incontinence   - Turn and reposition patient  - Assist with mobility/ambulation  - Relieve pressure over bony prominences  - Avoid friction and shearing  - Provide appropriate hygiene as needed including keeping skin clean and dry  - Evaluate need for skin moisturizer/barrier cream  - Collaborate with interdisciplinary team   - Patient/family teaching  - Consider wound care consult   Outcome: Progressing     Problem: RESPIRATORY - ADULT  Goal: Achieves optimal ventilation and oxygenation  Description: INTERVENTIONS:  - Assess for changes in respiratory status  - Assess for changes in mentation and behavior  - Position to facilitate oxygenation and minimize respiratory effort  - Oxygen administered by appropriate delivery if ordered  - Initiate smoking cessation education as indicated  - Encourage broncho-pulmonary hygiene including cough, deep breathe, Incentive Spirometry  - Assess the need for suctioning and aspirate as needed  - Assess and instruct to report SOB or any respiratory difficulty  - Respiratory Therapy support as indicated  Outcome: Progressing     Problem: PAIN - ADULT  Goal: Verbalizes/displays adequate comfort level or baseline comfort level  Description: Interventions:  - Encourage patient to monitor pain and request assistance  - Assess pain using appropriate pain scale  - Administer analgesics based on type and severity of pain and evaluate response  - Implement non-pharmacological measures as appropriate and evaluate response  - Consider cultural and social influences on pain and pain management  - Notify physician/advanced practitioner if interventions unsuccessful or patient reports new pain  Outcome: Progressing     Problem: INFECTION - ADULT  Goal: Absence or prevention of progression during hospitalization  Description: INTERVENTIONS:  - Assess and monitor for signs and symptoms of infection  - Monitor lab/diagnostic results  - Monitor all insertion sites, i e  indwelling lines, tubes, and drains  - Monitor endotracheal if appropriate and nasal secretions for changes in amount and color  - [place] appropriate cooling/warming therapies per order  - Administer medications as ordered  - Instruct and encourage patient and family to use good hand hygiene technique  - Identify and instruct in appropriate isolation precautions for identified infection/condition  Outcome: Progressing  Goal: Absence of fever/infection during neutropenic period  Description: INTERVENTIONS:  - Monitor WBC    Outcome: Progressing     Problem: SAFETY ADULT  Goal: Patient will remain free of falls  Description: INTERVENTIONS:  - Assess patient frequently for physical needs  -  Identify cognitive and physical deficits and behaviors that affect risk of falls    -  [place] fall precautions as indicated by assessment   - Educate patient/family on patient safety including physical limitations  - Instruct patient to call for assistance with activity based on assessment  - Modify environment to reduce risk of injury  - Consider OT/PT consult to assist with strengthening/mobility  Outcome: Progressing  Goal: Maintain or return to baseline ADL function  Description: INTERVENTIONS:  -  Assess patient's ability to carry out ADLs; assess patient's baseline for ADL function and identify physical deficits which impact ability to perform ADLs (bathing, care of mouth/teeth, toileting, grooming, dressing, etc )  - Assess/evaluate cause of self-care deficits   - Assess range of motion  - Assess patient's mobility; develop plan if impaired  - Assess patient's need for assistive devices and provide as appropriate  - Encourage maximum independence but intervene and supervise when necessary  - Involve family in performance of ADLs  - Assess for home care needs following discharge   - Consider OT consult to assist with ADL evaluation and planning for discharge  - Provide patient education as appropriate  Outcome: Progressing  Goal: Maintain or return mobility status to optimal level  Description: INTERVENTIONS:  - Assess patient's baseline mobility status (ambulation, transfers, stairs, etc )    - Identify cognitive and physical deficits and behaviors that affect mobility  - Identify mobility aids required to assist with transfers and/or ambulation (gait belt, sit-to-stand, lift, walker, cane, etc )  - [place] fall precautions as indicated by assessment  - Record patient progress and toleration of activity level on Mobility SBAR; progress patient to next Phase/Stage  - Instruct patient to call for assistance with activity based on assessment  - Consider rehabilitation consult to assist with strengthening/weightbearing, etc   Outcome: Progressing     Problem: DISCHARGE PLANNING  Goal: Discharge to home or other facility with appropriate resources  Description: INTERVENTIONS:  - Identify barriers to discharge w/patient and caregiver  - Arrange for needed discharge resources and transportation as appropriate  - Identify discharge learning needs (meds, wound care, etc )  - Arrange for interpretive services to assist at discharge as needed  - Refer to Case Management Department for coordinating discharge planning if the patient needs post-hospital services based on physician/advanced practitioner order or complex needs related to functional status, cognitive ability, or social support system  Outcome: Progressing     Problem: Knowledge Deficit  Goal: Patient/family/caregiver demonstrates understanding of disease process, treatment plan, medications, and discharge instructions  Description: Complete learning assessment and assess knowledge base    Interventions:  - Provide teaching at level of understanding  - Provide teaching via preferred learning methods  Outcome: Progressing

## 2021-02-15 NOTE — ASSESSMENT & PLAN NOTE
· Continue with metoprolol 25 mg [id number]   · Monitor on Lasix 40 mg daily per cards  · Monitor blood pressure

## 2021-02-15 NOTE — ASSESSMENT & PLAN NOTE
· Does not appear to be exacerbation  · Appreciate pulmonary input , steroids were discontinued on 2/6  · Monitor SpO2 and keep levels > 88%

## 2021-02-15 NOTE — ASSESSMENT & PLAN NOTE
· Acute on chronic hypoxic and hypercarbic respiratory failure multifactorial secondary to diastolic heart failure, pleural effusions, COPD, OHS/JAMEEL  · Continue supplemental oxygen wean as tolerated to keep O2 sats more than 88-90%  · Currently stable on baseline oxygen requirement of 2 L NC  · Appreciate cardiology and pulmonary input   · S/p Diamox   · S/p IV Lasix and transitioned to PO Lasix 40 mg daily   · Per pulmonary, patient is deemed appropriate for BiPAP   Prescription for BiPAP given by pulmonary to case management, arrangements for discharge with BiPAP underway by case management

## 2021-02-15 NOTE — PROGRESS NOTES
CHW- CR as patient is admitted  CHW to continue to follow up when DC  To assist with community services/ waiver FARZANA Guillen

## 2021-02-15 NOTE — PROGRESS NOTES
Progress Note - Joel Mercer 1935, 80 y o  female MRN: [id number]  Unit/Bed#: -01 Encounter: [id number]  Primary Care Provider: Rufino Hernandez DO   Date and time admitted to hospital: 2/5/2021  2:23 PM    * Acute on chronic respiratory failure with hypoxia and hypercapnia (HCC)  Assessment & Plan  · Acute on chronic hypoxic and hypercarbic respiratory failure multifactorial secondary to diastolic heart failure, pleural effusions, COPD, OHS/JAMEEL  · Continue supplemental oxygen wean as tolerated to keep O2 sats more than 88-90%  · Currently stable on baseline oxygen requirement of 2 L NC  · Appreciate cardiology and pulmonary input   · S/p Diamox   · S/p IV Lasix and transitioned to PO Lasix 40 mg daily   · Per pulmonary, patient is deemed appropriate for BiPAP  Prescription for BiPAP given by pulmonary to case management, arrangements for discharge with BiPAP underway by case management    Acute on chronic diastolic congestive heart failure [facility])  Assessment & Plan  Wt Readings from Last 3 Encounters:   02/12/21 92 8 kg (204 lb 9 6 oz)   10/15/20 97 1 kg (214 lb)   07/28/20 98 4 kg (217 lb)     · Updated echo 2/6 with LVEF 60 % and G2DD  · S/p IV Lasix and transitioned to 40 mg p o  Daily  · Continue home dose metoprolol  · Monitor I/O, daily weights  · Cardiology input noted, cleared for discharge on current regimen     Pleural effusion  Assessment & Plan  Continue Lasix 40 p o   Daily  Monitor I/O, daily weights    COPD (chronic obstructive pulmonary disease) (HCC)  Assessment & Plan  · Does not appear to be exacerbation  · Appreciate pulmonary input , steroids were discontinued on 2/6  · Monitor SpO2 and keep levels > 88%    Hypertension  Assessment & Plan  · Continue with metoprolol 25 mg [id number]   · Monitor on Lasix 40 mg daily per cards  · Monitor blood pressure    Type 2 diabetes mellitus [facility])  Assessment & Plan  Lab Results   Component Value Date    HGBA1C 6 3 (H) 07/01/2020       No results for input(s): POCGLU in the last 72 hours  Blood Sugar Average: Last 72 hrs:     · Diet controlled as outpatient  · Random glucose checks acceptable   · Diabetic diet   · Hypoglycemia protocol     S/P CABG (coronary artery bypass graft)  Assessment & Plan  · Continue aspirin, statin, metoprolol    Hyperlipidemia  Assessment & Plan  · Continue with statin     Hypothyroidism  Assessment & Plan  · Continue with levothyroxine    VTE Pharmacologic Prophylaxis:   Pharmacologic: Enoxaparin (Lovenox)  Mechanical VTE Prophylaxis in Place: No    Patient Centered Rounds: I have performed bedside rounds with nursing staff today  Discussions with Specialists or Other Care Team Provider: primary RN, case management     Education and Discussions with Family / Patient: patient    Time Spent for Care: 15 minutes  More than 50% of total time spent on counseling and coordination of care as described above  Current Length of Stay: 10 day(s)    Current Patient Status: Inpatient   Certification Statement: The patient will continue to require additional inpatient hospital stay due to awaiting deliver of BiPAP to home     Discharge Plan: medically stable, awaiting delivery of BiPAP to home     Code Status: Level 1 - Full Code    Subjective:   Patient offers no new complaints today  Aware we are awaiting BiPAP delivery for discharge  Objective:     Vitals:   Temp (24hrs), Av 9 °F (37 2 °C), Min:98 3 °F (36 8 °C), Max:99 6 °F (37 6 °C)    Temp:  [98 3 °F (36 8 °C)-99 6 °F (37 6 °C)] 99 6 °F (37 6 °C)  HR:  [42-80] 80  Resp:  [16-18] 18  BP: (116-121)/(53-57) 121/54  SpO2:  [90 %-96 %] 93 %  Body mass index is 35 12 kg/m²  Input and Output Summary (last 24 hours): Intake/Output Summary (Last 24 hours) at 2/15/2021 1226  Last data filed at 2/15/2021 0859  Gross per 24 hour   Intake 1140 ml   Output --   Net 1140 ml       Physical Exam:     Physical Exam  Vitals signs and nursing note reviewed     Constitutional: General: She is not in acute distress  Comments: Sitting upright in chair eating lunch   Cardiovascular:      Rate and Rhythm: Normal rate and regular rhythm  Heart sounds: No murmur  Pulmonary:      Effort: Pulmonary effort is normal  No respiratory distress  Breath sounds: Decreased breath sounds present  No wheezing, rhonchi or rales  Comments: SpO2 95% on baseline 2 L NC  Abdominal:      General: Abdomen is flat  There is no distension  Palpations: Abdomen is soft  Tenderness: There is no abdominal tenderness  Musculoskeletal:      Right lower leg: No edema  Left lower leg: No edema  Skin:     General: Skin is warm and dry  Findings: No erythema  Neurological:      Mental Status: She is alert and oriented to person, place, and time  Mental status is at baseline  Additional Data:     Labs:    Results from last 7 days   Lab Units 02/12/21  0401   WBC Thousand/uL 8 64   HEMOGLOBIN g/dL 13 0   HEMATOCRIT % 46 0   PLATELETS Thousands/uL 248   NEUTROS PCT % 71   LYMPHS PCT % 13*   MONOS PCT % 11   EOS PCT % 3     Results from last 7 days   Lab Units 02/12/21  0401 02/11/21  0453   SODIUM mmol/L 141 139   POTASSIUM mmol/L 3 9 4 5   CHLORIDE mmol/L 101 96*   CO2 mmol/L 40* 44*   BUN mg/dL 39* 38*   CREATININE mg/dL 1 09 1 10   ANION GAP mmol/L 0* -1*   CALCIUM mg/dL 9 1 9 1   ALBUMIN g/dL  --  2 5*   TOTAL BILIRUBIN mg/dL  --  0 92   ALK PHOS U/L  --  91   ALT U/L  --  14   AST U/L  --  14   GLUCOSE RANDOM mg/dL 133 110                           * I Have Reviewed All Lab Data Listed Above  * Additional Pertinent Lab Tests Reviewed:  All Labs Within Last 24 Hours Reviewed    Imaging:    Imaging Reports Reviewed Today Include: none  Imaging Personally Reviewed by Myself Includes:  none    Recent Cultures (last 7 days):           Last 24 Hours Medication List:   Current Facility-Administered Medications   Medication Dose Route Frequency Provider Last Rate    acetaminophen  650 mg Oral Q6H PRN Chandrakant Tobar MD      albuterol  2 5 mg Nebulization Q4H PRN Chandrakant Tobar MD      ALPRAZolam  0 5 mg Oral TID PRN Chandrakant Tobar MD      aluminum-magnesium hydroxide-simethicone  30 mL Oral Q6H PRN Chandrakant Tobar MD      amLODIPine  5 mg Oral Daily Chandrakant Tobar MD      ascorbic acid  500 mg Oral Daily Chandrakant Tobar MD      aspirin  81 mg Oral Daily Chandrakant Tobar MD      bimatoprost  1 drop Both Eyes HS Chandrakant Tobar MD      calcium carbonate  1,000 mg Oral Daily PRN Chandrakant Tobar MD      cholecalciferol  2,000 Units Oral Daily Chandrakant Toabr MD      clopidogrel  75 mg Oral Daily Chandrakant Tobar MD      docusate sodium  100 mg Oral BID Chandrakant Tobar MD      enoxaparin  40 mg Subcutaneous Daily Chandrakant Tobar MD      famotidine  20 mg Oral BID Chandrakant Tobar MD      furosemide  40 mg Oral Daily Viktor Thompson MD      ipratropium  0 5 mg Nebulization TID Balas Lisa, DO      levalbuterol  1 25 mg Nebulization TID Kaleigh Lisa, DO      levothyroxine  75 mcg Oral Early Morning Chandrakant Tobar MD      metoprolol tartrate  25 mg Oral Q12H [address] Chandrakant Tobar MD      oxyCODONE-acetaminophen  1 tablet Oral Q4H PRN Chandrakant Tobar MD      senna  1 tablet Oral Daily Chandrakant Tobar MD          Today, Patient Was Seen By: Pipo Naik PA-C    ** Please Note: Dictation voice to text software may have been used in the creation of this document   **

## 2021-02-15 NOTE — CASE MANAGEMENT
CM called [employer] spoke with melvi  Script went through  They will not deliver to patient is home  CM made Slim provider aware and dc order to be placed  CM contacted pts son he lives 10 minutes away and can come  pt  CM made primary nurse aware of dcp

## 2021-02-15 NOTE — DISCHARGE SUMMARY
Discharge Summary - [facility]    Patient Information: Sherwin Gonzales 80 y o  female MRN: [id number]  Unit/Bed#: -01 Encounter: [id number]    Discharging Physician / Practitioner: Jill Figueroa PA-C  PCP: Akua Perez DO  Admission Date: 2/5/2021  Discharge Date: 02/15/21    Disposition:     Home with VNA Services (Reminder: Complete face to face encounter)     Reason for Admission: acute on chronic hypoxic and hypercapnic respiratory failure     Discharge Diagnoses:     Principal Problem:    Acute on chronic respiratory failure with hypoxia and hypercapnia (HCC)  Active Problems:    Acute on chronic diastolic congestive heart failure (HCC)    COPD (chronic obstructive pulmonary disease) ([facility] [address] )    Pleural effusion    Hypertension    Type 2 diabetes mellitus ([facility] [address] )    Hypothyroidism    Hyperlipidemia    S/P CABG (coronary artery bypass graft)  Resolved Problems:    * No resolved hospital problems  *      Consultations During Hospital Stay:  · Cardiology   · Pulmonology     Procedures Performed:   · Echocardiogram on 2/6: Systolic function was normal  Ejection fraction was estimated to be 60 %  There were no regional wall motion abnormalities  There was mild concentric hypertrophy  Features were consistent with a pseudonormal left ventricular filling pattern, with concomitant abnormal relaxation and increased filling pressure (grade 2 diastolic dysfunction)  Doppler parameters were consistent with high ventricular filling pressure  Significant Findings / Test Results:   Imaging:  · Portable CXR on 2/5: Central vascular congestion with suspected asymmetric pulmonary edema and bibasilar opacities as above  · CT cervical spine on 2/5: Exam limited by motion artifact  No cervical spine fracture or traumatic malalignment  · [address] [place] [address] on 2/5: No acute intracranial abnormality  · Portable CXR on 2/9: Improving pulmonary venous congestion with decrease in small effusions and bibasilar atelectasis  Laboratory studies:  · proBNP 4918  · procalcitonin negative x 2   · COVID/influenza/RSV PCR negative   · ABG consistent with choric hypercarbic respiratory failure     Incidental Findings:   · none     Test Results Pending at Discharge (will require follow up):   · none     Outpatient Tests Requested:  · BMP in 1 week   · Outpatient follow-up with PCP   · Outpatient follow-up with pulmonary   · Outpatient follow-up with cardiology     Complications:  none    Hospital Course:     Indu Flynn is a 80 y o  female patient with PMH significant for chronic diastolic CHF, CAD post CABG, HLP, DM2, HTN, CVA 2015, OSH/OHVS with chronic hypercarbic respiratory failure not on CPAP, chronic hypoxic respiratory failure on 2L/min, and COPD on spiriva  who originally presented to the hospital on 2/5/2021 dur to fall at home in the setting of progressive shortness of breath and lower extremity edema  No evidence for traumatic injury upon presentation  CXR revealed cardiomegaly and bilateral pleural effusions  Patient was hypoxic in the ED requiring supplemental oxygen via mid-flow nasal cannula  She was started on IV diuresis with Lasix as well as IV steroids with Solumedrol  Patient was seen in consultation by both cardiology and pulmonology  Acute on chronic respiratory failure suspected to be multifactorial in the setting of acute CHF exacerbation, pleural effusions, COPD, OHS/JAMEEL  IR thoracentesis was deferred at this time  Diamox added by pulmonary for chronic hypercarbia  She was eventually transitioned from IV Lasix to PO Lasix 40 mg daily which she will continue at discharge  Diamox was discontinued and patient was started on Xopenex and Atrovent and discharged with Stiolto  Patient qualified for BiPAP at bedtime at 10/5  She was eventually weaned to baseline oxygen requirement of 2 L NC  Patient will follow-up with pulmonary and cariology as outpatient   Recommended BMP in 1 week as well as outpatient follow-up with PCP  Of note, patient was recommended rehab at discharge but family preferred she return home with home therapies  Condition at Discharge: fair    Discharge Day Visit / Exam:     * Please refer to separate progress note for these details *    Discharge instructions/Information to patient and family:   See after visit summary for information provided to patient and family  Provisions for Follow-Up Care:  See after visit summary for information related to follow-up care and any pertinent home health orders  Planned Readmission: no    Discharge Statement:  I spent 40 minutes discharging the patient  This time was spent on the day of discharge  I had direct contact with the patient on the day of discharge  Greater than 50% of the total time was spent examining patient, answering all patient questions, arranging and discussing plan of care with patient as well as directly providing post-discharge instructions  Additional time then spent on discharge activities  Discharge Medications:  See after visit summary for reconciled discharge medications provided to patient and family  ** Please Note: This note has been constructed using a voice recognition system   **

## 2021-02-15 NOTE — CASE MANAGEMENT
CM called [facility]/ spoke with jasmyn  Per jasmyn bi-pap order in process and she will have someone reach out to me once delivered  CM department to follow up

## 2021-02-15 NOTE — ASSESSMENT & PLAN NOTE
Wt Readings from Last 3 Encounters:   02/12/21 92 8 kg (204 lb 9 6 oz)   10/15/20 97 1 kg (214 lb)   07/28/20 98 4 kg (217 lb)     · Updated echo 2/6 with LVEF 60 % and G2DD  · S/p IV Lasix and transitioned to 40 mg p o   Daily  · Continue home dose metoprolol  · Monitor I/O, daily weights  · Cardiology input noted, cleared for discharge on current regimen

## 2021-02-15 NOTE — ASSESSMENT & PLAN NOTE
Lab Results   Component Value Date    HGBA1C 6 3 (H) 07/01/2020       No results for input(s): POCGLU in the last 72 hours      Blood Sugar Average: Last 72 hrs:     · Diet controlled as outpatient  · Random glucose checks acceptable   · Diabetic diet   · Hypoglycemia protocol

## 2021-02-16 NOTE — UTILIZATION REVIEW
Notification of Discharge  This is a Notification of Discharge from our facility [address]  Please be advised that this patient has been discharge from our facility  Below you will find the admission and discharge date and time including the patients disposition  PRESENTATION DATE: 2/5/2021  2:23 PM  OBS ADMISSION DATE:   IP ADMISSION DATE: 2/5/21 1705   DISCHARGE DATE: 2/15/2021  5:41 PM  DISPOSITION: Home with [facility] with [facility]   Admission Orders listed below:  Admission Orders (From admission, onward)     Ordered        02/05/21 1712  INPATIENT ADMISSION  Once                   Please contact the UR Department if additional information is required to close this patient's authorization/case  [address] Utilization Review Department  Main: [phone] x carefully listen to the prompts  All voicemails are confidential   [email or web address]  Send all requests for admission clinical reviews, approved or denied determinations and any other requests to dedicated fax number below belonging to the campus where the patient is receiving treatment   List of dedicated fax numbers:  [address] DENIALS (Administrative/Medical Necessity) [phone]   [address]  [facility] (Maternity/NICU/Pediatrics) [phone] 54[address] [facility] [phone]   Doyle Muscat [phone]   Scott Sandhu [phone]   Christina Henriquez [place] [address] [facility] [phone]   1101 [facility] [phone]   [address] [facility]  [address] [facility] [address] [facility] [phone]

## 2021-02-17 ENCOUNTER — TRANSITIONAL CARE MANAGEMENT (OUTPATIENT)
Dept: FAMILY MEDICINE CLINIC | Facility: CLINIC | Age: 86
End: 2021-02-17

## 2021-02-17 ENCOUNTER — TELEPHONE (OUTPATIENT)
Dept: FAMILY MEDICINE CLINIC | Facility: CLINIC | Age: 86
End: 2021-02-17

## 2021-02-17 NOTE — TELEPHONE ENCOUNTER
Sagrario Mckeon is a nurse with [facility]'s home care she admitted Erika Mcbride today to home care services  Sagrario Mckeon has a few things she would like for you to be aware of  On  Panchito's AVS it shows pt has an appointment scheduled for late march  She suggested we may want to see pt sooner  FYI: ashley is scheduled for a virtual TCM with her son on 02/24/2021 I did  advise Sagrario Mckeon of this  Also 2 medications on discharge summary that are ordered  For Ashley's nebulizer as needed  Pt does not  want to get them, because one of them makes her tremors  worse  Pt does not have these in her home  So pt is not taking the two medications that are   as needed for sob  Pt is taking an inhaler STIOLTO  Sagrario Mckeon advised son to please call the office or we reach out to make an  appointment  The nurses will plan on seeing her twice a week for a few weeks  If pt were to go to pulmonary rehab they would be urinable to see her anymore  At that time  Sagrario Mckeon believes she is not ready for the pulmonary rehab at this time

## 2021-02-17 NOTE — TELEPHONE ENCOUNTER
1  My understanding is outpatient pulmonary rehab is very difficult to find /set up? 2  With regards to the inhaler that patient is comfortable with I would be okay to continue that as it actually does have a long-acting bronchodilator in it  So I'm OK with patient's decision  3  We will discuss all this with her virtual TCM      Call Annabel Yepez:  [phone]

## 2021-02-19 NOTE — PROGRESS NOTES
Assessment & Plan:     E11 9 Type 2 diabetes mellitus ([facility])    E03 9 Hypothyroidism    J44 9 COPD (chronic obstructive pulmonary disease) ([facility])    J96 21, J96 22 Acute on chronic respiratory failure with hypoxia and hypercapnia ([facility])    I50 33 Acute on chronic diastolic congestive heart failure ([facility])    G25 0 Benign familial tremor    E78 5 Benign familial tremor    M17 10 Benign familial tremor    F41 1 Generalized anxiety disorder- panic    K21 9 Generalized anxiety disorder- panic    J90 Pleural effusion    F32 9 Clinical depression         Subjective:     Patient ID: Elina Patel is a 80 y o  female     No chief complaint on file  HPI    Review of Systems    Objective: There were no vitals taken for this visit      Problem List Items Addressed This Visit     None          Physical Exam

## 2021-02-22 ENCOUNTER — TRANSCRIBE ORDERS (OUTPATIENT)
Dept: LAB | Facility: CLINIC | Age: 86
End: 2021-02-22

## 2021-02-22 ENCOUNTER — LAB (OUTPATIENT)
Dept: LAB | Facility: CLINIC | Age: 86
End: 2021-02-22
Payer: COMMERCIAL

## 2021-02-22 DIAGNOSIS — I10 ESSENTIAL HYPERTENSION, MALIGNANT: ICD-10-CM

## 2021-02-22 DIAGNOSIS — I10 ESSENTIAL HYPERTENSION, MALIGNANT: Primary | ICD-10-CM

## 2021-02-22 LAB
ANION GAP SERPL CALCULATED.3IONS-SCNC: 3 MMOL/L (ref 4–13)
BUN SERPL-MCNC: 31 MG/DL (ref 5–25)
CALCIUM SERPL-MCNC: 9.5 MG/DL (ref 8.3–10.1)
CHLORIDE SERPL-SCNC: 99 MMOL/L (ref 100–108)
CO2 SERPL-SCNC: 39 MMOL/L (ref 21–32)
CREAT SERPL-MCNC: 1.1 MG/DL (ref 0.6–1.3)
GFR SERPL CREATININE-BSD FRML MDRD: 46 ML/MIN/1.73SQ M
GLUCOSE SERPL-MCNC: 135 MG/DL (ref 65–140)
POTASSIUM SERPL-SCNC: 3.9 MMOL/L (ref 3.5–5.3)
SODIUM SERPL-SCNC: 141 MMOL/L (ref 136–145)

## 2021-02-22 PROCEDURE — 80048 BASIC METABOLIC PNL TOTAL CA: CPT

## 2021-02-22 PROCEDURE — 36415 COLL VENOUS BLD VENIPUNCTURE: CPT

## 2021-02-23 ENCOUNTER — TELEPHONE (OUTPATIENT)
Dept: FAMILY MEDICINE CLINIC | Facility: CLINIC | Age: 86
End: 2021-02-23

## 2021-02-23 NOTE — TELEPHONE ENCOUNTER
Yue called the office she is a physical therapist with [facility] home care  Yue saw Elena Speaks today for her eval today  Notify you they will contiue with home PT  2 times a week for 2 weeks , then 1 time a week for 2 weeks  FYI:   Please call Yue if you have any questions and or concerns

## 2021-02-24 ENCOUNTER — TELEMEDICINE (OUTPATIENT)
Dept: FAMILY MEDICINE CLINIC | Facility: CLINIC | Age: 86
End: 2021-02-24
Payer: COMMERCIAL

## 2021-02-24 VITALS — OXYGEN SATURATION: 95 % | WEIGHT: 205 LBS | BODY MASS INDEX: 35.19 KG/M2

## 2021-02-24 DIAGNOSIS — J90 PLEURAL EFFUSION: ICD-10-CM

## 2021-02-24 DIAGNOSIS — I50.33 ACUTE ON CHRONIC DIASTOLIC CONGESTIVE HEART FAILURE (HCC): ICD-10-CM

## 2021-02-24 DIAGNOSIS — J96.22 ACUTE ON CHRONIC RESPIRATORY FAILURE WITH HYPOXIA AND HYPERCAPNIA (HCC): Primary | ICD-10-CM

## 2021-02-24 DIAGNOSIS — J44.9 CHRONIC OBSTRUCTIVE PULMONARY DISEASE, UNSPECIFIED COPD TYPE (HCC): ICD-10-CM

## 2021-02-24 DIAGNOSIS — F41.1 GENERALIZED ANXIETY DISORDER: ICD-10-CM

## 2021-02-24 DIAGNOSIS — G25.0 BENIGN FAMILIAL TREMOR: ICD-10-CM

## 2021-02-24 DIAGNOSIS — J96.21 ACUTE ON CHRONIC RESPIRATORY FAILURE WITH HYPOXIA AND HYPERCAPNIA (HCC): Primary | ICD-10-CM

## 2021-02-24 PROCEDURE — 99496 TRANSJ CARE MGMT HIGH F2F 7D: CPT | Performed by: FAMILY MEDICINE

## 2021-02-24 RX ORDER — MAGNESIUM GLUCONATE 30 MG(550)
TABLET ORAL
COMMUNITY

## 2021-02-24 NOTE — PROGRESS NOTES
Assessment/Plan:   TCM Call (since 2/2/2021)     Date and time call was made  2/17/2021 10:56 AM    Hospital care reviewed  Records reviewed    Patient was hospitialized at  [facility]        Date of Admission  02/05/21    Date of discharge  02/15/21    Diagnosis  COPD and CHF     Disposition  Home    Current Symptoms  -- (Comment)  retaining fluid       TCM Call (since 2/2/2021)     Post hospital issues  None    Should patient be enrolled in anticoag monitoring? No    Scheduled for follow up? Yes    Did you obtain your prescribed medications  Yes    Do you need help managing your prescriptions or medications  No    Is transportation to your appointment needed  No    I have advised the patient to call PCP with any new or worsening symptoms  Bryan Hopper MA     Living Arrangements  Children    Counseling  Family           Problem List Items Addressed This Visit     None         Reason for visit is TCM  Encounter provider Rufino Hernandez DO       Provider located at [address] [place] [address] [place] [phone]      Recent Visits  No visits were found meeting these conditions  Showing recent visits within past 7 days and meeting all other requirements     Future Appointments  No visits were found meeting these conditions  Showing future appointments within next 150 days and meeting all other requirements        After connecting through [employer], the patient was identified by name and date of birth  Joel Mercer was informed that this is a telemedicine visit and that the visit is being conducted through [facility] and patient was informed that this is a secure, HIPAA-compliant platform  She agrees to proceed     My office door was closed  No one else was in the room  She acknowledged consent and understanding of privacy and security of the video platform   The patient has agreed to participate and understands they can discontinue the visit at any time     Patient is aware this is a billable service  Subjective:  Chief Complaint   Patient presents with    Virtual Tcm    Virtual Tcm      Patient ID: Al Shukla is a 80 y o  female  Virtual telemedicine visit TCM  Patient is evaluated with son as primary caregiver with patient    Patient was admitted to [place] [address] (February 5th through February 15th) with symptoms of increasing confusion shortness of breath and falls  Her discharge Diagnosis were acute on chronic respiratory failure with hypoxia and hypercapnia  COPD remains stable  Patient did have pleural effusion on examination  She was seen by cardiology and pulmonology  Echocardiogram was updated  Ejection fraction was normal at 60%  There was concentric hypertrophy and diastolic dysfunction  Fall workup was negative for any fractures  COVID influenza RSV testing was negative  Medication was reviewed  Patient is compliant withStiolto inhaler and is not doing mini nebulizer her choice  Is on O2 is receiving home health care  Review of Systems   Respiratory: Negative for cough  Shortness of breath: Markedly improved  Cardiovascular: Leg swelling:  minimal    Neurological: Tremors:  stable  Psychiatric/Behavioral:        Anxiety is markedly improved         Objective:    Vitals:    02/24/21 1623   SpO2: 95%   Weight: 93 kg (205 lb)       Physical Exam  Constitutional:       Appearance: Normal appearance  Comments: 75-year-old female who appears at her baseline who is alert and pleasant   Pulmonary:      Effort: Pulmonary effort is normal    Neurological:      Mental Status: She is alert and oriented to person, place, and time     Psychiatric:         Mood and Affect: Mood normal          Component      Latest Ref Rng & Units 2/22/2021   Sodium      136 - 145 mmol/L 141   Potassium      3 5 - 5 3 mmol/L 3 9   Chloride      100 - 108 mmol/L 99 (L)   CO2      21 - 32 mmol/L 39 (H)   Anion Gap      4 - 13 mmol/L 3 (L)   BUN      5 - 25 mg/dL 31 (H)   Creatinine      0 60 - 1 30 mg/dL 1 10   Glucose, Random      65 - 140 mg/dL 135   Calcium      8 3 - 10 1 mg/dL 9 5   eGFR      ml/min/1 73sq m 46     Transitional Care Management Review:  Manuelito Covarrubias is a 80 y o  female here for TCM follow up  During the TCM phone call patient stated:    TCM Call (since 2/2/2021)     Date and time call was made  2/17/2021 10:56 AM    Hospital care reviewed  Records reviewed    Patient was hospitialized at  [facility]        Date of Admission  02/05/21    Date of discharge  02/15/21    Diagnosis  COPD and CHF     Disposition  Home    Current Symptoms  -- (Comment)  retaining fluid       TCM Call (since 2/2/2021)     Post hospital issues  None    Should patient be enrolled in anticoag monitoring? No    Scheduled for follow up? Yes    Did you obtain your prescribed medications  Yes    Do you need help managing your prescriptions or medications  No    Is transportation to your appointment needed  No    I have advised the patient to call PCP with any new or worsening symptoms  Lloyd Moya MA     Living Arrangements  Children    Counseling  Family        Full medication reconciliation performed  Patient will continue regimen as outlined  Continue follow-up with home visiting nurses with PT OT with follow-up appointment here end of March  I spent 25 minutes with the patient during this visit      Samia Raymundo DO

## 2021-02-25 ENCOUNTER — TELEPHONE (OUTPATIENT)
Dept: FAMILY MEDICINE CLINIC | Facility: CLINIC | Age: 86
End: 2021-02-25

## 2021-02-25 NOTE — TELEPHONE ENCOUNTER
Radha Maxwell was informed by patient and son telemedicine visit was conducted yesterday and potassium was recommended  They have OTC potassium gluconate 99 mg in the home to take 1 daily, is this correct?   If yes, Radha Maxwell will add it to her medication list   Thanks

## 2021-03-11 ENCOUNTER — PATIENT OUTREACH (OUTPATIENT)
Dept: FAMILY MEDICINE CLINIC | Facility: CLINIC | Age: 86
End: 2021-03-11

## 2021-03-11 NOTE — PROGRESS NOTES
Outpatient Care Management Note:  RE: Call placed to pt's son to f/u  He reports that things are going 'well' and surpisingly pleasant  He denies any needs at this time  Will close to f/u

## 2021-03-12 DIAGNOSIS — I10 ESSENTIAL HYPERTENSION: ICD-10-CM

## 2021-03-15 DIAGNOSIS — G25.0 BENIGN FAMILIAL TREMOR: ICD-10-CM

## 2021-03-15 NOTE — TELEPHONE ENCOUNTER
Can we reach out to her son Marie Wiggins who normally is the one that communicates about what patient is in need of  It looks like she is due for this but I have not heard from  her daughter  for anything with regards to her mom in ages

## 2021-03-15 NOTE — TELEPHONE ENCOUNTER
Pt's daughter called the office in regards to requesting a refill of her mothers' alprazolam  Please send to the [facility] on [address]    PDMP checked:

## 2021-03-16 DIAGNOSIS — G25.0 BENIGN FAMILIAL TREMOR: ICD-10-CM

## 2021-03-17 RX ORDER — ALPRAZOLAM 0.5 MG/1
0.5 TABLET ORAL 3 TIMES DAILY PRN
Qty: 90 TABLET | Refills: 0 | OUTPATIENT
Start: 2021-03-17

## 2021-03-17 RX ORDER — ALPRAZOLAM 0.5 MG/1
0.5 TABLET ORAL 3 TIMES DAILY PRN
Qty: 90 TABLET | Refills: 0 | Status: SHIPPED | OUTPATIENT
Start: 2021-03-17 | End: 2021-04-22 | Stop reason: SDUPTHER

## 2021-03-18 ENCOUNTER — PATIENT OUTREACH (OUTPATIENT)
Dept: FAMILY MEDICINE CLINIC | Facility: CLINIC | Age: 86
End: 2021-03-18

## 2021-03-18 ENCOUNTER — TELEPHONE (OUTPATIENT)
Dept: FAMILY MEDICINE CLINIC | Facility: CLINIC | Age: 86
End: 2021-03-18

## 2021-03-18 DIAGNOSIS — N32.81 OVERACTIVE BLADDER: ICD-10-CM

## 2021-03-18 RX ORDER — MIRABEGRON 25 MG/1
TABLET, FILM COATED, EXTENDED RELEASE ORAL
Qty: 30 TABLET | Refills: 11 | Status: SHIPPED | OUTPATIENT
Start: 2021-03-18 | End: 2021-03-18
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
## 2021-03-18 NOTE — TELEPHONE ENCOUNTER
Patient called  Stating she picked up a medication [facility] and she stated that this medication is no longer being taken  Can you please check and remove this from her medication list     Also [facility] pharmacy at [phone] needs to be notified if this is correct that she is no longer taking the medication so they stop the automatic refill

## 2021-03-18 NOTE — TELEPHONE ENCOUNTER
Davy Lyle called the office she is a nurse with [facility]  Calling to give you an FYI:   Blood pressure today in pt's  left arm 100/48  Blood pressure Right arm  98/48  Heart Rate 64  Pt is not having symptoms of low blood pressure  Average blood pressure for this month is [phone] systolic and bottom number  has been 62-80  Any questions please call Davy Lyle at [phone] or please call pt if you have questions for her

## 2021-03-19 NOTE — TELEPHONE ENCOUNTER
I actually spoke to the patient herself, her son was away for a few days so her daughter was helping her

## 2021-03-25 ENCOUNTER — TELEPHONE (OUTPATIENT)
Dept: FAMILY MEDICINE CLINIC | Facility: CLINIC | Age: 86
End: 2021-03-25

## 2021-03-25 ENCOUNTER — PATIENT OUTREACH (OUTPATIENT)
Dept: FAMILY MEDICINE CLINIC | Facility: CLINIC | Age: 86
End: 2021-03-25

## 2021-03-25 ENCOUNTER — IMMUNIZATIONS (OUTPATIENT)
Dept: FAMILY MEDICINE CLINIC | Facility: HOSPITAL | Age: 86
End: 2021-03-25

## 2021-03-25 DIAGNOSIS — J96.21 ACUTE ON CHRONIC RESPIRATORY FAILURE WITH HYPOXIA AND HYPERCAPNIA (HCC): Primary | ICD-10-CM

## 2021-03-25 DIAGNOSIS — I50.33 ACUTE ON CHRONIC DIASTOLIC CONGESTIVE HEART FAILURE (HCC): ICD-10-CM

## 2021-03-25 DIAGNOSIS — G47.33 OSA (OBSTRUCTIVE SLEEP APNEA): ICD-10-CM

## 2021-03-25 DIAGNOSIS — J96.22 ACUTE ON CHRONIC RESPIRATORY FAILURE WITH HYPOXIA AND HYPERCAPNIA (HCC): Primary | ICD-10-CM

## 2021-03-25 DIAGNOSIS — Z23 ENCOUNTER FOR IMMUNIZATION: Primary | ICD-10-CM

## 2021-03-25 DIAGNOSIS — M15.9 GENERALIZED OSTEOARTHRITIS: ICD-10-CM

## 2021-03-25 PROCEDURE — 0001A SARS-COV-2 / COVID-19 MRNA VACCINE (PFIZER-BIONTECH) 30 MCG: CPT

## 2021-03-25 PROCEDURE — 91300 SARS-COV-2 / COVID-19 MRNA VACCINE (PFIZER-BIONTECH) 30 MCG: CPT

## 2021-03-25 NOTE — TELEPHONE ENCOUNTER
----- Message from Ean Padgett DO sent at 8/00/9707  2:05 PM EDT -----  Regarding: RE: New hospital bed /mattress   Please place an order for hospital bed and mattress to be sent to [facility]  ----- Message -----  From: Angelia Bran RN  Sent: 3/18/2021   2:56 PM EDT  To: Ean Padgett DO, #  Subject: New hospital bed Harman Bernard  I received a call from [facility] who had been out to [place] [facility]  Nithya requests an order to be sent to [facility] for a new hospital bed for pt because the old one is broken and the mattress is estimated to be over 11years old  Nithya states that it 'looks worn and should be replaced'  Also, she states that pt would like to be called first by the office and not her son, as per pt request I will try to change this in [address]  And just an FYI, prior to pt's hospitalization in February I had contacted Kye and will do gain so that they can provide home visits to her on occasion, This would only be besides your care and not in place of it        Thanks, Reshma Mccullough

## 2021-03-25 NOTE — TELEPHONE ENCOUNTER
[facility]  And she is unsure what mattress pt would need  Do you feel pt needs a special mattress or standard hospital mattress? Please advise   Thanks

## 2021-03-25 NOTE — PROGRESS NOTES
Outpatient Care Management Note:  RE:Received message about ordering details for new hospital bed for pt by PCP staff and advised staff member that this CM would contact Margarette Nye from [facility] regarding her request for a new bed for pt since there are questions  Jeffrey Samayoa responded that she will contact staff

## 2021-03-25 NOTE — TELEPHONE ENCOUNTER
----- Message from Kaleigh Palmer DO sent at 4/89/3921  2:05 PM EDT -----  Regarding: RE: New hospital bed /mattress   Please place an order for hospital bed and mattress to be sent to [facility]  ----- Message -----  From: Marixa Denton RN  Sent: 3/18/2021   2:56 PM EDT  To: Kaleigh Palmer DO, #  Subject: New hospital bed Wesley Martinez                       Hi Dr Yousif Norman  I received a call from [facility] who had been out to see [facility]  Nithya requests an order to be sent to [facility] for a new hospital bed for pt because the old one is broken and the mattress is estimated to be over 11years old  Nithya states that it 'looks worn and should be replaced'  Also, she states that pt would like to be called first by the office and not her son, as per pt request I will try to change this in [place]  And just an FYI, prior to pt's hospitalization in February I had contacted Kye and will do gain so that they can provide home visits to her on occasion, This would only be besides your care and not in place of it        Thanks, [facility]

## 2021-03-26 ENCOUNTER — TELEPHONE (OUTPATIENT)
Dept: FAMILY MEDICINE CLINIC | Facility: CLINIC | Age: 86
End: 2021-03-26

## 2021-03-26 DIAGNOSIS — Z71.89 COMPLEX CARE COORDINATION: ICD-10-CM

## 2021-03-26 DIAGNOSIS — J44.9 CHRONIC OBSTRUCTIVE PULMONARY DISEASE, UNSPECIFIED COPD TYPE (HCC): Primary | ICD-10-CM

## 2021-03-26 DIAGNOSIS — R29.3 POSTURAL IMBALANCE: ICD-10-CM

## 2021-03-26 DIAGNOSIS — M17.11 PRIMARY OSTEOARTHRITIS OF RIGHT KNEE: ICD-10-CM

## 2021-03-26 DIAGNOSIS — I50.33 ACUTE ON CHRONIC DIASTOLIC CONGESTIVE HEART FAILURE (HCC): ICD-10-CM

## 2021-03-26 DIAGNOSIS — E11.9 TYPE 2 DIABETES MELLITUS WITHOUT COMPLICATION, WITHOUT LONG-TERM CURRENT USE OF INSULIN (HCC): ICD-10-CM

## 2021-03-30 ENCOUNTER — OFFICE VISIT (OUTPATIENT)
Dept: FAMILY MEDICINE CLINIC | Facility: CLINIC | Age: 86
End: 2021-03-30
Payer: COMMERCIAL

## 2021-03-30 VITALS
HEART RATE: 55 BPM | SYSTOLIC BLOOD PRESSURE: 112 MMHG | HEIGHT: 64 IN | WEIGHT: 207.6 LBS | TEMPERATURE: 97.6 F | BODY MASS INDEX: 35.44 KG/M2 | DIASTOLIC BLOOD PRESSURE: 62 MMHG | OXYGEN SATURATION: 95 %

## 2021-03-30 DIAGNOSIS — I10 ESSENTIAL HYPERTENSION: ICD-10-CM

## 2021-03-30 DIAGNOSIS — E78.01 FAMILIAL HYPERCHOLESTEROLEMIA: ICD-10-CM

## 2021-03-30 DIAGNOSIS — E11.9 TYPE 2 DIABETES MELLITUS WITHOUT COMPLICATION, WITHOUT LONG-TERM CURRENT USE OF INSULIN (HCC): Primary | ICD-10-CM

## 2021-03-30 DIAGNOSIS — M15.9 GENERALIZED OSTEOARTHRITIS: ICD-10-CM

## 2021-03-30 DIAGNOSIS — E03.9 ACQUIRED HYPOTHYROIDISM: ICD-10-CM

## 2021-03-30 DIAGNOSIS — E55.9 VITAMIN D DEFICIENCY: ICD-10-CM

## 2021-03-30 DIAGNOSIS — E53.8 VITAMIN B 12 DEFICIENCY: ICD-10-CM

## 2021-03-30 PROCEDURE — 99214 OFFICE O/P EST MOD 30 MIN: CPT | Performed by: FAMILY MEDICINE

## 2021-03-30 RX ORDER — OXYCODONE HYDROCHLORIDE AND ACETAMINOPHEN 5; 325 MG/1; MG/1
1 TABLET ORAL EVERY 4 HOURS PRN
Qty: 90 TABLET | Refills: 0 | Status: SHIPPED | OUTPATIENT
Start: 2021-03-30 | End: 2021-05-17 | Stop reason: SDUPTHER

## 2021-03-30 NOTE — PROGRESS NOTES
Assessment/Plan     Diagnoses and all orders for this visit:    Type 2 diabetes mellitus without complication, without long-term current use of insulin (HCC)  -     Hemoglobin A1C; Future  -     Microalbumin / creatinine urine ratio; Future  -     Comprehensive metabolic panel; Future    Acquired hypothyroidism  -     Comprehensive metabolic panel; Future  -     T3, free; Future  -     T4, free; Future  -     TSH, 3rd generation; Future    Familial hypercholesterolemia  -     Lipid Panel with Direct LDL reflex; Future  -     Comprehensive metabolic panel; Future    Vitamin B 12 deficiency  -     Vitamin B12; Future    Vitamin D deficiency  -     Vitamin D 25 hydroxy; Future    Essential hypertension  -     metoprolol tartrate (LOPRESSOR) 25 mg tablet; Take 1 tablet (25 mg total) by mouth every 12 (twelve) hours    Generalized osteoarthritis  -     oxyCODONE-acetaminophen (PERCOCET) 5-325 mg per tablet; Take 1 tablet by mouth every 4 (four) hours as needed for moderate painMax Daily Amount: 6 tablets    Other orders  -     Cancel: TDAP VACCINE GREATER THAN OR EQUAL TO 8YO IM  -     Cancel: Microalbumin / creatinine urine ratio ([facility]); Future        Encourage updating diabetic eye exam   Lab work ordered for [facility]  Subjective:   Chief Complaint   Patient presents with    Follow-up     6 month follow up       Patient ID: Ruba Phillips is a 80 y o  female  Patient is pleasant 77-year-old female who is here for follow-up  Had recent hospitalization  We did perform a telemedicine TCM at that time  Diagnosis were acute on chronic respiratory failure with hypoxia and hypercapnia  She remains on home O2  Currently she is doing well  Energy is picking up  Mentation has been stable  Labs again reviewed in full  Patient COVID influenza and RSV was negative at that time  She did have her 1st COVID vaccine March 25th  She had no side effects    All other immunizations are up-to-date at this point  Bowels and urine are okay  The following portions of the patient's history were reviewed and updated as appropriate: allergies, current medications, past family history, past medical history, past social history, past surgical history and problem list       Review of Systems   Constitutional: Positive for fatigue ( improving)  Respiratory: Shortness of breath:  minimal with activity  Musculoskeletal: Positive for arthralgias and myalgias  Psychiatric/Behavioral: Negative for hallucinations  Sleep disturbance:  improved  Objective:      /62   Pulse 55   Temp 97 6 °F (36 4 °C) (Temporal)   Ht 5' 4" (1 626 m)   Wt 94 2 kg (207 lb 9 6 oz)   SpO2 95%   BMI 35 63 kg/m²          Physical Exam  Constitutional:       General: She is not in acute distress  Appearance: She is well-developed  She is obese  Comments: 49-year-old female who appears in no acute distress with elevated BMI   HENT:      Head: Normocephalic  Eyes:      Conjunctiva/sclera: Conjunctivae normal       Pupils: Pupils are equal, round, and reactive to light  Neck:      Musculoskeletal: Normal range of motion and neck supple  Cardiovascular:      Rate and Rhythm: Normal rate and regular rhythm  Heart sounds: No murmur  Pulmonary:      Effort: Pulmonary effort is normal       Breath sounds: Normal breath sounds  Abdominal:      General: Bowel sounds are normal       Palpations: Abdomen is soft  There is no mass  Tenderness: There is no abdominal tenderness  Musculoskeletal:      Right lower leg: No edema  Left lower leg: No edema  Comments: Gait is slightly antalgic  Steady with walker   Skin:     General: Skin is warm and dry  Neurological:      General: No focal deficit present  Mental Status: She is alert and oriented to person, place, and time  Deep Tendon Reflexes: Reflexes are normal and symmetric     Psychiatric:         Mood and Affect: Mood normal  Behavior: Behavior normal          Thought Content:  Thought content normal          Judgment: Judgment normal       Comments: Feisty

## 2021-04-15 ENCOUNTER — IMMUNIZATIONS (OUTPATIENT)
Dept: FAMILY MEDICINE CLINIC | Facility: HOSPITAL | Age: 86
End: 2021-04-15

## 2021-04-15 DIAGNOSIS — Z23 ENCOUNTER FOR IMMUNIZATION: Primary | ICD-10-CM

## 2021-04-15 PROCEDURE — 0002A SARS-COV-2 / COVID-19 MRNA VACCINE (PFIZER-BIONTECH) 30 MCG: CPT

## 2021-04-15 PROCEDURE — 91300 SARS-COV-2 / COVID-19 MRNA VACCINE (PFIZER-BIONTECH) 30 MCG: CPT

## 2021-04-22 DIAGNOSIS — G25.0 BENIGN FAMILIAL TREMOR: ICD-10-CM

## 2021-04-22 RX ORDER — ALPRAZOLAM 0.5 MG/1
0.5 TABLET ORAL 3 TIMES DAILY PRN
Qty: 90 TABLET | Refills: 0 | Status: SHIPPED | OUTPATIENT
Start: 2021-04-22 | End: 2021-06-01 | Stop reason: SDUPTHER

## 2021-05-17 DIAGNOSIS — M15.9 GENERALIZED OSTEOARTHRITIS: ICD-10-CM

## 2021-05-17 RX ORDER — OXYCODONE HYDROCHLORIDE AND ACETAMINOPHEN 5; 325 MG/1; MG/1
1 TABLET ORAL EVERY 4 HOURS PRN
Qty: 90 TABLET | Refills: 0 | Status: SHIPPED | OUTPATIENT
Start: 2021-05-17 | End: 2021-06-29 | Stop reason: SDUPTHER

## 2021-05-17 NOTE — TELEPHONE ENCOUNTER
Patients cyril Watts called requesting refill for patient medication Percocet 5/325 mg sent to [facility]

## 2021-06-01 DIAGNOSIS — J96.22 ACUTE ON CHRONIC RESPIRATORY FAILURE WITH HYPOXIA AND HYPERCAPNIA (HCC): ICD-10-CM

## 2021-06-01 DIAGNOSIS — G25.0 BENIGN FAMILIAL TREMOR: ICD-10-CM

## 2021-06-01 DIAGNOSIS — J96.21 ACUTE ON CHRONIC RESPIRATORY FAILURE WITH HYPOXIA AND HYPERCAPNIA (HCC): ICD-10-CM

## 2021-06-02 DIAGNOSIS — G25.0 BENIGN FAMILIAL TREMOR: ICD-10-CM

## 2021-06-02 RX ORDER — ALPRAZOLAM 0.5 MG/1
0.5 TABLET ORAL 3 TIMES DAILY PRN
Qty: 90 TABLET | Refills: 0 | Status: SHIPPED | OUTPATIENT
Start: 2021-06-02 | End: 2021-06-29 | Stop reason: SDUPTHER

## 2021-06-02 RX ORDER — ALPRAZOLAM 0.5 MG/1
0.5 TABLET ORAL 3 TIMES DAILY PRN
Qty: 90 TABLET | Refills: 0 | OUTPATIENT
Start: 2021-06-02

## 2021-06-02 RX ORDER — FUROSEMIDE 40 MG/1
40 TABLET ORAL DAILY
Qty: 30 TABLET | Refills: 0 | Status: SHIPPED | OUTPATIENT
Start: 2021-06-02 | End: 2021-06-25

## 2021-06-13 DIAGNOSIS — J44.9 CHRONIC OBSTRUCTIVE PULMONARY DISEASE, UNSPECIFIED COPD TYPE (HCC): ICD-10-CM

## 2021-06-13 RX ORDER — TIOTROPIUM BROMIDE AND OLODATEROL 3.124; 2.736 UG/1; UG/1
SPRAY, METERED RESPIRATORY (INHALATION)
Qty: 4 G | Refills: 3 | Status: SHIPPED | OUTPATIENT
Start: 2021-06-13 | End: 2021-11-17

## 2021-06-25 DIAGNOSIS — J96.21 ACUTE ON CHRONIC RESPIRATORY FAILURE WITH HYPOXIA AND HYPERCAPNIA (HCC): ICD-10-CM

## 2021-06-25 DIAGNOSIS — J96.22 ACUTE ON CHRONIC RESPIRATORY FAILURE WITH HYPOXIA AND HYPERCAPNIA (HCC): ICD-10-CM

## 2021-06-25 RX ORDER — FUROSEMIDE 40 MG/1
TABLET ORAL
Qty: 30 TABLET | Refills: 0 | Status: SHIPPED | OUTPATIENT
Start: 2021-06-25 | End: 2021-06-28 | Stop reason: SDUPTHER

## 2021-06-28 ENCOUNTER — TELEPHONE (OUTPATIENT)
Dept: FAMILY MEDICINE CLINIC | Facility: CLINIC | Age: 86
End: 2021-06-28

## 2021-06-28 NOTE — TELEPHONE ENCOUNTER
Patient has a concern about her  furosemide (LASIX) 40 mg tablet that was sent in  She needs this fixed to 20MG as that is all she takes  Please advise

## 2021-06-29 DIAGNOSIS — G25.0 BENIGN FAMILIAL TREMOR: ICD-10-CM

## 2021-06-29 DIAGNOSIS — M15.9 GENERALIZED OSTEOARTHRITIS: ICD-10-CM

## 2021-06-30 RX ORDER — ALPRAZOLAM 0.5 MG/1
0.5 TABLET ORAL 3 TIMES DAILY PRN
Qty: 90 TABLET | Refills: 0 | Status: SHIPPED | OUTPATIENT
Start: 2021-06-30 | End: 2021-08-12 | Stop reason: SDUPTHER

## 2021-06-30 RX ORDER — OXYCODONE HYDROCHLORIDE AND ACETAMINOPHEN 5; 325 MG/1; MG/1
1 TABLET ORAL EVERY 4 HOURS PRN
Qty: 90 TABLET | Refills: 0 | Status: SHIPPED | OUTPATIENT
Start: 2021-06-30 | End: 2021-10-01

## 2021-08-01 DIAGNOSIS — K21.9 GASTROESOPHAGEAL REFLUX DISEASE WITHOUT ESOPHAGITIS: ICD-10-CM

## 2021-08-01 RX ORDER — FAMOTIDINE 20 MG/1
TABLET, FILM COATED ORAL
Qty: 180 TABLET | Refills: 1 | Status: SHIPPED | OUTPATIENT
Start: 2021-08-01 | End: 2022-03-01

## 2021-08-12 DIAGNOSIS — G25.0 BENIGN FAMILIAL TREMOR: ICD-10-CM

## 2021-08-12 DIAGNOSIS — I73.9 PAD (PERIPHERAL ARTERY DISEASE) (HCC): ICD-10-CM

## 2021-08-12 NOTE — TELEPHONE ENCOUNTER
Requested medication(s) are due for refill today: Yes  Patient has already received a courtesy refill: No  Other reason request has been forwarded to provider: Failed refill protocol due to   Hematology: Antiplatelets - clopidogrel [id number]/12/2021 12:40 AM   Manual Review Message: Evaluate AST, ALT within 2 months of therapy initiation   Protocol Details    HCT in normal range and within 180 days     HGB in normal range and within 180 days     PLT in normal range and within 180 days

## 2021-08-13 RX ORDER — ALPRAZOLAM 0.5 MG/1
0.5 TABLET ORAL 3 TIMES DAILY PRN
Qty: 90 TABLET | Refills: 0 | Status: SHIPPED | OUTPATIENT
Start: 2021-08-13 | End: 2021-09-14 | Stop reason: SDUPTHER

## 2021-08-13 RX ORDER — CLOPIDOGREL BISULFATE 75 MG/1
TABLET ORAL
Qty: 90 TABLET | Refills: 1 | Status: SHIPPED | OUTPATIENT
Start: 2021-08-13 | End: 2022-03-01

## 2021-08-18 ENCOUNTER — TELEPHONE (OUTPATIENT)
Dept: FAMILY MEDICINE CLINIC | Facility: CLINIC | Age: 86
End: 2021-08-18
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
## 2021-09-14 DIAGNOSIS — G25.0 BENIGN FAMILIAL TREMOR: ICD-10-CM

## 2021-09-15 RX ORDER — ALPRAZOLAM 0.5 MG/1
0.5 TABLET ORAL 3 TIMES DAILY PRN
Qty: 90 TABLET | Refills: 0 | Status: SHIPPED | OUTPATIENT
Start: 2021-09-15 | End: 2021-10-15 | Stop reason: SDUPTHER

## 2021-09-24 ENCOUNTER — RA CDI HCC (OUTPATIENT)
Dept: OTHER | Facility: HOSPITAL | Age: 86
End: 2021-09-24

## 2021-10-01 ENCOUNTER — OFFICE VISIT (OUTPATIENT)
Dept: FAMILY MEDICINE CLINIC | Facility: CLINIC | Age: 86
End: 2021-10-01
Payer: COMMERCIAL

## 2021-10-01 VITALS
WEIGHT: 195.6 LBS | TEMPERATURE: 97.7 F | SYSTOLIC BLOOD PRESSURE: 122 MMHG | OXYGEN SATURATION: 97 % | BODY MASS INDEX: 33.39 KG/M2 | HEIGHT: 64 IN | HEART RATE: 76 BPM | DIASTOLIC BLOOD PRESSURE: 80 MMHG

## 2021-10-01 DIAGNOSIS — M15.9 GENERALIZED OSTEOARTHRITIS: ICD-10-CM

## 2021-10-01 DIAGNOSIS — Z23 ENCOUNTER FOR IMMUNIZATION: ICD-10-CM

## 2021-10-01 DIAGNOSIS — J44.9 CHRONIC OBSTRUCTIVE PULMONARY DISEASE, UNSPECIFIED COPD TYPE (HCC): ICD-10-CM

## 2021-10-01 DIAGNOSIS — E11.9 TYPE 2 DIABETES MELLITUS WITHOUT COMPLICATION, WITHOUT LONG-TERM CURRENT USE OF INSULIN (HCC): ICD-10-CM

## 2021-10-01 DIAGNOSIS — F41.1 GENERALIZED ANXIETY DISORDER: ICD-10-CM

## 2021-10-01 DIAGNOSIS — I10 PRIMARY HYPERTENSION: Primary | ICD-10-CM

## 2021-10-01 PROCEDURE — 90662 IIV NO PRSV INCREASED AG IM: CPT | Performed by: FAMILY MEDICINE

## 2021-10-01 PROCEDURE — 99214 OFFICE O/P EST MOD 30 MIN: CPT | Performed by: FAMILY MEDICINE

## 2021-10-01 PROCEDURE — G0008 ADMIN INFLUENZA VIRUS VAC: HCPCS | Performed by: FAMILY MEDICINE

## 2021-10-01 RX ORDER — DULOXETIN HYDROCHLORIDE 30 MG/1
30 CAPSULE, DELAYED RELEASE ORAL DAILY
Qty: 30 CAPSULE | Refills: 1 | Status: SHIPPED | OUTPATIENT
Start: 2021-10-01 | End: 2021-10-28

## 2021-10-05 ENCOUNTER — TELEPHONE (OUTPATIENT)
Dept: FAMILY MEDICINE CLINIC | Facility: CLINIC | Age: 86
End: 2021-10-05

## 2021-10-12 ENCOUNTER — TELEPHONE (OUTPATIENT)
Dept: FAMILY MEDICINE CLINIC | Facility: CLINIC | Age: 86
End: 2021-10-12

## 2021-10-15 DIAGNOSIS — G25.0 BENIGN FAMILIAL TREMOR: ICD-10-CM

## 2021-10-18 ENCOUNTER — APPOINTMENT (OUTPATIENT)
Dept: LAB | Facility: CLINIC | Age: 86
End: 2021-10-18
Payer: COMMERCIAL

## 2021-10-18 DIAGNOSIS — E78.01 FAMILIAL HYPERCHOLESTEROLEMIA: ICD-10-CM

## 2021-10-18 DIAGNOSIS — E03.9 ACQUIRED HYPOTHYROIDISM: ICD-10-CM

## 2021-10-18 DIAGNOSIS — J96.21 ACUTE ON CHRONIC RESPIRATORY FAILURE WITH HYPOXIA AND HYPERCAPNIA (HCC): ICD-10-CM

## 2021-10-18 DIAGNOSIS — F22 DELUSIONS (HCC): ICD-10-CM

## 2021-10-18 DIAGNOSIS — E11.9 TYPE 2 DIABETES MELLITUS WITHOUT COMPLICATION, WITHOUT LONG-TERM CURRENT USE OF INSULIN (HCC): ICD-10-CM

## 2021-10-18 DIAGNOSIS — E55.9 VITAMIN D DEFICIENCY: ICD-10-CM

## 2021-10-18 DIAGNOSIS — J96.22 ACUTE ON CHRONIC RESPIRATORY FAILURE WITH HYPOXIA AND HYPERCAPNIA (HCC): ICD-10-CM

## 2021-10-18 DIAGNOSIS — E53.8 VITAMIN B 12 DEFICIENCY: ICD-10-CM

## 2021-10-18 LAB
25(OH)D3 SERPL-MCNC: 67.2 NG/ML (ref 30–100)
ALBUMIN SERPL BCP-MCNC: 3.2 G/DL (ref 3.5–5)
ALP SERPL-CCNC: 129 U/L (ref 46–116)
ALT SERPL W P-5'-P-CCNC: 11 U/L (ref 12–78)
ANION GAP SERPL CALCULATED.3IONS-SCNC: 0 MMOL/L (ref 4–13)
AST SERPL W P-5'-P-CCNC: 12 U/L (ref 5–45)
BILIRUB SERPL-MCNC: 0.46 MG/DL (ref 0.2–1)
BUN SERPL-MCNC: 43 MG/DL (ref 5–25)
CALCIUM ALBUM COR SERPL-MCNC: 10.9 MG/DL (ref 8.3–10.1)
CALCIUM SERPL-MCNC: 10.3 MG/DL (ref 8.3–10.1)
CHLORIDE SERPL-SCNC: 99 MMOL/L (ref 100–108)
CHOLEST SERPL-MCNC: 162 MG/DL (ref 50–200)
CO2 SERPL-SCNC: 38 MMOL/L (ref 21–32)
CREAT SERPL-MCNC: 1.37 MG/DL (ref 0.6–1.3)
ERYTHROCYTE [DISTWIDTH] IN BLOOD BY AUTOMATED COUNT: 13.8 % (ref 11.6–15.1)
EST. AVERAGE GLUCOSE BLD GHB EST-MCNC: 137 MG/DL
GFR SERPL CREATININE-BSD FRML MDRD: 35 ML/MIN/1.73SQ M
GLUCOSE P FAST SERPL-MCNC: 96 MG/DL (ref 65–99)
HBA1C MFR BLD: 6.4 %
HCT VFR BLD AUTO: 49.9 % (ref 34.8–46.1)
HDLC SERPL-MCNC: 48 MG/DL
HGB BLD-MCNC: 15.1 G/DL (ref 11.5–15.4)
LDLC SERPL CALC-MCNC: 91 MG/DL (ref 0–100)
MCH RBC QN AUTO: 28.5 PG (ref 26.8–34.3)
MCHC RBC AUTO-ENTMCNC: 30.3 G/DL (ref 31.4–37.4)
MCV RBC AUTO: 94 FL (ref 82–98)
PLATELET # BLD AUTO: 283 THOUSANDS/UL (ref 149–390)
PMV BLD AUTO: 10.6 FL (ref 8.9–12.7)
POTASSIUM SERPL-SCNC: 4.5 MMOL/L (ref 3.5–5.3)
PROT SERPL-MCNC: 7.5 G/DL (ref 6.4–8.2)
RBC # BLD AUTO: 5.3 MILLION/UL (ref 3.81–5.12)
SODIUM SERPL-SCNC: 137 MMOL/L (ref 136–145)
T3FREE SERPL-MCNC: 2.39 PG/ML (ref 2.3–4.2)
T4 FREE SERPL-MCNC: 1.11 NG/DL (ref 0.76–1.46)
TRIGL SERPL-MCNC: 114 MG/DL
TSH SERPL DL<=0.05 MIU/L-ACNC: 0.91 UIU/ML (ref 0.36–3.74)
VIT B12 SERPL-MCNC: 489 PG/ML (ref 100–900)
WBC # BLD AUTO: 8.7 THOUSAND/UL (ref 4.31–10.16)

## 2021-10-18 PROCEDURE — 84481 FREE ASSAY (FT-3): CPT

## 2021-10-18 PROCEDURE — 80053 COMPREHEN METABOLIC PANEL: CPT | Performed by: INTERNAL MEDICINE

## 2021-10-18 PROCEDURE — 80061 LIPID PANEL: CPT

## 2021-10-18 PROCEDURE — 83036 HEMOGLOBIN GLYCOSYLATED A1C: CPT

## 2021-10-18 PROCEDURE — 82607 VITAMIN B-12: CPT

## 2021-10-18 PROCEDURE — 36415 COLL VENOUS BLD VENIPUNCTURE: CPT | Performed by: INTERNAL MEDICINE

## 2021-10-18 PROCEDURE — 83970 ASSAY OF PARATHORMONE: CPT | Performed by: INTERNAL MEDICINE

## 2021-10-18 PROCEDURE — 84443 ASSAY THYROID STIM HORMONE: CPT | Performed by: INTERNAL MEDICINE

## 2021-10-18 PROCEDURE — 83835 ASSAY OF METANEPHRINES: CPT | Performed by: INTERNAL MEDICINE

## 2021-10-18 PROCEDURE — 84439 ASSAY OF FREE THYROXINE: CPT

## 2021-10-18 PROCEDURE — 85027 COMPLETE CBC AUTOMATED: CPT | Performed by: INTERNAL MEDICINE

## 2021-10-18 PROCEDURE — 82306 VITAMIN D 25 HYDROXY: CPT

## 2021-10-18 RX ORDER — ALPRAZOLAM 0.5 MG/1
0.5 TABLET ORAL 3 TIMES DAILY PRN
Qty: 90 TABLET | Refills: 0 | Status: SHIPPED | OUTPATIENT
Start: 2021-10-18 | End: 2021-11-18 | Stop reason: SDUPTHER

## 2021-10-19 LAB — PTH-INTACT SERPL-MCNC: 50.7 PG/ML (ref 18.4–80.1)

## 2021-10-21 ENCOUNTER — TELEPHONE (OUTPATIENT)
Dept: FAMILY MEDICINE CLINIC | Facility: CLINIC | Age: 86
End: 2021-10-21

## 2021-10-21 ENCOUNTER — OFFICE VISIT (OUTPATIENT)
Dept: CARDIOLOGY CLINIC | Facility: CLINIC | Age: 86
End: 2021-10-21
Payer: COMMERCIAL

## 2021-10-21 VITALS
WEIGHT: 192.6 LBS | HEART RATE: 70 BPM | HEIGHT: 64 IN | SYSTOLIC BLOOD PRESSURE: 118 MMHG | OXYGEN SATURATION: 88 % | BODY MASS INDEX: 32.88 KG/M2 | DIASTOLIC BLOOD PRESSURE: 60 MMHG

## 2021-10-21 DIAGNOSIS — I10 BENIGN ESSENTIAL HTN: ICD-10-CM

## 2021-10-21 DIAGNOSIS — I10 PRIMARY HYPERTENSION: Primary | ICD-10-CM

## 2021-10-21 DIAGNOSIS — I25.10 CORONARY ARTERY DISEASE INVOLVING NATIVE CORONARY ARTERY OF NATIVE HEART WITHOUT ANGINA PECTORIS: ICD-10-CM

## 2021-10-21 PROCEDURE — 1036F TOBACCO NON-USER: CPT | Performed by: INTERNAL MEDICINE

## 2021-10-21 PROCEDURE — 3078F DIAST BP <80 MM HG: CPT | Performed by: INTERNAL MEDICINE

## 2021-10-21 PROCEDURE — 99214 OFFICE O/P EST MOD 30 MIN: CPT | Performed by: INTERNAL MEDICINE

## 2021-10-21 PROCEDURE — 3074F SYST BP LT 130 MM HG: CPT | Performed by: INTERNAL MEDICINE

## 2021-10-21 PROCEDURE — 1160F RVW MEDS BY RX/DR IN RCRD: CPT | Performed by: INTERNAL MEDICINE

## 2021-10-21 RX ORDER — ROSUVASTATIN CALCIUM 10 MG/1
10 TABLET, COATED ORAL DAILY
Qty: 90 TABLET | Refills: 3 | Status: SHIPPED | OUTPATIENT
Start: 2021-10-21 | End: 2022-01-06 | Stop reason: SDUPTHER

## 2021-10-25 LAB
METANEPH FREE SERPL-MCNC: 25.1 PG/ML (ref 0–88)
NORMETANEPHRINE SERPL-MCNC: 105.5 PG/ML (ref 0–191.8)

## 2021-10-28 DIAGNOSIS — M15.9 GENERALIZED OSTEOARTHRITIS: ICD-10-CM

## 2021-10-28 RX ORDER — DULOXETIN HYDROCHLORIDE 30 MG/1
CAPSULE, DELAYED RELEASE ORAL
Qty: 30 CAPSULE | Refills: 1 | Status: SHIPPED | OUTPATIENT
Start: 2021-10-28 | End: 2021-11-10 | Stop reason: SDUPTHER

## 2021-11-10 ENCOUNTER — TELEPHONE (OUTPATIENT)
Dept: FAMILY MEDICINE CLINIC | Facility: CLINIC | Age: 86
End: 2021-11-10

## 2021-11-18 DIAGNOSIS — G25.0 BENIGN FAMILIAL TREMOR: ICD-10-CM

## 2021-11-18 RX ORDER — ALPRAZOLAM 0.5 MG/1
0.5 TABLET ORAL 3 TIMES DAILY PRN
Qty: 90 TABLET | Refills: 0 | Status: SHIPPED | OUTPATIENT
Start: 2021-11-18 | End: 2021-12-21 | Stop reason: SDUPTHER

## 2021-11-26 ENCOUNTER — TELEPHONE (OUTPATIENT)
Dept: FAMILY MEDICINE CLINIC | Facility: CLINIC | Age: 86
End: 2021-11-26

## 2021-12-17 ENCOUNTER — TELEPHONE (OUTPATIENT)
Dept: FAMILY MEDICINE CLINIC | Facility: CLINIC | Age: 86
End: 2021-12-17

## 2021-12-21 ENCOUNTER — TELEPHONE (OUTPATIENT)
Dept: FAMILY MEDICINE CLINIC | Facility: CLINIC | Age: 86
End: 2021-12-21

## 2021-12-21 DIAGNOSIS — G25.0 BENIGN FAMILIAL TREMOR: ICD-10-CM

## 2021-12-21 RX ORDER — ALPRAZOLAM 0.5 MG/1
0.5 TABLET ORAL 3 TIMES DAILY PRN
Qty: 90 TABLET | Refills: 0 | Status: SHIPPED | OUTPATIENT
Start: 2021-12-21 | End: 2022-01-21 | Stop reason: SDUPTHER

## 2021-12-22 ENCOUNTER — TELEMEDICINE (OUTPATIENT)
Dept: FAMILY MEDICINE CLINIC | Facility: CLINIC | Age: 86
End: 2021-12-22
Payer: COMMERCIAL

## 2021-12-22 DIAGNOSIS — L98.491 SUPERFICIAL ULCER OF SKIN (HCC): ICD-10-CM

## 2021-12-22 DIAGNOSIS — T30.0 SKIN BURN: Primary | ICD-10-CM

## 2021-12-22 PROCEDURE — 1160F RVW MEDS BY RX/DR IN RCRD: CPT | Performed by: FAMILY MEDICINE

## 2021-12-22 PROCEDURE — 1036F TOBACCO NON-USER: CPT | Performed by: FAMILY MEDICINE

## 2021-12-22 PROCEDURE — 99213 OFFICE O/P EST LOW 20 MIN: CPT | Performed by: FAMILY MEDICINE

## 2021-12-22 RX ORDER — AZITHROMYCIN 250 MG/1
TABLET, FILM COATED ORAL
Qty: 6 TABLET | Refills: 0 | Status: SHIPPED | OUTPATIENT
Start: 2021-12-22 | End: 2021-12-26

## 2022-01-06 ENCOUNTER — TELEPHONE (OUTPATIENT)
Dept: FAMILY MEDICINE CLINIC | Facility: CLINIC | Age: 87
End: 2022-01-06

## 2022-01-06 DIAGNOSIS — E03.9 ACQUIRED HYPOTHYROIDISM: ICD-10-CM

## 2022-01-06 DIAGNOSIS — I25.10 CORONARY ARTERY DISEASE INVOLVING NATIVE CORONARY ARTERY OF NATIVE HEART WITHOUT ANGINA PECTORIS: ICD-10-CM

## 2022-01-06 RX ORDER — LEVOTHYROXINE SODIUM 0.07 MG/1
75 TABLET ORAL EVERY MORNING
Qty: 90 TABLET | Refills: 1 | Status: SHIPPED | OUTPATIENT
Start: 2022-01-06 | End: 2022-04-04 | Stop reason: SDUPTHER
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
## 2022-01-07 RX ORDER — ROSUVASTATIN CALCIUM 10 MG/1
10 TABLET, COATED ORAL DAILY
Qty: 90 TABLET | Refills: 3 | Status: SHIPPED | OUTPATIENT
Start: 2022-01-07 | End: 2022-04-13 | Stop reason: SDUPTHER

## 2022-01-07 NOTE — TELEPHONE ENCOUNTER
We are going to need to see it in person  So see if there is a time slot next week any day that we can see her

## 2022-01-12 ENCOUNTER — OFFICE VISIT (OUTPATIENT)
Dept: FAMILY MEDICINE CLINIC | Facility: CLINIC | Age: 87
End: 2022-01-12
Payer: COMMERCIAL

## 2022-01-12 ENCOUNTER — APPOINTMENT (OUTPATIENT)
Dept: LAB | Facility: CLINIC | Age: 87
End: 2022-01-12
Payer: COMMERCIAL

## 2022-01-12 VITALS
HEART RATE: 85 BPM | WEIGHT: 186 LBS | BODY MASS INDEX: 31.76 KG/M2 | TEMPERATURE: 96.7 F | OXYGEN SATURATION: 95 % | DIASTOLIC BLOOD PRESSURE: 60 MMHG | HEIGHT: 64 IN | RESPIRATION RATE: 16 BRPM | SYSTOLIC BLOOD PRESSURE: 116 MMHG

## 2022-01-12 DIAGNOSIS — L98.491 SUPERFICIAL ULCER OF SKIN (HCC): Primary | ICD-10-CM

## 2022-01-12 DIAGNOSIS — T30.0 SKIN BURN: ICD-10-CM

## 2022-01-12 LAB
CREAT UR-MCNC: 107 MG/DL
MICROALBUMIN UR-MCNC: 14.5 MG/L (ref 0–20)
MICROALBUMIN/CREAT 24H UR: 14 MG/G CREATININE (ref 0–30)

## 2022-01-12 PROCEDURE — 99214 OFFICE O/P EST MOD 30 MIN: CPT | Performed by: FAMILY MEDICINE

## 2022-01-12 PROCEDURE — 1160F RVW MEDS BY RX/DR IN RCRD: CPT | Performed by: FAMILY MEDICINE

## 2022-01-12 PROCEDURE — 82570 ASSAY OF URINE CREATININE: CPT

## 2022-01-12 PROCEDURE — 82043 UR ALBUMIN QUANTITATIVE: CPT

## 2022-01-12 PROCEDURE — 1036F TOBACCO NON-USER: CPT | Performed by: FAMILY MEDICINE
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
## 2022-01-21 DIAGNOSIS — G25.0 BENIGN FAMILIAL TREMOR: ICD-10-CM

## 2022-01-22 RX ORDER — ALPRAZOLAM 0.5 MG/1
0.5 TABLET ORAL 3 TIMES DAILY PRN
Qty: 90 TABLET | Refills: 0 | Status: SHIPPED | OUTPATIENT
Start: 2022-01-22 | End: 2022-02-24 | Stop reason: SDUPTHER

## 2022-02-24 DIAGNOSIS — J96.21 ACUTE ON CHRONIC RESPIRATORY FAILURE WITH HYPOXIA AND HYPERCAPNIA (HCC): ICD-10-CM

## 2022-02-24 DIAGNOSIS — G25.0 BENIGN FAMILIAL TREMOR: ICD-10-CM

## 2022-02-24 DIAGNOSIS — J96.22 ACUTE ON CHRONIC RESPIRATORY FAILURE WITH HYPOXIA AND HYPERCAPNIA (HCC): ICD-10-CM

## 2022-02-24 RX ORDER — FUROSEMIDE 20 MG/1
20 TABLET ORAL DAILY
Qty: 90 TABLET | Refills: 1 | Status: SHIPPED | OUTPATIENT
Start: 2022-02-24 | End: 2022-02-28 | Stop reason: SDUPTHER

## 2022-02-24 RX ORDER — ALPRAZOLAM 0.5 MG/1
0.5 TABLET ORAL 3 TIMES DAILY PRN
Qty: 90 TABLET | Refills: 0 | Status: SHIPPED | OUTPATIENT
Start: 2022-02-24 | End: 2022-04-04 | Stop reason: SDUPTHER

## 2022-02-28 DIAGNOSIS — J96.21 ACUTE ON CHRONIC RESPIRATORY FAILURE WITH HYPOXIA AND HYPERCAPNIA (HCC): ICD-10-CM

## 2022-02-28 DIAGNOSIS — K21.9 GASTROESOPHAGEAL REFLUX DISEASE WITHOUT ESOPHAGITIS: ICD-10-CM

## 2022-02-28 DIAGNOSIS — J96.22 ACUTE ON CHRONIC RESPIRATORY FAILURE WITH HYPOXIA AND HYPERCAPNIA (HCC): ICD-10-CM

## 2022-02-28 DIAGNOSIS — I73.9 PAD (PERIPHERAL ARTERY DISEASE) (HCC): ICD-10-CM

## 2022-03-01 DIAGNOSIS — I65.29 STENOSIS OF CAROTID ARTERY, UNSPECIFIED LATERALITY: ICD-10-CM

## 2022-03-01 DIAGNOSIS — J44.9 CHRONIC OBSTRUCTIVE PULMONARY DISEASE, UNSPECIFIED COPD TYPE (HCC): ICD-10-CM

## 2022-03-01 RX ORDER — CLOPIDOGREL BISULFATE 75 MG/1
TABLET ORAL
Qty: 90 TABLET | Refills: 1 | Status: SHIPPED | OUTPATIENT
Start: 2022-03-01 | End: 2022-04-12

## 2022-03-01 RX ORDER — FUROSEMIDE 20 MG/1
20 TABLET ORAL DAILY
Qty: 90 TABLET | Refills: 1 | Status: SHIPPED | OUTPATIENT
Start: 2022-03-01

## 2022-03-01 RX ORDER — CLOPIDOGREL BISULFATE 75 MG/1
75 TABLET ORAL DAILY
Qty: 90 TABLET | Refills: 1 | Status: SHIPPED | OUTPATIENT
Start: 2022-03-01 | End: 2022-06-06 | Stop reason: SDUPTHER

## 2022-03-01 RX ORDER — AMLODIPINE BESYLATE 5 MG/1
TABLET ORAL
Qty: 90 TABLET | Refills: 3 | Status: SHIPPED | OUTPATIENT
Start: 2022-03-01

## 2022-03-01 RX ORDER — FAMOTIDINE 20 MG/1
TABLET, FILM COATED ORAL
Qty: 180 TABLET | Refills: 1 | Status: SHIPPED | OUTPATIENT
Start: 2022-03-01

## 2022-03-02 RX ORDER — TIOTROPIUM BROMIDE AND OLODATEROL 3.124; 2.736 UG/1; UG/1
SPRAY, METERED RESPIRATORY (INHALATION)
Qty: 4 G | Refills: 3 | Status: SHIPPED | OUTPATIENT
Start: 2022-03-02 | End: 2022-07-18

## 2022-04-04 DIAGNOSIS — G25.0 BENIGN FAMILIAL TREMOR: ICD-10-CM

## 2022-04-04 DIAGNOSIS — E03.9 ACQUIRED HYPOTHYROIDISM: ICD-10-CM

## 2022-04-04 RX ORDER — ALPRAZOLAM 0.5 MG/1
0.5 TABLET ORAL 3 TIMES DAILY PRN
Qty: 90 TABLET | Refills: 0 | Status: SHIPPED | OUTPATIENT
Start: 2022-04-04 | End: 2022-05-04 | Stop reason: SDUPTHER

## 2022-04-04 RX ORDER — LEVOTHYROXINE SODIUM 0.07 MG/1
75 TABLET ORAL EVERY MORNING
Qty: 90 TABLET | Refills: 1 | Status: SHIPPED | OUTPATIENT
Start: 2022-04-04

## 2022-04-05 ENCOUNTER — RA CDI HCC (OUTPATIENT)
Dept: OTHER | Facility: HOSPITAL | Age: 87
End: 2022-04-05

## 2022-04-05 NOTE — PROGRESS NOTES
[facility] 75  coding opportunities          Chart Reviewed number of suggestions sent to Provider: 1     Patients Insurance     Medicare Insurance: [email or web address]          [id number]

## 2022-04-06 DIAGNOSIS — I10 ESSENTIAL HYPERTENSION: ICD-10-CM

## 2022-04-12 ENCOUNTER — OFFICE VISIT (OUTPATIENT)
Dept: FAMILY MEDICINE CLINIC | Facility: CLINIC | Age: 87
End: 2022-04-12
Payer: COMMERCIAL

## 2022-04-12 VITALS
WEIGHT: 189.6 LBS | OXYGEN SATURATION: 90 % | BODY MASS INDEX: 32.37 KG/M2 | TEMPERATURE: 97.1 F | HEART RATE: 70 BPM | HEIGHT: 64 IN | RESPIRATION RATE: 16 BRPM

## 2022-04-12 DIAGNOSIS — I10 PRIMARY HYPERTENSION: ICD-10-CM

## 2022-04-12 DIAGNOSIS — Z00.00 MEDICARE ANNUAL WELLNESS VISIT, SUBSEQUENT: Primary | ICD-10-CM

## 2022-04-12 DIAGNOSIS — E03.9 ACQUIRED HYPOTHYROIDISM: ICD-10-CM

## 2022-04-12 DIAGNOSIS — E78.01 FAMILIAL HYPERCHOLESTEROLEMIA: ICD-10-CM

## 2022-04-12 DIAGNOSIS — E11.9 TYPE 2 DIABETES MELLITUS WITHOUT COMPLICATION, WITHOUT LONG-TERM CURRENT USE OF INSULIN (HCC): ICD-10-CM

## 2022-04-12 LAB — SL AMB POCT HEMOGLOBIN AIC: 6.1 (ref ?–6.5)

## 2022-04-12 PROCEDURE — 3288F FALL RISK ASSESSMENT DOCD: CPT | Performed by: FAMILY MEDICINE

## 2022-04-12 PROCEDURE — 83036 HEMOGLOBIN GLYCOSYLATED A1C: CPT | Performed by: FAMILY MEDICINE

## 2022-04-12 PROCEDURE — G0439 PPPS, SUBSEQ VISIT: HCPCS | Performed by: FAMILY MEDICINE

## 2022-04-12 PROCEDURE — 1170F FXNL STATUS ASSESSED: CPT | Performed by: FAMILY MEDICINE

## 2022-04-12 PROCEDURE — 1125F AMNT PAIN NOTED PAIN PRSNT: CPT | Performed by: FAMILY MEDICINE

## 2022-04-12 PROCEDURE — 3725F SCREEN DEPRESSION PERFORMED: CPT | Performed by: FAMILY MEDICINE

## 2022-04-12 NOTE — PROGRESS NOTES
Assessment and Plan:   Jeffrey Corley was seen today for medicare wellness visit and ear fullness  Diagnoses and all orders for this visit:    Medicare annual wellness visit, subsequent    Type 2 diabetes mellitus without complication, without long-term current use of insulin ([facility] )  -     POCT hemoglobin A1c  -     Hemoglobin A1C; Future  -     Microalbumin / creatinine urine ratio; Future    Acquired hypothyroidism  -     T3, free; Future  -     T4, free; Future  -     TSH, 3rd generation; Future    Primary hypertension  -     Comprehensive metabolic panel; Future    Familial hypercholesterolemia  -     Lipid Panel with Direct LDL reflex; Future      Problem List Items Addressed This Visit        Endocrine    Hypothyroidism    Relevant Orders    T3, free    T4, free    TSH, 3rd generation    Type 2 diabetes mellitus ([facility] )    Relevant Orders    POCT hemoglobin A1c (Completed)    Hemoglobin A1C    Microalbumin / creatinine urine ratio       Cardiovascular and Mediastinum    Hypertension    Relevant Orders    Comprehensive metabolic panel       Other    Hyperlipidemia    Relevant Orders    Lipid Panel with Direct LDL reflex      Other Visit Diagnoses     Medicare annual wellness visit, subsequent    -  Primary        BMI Counseling: Body mass index is 32 54 kg/m²  The BMI is above normal  Nutrition recommendations include decreasing portion sizes, encouraging healthy choices of fruits and vegetables, decreasing fast food intake, consuming healthier snacks, limiting drinks that contain sugar, moderation in carbohydrate intake, increasing intake of lean protein, reducing intake of saturated and trans fat and reducing intake of cholesterol  Exercise recommendations include exercising 3-5 times per week  Rationale for BMI follow-up plan is due to patient being overweight or obese         Preventive health issues were discussed with patient, and age appropriate screening tests were ordered as noted in patient's After Visit Summary  Personalized health advice and appropriate referrals for health education or preventive services given if needed, as noted in patient's After Visit Summary  History of Present Illness:     Patient presents for Medicare Annual Wellness visit  Review of Systems - History obtained from chart review and the patient  ENT ROS: negative  Respiratory ROS: positive for - mild exertional, is able to walk up a flight of steps every other day for her bath  Cardiovascular ROS: negative for - chest pain or edema  Gastrointestinal ROS: no abdominal pain, change in bowel habits, or black or bloody stools  Genito-Urinary ROS:  No incontinence  Musculoskeletal ROS: positive for - gait disturbance and joint stiffness    HGA1C= 6 1%    Lots of changes--- moved out of house August --- for about 3 months patient was noncompliant with his sleep apnea equipment as she was not sleeping in a bedroom as of yet    Apparently the sleep apnea company is wanting the machine back or either requesting a $1100 payment or retest  Patient Care Team:  Sergio Saravia DO as PCP - General (Family Medicine)  Sergio Saravia DO as PCP - [facility]  Gerome Cockayne, MD Terisa Ochs, MD Kelley Vega DO Rand Gulling, MD Digna Merino, DO Nathan Carr MD as Endoscopist     Problem List:     Patient Active Problem List   Diagnosis    COPD [facility]chronic obstructive pulmonary disease) [facility] )    Benign familial tremor    Hypertension    Hypothyroidism    Hx of arterial ischemic stroke    Acute on chronic diastolic congestive heart failure ([facility] )    Clinical depression    Esophageal reflux    Female stress incontinence    Generalized anxiety disorder- panic    Generalized osteoarthritis    Glaucoma    Hyperlipidemia    Insomnia    JAMEEL (obstructive sleep apnea)    Osteoarthritis of knee    Postural imbalance    S/P CABG (coronary artery bypass graft)    History of total knee replacement    Type 2 diabetes mellitus (HCC)    Pleural effusion    Acute on chronic respiratory failure with hypoxia and hypercapnia (HCC)      Past Medical and Surgical History:     Past Medical History:   Diagnosis Date    Anxiety     Cataract, bilateral     Last Assessed:3/19/2014    COPD [facility]chronic obstructive pulmonary disease) [facility] )     Coronary artery disease     Hx of arterial ischemic stroke     Hypertension     [facility]     Wears glasses      Past Surgical History:   Procedure Laterality Date    CAROTID ENDARTERECTOMY Right     CATARACT EXTRACTION W/ INTRAOCULAR LENS  IMPLANT, BILATERAL      COLONOSCOPY N/A 9/30/2017    Procedure: COLONOSCOPY FOR CONTROL OF BLEEDING;  Surgeon: Pancho Ferrell MD;  [facility]: [facility];  Service: Colorectal    CORONARY ARTERY BYPASS GRAFT      CABG X3 with LIMA to LAD,SVG to OM,SVG to distal  RCA ; Last Assessed:7/13/2015    JOINT REPLACEMENT      left TKR    KIDNEY STONE SURGERY      NEPHROSTOMY Left     with Drainage Irrigation ; [id number]    SC TOTAL KNEE ARTHROPLASTY Right 2/25/2016    Procedure: ARTHROPLASTY KNEE TOTAL;  Surgeon: Diane Ceron MD;  Location: [facility];  Service: Orthopedics      Family History:     Family History   Problem Relation Age of Onset    Cancer Mother         malignant neoplasm    Heart attack Father     Aneurysm Other         Aortic    Cancer Other         malignant neoplasm      Social History:     Social History     Socioeconomic History    Marital status:       Spouse name: Not on file    Number of children: Not on file    Years of education: 8    Highest education level: Not on file   Occupational History    Not on file   Tobacco Use    Smoking status: Never Smoker    Smokeless tobacco: Never Used   Vaping Use    Vaping Use: Never used   Substance and Sexual Activity    Alcohol use: Never    Drug use: Never    Sexual activity: Not Currently   Other Topics Concern    Not on file   Social History Narrative    ** Merged History Encounter **         No caffeine use     Social Determinants of Health     Financial Resource Strain: Not on file   Food Insecurity: Not on file   Transportation Needs: Not on file   Physical Activity: Not on file   Stress: Not on file   Social Connections: Not on file   Intimate Partner Violence: Not on file   Housing Stability: Not on file      Medications and Allergies:     Current Outpatient Medications   Medication Sig Dispense Refill    ALPRAZolam (XANAX) 0 5 mg tablet Take 1 tablet (0 5 mg total) by mouth 3 (three) times a day as needed (TREMOR) 90 tablet 0    amLODIPine (NORVASC) 5 mg tablet TAKE 1 TABLET BY MOUTH EVERY DAY 90 tablet 3    aspirin (ECOTRIN LOW STRENGTH) 81 mg EC tablet Take 81 mg by mouth daily      bimatoprost (LUMIGAN) 0 01 % ophthalmic drops Apply 1 drop to eye Daily      Cholecalciferol (VITAMIN D) 2000 UNITS tablet Take 1,000 Units by mouth daily       clopidogrel (PLAVIX) 75 mg tablet Take 1 tablet (75 mg total) by mouth daily 90 tablet 1    DULoxetine (CYMBALTA) 60 mg delayed release capsule TAKE 1 CAPSULE BY MOUTH EVERY DAY IN THE MORNING 90 capsule 1    famotidine (PEPCID) 20 mg tablet TAKE 1 TABLET BY MOUTH TWICE A  tablet 1    furosemide (LASIX) 20 mg tablet Take 1 tablet (20 mg total) by mouth daily 90 tablet 1    levothyroxine 75 mcg tablet Take 1 tablet (75 mcg total) by mouth every morning 90 tablet 1    metoprolol tartrate (LOPRESSOR) 25 mg tablet TAKE 1 TABLET (25 MG TOTAL) BY MOUTH EVERY 12 (TWELVE) HOURS 180 tablet 3    mupirocin (BACTROBAN) 2 % ointment Apply topically 3 (three) times a day 30 g 0    Pediatric Multiple Vit-C-FA (PEDIATRIC MULTIVITAMIN) chewable tablet Chew 1 tablet daily      Potassium Gluconate 595 (99 K) MG TABS Take by mouth      Stiolto Respimat 2 5-2 5 MCG/ACT inhaler INHALE 2 PUFFS BY MOUTH EVERY DAY 4 g 3    rosuvastatin (CRESTOR) 10 MG tablet Take 1 tablet (10 mg total) by mouth daily 90 tablet 3     No current facility-administered medications for this visit  Allergies   Allergen Reactions    Penicillins Itching    Penicillins Rash      Immunizations:     Immunization History   Administered Date(s) Administered    COVID-19 PFIZER VACCINE 0 3 ML IM 03/25/2021, 04/15/2021    INFLUENZA 11/09/2016, 10/10/2017    Influenza Split High Dose Preservative Free IM 10/15/2012, 10/15/2013, 10/15/2014, 10/28/2015, 11/09/2016, 10/10/2017    Influenza, high dose seasonal 0 7 mL 10/16/2018, 10/15/2019, 10/15/2020, 10/01/2021    Influenza, seasonal, injectable 12/06/2011    Pneumococcal Conjugate 13-Valent 11/09/2016    Pneumococcal Polysaccharide PPV23 06/25/2015    Zoster 05/01/2017      Health Maintenance: There are no preventive care reminders to display for this patient  Topic Date Due    DTaP,Tdap,and Td Vaccines (1 - Tdap) Never done    COVID-19 Vaccine (3 - Booster for Pfizer series) 09/15/2021      Medicare Health Risk Assessment:     Pulse 70   Temp (!) 97 1 °F (36 2 °C) (Temporal)   Resp 16   Ht 5' 4" (1 626 m)   Wt 86 kg (189 lb 9 6 oz)   SpO2 90%   BMI 32 54 kg/m²      Margarito Farr is here for her Subsequent Wellness visit  Health Risk Assessment:   Patient rates overall health as good  Patient feels that their physical health rating is slightly better  Patient is satisfied with their life  Eyesight was rated as slightly worse  Hearing was rated as much worse  Patient feels that their emotional and mental health rating is same  Patients states they are never, rarely angry  Patient states they are sometimes unusually tired/fatigued  Pain experienced in the last 7 days has been none  Patient states that she has experienced no weight loss or gain in last 6 months  Depression Screening:   PHQ-9 Score: 0      Fall Risk Screening:    In the past year, patient has experienced: no history of falling in past year      Urinary Incontinence Screening:   Patient has leaked urine accidently in the last six months  Home Safety:  Patient does not have trouble with stairs inside or outside of their home  Patient has working smoke alarms and has working carbon monoxide detector  Home safety hazards include: none  Nutrition:   Current diet is Regular  Medications:   Patient is currently taking over-the-counter supplements  OTC medications include: see medication list  Patient is able to manage medications  Activities of Daily Living (ADLs)/Instrumental Activities of Daily Living (IADLs):   Walk and transfer into and out of bed and chair?: Yes  Dress and groom yourself?: Yes    Bathe or shower yourself?: Yes    Feed yourself?  Yes  Do your laundry/housekeeping?: Yes  Manage your money, pay your bills and track your expenses?: Yes  Make your own meals?: No    Do your own shopping?: No    Previous Hospitalizations:   Any hospitalizations or ED visits within the last 12 months?: No      Advance Care Planning:   Living will: No    Durable POA for healthcare: No    Advanced directive: No    Advanced directive counseling given: Yes    End of Life Decisions reviewed with patient: Yes      Cognitive Screening:   Provider or family/friend/caregiver concerned regarding cognition?: No    PREVENTIVE SCREENINGS      Cardiovascular Screening:    General: History Lipid Disorder and Screening Current      Diabetes Screening:     General: History Diabetes and Screening Current      Colorectal Cancer Screening:     General: Screening Not Indicated      Breast Cancer Screening:     General: Patient Declines and Screening Not Indicated      Cervical Cancer Screening:    General: Screening Not Indicated      Osteoporosis Screening:    General: Patient Declines      Lung Cancer Screening:     General: Screening Not Indicated      Hepatitis C Screening:    General: Screening Not Indicated    Hep C Screening Accepted: No     Screening, Brief Intervention, and Referral to Treatment (SBIRT)    Screening  Typical number of drinks in a day: 0  Typical number of drinks in a week: 0  Interpretation: Low risk drinking behavior  Single Item Drug Screening:  How often have you used an illegal drug (including marijuana) or a prescription medication for non-medical reasons in the past year? never    Single Item Drug Screen Score: 0  Interpretation: Negative screen for possible drug use disorder    Physical Exam  Vitals and nursing note reviewed  Constitutional:       General: She is not in acute distress  Appearance: Normal appearance  She is well-developed  Comments: 80-year-old female in no acute distress with elevated BMI 32%   HENT:      Head: Normocephalic and atraumatic  Right Ear: Tympanic membrane normal       Left Ear: Tympanic membrane normal       Nose: Nose normal    Eyes:      Conjunctiva/sclera: Conjunctivae normal    Neck:      Vascular: No carotid bruit  Cardiovascular:      Rate and Rhythm: Normal rate and regular rhythm  Heart sounds: No murmur heard  Pulmonary:      Effort: Pulmonary effort is normal  No respiratory distress  Breath sounds: Normal breath sounds  Abdominal:      Palpations: Abdomen is soft  Tenderness: There is no abdominal tenderness  Musculoskeletal:      Cervical back: Neck supple  Skin:     General: Skin is warm and dry  Comments: Chronic tremor facial noted   Neurological:      Mental Status: She is alert and oriented to person, place, and time  Psychiatric:         Mood and Affect: Mood normal          Behavior: Behavior normal          Thought Content:  Thought content normal          Judgment: Judgment normal        Rashad Perales DO

## 2022-04-12 NOTE — PATIENT INSTRUCTIONS
Medicare Preventive Visit Patient Instructions  Thank you for completing your Welcome to Medicare Visit or Medicare Annual Wellness Visit today  Your next wellness visit will be due in one year (4/13/2023)  The screening/preventive services that you may require over the next 5-10 years are detailed below  Some tests may not apply to you based off risk factors and/or age  Screening tests ordered at today's visit but not completed yet may show as past due  Also, please note that scanned in results may not display below  Preventive Screenings:  Service Recommendations Previous Testing/Comments   Colorectal Cancer Screening  * Colonoscopy    * Fecal Occult Blood Test (FOBT)/Fecal Immunochemical Test (FIT)  * Fecal DNA/Cologuard Test  * Flexible Sigmoidoscopy Age: 54-65 years old   Colonoscopy: every 10 years (may be performed more frequently if at higher risk)  OR  FOBT/FIT: every 1 year  OR  Cologuard: every 3 years  OR  Sigmoidoscopy: every 5 years  Screening may be recommended earlier than age 48 if at higher risk for colorectal cancer  Also, an individualized decision between you and your healthcare provider will decide whether screening between the ages of 74-80 would be appropriate  Colonoscopy: Not on file  FOBT/FIT: Not on file  Cologuard: Not on file  Sigmoidoscopy: Not on file    Screening Not Indicated     Breast Cancer Screening Age: 36 years old  Frequency: every 1-2 years  Not required if history of left and right mastectomy Mammogram: Not on file        Cervical Cancer Screening Between the ages of 21-29, pap smear recommended once every 3 years  Between the ages of 33-67, can perform pap smear with HPV co-testing every 5 years     Recommendations may differ for women with a history of total hysterectomy, cervical cancer, or abnormal pap smears in past  Pap Smear: Not on file    Screening Not Indicated   Hepatitis C Screening Once for adults born between 1945 and 1965  More frequently in patients at high risk for Hepatitis C Hep C Antibody: Not on file        Diabetes Screening 1-2 times per year if you're at risk for diabetes or have pre-diabetes Fasting glucose: 96 mg/dL   A1C: 6 4 %    Screening Not Indicated  History Diabetes   Cholesterol Screening Once every 5 years if you don't have a lipid disorder  May order more often based on risk factors  Lipid panel: 10/18/2021    Screening Not Indicated  History Lipid Disorder     Other Preventive Screenings Covered by Medicare:  1  Abdominal Aortic Aneurysm (AAA) Screening: covered once if your at risk  You're considered to be at risk if you have a family history of AAA  2  Lung Cancer Screening: covers low dose CT scan once per year if you meet all of the following conditions: (1) Age 50-69; (2) No signs or symptoms of lung cancer; (3) Current smoker or have quit smoking within the last 15 years; (4) You have a tobacco smoking history of at least 30 pack years (packs per day multiplied by number of years you smoked); (5) You get a written order from a healthcare provider  3  Glaucoma Screening: covered annually if you're considered high risk: (1) You have diabetes OR (2) Family history of glaucoma OR (3)  aged 48 and older OR (3)  American aged 72 and older  3  Osteoporosis Screening: covered every 2 years if you meet one of the following conditions: (1) You're estrogen deficient and at risk for osteoporosis based off medical history and other findings; (2) Have a vertebral abnormality; (3) On glucocorticoid therapy for more than 3 months; (4) Have primary hyperparathyroidism; (5) On osteoporosis medications and need to assess response to drug therapy  · Last bone density test (DXA Scan): Not on file  5  HIV Screening: covered annually if you're between the age of 12-76  Also covered annually if you are younger than 13 and older than 72 with risk factors for HIV infection   For pregnant patients, it is covered up to 3 times per pregnancy  Immunizations:  Immunization Recommendations   Influenza Vaccine Annual influenza vaccination during flu season is recommended for all persons aged >= 6 months who do not have contraindications   Pneumococcal Vaccine (Prevnar and Pneumovax)  * Prevnar = PCV13  * Pneumovax = PPSV23   Adults 25-60 years old: 1-3 doses may be recommended based on certain risk factors  Adults 72 years old: Prevnar (PCV13) vaccine recommended followed by Pneumovax (PPSV23) vaccine  If already received PPSV23 since turning 65, then PCV13 recommended at least one year after PPSV23 dose  Hepatitis B Vaccine 3 dose series if at intermediate or high risk (ex: diabetes, end stage renal disease, liver disease)   Tetanus (Td) Vaccine - COST NOT COVERED BY MEDICARE PART B Following completion of primary series, a booster dose should be given every 10 years to maintain immunity against tetanus  Td may also be given as tetanus wound prophylaxis  Tdap Vaccine - COST NOT COVERED BY MEDICARE PART B Recommended at least once for all adults  For pregnant patients, recommended with each pregnancy  Shingles Vaccine (Shingrix) - COST NOT COVERED BY MEDICARE PART B  2 shot series recommended in those aged 48 and above     Health Maintenance Due:  There are no preventive care reminders to display for this patient  Immunizations Due:      Topic Date Due    DTaP,Tdap,and Td Vaccines (1 - Tdap) Never done    COVID-19 Vaccine (3 - Booster for Pfizer series) 09/15/2021     Advance Directives   What are advance directives? Advance directives are legal documents that state your wishes and plans for medical care  These plans are made ahead of time in case you lose your ability to make decisions for yourself  Advance directives can apply to any medical decision, such as the treatments you want, and if you want to donate organs  What are the types of advance directives?   There are many types of advance directives, and each state has rules about how to use them  You may choose a combination of any of the following:  · Living will: This is a written record of the treatment you want  You can also choose which treatments you do not want, which to limit, and which to stop at a certain time  This includes surgery, medicine, IV fluid, and tube feedings  · Durable power of  for healthcare [facility]): This is a written record that states who you want to make healthcare choices for you when you are unable to make them for yourself  This person, called a proxy, is usually a family member or a friend  You may choose more than 1 proxy  · Do not resuscitate (DNR) order:  A DNR order is used in case your heart stops beating or you stop breathing  It is a request not to have certain forms of treatment, such as CPR  A DNR order may be included in other types of advance directives  · Medical directive: This covers the care that you want if you are in a coma, near death, or unable to make decisions for yourself  You can list the treatments you want for each condition  Treatment may include pain medicine, surgery, blood transfusions, dialysis, IV or tube feedings, and a ventilator (breathing machine)  · Values history: This document has questions about your views, beliefs, and how you feel and think about life  This information can help others choose the care that you would choose  Why are advance directives important? An advance directive helps you control your care  Although spoken wishes may be used, it is better to have your wishes written down  Spoken wishes can be misunderstood, or not followed  Treatments may be given even if you do not want them  An advance directive may make it easier for your family to make difficult choices about your care  Urinary Incontinence   Urinary incontinence (UI)  is when you lose control of your bladder  UI develops because your bladder cannot store or empty urine properly   The 3 most common types of UI are stress incontinence, urge incontinence, or both  Medicines:   · May be given to help strengthen your bladder control  Report any side effects of medication to your healthcare provider  Do pelvic muscle exercises often:  Your pelvic muscles help you stop urinating  Squeeze these muscles tight for 5 seconds, then relax for 5 seconds  Gradually work up to squeezing for 10 seconds  Do 3 sets of 15 repetitions a day, or as directed  This will help strengthen your pelvic muscles and improve bladder control  Train your bladder:  Go to the bathroom at set times, such as every 2 hours, even if you do not feel the urge to go  You can also try to hold your urine when you feel the urge to go  For example, hold your urine for 5 minutes when you feel the urge to go  As that becomes easier, hold your urine for 10 minutes  Self-care:   · Keep a UI record  Write down how often you leak urine and how much you leak  Make a note of what you were doing when you leaked urine  · Drink liquids as directed  You may need to limit the amount of liquid you drink to help control your urine leakage  Do not drink any liquid right before you go to bed  Limit or do not have drinks that contain caffeine or alcohol  · Prevent constipation  Eat a variety of high-fiber foods  Good examples are high-fiber cereals, beans, vegetables, and whole-grain breads  Walking is the best way to trigger your intestines to have a bowel movement  · Exercise regularly and maintain a healthy weight  Weight loss and exercise will decrease pressure on your bladder and help you control your leakage  · Use a catheter as directed  to help empty your bladder  A catheter is a tiny, plastic tube that is put into your bladder to drain your urine  · Go to behavior therapy as directed  Behavior therapy may be used to help you learn to control your urge to urinate      Weight Management   Why it is important to manage your weight:  Being overweight increases your risk of health conditions such as heart disease, high blood pressure, type 2 diabetes, and certain types of cancer  It can also increase your risk for osteoarthritis, sleep apnea, and other respiratory problems  Aim for a slow, steady weight loss  Even a small amount of weight loss can lower your risk of health problems  How to lose weight safely:  A safe and healthy way to lose weight is to eat fewer calories and get regular exercise  You can lose up about 1 pound a week by decreasing the number of calories you eat by 500 calories each day  Healthy meal plan for weight management:  A healthy meal plan includes a variety of foods, contains fewer calories, and helps you stay healthy  A healthy meal plan includes the following:  · Eat whole-grain foods more often  A healthy meal plan should contain fiber  Fiber is the part of grains, fruits, and vegetables that is not broken down by your body  Whole-grain foods are healthy and provide extra fiber in your diet  Some examples of whole-grain foods are whole-wheat breads and pastas, oatmeal, brown rice, and bulgur  · Eat a variety of vegetables every day  Include dark, leafy greens such as spinach, kale, noa greens, and mustard greens  Eat yellow and orange vegetables such as carrots, sweet potatoes, and winter squash  · Eat a variety of fruits every day  Choose fresh or canned fruit (canned in its own juice or light syrup) instead of juice  Fruit juice has very little or no fiber  · Eat low-fat dairy foods  Drink fat-free (skim) milk or 1% milk  Eat fat-free yogurt and low-fat cottage cheese  Try low-fat cheeses such as mozzarella and other reduced-fat cheeses  · Choose meat and other protein foods that are low in fat  Choose beans or other legumes such as split peas or lentils  Choose fish, skinless poultry (chicken or turkey), or lean cuts of red meat (beef or pork)  Before you cook meat or poultry, cut off any visible fat  · Use less fat and oil    Try baking foods instead of frying them  Add less fat, such as margarine, sour cream, regular salad dressing and mayonnaise to foods  Eat fewer high-fat foods  Some examples of high-fat foods include french fries, doughnuts, ice cream, and cakes  · Eat fewer sweets  Limit foods and drinks that are high in sugar  This includes candy, cookies, regular soda, and sweetened drinks  Exercise:  Exercise at least 30 minutes per day on most days of the week  Some examples of exercise include walking, biking, dancing, and swimming  You can also fit in more physical activity by taking the stairs instead of the elevator or parking farther away from stores  Ask your healthcare provider about the best exercise plan for you  © Copyright [employer] 2018 Information is for End User's use only and may not be sold, redistributed or otherwise used for commercial purposes   All illustrations and images included in CareNotes® are the copyrighted property of A D A M , Inc  or [address] [place] [address]

## 2022-04-13 DIAGNOSIS — I25.10 CORONARY ARTERY DISEASE INVOLVING NATIVE CORONARY ARTERY OF NATIVE HEART WITHOUT ANGINA PECTORIS: ICD-10-CM

## 2022-04-13 RX ORDER — ROSUVASTATIN CALCIUM 10 MG/1
10 TABLET, COATED ORAL DAILY
Qty: 90 TABLET | Refills: 3 | Status: SHIPPED | OUTPATIENT
Start: 2022-04-13

## 2022-04-13 NOTE — TELEPHONE ENCOUNTER
Patient called wanted to make you aware that she is taking Clopidogrel 75 mg     Patient also called requesting refill for Rosuvastatin 10 mg sent to [facility]

## 2022-04-21 ENCOUNTER — TELEPHONE (OUTPATIENT)
Dept: FAMILY MEDICINE CLINIC | Facility: CLINIC | Age: 87
End: 2022-04-21

## 2022-04-21 NOTE — TELEPHONE ENCOUNTER
Pt called the office she brought in a letter pertaining to her oxygen  Pt expressed someone form Dr Mojica's office was to call [facility]  Pt spoke to someone at [facility] she was told insurance will not cover her supplies    Please call [phone]

## 2022-04-27 ENCOUNTER — OFFICE VISIT (OUTPATIENT)
Dept: CARDIOLOGY CLINIC | Facility: CLINIC | Age: 87
End: 2022-04-27
Payer: COMMERCIAL

## 2022-04-27 VITALS
BODY MASS INDEX: 32.2 KG/M2 | HEIGHT: 64 IN | HEART RATE: 61 BPM | SYSTOLIC BLOOD PRESSURE: 130 MMHG | WEIGHT: 188.6 LBS | DIASTOLIC BLOOD PRESSURE: 58 MMHG

## 2022-04-27 DIAGNOSIS — I25.10 CORONARY ARTERY DISEASE INVOLVING NATIVE CORONARY ARTERY OF NATIVE HEART WITHOUT ANGINA PECTORIS: Primary | ICD-10-CM

## 2022-04-27 PROCEDURE — 99214 OFFICE O/P EST MOD 30 MIN: CPT | Performed by: INTERNAL MEDICINE

## 2022-04-27 PROCEDURE — 1160F RVW MEDS BY RX/DR IN RCRD: CPT | Performed by: INTERNAL MEDICINE

## 2022-04-27 PROCEDURE — 93000 ELECTROCARDIOGRAM COMPLETE: CPT | Performed by: INTERNAL MEDICINE

## 2022-04-27 PROCEDURE — 1036F TOBACCO NON-USER: CPT | Performed by: INTERNAL MEDICINE

## 2022-04-27 NOTE — PROGRESS NOTES
Cardiology             Bing Campuzano  1935  [id number]                   Assessment/Plan:     CAD status post CABG x3  PVCs  Benign essential hypertension  Dyslipidemia  Chronic right bundle branch block  Chronic diastolic CHF        No symptoms of angina, continue aspirin, clopidogrel which she is tolerating well  May consider discontinuation of clopidogrel in the future to reduce bleeding risk, considering her advancing age  Continue rosuvastatin  Euvolemic by examination, continue furosemide  Office weight 188 lb, stable  She admits to occasional lightheadedness  I have encouraged her son to check her blood pressure at home during these times and call me if her blood pressure is low        Follow-up in 6 months        Interval History:      This is a very pleasant 80-year-old female with a history of CVA 02/2015 with MRA neck revealing greater than 70% bilateral internal carotid artery stenosis with diminished flow in the right MCA   CTA revealed greater than 90% high-grade stenosis on the right and she thereafter underwent right CEA   She had a NSTEMI 06/2015 with cardiac catheterization revealing three-vessel CAD   Thereafter she underwent CABG x3 with LIMA to LAD, SVG to OM, SVG to RCA in 2015   She lives with her son      She was hospitalized with a fall 05/2019, and had a closed fracture of cervical vertebrae with non operative management      She was subsequently hospitalized 02/2021 with CHF  Her discharge weight was 204 lb      She presents today for follow-up with no complaints  She admits to occasional lightheadedness which she states has been going on for a number of years and has been stable  She thinks nervousness increases her lightheadedness and possibly her eye issues as well            Vitals:  Vitals:    04/27/22 1306   BP: 130/58   Pulse: 61   Weight: 85 5 kg (188 lb 9 6 oz)   Height: 5' 4" (1 626 m)         Past Medical History:   Diagnosis Date    Anxiety     Cataract, bilateral     Last Assessed:3/19/2014    COPD (chronic obstructive pulmonary disease) (HCC)     Coronary artery disease     Hx of arterial ischemic stroke     Hypertension     Stroke [facility])     Wears glasses      Social History     Socioeconomic History    Marital status:      Spouse name: Not on file    Number of children: Not on file    Years of education: 8    Highest education level: Not on file   Occupational History    Not on file   Tobacco Use    Smoking status: Never Smoker    Smokeless tobacco: Never Used   Vaping Use    Vaping Use: Never used   Substance and Sexual Activity    Alcohol use: Never    Drug use: Never    Sexual activity: Not Currently   Other Topics Concern    Not on file   Social History Narrative    ** Merged History Encounter **         No caffeine use     Social Determinants of Health     Financial Resource Strain: Not on file   Food Insecurity: Not on file   Transportation Needs: Not on file   Physical Activity: Not on file   Stress: Not on file   Social Connections: Not on file   Intimate Partner Violence: Not on file   Housing Stability: Not on file      Family History   Problem Relation Age of Onset    Cancer Mother         malignant neoplasm    Heart attack Father     Aneurysm Other         Aortic    Cancer Other         malignant neoplasm     Past Surgical History:   Procedure Laterality Date    CAROTID ENDARTERECTOMY Right     CATARACT EXTRACTION W/ INTRAOCULAR LENS  IMPLANT, BILATERAL      COLONOSCOPY N/A 9/30/2017    Procedure: COLONOSCOPY FOR CONTROL OF BLEEDING;  Surgeon: Reyna Durán MD;  Location: [facility];  Service: Colorectal    CORONARY ARTERY BYPASS GRAFT      CABG X3 with LIMA to LAD,SVG to OM,SVG to distal  RCA ; Last Assessed:7/13/2015    JOINT REPLACEMENT      left TKR    KIDNEY STONE SURGERY      NEPHROSTOMY Left     with Drainage Irrigation ; [id number]    NV TOTAL KNEE ARTHROPLASTY Right 2/25/2016    Procedure: ARTHROPLASTY KNEE TOTAL;  Surgeon: Diane Ceron MD;  Location: [facility];  Service: Orthopedics       Current Outpatient Medications:     ALPRAZolam (XANAX) 0 5 mg tablet, Take 1 tablet (0 5 mg total) by mouth 3 (three) times a day as needed (TREMOR), Disp: 90 tablet, Rfl: 0    amLODIPine (NORVASC) 5 mg tablet, TAKE 1 TABLET BY MOUTH EVERY DAY, Disp: 90 tablet, Rfl: 3    aspirin (ECOTRIN LOW STRENGTH) 81 mg EC tablet, Take 81 mg by mouth daily, Disp: , Rfl:     bimatoprost (LUMIGAN) 0 01 % ophthalmic drops, Apply 1 drop to eye Daily, Disp: , Rfl:     Cholecalciferol (VITAMIN D) 2000 UNITS tablet, Take 1,000 Units by mouth daily , Disp: , Rfl:     clopidogrel (PLAVIX) 75 mg tablet, Take 1 tablet (75 mg total) by mouth daily, Disp: 90 tablet, Rfl: 1    DULoxetine (CYMBALTA) 60 mg delayed release capsule, TAKE 1 CAPSULE BY MOUTH EVERY DAY IN THE MORNING, Disp: 90 capsule, Rfl: 1    famotidine (PEPCID) 20 mg tablet, TAKE 1 TABLET BY MOUTH TWICE A DAY, Disp: 180 tablet, Rfl: 1    furosemide (LASIX) 20 mg tablet, Take 1 tablet (20 mg total) by mouth daily, Disp: 90 tablet, Rfl: 1    levothyroxine 75 mcg tablet, Take 1 tablet (75 mcg total) by mouth every morning, Disp: 90 tablet, Rfl: 1    metoprolol tartrate (LOPRESSOR) 25 mg tablet, TAKE 1 TABLET (25 MG TOTAL) BY MOUTH EVERY 12 (TWELVE) HOURS, Disp: 180 tablet, Rfl: 3    mupirocin (BACTROBAN) 2 % ointment, Apply topically 3 (three) times a day, Disp: 30 g, Rfl: 0    Pediatric Multiple Vit-C-FA (PEDIATRIC MULTIVITAMIN) chewable tablet, Chew 1 tablet daily, Disp: , Rfl:     Potassium Gluconate 595 (99 K) MG TABS, Take by mouth, Disp: , Rfl:     rosuvastatin (CRESTOR) 10 MG tablet, Take 1 tablet (10 mg total) by mouth daily, Disp: 90 tablet, Rfl: 3    Stiolto Respimat 2 5-2 5 MCG/ACT inhaler, INHALE 2 PUFFS BY MOUTH EVERY DAY, Disp: 4 g, Rfl: 3        Review of Systems:  Review of Systems   Constitutional: Negative for activity change, fever and unexpected weight change  HENT: Negative for facial swelling, nosebleeds and voice change  Respiratory: Negative for chest tightness, shortness of breath and wheezing  Cardiovascular: Negative for chest pain, palpitations and leg swelling  Gastrointestinal: Negative for abdominal distention  Genitourinary: Negative for hematuria  Musculoskeletal: Negative for arthralgias  Skin: Negative for color change, pallor, rash and wound  Neurological: Negative for dizziness, seizures and syncope  Psychiatric/Behavioral: Negative for agitation  Physical Exam:  Physical Exam  Vitals reviewed  Constitutional:       Appearance: She is well-developed  HENT:      Head: Normocephalic and atraumatic  Cardiovascular:      Rate and Rhythm: Normal rate and regular rhythm  Heart sounds: Normal heart sounds  Pulmonary:      Effort: Pulmonary effort is normal       Breath sounds: Normal breath sounds  Abdominal:      Palpations: Abdomen is soft  Musculoskeletal:         General: Normal range of motion  Cervical back: Normal range of motion and neck supple  Skin:     General: Skin is warm and dry  Neurological:      Mental Status: She is alert and oriented to person, place, and time  Psychiatric:         Behavior: Behavior normal          Thought Content: Thought content normal          Judgment: Judgment normal          This note was completed in part utilizing M-Modal Fluency Direct Software  Grammatical errors, random word insertions, spelling mistakes, and incomplete sentences can be an occasional consequence of this system secondary to software limitations, ambient noise, and hardware issues  If you have any questions or concerns about the content, text, or information contained within the body of this dictation, please contact the provider for clarification

## 2022-04-29 NOTE — TELEPHONE ENCOUNTER
It is not a matter of paperwork it is a falling at that find it what happened with the home medical equipment insurance coverage? The phone number to discuss is [phone]    Apparently they insist that the patient was not using her equipment but they do not realize that she transitioned from 1 home to the other end was not in a bedroom for several weeks  She was unable to use the machine time until she able to get set in an upstairs bedroom    So basically things need to be rectified with the account

## 2022-05-02 ENCOUNTER — TELEPHONE (OUTPATIENT)
Dept: FAMILY MEDICINE CLINIC | Facility: CLINIC | Age: 87
End: 2022-05-02

## 2022-05-02 NOTE — TELEPHONE ENCOUNTER
I called and informed the representative that I was calling to confirm what happened with the home medical equipment insurance coverage? apparently c pap machine she is apparently not complaint and didn't meet the compliance usage  I explained what had happened that pt transferee from one home to another was not able to use machine until now and we need to re certificate  account  Pt would need to return the equipment first then coordinate with provider to re qualify   Insurance will  not cover ,due to she didn't meet the compliance  usage of 70 %  4 hours initial 90 days , but they do have usage of consistent usage  Insurance requires use daily at least 4 hours   They said we should have been faxed paperwork for appeal

## 2022-05-04 DIAGNOSIS — G25.0 BENIGN FAMILIAL TREMOR: ICD-10-CM

## 2022-05-04 RX ORDER — ALPRAZOLAM 0.5 MG/1
0.5 TABLET ORAL 3 TIMES DAILY PRN
Qty: 90 TABLET | Refills: 0 | Status: SHIPPED | OUTPATIENT
Start: 2022-05-04 | End: 2022-06-06 | Stop reason: SDUPTHER

## 2022-06-06 DIAGNOSIS — I73.9 PAD (PERIPHERAL ARTERY DISEASE) (HCC): ICD-10-CM

## 2022-06-06 DIAGNOSIS — G25.0 BENIGN FAMILIAL TREMOR: ICD-10-CM

## 2022-06-06 RX ORDER — ALPRAZOLAM 0.5 MG/1
0.5 TABLET ORAL 3 TIMES DAILY PRN
Qty: 90 TABLET | Refills: 0 | Status: SHIPPED | OUTPATIENT
Start: 2022-06-06 | End: 2022-07-08 | Stop reason: SDUPTHER

## 2022-06-06 RX ORDER — CLOPIDOGREL BISULFATE 75 MG/1
75 TABLET ORAL DAILY
Qty: 90 TABLET | Refills: 1 | Status: SHIPPED | OUTPATIENT
Start: 2022-06-06

## 2022-06-19 DIAGNOSIS — M15.9 GENERALIZED OSTEOARTHRITIS: ICD-10-CM

## 2022-06-20 RX ORDER — DULOXETIN HYDROCHLORIDE 60 MG/1
CAPSULE, DELAYED RELEASE ORAL
Qty: 90 CAPSULE | Refills: 1 | Status: SHIPPED | OUTPATIENT
Start: 2022-06-20

## 2022-07-08 DIAGNOSIS — G25.0 BENIGN FAMILIAL TREMOR: ICD-10-CM

## 2022-07-09 RX ORDER — ALPRAZOLAM 0.5 MG/1
0.5 TABLET ORAL 3 TIMES DAILY PRN
Qty: 90 TABLET | Refills: 0 | Status: SHIPPED | OUTPATIENT
Start: 2022-07-09 | End: 2022-08-11 | Stop reason: SDUPTHER

## 2022-07-18 DIAGNOSIS — J44.9 CHRONIC OBSTRUCTIVE PULMONARY DISEASE, UNSPECIFIED COPD TYPE (HCC): ICD-10-CM

## 2022-07-18 RX ORDER — TIOTROPIUM BROMIDE AND OLODATEROL 3.124; 2.736 UG/1; UG/1
2 SPRAY, METERED RESPIRATORY (INHALATION) DAILY
Qty: 4 G | Refills: 3 | Status: SHIPPED | OUTPATIENT
Start: 2022-07-18

## 2022-08-11 DIAGNOSIS — G25.0 BENIGN FAMILIAL TREMOR: ICD-10-CM

## 2022-08-11 RX ORDER — ALPRAZOLAM 0.5 MG/1
0.5 TABLET ORAL 3 TIMES DAILY PRN
Qty: 90 TABLET | Refills: 0 | Status: SHIPPED | OUTPATIENT
Start: 2022-08-11 | End: 2022-09-09 | Stop reason: SDUPTHER

## 2022-08-15 DIAGNOSIS — J96.21 ACUTE ON CHRONIC RESPIRATORY FAILURE WITH HYPOXIA AND HYPERCAPNIA (HCC): ICD-10-CM

## 2022-08-15 DIAGNOSIS — J96.22 ACUTE ON CHRONIC RESPIRATORY FAILURE WITH HYPOXIA AND HYPERCAPNIA (HCC): ICD-10-CM

## 2022-08-15 DIAGNOSIS — K21.9 GASTROESOPHAGEAL REFLUX DISEASE WITHOUT ESOPHAGITIS: ICD-10-CM

## 2022-08-15 DIAGNOSIS — E03.9 ACQUIRED HYPOTHYROIDISM: ICD-10-CM

## 2022-08-15 RX ORDER — FUROSEMIDE 20 MG/1
TABLET ORAL
Qty: 90 TABLET | Refills: 1 | Status: SHIPPED | OUTPATIENT
Start: 2022-08-15

## 2022-08-15 RX ORDER — FAMOTIDINE 20 MG/1
TABLET, FILM COATED ORAL
Qty: 180 TABLET | Refills: 1 | Status: SHIPPED | OUTPATIENT
Start: 2022-08-15

## 2022-08-15 RX ORDER — LEVOTHYROXINE SODIUM 0.07 MG/1
TABLET ORAL
Qty: 90 TABLET | Refills: 1 | Status: SHIPPED | OUTPATIENT
Start: 2022-08-15

## 2022-09-09 DIAGNOSIS — G25.0 BENIGN FAMILIAL TREMOR: ICD-10-CM

## 2022-09-09 RX ORDER — ALPRAZOLAM 0.5 MG/1
0.5 TABLET ORAL 3 TIMES DAILY PRN
Qty: 90 TABLET | Refills: 0 | Status: SHIPPED | OUTPATIENT
Start: 2022-09-09 | End: 2022-10-13 | Stop reason: SDUPTHER

## 2022-09-21 ENCOUNTER — TELEPHONE (OUTPATIENT)
Dept: FAMILY MEDICINE CLINIC | Facility: CLINIC | Age: 87
End: 2022-09-21

## 2022-09-21 NOTE — TELEPHONE ENCOUNTER
I called [facility]   Patient is up for a 60 month (5 year) recert/restart  They will need updated documentation stating patient needs to remain on oxygen/portable oxygen  [facility] will be faxing a recert form to our office to complete so they may service the patient's oxygen  Patient has an appointment 10/27/22 will need to have this documented for billing purposes  [employer] has the work order but awaiting insurance approval, apparently they tried to contact the patient regarding this for the past few months but did not hear back from the patient  I did make the patient aware

## 2022-09-21 NOTE — TELEPHONE ENCOUNTER
Patient called stating her portable oxygen concentrator is making a loud noise and not functioning properly  She called [facility] and was told she needed to contact us to complete a recertification form  I advised the patient I would call [facility] and determine exactly what was needed from our office

## 2022-10-13 DIAGNOSIS — G25.0 BENIGN FAMILIAL TREMOR: ICD-10-CM

## 2022-10-13 DIAGNOSIS — M15.9 GENERALIZED OSTEOARTHRITIS: ICD-10-CM

## 2022-10-14 RX ORDER — ALPRAZOLAM 0.5 MG/1
0.5 TABLET ORAL 3 TIMES DAILY PRN
Qty: 90 TABLET | Refills: 0 | Status: SHIPPED | OUTPATIENT
Start: 2022-10-14

## 2022-10-14 RX ORDER — DULOXETIN HYDROCHLORIDE 60 MG/1
CAPSULE, DELAYED RELEASE ORAL
Qty: 90 CAPSULE | Refills: 1 | Status: SHIPPED | OUTPATIENT
Start: 2022-10-14

## 2022-10-17 ENCOUNTER — RA CDI HCC (OUTPATIENT)
Dept: OTHER | Facility: HOSPITAL | Age: 87
End: 2022-10-17

## 2022-10-17 NOTE — PROGRESS NOTES
Nas[facility] 75  coding opportunities          Chart Reviewed number of suggestions sent to Provider: 1     Patients Insurance     Medicare Insurance: Buy buy tea [employer]        [id number]

## 2022-10-24 ENCOUNTER — APPOINTMENT (OUTPATIENT)
Dept: LAB | Facility: CLINIC | Age: 87
End: 2022-10-24
Payer: COMMERCIAL

## 2022-10-24 DIAGNOSIS — E78.01 FAMILIAL HYPERCHOLESTEROLEMIA: ICD-10-CM

## 2022-10-24 DIAGNOSIS — E03.9 ACQUIRED HYPOTHYROIDISM: ICD-10-CM

## 2022-10-24 DIAGNOSIS — E11.9 TYPE 2 DIABETES MELLITUS WITHOUT COMPLICATION, WITHOUT LONG-TERM CURRENT USE OF INSULIN (HCC): ICD-10-CM

## 2022-10-24 DIAGNOSIS — I10 PRIMARY HYPERTENSION: ICD-10-CM

## 2022-10-24 LAB
ALBUMIN SERPL BCP-MCNC: 3.2 G/DL (ref 3.5–5)
ALP SERPL-CCNC: 104 U/L (ref 46–116)
ALT SERPL W P-5'-P-CCNC: 13 U/L (ref 12–78)
ANION GAP SERPL CALCULATED.3IONS-SCNC: 2 MMOL/L (ref 4–13)
AST SERPL W P-5'-P-CCNC: 11 U/L (ref 5–45)
BILIRUB SERPL-MCNC: 0.46 MG/DL (ref 0.2–1)
BUN SERPL-MCNC: 36 MG/DL (ref 5–25)
CALCIUM ALBUM COR SERPL-MCNC: 9.8 MG/DL (ref 8.3–10.1)
CALCIUM SERPL-MCNC: 9.2 MG/DL (ref 8.3–10.1)
CHLORIDE SERPL-SCNC: 105 MMOL/L (ref 96–108)
CHOLEST SERPL-MCNC: 125 MG/DL
CO2 SERPL-SCNC: 33 MMOL/L (ref 21–32)
CREAT SERPL-MCNC: 1.36 MG/DL (ref 0.6–1.3)
EST. AVERAGE GLUCOSE BLD GHB EST-MCNC: 128 MG/DL
GFR SERPL CREATININE-BSD FRML MDRD: 35 ML/MIN/1.73SQ M
GLUCOSE P FAST SERPL-MCNC: 105 MG/DL (ref 65–99)
HBA1C MFR BLD: 6.1 %
HDLC SERPL-MCNC: 50 MG/DL
LDLC SERPL CALC-MCNC: 60 MG/DL (ref 0–100)
POTASSIUM SERPL-SCNC: 4.2 MMOL/L (ref 3.5–5.3)
PROT SERPL-MCNC: 7.2 G/DL (ref 6.4–8.4)
SODIUM SERPL-SCNC: 140 MMOL/L (ref 135–147)
T3FREE SERPL-MCNC: 2.62 PG/ML (ref 2.3–4.2)
T4 FREE SERPL-MCNC: 1.17 NG/DL (ref 0.76–1.46)
TRIGL SERPL-MCNC: 74 MG/DL
TSH SERPL DL<=0.05 MIU/L-ACNC: 0.33 UIU/ML (ref 0.45–4.5)

## 2022-10-24 PROCEDURE — 84481 FREE ASSAY (FT-3): CPT

## 2022-10-24 PROCEDURE — 84439 ASSAY OF FREE THYROXINE: CPT

## 2022-10-24 PROCEDURE — 80053 COMPREHEN METABOLIC PANEL: CPT

## 2022-10-24 PROCEDURE — 36415 COLL VENOUS BLD VENIPUNCTURE: CPT

## 2022-10-24 PROCEDURE — 80061 LIPID PANEL: CPT

## 2022-10-24 PROCEDURE — 84443 ASSAY THYROID STIM HORMONE: CPT

## 2022-10-24 PROCEDURE — 83036 HEMOGLOBIN GLYCOSYLATED A1C: CPT

## 2022-10-27 ENCOUNTER — OFFICE VISIT (OUTPATIENT)
Dept: FAMILY MEDICINE CLINIC | Facility: CLINIC | Age: 87
End: 2022-10-27

## 2022-10-27 DIAGNOSIS — E03.9 ACQUIRED HYPOTHYROIDISM: ICD-10-CM

## 2022-10-27 DIAGNOSIS — L72.3 SEBACEOUS CYST: ICD-10-CM

## 2022-10-27 DIAGNOSIS — I10 PRIMARY HYPERTENSION: ICD-10-CM

## 2022-10-27 DIAGNOSIS — E78.01 FAMILIAL HYPERCHOLESTEROLEMIA: ICD-10-CM

## 2022-10-27 DIAGNOSIS — E11.9 TYPE 2 DIABETES MELLITUS WITHOUT COMPLICATION, WITHOUT LONG-TERM CURRENT USE OF INSULIN (HCC): Primary | ICD-10-CM

## 2022-10-27 DIAGNOSIS — Z23 ENCOUNTER FOR IMMUNIZATION: ICD-10-CM

## 2022-10-27 DIAGNOSIS — J06.9 UPPER RESPIRATORY TRACT INFECTION, UNSPECIFIED TYPE: ICD-10-CM

## 2022-10-27 LAB
CREAT UR-MCNC: 82.5 MG/DL
MICROALBUMIN UR-MCNC: 10.8 MG/L (ref 0–20)
MICROALBUMIN/CREAT 24H UR: 13 MG/G CREATININE (ref 0–30)

## 2022-10-27 PROCEDURE — 82043 UR ALBUMIN QUANTITATIVE: CPT | Performed by: FAMILY MEDICINE

## 2022-10-27 PROCEDURE — 82570 ASSAY OF URINE CREATININE: CPT | Performed by: FAMILY MEDICINE

## 2022-10-27 RX ORDER — AZITHROMYCIN 250 MG/1
TABLET, FILM COATED ORAL
Qty: 6 TABLET | Refills: 0 | Status: SHIPPED | OUTPATIENT
Start: 2022-10-27 | End: 2022-10-31

## 2022-10-27 NOTE — PROGRESS NOTES
Name: Conrad Barriga      : 1935      MRN: [id number]  Encounter Provider: Grace Costa DO  Encounter Date: 10/27/2022   Encounter department: [facility] PRIMARY CARE    Assessment & Plan     1  Type 2 diabetes mellitus without complication, without long-term current use of insulin (HCC)  -     Microalbumin / creatinine urine ratio    2  Encounter for immunization  -     influenza vaccine, high-dose, PF 0 7 mL (FLUZONE HIGH-DOSE)    3  Upper respiratory tract infection, unspecified type  -     azithromycin (ZITHROMAX) 250 mg tablet; Take 2 tablets today then 1 tablet daily x 4 days    4  Sebaceous cyst  -     Ambulatory Referral to General Surgery; Future    5  Primary hypertension    6  Familial hypercholesterolemia    7  Acquired hypothyroidism    The current medical regimen is effective;  continue present plan and medications  The patient is asked to continue to improve diet and movement patterns to aid in medical management of this problem  Subjective     Patient is an 59-year-old female here for six-month follow-up  She is accompanied by her son, Leila Oneilant who she currently is living with  She is estranged from her son and daughter   Here for review of recent labs   results as noted below    Patient states she wants to have a Zithromax around just in case she would come down with symptoms  She is exposed to her grandson who has lots of colds    Chief Complaint   Patient presents with   • Follow-up     6 month check, needs documentation updated for home and portable oxygen Pt had Dm eye exam with Dr Steven Kay, has another appt in [address] [facility] will call for report      Component      Latest Ref Rng & Units 10/24/2022   Sodium      135 - 147 mmol/L 140   Potassium      3 5 - 5 3 mmol/L 4 2   Chloride      96 - 108 mmol/L 105   CO2      21 - 32 mmol/L 33 (H)   Anion Gap      4 - 13 mmol/L 2 (L)   BUN      5 - 25 mg/dL 36 (H)   Creatinine      0 60 - 1 30 mg/dL 1 36 (H)   GLUCOSE FASTING 65 - 99 mg/dL 105 (H)   Calcium      8 3 - 10 1 mg/dL 9 2   CORRECTED CALCIUM      8 3 - 10 1 mg/dL 9 8   AST      5 - 45 U/L 11   ALT      12 - 78 U/L 13   Alkaline Phosphatase      46 - 116 U/L 104   Total Protein      6 4 - 8 4 g/dL 7 2   Albumin      3 5 - 5 0 g/dL 3 2 (L)   TOTAL BILIRUBIN      0 20 - 1 00 mg/dL 0 46   eGFR      ml/min/1 73sq m 35   Cholesterol      See Comment mg/dL 125   Triglycerides      See Comment mg/dL 74   HDL      >=50 mg/dL 50   LDL Calculated      0 - 100 mg/dL 60   Hemoglobin A1C      Normal 3 8-5 6%; PreDiabetic 5 7-6 4%; Diabetic >=6 5%; Glycemic control for adults with diabetes <7 0% % 6 1 (H)   eAG, EST AVG Glucose      mg/dl 128   T3, Free      2 30 - 4 20 pg/mL 2 62   Free T4      0 76 - 1 46 ng/dL 1 17   TSH 3RD GENERATON      0 450 - 4 500 uIU/mL 0 334 (L)   TSH is acceptable  Tremor is not any worse, energy level is usually generalized low but she is not active  Hemoglobin A1c continues to be in the prediabetic range  Stable now at 6 1%  Lipids are at goal   Microalbumin collected today  Review of Systems   Constitutional: Fatigue:  fair  Respiratory: Shortness of breath:  with exertion  Is on O2  Cardiovascular: Negative for leg swelling  Gastrointestinal: Negative for abdominal pain and blood in stool  Genitourinary: Negative for dysuria  No incontinence   Musculoskeletal: Positive for arthralgias, gait problem and myalgias  Neurological: Tremors: Same          No falls       Past Medical History:   Diagnosis Date   • Anxiety    • Cataract, bilateral     Last Assessed:3/19/2014   • COPD (chronic obstructive pulmonary disease) (HCC)    • Coronary artery disease    • Hx of arterial ischemic stroke    • Hypertension    • Stroke [facility])    • Wears glasses      Past Surgical History:   Procedure Laterality Date   • CAROTID ENDARTERECTOMY Right    • CATARACT EXTRACTION W/ INTRAOCULAR LENS  IMPLANT, BILATERAL     • COLONOSCOPY N/A 9/30/2017    Procedure: COLONOSCOPY FOR CONTROL OF BLEEDING;  Surgeon: Heydi Leigh MD;  [facility];  Service: Colorectal   • CORONARY ARTERY BYPASS GRAFT      CABG X3 with LIMA to LAD,SVG to OM,SVG to distal  RCA ; Last Assessed:7/13/2015   • JOINT REPLACEMENT      left TKR   • KIDNEY STONE SURGERY     • NEPHROSTOMY Left     with Drainage Irrigation ; [id number]   • TX TOTAL KNEE ARTHROPLASTY Right 2/25/2016    Procedure: ARTHROPLASTY KNEE TOTAL;  Surgeon: Nusrat Valles MD;  Location: [facility];  Service: Orthopedics     Family History   Problem Relation Age of Onset   • Cancer Mother         malignant neoplasm   • Heart attack Father    • Aneurysm Other         Aortic   • Cancer Other         malignant neoplasm     Social History     Socioeconomic History   • Marital status:       Spouse name: None   • Number of children: None   • Years of education: 10   • Highest education level: None   Occupational History   • None   Tobacco Use   • Smoking status: Never Smoker   • Smokeless tobacco: Never Used   Vaping Use   • Vaping Use: Never used   Substance and Sexual Activity   • Alcohol use: Never   • Drug use: Never   • Sexual activity: Not Currently   Other Topics Concern   • None   Social History Narrative    ** Merged History Encounter **         No caffeine use     Social Determinants of Health     Financial Resource Strain: Not on file   Food Insecurity: Not on file   Transportation Needs: Not on file   Physical Activity: Not on file   Stress: Not on file   Social Connections: Not on file   Intimate Partner Violence: Not on file   Housing Stability: Not on file     Current Outpatient Medications on File Prior to Visit   Medication Sig   • ALPRAZolam (XANAX) 0 5 mg tablet Take 1 tablet (0 5 mg total) by mouth 3 (three) times a day as needed (TREMOR)   • amLODIPine (NORVASC) 5 mg tablet TAKE 1 TABLET BY MOUTH EVERY DAY   • aspirin (ECOTRIN LOW STRENGTH) 81 mg EC tablet Take 81 mg by mouth daily   • bimatoprost (LUMIGAN) 0 01 % ophthalmic drops Apply 1 drop to eye Daily   • Cholecalciferol (VITAMIN D) 2000 UNITS tablet Take 1,000 Units by mouth daily    • clopidogrel (PLAVIX) 75 mg tablet Take 1 tablet (75 mg total) by mouth daily   • DULoxetine (CYMBALTA) 60 mg delayed release capsule TAKE 1 CAPSULE BY MOUTH EVERY DAY IN THE MORNING   • famotidine (PEPCID) 20 mg tablet TAKE 1 TABLET BY MOUTH TWICE A DAY   • furosemide (LASIX) 20 mg tablet TAKE 1 TABLET BY MOUTH EVERY DAY   • levothyroxine 75 mcg tablet TAKE 1 TABLET BY MOUTH EVERY DAY IN THE MORNING   • metoprolol tartrate (LOPRESSOR) 25 mg tablet TAKE 1 TABLET (25 MG TOTAL) BY MOUTH EVERY 12 (TWELVE) HOURS   • mupirocin (BACTROBAN) 2 % ointment Apply topically 3 (three) times a day   • Pediatric Multiple Vit-C-FA (PEDIATRIC MULTIVITAMIN) chewable tablet Chew 1 tablet daily   • Potassium Gluconate 595 (99 K) MG TABS Take by mouth   • rosuvastatin (CRESTOR) 10 MG tablet Take 1 tablet (10 mg total) by mouth daily   • tiotropium-olodaterol (Stiolto Respimat) 2 5-2 5 MCG/ACT inhaler Inhale 2 puffs daily     Allergies   Allergen Reactions   • Penicillins Itching   • Penicillins Rash     Immunization History   Administered Date(s) Administered   • COVID-19 PFIZER VACCINE 0 3 ML IM 03/25/2021, 04/15/2021   • INFLUENZA 11/09/2016, 10/10/2017   • Influenza Split High Dose Preservative Free IM 10/15/2012, 10/15/2013, 10/15/2014, 10/28/2015, 11/09/2016, 10/10/2017   • Influenza, high dose seasonal 0 7 mL 10/16/2018, 10/15/2019, 10/15/2020, 10/01/2021, 10/27/2022   • Influenza, seasonal, injectable 12/06/2011   • Pneumococcal Conjugate 13-Valent 11/09/2016   • Pneumococcal Polysaccharide PPV23 06/25/2015   • Zoster 05/01/2017       Objective     /70   Pulse 62   Temp (!) 97 2 °F (36 2 °C) (Temporal)   Ht 5' 2" (1 575 m)   Wt 82 6 kg (182 lb)   SpO2 94% Comment: 94% with 2 liters  BMI 33 29 kg/m²     Physical Exam  Constitutional:       General: She is not in acute distress  Appearance: She is well-developed  She is obese  Comments: 30-year-old female who appears her stated age with a BMI of 33%   HENT:      Head: Normocephalic  Right Ear: Tympanic membrane normal       Left Ear: Tympanic membrane normal       Nose: Nose normal       Mouth/Throat:      Mouth: Mucous membranes are moist    Eyes:      Conjunctiva/sclera: Conjunctivae normal       Pupils: Pupils are equal, round, and reactive to light  Neck:      Vascular: No carotid bruit  Cardiovascular:      Rate and Rhythm: Normal rate and regular rhythm  Pulses: Normal pulses  no weak pulses          Dorsalis pedis pulses are 2+ on the right side and 2+ on the left side  Posterior tibial pulses are 2+ on the right side and 2+ on the left side  Heart sounds: No murmur heard  Pulmonary:      Effort: Pulmonary effort is normal       Breath sounds: Normal breath sounds  Abdominal:      General: Bowel sounds are normal       Palpations: Abdomen is soft  There is no mass  Tenderness: There is no abdominal tenderness  Musculoskeletal:         General: Normal range of motion  Feet:      Right foot:      Skin integrity: Callus and dry skin present  No ulcer, skin breakdown, erythema or warmth  Left foot:      Skin integrity: Callus and dry skin present  No ulcer, skin breakdown, erythema or warmth  Skin:     General: Skin is warm and dry  Comments: Chronic sebaceous cyst of the scalp  Multiple   Neurological:      Mental Status: She is alert and oriented to person, place, and time  Deep Tendon Reflexes: Reflexes are normal and symmetric  Psychiatric:         Mood and Affect: Anxious: Chronic  Behavior: Behavior normal          Thought Content: Thought content normal        Marah Hams, DODiabetic Foot Exam    Patient's shoes and socks removed  Right Foot/Ankle   Right Foot Inspection  Skin Exam: skin normal, skin intact, dry skin, callus and callus   No warmth, no erythema, no maceration, no abnormal color, no pre-ulcer and no ulcer  Toe Exam: ROM and strength within normal limits  Sensory   Monofilament testing: intact    Vascular  The right DP pulse is 2+  The right PT pulse is 2+  Left Foot/Ankle  Left Foot Inspection  Skin Exam: skin normal, skin intact, dry skin and callus  No warmth, no erythema, no maceration, normal color, no pre-ulcer and no ulcer  Toe Exam: ROM and strength within normal limits  Sensory   Monofilament testing: intact    Vascular  The left DP pulse is 2+  The left PT pulse is 2+       Assign Risk Category  No deformity present  No loss of protective sensation  No weak pulses  Risk: 0

## 2022-10-31 VITALS
HEIGHT: 62 IN | DIASTOLIC BLOOD PRESSURE: 70 MMHG | WEIGHT: 182 LBS | OXYGEN SATURATION: 94 % | HEART RATE: 62 BPM | SYSTOLIC BLOOD PRESSURE: 118 MMHG | TEMPERATURE: 97.2 F | BODY MASS INDEX: 33.49 KG/M2

## 2022-11-01 ENCOUNTER — TELEPHONE (OUTPATIENT)
Dept: ADMINISTRATIVE | Facility: OTHER | Age: 87
End: 2022-11-01

## 2022-11-01 NOTE — TELEPHONE ENCOUNTER
----- Message from Malaika Kim DO sent at 23/21/3116  4:31 PM EDT -----  Regarding: care gap request  10/31/22 4:31 PM    Hello, our patient attached above has had Diabetic Eye Exam completed/performed  Please assist in updating the patient chart by pulling the document from the Media Tab  The date of service is 1/2022    Scanned into chart 10/31/2022       Thank you,  Malaika Kim  [facility]

## 2022-11-02 NOTE — TELEPHONE ENCOUNTER
Upon review of the In Basket request we have found/obtained the documentation  After careful review of the document we are unable to complete this request for Diabetic Eye Exam because the documentation does not have the result(s) needed to close the requested care gap(s)  Any additional questions or concerns should be emailed to the Practice Liaisons via the appropriate education email address, please do not reply via In Basket      Thank you  Vincenzo Shaikh

## 2022-11-10 DIAGNOSIS — I73.9 PAD (PERIPHERAL ARTERY DISEASE) (HCC): ICD-10-CM

## 2022-11-10 DIAGNOSIS — J44.9 CHRONIC OBSTRUCTIVE PULMONARY DISEASE, UNSPECIFIED COPD TYPE (HCC): ICD-10-CM

## 2022-11-10 RX ORDER — CLOPIDOGREL BISULFATE 75 MG/1
TABLET ORAL
Qty: 90 TABLET | Refills: 1 | Status: SHIPPED | OUTPATIENT
Start: 2022-11-10

## 2022-11-14 ENCOUNTER — TELEPHONE (OUTPATIENT)
Dept: FAMILY MEDICINE CLINIC | Facility: CLINIC | Age: 87
End: 2022-11-14

## 2022-11-14 NOTE — TELEPHONE ENCOUNTER
Patient was seen on 10/27/22 for a 6 month check and documentation updated for continued home and portable oxygen usage  Chart note needs to be faxed to [facility]  Please update chart note with regards to her continued need for home and portable O2  Thank you  [facility] fax:  [phone]

## 2022-11-15 DIAGNOSIS — J96.21 ACUTE ON CHRONIC RESPIRATORY FAILURE WITH HYPOXIA AND HYPERCAPNIA (HCC): ICD-10-CM

## 2022-11-15 DIAGNOSIS — G25.0 BENIGN FAMILIAL TREMOR: ICD-10-CM

## 2022-11-15 DIAGNOSIS — J96.22 ACUTE ON CHRONIC RESPIRATORY FAILURE WITH HYPOXIA AND HYPERCAPNIA (HCC): ICD-10-CM

## 2022-11-15 RX ORDER — ALPRAZOLAM 0.5 MG/1
0.5 TABLET ORAL 3 TIMES DAILY PRN
Qty: 90 TABLET | Refills: 0 | Status: SHIPPED | OUTPATIENT
Start: 2022-11-15

## 2022-11-15 RX ORDER — FUROSEMIDE 20 MG/1
TABLET ORAL
Qty: 90 TABLET | Refills: 1 | Status: SHIPPED | OUTPATIENT
Start: 2022-11-15

## 2022-11-18 RX ORDER — TIOTROPIUM BROMIDE AND OLODATEROL 3.124; 2.736 UG/1; UG/1
SPRAY, METERED RESPIRATORY (INHALATION)
Qty: 4 G | Refills: 3 | Status: SHIPPED | OUTPATIENT
Start: 2022-11-18

## 2022-11-22 ENCOUNTER — OFFICE VISIT (OUTPATIENT)
Dept: CARDIOLOGY CLINIC | Facility: CLINIC | Age: 87
End: 2022-11-22

## 2022-11-22 VITALS
WEIGHT: 185 LBS | DIASTOLIC BLOOD PRESSURE: 60 MMHG | HEART RATE: 64 BPM | SYSTOLIC BLOOD PRESSURE: 160 MMHG | BODY MASS INDEX: 33.84 KG/M2

## 2022-11-22 DIAGNOSIS — I25.10 CORONARY ARTERY DISEASE INVOLVING NATIVE CORONARY ARTERY OF NATIVE HEART WITHOUT ANGINA PECTORIS: ICD-10-CM

## 2022-11-22 DIAGNOSIS — I10 HTN (HYPERTENSION), BENIGN: ICD-10-CM

## 2022-11-22 DIAGNOSIS — N18.9 CHRONIC KIDNEY DISEASE, UNSPECIFIED CKD STAGE: Primary | ICD-10-CM

## 2022-11-22 NOTE — PROGRESS NOTES
Cardiology             Wang Levinter  1935  [id number]              Assessment/Plan:     CAD status post CABG x3  PVCs  Benign essential hypertension  Dyslipidemia  Chronic right bundle branch block  Chronic diastolic CHF        No symptoms of angina  Will continue aspirin  Will discontinue clopidogrel to reduce bleeding was including advancing age, high fall risk, and uncontrolled blood pressure times  High blood pressure on today's visit which I think is due to anxiety and stress  She states she feels like her insides are shaking when she gets very anxious and stressed  She takes alprazolam which comes on quickly  Advised to continue discussing this with her PCP to make sure this is adequately controlled  I have recommended she that she monitor blood pressures daily at home and call me in 2 weeks to discuss home blood pressure readings  She is in agreement  If blood pressure controlled, may consider adding hydralazine  Will avoid ACE-inhibitor/ARB/spironolactone in light of kidney disease  Euvolemic by examination, continue furosemide 20 mg daily           Follow-up in 6 months          Interval History:      This is a very pleasant 59-year-old female with a history of CVA 02/2015 with MRA neck revealing greater than 70% bilateral internal carotid artery stenosis with diminished flow in the right MCA   CTA revealed greater than 90% high-grade stenosis on the right and she thereafter underwent right CEA   She had a NSTEMI 06/2015 with cardiac catheterization revealing three-vessel CAD   Thereafter she underwent CABG x3 with LIMA to LAD, SVG to OM, SVG to RCA in 2015   She lives with her son      She was hospitalized with a fall 05/2019, and had a closed fracture of cervical vertebrae with non operative management      She was subsequently hospitalized 02/2021 with CHF   Her discharge weight was 204 lb      She presents today for follow-up with no complaints    She denies chest pain, shortness of breath, dizziness, palpitations, lower extremity edema  She does admit to significant anxiety symptoms  Yesterday she states she had to go to court for her son and was quite nervous and anxious all day  She states that she feels like her incisor shaking all the time  She takes alprazolam at home which alleviates her anxiety makes her feel better  Vitals:  Vitals:    11/22/22 1543   BP: 160/60   BP Location: Right arm   Patient Position: Sitting   Cuff Size: Large   Pulse: 64   Weight: 83 9 kg (185 lb)         Past Medical History:   Diagnosis Date   • Anxiety    • Cataract, bilateral     Last Assessed:3/19/2014   • COPD (chronic obstructive pulmonary disease) ([facility])    • Coronary artery disease    • Hx of arterial ischemic stroke    • Hypertension    • Stroke [facility])    • Wears glasses      Social History     Socioeconomic History   • Marital status:       Spouse name: Not on file   • Number of children: Not on file   • Years of education: 10   • Highest education level: Not on file   Occupational History   • Not on file   Tobacco Use   • Smoking status: Never   • Smokeless tobacco: Never   Vaping Use   • Vaping Use: Never used   Substance and Sexual Activity   • Alcohol use: Never   • Drug use: Never   • Sexual activity: Not Currently   Other Topics Concern   • Not on file   Social History Narrative    ** Merged History Encounter **         No caffeine use     Social Determinants of Health     Financial Resource Strain: Not on file   Food Insecurity: Not on file   Transportation Needs: Not on file   Physical Activity: Not on file   Stress: Not on file   Social Connections: Not on file   Intimate Partner Violence: Not on file   Housing Stability: Not on file      Family History   Problem Relation Age of Onset   • Cancer Mother         malignant neoplasm   • Heart attack Father    • Aneurysm Other         Aortic   • Cancer Other         malignant neoplasm     Past Surgical History:   Procedure Laterality Date   • CAROTID ENDARTERECTOMY Right    • CATARACT EXTRACTION W/ INTRAOCULAR LENS  IMPLANT, BILATERAL     • COLONOSCOPY N/A 9/30/2017    Procedure: COLONOSCOPY FOR CONTROL OF BLEEDING;  Surgeon: Cherylene Grana, MD;  [facility];  Service: Colorectal   • CORONARY ARTERY BYPASS GRAFT      CABG X3 with LIMA to LAD,SVG to OM,SVG to distal  RCA ; Last Assessed:7/13/2015   • JOINT REPLACEMENT      left TKR   • KIDNEY STONE SURGERY     • NEPHROSTOMY Left     with Drainage Irrigation ; [id number]   • MD TOTAL KNEE ARTHROPLASTY Right 2/25/2016    Procedure: ARTHROPLASTY KNEE TOTAL;  Surgeon: Carmen Augustin MD;  Location[facility];  Service: Orthopedics       Current Outpatient Medications:   •  ALPRAZolam (XANAX) 0 5 mg tablet, Take 1 tablet (0 5 mg total) by mouth 3 (three) times a day as needed (TREMOR), Disp: 90 tablet, Rfl: 0  •  amLODIPine (NORVASC) 5 mg tablet, TAKE 1 TABLET BY MOUTH EVERY DAY, Disp: 90 tablet, Rfl: 3  •  aspirin (ECOTRIN LOW STRENGTH) 81 mg EC tablet, Take 81 mg by mouth daily, Disp: , Rfl:   •  bimatoprost (LUMIGAN) 0 01 % ophthalmic drops, Apply 1 drop to eye Daily, Disp: , Rfl:   •  Cholecalciferol (VITAMIN D) 2000 UNITS tablet, Take 1,000 Units by mouth daily , Disp: , Rfl:   •  DULoxetine (CYMBALTA) 60 mg delayed release capsule, TAKE 1 CAPSULE BY MOUTH EVERY DAY IN THE MORNING, Disp: 90 capsule, Rfl: 1  •  famotidine (PEPCID) 20 mg tablet, TAKE 1 TABLET BY MOUTH TWICE A DAY, Disp: 180 tablet, Rfl: 1  •  furosemide (LASIX) 20 mg tablet, TAKE 1 TABLET BY MOUTH EVERY DAY, Disp: 90 tablet, Rfl: 1  •  levothyroxine 75 mcg tablet, TAKE 1 TABLET BY MOUTH EVERY DAY IN THE MORNING, Disp: 90 tablet, Rfl: 1  •  metoprolol tartrate (LOPRESSOR) 25 mg tablet, TAKE 1 TABLET (25 MG TOTAL) BY MOUTH EVERY 12 (TWELVE) HOURS, Disp: 180 tablet, Rfl: 3  •  mupirocin (BACTROBAN) 2 % ointment, Apply topically 3 (three) times a day, Disp: 30 g, Rfl: 0  •  Pediatric Multiple Vit-C-FA (PEDIATRIC MULTIVITAMIN) chewable tablet, Chew 1 tablet daily, Disp: , Rfl:   •  Potassium Gluconate 595 (99 K) MG TABS, Take by mouth, Disp: , Rfl:   •  rosuvastatin (CRESTOR) 10 MG tablet, Take 1 tablet (10 mg total) by mouth daily, Disp: 90 tablet, Rfl: 3  •  Stiolto Respimat 2 5-2 5 MCG/ACT inhaler, INHALE 2 PUFFS BY MOUTH DAILY, Disp: 4 g, Rfl: 3        Review of Systems:  Review of Systems   Constitutional: Negative for activity change, fever and unexpected weight change  HENT: Negative for facial swelling, nosebleeds and voice change  Respiratory: Negative for chest tightness, shortness of breath and wheezing  Cardiovascular: Negative for chest pain, palpitations and leg swelling  Gastrointestinal: Negative for abdominal distention  Genitourinary: Negative for hematuria  Musculoskeletal: Negative for arthralgias  Skin: Negative for color change, pallor, rash and wound  Neurological: Negative for dizziness, seizures and syncope  Psychiatric/Behavioral: Negative for agitation  Physical Exam:  Physical Exam  Vitals reviewed  Constitutional:       Appearance: She is well-developed and well-nourished  HENT:      Head: Normocephalic and atraumatic  Eyes:      Extraocular Movements: EOM normal    Cardiovascular:      Rate and Rhythm: Normal rate and regular rhythm  Pulses: Intact distal pulses  Heart sounds: Normal heart sounds  Pulmonary:      Effort: Pulmonary effort is normal       Breath sounds: Normal breath sounds  Abdominal:      Palpations: Abdomen is soft  Musculoskeletal:         General: Normal range of motion  Cervical back: Normal range of motion and neck supple  Skin:     General: Skin is warm and dry  Neurological:      Mental Status: She is alert and oriented to person, place, and time  Psychiatric:         Mood and Affect: Mood and affect normal          Behavior: Behavior normal          Thought Content:  Thought content normal          Judgment: Judgment normal          This note was completed in part utilizing M-Modal Fluency Direct Software  Grammatical errors, random word insertions, spelling mistakes, and incomplete sentences can be an occasional consequence of this system secondary to software limitations, ambient noise, and hardware issues  If you have any questions or concerns about the content, text, or information contained within the body of this dictation, please contact the provider for clarification

## 2022-11-25 ENCOUNTER — TELEPHONE (OUTPATIENT)
Dept: CARDIOLOGY CLINIC | Facility: CLINIC | Age: 87
End: 2022-11-25

## 2022-11-28 ENCOUNTER — TELEPHONE (OUTPATIENT)
Dept: FAMILY MEDICINE CLINIC | Facility: CLINIC | Age: 87
End: 2022-11-28

## 2022-11-28 NOTE — TELEPHONE ENCOUNTER
Ever Anup was contacted by [employer]   She stated that they have been trying to get contact with the office regarding equipment from [employer], her oxygen  Do you have any paperwork on her?

## 2022-12-19 DIAGNOSIS — G25.0 BENIGN FAMILIAL TREMOR: ICD-10-CM

## 2022-12-19 RX ORDER — ALPRAZOLAM 0.5 MG/1
0.5 TABLET ORAL 3 TIMES DAILY PRN
Qty: 90 TABLET | Refills: 0 | Status: SHIPPED | OUTPATIENT
Start: 2022-12-19

## 2023-01-11 ENCOUNTER — TELEPHONE (OUTPATIENT)
Dept: FAMILY MEDICINE CLINIC | Facility: CLINIC | Age: 88
End: 2023-01-11

## 2023-01-11 DIAGNOSIS — J96.22 ACUTE ON CHRONIC RESPIRATORY FAILURE WITH HYPOXIA AND HYPERCAPNIA (HCC): ICD-10-CM

## 2023-01-11 DIAGNOSIS — J96.21 ACUTE ON CHRONIC RESPIRATORY FAILURE WITH HYPOXIA AND HYPERCAPNIA (HCC): ICD-10-CM

## 2023-01-11 DIAGNOSIS — Z13.9 ENCOUNTER FOR SCREENING INVOLVING SOCIAL DETERMINANTS OF HEALTH (SDOH): Primary | ICD-10-CM

## 2023-01-11 NOTE — TELEPHONE ENCOUNTER
Patient called asking for guidance  She is unsure who to contact but is looking for in home help with meals and other small tasks around the home  She does not need assistance with personal hygiene  Thoughts/recommendations?   Thank you

## 2023-01-12 ENCOUNTER — PATIENT OUTREACH (OUTPATIENT)
Dept: FAMILY MEDICINE CLINIC | Facility: CLINIC | Age: 88
End: 2023-01-12

## 2023-01-12 NOTE — TELEPHONE ENCOUNTER
I was speaking to the patient and making her aware referral was placed for Samantha Guy, , to call her to discuss available help  The line cut out and then disconnected

## 2023-01-12 NOTE — PROGRESS NOTES
OP CM called to pt in regards to assistance around the home  Pt states she resides in a townhouse with her son  Pt states he is at work all day  Pt states she has tremors  Explained home health waiver and pt states she gets 1500 in SSI  Pt states she can get her niece or son to help get together the financial documents for the waiver program   Pt uses a roller walker inside the home  Pt is also on 2L of oxygen  Pt states her nieces daughter or her son take her to her appts  Will complete [place] with PCP on Monday

## 2023-01-12 NOTE — TELEPHONE ENCOUNTER
Patient returned call and we were able to complete our conversation  Patient is agreeable to speaking to [facility]

## 2023-01-23 ENCOUNTER — PATIENT OUTREACH (OUTPATIENT)
Dept: FAMILY MEDICINE CLINIC | Facility: CLINIC | Age: 88
End: 2023-01-23

## 2023-01-23 DIAGNOSIS — G25.0 BENIGN FAMILIAL TREMOR: ICD-10-CM

## 2023-01-24 DIAGNOSIS — E03.9 ACQUIRED HYPOTHYROIDISM: ICD-10-CM

## 2023-01-24 DIAGNOSIS — K21.9 GASTROESOPHAGEAL REFLUX DISEASE WITHOUT ESOPHAGITIS: ICD-10-CM

## 2023-01-24 DIAGNOSIS — I25.10 CORONARY ARTERY DISEASE INVOLVING NATIVE CORONARY ARTERY OF NATIVE HEART WITHOUT ANGINA PECTORIS: ICD-10-CM

## 2023-01-24 DIAGNOSIS — I65.29 STENOSIS OF CAROTID ARTERY, UNSPECIFIED LATERALITY: ICD-10-CM

## 2023-01-24 DIAGNOSIS — G25.0 BENIGN FAMILIAL TREMOR: ICD-10-CM

## 2023-01-24 DIAGNOSIS — I10 ESSENTIAL HYPERTENSION: ICD-10-CM

## 2023-01-24 RX ORDER — ALPRAZOLAM 0.5 MG/1
0.5 TABLET ORAL 3 TIMES DAILY PRN
Qty: 90 TABLET | Refills: 0 | Status: SHIPPED | OUTPATIENT
Start: 2023-01-24

## 2023-01-24 RX ORDER — LEVOTHYROXINE SODIUM 0.07 MG/1
TABLET ORAL
Qty: 90 TABLET | Refills: 1 | Status: SHIPPED | OUTPATIENT
Start: 2023-01-24

## 2023-01-24 RX ORDER — FAMOTIDINE 20 MG/1
TABLET, FILM COATED ORAL
Qty: 180 TABLET | Refills: 1 | Status: SHIPPED | OUTPATIENT
Start: 2023-01-24

## 2023-01-24 RX ORDER — AMLODIPINE BESYLATE 5 MG/1
TABLET ORAL
Qty: 90 TABLET | Refills: 3 | Status: SHIPPED | OUTPATIENT
Start: 2023-01-24 | End: 2023-01-26 | Stop reason: SDUPTHER

## 2023-01-24 RX ORDER — ROSUVASTATIN CALCIUM 10 MG/1
TABLET, COATED ORAL
Qty: 90 TABLET | Refills: 3 | Status: SHIPPED | OUTPATIENT
Start: 2023-01-24

## 2023-01-26 DIAGNOSIS — I65.29 STENOSIS OF CAROTID ARTERY, UNSPECIFIED LATERALITY: ICD-10-CM

## 2023-01-26 RX ORDER — AMLODIPINE BESYLATE 5 MG/1
5 TABLET ORAL DAILY
Qty: 90 TABLET | Refills: 3 | Status: SHIPPED | OUTPATIENT
Start: 2023-01-26

## 2023-01-26 RX ORDER — AMLODIPINE BESYLATE 5 MG/1
5 TABLET ORAL DAILY
Qty: 90 TABLET | Refills: 3 | OUTPATIENT
Start: 2023-01-26

## 2023-02-06 ENCOUNTER — PATIENT OUTREACH (OUTPATIENT)
Dept: FAMILY MEDICINE CLINIC | Facility: CLINIC | Age: 88
End: 2023-02-06

## 2023-02-07 ENCOUNTER — PATIENT OUTREACH (OUTPATIENT)
Dept: FAMILY MEDICINE CLINIC | Facility: CLINIC | Age: 88
End: 2023-02-07

## 2023-02-07 NOTE — PROGRESS NOTES
OP CM spoke to pt who states she has not heard from [facility] waiver so email sent to waiver to check the status  Will continue to follow

## 2023-02-20 ENCOUNTER — TELEMEDICINE (OUTPATIENT)
Dept: FAMILY MEDICINE CLINIC | Facility: CLINIC | Age: 88
End: 2023-02-20

## 2023-02-20 ENCOUNTER — TELEPHONE (OUTPATIENT)
Dept: FAMILY MEDICINE CLINIC | Facility: CLINIC | Age: 88
End: 2023-02-20

## 2023-02-20 DIAGNOSIS — J06.9 UPPER RESPIRATORY TRACT INFECTION, UNSPECIFIED TYPE: Primary | ICD-10-CM

## 2023-02-20 DIAGNOSIS — F41.1 GENERALIZED ANXIETY DISORDER: ICD-10-CM

## 2023-02-20 DIAGNOSIS — G25.0 BENIGN FAMILIAL TREMOR: ICD-10-CM

## 2023-02-20 RX ORDER — ALPRAZOLAM 0.5 MG/1
0.5 TABLET, EXTENDED RELEASE ORAL EVERY MORNING
Qty: 30 TABLET | Refills: 0 | Status: SHIPPED | OUTPATIENT
Start: 2023-02-20

## 2023-02-20 RX ORDER — DOXYCYCLINE HYCLATE 100 MG/1
100 CAPSULE ORAL
Qty: 14 CAPSULE | Refills: 0 | Status: SHIPPED | OUTPATIENT
Start: 2023-02-20 | End: 2023-02-27

## 2023-02-20 NOTE — TELEPHONE ENCOUNTER
Dane Rebollar returned the office's call  She is aware, but can only do telephone call no availability to do virtually

## 2023-02-20 NOTE — PROGRESS NOTES
Virtual Brief Visit    Patient is located in the following state in which I hold an active license [place]      Assessment/Plan:  Update me with regards to the clinical benefit of the alprazolam ER  Antibiotic called in for sore throat  Problem List Items Addressed This Visit        Nervous and Auditory    Benign familial tremor    Relevant Medications    ALPRAZolam ER (XANAX XR) 0 5 MG 24 hr tablet       Other    Generalized anxiety disorder- panic    Relevant Medications    ALPRAZolam ER (XANAX XR) 0 5 MG 24 hr tablet   Other Visit Diagnoses     Upper respiratory tract infection, unspecified type    -  Primary    Relevant Medications    doxycycline hyclate (VIBRAMYCIN) 100 mg capsule          Recent Visits  Date Type Provider Dept   02/20/23 Telemedicine Jessi Bryant, [facility]   02/20/23 Telephone Jsesi Bryant DO [facility]   Showing recent visits within past 7 days and meeting all other requirements  Today's Visits  Date Type Provider Dept   02/21/23 Telephone [facility]   Showing today's visits and meeting all other requirements  Future Appointments  No visits were found meeting these conditions  Showing future appointments within next 150 days and meeting all other requirements     URI   This is a new problem  Episode onset: 2- 3 days  Saw cardiology in November-plavix discontinued  Pressures  Generally they will go up when she is anxious  Her last number was 121/76  Review of Systems - General ROS: positive for  - fatigue, sleep disturbance and anxiety  ENT ROS: positive for - headaches, nasal congestion, nasal discharge, sinus pain, sore throat and vocal changes  Respiratory ROS: positive for - cough  Cardiovascular ROS: no chest pain or dyspnea on exertion    Anxiety is a continued issue    She is currently on as needed alprazolam   In the past she was on a long-acting product and we have discussed possibly trying that again   [employer] - to help with home things and companionship  Visit Time    Visit Start Time: 4:00pm  Visit Stop Time: 4:20 pm  Total Visit Duration: 20 minutes

## 2023-02-20 NOTE — TELEPHONE ENCOUNTER
Left message for patient to see if she can do a virtual/phone call around 2:30 with Dr Martinez Knock

## 2023-02-20 NOTE — TELEPHONE ENCOUNTER
Paddy Sorenson is been experiencing a sore throat for a few days days  She was wondering if you would be able to send in something to help her   Please advise

## 2023-02-21 ENCOUNTER — TELEPHONE (OUTPATIENT)
Dept: FAMILY MEDICINE CLINIC | Facility: CLINIC | Age: 88
End: 2023-02-21

## 2023-02-21 NOTE — TELEPHONE ENCOUNTER
Spoke w/ pt and advised medication was sent for sore throat and pt did  from pharmacy  Pt stated she has questions regarding the Alprazolam  I asked pt what questions she had she stated she just confused re: medication  Pt wanted to speak w/ you regarding this   I scheduled virtual appt w/ for Thursday

## 2023-02-21 NOTE — TELEPHONE ENCOUNTER
Patient called stating her sore throat hurts, she is wondering if you would call in a medication for her  Also, she wants to talk to you about the Alprazolam she is afraid she is going to overdose  Please contact patient at [phone]

## 2023-02-23 ENCOUNTER — TELEMEDICINE (OUTPATIENT)
Dept: FAMILY MEDICINE CLINIC | Facility: CLINIC | Age: 88
End: 2023-02-23

## 2023-02-23 DIAGNOSIS — G25.0 BENIGN FAMILIAL TREMOR: ICD-10-CM

## 2023-02-23 DIAGNOSIS — J06.9 UPPER RESPIRATORY TRACT INFECTION, UNSPECIFIED TYPE: ICD-10-CM

## 2023-02-23 DIAGNOSIS — F51.01 PRIMARY INSOMNIA: ICD-10-CM

## 2023-02-23 DIAGNOSIS — G25.0 BENIGN FAMILIAL TREMOR: Primary | ICD-10-CM

## 2023-02-23 DIAGNOSIS — F41.1 GENERALIZED ANXIETY DISORDER: ICD-10-CM

## 2023-02-23 RX ORDER — ALPRAZOLAM 0.5 MG/1
0.5 TABLET ORAL 3 TIMES DAILY PRN
Qty: 90 TABLET | Refills: 0 | Status: SHIPPED | OUTPATIENT
Start: 2023-02-23

## 2023-02-23 RX ORDER — AZITHROMYCIN 250 MG/1
TABLET, FILM COATED ORAL
Qty: 6 TABLET | Refills: 0 | OUTPATIENT
Start: 2023-02-23

## 2023-02-23 NOTE — PROGRESS NOTES
Virtual Brief Visit    Patient is located in the following state in which I hold an active license [place]      Assessment/Plan:  Keila Boyd was seen today for virtual brief visit  Diagnoses and all orders for this visit:    Benign familial tremor    Generalized anxiety disorder- panic    Primary insomnia      Problem List Items Addressed This Visit        Nervous and Auditory    Benign familial tremor - Primary       Other    Generalized anxiety disorder- panic    Insomnia       Recent Visits  Date Type Provider Dept   [facility] [address] [facility]   02/21/23 Telephone [address] [facility]   02/20/23 Telemedicine Darlin Brill, [address] [facility]   02/20/23 Telephone [place]lin Banda DO [address] [place] [address] Primary Care   Showing recent visits within past 7 days and meeting all other requirements  Future Appointments  No visits were found meeting these conditions  Showing future appointments within next 150 days and meeting all other requirements   Patient is an 80-year-old female here seen virtually by telephone call as no video available  She is concerned about the new prescription  Her son picked it up to the pharmacist and the pharmacist mentioned concerned about "overdose "  Lengthy discussion with the patient that the new long-acting alprazolam is going to be her controller  She is to take as needed on the short acting  As long as she follows directions on how to take the medication there will be no issues  She is aware of the possible sedation and unsteadiness    She has had none of this so far on the as needed short acting lorazepam        ros    Visit Time    Visit Start Time: 12:30  Visit Stop Time: 12:40  Total Visit Duration: 10 minutes minutes

## 2023-02-23 NOTE — TELEPHONE ENCOUNTER
Otf Montoya from [facility] called  Patient just picked up her Xanax XR and now was prescribed the regular Xanax  Is the patient suppose to be taking both?

## 2023-02-23 NOTE — TELEPHONE ENCOUNTER
I spoke to Naldo at [place] [address] and gave her all of the info regarding Xanax XR and short acting Xanax

## 2023-02-23 NOTE — TELEPHONE ENCOUNTER
Yes, our goal is that patient will be able to be controlled on long-acting XR and be able to cut back on her short acting Xanax  She has been instructed to take the Xanax XR in the a m  If she has significant anxiety throughout the day she is instructed she can take the short acting Xanax no sooner than every 8 hours as needed

## 2023-02-27 ENCOUNTER — PATIENT OUTREACH (OUTPATIENT)
Dept: FAMILY MEDICINE CLINIC | Facility: CLINIC | Age: 88
End: 2023-02-27

## 2023-02-27 NOTE — PROGRESS NOTES
OP CM called back and states [facility] is still reviewing pts papers and they may not call for another 30 days  Pt also states she has bad anxiety  Pt states she resides with her son  Son works all day and then visits his son in [facility] to visit his son  Offered to assist pt with OP MH services  Pt states she is not interested at this time  OP CM will continue to follow

## 2023-04-04 ENCOUNTER — TELEPHONE (OUTPATIENT)
Dept: FAMILY MEDICINE CLINIC | Facility: CLINIC | Age: 88
End: 2023-04-04

## 2023-04-04 DIAGNOSIS — G25.0 BENIGN FAMILIAL TREMOR: ICD-10-CM

## 2023-04-04 RX ORDER — ALPRAZOLAM 0.5 MG/1
0.5 TABLET ORAL 3 TIMES DAILY PRN
Qty: 90 TABLET | Refills: 0 | Status: SHIPPED | OUTPATIENT
Start: 2023-04-04 | End: 2023-05-10 | Stop reason: SDUPTHER

## 2023-04-04 NOTE — TELEPHONE ENCOUNTER
Patient called as her Alprazolam 0 5 mg is not helping her at all  She does need a refill but didn't know if you wanted to change anything  Please advise, thank you

## 2023-04-30 DIAGNOSIS — K21.9 GASTROESOPHAGEAL REFLUX DISEASE WITHOUT ESOPHAGITIS: ICD-10-CM

## 2023-05-01 RX ORDER — FAMOTIDINE 20 MG/1
TABLET, FILM COATED ORAL
Qty: 180 TABLET | Refills: 1 | Status: SHIPPED | OUTPATIENT
Start: 2023-05-01

## 2023-05-04 DIAGNOSIS — J44.9 CHRONIC OBSTRUCTIVE PULMONARY DISEASE, UNSPECIFIED COPD TYPE (HCC): ICD-10-CM

## 2023-05-05 RX ORDER — TIOTROPIUM BROMIDE AND OLODATEROL 3.124; 2.736 UG/1; UG/1
SPRAY, METERED RESPIRATORY (INHALATION)
Qty: 4 G | Refills: 3 | Status: SHIPPED | OUTPATIENT
Start: 2023-05-05

## 2023-05-10 ENCOUNTER — OFFICE VISIT (OUTPATIENT)
Dept: FAMILY MEDICINE CLINIC | Facility: CLINIC | Age: 88
End: 2023-05-10

## 2023-05-10 VITALS
RESPIRATION RATE: 16 BRPM | DIASTOLIC BLOOD PRESSURE: 60 MMHG | SYSTOLIC BLOOD PRESSURE: 110 MMHG | WEIGHT: 178.4 LBS | BODY MASS INDEX: 32.83 KG/M2 | OXYGEN SATURATION: 92 % | HEART RATE: 69 BPM | HEIGHT: 62 IN | TEMPERATURE: 96.2 F

## 2023-05-10 DIAGNOSIS — Z00.00 MEDICARE ANNUAL WELLNESS VISIT, SUBSEQUENT: Primary | ICD-10-CM

## 2023-05-10 DIAGNOSIS — E03.9 ACQUIRED HYPOTHYROIDISM: ICD-10-CM

## 2023-05-10 DIAGNOSIS — E78.01 FAMILIAL HYPERCHOLESTEROLEMIA: ICD-10-CM

## 2023-05-10 DIAGNOSIS — H40.9 GLAUCOMA OF BOTH EYES, UNSPECIFIED GLAUCOMA TYPE: ICD-10-CM

## 2023-05-10 DIAGNOSIS — J96.22 ACUTE ON CHRONIC RESPIRATORY FAILURE WITH HYPOXIA AND HYPERCAPNIA (HCC): ICD-10-CM

## 2023-05-10 DIAGNOSIS — F51.01 PRIMARY INSOMNIA: ICD-10-CM

## 2023-05-10 DIAGNOSIS — G25.0 BENIGN FAMILIAL TREMOR: ICD-10-CM

## 2023-05-10 DIAGNOSIS — J44.9 CHRONIC OBSTRUCTIVE PULMONARY DISEASE, UNSPECIFIED COPD TYPE (HCC): ICD-10-CM

## 2023-05-10 DIAGNOSIS — E11.9 TYPE 2 DIABETES MELLITUS WITHOUT COMPLICATION, WITHOUT LONG-TERM CURRENT USE OF INSULIN (HCC): ICD-10-CM

## 2023-05-10 DIAGNOSIS — I50.32 CHRONIC DIASTOLIC CONGESTIVE HEART FAILURE (HCC): ICD-10-CM

## 2023-05-10 DIAGNOSIS — I10 PRIMARY HYPERTENSION: ICD-10-CM

## 2023-05-10 DIAGNOSIS — E66.01 MORBID (SEVERE) OBESITY DUE TO EXCESS CALORIES (HCC): ICD-10-CM

## 2023-05-10 DIAGNOSIS — J96.21 ACUTE ON CHRONIC RESPIRATORY FAILURE WITH HYPOXIA AND HYPERCAPNIA (HCC): ICD-10-CM

## 2023-05-10 LAB — SL AMB POCT HEMOGLOBIN AIC: 6.2 (ref ?–6.5)

## 2023-05-10 RX ORDER — ALPRAZOLAM 0.5 MG/1
0.5 TABLET ORAL 3 TIMES DAILY PRN
Qty: 100 TABLET | Refills: 0 | Status: SHIPPED | OUTPATIENT
Start: 2023-05-10

## 2023-05-10 RX ORDER — ROPINIROLE 0.5 MG/1
0.5 TABLET, FILM COATED ORAL
Qty: 30 TABLET | Refills: 0 | Status: SHIPPED | OUTPATIENT
Start: 2023-05-10

## 2023-05-10 RX ORDER — FUROSEMIDE 20 MG/1
20 TABLET ORAL DAILY
Qty: 90 TABLET | Refills: 1 | Status: SHIPPED | OUTPATIENT
Start: 2023-05-10

## 2023-05-10 NOTE — PATIENT INSTRUCTIONS
Medicare Preventive Visit Patient Instructions  Thank you for completing your Welcome to Medicare Visit or Medicare Annual Wellness Visit today  Your next wellness visit will be due in one year (5/10/2024)  The screening/preventive services that you may require over the next 5-10 years are detailed below  Some tests may not apply to you based off risk factors and/or age  Screening tests ordered at today's visit but not completed yet may show as past due  Also, please note that scanned in results may not display below  Preventive Screenings:  Service Recommendations Previous Testing/Comments   Colorectal Cancer Screening  * Colonoscopy    * Fecal Occult Blood Test (FOBT)/Fecal Immunochemical Test (FIT)  * Fecal DNA/Cologuard Test  * Flexible Sigmoidoscopy Age: 39-70 years old   Colonoscopy: every 10 years (may be performed more frequently if at higher risk)  OR  FOBT/FIT: every 1 year  OR  Cologuard: every 3 years  OR  Sigmoidoscopy: every 5 years  Screening may be recommended earlier than age 39 if at higher risk for colorectal cancer  Also, an individualized decision between you and your healthcare provider will decide whether screening between the ages of 74-80 would be appropriate  Colonoscopy: Not on file  FOBT/FIT: Not on file  Cologuard: Not on file  Sigmoidoscopy: Not on file    Screening Not Indicated     Breast Cancer Screening Age: 36 years old  Frequency: every 1-2 years  Not required if history of left and right mastectomy Mammogram: Not on file        Cervical Cancer Screening Between the ages of 21-29, pap smear recommended once every 3 years  Between the ages of 33-67, can perform pap smear with HPV co-testing every 5 years     Recommendations may differ for women with a history of total hysterectomy, cervical cancer, or abnormal pap smears in past  Pap Smear: Not on file    Screening Not Indicated   Hepatitis C Screening Once for adults born between 1945 and 1965  More frequently in patients at high risk for Hepatitis C Hep C Antibody: Not on file        Diabetes Screening 1-2 times per year if you're at risk for diabetes or have pre-diabetes Fasting glucose: 105 mg/dL (10/24/2022)  A1C: 6 1 % (10/24/2022)  Screening Not Indicated  History Diabetes   Cholesterol Screening Once every 5 years if you don't have a lipid disorder  May order more often based on risk factors  Lipid panel: 10/24/2022    Screening Not Indicated  History Lipid Disorder     Other Preventive Screenings Covered by Medicare:  1  Abdominal Aortic Aneurysm (AAA) Screening: covered once if your at risk  You're considered to be at risk if you have a family history of AAA  2  Lung Cancer Screening: covers low dose CT scan once per year if you meet all of the following conditions: (1) Age 50-69; (2) No signs or symptoms of lung cancer; (3) Current smoker or have quit smoking within the last 15 years; (4) You have a tobacco smoking history of at least 20 pack years (packs per day multiplied by number of years you smoked); (5) You get a written order from a healthcare provider  3  Glaucoma Screening: covered annually if you're considered high risk: (1) You have diabetes OR (2) Family history of glaucoma OR (3)  aged 48 and older OR (3)  American aged 72 and older  3  Osteoporosis Screening: covered every 2 years if you meet one of the following conditions: (1) You're estrogen deficient and at risk for osteoporosis based off medical history and other findings; (2) Have a vertebral abnormality; (3) On glucocorticoid therapy for more than 3 months; (4) Have primary hyperparathyroidism; (5) On osteoporosis medications and need to assess response to drug therapy  · Last bone density test (DXA Scan): Not on file  5  HIV Screening: covered annually if you're between the age of 12-76  Also covered annually if you are younger than 13 and older than 72 with risk factors for HIV infection   For pregnant patients, it is covered up to 3 times per pregnancy  Immunizations:  Immunization Recommendations   Influenza Vaccine Annual influenza vaccination during flu season is recommended for all persons aged >= 6 months who do not have contraindications   Pneumococcal Vaccine   * Pneumococcal conjugate vaccine = PCV13 (Prevnar 13), PCV15 (Vaxneuvance), PCV20 (Prevnar 20)  * Pneumococcal polysaccharide vaccine = PPSV23 (Pneumovax) Adults 25-60 years old: 1-3 doses may be recommended based on certain risk factors  Adults 72 years old: 1-2 doses may be recommended based off what pneumonia vaccine you previously received   Hepatitis B Vaccine 3 dose series if at intermediate or high risk (ex: diabetes, end stage renal disease, liver disease)   Tetanus (Td) Vaccine - COST NOT COVERED BY MEDICARE PART B Following completion of primary series, a booster dose should be given every 10 years to maintain immunity against tetanus  Td may also be given as tetanus wound prophylaxis  Tdap Vaccine - COST NOT COVERED BY MEDICARE PART B Recommended at least once for all adults  For pregnant patients, recommended with each pregnancy  Shingles Vaccine (Shingrix) - COST NOT COVERED BY MEDICARE PART B  2 shot series recommended in those aged 48 and above     Health Maintenance Due:  There are no preventive care reminders to display for this patient  Immunizations Due:      Topic Date Due   • COVID-19 Vaccine (3 - Booster for Pfizer series) 06/10/2021     Advance Directives   What are advance directives? Advance directives are legal documents that state your wishes and plans for medical care  These plans are made ahead of time in case you lose your ability to make decisions for yourself  Advance directives can apply to any medical decision, such as the treatments you want, and if you want to donate organs  What are the types of advance directives? There are many types of advance directives, and each state has rules about how to use them   You may choose a combination of any of the following:  · Living will: This is a written record of the treatment you want  You can also choose which treatments you do not want, which to limit, and which to stop at a certain time  This includes surgery, medicine, IV fluid, and tube feedings  · Durable power of  for healthcare [facility]): This is a written record that states who you want to make healthcare choices for you when you are unable to make them for yourself  This person, called a proxy, is usually a family member or a friend  You may choose more than 1 proxy  · Do not resuscitate (DNR) order:  A DNR order is used in case your heart stops beating or you stop breathing  It is a request not to have certain forms of treatment, such as CPR  A DNR order may be included in other types of advance directives  · Medical directive: This covers the care that you want if you are in a coma, near death, or unable to make decisions for yourself  You can list the treatments you want for each condition  Treatment may include pain medicine, surgery, blood transfusions, dialysis, IV or tube feedings, and a ventilator (breathing machine)  · Values history: This document has questions about your views, beliefs, and how you feel and think about life  This information can help others choose the care that you would choose  Why are advance directives important? An advance directive helps you control your care  Although spoken wishes may be used, it is better to have your wishes written down  Spoken wishes can be misunderstood, or not followed  Treatments may be given even if you do not want them  An advance directive may make it easier for your family to make difficult choices about your care  Weight Management   Why it is important to manage your weight:  Being overweight increases your risk of health conditions such as heart disease, high blood pressure, type 2 diabetes, and certain types of cancer   It can also increase your risk for osteoarthritis, sleep apnea, and other respiratory problems  Aim for a slow, steady weight loss  Even a small amount of weight loss can lower your risk of health problems  How to lose weight safely:  A safe and healthy way to lose weight is to eat fewer calories and get regular exercise  You can lose up about 1 pound a week by decreasing the number of calories you eat by 500 calories each day  Healthy meal plan for weight management:  A healthy meal plan includes a variety of foods, contains fewer calories, and helps you stay healthy  A healthy meal plan includes the following:  · Eat whole-grain foods more often  A healthy meal plan should contain fiber  Fiber is the part of grains, fruits, and vegetables that is not broken down by your body  Whole-grain foods are healthy and provide extra fiber in your diet  Some examples of whole-grain foods are whole-wheat breads and pastas, oatmeal, brown rice, and bulgur  · Eat a variety of vegetables every day  Include dark, leafy greens such as spinach, kale, noa greens, and mustard greens  Eat yellow and orange vegetables such as carrots, sweet potatoes, and winter squash  · Eat a variety of fruits every day  Choose fresh or canned fruit (canned in its own juice or light syrup) instead of juice  Fruit juice has very little or no fiber  · Eat low-fat dairy foods  Drink fat-free (skim) milk or 1% milk  Eat fat-free yogurt and low-fat cottage cheese  Try low-fat cheeses such as mozzarella and other reduced-fat cheeses  · Choose meat and other protein foods that are low in fat  Choose beans or other legumes such as split peas or lentils  Choose fish, skinless poultry (chicken or turkey), or lean cuts of red meat (beef or pork)  Before you cook meat or poultry, cut off any visible fat  · Use less fat and oil  Try baking foods instead of frying them  Add less fat, such as margarine, sour cream, regular salad dressing and mayonnaise to foods   Eat fewer high-fat foods  Some examples of high-fat foods include french fries, doughnuts, ice cream, and cakes  · Eat fewer sweets  Limit foods and drinks that are high in sugar  This includes candy, cookies, regular soda, and sweetened drinks  Exercise:  Exercise at least 30 minutes per day on most days of the week  Some examples of exercise include walking, biking, dancing, and swimming  You can also fit in more physical activity by taking the stairs instead of the elevator or parking farther away from stores  Ask your healthcare provider about the best exercise plan for you  © Copyright [employer] 2018 Information is for End User's use only and may not be sold, redistributed or otherwise used for commercial purposes   All illustrations and images included in CareNotes® are the copyrighted property of A D A M , [employer]  or [address] [place] [address]

## 2023-05-10 NOTE — PROGRESS NOTES
Assessment and Plan:   Juan Francisco Hoang was seen today for medicare wellness visit  Diagnoses and all orders for this visit:    Medicare annual wellness visit, subsequent    Type 2 diabetes mellitus without complication, without long-term current use of insulin ([facility] )  -     POCT hemoglobin A1c  -     Hemoglobin A1C; Future  -     Comprehensive metabolic panel; Future    Benign familial tremor  -     ALPRAZolam (XANAX) 0 5 mg tablet; Take 1 tablet (0 5 mg total) by mouth 3 (three) times a day as needed (TREMOR) May take one additional prn    Acute on chronic respiratory failure with hypoxia and hypercapnia (HCC)  -     furosemide (LASIX) 20 mg tablet; Take 1 tablet (20 mg total) by mouth daily    Glaucoma of both eyes, unspecified glaucoma type  -     bimatoprost (LUMIGAN) 0 01 % ophthalmic drops; Administer 1 drop to both eyes daily at bedtime    Morbid (severe) obesity due to excess calories (HCC)    Primary insomnia  -     rOPINIRole (REQUIP) 0 5 mg tablet; Take 1 tablet (0 5 mg total) by mouth daily at bedtime Will titrate up    Primary hypertension  -     Comprehensive metabolic panel; Future    Acquired hypothyroidism  -     TSH, 3rd generation; Future    Familial hypercholesterolemia  -     Lipid Panel with Direct LDL reflex; Future    Chronic obstructive pulmonary disease, unspecified COPD type ([facility] )    Chronic diastolic congestive heart failure ([facility] )    Labs will be due in October  Continue current medical regimen for all other diagnoses  With regards to tremor we will suggest Requip with titration pending clinical response  Patient will be allowed to take an extra Xanax as needed knowing the risk for radial with age and other comorbid diagnoses    Problem List Items Addressed This Visit        Endocrine    Hypothyroidism    Relevant Orders    TSH, 3rd generation    Type 2 diabetes mellitus ([facility] )    Relevant Orders    POCT hemoglobin A1c (Completed)    Hemoglobin A1C    Comprehensive metabolic panel Respiratory    COPD (chronic obstructive pulmonary disease) (HCC)    Acute on chronic respiratory failure with hypoxia and hypercapnia (HCC)    Relevant Medications    furosemide (LASIX) 20 mg tablet       Cardiovascular and Mediastinum    Hypertension    Relevant Medications    furosemide (LASIX) 20 mg tablet    Other Relevant Orders    Comprehensive metabolic panel       Nervous and Auditory    Benign familial tremor    Relevant Medications    ALPRAZolam (XANAX) 0 5 mg tablet    rOPINIRole (REQUIP) 0 5 mg tablet       Other    Glaucoma    Relevant Medications    bimatoprost (LUMIGAN) 0 01 % ophthalmic drops    Hyperlipidemia    Relevant Orders    Lipid Panel with Direct LDL reflex    Insomnia    Relevant Medications    rOPINIRole (REQUIP) 0 5 mg tablet    Morbid (severe) obesity due to excess calories ([facility] )   Other Visit Diagnoses     Medicare annual wellness visit, subsequent    -  Primary    Chronic diastolic congestive heart failure ([facility] )            BMI Counseling: Body mass index is 32 42 kg/m²  The BMI is above normal  Nutrition recommendations include decreasing portion sizes, encouraging healthy choices of fruits and vegetables, decreasing fast food intake, consuming healthier snacks, limiting drinks that contain sugar, moderation in carbohydrate intake, increasing intake of lean protein, reducing intake of saturated and trans fat and reducing intake of cholesterol  Exercise recommendations include exercising 3-5 times per week  Rationale for BMI follow-up plan is due to patient being overweight or obese  BMI Counseling: Body mass index is 32 42 kg/m²  Follow-up plan was not completed due to elderly patient (72 years old) where weight reduction/weight gain would complicate underlying health condition such as: illness or physical disability  Preventive health issues were discussed with patient, and age appropriate screening tests were ordered as noted in patient's After Visit Summary    Personalized health advice and appropriate referrals for health education or preventive services given if needed, as noted in patient's After Visit Summary  History of Present Illness:     Patient presents for a Medicare Wellness Visit    80-year-old female here for Medicare wellness accompanied by her daughter-in-law  States that she has an upcoming visit with Dr Ciera Bethea for her diabetic eye follow-up  She has been going fairly regularly but we have no documentation  Care gap out reach  Component      Latest Ref Rng & Units 10/24/2022 5/10/2023   Hemoglobin A1C      6 5 6 1 (H) 6 2   eAG, EST AVG Glucose      mg/dl 128        Patient Care Team:  Gideon Ji DO as PCP - General (Family Medicine)  Gideon Ji DO as PCP - PCP-[facility]  Rosie Sacks, MD Kennard Hausen, MD Henok Loza, MD Sean Gimenez, DO Gideon Ji, DO Mery Mayo MD as Endoscopist  Maria C Addison, MSW as  Care Manager ([employer])     Review of Systems:     Review of Systems   Neurological: Positive for tremors (And anxiety  Long-acting Xanax XR was not helpful  )  Patient wants to know if she can take 1 extra short acting Xanax as needed  We have tried BuSpar and other SSRIs without clinical benefit and produced side effects    Patient does tolerate the Xanax and is aware of the risk age wise etc      Problem List:     Patient Active Problem List   Diagnosis   • COPD (chronic obstructive pulmonary disease) ([facility] )   • Benign familial tremor   • Hypertension   • Hypothyroidism   • Hx of arterial ischemic stroke   • Acute on chronic diastolic congestive heart failure (HCC)   • Clinical depression   • Esophageal reflux   • Female stress incontinence   • Generalized anxiety disorder- panic   • Generalized osteoarthritis   • Glaucoma   • Hyperlipidemia   • Insomnia   • JAMEEL (obstructive sleep apnea)   • Osteoarthritis of knee   • Postural imbalance   • S/P CABG (coronary artery bypass graft)   • History of total knee replacement   • Type 2 diabetes mellitus (HCC)   • Pleural effusion   • Acute on chronic respiratory failure with hypoxia and hypercapnia (HCC)   • Morbid (severe) obesity due to excess calories [facility])      Past Medical and Surgical History:     Past Medical History:   Diagnosis Date   • Anxiety    • Cataract, bilateral     Last Assessed:3/19/2014   • COPD (chronic obstructive pulmonary disease) ([facility])    • Coronary artery disease    • Hx of arterial ischemic stroke    • Hypertension    • Stroke [facility])    • Wears glasses      Past Surgical History:   Procedure Laterality Date   • CAROTID ENDARTERECTOMY Right    • CATARACT EXTRACTION W/ INTRAOCULAR LENS  IMPLANT, BILATERAL     • COLONOSCOPY N/A 9/30/2017    Procedure: COLONOSCOPY FOR CONTROL OF BLEEDING;  Surgeon: Reginaldo Hebert MD;  Location:  MAIN OR;  Service: Colorectal   • CORONARY ARTERY BYPASS GRAFT      CABG X3 with LIMA to LAD,SVG to OM,SVG to distal  RCA ; Last Assessed:7/13/2015   • JOINT REPLACEMENT      left TKR   • KIDNEY STONE SURGERY     • NEPHROSTOMY Left     with Drainage Irrigation ; NBUPR:[id number]   • AR ARTHRP KNE CONDYLE&PLATU MEDIAL&LAT COMPARTMENTS Right 2/25/2016    Procedure: ARTHROPLASTY KNEE TOTAL;  Surgeon: Mercy Yanez MD;  Location: [facility];  Service: Orthopedics      Family History:     Family History   Problem Relation Age of Onset   • Cancer Mother         malignant neoplasm   • Heart attack Father    • Aneurysm Other         Aortic   • Cancer Other         malignant neoplasm      Social History:     Social History     Socioeconomic History   • Marital status:       Spouse name: None   • Number of children: None   • Years of education: 10   • Highest education level: None   Occupational History   • None   Tobacco Use   • Smoking status: Never   • Smokeless tobacco: Never   Vaping Use   • Vaping Use: Never used   Substance and Sexual Activity   • Alcohol use: Never   • Drug use: Never   • Sexual activity: Not Currently   Other Topics Concern   • None   Social History Narrative    ** Merged History Encounter **         No caffeine use     Social Determinants of Health     Financial Resource Strain: Low Risk    • Difficulty of Paying Living Expenses: Not hard at all   Food Insecurity: Not on file   Transportation Needs: No Transportation Needs   • Lack of Transportation (Medical): No   • Lack of Transportation (Non-Medical):  No   Physical Activity: Not on file   Stress: Not on file   Social Connections: Not on file   Intimate Partner Violence: Not on file   Housing Stability: Not on file      Medications and Allergies:     Current Outpatient Medications   Medication Sig Dispense Refill   • ALPRAZolam (XANAX) 0 5 mg tablet Take 1 tablet (0 5 mg total) by mouth 3 (three) times a day as needed (TREMOR) May take one additional prn 100 tablet 0   • amLODIPine (NORVASC) 5 mg tablet Take 1 tablet (5 mg total) by mouth daily 90 tablet 3   • aspirin (ECOTRIN LOW STRENGTH) 81 mg EC tablet Take 81 mg by mouth daily     • bimatoprost (LUMIGAN) 0 01 % ophthalmic drops Administer 1 drop to both eyes daily at bedtime 5 mL 5   • Cholecalciferol (VITAMIN D) 2000 UNITS tablet Take 1,000 Units by mouth daily      • famotidine (PEPCID) 20 mg tablet TAKE 1 TABLET BY MOUTH TWICE A  tablet 1   • furosemide (LASIX) 20 mg tablet Take 1 tablet (20 mg total) by mouth daily 90 tablet 1   • levothyroxine 75 mcg tablet TAKE 1 TABLET BY MOUTH EVERY DAY IN THE MORNING 90 tablet 1   • metoprolol tartrate (LOPRESSOR) 25 mg tablet TAKE 1 TABLET (25 MG TOTAL) BY MOUTH EVERY 12 (TWELVE) HOURS 180 tablet 3   • Pediatric Multiple Vit-C-FA (PEDIATRIC MULTIVITAMIN) chewable tablet Chew 1 tablet daily     • Potassium Gluconate 595 (99 K) MG TABS Take by mouth     • rOPINIRole (REQUIP) 0 5 mg tablet Take 1 tablet (0 5 mg total) by mouth daily at bedtime Will titrate up 30 tablet 0   • rosuvastatin (CRESTOR) 10 MG tablet TAKE 1 TABLET BY MOUTH EVERY DAY 90 tablet 3   • Stiolto Respimat 2 5-2 5 MCG/ACT inhaler INHALE 2 PUFFS BY MOUTH EVERY DAY 4 g 3     No current facility-administered medications for this visit  Allergies   Allergen Reactions   • Penicillins Itching   • Penicillins Rash      Immunizations:     Immunization History   Administered Date(s) Administered   • COVID-19 PFIZER VACCINE 0 3 ML IM 03/25/2021, 04/15/2021   • INFLUENZA 11/09/2016, 10/10/2017   • Influenza Split High Dose Preservative Free IM 10/15/2012, 10/15/2013, 10/15/2014, 10/28/2015, 11/09/2016, 10/10/2017   • Influenza, high dose seasonal 0 7 mL 10/16/2018, 10/15/2019, 10/15/2020, 10/01/2021, 10/27/2022   • Influenza, seasonal, injectable 12/06/2011   • Pneumococcal Conjugate 13-Valent 11/09/2016   • Pneumococcal Polysaccharide PPV23 06/25/2015   • Zoster 05/01/2017      Health Maintenance: There are no preventive care reminders to display for this patient  Topic Date Due   • COVID-19 Vaccine (3 - Booster for Pfizer series) 06/10/2021      Medicare Screening Tests and Risk Assessments:     Juan Francisco Hoang is here for her Subsequent Wellness visit  Health Risk Assessment:   Patient rates overall health as good  Patient feels that their physical health rating is same  Patient is very satisfied with their life  Eyesight was rated as same  Hearing was rated as same  Patient feels that their emotional and mental health rating is slightly better  Patients states they are never, rarely angry  Patient states they are often unusually tired/fatigued  Pain experienced in the last 7 days has been none  Patient states that she has experienced no weight loss or gain in last 6 months  Depression Screening:   PHQ-9 Score: 7      Fall Risk Screening:    In the past year, patient has experienced: no history of falling in past year      Urinary Incontinence Screening:   Patient has not leaked urine accidently in the last six months  Home Safety:  Patient does not have trouble with stairs inside or outside of their home  Patient has working smoke alarms and has working carbon monoxide detector  Home safety hazards include: none  Nutrition:   Current diet is Regular  Medications:   Patient is currently taking over-the-counter supplements  OTC medications include: see medication list  Patient is able to manage medications  Activities of Daily Living (ADLs)/Instrumental Activities of Daily Living (IADLs):   Walk and transfer into and out of bed and chair?: Yes  Dress and groom yourself?: Yes    Bathe or shower yourself?: Yes    Feed yourself?  Yes  Do your laundry/housekeeping?: Yes  Manage your money, pay your bills and track your expenses?: Yes  Make your own meals?: Yes    Do your own shopping?: Yes    Previous Hospitalizations:   Any hospitalizations or ED visits within the last 12 months?: No      Advance Care Planning:   Living will: No    Durable POA for healthcare: No    Advanced directive: Yes    End of Life Decisions reviewed with patient: Yes      Comments: Warren Aguilar is healthcare proxy    Cognitive Screening:   Provider or family/friend/caregiver concerned regarding cognition?: No    PREVENTIVE SCREENINGS      Cardiovascular Screening:    General: History Lipid Disorder and Screening Current      Diabetes Screening:     General: History Diabetes and Screening Current      Colorectal Cancer Screening:     General: Screening Not Indicated      Breast Cancer Screening:     General: Screening Not Indicated      Cervical Cancer Screening:    General: Screening Not Indicated      Osteoporosis Screening:    General: Patient Declines      Abdominal Aortic Aneurysm (AAA) Screening:        General: Patient Declines      Lung Cancer Screening:     General: Screening Not Indicated      Hepatitis C Screening:    General: Screening Not Indicated    Hep C Screening Accepted: No     Screening, Brief Intervention, and "Referral to Treatment (SBIRT)    Screening  Typical number of drinks in a day: 0  Typical number of drinks in a week: 0  Interpretation: Low risk drinking behavior  Single Item Drug Screening:  How often have you used an illegal drug (including marijuana) or a prescription medication for non-medical reasons in the past year? never    Single Item Drug Screen Score: 0  Interpretation: Negative screen for possible drug use disorder    No results found  Physical Exam:     /60   Pulse 69   Temp (!) 96 2 °F (35 7 °C) (Temporal)   Resp 16   Ht 5' 2 21\" (1 58 m)   Wt 80 9 kg (178 lb 6 4 oz)   SpO2 92%   BMI 32 42 kg/m²     Physical Exam  Vitals and nursing note reviewed  Constitutional:       General: She is not in acute distress  Appearance: She is well-developed  She is obese  Comments: 70-year-old female who appears her stated age   HENT:      Head: Normocephalic and atraumatic  Mouth/Throat:      Mouth: Mucous membranes are moist    Eyes:      Conjunctiva/sclera: Conjunctivae normal    Cardiovascular:      Rate and Rhythm: Normal rate and regular rhythm  Heart sounds: Murmur heard  Pulmonary:      Effort: Pulmonary effort is normal  No respiratory distress  Breath sounds: Normal breath sounds  Abdominal:      Palpations: Abdomen is soft  Tenderness: There is no abdominal tenderness  Comments: Obese   Musculoskeletal:         General: No swelling  Comments: Gait not tested  She is in wheelchair  She can ambulate  And does get herself up the stairs at home   Skin:     General: Skin is dry  Capillary Refill: Capillary refill takes less than 2 seconds  Neurological:      Mental Status: She is alert and oriented to person, place, and time  Motor: Tremor present     Psychiatric:         Mood and Affect: Mood normal           Ti Many, DO  "

## 2023-06-16 ENCOUNTER — OFFICE VISIT (OUTPATIENT)
Dept: CARDIOLOGY CLINIC | Facility: CLINIC | Age: 88
End: 2023-06-16
Payer: COMMERCIAL

## 2023-06-16 VITALS
DIASTOLIC BLOOD PRESSURE: 50 MMHG | WEIGHT: 180 LBS | HEART RATE: 67 BPM | BODY MASS INDEX: 32.71 KG/M2 | SYSTOLIC BLOOD PRESSURE: 120 MMHG

## 2023-06-16 DIAGNOSIS — E78.5 DYSLIPIDEMIA: ICD-10-CM

## 2023-06-16 DIAGNOSIS — I10 BENIGN ESSENTIAL HTN: ICD-10-CM

## 2023-06-16 DIAGNOSIS — I10 PRIMARY HYPERTENSION: Primary | ICD-10-CM

## 2023-06-16 DIAGNOSIS — I25.10 CORONARY ARTERY DISEASE INVOLVING NATIVE CORONARY ARTERY OF NATIVE HEART WITHOUT ANGINA PECTORIS: ICD-10-CM

## 2023-06-16 PROCEDURE — 93000 ELECTROCARDIOGRAM COMPLETE: CPT | Performed by: INTERNAL MEDICINE

## 2023-06-16 PROCEDURE — 99214 OFFICE O/P EST MOD 30 MIN: CPT | Performed by: INTERNAL MEDICINE

## 2023-06-17 NOTE — PROGRESS NOTES
Cardiology             Elena Briceño  1935  [id number]              Assessment/Plan:     CAD status post CABG x3  PVCs  Benign essential hypertension  Dyslipidemia  Chronic right bundle branch block  Chronic diastolic CHF          No symptoms of angina, continue ASA, rosuvastatin  BP controlled, continue metoprolol, amlodipine  Euvolemic by exam, continue lasix      Follow-up in 6 months          Interval History:      This is a very pleasant 80-year-old female with a history of CVA 02/2015 with MRA neck revealing greater than 70% bilateral internal carotid artery stenosis with diminished flow in the right MCA   CTA revealed greater than 90% high-grade stenosis on the right and she thereafter underwent right CEA   She had a NSTEMI 06/2015 with cardiac catheterization revealing three-vessel CAD   Thereafter she underwent CABG x3 with LIMA to LAD, SVG to OM, SVG to RCA in 2015   She lives with her son      She was hospitalized with a fall 05/2019, and had a closed fracture of cervical vertebrae with non operative management      She was subsequently hospitalized 02/2021 with CHF   Her discharge weight was 204 lb      She presents today for follow-up with no complaints  She denies chest pain, shortness of breath, dizziness, palpitations, lower extremity edema  Vitals:  Vitals:    06/16/23 1623   BP: 120/50   BP Location: Left arm   Patient Position: Sitting   Cuff Size: Adult   Pulse: 67   Weight: 81 6 kg (180 lb)         Past Medical History:   Diagnosis Date   • Anxiety    • Cataract, bilateral     Last Assessed:3/19/2014   • COPD (chronic obstructive pulmonary disease) ([facility]    • Coronary artery disease    • Hx of arterial ischemic stroke    • Hypertension    • Stroke [facility])    • Wears glasses      Social History     Socioeconomic History   • Marital status:       Spouse name: Not on file   • Number of children: Not on file   • Years of education: 10   • Highest education level: Not on file   Occupational History   • Not on file   Tobacco Use   • Smoking status: Never   • Smokeless tobacco: Never   Vaping Use   • Vaping Use: Never used   Substance and Sexual Activity   • Alcohol use: Never   • Drug use: Never   • Sexual activity: Not Currently   Other Topics Concern   • Not on file   Social History Narrative    ** Merged History Encounter **         No caffeine use     Social Determinants of Health     Financial Resource Strain: Low Risk  (5/10/2023)    Overall Financial Resource Strain (CARDIA)    • Difficulty of Paying Living Expenses: Not hard at all   Food Insecurity: Not on file   Transportation Needs: No Transportation Needs (5/10/2023)    PRAPARE - Transportation    • Lack of Transportation (Medical): No    • Lack of Transportation (Non-Medical): No   Physical Activity: Not on file   Stress: Not on file   Social Connections: Not on file   Intimate Partner Violence: Not on file   Housing Stability: Not on file      Family History   Problem Relation Age of Onset   • Cancer Mother         malignant neoplasm   • Heart attack Father    • Aneurysm Other         Aortic   • Cancer Other         malignant neoplasm     Past Surgical History:   Procedure Laterality Date   • CAROTID ENDARTERECTOMY Right    • CATARACT EXTRACTION W/ INTRAOCULAR LENS  IMPLANT, BILATERAL     • COLONOSCOPY N/A 9/30/2017    Procedure: COLONOSCOPY FOR CONTROL OF BLEEDING;  Surgeon: Antoni Berry MD;  Location:  MAIN OR;  Service: Colorectal   • CORONARY ARTERY BYPASS GRAFT      CABG X3 with LIMA to LAD,SVG to OM,SVG to distal  RCA ; Last Assessed:7/13/2015   • JOINT REPLACEMENT      left TKR   • KIDNEY STONE SURGERY     • NEPHROSTOMY Left     with Drainage Irrigation ; DUDOP:[id number]   • TN ARTHRP KNE CONDYLE&PLATU MEDIAL&LAT COMPARTMENTS Right 2/25/2016    Procedure: ARTHROPLASTY KNEE TOTAL;  Surgeon: Eri Lawler MD;  Location: [facility];  Service: Orthopedics       Current Outpatient Medications:   •  ALPRAZolam Mireille Mendiola) 0 5 mg tablet, Take 1 tablet (0 5 mg total) by mouth 3 (three) times a day as needed (TREMOR) May take one additional prn, Disp: 100 tablet, Rfl: 0  •  amLODIPine (NORVASC) 5 mg tablet, Take 1 tablet (5 mg total) by mouth daily, Disp: 90 tablet, Rfl: 3  •  aspirin (ECOTRIN LOW STRENGTH) 81 mg EC tablet, Take 81 mg by mouth daily, Disp: , Rfl:   •  bimatoprost (LUMIGAN) 0 01 % ophthalmic drops, Administer 1 drop to both eyes daily at bedtime, Disp: 5 mL, Rfl: 5  •  Cholecalciferol (VITAMIN D) 2000 UNITS tablet, Take 1,000 Units by mouth daily , Disp: , Rfl:   •  famotidine (PEPCID) 20 mg tablet, TAKE 1 TABLET BY MOUTH TWICE A DAY, Disp: 180 tablet, Rfl: 1  •  furosemide (LASIX) 20 mg tablet, Take 1 tablet (20 mg total) by mouth daily, Disp: 90 tablet, Rfl: 1  •  levothyroxine 75 mcg tablet, TAKE 1 TABLET BY MOUTH EVERY DAY IN THE MORNING, Disp: 90 tablet, Rfl: 1  •  metoprolol tartrate (LOPRESSOR) 25 mg tablet, TAKE 1 TABLET (25 MG TOTAL) BY MOUTH EVERY 12 (TWELVE) HOURS, Disp: 180 tablet, Rfl: 3  •  Pediatric Multiple Vit-C-FA (PEDIATRIC MULTIVITAMIN) chewable tablet, Chew 1 tablet daily, Disp: , Rfl:   •  Potassium Gluconate 595 (99 K) MG TABS, Take by mouth, Disp: , Rfl:   •  rOPINIRole (REQUIP) 0 5 mg tablet, Take 1 tablet (0 5 mg total) by mouth daily at bedtime Will titrate up, Disp: 30 tablet, Rfl: 0  •  rosuvastatin (CRESTOR) 10 MG tablet, TAKE 1 TABLET BY MOUTH EVERY DAY, Disp: 90 tablet, Rfl: 3  •  Stiolto Respimat 2 5-2 5 MCG/ACT inhaler, INHALE 2 PUFFS BY MOUTH EVERY DAY, Disp: 4 g, Rfl: 3        Review of Systems:  Review of Systems   Constitutional: Negative for activity change, fever and unexpected weight change  HENT: Negative for facial swelling, nosebleeds and voice change  Respiratory: Negative for chest tightness, shortness of breath and wheezing  Cardiovascular: Negative for chest pain, palpitations and leg swelling  Gastrointestinal: Negative for abdominal distention     Genitourinary: Negative for hematuria  Musculoskeletal: Negative for arthralgias  Skin: Negative for color change, pallor, rash and wound  Neurological: Negative for dizziness, seizures, syncope and light-headedness  Psychiatric/Behavioral: Negative for agitation  Physical Exam:  Physical Exam  Vitals reviewed  Constitutional:       Appearance: She is well-developed  HENT:      Head: Normocephalic and atraumatic  Cardiovascular:      Rate and Rhythm: Normal rate and regular rhythm  Heart sounds: Normal heart sounds  Pulmonary:      Effort: Pulmonary effort is normal       Breath sounds: Normal breath sounds  Abdominal:      Palpations: Abdomen is soft  Musculoskeletal:         General: Normal range of motion  Cervical back: Normal range of motion and neck supple  Skin:     General: Skin is warm and dry  Neurological:      Mental Status: She is alert and oriented to person, place, and time  Psychiatric:         Behavior: Behavior normal          Thought Content: Thought content normal          Judgment: Judgment normal          This note was completed in part utilizing [employer] Software  Grammatical errors, random word insertions, spelling mistakes, and incomplete sentences can be an occasional consequence of this system secondary to software limitations, ambient noise, and hardware issues  If you have any questions or concerns about the content, text, or information contained within the body of this dictation, please contact the provider for clarification

## 2023-06-19 ENCOUNTER — PATIENT OUTREACH (OUTPATIENT)
Dept: FAMILY MEDICINE CLINIC | Facility: CLINIC | Age: 88
End: 2023-06-19

## 2023-06-19 NOTE — PROGRESS NOTES
OP CM called to pt to follow up on any psychosocial needs  Pts son states pt was denied for home health waiver bc they felt pt was too indep  Son is aware that he can call OP CM if anything changes  Pt does get SOB going up and down steps  Pts son has cameras in the house  Son would like to see if pt is eligible to reapply for waiver so email sent and will get back to son  Son states it is difficult bc he is the only one in the family that assists with patient

## 2023-06-20 ENCOUNTER — PATIENT OUTREACH (OUTPATIENT)
Dept: FAMILY MEDICINE CLINIC | Facility: CLINIC | Age: 88
End: 2023-06-20

## 2023-06-20 NOTE — PROGRESS NOTES
OP CM vd email back from [facility]:    Chris Castillo,   I created a task to have a new application started  Our office will be calling the consumer to schedule a new [employer] application soon  Sincerely,   Ray Figueredo     Called to pts son Jenifer Cordero and made him aware  Offered to call pt as well and he states she will not answer so he called pt on [address] with me  Pt did not answer him but he states he will let her know today  Pts son advised to call with any concerns and to also call when he hears from the waiver

## 2023-07-03 ENCOUNTER — PATIENT OUTREACH (OUTPATIENT)
Dept: FAMILY MEDICINE CLINIC | Facility: CLINIC | Age: 88
End: 2023-07-03

## 2023-07-03 NOTE — PROGRESS NOTES
OP CM called to pts son to follow up on home health waiver. Pts son states he has not heard from [employer] but he knows pt has been speaking to pt. Explained if [employer] calls and leaves a message and do not hear back sometimes they will close the case. Son states he is going to call pt and requested I call her in five min to follow up. Waited and called pt and she did not answer so left message requesting a call back. OP CM will continue to follow.

## 2023-07-03 NOTE — PROGRESS NOTES
OP  rcvd call back from pt that Mr Wilmer Carmona called from the [facility] [phone]  And [phone] said they sent her paperwork two weeks ago in the mail. Pt states that she needs to submit financially info to [employer]. Pt states she gets $1598 a month. Pt states she crawls up the steps. Pt needs assistance with ADLs. Pts son has cameras in the house. Pt is on 2L 02 at night but also wears it during the day when her anxiety is bad. Pt states she has bad anxiety. Pt states she has a daughter she has not spoke to in two years. Pt states she is taking 3 xanax a day. Sometimes pt cannot sleep. Explained OP  therapy and how this may be beneficial.  Pt states she feels calm talking to people like her niece or seeing her 9year old grandson. Called to [facility] on [address] [phone] for a therapist.  They are out of the office until Wed for holiday. Will try calling again. Also called Mr Wilmer Carmona from the [facility] and left message because pt is waiting for home health paperwork. Pt states she rcvd a call about [facility]. Pt states she does not know who they man was but it was a Wong Ely who offered this new insurance. Called to [employer] and spoke to [employer] and explained pt did not understand what she was doing. Pts now active with [employer] [facility]) as of  July 1st [employer] [id number] group number [id number] customer service number [phone]. Nura states he has cancelled this plan. Called to [facility] with pt on three way [phone] and spoke to Williams and she verified that pts plan was cancelled as of 6/30/2022. Pt states that someone called her to from [employer] [facility] and convinced her to switch. Williams states that pt needs to call [employer] [facility] and tell them she purchased it in error. Explained we called [employer] and spoke to [employer] and he states this [employer] [facility] plan was cancelled.   Called back to [facility] [phone] and spoke to another  Miguel Angel Valladares who states she cannot get [employer] again until Oct 1 and they cannot re-add her until Jan 2024. Called back to Adrian Mack who sold pt the [address]  [phone]  and left message. Called back to [facility] and spoke to Jarvis Pedraza and he states that pt is still active with united and there is no cancellation pending so pt does have active [facility].

## 2023-07-05 ENCOUNTER — PATIENT OUTREACH (OUTPATIENT)
Dept: FAMILY MEDICINE CLINIC | Facility: CLINIC | Age: 88
End: 2023-07-05

## 2023-07-05 NOTE — PROGRESS NOTES
OP FARZANA rcvd a call from pt that she did receive the waiver paperwork two days ago. Pt states it is now due July 5 and she will not be able to complete it on time. Email sent to [employer] requesting more time. Pt states she is in a tizzy and explained again that I requested an extension. Pt is aware she will have to ask her son for help with the financial info.

## 2023-07-06 ENCOUNTER — PATIENT OUTREACH (OUTPATIENT)
Dept: FAMILY MEDICINE CLINIC | Facility: CLINIC | Age: 88
End: 2023-07-06

## 2023-07-06 NOTE — PROGRESS NOTES
OP FARZANA rcvd email from Abby at [facility] waiver that they will extend pts application 60 days. Called to pt to make aware.

## 2023-07-10 ENCOUNTER — PATIENT OUTREACH (OUTPATIENT)
Dept: FAMILY MEDICINE CLINIC | Facility: CLINIC | Age: 88
End: 2023-07-10

## 2023-07-10 NOTE — PROGRESS NOTES
OP CM called to pt in regards psychosocial needs/waiver. Pts cell phone is unavailable so unable to leave message. Called to pts home phone number and she states she completed all the paperwork and her niece faxed it Friday to [address] [place] [address]. Pt states she is feeling under the weather today. Pt states she is doing the best she can. Pt is hoping that she will get approved for a home health aide because she feels her body is falling apart. Pt denies any other needs at this time. Will continue to follow.

## 2023-07-21 DIAGNOSIS — G25.0 BENIGN FAMILIAL TREMOR: ICD-10-CM

## 2023-07-21 RX ORDER — ALPRAZOLAM 0.5 MG/1
0.5 TABLET ORAL 3 TIMES DAILY PRN
Qty: 100 TABLET | Refills: 0 | Status: SHIPPED | OUTPATIENT
Start: 2023-07-21

## 2023-07-26 ENCOUNTER — PATIENT OUTREACH (OUTPATIENT)
Dept: FAMILY MEDICINE CLINIC | Facility: CLINIC | Age: 88
End: 2023-07-26
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
## 2023-07-26 NOTE — PROGRESS NOTES
OP CM called to pts son to follow up on home health waiver. Son states they have not heard from anyone yet for the waiver. Email sent to [employer] to check status.

## 2023-08-14 ENCOUNTER — PATIENT OUTREACH (OUTPATIENT)
Dept: FAMILY MEDICINE CLINIC | Facility: CLINIC | Age: 88
End: 2023-08-14

## 2023-08-14 NOTE — PROGRESS NOTES
OP CM called to pt and LM in regards to waiver application. Pt called back but she was not talking. Called back to pt again x2 and no answer. Will try calling again. Pts son needed to submit financial information for the waiver the last time we spoke.

## 2023-08-24 DIAGNOSIS — E03.9 ACQUIRED HYPOTHYROIDISM: ICD-10-CM

## 2023-08-24 DIAGNOSIS — G25.0 BENIGN FAMILIAL TREMOR: ICD-10-CM

## 2023-08-24 RX ORDER — LEVOTHYROXINE SODIUM 0.07 MG/1
TABLET ORAL
Qty: 90 TABLET | Refills: 1 | Status: SHIPPED | OUTPATIENT
Start: 2023-08-24

## 2023-08-25 DIAGNOSIS — G25.0 BENIGN FAMILIAL TREMOR: ICD-10-CM

## 2023-08-25 RX ORDER — ALPRAZOLAM 0.5 MG/1
TABLET ORAL
Qty: 100 TABLET | Refills: 0 | OUTPATIENT
Start: 2023-08-25

## 2023-08-25 RX ORDER — ALPRAZOLAM 0.5 MG/1
0.5 TABLET ORAL 3 TIMES DAILY PRN
Qty: 100 TABLET | Refills: 0 | Status: SHIPPED | OUTPATIENT
Start: 2023-08-25

## 2023-08-28 ENCOUNTER — PATIENT OUTREACH (OUTPATIENT)
Dept: FAMILY MEDICINE CLINIC | Facility: CLINIC | Age: 88
End: 2023-08-28

## 2023-09-13 ENCOUNTER — TELEPHONE (OUTPATIENT)
Dept: CARDIOLOGY CLINIC | Facility: CLINIC | Age: 88
End: 2023-09-13

## 2023-09-13 ENCOUNTER — TELEPHONE (OUTPATIENT)
Dept: FAMILY MEDICINE CLINIC | Facility: CLINIC | Age: 88
End: 2023-09-13

## 2023-09-13 NOTE — TELEPHONE ENCOUNTER
Called son with response from Dr Alyse Nova. . Son said her BP are remaining fine upon checking today and will call if any issues and make sure meals with less salt.

## 2023-09-13 NOTE — TELEPHONE ENCOUNTER
Agree w/ your recommendation to monitor for next several days and perhaps give us an update on Friday.  JULIANNA good RP

## 2023-09-13 NOTE — TELEPHONE ENCOUNTER
Son calling concerned because had elevated BP reading this Am 212/103 prior to meds. ..had rechecked now 147/89 pt has no headache or other symptoms. Son states last night BP was 152/92 states yesterday a caretaker gave her a TV dinner(not her usual diet). Told him to contiue monitoring the BP over the next few days and to call if not going down or if has any symtoms. ..told to watch prepacked foods due to salt content. He was in agreement and understood. Told will message Dr Patricia Mojica for his advisement.

## 2023-09-16 DIAGNOSIS — J44.9 CHRONIC OBSTRUCTIVE PULMONARY DISEASE, UNSPECIFIED COPD TYPE (HCC): ICD-10-CM

## 2023-09-19 RX ORDER — TIOTROPIUM BROMIDE AND OLODATEROL 3.124; 2.736 UG/1; UG/1
SPRAY, METERED RESPIRATORY (INHALATION)
Qty: 4 G | Refills: 3 | Status: SHIPPED | OUTPATIENT
Start: 2023-09-19

## 2023-09-23 ENCOUNTER — LAB (OUTPATIENT)
Dept: LAB | Facility: MEDICAL CENTER | Age: 88
End: 2023-09-23
Payer: COMMERCIAL

## 2023-09-23 DIAGNOSIS — I10 PRIMARY HYPERTENSION: ICD-10-CM

## 2023-09-23 DIAGNOSIS — E11.9 TYPE 2 DIABETES MELLITUS WITHOUT COMPLICATION, WITHOUT LONG-TERM CURRENT USE OF INSULIN (HCC): ICD-10-CM

## 2023-09-23 DIAGNOSIS — E03.9 ACQUIRED HYPOTHYROIDISM: ICD-10-CM

## 2023-09-23 DIAGNOSIS — E78.01 FAMILIAL HYPERCHOLESTEROLEMIA: ICD-10-CM

## 2023-09-23 LAB
ALBUMIN SERPL BCP-MCNC: 3.6 G/DL (ref 3.5–5)
ALP SERPL-CCNC: 69 U/L (ref 34–104)
ALT SERPL W P-5'-P-CCNC: 6 U/L (ref 7–52)
ANION GAP SERPL CALCULATED.3IONS-SCNC: 0 MMOL/L
AST SERPL W P-5'-P-CCNC: 12 U/L (ref 13–39)
BILIRUB SERPL-MCNC: 0.36 MG/DL (ref 0.2–1)
BUN SERPL-MCNC: 38 MG/DL (ref 5–25)
CALCIUM SERPL-MCNC: 9.5 MG/DL (ref 8.4–10.2)
CHLORIDE SERPL-SCNC: 98 MMOL/L (ref 96–108)
CHOLEST SERPL-MCNC: 146 MG/DL
CO2 SERPL-SCNC: 43 MMOL/L (ref 21–32)
CREAT SERPL-MCNC: 1 MG/DL (ref 0.6–1.3)
EST. AVERAGE GLUCOSE BLD GHB EST-MCNC: 128 MG/DL
GFR SERPL CREATININE-BSD FRML MDRD: 50 ML/MIN/1.73SQ M
GLUCOSE P FAST SERPL-MCNC: 103 MG/DL (ref 65–99)
HBA1C MFR BLD: 6.1 %
HDLC SERPL-MCNC: 47 MG/DL
LDLC SERPL CALC-MCNC: 73 MG/DL (ref 0–100)
POTASSIUM SERPL-SCNC: 4.8 MMOL/L (ref 3.5–5.3)
PROT SERPL-MCNC: 6.8 G/DL (ref 6.4–8.4)
SODIUM SERPL-SCNC: 141 MMOL/L (ref 135–147)
TRIGL SERPL-MCNC: 128 MG/DL
TSH SERPL DL<=0.05 MIU/L-ACNC: 1.48 UIU/ML (ref 0.45–4.5)

## 2023-09-23 PROCEDURE — 80053 COMPREHEN METABOLIC PANEL: CPT

## 2023-09-23 PROCEDURE — 84443 ASSAY THYROID STIM HORMONE: CPT

## 2023-09-23 PROCEDURE — 80061 LIPID PANEL: CPT

## 2023-09-23 PROCEDURE — 36415 COLL VENOUS BLD VENIPUNCTURE: CPT

## 2023-09-23 PROCEDURE — 83036 HEMOGLOBIN GLYCOSYLATED A1C: CPT

## 2023-09-25 NOTE — RESULT ENCOUNTER NOTE
We will talk about the labs and her tremor/sleep issues at her upcoming office visit. I did send a previous task that we could try the trazodone.

## 2023-09-26 DIAGNOSIS — G25.0 BENIGN FAMILIAL TREMOR: ICD-10-CM

## 2023-09-26 RX ORDER — ALPRAZOLAM 0.5 MG/1
0.5 TABLET ORAL 3 TIMES DAILY PRN
Qty: 100 TABLET | Refills: 0 | Status: SHIPPED | OUTPATIENT
Start: 2023-09-26

## 2023-10-16 ENCOUNTER — OFFICE VISIT (OUTPATIENT)
Dept: FAMILY MEDICINE CLINIC | Facility: CLINIC | Age: 88
End: 2023-10-16
Payer: COMMERCIAL

## 2023-10-16 VITALS
BODY MASS INDEX: 34.78 KG/M2 | HEART RATE: 77 BPM | WEIGHT: 189 LBS | RESPIRATION RATE: 16 BRPM | OXYGEN SATURATION: 90 % | HEIGHT: 62 IN | TEMPERATURE: 97.8 F

## 2023-10-16 DIAGNOSIS — E11.9 TYPE 2 DIABETES MELLITUS WITHOUT COMPLICATION, WITHOUT LONG-TERM CURRENT USE OF INSULIN (HCC): Primary | ICD-10-CM

## 2023-10-16 DIAGNOSIS — Z23 ENCOUNTER FOR IMMUNIZATION: ICD-10-CM

## 2023-10-16 DIAGNOSIS — F41.1 GENERALIZED ANXIETY DISORDER: ICD-10-CM

## 2023-10-16 DIAGNOSIS — E55.9 VITAMIN D DEFICIENCY: ICD-10-CM

## 2023-10-16 DIAGNOSIS — F51.01 PRIMARY INSOMNIA: ICD-10-CM

## 2023-10-16 PROCEDURE — 90662 IIV NO PRSV INCREASED AG IM: CPT

## 2023-10-16 PROCEDURE — 90471 IMMUNIZATION ADMIN: CPT

## 2023-10-16 PROCEDURE — 99214 OFFICE O/P EST MOD 30 MIN: CPT | Performed by: FAMILY MEDICINE

## 2023-10-16 RX ORDER — CHOLECALCIFEROL (VITAMIN D3) 50 MCG
1000 TABLET ORAL EVERY OTHER DAY
Qty: 100 TABLET | Refills: 0
Start: 2023-10-16

## 2023-10-16 NOTE — PROGRESS NOTES
Name: Rosi Dias      : 1935      MRN: [id number]  Encounter Provider: Raquel Garrett DO  Encounter Date: 10/16/2023   Encounter department: [facility] PRIMARY [facility]ARE    Assessment & Plan     1. Type 2 diabetes mellitus without complication, without long-term current use of insulin ([facility])  Comments:  Hemoglobin A1c at goal    2. Encounter for immunization  -     influenza vaccine, high-dose, PF 0.7 mL (FLUZONE HIGH-DOSE)    3. Vitamin D deficiency  -     Cholecalciferol (Vitamin D) 50 MCG (2000 UT) tablet; Take 0.5 tablets (1,000 Units total) by mouth every other day    4. Primary insomnia  -     traZODone (DESYREL) 50 mg tablet; Take 1 tablet (50 mg total) by mouth daily at bedtime Can titrate up in 25 mg increments if needed    5. Generalized anxiety disorder- panic  -     traZODone (DESYREL) 50 mg tablet; Take 1 tablet (50 mg total) by mouth daily at bedtime Can titrate up in 25 mg increments if needed    Alprazolam 7:00 am, 12 NOON and 5:00pm daily  Going to TRY trazodone for BEDTIME 9:00 pm -10:00-pm for SLEEP. This medication we can INCREASE in 25 mg "increments" to achieve   Do not take alprazolam with the trazodone       Trial of trazodone and call with update. Continue same medical regimen. I have spent a total time of 30 minutes caring for this patient including Diagnostic results, Prognosis, Instructions for management, Patient and family education, Importance of tx compliance, Risk factor reductions, Impressions, Counseling / Coordination of care, Documenting in the medical record, Reviewing / ordering tests, medicine, procedures  , and Obtaining or reviewing history  . 27  Subjective      49-year-old female here for "routine" follow-up quarterly and lab review. Patient with history of hypertension, CAD, chronic essential tremor and anxiety. Ongoing issues with insomnia. Lengthy discussion with regards to anxiety and insomnia.   We have reviewed and reconciled medications that have been used in the past.  We did discuss use of trazodone. Patient initially reluctant when she googled it. One of the issues is she goes upstairs and watches TV and falls asleep watching TV prior to what would be routine "bedtime ". Then she wakes up has to get herself to the bathroom and most of the time cannot easily fall back to sleep. Hemoglobin A1c is acceptable. Chemistry is acceptable. Lipids at goal.  Thyroid at goal.  All meds will otherwise stay the same. Chief Complaint   Patient presents with    Follow-up     Component      Latest Ref Rng 9/23/2023   Sodium      135 - 147 mmol/L 141    Potassium      3.5 - 5.3 mmol/L 4.8    Chloride      96 - 108 mmol/L 98    CO2      21 - 32 mmol/L 43 (H)    Anion Gap      mmol/L 0    BUN      5 - 25 mg/dL 38 (H)    Creatinine      0.60 - 1.30 mg/dL 1.00    GLUCOSE FASTING      65 - 99 mg/dL 103 (H)    Calcium      8.4 - 10.2 mg/dL 9.5    AST      13 - 39 U/L 12 (L)    ALT      7 - 52 U/L 6 (L)    Alkaline Phosphatase      34 - 104 U/L 69    Total Protein      6.4 - 8.4 g/dL 6.8    Albumin      3.5 - 5.0 g/dL 3.6    TOTAL BILIRUBIN      0.20 - 1.00 mg/dL 0.36    eGFR      ml/min/1.73sq m 50    Cholesterol      See Comment mg/dL 146    Triglycerides      See Comment mg/dL 128    HDL      >=50 mg/dL 47 (L)    LDL Calculated      0 - 100 mg/dL 73    Hemoglobin A1C      Normal 4.0-5.6%; PreDiabetic 5.7-6.4%; Diabetic >=6.5%; Glycemic control for adults with diabetes <7.0% % 6.1 (H)    eAG, EST AVG Glucose      mg/dl 128    TSH 3RD GENERATON      0.450 - 4.500 uIU/mL 1.482        to [facility].      9/13/23 10:33 AM  Patient says the lorazepam is not helping anymore. She wants to know if there is something else you could prescribe for her for the tremors which she still has and she is not sleep at night either. Please would like to be called back on her home phone [phone]  September 25, 2023  Me   to       9/25/23  5:40 PM  Sorry for the delay.   I was trying to research her old records to see what we have tried before. Primidone, gabapentin and Requip were either not effective or she did not tolerate. Not a whole lot of alternatives. I do not think we ever tried trazodone (at least since we have been electronic records and now that is more than 10 years). This is specifically for nighttime and sleep. We could initiate the trazodone and see if his sleep is better. For now keep the lorazepam the same if she would like to try the trazodone trial  September 26, 2023  to [facility].    9/26/23  4:01 PM  Spoke with patient she does not want to try the trazodone,she  will just continue with the Lorazepam for now and will discuss further when she comes in next month. Review of Systems   Constitutional:  Positive for fatigue (Chronic). Respiratory:  Negative for shortness of breath (Exertional but generally stable). Cardiovascular:  Negative for chest pain and leg swelling. Genitourinary:         No incontinence   Musculoskeletal:  Positive for arthralgias and myalgias (Stable). Neurological:  Positive for tremors. Psychiatric/Behavioral:  Positive for sleep disturbance. The patient is nervous/anxious. All other systems reviewed and are negative.       Current Outpatient Medications on File Prior to Visit   Medication Sig    ALPRAZolam (XANAX) 0.5 mg tablet Take 1 tablet (0.5 mg total) by mouth 3 (three) times a day as needed (TREMOR) May take one additional prn    amLODIPine (NORVASC) 5 mg tablet Take 1 tablet (5 mg total) by mouth daily    aspirin (ECOTRIN LOW STRENGTH) 81 mg EC tablet Take 81 mg by mouth daily    bimatoprost (LUMIGAN) 0.01 % ophthalmic drops Administer 1 drop to both eyes daily at bedtime    famotidine (PEPCID) 20 mg tablet TAKE 1 TABLET BY MOUTH TWICE A DAY    furosemide (LASIX) 20 mg tablet Take 1 tablet (20 mg total) by mouth daily    levothyroxine 75 mcg tablet TAKE 1 TABLET BY MOUTH EVERY DAY IN THE MORNING    metoprolol tartrate (LOPRESSOR) 25 mg tablet TAKE 1 TABLET (25 MG TOTAL) BY MOUTH EVERY 12 (TWELVE) HOURS    Pediatric Multiple Vit-C-FA (PEDIATRIC MULTIVITAMIN) chewable tablet Chew 1 tablet daily    Potassium Gluconate 595 (99 K) MG TABS Take by mouth    rosuvastatin (CRESTOR) 10 MG tablet TAKE 1 TABLET BY MOUTH EVERY DAY    Stiolto Respimat 2.5-2.5 MCG/ACT inhaler INHALE 2 PUFFS BY MOUTH EVERY DAY       Objective     Pulse 77   Temp 97.8 °F (36.6 °C) (Temporal)   Resp 16   Ht 5' 2" (1.575 m)   Wt 85.7 kg (189 lb)   SpO2 90% Comment: O2 2 liters  BMI 34.57 kg/m²     Physical Exam  Constitutional:       General: She is not in acute distress. Appearance: Normal appearance. She is well-developed. She is obese. Comments: 75-year-old female \who appears stated age with elevated BMI   HENT:      Head: Normocephalic. Right Ear: Tympanic membrane normal.      Left Ear: Tympanic membrane normal.      Nose: Nose normal.      Mouth/Throat:      Mouth: Mucous membranes are moist.   Eyes:      Pupils: Pupils are equal, round, and reactive to light. Neck:      Vascular: No carotid bruit. Cardiovascular:      Rate and Rhythm: Normal rate and regular rhythm. Pulses: Normal pulses. Heart sounds: Murmur heard. Pulmonary:      Effort: Pulmonary effort is normal.      Breath sounds: Normal breath sounds. Abdominal:      General: Bowel sounds are normal.      Palpations: Abdomen is soft. There is no mass. Tenderness: There is no abdominal tenderness. Musculoskeletal:      Left lower leg: No edema. Comments: Gait slow antalgic   Skin:     Findings: No rash. Neurological:      Mental Status: She is alert and oriented to person, place, and time. Deep Tendon Reflexes: Reflexes are normal and symmetric. Psychiatric:         Mood and Affect: Mood normal.         Behavior: Behavior normal.         Thought Content:  Thought content normal.         Judgment: Judgment normal.       Scott Day DO

## 2023-10-16 NOTE — PATIENT INSTRUCTIONS
Alprazolam 7:00 am, 12 NOON and 5:00pm daily  Going to TRY trazodone for BEDTIME 9:00 pm -10:00-pm for SLEEP.  This medication we can INCREASE in 25 mg "increments" to achieve   Do not take alprazolam with the trazodone

## 2023-10-17 RX ORDER — TRAZODONE HYDROCHLORIDE 50 MG/1
50 TABLET ORAL
Qty: 60 TABLET | Refills: 0 | Status: SHIPPED | OUTPATIENT
Start: 2023-10-17 | End: 2023-10-27

## 2023-10-27 DIAGNOSIS — G25.0 BENIGN FAMILIAL TREMOR: ICD-10-CM

## 2023-10-27 RX ORDER — ALPRAZOLAM 0.5 MG/1
0.5 TABLET ORAL 3 TIMES DAILY PRN
Qty: 100 TABLET | Refills: 0 | Status: SHIPPED | OUTPATIENT
Start: 2023-10-27

## 2023-11-20 DIAGNOSIS — I25.10 CORONARY ARTERY DISEASE INVOLVING NATIVE CORONARY ARTERY OF NATIVE HEART WITHOUT ANGINA PECTORIS: ICD-10-CM

## 2023-11-20 RX ORDER — ROSUVASTATIN CALCIUM 10 MG/1
10 TABLET, COATED ORAL DAILY
Qty: 90 TABLET | Refills: 1 | Status: SHIPPED | OUTPATIENT
Start: 2023-11-20

## 2023-11-27 DIAGNOSIS — G25.0 BENIGN FAMILIAL TREMOR: ICD-10-CM

## 2023-11-27 RX ORDER — ALPRAZOLAM 0.5 MG/1
0.5 TABLET ORAL 3 TIMES DAILY PRN
Qty: 100 TABLET | Refills: 0 | Status: SHIPPED | OUTPATIENT
Start: 2023-11-27

## 2023-12-04 ENCOUNTER — TELEPHONE (OUTPATIENT)
Dept: FAMILY MEDICINE CLINIC | Facility: CLINIC | Age: 88
End: 2023-12-04

## 2023-12-04 NOTE — TELEPHONE ENCOUNTER
Patient called asking if there is another medication that would place Xanax, she has been on it for so long it's no longer working    Please advise patient at [phone]

## 2023-12-12 LAB
DME PARACHUTE DELIVERY DATE REQUESTED: NORMAL
DME PARACHUTE ITEM DESCRIPTION: NORMAL
DME PARACHUTE ORDER STATUS: NORMAL
DME PARACHUTE SUPPLIER NAME: NORMAL
DME PARACHUTE SUPPLIER PHONE: NORMAL

## 2023-12-26 DIAGNOSIS — G25.0 BENIGN FAMILIAL TREMOR: ICD-10-CM

## 2023-12-26 RX ORDER — ALPRAZOLAM 0.5 MG/1
0.5 TABLET ORAL 3 TIMES DAILY PRN
Qty: 100 TABLET | Refills: 0 | Status: SHIPPED | OUTPATIENT
Start: 2023-12-26

## 2024-01-10 ENCOUNTER — TELEPHONE (OUTPATIENT)
Dept: FAMILY MEDICINE CLINIC | Facility: CLINIC | Age: 89
End: 2024-01-10

## 2024-01-10 DIAGNOSIS — I65.29 STENOSIS OF CAROTID ARTERY, UNSPECIFIED LATERALITY: ICD-10-CM

## 2024-01-10 DIAGNOSIS — E03.9 ACQUIRED HYPOTHYROIDISM: ICD-10-CM

## 2024-01-10 DIAGNOSIS — I10 ESSENTIAL HYPERTENSION: ICD-10-CM

## 2024-01-10 RX ORDER — LEVOTHYROXINE SODIUM 0.07 MG/1
TABLET ORAL
Qty: 90 TABLET | Refills: 1 | Status: SHIPPED | OUTPATIENT
Start: 2024-01-10

## 2024-01-10 RX ORDER — AMLODIPINE BESYLATE 5 MG/1
5 TABLET ORAL DAILY
Qty: 90 TABLET | Refills: 1 | Status: SHIPPED | OUTPATIENT
Start: 2024-01-10

## 2024-01-10 NOTE — TELEPHONE ENCOUNTER
Patient called requesting refill for rosuvastatin. Patient made aware medication was refilled on 11/20/23 for 90 with 1 refills to [facility] pharmacy. Patient instructed to contact the pharmacy to obtain refills of medication. Patient verbalized understanding.

## 2024-01-22 NOTE — TELEPHONE ENCOUNTER
I am not sure how to order portable oxygen    Please double check with Bharath Minus [Alert] : alert [Oriented x 3] : ~L oriented x 3 [Conjunctiva Non-injected] : conjunctiva non-injected [Well Nourished] : well nourished [No Clubbing] : no clubbing [No Visual Lymphadenopathy] : no visual  lymphadenopathy [No Edema] : no edema [No Chromhidrosis] : no chromhidrosis [No Bromhidrosis] : no bromhidrosis [FreeTextEntry3] : right chest hyperpigmented patch, no nodularity noted bilateral chest and abdomen with fine purple telangiectasia no palmar erythema, tele on face, lips, fingertips

## 2024-01-26 DIAGNOSIS — G25.0 BENIGN FAMILIAL TREMOR: ICD-10-CM

## 2024-01-26 RX ORDER — ALPRAZOLAM 0.5 MG/1
0.5 TABLET ORAL 3 TIMES DAILY PRN
Qty: 100 TABLET | Refills: 0 | Status: SHIPPED | OUTPATIENT
Start: 2024-01-26

## 2024-01-26 NOTE — TELEPHONE ENCOUNTER
Reason for call:   [x] Refill   [] Prior Auth  [] Other:     Office: [facility]   [x] PCP/Provider - pryblick   [] Specialty/Provider -     Medication: alprazolam     Dose/Frequency: 0.5 mg/ 3 times daily PRN     Quantity: 100 tabs     Pharmacy: [facility]     Does the patient have enough for 3 days?   [x] Yes   [] No - Send as HP to POD

## 2024-01-27 ENCOUNTER — NURSE TRIAGE (OUTPATIENT)
Dept: OTHER | Facility: OTHER | Age: 89
End: 2024-01-27

## 2024-01-27 ENCOUNTER — OFFICE VISIT (OUTPATIENT)
Dept: URGENT CARE | Facility: CLINIC | Age: 89
End: 2024-01-27
Payer: COMMERCIAL

## 2024-01-27 VITALS
SYSTOLIC BLOOD PRESSURE: 150 MMHG | RESPIRATION RATE: 22 BRPM | OXYGEN SATURATION: 80 % | TEMPERATURE: 97.9 F | HEART RATE: 82 BPM | DIASTOLIC BLOOD PRESSURE: 84 MMHG

## 2024-01-27 DIAGNOSIS — R39.9 UTI SYMPTOMS: Primary | ICD-10-CM

## 2024-01-27 LAB
SL AMB  POCT GLUCOSE, UA: NEGATIVE
SL AMB LEUKOCYTE ESTERASE,UA: ABNORMAL
SL AMB POCT BILIRUBIN,UA: NEGATIVE
SL AMB POCT BLOOD,UA: ABNORMAL
SL AMB POCT CLARITY,UA: CLEAR
SL AMB POCT COLOR,UA: ABNORMAL
SL AMB POCT KETONES,UA: NEGATIVE
SL AMB POCT NITRITE,UA: POSITIVE
SL AMB POCT PH,UA: 6
SL AMB POCT SPECIFIC GRAVITY,UA: 1.02
SL AMB POCT URINE PROTEIN: 30
SL AMB POCT UROBILINOGEN: 0.2

## 2024-01-27 PROCEDURE — 87086 URINE CULTURE/COLONY COUNT: CPT | Performed by: NURSE PRACTITIONER

## 2024-01-27 PROCEDURE — 99213 OFFICE O/P EST LOW 20 MIN: CPT | Performed by: NURSE PRACTITIONER

## 2024-01-27 PROCEDURE — 81002 URINALYSIS NONAUTO W/O SCOPE: CPT | Performed by: NURSE PRACTITIONER

## 2024-01-27 PROCEDURE — 87077 CULTURE AEROBIC IDENTIFY: CPT | Performed by: NURSE PRACTITIONER

## 2024-01-27 PROCEDURE — 87186 SC STD MICRODIL/AGAR DIL: CPT | Performed by: NURSE PRACTITIONER

## 2024-01-27 RX ORDER — SULFAMETHOXAZOLE AND TRIMETHOPRIM 800; 160 MG/1; MG/1
1 TABLET ORAL 2 TIMES DAILY
Qty: 10 TABLET | Refills: 0 | Status: SHIPPED | OUTPATIENT
Start: 2024-01-27 | End: 2024-02-01

## 2024-01-27 NOTE — TELEPHONE ENCOUNTER
"Reason for Disposition  • Urinating more frequently than usual (i.e., frequency)    Answer Assessment - Initial Assessment Questions  1. SYMPTOM: \"What's the main symptom you're concerned about?\" (e.g., frequency, incontinence)      Urinary frequency.    2. ONSET: \"When did the    start?\"      Started 2 days ago.    3. PAIN: \"Is there any pain?\" If Yes, ask: \"How bad is it?\" (Scale: 1-10; mild, moderate, severe)      No pain    4. CAUSE: \"What do you think is causing the symptoms?\"      Unknown    5. OTHER SYMPTOMS: \"Do you have any other symptoms?\" (e.g., fever, flank pain, blood in urine, pain with urination)      Temp: 97-98 (forehead). No blood in urine. Urine is clear.    Protocols used: Urinary Symptoms-ADULT-    "

## 2024-01-27 NOTE — TELEPHONE ENCOUNTER
"Regarding: possible UTI/frequent urge to urinate//71  ----- Message from Annie Rubi sent at 1/27/2024 12:53 PM EST -----  Pt's son stated, \"My mother is having the urge to urinate frequently. I think she has a UTI. I took her blood pressure and it is 144/71.\"    "

## 2024-01-27 NOTE — PROGRESS NOTES
[facility]        NAME: Ashley Gonzales is a 88 y.o. female  : 1935    MRN: [id number]  DATE: 2024  TIME: 2:53 PM    Assessment and Plan   UTI symptoms [R39.9]  1. UTI symptoms  POCT urine dip    sulfamethoxazole-trimethoprim (BACTRIM DS) 800-160 mg per tablet      Acute symptomatic POCT urine dip positive for nitrates leukocytes blood.  Will send urine culture and start Bactrim twice daily x 5 days educated on side effects proper use of medication follow-up with primary care with worsening symptoms no improvement      Patient Instructions       Follow up with PCP in 3-5 days.  Proceed to  ER if symptoms worsen.    Chief Complaint     Chief Complaint   Patient presents with   • Possible UTI     Patient has had increased urinary frequency for the last 2 days          History of Present Illness       Patient is an 88-year-old field male arrives with son with complaints of UTI symptoms frequent urination urgency to urinate.  Denies dysuria.  POCT urine dip was only able to be obtained due to very little amount of urine.  She was positive for nitrates moderate leuks large blood.  She does report having kidney stones approximately 25 years ago denies abdominal pain suprapubic pressure low back pain.  Negative bilateral CVA tenderness.        Review of Systems   Review of Systems   Constitutional:  Negative for activity change, appetite change, chills, fatigue and fever.   HENT:  Negative for congestion, postnasal drip, rhinorrhea, sneezing and sore throat.    Respiratory:  Negative for cough, chest tightness, shortness of breath and wheezing.    Cardiovascular:  Negative for chest pain and palpitations.   Gastrointestinal:  Negative for abdominal pain, constipation, diarrhea, nausea and vomiting.   Genitourinary:  Positive for frequency and urgency. Negative for dysuria, flank pain and hematuria.   Musculoskeletal:  Negative for arthralgias and myalgias.   Skin:  Negative for color change,  pallor and rash.   Neurological:  Negative for dizziness, weakness, light-headedness and headaches.   Hematological:  Negative for adenopathy.   Psychiatric/Behavioral:  Negative for agitation and confusion.          Current Medications       Current Outpatient Medications:   •  ALPRAZolam (XANAX) 0.5 mg tablet, Take 1 tablet (0.5 mg total) by mouth 3 (three) times a day as needed (TREMOR) May take one additional prn, Disp: 100 tablet, Rfl: 0  •  famotidine (PEPCID) 20 mg tablet, TAKE 1 TABLET BY MOUTH TWICE A DAY, Disp: 180 tablet, Rfl: 1  •  furosemide (LASIX) 20 mg tablet, TAKE 1 TABLET BY MOUTH EVERY DAY, Disp: 90 tablet, Rfl: 1  •  sulfamethoxazole-trimethoprim (BACTRIM DS) 800-160 mg per tablet, Take 1 tablet by mouth 2 (two) times a day for 5 days, Disp: 10 tablet, Rfl: 0  •  traZODone (DESYREL) 50 mg tablet, Take 1 tablet (50 mg total) by mouth daily at bedtime Can titrate up in 25 mg increments if needed, Disp: 60 tablet, Rfl: 0  •  amLODIPine (NORVASC) 5 mg tablet, TAKE 1 TABLET (5 MG TOTAL) BY MOUTH DAILY., Disp: 90 tablet, Rfl: 1  •  aspirin (ECOTRIN LOW STRENGTH) 81 mg EC tablet, Take 81 mg by mouth daily, Disp: , Rfl:   •  bimatoprost (LUMIGAN) 0.01 % ophthalmic drops, Administer 1 drop to both eyes daily at bedtime, Disp: 5 mL, Rfl: 5  •  Cholecalciferol (Vitamin D) 50 MCG (2000 UT) tablet, Take 0.5 tablets (1,000 Units total) by mouth every other day, Disp: 100 tablet, Rfl: 0  •  levothyroxine 75 mcg tablet, TAKE 1 TABLET BY MOUTH EVERY DAY IN THE MORNING, Disp: 90 tablet, Rfl: 1  •  metoprolol tartrate (LOPRESSOR) 25 mg tablet, TAKE 1 TABLET (25 MG TOTAL) BY MOUTH EVERY 12 (TWELVE) HOURS, Disp: 180 tablet, Rfl: 1  •  Pediatric Multiple Vit-C-FA (PEDIATRIC MULTIVITAMIN) chewable tablet, Chew 1 tablet daily, Disp: , Rfl:   •  Potassium Gluconate 595 (99 K) MG TABS, Take by mouth, Disp: , Rfl:   •  rosuvastatin (CRESTOR) 10 MG tablet, Take 1 tablet (10 mg total) by mouth daily, Disp: 90 tablet, Rfl:  1  •  Stiolto Respimat 2.5-2.5 MCG/ACT inhaler, INHALE 2 PUFFS BY MOUTH EVERY DAY, Disp: 4 g, Rfl: 3    Current Allergies     Allergies as of 01/27/2024 - Reviewed 01/27/2024   Allergen Reaction Noted   • Penicillins Itching 05/01/2019   • Penicillins Rash 02/03/2016            The following portions of the patient's history were reviewed and updated as appropriate: allergies, current medications, past family history, past medical history, past social history, past surgical history and problem list.     Past Medical History:   Diagnosis Date   • Anxiety    • Cataract, bilateral     Last Assessed:3/19/2014   • COPD (chronic obstructive pulmonary disease) (HCC)    • Coronary artery disease    • Hx of arterial ischemic stroke    • Hypertension    • Stroke (HCC)    • Wears glasses        Past Surgical History:   Procedure Laterality Date   • CAROTID ENDARTERECTOMY Right    • CATARACT EXTRACTION W/ INTRAOCULAR LENS  IMPLANT, BILATERAL     • COLONOSCOPY N/A 9/30/2017    Procedure: COLONOSCOPY FOR CONTROL OF BLEEDING;  Surgeon: Jeb Gaxiola MD;  Location:  MAIN OR;  Service: Colorectal   • CORONARY ARTERY BYPASS GRAFT      CABG X3 with LIMA to LAD,SVG to OM,SVG to distal  RCA ; Last Assessed:7/13/2015   • JOINT REPLACEMENT      left TKR   • KIDNEY STONE SURGERY     • NEPHROSTOMY Left     with Drainage Irrigation ;Onset:1974   • NV ARTHRP KNE CONDYLE&PLATU MEDIAL&LAT COMPARTMENTS Right 2/25/2016    Procedure: ARTHROPLASTY KNEE TOTAL;  Surgeon: Jeb Figueroa MD;  Location: [facility];  Service: Orthopedics       Family History   Problem Relation Age of Onset   • Cancer Mother         malignant neoplasm   • Heart attack Father    • Aneurysm Other         Aortic   • Cancer Other         malignant neoplasm         Medications have been verified.        Objective   /84   Pulse 82   Temp 97.9 °F (36.6 °C)   Resp 22   SpO2 (!) 80%   No LMP recorded. Patient is postmenopausal.       Physical Exam     Physical  Exam  Vitals and nursing note reviewed.   Constitutional:       General: She is not in acute distress.     Appearance: Normal appearance. She is not ill-appearing or diaphoretic.   HENT:      Head: Normocephalic and atraumatic.      Nose: No congestion or rhinorrhea.   Eyes:      General: No scleral icterus.        Right eye: No discharge.         Left eye: No discharge.   Pulmonary:      Effort: Pulmonary effort is normal. No respiratory distress.   Abdominal:      Tenderness: There is no abdominal tenderness. There is no right CVA tenderness, left CVA tenderness or guarding.   Musculoskeletal:         General: Normal range of motion.      Cervical back: Normal range of motion.   Skin:     Coloration: Skin is not jaundiced or pale.      Findings: No bruising, erythema or rash.   Neurological:      General: No focal deficit present.      Mental Status: She is alert and oriented to person, place, and time.   Psychiatric:         Mood and Affect: Mood normal.         Behavior: Behavior normal.         Thought Content: Thought content normal.         Judgment: Judgment normal.

## 2024-01-28 LAB — BACTERIA UR CULT: ABNORMAL

## 2024-01-29 LAB — BACTERIA UR CULT: ABNORMAL

## 2024-01-29 NOTE — TELEPHONE ENCOUNTER
Spoke w/ pt's son Pablo and he states that pt was seen at [facility] in [place] on Saturday where they collected a urine and culture and started her on Bactrim.

## 2024-01-31 ENCOUNTER — TELEPHONE (OUTPATIENT)
Dept: URGENT CARE | Facility: CLINIC | Age: 89
End: 2024-01-31

## 2024-01-31 NOTE — TELEPHONE ENCOUNTER
Spoke with patient's son Pablo and discussed urine results.  Son does report she is feeling much improvement

## 2024-02-02 ENCOUNTER — TELEPHONE (OUTPATIENT)
Age: 89
End: 2024-02-02

## 2024-02-02 NOTE — TELEPHONE ENCOUNTER
Pt's son called back spoke w/ pt's son and she states that she is feeling much better and has no symptoms

## 2024-02-10 DIAGNOSIS — F41.1 GENERALIZED ANXIETY DISORDER: ICD-10-CM

## 2024-02-10 DIAGNOSIS — F51.01 PRIMARY INSOMNIA: ICD-10-CM

## 2024-02-12 RX ORDER — TRAZODONE HYDROCHLORIDE 50 MG/1
50 TABLET ORAL
Qty: 90 TABLET | Refills: 1 | Status: SHIPPED | OUTPATIENT
Start: 2024-02-12

## 2024-02-21 ENCOUNTER — TELEPHONE (OUTPATIENT)
Dept: FAMILY MEDICINE CLINIC | Facility: CLINIC | Age: 89
End: 2024-02-21

## 2024-02-21 ENCOUNTER — OFFICE VISIT (OUTPATIENT)
Dept: FAMILY MEDICINE CLINIC | Facility: CLINIC | Age: 89
End: 2024-02-21
Payer: COMMERCIAL

## 2024-02-21 VITALS
TEMPERATURE: 97 F | RESPIRATION RATE: 16 BRPM | BODY MASS INDEX: 34.19 KG/M2 | WEIGHT: 185.8 LBS | SYSTOLIC BLOOD PRESSURE: 120 MMHG | DIASTOLIC BLOOD PRESSURE: 72 MMHG | HEIGHT: 62 IN

## 2024-02-21 DIAGNOSIS — M15.9 GENERALIZED OSTEOARTHRITIS: ICD-10-CM

## 2024-02-21 DIAGNOSIS — F41.1 GENERALIZED ANXIETY DISORDER: ICD-10-CM

## 2024-02-21 DIAGNOSIS — E11.9 TYPE 2 DIABETES MELLITUS WITHOUT COMPLICATION, WITHOUT LONG-TERM CURRENT USE OF INSULIN (HCC): Primary | ICD-10-CM

## 2024-02-21 LAB
DME PARACHUTE DELIVERY DATE REQUESTED: NORMAL
DME PARACHUTE ITEM DESCRIPTION: NORMAL
DME PARACHUTE ORDER STATUS: NORMAL
DME PARACHUTE SUPPLIER NAME: NORMAL
DME PARACHUTE SUPPLIER PHONE: NORMAL
SL AMB POCT HEMOGLOBIN AIC: 6.1 (ref ?–6.5)

## 2024-02-21 PROCEDURE — 83036 HEMOGLOBIN GLYCOSYLATED A1C: CPT | Performed by: FAMILY MEDICINE

## 2024-02-21 PROCEDURE — 99214 OFFICE O/P EST MOD 30 MIN: CPT | Performed by: FAMILY MEDICINE

## 2024-02-21 RX ORDER — DULOXETIN HYDROCHLORIDE 30 MG/1
30 CAPSULE, DELAYED RELEASE ORAL DAILY
Qty: 30 CAPSULE | Refills: 5 | Status: SHIPPED | OUTPATIENT
Start: 2024-02-21 | End: 2024-02-29

## 2024-02-21 NOTE — PATIENT INSTRUCTIONS
TRAZODONE 50 mg at night  (increase at 25 mg intervals ) if needed  RESTART duloxetine 30mg one in MORNING for anxiety   Keep XANAX the same!!!

## 2024-02-21 NOTE — PROGRESS NOTES
All name: Ashley Gonzales      : 1935      MRN: [id number]  Encounter Provider: Paz Maharaj DO  Encounter Date: 2024   Encounter department: [facility]    Assessment & Plan     1. Type 2 diabetes mellitus without complication, without long-term current use of insulin (HCC)  -     POCT hemoglobin A1c    2. Generalized anxiety disorder  -     DULoxetine (CYMBALTA) 30 mg delayed release capsule; Take 1 capsule (30 mg total) by mouth daily    3. Generalized osteoarthritis  -     DULoxetine (CYMBALTA) 30 mg delayed release capsule; Take 1 capsule (30 mg total) by mouth daily    TRAZODONE 50 mg at night  (increase at 25 mg intervals ) if needed  RESTART duloxetine 30mg one in MORNING for anxiety   Keep XANAX the same!!!       Subjective     88-year-old female here accompanied by family friend.  Follow-up for multiple medical issues..  Anxiety and tremors are continuing to be top on the list of issues.    Hemoglobin A1c is at goal.  Ambulatory function is with walker.  Chief Complaint   Patient presents with    Follow-up     4 month    Last labs 2023 with normal TSH and CMP.  Review of Systems   Constitutional:  Negative for fatigue.   Eyes:  Positive for visual disturbance (Needs referral for specialist.-Decrease visual issue, formerly seen by Dr. Zheng).   Respiratory:  Negative for shortness of breath.    Cardiovascular:  Negative for chest pain.   Neurological:  Positive for tremors.   Psychiatric/Behavioral:  Positive for sleep disturbance (Poor sleep hygiene). The patient is nervous/anxious (Chronic years).         Has required long-term benzos to at least keep symptoms tolerable.       Past Medical History:   Diagnosis Date    Anxiety     Cataract, bilateral     Last Assessed:3/19/2014    COPD (chronic obstructive pulmonary disease) ([facility])     Coronary artery disease     Hx of arterial ischemic stroke     Hypertension     Stroke (HCC)     Wears glasses      Past  Surgical History:   Procedure Laterality Date    CAROTID ENDARTERECTOMY Right     CATARACT EXTRACTION W/ INTRAOCULAR LENS  IMPLANT, BILATERAL      COLONOSCOPY N/A 9/30/2017    Procedure: COLONOSCOPY FOR CONTROL OF BLEEDING;  Surgeon: Jeb Gaxiola MD;  Location: [facility];  Service: Colorectal    CORONARY ARTERY BYPASS GRAFT      CABG X3 with LIMA to LAD,SVG to OM,SVG to distal  RCA ; Last Assessed:7/13/2015    JOINT REPLACEMENT      left TKR    KIDNEY STONE SURGERY      NEPHROSTOMY Left     with Drainage Irrigation ;Onset:1974    ME ARTHRP KNE CONDYLE&PLATU MEDIAL&LAT COMPARTMENTS Right 2/25/2016    Procedure: ARTHROPLASTY KNEE TOTAL;  Surgeon: Jeb Figueroa MD;  Location: [facility];  Service: Orthopedics     Family History   Problem Relation Age of Onset    Cancer Mother         malignant neoplasm    Heart attack Father     Aneurysm Other         Aortic    Cancer Other         malignant neoplasm     Social History     Socioeconomic History    Marital status:      Spouse name: None    Number of children: None    Years of education: 10    Highest education level: None   Occupational History    None   Tobacco Use    Smoking status: Never    Smokeless tobacco: Never   Vaping Use    Vaping status: Never Used   Substance and Sexual Activity    Alcohol use: Never    Drug use: Never    Sexual activity: Not Currently   Other Topics Concern    None   Social History Narrative    ** Merged History Encounter **         No caffeine use     Social Determinants of Health     Financial Resource Strain: Low Risk  (5/10/2023)    Overall Financial Resource Strain (CARDIA)     Difficulty of Paying Living Expenses: Not hard at all   Food Insecurity: Not on file   Transportation Needs: No Transportation Needs (5/10/2023)    PRAPARE - Transportation     Lack of Transportation (Medical): No     Lack of Transportation (Non-Medical): No   Physical Activity: Not on file   Stress: Not on file   Social Connections: Not on file    Intimate Partner Violence: Not on file   Housing Stability: Not on file     Current Outpatient Medications on File Prior to Visit   Medication Sig    ALPRAZolam (XANAX) 0.5 mg tablet Take 1 tablet (0.5 mg total) by mouth 3 (three) times a day as needed (TREMOR) May take one additional prn    amLODIPine (NORVASC) 5 mg tablet TAKE 1 TABLET (5 MG TOTAL) BY MOUTH DAILY.    aspirin (ECOTRIN LOW STRENGTH) 81 mg EC tablet Take 81 mg by mouth daily    bimatoprost (LUMIGAN) 0.01 % ophthalmic drops Administer 1 drop to both eyes daily at bedtime    Cholecalciferol (Vitamin D) 50 MCG (2000 UT) tablet Take 0.5 tablets (1,000 Units total) by mouth every other day    famotidine (PEPCID) 20 mg tablet TAKE 1 TABLET BY MOUTH TWICE A DAY    furosemide (LASIX) 20 mg tablet TAKE 1 TABLET BY MOUTH EVERY DAY    levothyroxine 75 mcg tablet TAKE 1 TABLET BY MOUTH EVERY DAY IN THE MORNING    metoprolol tartrate (LOPRESSOR) 25 mg tablet TAKE 1 TABLET (25 MG TOTAL) BY MOUTH EVERY 12 (TWELVE) HOURS    Pediatric Multiple Vit-C-FA (PEDIATRIC MULTIVITAMIN) chewable tablet Chew 1 tablet daily    Potassium Gluconate 595 (99 K) MG TABS Take by mouth    rosuvastatin (CRESTOR) 10 MG tablet Take 1 tablet (10 mg total) by mouth daily    Stiolto Respimat 2.5-2.5 MCG/ACT inhaler INHALE 2 PUFFS BY MOUTH EVERY DAY    traZODone (DESYREL) 50 mg tablet TAKE 1 TABLET (50 MG TOTAL) BY MOUTH DAILY AT BEDTIME CAN TITRATE UP IN 25 MG INCREMENTS IF NEEDED     Allergies   Allergen Reactions    Penicillins Itching    Penicillins Rash     Immunization History   Administered Date(s) Administered    COVID-19 PFIZER VACCINE 0.3 ML IM 03/25/2021, 04/15/2021, 01/06/2022, 09/25/2023    COVID-19 Pfizer vac (Christopher-sucrose, gray cap) 12 yr+ IM 08/06/2022    INFLUENZA 11/09/2016, 10/10/2017, 10/15/2019, 10/15/2020, 10/01/2021, 10/27/2022, 10/16/2023    Influenza Split High Dose Preservative Free IM 10/15/2012, 10/15/2013, 10/15/2014, 10/28/2015, 11/09/2016, 10/10/2017     "Influenza, high dose seasonal 0.7 mL 10/16/2018, 10/15/2019, 10/15/2020, 10/01/2021, 10/27/2022, 10/16/2023    Influenza, seasonal, injectable 12/06/2011    Pneumococcal Conjugate 13-Valent 06/25/2015, 11/09/2016    Pneumococcal Polysaccharide PPV23 06/25/2015    Zoster 05/01/2017       Objective     /72   Temp (!) 97 °F (36.1 °C) (Temporal)   Resp 16   Ht 5' 2\" (1.575 m)   Wt 84.3 kg (185 lb 12.8 oz)   BMI 33.98 kg/m²     Physical Exam  Constitutional:       Appearance: Normal appearance. She is obese.      Comments: 88-year-old female appears her stated age   HENT:      Mouth/Throat:      Mouth: Mucous membranes are moist.   Cardiovascular:      Rate and Rhythm: Normal rate and regular rhythm.   Pulmonary:      Effort: Pulmonary effort is normal.      Breath sounds: Normal breath sounds.   Abdominal:      General: Bowel sounds are normal.      Palpations: Abdomen is soft.   Musculoskeletal:      Comments: Ambulatory with walker steady   Neurological:      Mental Status: She is alert and oriented to person, place, and time.   Psychiatric:         Mood and Affect: Mood is anxious.         Behavior: Behavior is slowed.         Cognition and Memory: Cognition normal.      Comments: Short-term may be slightly off, but generally fairly sharp       Paz Pryblick, DO    "

## 2024-02-21 NOTE — TELEPHONE ENCOUNTER
Patient was questioning the name of the eye doctor you suggested for her.    Please advise patient at [phone].

## 2024-02-23 ENCOUNTER — TELEPHONE (OUTPATIENT)
Age: 89
End: 2024-02-23

## 2024-02-23 NOTE — TELEPHONE ENCOUNTER
Patients octavio called stating that Dr. Maharaj advised patient to see an eye surgeon when she was seen on 2/21/24.  Patients octavio would like to know which surgeon was recommended. Please return patients fernanda call.

## 2024-02-27 ENCOUNTER — APPOINTMENT (EMERGENCY)
Dept: RADIOLOGY | Facility: HOSPITAL | Age: 89
DRG: 689 | End: 2024-02-27
Payer: COMMERCIAL

## 2024-02-27 ENCOUNTER — APPOINTMENT (EMERGENCY)
Dept: CT IMAGING | Facility: HOSPITAL | Age: 89
DRG: 689 | End: 2024-02-27
Payer: COMMERCIAL

## 2024-02-27 ENCOUNTER — HOSPITAL ENCOUNTER (INPATIENT)
Facility: HOSPITAL | Age: 89
LOS: 1 days | Discharge: HOME HEALTH | DRG: 689 | End: 2024-02-29
Attending: EMERGENCY MEDICINE | Admitting: INTERNAL MEDICINE
Payer: COMMERCIAL

## 2024-02-27 DIAGNOSIS — R26.2 AMBULATORY DYSFUNCTION: ICD-10-CM

## 2024-02-27 DIAGNOSIS — J90 PLEURAL EFFUSION: ICD-10-CM

## 2024-02-27 DIAGNOSIS — R53.1 GENERALIZED WEAKNESS: Primary | ICD-10-CM

## 2024-02-27 DIAGNOSIS — R09.89 PULMONARY VASCULAR CONGESTION: ICD-10-CM

## 2024-02-27 DIAGNOSIS — N30.01 ACUTE CYSTITIS WITH HEMATURIA: ICD-10-CM

## 2024-02-27 PROBLEM — N30.00 ACUTE CYSTITIS: Status: ACTIVE | Noted: 2024-02-27

## 2024-02-27 LAB
2HR DELTA HS TROPONIN: 1 NG/L
4HR DELTA HS TROPONIN: 1 NG/L
ALBUMIN SERPL BCP-MCNC: 3.6 G/DL (ref 3.5–5)
ALP SERPL-CCNC: 75 U/L (ref 34–104)
ALT SERPL W P-5'-P-CCNC: 4 U/L (ref 7–52)
ANION GAP SERPL CALCULATED.3IONS-SCNC: 2 MMOL/L
APTT PPP: 28 SECONDS (ref 23–37)
AST SERPL W P-5'-P-CCNC: 10 U/L (ref 13–39)
ATRIAL RATE: 57 BPM
ATRIAL RATE: 68 BPM
BACTERIA UR QL AUTO: ABNORMAL /HPF
BASOPHILS # BLD AUTO: 0.06 THOUSANDS/ÂΜL (ref 0–0.1)
BASOPHILS NFR BLD AUTO: 1 % (ref 0–1)
BILIRUB SERPL-MCNC: 0.35 MG/DL (ref 0.2–1)
BILIRUB UR QL STRIP: NEGATIVE
BNP SERPL-MCNC: 166 PG/ML (ref 0–100)
BUN SERPL-MCNC: 38 MG/DL (ref 5–25)
CALCIUM SERPL-MCNC: 9.3 MG/DL (ref 8.4–10.2)
CARDIAC TROPONIN I PNL SERPL HS: 7 NG/L
CARDIAC TROPONIN I PNL SERPL HS: 8 NG/L
CARDIAC TROPONIN I PNL SERPL HS: 8 NG/L
CHLORIDE SERPL-SCNC: 98 MMOL/L (ref 96–108)
CLARITY UR: ABNORMAL
CO2 SERPL-SCNC: 41 MMOL/L (ref 21–32)
COLOR UR: ABNORMAL
CREAT SERPL-MCNC: 1.24 MG/DL (ref 0.6–1.3)
EOSINOPHIL # BLD AUTO: 0.23 THOUSAND/ÂΜL (ref 0–0.61)
EOSINOPHIL NFR BLD AUTO: 4 % (ref 0–6)
ERYTHROCYTE [DISTWIDTH] IN BLOOD BY AUTOMATED COUNT: 12.4 % (ref 11.6–15.1)
FLUAV RNA RESP QL NAA+PROBE: NEGATIVE
FLUBV RNA RESP QL NAA+PROBE: NEGATIVE
GFR SERPL CREATININE-BSD FRML MDRD: 38 ML/MIN/1.73SQ M
GLUCOSE SERPL-MCNC: 132 MG/DL (ref 65–140)
GLUCOSE SERPL-MCNC: 147 MG/DL (ref 65–140)
GLUCOSE UR STRIP-MCNC: NEGATIVE MG/DL
HCT VFR BLD AUTO: 48.4 % (ref 34.8–46.1)
HGB BLD-MCNC: 14 G/DL (ref 11.5–15.4)
HGB UR QL STRIP.AUTO: ABNORMAL
IMM GRANULOCYTES # BLD AUTO: 0.01 THOUSAND/UL (ref 0–0.2)
IMM GRANULOCYTES NFR BLD AUTO: 0 % (ref 0–2)
INR PPP: 1 (ref 0.84–1.19)
KETONES UR STRIP-MCNC: NEGATIVE MG/DL
LEUKOCYTE ESTERASE UR QL STRIP: ABNORMAL
LYMPHOCYTES # BLD AUTO: 1.21 THOUSANDS/ÂΜL (ref 0.6–4.47)
LYMPHOCYTES NFR BLD AUTO: 20 % (ref 14–44)
MAGNESIUM SERPL-MCNC: 2.3 MG/DL (ref 1.9–2.7)
MCH RBC QN AUTO: 28.4 PG (ref 26.8–34.3)
MCHC RBC AUTO-ENTMCNC: 28.9 G/DL (ref 31.4–37.4)
MCV RBC AUTO: 98 FL (ref 82–98)
MONOCYTES # BLD AUTO: 0.64 THOUSAND/ÂΜL (ref 0.17–1.22)
MONOCYTES NFR BLD AUTO: 10 % (ref 4–12)
MUCOUS THREADS UR QL AUTO: ABNORMAL
NEUTROPHILS # BLD AUTO: 4.02 THOUSANDS/ÂΜL (ref 1.85–7.62)
NEUTS SEG NFR BLD AUTO: 65 % (ref 43–75)
NITRITE UR QL STRIP: POSITIVE
NON-SQ EPI CELLS URNS QL MICRO: ABNORMAL /HPF
NRBC BLD AUTO-RTO: 0 /100 WBCS
P AXIS: 119 DEGREES
P AXIS: 79 DEGREES
PH UR STRIP.AUTO: 6 [PH]
PLATELET # BLD AUTO: 245 THOUSANDS/UL (ref 149–390)
PMV BLD AUTO: 9.9 FL (ref 8.9–12.7)
POTASSIUM SERPL-SCNC: 4.5 MMOL/L (ref 3.5–5.3)
PR INTERVAL: 240 MS
PR INTERVAL: 264 MS
PROT SERPL-MCNC: 6.4 G/DL (ref 6.4–8.4)
PROT UR STRIP-MCNC: ABNORMAL MG/DL
PROTHROMBIN TIME: 13.4 SECONDS (ref 11.6–14.5)
QRS AXIS: 124 DEGREES
QRS AXIS: 128 DEGREES
QRSD INTERVAL: 156 MS
QRSD INTERVAL: 158 MS
QT INTERVAL: 422 MS
QT INTERVAL: 452 MS
QTC INTERVAL: 439 MS
QTC INTERVAL: 448 MS
RBC # BLD AUTO: 4.93 MILLION/UL (ref 3.81–5.12)
RBC #/AREA URNS AUTO: ABNORMAL /HPF
RSV RNA RESP QL NAA+PROBE: NEGATIVE
SARS-COV-2 RNA RESP QL NAA+PROBE: NEGATIVE
SODIUM SERPL-SCNC: 141 MMOL/L (ref 135–147)
SP GR UR STRIP.AUTO: 1.04 (ref 1–1.03)
T WAVE AXIS: 44 DEGREES
T WAVE AXIS: 54 DEGREES
UROBILINOGEN UR STRIP-ACNC: <2 MG/DL
VENTRICULAR RATE: 57 BPM
VENTRICULAR RATE: 68 BPM
WBC # BLD AUTO: 6.17 THOUSAND/UL (ref 4.31–10.16)
WBC #/AREA URNS AUTO: ABNORMAL /HPF
WBC CLUMPS # UR AUTO: PRESENT /UL

## 2024-02-27 PROCEDURE — 74177 CT ABD & PELVIS W/CONTRAST: CPT

## 2024-02-27 PROCEDURE — 87077 CULTURE AEROBIC IDENTIFY: CPT | Performed by: EMERGENCY MEDICINE

## 2024-02-27 PROCEDURE — 84484 ASSAY OF TROPONIN QUANT: CPT | Performed by: EMERGENCY MEDICINE

## 2024-02-27 PROCEDURE — 82948 REAGENT STRIP/BLOOD GLUCOSE: CPT

## 2024-02-27 PROCEDURE — 99223 1ST HOSP IP/OBS HIGH 75: CPT | Performed by: INTERNAL MEDICINE

## 2024-02-27 PROCEDURE — 81001 URINALYSIS AUTO W/SCOPE: CPT | Performed by: EMERGENCY MEDICINE

## 2024-02-27 PROCEDURE — 36415 COLL VENOUS BLD VENIPUNCTURE: CPT | Performed by: EMERGENCY MEDICINE

## 2024-02-27 PROCEDURE — 96360 HYDRATION IV INFUSION INIT: CPT

## 2024-02-27 PROCEDURE — 99285 EMERGENCY DEPT VISIT HI MDM: CPT | Performed by: EMERGENCY MEDICINE

## 2024-02-27 PROCEDURE — 99285 EMERGENCY DEPT VISIT HI MDM: CPT

## 2024-02-27 PROCEDURE — 93005 ELECTROCARDIOGRAM TRACING: CPT

## 2024-02-27 PROCEDURE — 80053 COMPREHEN METABOLIC PANEL: CPT | Performed by: EMERGENCY MEDICINE

## 2024-02-27 PROCEDURE — 87186 SC STD MICRODIL/AGAR DIL: CPT | Performed by: EMERGENCY MEDICINE

## 2024-02-27 PROCEDURE — 96361 HYDRATE IV INFUSION ADD-ON: CPT

## 2024-02-27 PROCEDURE — 85730 THROMBOPLASTIN TIME PARTIAL: CPT | Performed by: EMERGENCY MEDICINE

## 2024-02-27 PROCEDURE — 0241U HB NFCT DS VIR RESP RNA 4 TRGT: CPT | Performed by: EMERGENCY MEDICINE

## 2024-02-27 PROCEDURE — 83880 ASSAY OF NATRIURETIC PEPTIDE: CPT | Performed by: EMERGENCY MEDICINE

## 2024-02-27 PROCEDURE — 83735 ASSAY OF MAGNESIUM: CPT | Performed by: EMERGENCY MEDICINE

## 2024-02-27 PROCEDURE — 85610 PROTHROMBIN TIME: CPT | Performed by: EMERGENCY MEDICINE

## 2024-02-27 PROCEDURE — 85025 COMPLETE CBC W/AUTO DIFF WBC: CPT | Performed by: EMERGENCY MEDICINE

## 2024-02-27 PROCEDURE — 71045 X-RAY EXAM CHEST 1 VIEW: CPT

## 2024-02-27 PROCEDURE — 87086 URINE CULTURE/COLONY COUNT: CPT | Performed by: EMERGENCY MEDICINE

## 2024-02-27 PROCEDURE — 93010 ELECTROCARDIOGRAM REPORT: CPT | Performed by: INTERNAL MEDICINE

## 2024-02-27 RX ORDER — FUROSEMIDE 10 MG/ML
20 INJECTION INTRAMUSCULAR; INTRAVENOUS ONCE
Status: COMPLETED | OUTPATIENT
Start: 2024-02-27 | End: 2024-02-27

## 2024-02-27 RX ORDER — ENOXAPARIN SODIUM 100 MG/ML
40 INJECTION SUBCUTANEOUS DAILY
Status: DISCONTINUED | OUTPATIENT
Start: 2024-02-28 | End: 2024-02-27

## 2024-02-27 RX ORDER — INSULIN LISPRO 100 [IU]/ML
1-5 INJECTION, SOLUTION INTRAVENOUS; SUBCUTANEOUS
Status: DISCONTINUED | OUTPATIENT
Start: 2024-02-27 | End: 2024-02-29 | Stop reason: HOSPADM

## 2024-02-27 RX ORDER — ONDANSETRON 2 MG/ML
4 INJECTION INTRAMUSCULAR; INTRAVENOUS EVERY 6 HOURS PRN
Status: DISCONTINUED | OUTPATIENT
Start: 2024-02-27 | End: 2024-02-29 | Stop reason: HOSPADM

## 2024-02-27 RX ORDER — ALPRAZOLAM 0.5 MG/1
0.5 TABLET ORAL 3 TIMES DAILY PRN
Status: DISCONTINUED | OUTPATIENT
Start: 2024-02-27 | End: 2024-02-29 | Stop reason: HOSPADM

## 2024-02-27 RX ORDER — ACETAMINOPHEN 325 MG/1
650 TABLET ORAL EVERY 6 HOURS PRN
Status: DISCONTINUED | OUTPATIENT
Start: 2024-02-27 | End: 2024-02-29 | Stop reason: HOSPADM

## 2024-02-27 RX ORDER — FUROSEMIDE 10 MG/ML
40 INJECTION INTRAMUSCULAR; INTRAVENOUS DAILY
Status: COMPLETED | OUTPATIENT
Start: 2024-02-28 | End: 2024-02-28

## 2024-02-27 RX ORDER — PRAVASTATIN SODIUM 80 MG/1
80 TABLET ORAL
Status: DISCONTINUED | OUTPATIENT
Start: 2024-02-28 | End: 2024-02-29 | Stop reason: HOSPADM

## 2024-02-27 RX ORDER — DOCUSATE SODIUM 100 MG/1
100 CAPSULE, LIQUID FILLED ORAL 2 TIMES DAILY
Status: DISCONTINUED | OUTPATIENT
Start: 2024-02-27 | End: 2024-02-29 | Stop reason: HOSPADM

## 2024-02-27 RX ORDER — LEVOTHYROXINE SODIUM 0.07 MG/1
75 TABLET ORAL
Status: DISCONTINUED | OUTPATIENT
Start: 2024-02-28 | End: 2024-02-29 | Stop reason: HOSPADM

## 2024-02-27 RX ORDER — TRAZODONE HYDROCHLORIDE 50 MG/1
50 TABLET ORAL
Status: DISCONTINUED | OUTPATIENT
Start: 2024-02-27 | End: 2024-02-29 | Stop reason: HOSPADM

## 2024-02-27 RX ORDER — ENOXAPARIN SODIUM 100 MG/ML
30 INJECTION SUBCUTANEOUS DAILY
Status: DISCONTINUED | OUTPATIENT
Start: 2024-02-28 | End: 2024-02-29 | Stop reason: HOSPADM

## 2024-02-27 RX ORDER — AMLODIPINE BESYLATE 5 MG/1
5 TABLET ORAL DAILY
Status: DISCONTINUED | OUTPATIENT
Start: 2024-02-28 | End: 2024-02-29 | Stop reason: HOSPADM

## 2024-02-27 RX ORDER — BIMATOPROST 0.3 MG/ML
1 SOLUTION/ DROPS OPHTHALMIC
Status: DISCONTINUED | OUTPATIENT
Start: 2024-02-27 | End: 2024-02-29 | Stop reason: HOSPADM

## 2024-02-27 RX ORDER — CEFTRIAXONE 1 G/50ML
1000 INJECTION, SOLUTION INTRAVENOUS EVERY 24 HOURS
Status: DISCONTINUED | OUTPATIENT
Start: 2024-02-27 | End: 2024-02-29

## 2024-02-27 RX ORDER — ALPRAZOLAM 0.5 MG/1
0.5 TABLET ORAL 3 TIMES DAILY PRN
Status: DISCONTINUED | OUTPATIENT
Start: 2024-02-27 | End: 2024-02-27

## 2024-02-27 RX ORDER — INSULIN LISPRO 100 [IU]/ML
1-5 INJECTION, SOLUTION INTRAVENOUS; SUBCUTANEOUS
Status: DISCONTINUED | OUTPATIENT
Start: 2024-02-28 | End: 2024-02-29 | Stop reason: HOSPADM

## 2024-02-27 RX ORDER — FAMOTIDINE 20 MG/1
20 TABLET, FILM COATED ORAL 2 TIMES DAILY
Status: DISCONTINUED | OUTPATIENT
Start: 2024-02-27 | End: 2024-02-29 | Stop reason: HOSPADM

## 2024-02-27 RX ADMIN — CEFTRIAXONE 1000 MG: 1 INJECTION, SOLUTION INTRAVENOUS at 21:59

## 2024-02-27 RX ADMIN — TRAZODONE HYDROCHLORIDE 50 MG: 50 TABLET ORAL at 21:58

## 2024-02-27 RX ADMIN — FAMOTIDINE 20 MG: 20 TABLET, FILM COATED ORAL at 21:59

## 2024-02-27 RX ADMIN — BIMATOPROST 1 DROP: 0.3 SOLUTION/ DROPS OPHTHALMIC at 22:22

## 2024-02-27 RX ADMIN — FUROSEMIDE 20 MG: 10 INJECTION, SOLUTION INTRAVENOUS at 18:56

## 2024-02-27 RX ADMIN — SODIUM CHLORIDE 1000 ML: 0.9 INJECTION, SOLUTION INTRAVENOUS at 14:15

## 2024-02-27 RX ADMIN — IOHEXOL 100 ML: 350 INJECTION, SOLUTION INTRAVENOUS at 15:27

## 2024-02-27 RX ADMIN — METOPROLOL TARTRATE 25 MG: 25 TABLET, FILM COATED ORAL at 21:58

## 2024-02-27 NOTE — ASSESSMENT & PLAN NOTE
Patient with UA in the ED consistent with UTI; nitrate positive urine with innumerable bacteria  Reports generalized malaise as well as weakness and urinary frequency  Recently seen in the outpatient setting for urinary frequency and started on Bactrim  Will initiate empiric therapy with Rocephin based on recent urine culture labs  Repeat urine cultures pending    (Note-additionally will hold Cymbalta on admission as patient and family believe this may be contributing to underlying symptoms)

## 2024-02-27 NOTE — ASSESSMENT & PLAN NOTE
Continue levothyroxine 75 mcg daily  TSH 5 months prior was 1.482; follow-up in the outpatient setting

## 2024-02-27 NOTE — ASSESSMENT & PLAN NOTE
"Lab Results   Component Value Date    HGBA1C 6.1 02/21/2024       No results for input(s): \"POCGLU\" in the last 72 hours.    Blood Sugar Average: Last 72 hrs:    SSI; Subcutaneous Insulin Order Set  Blood Glucose checks TIDWM and QHS (Q6H for NPO patients)  Hold oral medications  Blood Glucose goal while inpatient is 140-180  Reduce basal insulin by 25-50% while inpatient  Consistent Carbohydrate Diet      "

## 2024-02-27 NOTE — ASSESSMENT & PLAN NOTE
Blood pressure moderately well-controlled on admission, continue amlodipine and Lopressor  IV Lasix for diuresis, monitor and uptitrate medications as needed

## 2024-02-27 NOTE — ED PROVIDER NOTES
History  Chief Complaint   Patient presents with    Weakness - Generalized     Generalized weakness. Offers no other complaints.      Patient is a 88-year-old female with a history of hypertension, coronary artery disease status post CABG x 3,, essential tremor, anxiety, insomnia, chronic diastolic heart failure, COPD on oxygen coming in today reporting she just does not feel well.  Patient states that she was seen by her doctor and put on a new medication but unsure the name of it.  She reports that she just generally feels weak.  She has no headache or chest pain.  She has no worsening shortness of breath or dyspnea on exertion.  She states that she has decreased p.o. intake, difficulty ambulating around her home.  She has not fallen recently.  She states that she has persistent diarrhea without melena or bright red blood per rectum.  She states that she cannot eat or drink anything or eat without diarrhea or urinary frequency.    Patient was seen on February 21 at her PCP.  She was subsequently started on Cymbalta.  Patient states that she does not want to take this anymore as it makes her tremors worse patient's last echo was February 2021 with EF 60%.  Concentric hypertrophy.      History provided by:  Patient and medical records   used: No    Fatigue  Severity:  Moderate  Onset quality:  Gradual  Duration:  3 days  Timing:  Constant  Progression:  Unchanged  Chronicity:  New  Context: not alcohol use, not allergies, not decreased sleep, not dehydration, not drug use, not increased activity, not pinched nerve, not recent infection, not stress and not urinary tract infection    Relieved by:  None tried  Worsened by:  Nothing  Ineffective treatments:  None tried  Associated symptoms: anorexia, diarrhea, difficulty walking, lethargy, nausea and urgency    Associated symptoms: no abdominal pain, no aphasia, no arthralgias, no ataxia, no chest pain, no cough, no dizziness, no drooling, no  dysphagia, no dysuria, no numbness in extremities, no falls, no fever, no foul-smelling urine, no frequency, no headaches, no hematochezia, no loss of consciousness, no melena, no myalgias, no near-syncope, no seizures, no sensory-motor deficit, no shortness of breath, no stroke symptoms, no syncope, no vision change and no vomiting    Risk factors: diabetes and new medications            Prior to Admission Medications   Prescriptions Last Dose Informant Patient Reported? Taking?   ALPRAZolam (XANAX) 0.5 mg tablet   No No   Sig: Take 1 tablet (0.5 mg total) by mouth 3 (three) times a day as needed (TREMOR) May take one additional prn   Cholecalciferol (Vitamin D) 50 MCG (2000 UT) tablet   No No   Sig: Take 0.5 tablets (1,000 Units total) by mouth every other day   DULoxetine (CYMBALTA) 30 mg delayed release capsule   No No   Sig: Take 1 capsule (30 mg total) by mouth daily   Pediatric Multiple Vit-C-FA (PEDIATRIC MULTIVITAMIN) chewable tablet  Self Yes No   Sig: Chew 1 tablet daily   Potassium Gluconate 595 (99 K) MG TABS  Self Yes No   Sig: Take by mouth   Stiolto Respimat 2.5-2.5 MCG/ACT inhaler   No No   Sig: INHALE 2 PUFFS BY MOUTH EVERY DAY   amLODIPine (NORVASC) 5 mg tablet   No No   Sig: TAKE 1 TABLET (5 MG TOTAL) BY MOUTH DAILY.   aspirin (ECOTRIN LOW STRENGTH) 81 mg EC tablet  Self Yes No   Sig: Take 81 mg by mouth daily   bimatoprost (LUMIGAN) 0.01 % ophthalmic drops   No No   Sig: Administer 1 drop to both eyes daily at bedtime   famotidine (PEPCID) 20 mg tablet   No No   Sig: TAKE 1 TABLET BY MOUTH TWICE A DAY   furosemide (LASIX) 20 mg tablet   No No   Sig: TAKE 1 TABLET BY MOUTH EVERY DAY   levothyroxine 75 mcg tablet   No No   Sig: TAKE 1 TABLET BY MOUTH EVERY DAY IN THE MORNING   metoprolol tartrate (LOPRESSOR) 25 mg tablet   No No   Sig: TAKE 1 TABLET (25 MG TOTAL) BY MOUTH EVERY 12 (TWELVE) HOURS   rosuvastatin (CRESTOR) 10 MG tablet   No No   Sig: Take 1 tablet (10 mg total) by mouth daily    traZODone (DESYREL) 50 mg tablet   No No   Sig: TAKE 1 TABLET (50 MG TOTAL) BY MOUTH DAILY AT BEDTIME CAN TITRATE UP IN 25 MG INCREMENTS IF NEEDED      Facility-Administered Medications: None       Past Medical History:   Diagnosis Date    Anxiety     Cataract, bilateral     Last Assessed:3/19/2014    COPD (chronic obstructive pulmonary disease) (HCC)     Coronary artery disease     Hx of arterial ischemic stroke     Hypertension     Stroke (HCC)     Wears glasses        Past Surgical History:   Procedure Laterality Date    CAROTID ENDARTERECTOMY Right     CATARACT EXTRACTION W/ INTRAOCULAR LENS  IMPLANT, BILATERAL      COLONOSCOPY N/A 9/30/2017    Procedure: COLONOSCOPY FOR CONTROL OF BLEEDING;  Surgeon: Jeb Gaxiola MD;  Location: [facility];  Service: Colorectal    CORONARY ARTERY BYPASS GRAFT      CABG X3 with LIMA to LAD,SVG to OM,SVG to distal  RCA ; Last Assessed:7/13/2015    JOINT REPLACEMENT      left TKR    KIDNEY STONE SURGERY      NEPHROSTOMY Left     with Drainage Irrigation ;Onset:1974    AK ARTHRP KNE CONDYLE&PLATU MEDIAL&LAT COMPARTMENTS Right 2/25/2016    Procedure: ARTHROPLASTY KNEE TOTAL;  Surgeon: Jeb Figueroa MD;  Location: [facility];  Service: Orthopedics       Family History   Problem Relation Age of Onset    Cancer Mother         malignant neoplasm    Heart attack Father     Aneurysm Other         Aortic    Cancer Other         malignant neoplasm     I have reviewed and agree with the history as documented.    E-Cigarette/Vaping    E-Cigarette Use Never User      E-Cigarette/Vaping Substances    Nicotine No     THC No     CBD No      Social History     Tobacco Use    Smoking status: Never    Smokeless tobacco: Never   Vaping Use    Vaping status: Never Used   Substance Use Topics    Alcohol use: Never    Drug use: Never       Review of Systems   Constitutional:  Positive for fatigue. Negative for chills and fever.   HENT: Negative.  Negative for drooling, ear pain and sore throat.     Eyes: Negative.  Negative for pain and visual disturbance.   Respiratory: Negative.  Negative for cough and shortness of breath.    Cardiovascular: Negative.  Negative for chest pain, palpitations, syncope and near-syncope.   Gastrointestinal:  Positive for anorexia, diarrhea and nausea. Negative for abdominal pain, dysphagia, hematochezia, melena and vomiting.   Endocrine: Negative.    Genitourinary:  Positive for urgency. Negative for dysuria, frequency and hematuria.   Musculoskeletal: Negative.  Negative for arthralgias, back pain, falls and myalgias.   Skin:  Negative for color change and rash.   Neurological: Negative.  Negative for dizziness, seizures, loss of consciousness, syncope and headaches.   Hematological: Negative.    Psychiatric/Behavioral: Negative.     All other systems reviewed and are negative.      Physical Exam  Physical Exam  Vitals and nursing note reviewed.   Constitutional:       General: She is not in acute distress.     Appearance: Normal appearance. She is well-developed. She is obese.   HENT:      Head: Normocephalic and atraumatic.      Comments: Patient maintaining airway and secretions. No stridor . No brawniness under tongue.       Dry MM     Right Ear: External ear normal.      Left Ear: External ear normal.      Nose: Nose normal.   Eyes:      Extraocular Movements: Extraocular movements intact.      Conjunctiva/sclera: Conjunctivae normal.      Pupils: Pupils are equal, round, and reactive to light.   Cardiovascular:      Rate and Rhythm: Normal rate and regular rhythm.      Pulses:           Radial pulses are 2+ on the right side and 2+ on the left side.        Dorsalis pedis pulses are 2+ on the right side and 2+ on the left side.      Heart sounds: Normal heart sounds, S1 normal and S2 normal. No murmur heard.  Pulmonary:      Effort: Pulmonary effort is normal. No respiratory distress.      Breath sounds: Normal breath sounds.      Comments: No conversational  dyspnea  Abdominal:      General: Bowel sounds are normal.      Palpations: Abdomen is soft.      Tenderness: There is generalized abdominal tenderness.   Musculoskeletal:         General: No swelling.      Cervical back: Neck supple.      Right lower leg: No edema.      Left lower leg: No edema.   Skin:     General: Skin is warm and dry.      Capillary Refill: Capillary refill takes less than 2 seconds.   Neurological:      General: No focal deficit present.      Mental Status: She is alert and oriented to person, place, and time.      Cranial Nerves: Cranial nerves 2-12 are intact.      Sensory: Sensation is intact.      Motor: Motor function is intact.      Comments: Resting tremor.    No slurred speech.  No facial asymmetry.  No tongue deviation.  Patient can move bilateral upper extremities and lower extremities with functional active range of motion   Psychiatric:         Mood and Affect: Mood normal.         Behavior: Behavior normal.         Thought Content: Thought content normal.         Judgment: Judgment normal.         Vital Signs  ED Triage Vitals [02/27/24 1339]   Temperature Pulse Respirations Blood Pressure SpO2   98.9 °F (37.2 °C) 60 18 144/81 95 %      Temp src Heart Rate Source Patient Position - Orthostatic VS BP Location FiO2 (%)   -- -- -- -- --      Pain Score       --           Vitals:    02/27/24 1339   BP: 144/81   Pulse: 60         Visual Acuity      ED Medications  Medications   furosemide (LASIX) injection 20 mg (has no administration in time range)   ALPRAZolam (XANAX) tablet 0.5 mg (has no administration in time range)   sodium chloride 0.9 % bolus 1,000 mL (1,000 mL Intravenous New Bag 2/27/24 1415)   iohexol (OMNIPAQUE) 350 MG/ML injection (SINGLE-DOSE) 100 mL (100 mL Intravenous Given 2/27/24 1527)       Diagnostic Studies  Results Reviewed       Procedure Component Value Units Date/Time    HS Troponin I 2hr [[id number]]  (Normal) Collected: 02/27/24 1633    Lab Status: Final result  Specimen: Blood from Arm, Right Updated: 02/27/24 1700     hs TnI 2hr 8 ng/L      Delta 2hr hsTnI 1 ng/L     Urine culture [[id number]] Updated: 02/27/24 1638    Lab Status: In process Specimen: Urine     Urine Microscopic [[id number]] Updated: 02/27/24 1635    Lab Status: In process Specimen: Urine     UA w Reflex to Microscopic w Reflex to Culture [[id number]] Updated: 02/27/24 1623    Lab Status: In process Specimen: Urine     HS Troponin I 4hr [[id number]]     Lab Status: No result Specimen: Blood     FLU/RSV/COVID - if FLU/RSV clinically relevant [[id number]]  (Normal) Collected: 02/27/24 1410    Lab Status: Final result Specimen: Nares from Nose Updated: 02/27/24 1459     SARS-CoV-2 Negative     INFLUENZA A PCR Negative     INFLUENZA B PCR Negative     RSV PCR Negative    Narrative:      FOR PEDIATRIC PATIENTS - copy/paste COVID Guidelines URL to browser: [email or web address]    SARS-CoV-2 assay is a Nucleic Acid Amplification assay intended for the  qualitative detection of nucleic acid from SARS-CoV-2 in nasopharyngeal  swabs. Results are for the presumptive identification of SARS-CoV-2 RNA.    Positive results are indicative of infection with SARS-CoV-2, the virus  causing COVID-19, but do not rule out bacterial infection or co-infection  with other viruses. Laboratories within the [place] and its  territories are required to report all positive results to the appropriate  public health authorities. Negative results do not preclude SARS-CoV-2  infection and should not be used as the sole basis for treatment or other  patient management decisions. Negative results must be combined with  clinical observations, patient history, and epidemiological information.  This test has not been FDA cleared or approved.    This test has been authorized by FDA under an Emergency Use Authorization  (EUA). This test is only authorized for the duration of time  the  declaration that circumstances exist justifying the authorization of the  emergency use of an in vitro diagnostic tests for detection of SARS-CoV-2  virus and/or diagnosis of COVID-19 infection under section 564(b)(1) of  the Act, 21 U.S.C. 360bbb-3(b)(1), unless the authorization is terminated  or revoked sooner. The test has been validated but independent review by FDA  and CLIA is pending.    Test performed using Respicardiapert: This RT-PCR assay targets N2,  a region unique to SARS-CoV-2. A conserved region in the E-gene was chosen  for pan-Sarbecovirus detection which includes SARS-CoV-2.    According to CMS-2020-01-R, this platform meets the definition of high-throughput technology.    HS Troponin 0hr (reflex protocol) [[id number]]  (Normal) Collected: 02/27/24 1410    Lab Status: Final result Specimen: Blood from Arm, Left Updated: 02/27/24 1445     hs TnI 0hr 7 ng/L     B-Type Natriuretic Peptide(BNP) [[id number]]  (Abnormal) Collected: 02/27/24 1410    Lab Status: Final result Specimen: Blood from Arm, Left Updated: 02/27/24 1444      pg/mL     Comprehensive metabolic panel [[id number]]  (Abnormal) Collected: 02/27/24 1410    Lab Status: Final result Specimen: Blood from Arm, Left Updated: 02/27/24 1441     Sodium 141 mmol/L      Potassium 4.5 mmol/L      Chloride 98 mmol/L      CO2 41 mmol/L      ANION GAP 2 mmol/L      BUN 38 mg/dL      Creatinine 1.24 mg/dL      Glucose 132 mg/dL      Calcium 9.3 mg/dL      AST 10 U/L      ALT 4 U/L      Alkaline Phosphatase 75 U/L      Total Protein 6.4 g/dL      Albumin 3.6 g/dL      Total Bilirubin 0.35 mg/dL      eGFR 38 ml/min/1.73sq m     Narrative:      National Kidney Disease Foundation guidelines for Chronic Kidney Disease (CKD):     Stage 1 with normal or high GFR (GFR > 90 mL/min/1.73 square meters)    Stage 2 Mild CKD (GFR = 60-89 mL/min/1.73 square meters)    Stage 3A Moderate CKD (GFR = 45-59 mL/min/1.73 square meters)    Stage 3B Moderate CKD  (GFR = 30-44 mL/min/1.73 square meters)    Stage 4 Severe CKD (GFR = 15-29 mL/min/1.73 square meters)    Stage 5 End Stage CKD (GFR <15 mL/min/1.73 square meters)  Note: GFR calculation is accurate only with a steady state creatinine    Magnesium [[id number]]  (Normal) Collected: 02/27/24 1410    Lab Status: Final result Specimen: Blood from Arm, Left Updated: 02/27/24 1441     Magnesium 2.3 mg/dL     Protime-INR [[id number]]  (Normal) Collected: 02/27/24 1410    Lab Status: Final result Specimen: Blood from Arm, Left Updated: 02/27/24 1435     Protime 13.4 seconds      INR 1.00    APTT [[id number]]  (Normal) Collected: 02/27/24 1410    Lab Status: Final result Specimen: Blood from Arm, Left Updated: 02/27/24 1435     PTT 28 seconds     CBC and differential [[id number]]  (Abnormal) Collected: 02/27/24 1410    Lab Status: Final result Specimen: Blood from Arm, Left Updated: 02/27/24 1420     WBC 6.17 Thousand/uL      RBC 4.93 Million/uL      Hemoglobin 14.0 g/dL      Hematocrit 48.4 %      MCV 98 fL      MCH 28.4 pg      MCHC 28.9 g/dL      RDW 12.4 %      MPV 9.9 fL      Platelets 245 Thousands/uL      nRBC 0 /100 WBCs      Neutrophils Relative 65 %      Immat GRANS % 0 %      Lymphocytes Relative 20 %      Monocytes Relative 10 %      Eosinophils Relative 4 %      Basophils Relative 1 %      Neutrophils Absolute 4.02 Thousands/µL      Immature Grans Absolute 0.01 Thousand/uL      Lymphocytes Absolute 1.21 Thousands/µL      Monocytes Absolute 0.64 Thousand/µL      Eosinophils Absolute 0.23 Thousand/µL      Basophils Absolute 0.06 Thousands/µL                    XR chest 1 view portable   Final Result by Niall Vargas MD (02/27 1606)      Cardiomegaly.      Mild vascular congestion      Small pleural effusions      Left base atelectasis and/or infiltrate            Workstation performed: [id number]         CT abdomen pelvis w contrast   Final Result by Nash Huff MD (02/27 1618)      1.  No acute findings in the  "abdomen or pelvis.   2.  Bilateral nonobstructing nephrolithiasis.   3.  Cholelithiasis without evidence of acute cholecystitis.   4.  Colonic diverticulosis without evidence of acute diverticulitis.   5.  A 3.9 cm infrarenal abdominal aortic aneurysm on a background of severe atherosclerosis, increased since 2017. Recommend vascular surgery follow-up as appropriate.         The study was marked in EPIC for immediate notification.      Workstation performed: [id number]                    Procedures  Procedures         ED Course  ED Course as of 02/27/24 1706   Tue Feb 27, 2024   1356 Patient is an 88 year old female coming in with onset of generalized weakness, nausea, decreased po intake and difficulty walking . On exam, well appearing, non toxic and non peritoneal. Will start work up with labs, EKG, chest xray, urine, ct abd/pelvis as well as IVF    Disclosure: Voice to text software was used in the preparation of this document and could have resulted in translational errors.      Occasional wrong word or \"sound a like\" substitutions may have occurred due to the inherent limitations of voice recognition software.  Read the chart carefully and recognize, using context, where substitutions have occurred.       I have independently reviewed external records are available to me to the level of detail possible within the time constraints of my patient care responsibilities in the ED.       1550 Labs reviewed and without actionable derangement       1620 Chest x-ray concerning for mild vascular congestion.   1620 1.  No acute findings in the abdomen or pelvis.  2.  Bilateral nonobstructing nephrolithiasis.  3.  Cholelithiasis without evidence of acute cholecystitis.  4.  Colonic diverticulosis without evidence of acute diverticulitis.  5.  A 3.9 cm infrarenal abdominal aortic aneurysm on a background of severe atherosclerosis, increased since 2017. Recommend vascular surgery follow-up as appropriate.     1620 " Patient's last echo was in 2021 and no old proBNP.  Given chest x-ray with effusions and congestion concern for possible acute on chronic CHF.  Will discuss with medicine team   1644 Patient's son at bedside.  She states that she has not been taking her medications as prescribed and that she does not like to take the diuretic because she cannot go anywhere.   1644   She is agreeable for admission.    Will give dose of lasix IV   1701 Delta trop of 1             HEART Risk Score      Flowsheet Row Most Recent Value   Heart Score Risk Calculator    History 0 Filed at: 02/27/2024 1453   ECG 1 Filed at: 02/27/2024 1453   Age 2 Filed at: 02/27/2024 1453   Risk Factors 2 Filed at: 02/27/2024 1453   Troponin 0 Filed at: 02/27/2024 1453   HEART Score 5 Filed at: 02/27/2024 1453                          SBIRT 20yo+      Flowsheet Row Most Recent Value   Initial Alcohol Screen: US AUDIT-C     1. How often do you have a drink containing alcohol? 0 Filed at: 02/27/2024 1352   2. How many drinks containing alcohol do you have on a typical day you are drinking?  0 Filed at: 02/27/2024 1352   3a. Male UNDER 65: How often do you have five or more drinks on one occasion? 0 Filed at: 02/27/2024 1352   3b. FEMALE Any Age, or MALE 65+: How often do you have 4 or more drinks on one occassion? 0 Filed at: 02/27/2024 1352   Audit-C Score 0 Filed at: 02/27/2024 1352   LUDIVINA: How many times in the past year have you...    Used an illegal drug or used a prescription medication for non-medical reasons? Never Filed at: 02/27/2024 1352                      Medical Decision Making      EKG INTERPRETATION 1348    RHYTHM: Sinus bradycardia at 57 bpm  AXIS: Right axis deviation  INTERVALS: KY interval measured 264 ms consistent with first-degree AV block  QRS COMPLEX: QRS measured at 156 with a right bundle branch block.  Right ventricular hypertrophy  ST SEGMENT: Nonspecific ST segment changes.  Diffuse artifact.  QT INTERVAL: QTc measured at about  439 ms  COMPARED WITH PRIOR compared to old EKG on February 5, 2021 no acute change  Interpretation by Sindhu Suarez DO      Differential includes but not limited to:  ACS, NSTEMI, acute kidney injury, dehydration, electrolyte dysfunction, liver failure, pancytopenia, anemia, hypothyroidism, hyperthyroidism, UTI, rhabdomyolysis, TIA, stroke, BPPV, sepsis, pneumonia, meningitis,      Repeat EKG shows normal sinus rhythm with first-degree AV block.  Right bundle rosa block without acute change from prior.    Problems Addressed:  Generalized weakness: acute illness or injury    Amount and/or Complexity of Data Reviewed  External Data Reviewed: notes.  Labs: ordered. Decision-making details documented in ED Course.     Details: No anemia, thrombocytopenia or leukocytosis.  No acute kidney injury or electrolyte dysfunction  Coags within normal limits  Troponin 7 with heart score 5. Delta troponin of 1  COVID/flu/RSV negative.  Radiology: ordered. Decision-making details documented in ED Course.     Details: Chest x-ray concerning for mild vascular congestion with bilateral pleural effusions.    CT ABP interpreted by radiologist at1.  No acute findings in the abdomen or pelvis.  2.  Bilateral nonobstructing nephrolithiasis.  3.  Cholelithiasis without evidence of acute cholecystitis.  4.  Colonic diverticulosis without evidence of acute diverticulitis.  5.  A 3.9 cm infrarenal abdominal aortic aneurysm on a background of severe atherosclerosis, increased since 2017. Recommend vascular surgery follow-up as appropriate.    ECG/medicine tests: ordered and independent interpretation performed. Decision-making details documented in ED Course.     Details: EKG without ischemia or arrhythmia but noted first-degree AV block and right bundle branch block which is chronic    Risk  Prescription drug management.  Decision regarding hospitalization.             Disposition  Final diagnoses:   Generalized weakness   Pleural  effusion   Pulmonary vascular congestion   Ambulatory dysfunction     Time reflects when diagnosis was documented in both MDM as applicable and the Disposition within this note       Time User Action Codes Description Comment    2/27/2024  2:54 PM BendSindhu jones Add [R53.1] Generalized weakness     2/27/2024  4:38 PM Bendock, Sindhu L Add [J90] Chronic bilateral pleural effusions     2/27/2024  4:38 PM Bendock, Sindhu L Remove [J90] Chronic bilateral pleural effusions     2/27/2024  4:38 PM Bendock, Sindhu L Add [J90] Pleural effusion     2/27/2024  5:06 PM Bendock, Sindhu L Add [R09.89] Pulmonary vascular congestion     2/27/2024  5:06 PM Bendrobert, Sindhu L Add [R26.2] Ambulatory dysfunction           ED Disposition       ED Disposition   Admit    Condition   Stable    Date/Time   Tue Feb 27, 2024 1707    Comment   Case was discussed with Dr Knight and the patient's admission status was agreed to be Admission Status: observation status to the service of Dr. Knight .               Follow-up Information    None         Patient's Medications   Discharge Prescriptions    No medications on file       No discharge procedures on file.    PDMP Review         Value Time User    PDMP Reviewed  Yes 2/21/2024  5:26 PM Paz Maharaj DO            ED Provider  Electronically Signed by             Sindhu Suarez DO  02/27/24 4680

## 2024-02-27 NOTE — ASSESSMENT & PLAN NOTE
Patient with chronic respiratory failure and requires oxygen in the outpatient setting  Appears to be at baseline breathing and not in acute COPD exacerbation  Continue home medications and monitor

## 2024-02-27 NOTE — ASSESSMENT & PLAN NOTE
Wt Readings from Last 3 Encounters:   02/21/24 84.3 kg (185 lb 12.8 oz)   10/16/23 85.7 kg (189 lb)   06/16/23 81.6 kg (180 lb)     Patient with history of heart failure with preserved ejection fraction; 2/2021 echo with 60% EF and grade 2 diastolic dysfunction  Requiring 3 L nasal cannula (on 2 L at baseline) while in the ED  Chest x-ray shows mild pulmonary congestion; patient's son reports she is not compliant with Lasix in the outpatient setting  Was given 20 mg IV Lasix in the ED; will trial 40 mg IV Lasix x 1 tomorrow  If symptoms improved, consider transition back to home dose 20 mg p.o.

## 2024-02-28 LAB
ALBUMIN SERPL BCP-MCNC: 3.5 G/DL (ref 3.5–5)
ALP SERPL-CCNC: 73 U/L (ref 34–104)
ALT SERPL W P-5'-P-CCNC: 4 U/L (ref 7–52)
ANION GAP SERPL CALCULATED.3IONS-SCNC: 1 MMOL/L
AST SERPL W P-5'-P-CCNC: 10 U/L (ref 13–39)
BASOPHILS # BLD AUTO: 0.04 THOUSANDS/ÂΜL (ref 0–0.1)
BASOPHILS NFR BLD AUTO: 1 % (ref 0–1)
BILIRUB SERPL-MCNC: 0.31 MG/DL (ref 0.2–1)
BUN SERPL-MCNC: 33 MG/DL (ref 5–25)
CALCIUM SERPL-MCNC: 8.8 MG/DL (ref 8.4–10.2)
CHLORIDE SERPL-SCNC: 98 MMOL/L (ref 96–108)
CO2 SERPL-SCNC: 43 MMOL/L (ref 21–32)
CREAT SERPL-MCNC: 1.21 MG/DL (ref 0.6–1.3)
EOSINOPHIL # BLD AUTO: 0.3 THOUSAND/ÂΜL (ref 0–0.61)
EOSINOPHIL NFR BLD AUTO: 4 % (ref 0–6)
ERYTHROCYTE [DISTWIDTH] IN BLOOD BY AUTOMATED COUNT: 12.5 % (ref 11.6–15.1)
GFR SERPL CREATININE-BSD FRML MDRD: 40 ML/MIN/1.73SQ M
GLUCOSE SERPL-MCNC: 118 MG/DL (ref 65–140)
GLUCOSE SERPL-MCNC: 126 MG/DL (ref 65–140)
GLUCOSE SERPL-MCNC: 141 MG/DL (ref 65–140)
GLUCOSE SERPL-MCNC: 93 MG/DL (ref 65–140)
GLUCOSE SERPL-MCNC: 96 MG/DL (ref 65–140)
HCT VFR BLD AUTO: 46.7 % (ref 34.8–46.1)
HGB BLD-MCNC: 13.5 G/DL (ref 11.5–15.4)
IMM GRANULOCYTES # BLD AUTO: 0.03 THOUSAND/UL (ref 0–0.2)
IMM GRANULOCYTES NFR BLD AUTO: 0 % (ref 0–2)
LYMPHOCYTES # BLD AUTO: 0.87 THOUSANDS/ÂΜL (ref 0.6–4.47)
LYMPHOCYTES NFR BLD AUTO: 13 % (ref 14–44)
MAGNESIUM SERPL-MCNC: 2.2 MG/DL (ref 1.9–2.7)
MCH RBC QN AUTO: 28.8 PG (ref 26.8–34.3)
MCHC RBC AUTO-ENTMCNC: 28.9 G/DL (ref 31.4–37.4)
MCV RBC AUTO: 100 FL (ref 82–98)
MONOCYTES # BLD AUTO: 0.74 THOUSAND/ÂΜL (ref 0.17–1.22)
MONOCYTES NFR BLD AUTO: 11 % (ref 4–12)
NEUTROPHILS # BLD AUTO: 4.91 THOUSANDS/ÂΜL (ref 1.85–7.62)
NEUTS SEG NFR BLD AUTO: 71 % (ref 43–75)
NRBC BLD AUTO-RTO: 0 /100 WBCS
PHOSPHATE SERPL-MCNC: 3.4 MG/DL (ref 2.3–4.1)
PLATELET # BLD AUTO: 212 THOUSANDS/UL (ref 149–390)
PMV BLD AUTO: 9.7 FL (ref 8.9–12.7)
POTASSIUM SERPL-SCNC: 4 MMOL/L (ref 3.5–5.3)
PROCALCITONIN SERPL-MCNC: <0.05 NG/ML
PROT SERPL-MCNC: 6.3 G/DL (ref 6.4–8.4)
RBC # BLD AUTO: 4.69 MILLION/UL (ref 3.81–5.12)
SODIUM SERPL-SCNC: 142 MMOL/L (ref 135–147)
TSH SERPL DL<=0.05 MIU/L-ACNC: 2.33 UIU/ML (ref 0.45–4.5)
WBC # BLD AUTO: 6.89 THOUSAND/UL (ref 4.31–10.16)

## 2024-02-28 PROCEDURE — 84443 ASSAY THYROID STIM HORMONE: CPT | Performed by: INTERNAL MEDICINE

## 2024-02-28 PROCEDURE — 84100 ASSAY OF PHOSPHORUS: CPT | Performed by: INTERNAL MEDICINE

## 2024-02-28 PROCEDURE — 83735 ASSAY OF MAGNESIUM: CPT | Performed by: INTERNAL MEDICINE

## 2024-02-28 PROCEDURE — 85025 COMPLETE CBC W/AUTO DIFF WBC: CPT | Performed by: INTERNAL MEDICINE

## 2024-02-28 PROCEDURE — 80053 COMPREHEN METABOLIC PANEL: CPT | Performed by: INTERNAL MEDICINE

## 2024-02-28 PROCEDURE — 84145 PROCALCITONIN (PCT): CPT | Performed by: INTERNAL MEDICINE

## 2024-02-28 PROCEDURE — 82948 REAGENT STRIP/BLOOD GLUCOSE: CPT

## 2024-02-28 PROCEDURE — 99232 SBSQ HOSP IP/OBS MODERATE 35: CPT

## 2024-02-28 PROCEDURE — 97163 PT EVAL HIGH COMPLEX 45 MIN: CPT

## 2024-02-28 PROCEDURE — 97166 OT EVAL MOD COMPLEX 45 MIN: CPT

## 2024-02-28 RX ORDER — FUROSEMIDE 20 MG/1
20 TABLET ORAL DAILY
Status: DISCONTINUED | OUTPATIENT
Start: 2024-02-29 | End: 2024-02-29 | Stop reason: HOSPADM

## 2024-02-28 RX ADMIN — ALPRAZOLAM 0.5 MG: 0.5 TABLET ORAL at 21:37

## 2024-02-28 RX ADMIN — UMECLIDINIUM 1 PUFF: 62.5 AEROSOL, POWDER ORAL at 08:12

## 2024-02-28 RX ADMIN — CEFTRIAXONE 1000 MG: 1 INJECTION, SOLUTION INTRAVENOUS at 20:51

## 2024-02-28 RX ADMIN — METOPROLOL TARTRATE 25 MG: 25 TABLET, FILM COATED ORAL at 08:12

## 2024-02-28 RX ADMIN — DOCUSATE SODIUM 100 MG: 100 CAPSULE, LIQUID FILLED ORAL at 08:12

## 2024-02-28 RX ADMIN — OLODATEROL RESPIMAT INHALATION SPRAY 2 PUFF: 2.5 SPRAY, METERED RESPIRATORY (INHALATION) at 08:12

## 2024-02-28 RX ADMIN — PRAVASTATIN SODIUM 80 MG: 80 TABLET ORAL at 17:01

## 2024-02-28 RX ADMIN — FAMOTIDINE 20 MG: 20 TABLET, FILM COATED ORAL at 08:12

## 2024-02-28 RX ADMIN — ASPIRIN 81 MG: 81 TABLET, COATED ORAL at 08:12

## 2024-02-28 RX ADMIN — ENOXAPARIN SODIUM 30 MG: 30 INJECTION SUBCUTANEOUS at 08:12

## 2024-02-28 RX ADMIN — TRAZODONE HYDROCHLORIDE 50 MG: 50 TABLET ORAL at 21:37

## 2024-02-28 RX ADMIN — BIMATOPROST 1 DROP: 0.3 SOLUTION/ DROPS OPHTHALMIC at 21:38

## 2024-02-28 RX ADMIN — AMLODIPINE BESYLATE 5 MG: 5 TABLET ORAL at 08:12

## 2024-02-28 RX ADMIN — LEVOTHYROXINE SODIUM 75 MCG: 75 TABLET ORAL at 04:58

## 2024-02-28 RX ADMIN — FAMOTIDINE 20 MG: 20 TABLET, FILM COATED ORAL at 17:01

## 2024-02-28 RX ADMIN — DOCUSATE SODIUM 100 MG: 100 CAPSULE, LIQUID FILLED ORAL at 17:01

## 2024-02-28 RX ADMIN — METOPROLOL TARTRATE 25 MG: 25 TABLET, FILM COATED ORAL at 20:51

## 2024-02-28 RX ADMIN — FUROSEMIDE 40 MG: 10 INJECTION, SOLUTION INTRAMUSCULAR; INTRAVENOUS at 08:11

## 2024-02-28 RX ADMIN — ALPRAZOLAM 0.5 MG: 0.5 TABLET ORAL at 08:12

## 2024-02-28 NOTE — APP STUDENT NOTE
[facility] Internal Medicine Progress Note  Patient: Ashley Gonzales 88 y.o. female   MRN: [id number]  PCP: Paz Maharaj DO  Unit/Bed#: E5 -01 Encounter: [id number]  Date Of Visit: 02/28/24    Assessment:    Principal Problem:    Acute cystitis  Active Problems:    COPD (chronic obstructive pulmonary disease) (HCC)    Benign familial tremor    Hypertension    Hypothyroidism    Acute on chronic diastolic congestive heart failure (HCC)    JAMEEL (obstructive sleep apnea)    S/P CABG (coronary artery bypass graft)    Type 2 diabetes mellitus (HCC)      Plan:    Acute cystitis  Assessment and Plan:  Presented to the ED with weakness and urinary frequency, patient thought this was due to recently being started on Cymbalta. Seen in the outpt setting (1/27)recently for frequency and started on Bactrim 80-160mg PO BID, finished course on 2/1.  UA with signs of UTI, large leukocytes, (+) nitrates, and innumerable bacteria.   Procal (-) on AM labs.   Continue ceftriaxone 1000mg IV Q24h (Day 2)  Urine cultures pending   Cymbalta will be held during inpt stay as requested by patient and her family.   No acute findings in the abdomen/pelvis on CT. Bilateral non-obstructing nephrolithiasis.   Chronic obstructive pulmonary disease (COPD)  Assessment and Plan:  Hx of COPD maintained on 2L O2 nasal cannula at baseline.   No acute exacerbations noted, breathing appears to be at baseline.   Continue olodaterol inhaler 2 puff daily  Acute on chornic diastolic congestive heart failure   Assessment and Plan:  Hx of CHF with preserved ejection fraction. Last ECHO from 2/2021 showed EF of 60% and grade 2 diastolic dysfunction. On 2L O2 nasal cannula at baseline. Family reports she is non-compliant with Lasix as outpt.  Did require 3L O2 in the ED, has since transitioned back to baseline 2L.   CXR showed mild pulmonary congestion  20mg IV lasix given in the ED, 1 dose of 40mg lasix IV given today.  No symptoms or signs of volume  overload on exam.  Transition back to home dose of lasix 20mg PO daily.   Status post coronary artery bypass graft (CABG)  Assessment and Plan:  Hx of CAD s/p CABG x3.  Continue aspirin 81mg PO daily.  Continue pravastatin 80mg PO daily    Generalized weakness  Assessment and Plan:  Presented to the ED complaining of generalized weakness and urinary frequency. Ambulates with walker at baseline. Continued weakness, in setting of UTI.  States she had difficulty getting from bed to bedside commode, had an episode of her knees buckling, states she was shannon a nurse was there.  Would benefit from PT/OT evaluation to determine appropriate level of care needed upon discharge.   Type 2 diabetes mellitus   Assessment and Plan:  Last A1C on 2/21 was 6.1.  Hold home medications.   Continue sliding scale insulin, Accu-checks.   Hypertension  Assessment and Plan:  Continue amlodipine 5mg PO daily   Continue metoprolol 25mg PO daily  Was given Lasix 40mg IV for diuresis today, continue to monitor BP and stop/titrate BP meds as needed.   Benign familial tremor  Assessment and Plan:  Continue PRN Xanax 0.5mg TID.  Hypothyroidism   Assessment and Plan:  TSH today on AM labs was 2.326.   Continue levothyroxine 75mcg PO daily  Obstructive sleep apnea (JAMEEL)  Assessment and Plan:  Reportedly non-compliant with CPAP.   Offer CPAP use as inpt.        VTE Pharmacologic Prophylaxis:     Moderate Risk (Score 3-4) - Pharmacological DVT Prophylaxis Ordered: Enoxaparin (Lovenox).    Current Length of Stay: 0 day(s)  Current Patient Status: Observation     Code Status: Level 1 - Full Code      Subjective:   Patient seen and examined.  Not complaining of any SOB, chest pain, or trouble breathing at this time. Denies dysuria or hematuria. States her urgency and frequency have decreased in severity since arrival, states she feels like she can hold her bladder for longer now.   Did state that last night when trying to use the bathroom, her knees  buckled, stated a nurse was there. Still feeling a little weak, but states it is improving.     Objective:     Vitals:   Temp (24hrs), Av.5 °F (36.9 °C), Min:98.1 °F (36.7 °C), Max:98.9 °F (37.2 °C)    Temp:  [98.1 °F (36.7 °C)-98.9 °F (37.2 °C)] 98.1 °F (36.7 °C)  HR:  [59-71] 59  Resp:  [16-18] 18  BP: (116-144)/(46-81) 116/46  SpO2:  [94 %-96 %] 96 %  Body mass index is 33.19 kg/m².     Input and Output Summary (last 24 hours):       Intake/Output Summary (Last 24 hours) at 2024 0844  Last data filed at 2024 0813  Gross per 24 hour   Intake 1000 ml   Output 800 ml   Net 200 ml       Physical Exam:   Physical Exam  Constitutional:       Appearance: Normal appearance.   Cardiovascular:      Rate and Rhythm: Normal rate and regular rhythm.      Pulses: Normal pulses.      Heart sounds: Normal heart sounds.   Pulmonary:      Effort: Pulmonary effort is normal.      Breath sounds: Normal breath sounds.   Abdominal:      General: Abdomen is flat. Bowel sounds are normal.      Palpations: Abdomen is soft.      Tenderness: There is no abdominal tenderness.   Neurological:      Mental Status: She is alert and oriented to person, place, and time.          Additional Data:     Labs:  Results from last 7 days   Lab Units 24  0456   WBC Thousand/uL 6.89   HEMOGLOBIN g/dL 13.5   HEMATOCRIT % 46.7*   PLATELETS Thousands/uL 212   NEUTROS PCT % 71   LYMPHS PCT % 13*   MONOS PCT % 11   EOS PCT % 4     Results from last 7 days   Lab Units 24  0456   SODIUM mmol/L 142   POTASSIUM mmol/L 4.0   CHLORIDE mmol/L 98   CO2 mmol/L 43*   BUN mg/dL 33*   CREATININE mg/dL 1.21   ANION GAP mmol/L 1   CALCIUM mg/dL 8.8   ALBUMIN g/dL 3.5   TOTAL BILIRUBIN mg/dL 0.31   ALK PHOS U/L 73   ALT U/L 4*   AST U/L 10*   GLUCOSE RANDOM mg/dL 96     Results from last 7 days   Lab Units 24  1410   INR  1.00     Results from last 7 days   Lab Units 24  0719 24  2057   POC GLUCOSE mg/dl 93 147*     Results from  last 7 days   Lab Units 02/21/24  1648   HEMOGLOBIN A1C  6.1     Results from last 7 days   Lab Units 02/28/24  0456   PROCALCITONIN ng/ml <0.05       Imaging: Reviewed radiology reports from this admission including: chest xray and abdominal/pelvic CT scan    Recent Cultures (last 7 days):           Lines/Drains:  Invasive Devices       Peripheral Intravenous Line  Duration             Peripheral IV 02/27/24 Right Antecubital <1 day              Drain  Duration             External Urinary Catheter 1116 days                    Telemetry:        Last 24 Hours Medication List:   Current Facility-Administered Medications   Medication Dose Route Frequency Provider Last Rate    acetaminophen  650 mg Oral Q6H PRN Navneet Knight MD      ALPRAZolam  0.5 mg Oral TID PRN Navneet Knight MD      amLODIPine  5 mg Oral Daily Navneet Knight MD      aspirin  81 mg Oral Daily Navneet Knight MD      bimatoprost  1 drop Ophthalmic HS Navneet Knight MD      cefTRIAXone  1,000 mg Intravenous Q24H Navneet Knight MD 1,000 mg (02/27/24 6929)    docusate sodium  100 mg Oral BID Navneet Knight MD      enoxaparin  30 mg Subcutaneous Daily Ardalan MD Macy      famotidine  20 mg Oral BID Navneet Knight MD      insulin lispro  1-5 Units Subcutaneous TID AC Navneet Knight MD      insulin lispro  1-5 Units Subcutaneous HS Navneet Knight MD      levothyroxine  75 mcg Oral Early Morning Navneet Knight MD      metoprolol tartrate  25 mg Oral Q12H [facility] Navneet Knight MD      umeclidinium  1 puff Inhalation Daily Navneet Knight MD      And    olodaterol HCl  2 puff Inhalation Daily Navneet Knight MD      ondansetron  4 mg Intravenous Q6H PRN Navneet Knight MD      pravastatin  80 mg Oral Daily With Dinner Navneet Knight MD      traZODone  50 mg Oral HS Navneet Knight MD          Today, Patient Was Seen By: Kaelyn Jennings    ** Please Note: This note has been constructed using a voice recognition system. **

## 2024-02-28 NOTE — PLAN OF CARE
Problem: Potential for Falls  Goal: Patient will remain free of falls  Description: INTERVENTIONS:  - Educate patient/family on patient safety including physical limitations  - Instruct patient to call for assistance with activity   - Consult OT/PT to assist with strengthening/mobility   - Keep Call bell within reach  - Keep bed low and locked with side rails adjusted as appropriate  - Keep care items and personal belongings within reach  - Initiate and maintain comfort rounds  - Make Fall Risk Sign visible to staff  - Apply yellow socks and bracelet for high fall risk patients  - Consider moving patient to room near nurses station  Outcome: Progressing     Problem: Prexisting or High Potential for Compromised Skin Integrity  Goal: Skin integrity is maintained or improved  Description: INTERVENTIONS:  - Identify patients at risk for skin breakdown  - Assess and monitor skin integrity  - Assess and monitor nutrition and hydration status  - Monitor labs   - Assess for incontinence   - Turn and reposition patient  - Assist with mobility/ambulation  - Relieve pressure over bony prominences  - Avoid friction and shearing  - Provide appropriate hygiene as needed including keeping skin clean and dry  - Evaluate need for skin moisturizer/barrier cream  - Collaborate with interdisciplinary team   - Patient/family teaching  - Consider wound care consult   Outcome: Progressing        Vaccine Information Statement(s) was given today. This has been reviewed, questions answered, and verbal consent given by Patient for injection(s) and administration of Influenza (Inactivated).    Patient tolerated without incident. See immunization grid for documentation.

## 2024-02-28 NOTE — ASSESSMENT & PLAN NOTE
Lab Results   Component Value Date    HGBA1C 6.1 02/21/2024       Recent Labs     02/27/24 2057 02/28/24 0719 02/28/24  1108   POCGLU 147* 93 118         Blood Sugar Average: Last 72 hrs:  (P) [id number]  Continue diet and lifestyle modifications

## 2024-02-28 NOTE — ASSESSMENT & PLAN NOTE
Patient presented due to generalized malaise, weakness and urinary frequency with UA in the ED consistent with UTI; nitrate positive urine with innumerable bacteria  Family felt cymbalta may have been contributing to symptoms as well  Recently seen in the outpatient setting for urinary frequency and started on Bactrim  Continue rocephin day 2/3  Repeat urine cultures pending  Pt/ot recommendations pending

## 2024-02-28 NOTE — ASSESSMENT & PLAN NOTE
Patient presented due to generalized malaise, weakness and urinary frequency with UA in the ED consistent with UTI; nitrate positive urine with innumerable bacteria  Family felt cymbalta may have been contributing to symptoms as well, discontinue   Recently seen in the outpatient setting for urinary frequency and started on Bactrim  Received IV rocephin x 2 doses continue on cefdinir 300 mg BID for an additional 3 days   Repeat urine cultures resulting in gram negative enteric rods  Pt/ot recommending minimal resource intensity

## 2024-02-28 NOTE — H&P
"[facility]  H&P  Name: Ashley Gonzales 88 y.o. female I MRN: [id number]  Unit/Bed#: E5 -01 I Date of Admission: 2/27/2024   Date of Service: 2/27/2024 I Hospital Day: 0      Assessment/Plan   * Acute cystitis  Assessment & Plan  Patient with UA in the ED consistent with UTI; nitrate positive urine with innumerable bacteria  Reports generalized malaise as well as weakness and urinary frequency  Recently seen in the outpatient setting for urinary frequency and started on Bactrim  Will initiate empiric therapy with Rocephin based on recent urine culture labs  Repeat urine cultures pending    (Note-additionally will hold Cymbalta on admission as patient and family believe this may be contributing to underlying symptoms)    Type 2 diabetes mellitus (HCC)  Assessment & Plan  Lab Results   Component Value Date    HGBA1C 6.1 02/21/2024       No results for input(s): \"POCGLU\" in the last 72 hours.    Blood Sugar Average: Last 72 hrs:    SSI; Subcutaneous Insulin Order Set  Blood Glucose checks TIDWM and QHS (Q6H for NPO patients)  Hold oral medications  Blood Glucose goal while inpatient is 140-180  Reduce basal insulin by 25-50% while inpatient  Consistent Carbohydrate Diet        S/P CABG (coronary artery bypass graft)  Assessment & Plan  Patient with history of CAD status post CABG  Continue daily aspirin and statin    Acute on chronic diastolic congestive heart failure (HCC)  Assessment & Plan  Wt Readings from Last 3 Encounters:   02/21/24 84.3 kg (185 lb 12.8 oz)   10/16/23 85.7 kg (189 lb)   06/16/23 81.6 kg (180 lb)     Patient with history of heart failure with preserved ejection fraction; 2/2021 echo with 60% EF and grade 2 diastolic dysfunction  Requiring 3 L nasal cannula (on 2 L at baseline) while in the ED  Chest x-ray shows mild pulmonary congestion; patient's son reports she is not compliant with Lasix in the outpatient setting  Was given 20 mg IV Lasix in the ED; will trial " 40 mg IV Lasix x 1 tomorrow  If symptoms improved, consider transition back to home dose 20 mg p.o.          Hypothyroidism  Assessment & Plan  Continue levothyroxine 75 mcg daily  TSH 5 months prior was 1.482; follow-up in the outpatient setting    Hypertension  Assessment & Plan  Blood pressure moderately well-controlled on admission, continue amlodipine and Lopressor  IV Lasix for diuresis, monitor and uptitrate medications as needed    Benign familial tremor  Assessment & Plan  Noted, as per PCP takes Xanax 0.5 mg 3 times daily as needed    COPD (chronic obstructive pulmonary disease) (HCC)  Assessment & Plan  Patient with chronic respiratory failure and requires oxygen in the outpatient setting  Appears to be at baseline breathing and not in acute COPD exacerbation  Continue home medications and monitor             VTE Pharmacologic Prophylaxis:   High Risk (Score >/= 5) - Pharmacological DVT Prophylaxis Ordered: enoxaparin (Lovenox). Sequential Compression Devices Ordered.  Code Status: Prior   Discussion with family: Updated  (son) at bedside.    Anticipated Length of Stay: Patient will be admitted on an inpatient basis with an anticipated length of stay of greater than 2 midnights secondary to urinary tract infection and fluid overload.    Total Time Spent on Date of Encounter in care of patient: 45 mins. This time was spent on one or more of the following: performing physical exam; counseling and coordination of care; obtaining or reviewing history; documenting in the medical record; reviewing/ordering tests, medications or procedures; communicating with other healthcare professionals and discussing with patient's family/caregivers.    Chief Complaint: Generalized weakness    History of Present Illness:  Ashley Gonzales is a 88 y.o. female with a PMH of benign familial tremor, GERD, JAMEEL (noncompliant with CPAP), coronary artery disease (status post CABG), COPD, type 2 diabetes, and  hypertension who presents with generalized weakness and malaise and urinary frequency.  Patient reports generally feeling poorly for several days leading up into this presentation.  Was seen by primary care in January for UTI and reports taking antibiotic therapy.  However in the ED, noted to have UTI consistent with urinary frequency as well as weakness and general malaise.  Chest x-ray in ED showed some pulmonary congestion; patient requiring 3 L nasal cannula instead of baseline 2 L nasal cannula.  Was given Lasix in the ED.  Patient also reports feeling somewhat off balance for the last few days and diarrhea but is gone on for 3 weeks.  Son reports she is not compliant with Lasix and both reports she was recently started on Cymbalta and feels this may be contributing to her underlying symptoms.    Review of Systems:  Review of Systems   Constitutional:  Positive for activity change and appetite change. Negative for chills and fever.   HENT:  Negative for ear pain and sore throat.    Eyes:  Negative for pain and visual disturbance.   Respiratory:  Negative for cough and shortness of breath.    Cardiovascular:  Negative for chest pain and palpitations.   Gastrointestinal:  Negative for abdominal pain and vomiting.   Genitourinary:  Positive for frequency. Negative for dysuria and hematuria.   Musculoskeletal:  Negative for arthralgias and back pain.   Skin:  Negative for color change and rash.   Neurological:  Positive for dizziness, tremors and weakness. Negative for seizures and syncope.   Psychiatric/Behavioral:  Positive for dysphoric mood.    All other systems reviewed and are negative.      Past Medical and Surgical History:   Past Medical History:   Diagnosis Date    Anxiety     Cataract, bilateral     Last Assessed:3/19/2014    COPD (chronic obstructive pulmonary disease) (HCC)     Coronary artery disease     Hx of arterial ischemic stroke     Hypertension     Stroke ([facility])     Wears glasses        Past  Surgical History:   Procedure Laterality Date    CAROTID ENDARTERECTOMY Right     CATARACT EXTRACTION W/ INTRAOCULAR LENS  IMPLANT, BILATERAL      COLONOSCOPY N/A 9/30/2017    Procedure: COLONOSCOPY FOR CONTROL OF BLEEDING;  Surgeon: Jeb Gaxiola MD;  Location: [facility];  Service: Colorectal    CORONARY ARTERY BYPASS GRAFT      CABG X3 with LIMA to LAD,SVG to OM,SVG to distal  RCA ; Last Assessed:7/13/2015    JOINT REPLACEMENT      left TKR    KIDNEY STONE SURGERY      NEPHROSTOMY Left     with Drainage Irrigation ;Onset:1974    GA ARTHRP KNE CONDYLE&PLATU MEDIAL&LAT COMPARTMENTS Right 2/25/2016    Procedure: ARTHROPLASTY KNEE TOTAL;  Surgeon: Jeb Figueroa MD;  Location: [facility];  Service: Orthopedics       Meds/Allergies:  Prior to Admission medications    Medication Sig Start Date End Date Taking? Authorizing Provider   ALPRAZolam (XANAX) 0.5 mg tablet Take 1 tablet (0.5 mg total) by mouth 3 (three) times a day as needed (TREMOR) May take one additional prn 1/26/24   Paz Baumanlick, DO   amLODIPine (NORVASC) 5 mg tablet TAKE 1 TABLET (5 MG TOTAL) BY MOUTH DAILY. 1/10/24   Paz Pryblick, DO   aspirin (ECOTRIN LOW STRENGTH) 81 mg EC tablet Take 81 mg by mouth daily    Historical Provider, MD   bimatoprost (LUMIGAN) 0.01 % ophthalmic drops Administer 1 drop to both eyes daily at bedtime 5/10/23   Paz Romanyblicsara, DO   Cholecalciferol (Vitamin D) 50 MCG (2000 UT) tablet Take 0.5 tablets (1,000 Units total) by mouth every other day 10/16/23   Paz Romanyblicsara, DO   DULoxetine (CYMBALTA) 30 mg delayed release capsule Take 1 capsule (30 mg total) by mouth daily 2/21/24   Paz Pryblick, DO   famotidine (PEPCID) 20 mg tablet TAKE 1 TABLET BY MOUTH TWICE A DAY 10/27/23   Paz Pryblick, DO   furosemide (LASIX) 20 mg tablet TAKE 1 TABLET BY MOUTH EVERY DAY 10/27/23   Paz Pryblicsara, DO   levothyroxine 75 mcg tablet TAKE 1 TABLET BY MOUTH EVERY DAY IN THE MORNING 1/10/24   FLAKO Davis    metoprolol tartrate (LOPRESSOR) 25 mg tablet TAKE 1 TABLET (25 MG TOTAL) BY MOUTH EVERY 12 (TWELVE) HOURS 1/10/24   Paz Maharaj DO   Pediatric Multiple Vit-C-FA (PEDIATRIC MULTIVITAMIN) chewable tablet Chew 1 tablet daily    Historical Provider, MD   Potassium Gluconate 595 (99 K) MG TABS Take by mouth    Historical Provider, MD   rosuvastatin (CRESTOR) 10 MG tablet Take 1 tablet (10 mg total) by mouth daily 11/20/23   Paz Maharaj DO   Stiolto Respimat 2.5-2.5 MCG/ACT inhaler INHALE 2 PUFFS BY MOUTH EVERY DAY 9/19/23   Keon Iqbal DO   traZODone (DESYREL) 50 mg tablet TAKE 1 TABLET (50 MG TOTAL) BY MOUTH DAILY AT BEDTIME CAN TITRATE UP IN 25 MG INCREMENTS IF NEEDED 2/12/24   Paz Maharaj DO     I have reviewed home medications using recent Epic encounter.    Allergies:   Allergies   Allergen Reactions    Penicillins Itching    Penicillins Rash       Social History:  Marital Status:    Occupation: Retired  Patient Pre-hospital Living Situation: With other family member: Son  Patient Pre-hospital Level of Mobility: walks with walker  Patient Pre-hospital Diet Restrictions: Diabetic  Substance Use History:   Social History     Substance and Sexual Activity   Alcohol Use Never     Social History     Tobacco Use   Smoking Status Never   Smokeless Tobacco Never     Social History     Substance and Sexual Activity   Drug Use Never       Family History:  Family History   Problem Relation Age of Onset    Cancer Mother         malignant neoplasm    Heart attack Father     Aneurysm Other         Aortic    Cancer Other         malignant neoplasm       Physical Exam:     Vitals:   Blood Pressure: 129/60 (02/27/24 1845)  Pulse: 68 (02/27/24 1845)  Temperature: 98.9 °F (37.2 °C) (02/27/24 1339)  Respirations: 16 (02/27/24 1845)  SpO2: 95 % (02/27/24 1845)    Physical Exam  Vitals and nursing note reviewed.   Constitutional:       General: She is not in acute distress.     Appearance: She is  well-developed.   HENT:      Head: Normocephalic and atraumatic.   Eyes:      Conjunctiva/sclera: Conjunctivae normal.   Cardiovascular:      Rate and Rhythm: Normal rate and regular rhythm.      Heart sounds: Murmur heard.   Pulmonary:      Effort: No respiratory distress.      Comments: Globally reduced breath sounds bilaterally  Abdominal:      Palpations: Abdomen is soft.      Tenderness: There is no abdominal tenderness.   Musculoskeletal:         General: No swelling.      Cervical back: Neck supple.      Comments: Trace pedal edema bilaterally   Skin:     General: Skin is warm and dry.   Neurological:      Mental Status: She is alert.   Psychiatric:         Mood and Affect: Mood normal.            Additional Data:     Lab Results:  Results from last 7 days   Lab Units 02/27/24  1410   WBC Thousand/uL 6.17   HEMOGLOBIN g/dL 14.0   HEMATOCRIT % 48.4*   PLATELETS Thousands/uL 245   NEUTROS PCT % 65   LYMPHS PCT % 20   MONOS PCT % 10   EOS PCT % 4     Results from last 7 days   Lab Units 02/27/24  1410   SODIUM mmol/L 141   POTASSIUM mmol/L 4.5   CHLORIDE mmol/L 98   CO2 mmol/L 41*   BUN mg/dL 38*   CREATININE mg/dL 1.24   ANION GAP mmol/L 2   CALCIUM mg/dL 9.3   ALBUMIN g/dL 3.6   TOTAL BILIRUBIN mg/dL 0.35   ALK PHOS U/L 75   ALT U/L 4*   AST U/L 10*   GLUCOSE RANDOM mg/dL 132     Results from last 7 days   Lab Units 02/27/24  1410   INR  1.00         Results from last 7 days   Lab Units 02/21/24  1648   HEMOGLOBIN A1C  6.1           Lines/Drains:  Invasive Devices       Peripheral Intravenous Line  Duration             Peripheral IV 02/27/24 Right Antecubital <1 day              Drain  Duration             External Urinary Catheter 1116 days                        Imaging: Reviewed radiology reports from this admission including: chest xray  XR chest 1 view portable   Final Result by Niall Vargas MD (02/27 1606)      Cardiomegaly.      Mild vascular congestion      Small pleural effusions      Left base  atelectasis and/or infiltrate            Workstation performed: [id number]         CT abdomen pelvis w contrast   Final Result by Nash Huff MD (02/27 1618)      1.  No acute findings in the abdomen or pelvis.   2.  Bilateral nonobstructing nephrolithiasis.   3.  Cholelithiasis without evidence of acute cholecystitis.   4.  Colonic diverticulosis without evidence of acute diverticulitis.   5.  A 3.9 cm infrarenal abdominal aortic aneurysm on a background of severe atherosclerosis, increased since 2017. Recommend vascular surgery follow-up as appropriate.         The study was marked in EPIC for immediate notification.      Workstation performed: [id number]             EKG and Other Studies Reviewed on Admission:   EKG: Sinus Tachycardia. HR 57.    ** Please Note: This note has been constructed using a voice recognition system. **

## 2024-02-28 NOTE — ASSESSMENT & PLAN NOTE
Wt Readings from Last 3 Encounters:   02/28/24 82.3 kg (181 lb 7 oz)   02/21/24 84.3 kg (185 lb 12.8 oz)   10/16/23 85.7 kg (189 lb)     Patient with history of heart failure with preserved ejection fraction; 2/2021 echo with 60% EF and grade 2 diastolic dysfunction  Requiring 3 L nasal cannula while in the ED, now back to baseline 2 L  Chest x-ray showed mild pulmonary congestion; patient's son reports she is not compliant with Lasix in the outpatient setting   Continue on lasix 20 mg daily at home

## 2024-02-28 NOTE — UTILIZATION REVIEW
Initial Clinical Review    OBS 2/27 UPGRADED TO INPATIENT 2/28 FOR CONTINUED TX OF CYSTITIS WITH IV ABX    Admission: Date/Time/Statement:   02/28/24 1817  Inpatient Admission  Once        Transfer Service: Hospitalist   Question Answer Comment   Level of Care Med Surg    Estimated length of stay More than 2 Midnights    Certification I certify that inpatient services are medically necessary for this patient for a duration of greater than two midnights. See H&P and MD Progress Notes for additional information about the patient's course of treatment.        02/28/24 1818       Start   Ordered   02/28/24 1817  Inpatient Admission  Once         02/28/24 1818 02/27/24 1707  Place in Observation  (ED Bridging Orders Panel)  Once         02/27/24 1706       ED Arrival Information       Expected   -    Arrival   2/27/2024 13:34    Acuity   Urgent              Means of arrival   Ambulance    Escorted by   [facility] ([facility])    Service   Hospitalist    Admission type   Emergency              Arrival complaint   Weakness             Chief Complaint   Patient presents with    Weakness - Generalized     Generalized weakness. Offers no other complaints.        Initial Presentation: 88 y.o. female with PMHx of benign familial tremor, GERD, JAMEEL (noncompliant with CPAP), CAD s/p CABG, COPD, T2 DM, HTN presents to ED by ems with generalized weakness, malaise and urinary frequency x several days. Pt was seen by her PCP in January for UTI, and taking abx. However in the ED, noted to have UTI consistent with urinary frequency as well as weakness and general malaise.  CXR showed some pulmonary congestion; pt requiring 3L NC of baseline 2 L NC.  Given Lasix in ED.    Admitted under observation to MS unit with Acute Cystitis -- IV Rocephin. F/u urine and blood cxs.  Continue pta po meds, but hold cymbalta currently as may be contributing to symptoms. Accucheks w/ ssi. Trial IV lasix 40 mg tomorrow. Monitor I/Os, daily  wts.     Date: 2/28   Day 2: Upgraded to Inpatient  continue IV Rocephin. Cxs pending. Po meds. Supportive care. PT/OT evals for d/c disposition.    Date: 2/29    Day 3: Has surpassed a 2nd midnight with active treatments and services, which include continued IV abx for cystitis .      ED Triage Vitals   Temperature Pulse Respirations Blood Pressure SpO2   02/27/24 1339 02/27/24 1339 02/27/24 1339 02/27/24 1339 02/27/24 1339   98.9 °F (37.2 °C) 60 18 144/81 95 %      Temp Source Heart Rate Source Patient Position - Orthostatic VS BP Location FiO2 (%)   02/27/24 1937 02/27/24 1715 02/27/24 1715 02/27/24 1715 --   Oral Monitor Sitting Right arm       Pain Score       02/27/24 2300       No Pain          Wt Readings from Last 1 Encounters:   02/28/24 82.3 kg (181 lb 7 oz)     Additional Vital Signs:   Date/Time Temp Pulse Resp BP MAP (mmHg) SpO2 Calculated FIO2 (%) - Nasal Cannula Nasal Cannula O2 Flow Rate (L/min) O2 Device Patient Position - Orthostatic VS   02/28/24 1532 98.7 °F (37.1 °C) -- 16 127/59 82 -- 28 2 L/min Nasal cannula Sitting   02/28/24 0722 -- -- -- 116/46 Abnormal  -- -- -- -- -- --   02/27/24 2300 -- -- -- -- -- -- 28 2 L/min Nasal cannula --   02/27/24 21:56:58 -- 59 -- 136/62 87 96 % -- -- -- --   02/27/24 19:37:54 98.1 °F (36.7 °C) 71 18 130/55 80 94 % 28 2 L/min Nasal cannula Lying   02/27/24 1845 -- 68 16 129/60 87 95 % -- -- None (Room air) Sitting   02/27/24 1715 -- 64 16 143/65 93 95 % 28 2 L/min Nasal cannula      Pertinent Labs/Diagnostic Test Results:   XR chest 1 view portable   Final Result by Niall Vargas MD (02/27 1606)      Cardiomegaly.      Mild vascular congestion      Small pleural effusions      Left base atelectasis and/or infiltrate         CT abdomen pelvis w contrast   Final Result by Nash Huff MD (02/27 1618)      1.  No acute findings in the abdomen or pelvis.   2.  Bilateral nonobstructing nephrolithiasis.   3.  Cholelithiasis without evidence of acute  cholecystitis.   4.  Colonic diverticulosis without evidence of acute diverticulitis.   5.  A 3.9 cm infrarenal abdominal aortic aneurysm on a background of severe atherosclerosis, increased since 2017. Recommend vascular surgery follow-up as appropriate.        Results from last 7 days   Lab Units 02/27/24  1410   SARS-COV-2  Negative     Results from last 7 days   Lab Units 02/28/24  0456 02/27/24  1410   WBC Thousand/uL 6.89 6.17   HEMOGLOBIN g/dL 13.5 14.0   HEMATOCRIT % 46.7* 48.4*   PLATELETS Thousands/uL 212 245   NEUTROS ABS Thousands/µL 4.91 4.02     Results from last 7 days   Lab Units 02/28/24  0456 02/27/24  1410   SODIUM mmol/L 142 141   POTASSIUM mmol/L 4.0 4.5   CHLORIDE mmol/L 98 98   CO2 mmol/L 43* 41*   ANION GAP mmol/L 1 2   BUN mg/dL 33* 38*   CREATININE mg/dL 1.21 1.24   EGFR ml/min/1.73sq m 40 38   CALCIUM mg/dL 8.8 9.3   MAGNESIUM mg/dL 2.2 2.3   PHOSPHORUS mg/dL 3.4  --      Results from last 7 days   Lab Units 02/28/24  0456 02/27/24  1410   AST U/L 10* 10*   ALT U/L 4* 4*   ALK PHOS U/L 73 75   TOTAL PROTEIN g/dL 6.3* 6.4   ALBUMIN g/dL 3.5 3.6   TOTAL BILIRUBIN mg/dL 0.31 0.35     Results from last 7 days   Lab Units 02/28/24  1530 02/28/24  1108 02/28/24  0719 02/27/24  2057   POC GLUCOSE mg/dl 141* 118 93 147*     Results from last 7 days   Lab Units 02/28/24  0456 02/27/24  1410   GLUCOSE RANDOM mg/dL 96 132     Results from last 7 days   Lab Units 02/27/24  1921 02/27/24  1633 02/27/24  1410   HS TNI 0HR ng/L  --   --  7   HS TNI 2HR ng/L  --  8  --    HSTNI D2 ng/L  --  1  --    HS TNI 4HR ng/L 8  --   --    HSTNI D4 ng/L 1  --   --        Results from last 7 days   Lab Units 02/27/24  1410   PROTIME seconds 13.4   INR  1.00   PTT seconds 28     Results from last 7 days   Lab Units 02/28/24  0456   TSH 3RD GENERATON uIU/mL 2.326     Results from last 7 days   Lab Units 02/28/24  0456   PROCALCITONIN ng/ml <0.05     Results from last 7 days   Lab Units 02/27/24  1410   BNP pg/mL 166*      Results from last 7 days   Lab Units 02/27/24  1716 02/27/24  1716   CLARITY UA   --  Turbid   COLOR UA   --  Light Yellow   SPEC GRAV UA   --  1.041*   PH UA   --  6.0   GLUCOSE UA mg/dl  --  Negative   KETONES UA mg/dl  --  Negative   BLOOD UA   --  Moderate*   PROTEIN UA mg/dl  --  30 (1+)*   NITRITE UA   --  Positive*   BILIRUBIN UA   --  Negative   UROBILINOGEN UA (BE) mg/dl  --  <2.0   LEUKOCYTES UA   --  Large*   WBC UA /hpf Innumerable*  --    RBC UA /hpf Innumerable*  --    BACTERIA UA /hpf Innumerable*  --    EPITHELIAL CELLS WET PREP /hpf Occasional  --    MUCUS THREADS  Occasional*  --      Results from last 7 days   Lab Units 02/27/24  1410   INFLUENZA A PCR  Negative   INFLUENZA B PCR  Negative   RSV PCR  Negative     Results from last 7 days   Lab Units 02/28/24  1422   URINE CULTURE  >100,000 cfu/ml Gram Negative Alphonso Enteric Like*       ED Treatment:   Medication Administration from 02/27/2024 1334 to 02/27/2024 1927         Date/Time Order Dose Route Action     02/27/2024 1415 EST sodium chloride 0.9 % bolus 1,000 mL 1,000 mL Intravenous New Bag     02/27/2024 1856 EST furosemide (LASIX) injection 20 mg 20 mg Intravenous Given       Past Medical History:   Diagnosis Date    Anxiety     Cataract, bilateral     Last Assessed:3/19/2014    COPD (chronic obstructive pulmonary disease) ([facility])     Coronary artery disease     Hx of arterial ischemic stroke     Hypertension     Stroke ([facility])     Wears glasses      Present on Admission:   Type 2 diabetes mellitus (HCC)   Hypertension   COPD (chronic obstructive pulmonary disease) (HCC)   Acute on chronic diastolic congestive heart failure (HCC)   Hypothyroidism   JAMEEL (obstructive sleep apnea)   Benign familial tremor   Acute cystitis      Admitting Diagnosis: Weakness [R53.1]  Pleural effusion [J90]  Pulmonary vascular congestion [R09.89]  Generalized weakness [R53.1]  Ambulatory dysfunction [R26.2]  Age/Sex: 88 y.o. female  Admission Orders:  Scheduled  Medications:  amLODIPine, 5 mg, Oral, Daily  aspirin, 81 mg, Oral, Daily  bimatoprost, 1 drop, Ophthalmic, HS  cefTRIAXone, 1,000 mg, Intravenous, Q24H  docusate sodium, 100 mg, Oral, BID  enoxaparin, 30 mg, Subcutaneous, Daily  famotidine, 20 mg, Oral, BID  [START ON 2/29/2024] furosemide, 20 mg, Oral, Daily  insulin lispro, 1-5 Units, Subcutaneous, TID AC  insulin lispro, 1-5 Units, Subcutaneous, HS  levothyroxine, 75 mcg, Oral, Early Morning  metoprolol tartrate, 25 mg, Oral, Q12H MODESTO  umeclidinium, 1 puff, Inhalation, Daily   And  olodaterol HCl, 2 puff, Inhalation, Daily  pravastatin, 80 mg, Oral, Daily With Dinner  traZODone, 50 mg, Oral, HS    Continuous IV Infusions: none     PRN Meds:  acetaminophen, 650 mg, Oral, Q6H PRN  ALPRAZolam, 0.5 mg, Oral, TID PRN 2/28 x2  ondansetron, 4 mg, Intravenous, Q6H PRN          Network Utilization Review Department  ATTENTION: Please call with any questions or concerns to [phone] and carefully listen to the prompts so that you are directed to the right person. All voicemails are confidential.   For Discharge needs, contact Care Management DC Support Team at [phone] opt. 2  Send all requests for admission clinical reviews, approved or denied determinations and any other requests to dedicated fax number below belonging to the campus where the patient is receiving treatment. List of dedicated fax numbers for the Facilities:  FACILITY NAME UR FAX NUMBER   ADMISSION DENIALS (Administrative/Medical Necessity) [phone]   DISCHARGE SUPPORT TEAM (NETWORK) [phone]   PARENT CHILD HEALTH (Maternity/NICU/Pediatrics) [phone]   [facility] [phone]   [facility] [phone]   [facility] [phone]   [facility] [phone]   Critical access hospital [phone]   [facility] [phone]   [facility]  [facility] [phone]   [facility] [phone]   [facility] [phone]   [facility] [phone]   [facility] [phone]   [facility] [phone]

## 2024-02-28 NOTE — ASSESSMENT & PLAN NOTE
Lab Results   Component Value Date    HGBA1C 6.1 02/21/2024       Recent Labs     02/27/24 2057 02/28/24  0719   POCGLU 147* 93       Blood Sugar Average: Last 72 hrs:  (P) 120  Hold oral medications  Continue ISS  Blood Glucose goal while inpatient is 140-180  Consistent Carbohydrate Diet

## 2024-02-28 NOTE — PLAN OF CARE
Problem: PHYSICAL THERAPY ADULT  Goal: Performs mobility at highest level of function for planned discharge setting.  See evaluation for individualized goals.  Description: Treatment/Interventions: Functional transfer training, LE strengthening/ROM, Elevations, Therapeutic exercise, Endurance training, Patient/family training, Bed mobility, Gait training, Spoke to nursing, OT, Family  Equipment Recommended: Walker (pt has)       See flowsheet documentation for full assessment, interventions and recommendations.  Note: Prognosis: Good  Problem List: Decreased strength, Decreased endurance, Impaired balance, Decreased mobility, Obesity  Assessment: Pt. 88 y.o.female presented w/ c/o generalized weakness, malaise & urinary frequency. Pt admitted for Acute cystitis w/ acute on chronic diastolic CHF. Pt referred to PT for mobility assessment & D/C planning w/ orders to Ambulate pt. Please see above for information re: home set-up & PLOF as well as objective findings during PT assessment. On eval, pt functioning below baseline hence will continue skilled PT to improve function & safety. Pt require S for most functional mobility w/ RW + cues for techniques & safety. Gait deviations as above but no gross LOB noted. Pt on 2L O2 NC t/o session w/ resting SpO2 95%. However ?accuracy of SpO2 reading during mobility, 76-82%. No SOB & dizziness reported t/o session. The patient's AM-PAC Basic Mobility Inpatient Short Form Raw Score is 17. A Raw score of greater than 16 suggests the patient may benefit from discharge to home. Please also refer to the recommendation of the Physical Therapist for safe discharge planning. From PT standpoint, will recommend Level III (minimum resource intensity) rehab services and inc family support at D/C.  [facility] staff most recent vital signs as follows: BP (!) 116/46   Pulse 59   Temp 98.1 °F (36.7 °C) (Oral)   Resp 18   Wt 82.3 kg (181 lb 7 oz)   SpO2 96%   BMI 33.19 kg/m² . At end of  session, pt OOB in chair in stable condition, call bell & phone in reach, all lines intact. Fall precautions reinforced w/ good understanding. CM to follow. Nsg staff to continue to mobilized pt (OOB in chair for all meals & ambulate in room/unit) as tolerated to prevent further decline in function. Will also recommend Restorative for daily amb &/or daily OOB in chair as appropriate. Nsg notified. Co-eval was necessary to complete this PT eval for the pt's best interest given pt's medical acuity & complexity.        Rehab Resource Intensity Level, PT: III (Minimum Resource Intensity) (w/ inc level of support at home)    See flowsheet documentation for full assessment.

## 2024-02-28 NOTE — OCCUPATIONAL THERAPY NOTE
Occupational Therapy Evaluation     Patient Name: Ashley Gonzales  Today's Date: 2/28/2024  Problem List  Principal Problem:    Acute cystitis  Active Problems:    COPD (chronic obstructive pulmonary disease) (HCC)    Benign familial tremor    Hypertension    Hypothyroidism    Acute on chronic diastolic congestive heart failure (HCC)    JAMEEL (obstructive sleep apnea)    S/P CABG (coronary artery bypass graft)    Type 2 diabetes mellitus (HCC)    Past Medical History  Past Medical History:   Diagnosis Date    Anxiety     Cataract, bilateral     Last Assessed:3/19/2014    COPD (chronic obstructive pulmonary disease) (HCC)     Coronary artery disease     Hx of arterial ischemic stroke     Hypertension     Stroke ([facility])     Wears glasses      Past Surgical History  Past Surgical History:   Procedure Laterality Date    CAROTID ENDARTERECTOMY Right     CATARACT EXTRACTION W/ INTRAOCULAR LENS  IMPLANT, BILATERAL      COLONOSCOPY N/A 9/30/2017    Procedure: COLONOSCOPY FOR CONTROL OF BLEEDING;  Surgeon: Jeb Gaxiola MD;  Location: [facility];  Service: Colorectal    CORONARY ARTERY BYPASS GRAFT      CABG X3 with LIMA to LAD,SVG to OM,SVG to distal  RCA ; Last Assessed:7/13/2015    JOINT REPLACEMENT      left TKR    KIDNEY STONE SURGERY      NEPHROSTOMY Left     with Drainage Irrigation ;Onset:1974    TN ARTHRP KNE CONDYLE&PLATU MEDIAL&LAT COMPARTMENTS Right 2/25/2016    Procedure: ARTHROPLASTY KNEE TOTAL;  Surgeon: Jeb Figueroa MD;  Location: [facility];  Service: Orthopedics        02/28/24 1040   OT Last Visit   OT Visit Date 02/28/24   Note Type   Note type Evaluation   Pain Assessment   Pain Score No Pain   Restrictions/Precautions   Weight Bearing Precautions Per Order No   Other Precautions Bed Alarm;O2;Fall Risk   Home Living   Type of Home House   Home Layout Two level;Bed/bath upstairs;Performs ADLs on one level;Able to live on main level with bedroom/bathroom;Stairs to enter with rails;Other (Comment);1/2  "bath on main level  (1STE; stays on the 1st floor during the day; FOS to 2nd floor bed/bath; pt goes upstairs w/ assistance at night)   Bathroom Shower/Tub Tub/shower unit   Bathroom Toilet Standard   Bathroom Equipment Shower chair   Home Equipment Other (Comment)  (rollator; w/c in process)   Additional Comments 2L home O2 at baseline   Prior Function   Level of [place] Independent with functional mobility;Needs assistance with IADLS;Needs assistance with ADLs  (ambulates w/ RW; requires assistance to negotiate stairs; pt reports, she crawls up the stairs)   Lives With Son  (Son's S/O)   Receives Help From Family;Home health  (son's GF assists PRN; pt has HHA Mondays & Wednesdays x 6hrs)   IADLs Family/Friend/Other provides transportation;Family/Friend/Other provides meals;Family/Friend/Other provides medication management   Falls in the last 6 months 0   Vocational Retired   Comments (-) ; family provides transporation; pt son, reports that pt mainly stays in the 1st floor during the day & only goes upstairs at night w/ assistance.   General   Additional Pertinent History Comorbidities affecting pt’s functional performance include a significant PMH of: DM2, s/p CABG, JAMEEL, acute on chronic diastolic CHF, HTN, COPD, stroke, depression, KIMI, OA, glaucoma, hx TKR.  Patient with active OT orders and activity orders for Ambulate.   Family/Caregiver Present Yes   Subjective   Subjective \"I'm nervous ever since my knees buckled\"   ADL   Where Assessed Edge of bed   Eating Assistance 6  Modified independent   Grooming Assistance 5  Supervision/Setup   UB Bathing Assistance 5  Supervision/Setup   LB Bathing Assistance 4  Minimal Assistance   UB Dressing Assistance 5  Supervision/Setup   LB Dressing Assistance 4  Minimal Assistance   Toileting Assistance  4  Minimal Assistance   Bed Mobility   Supine to Sit 6  Modified independent   Additional items HOB elevated;Increased time required;Bedrails   Transfers   Sit " to Stand 5  Supervision   Additional items Increased time required;Verbal cues;Bedrails;HOB elevated   Stand to Sit 5  Supervision   Additional items Armrests;Increased time required;Verbal cues   Toilet transfer 5  Supervision   Additional items Armrests;Increased time required;Verbal cues;Commode   Additional Comments O2 at 2 L upon entry. W/ mobility, pt desats and requires increase to 3 L. Would rec home O2 assessment priot to d/c. Difficult to obtain reading at times given pt gripping RW.   Functional Mobility   Functional Mobility 5  Supervision   Additional items Rolling walker   Balance   Static Sitting Good   Dynamic Sitting Fair +   Static Standing Fair   Dynamic Standing Fair -   Ambulatory Fair -   Activity Tolerance   Activity Tolerance Patient tolerated treatment well   Medical Staff Made Aware Theo PT   Nurse Made Aware Yes   RUE Assessment   RUE Assessment WFL   LUE Assessment   LUE Assessment WFL   Hand Function   Gross Motor Coordination Functional   Fine Motor Coordination Functional   Sensation   Light Touch No apparent deficits   Proprioception   Proprioception No apparent deficits   Vision - Complex Assessment   Ocular Range of Motion Intact   Perception   Inattention/Neglect Appears intact   Cognition   Overall Cognitive Status WFL   Arousal/Participation Alert;Responsive;Cooperative   Attention Within functional limits   Orientation Level Oriented X4   Memory Within functional limits   Following Commands Follows all commands and directions without difficulty   Assessment   Limitation Decreased ADL status;Decreased UE strength;Decreased Safe judgement during ADL;Decreased endurance;Decreased self-care trans;Decreased high-level ADLs   Prognosis Good   Assessment Patient is a 88 y.o. year old female seen for OT grahamal s/p admit to [facility] on 2/27/2024 with acute cystitis.  OT consulted to assess ADLs/IADLs/functional mobility and assist w/ D/C planning. PTA, was (I) with ADLs and required (A) with  IADLs. Patient demonstrates the following deficits impacting occupational performance: weakness, decreased strength , decreased balance, decreased activity tolerance, limited functional reach, decreased safety awareness, ANDRADE, impaired coordination, and decreased cardiovascular endurance. These impairments, as well at pt’s steps within home environment, difficulty performing ADLs, difficulty performing IADLs, difficulty performing transfers/mobility, fall risk , functional decline , increase in O2 requirements , increased reliance on DME , and advanced age, limit pt’s ability to safely engage in all baseline areas of occupation. Pt's CLOF as follows: eating/grooming: Manjeet and supervision , UB ADLs: supervision , LB ADLs: Neto, toileting: Neto, bed mobility: Manjeet, functional transfers: supervision , functional mobility: supervision , sitting/standing tolerance: ~4 min. Pt would benefit from continued skilled OT while in acute setting to address deficits as defined above and to maximize (I) w/ ADLs/functional mobility. Occupational performance areas to address include: grooming, bathing/shower, toilet hygiene, dressing, and health maintenance. Based on the aforementioned evaluation, functional performance deficits, and assessments, pt has been identified as a moderate complexity evaluation. At this time, recommendation for pt to receive post-acute rehabilitation services at a Level III (minimum resource intensity) due to above deficits and CLOF. OT will continue to follow pt 3-5x/wk to address the goals listed below to  w/in 10-14 days.   Goals   Patient Goals to go home   LTG Time Frame 10-14   Plan   Treatment Interventions ADL retraining;Functional transfer training;UE strengthening/ROM;Endurance training;Patient/family training;Equipment evaluation/education;Neuromuscular reeducation;Compensatory technique education;Continued evaluation;Energy conservation;Activityengagement;Cardiac education   Goal  Expiration Date 03/13/24   OT Treatment Day 0   OT Frequency 3-5x/wk   Discharge Recommendation   Rehab Resource Intensity Level, OT III (Minimum Resource Intensity)   AM-PAC Daily Activity Inpatient   Lower Body Dressing 2   Bathing 3   Toileting 2   Upper Body Dressing 3   Grooming 3   Eating 4   Daily Activity Raw Score 17   Daily Activity Standardized Score (Calc for Raw Score >=11) 37.26   AM-PAC Applied Cognition Inpatient   Following a Speech/Presentation 4   Understanding Ordinary Conversation 4   Taking Medications 3   Remembering Where Things Are Placed or Put Away 3   Remembering List of 4-5 Errands 3   Taking Care of Complicated Tasks 3   Applied Cognition Raw Score 20   Applied Cognition Standardized Score 41.76     Occupational Therapy goals: In 7-14 days:     1- Patient will verbalize and demonstrate use of energy conservation/deep breathing technique and work simplification skills during functional activity with no verbal cues.   2- Patient will verbalize and demonstrate good body mechanics and joint protection techniques during ADLs/IADLs with no verbal cues   3- Pt will complete bed mobility at a Mod I level w/ G balance/safety demonstrated to decrease caregiver assistance required   4- Patient will increase OOB/ sitting tolerance to 2-4 hours per day for increased participation in self care and leisure tasks with no s/s of exertion.   5-Patient will increase standing tolerance time to 5 minutes with unilateral UE support to complete sink level ADLs@ mod I level    6- Pt will improve functional transfers to Mod I on/off all surfaces using DME as needed w/ G balance/safety   7- Patient will complete UB ADLs with Raul utilizing appropriate DME/AE PRN   8- Patient will complete LB ADLs with Raul utilizing appropriate DME/AE PRN   9- Patient will complete toileting tasks with Raul with G hygiene/thoroughness utilizing appropriate DME/AE PRN   10- Pt will improve functional mobility during  ADL/IADL/leisure tasks to Mod I using DME as needed w/ G balance/safety    11- Pt will be attentive 100% of the time during ongoing cognitive assessment w/ G participation to assist w/ safe d/c planning/recommendations   12- Pt will participate in simulated IADL management task to increase independence to Mod I w/ G safety and endurance   13- Pt will increase BUE strength by 1MM grade via AROM/AAROM/PROM exercises to increase independence in ADLs and transfers         Lissa Stein, OTR/L

## 2024-02-28 NOTE — PLAN OF CARE
Problem: Potential for Falls  Goal: Patient will remain free of falls  Description: INTERVENTIONS:  - Educate patient/family on patient safety including physical limitations  - Instruct patient to call for assistance with activity   - Consult OT/PT to assist with strengthening/mobility   - Keep Call bell within reach  - Keep bed low and locked with side rails adjusted as appropriate  - Keep care items and personal belongings within reach  - Initiate and maintain comfort rounds  - Make Fall Risk Sign visible to staff  - Offer Toileting every 2 Hours, in advance of need  - Initiate/Maintain bed alarm  - Obtain necessary fall risk management equipment: socks  - Apply yellow socks and bracelet for high fall risk patients  - Consider moving patient to room near nurses station  Outcome: Progressing      18-Aug-2021 02:00

## 2024-02-28 NOTE — PHYSICAL THERAPY NOTE
PT EVALUATION    Pt. Name: Ashley Gonzales  Pt. Age: 88 y.o.  MRN: [id number]  LENGTH OF STAY: 0      Admitting Diagnoses:   Weakness [R53.1]  Pleural effusion [J90]  Pulmonary vascular congestion [R09.89]  Generalized weakness [R53.1]  Ambulatory dysfunction [R26.2]    Past Medical History:   Diagnosis Date    Anxiety     Cataract, bilateral     Last Assessed:3/19/2014    COPD (chronic obstructive pulmonary disease) (HCC)     Coronary artery disease     Hx of arterial ischemic stroke     Hypertension     Stroke (HCC)     Wears glasses        Past Surgical History:   Procedure Laterality Date    CAROTID ENDARTERECTOMY Right     CATARACT EXTRACTION W/ INTRAOCULAR LENS  IMPLANT, BILATERAL      COLONOSCOPY N/A 9/30/2017    Procedure: COLONOSCOPY FOR CONTROL OF BLEEDING;  Surgeon: Jeb Gaxiola MD;  Location: [facility];  Service: Colorectal    CORONARY ARTERY BYPASS GRAFT      CABG X3 with LIMA to LAD,SVG to OM,SVG to distal  RCA ; Last Assessed:7/13/2015    JOINT REPLACEMENT      left TKR    KIDNEY STONE SURGERY      NEPHROSTOMY Left     with Drainage Irrigation ;Onset:1974    SC ARTHRP KNE CONDYLE&PLATU MEDIAL&LAT COMPARTMENTS Right 2/25/2016    Procedure: ARTHROPLASTY KNEE TOTAL;  Surgeon: Jeb Figueroa MD;  Location: [facility];  Service: Orthopedics       Imaging Studies:  XR chest 1 view portable   Final Result by Niall Vargas MD (02/27 8946)      Cardiomegaly.      Mild vascular congestion      Small pleural effusions      Left base atelectasis and/or infiltrate            Workstation performed: [id number]         CT abdomen pelvis w contrast   Final Result by Nash Huff MD (02/27 8533)      1.  No acute findings in the abdomen or pelvis.   2.  Bilateral nonobstructing nephrolithiasis.   3.  Cholelithiasis without evidence of acute cholecystitis.   4.  Colonic diverticulosis without evidence of acute diverticulitis.   5.  A 3.9 cm infrarenal abdominal aortic aneurysm on a background of severe  atherosclerosis, increased since 2017. Recommend vascular surgery follow-up as appropriate.         The study was marked in EPIC for immediate notification.      Workstation performed: [id number]               02/28/24 1100   PT Last Visit   PT Visit Date 02/28/24   Note Type   Note type Evaluation   Pain Assessment   Pain Score No Pain   Restrictions/Precautions   Weight Bearing Precautions Per Order No   Other Precautions Bed Alarm;O2;Fall Risk   Home Living   Type of Home House   Home Layout Two level;Bed/bath upstairs;Performs ADLs on one level;Able to live on main level with bedroom/bathroom;Stairs to enter with rails;Other (Comment);1/2 bath on main level  (1STE; stays on the 1st floor during the day; FOS to 2nd floor bed/bath; pt goes upstairs w/ assistance at night)   Bathroom Shower/Tub Tub/shower unit   Bathroom Toilet Standard   Bathroom Equipment Shower chair   Home Equipment Walker  (rollator; w/c in process)   Additional Comments 2L home O2 at baseline   Prior Function   Level of [place] Independent with functional mobility;Needs assistance with IADLS;Needs assistance with ADLs  (ambulates w/ RW; requires assistance to negotiate stairs; pt reports, she crawls up the stairs)   Lives With Son  (who works)   Receives Help From Family;Home health  (son's GF assists PRN; pt has HHA Mondays & Wednesdays x 6hrs)   Falls in the last 6 months 0   Vocational Retired   Comments (-) ; family provides transporation; pt son, reports that pt mainly stays in the 1st floor during the day & only goes upstairs at night w/ assistance.   General   Family/Caregiver Present Yes  (son)   Cognition   Overall Cognitive Status WFL   Arousal/Participation Alert   Orientation Level Oriented to person;Oriented to place;Oriented to time   Following Commands Follows one step commands without difficulty   Comments pleasant & cooperative   Subjective   Subjective Pt agreeable to PT/OT evals.   RUE Assessment   RUE  Assessment   (refer to OT)   LUE Assessment   LUE Assessment   (refer to OT)   RLE Assessment   RLE Assessment WFL  (3+/5 grossly)   LLE Assessment   LLE Assessment WFL  (3+/5 grossly)   Bed Mobility   Supine to Sit 6  Modified independent   Additional items HOB elevated;Bedrails;Increased time required   Transfers   Sit to Stand 5  Supervision   Additional items Increased time required;Verbal cues;Bedrails   Stand to Sit 5  Supervision   Additional items Armrests;Increased time required;Verbal cues   Toilet transfer 5  Supervision   Additional items Armrests;Increased time required;Verbal cues;Commode   Additional Comments cues for techniques & safety; pt initially require minAx1 to stand but progress to S w/ further sit<>stand trials   Ambulation/Elevation   Gait pattern Forward Flexion;Wide MEHDI;Decreased foot clearance;Shuffling;Excessively slow   Gait Assistance 5  Supervision   Additional items Verbal cues   Assistive Device Rolling walker   Distance 3'x1 + 50'x1   Ambulation/Elevation Additional Comments no gross LOB noted   Balance   Static Sitting Good   Dynamic Sitting Fair +   Static Standing Fair  (w/ RW)   Dynamic Standing Fair -  (w/ RW)   Ambulatory Fair -  (w/ RW)   Activity Tolerance   Activity Tolerance Patient tolerated treatment well   Medical Staff Made Aware OTR Lissa   Nurse Made Aware yes, Xochitl   Assessment   Prognosis Good   Problem List Decreased strength;Decreased endurance;Impaired balance;Decreased mobility;Obesity   Assessment Pt. 88 y.o.female presented w/ c/o generalized weakness, malaise & urinary frequency. Pt admitted for Acute cystitis w/ acute on chronic diastolic CHF. Pt referred to PT for mobility assessment & D/C planning w/ orders to Ambulate pt. Please see above for information re: home set-up & PLOF as well as objective findings during PT assessment. On eval, pt functioning below baseline hence will continue skilled PT to improve function & safety. Pt require S for most  functional mobility w/ RW + cues for techniques & safety. Gait deviations as above but no gross LOB noted. Pt on 2L O2 NC t/o session w/ resting SpO2 95%. However ?accuracy of SpO2 reading during mobility, 76-82%. No SOB & dizziness reported t/o session. The patient's AM-PAC Basic Mobility Inpatient Short Form Raw Score is 17. A Raw score of greater than 16 suggests the patient may benefit from discharge to home. Please also refer to the recommendation of the Physical Therapist for safe discharge planning. From PT standpoint, will recommend Level III (minimum resource intensity) rehab services and inc family support at D/C.  Nsg staff most recent vital signs as follows: BP (!) 116/46   Pulse 59   Temp 98.1 °F (36.7 °C) (Oral)   Resp 18   Wt 82.3 kg (181 lb 7 oz)   SpO2 96%   BMI 33.19 kg/m² . At end of session, pt OOB in chair in stable condition, call bell & phone in reach, all lines intact. Fall precautions reinforced w/ good understanding. CM to follow. Nsg staff to continue to mobilized pt (OOB in chair for all meals & ambulate in room/unit) as tolerated to prevent further decline in function. Will also recommend Restorative for daily amb &/or daily OOB in chair as appropriate. Nsg notified. Co-eval was necessary to complete this PT eval for the pt's best interest given pt's medical acuity & complexity.   Goals   Patient Goals to go home   STG Expiration Date 03/09/24   Short Term Goal #1 Goals to be met in 10 days; pt will be able to: 1) inc strength & balance by 1/2 grade to improve overall functional mobility & dec fall risk; 2) inc bed mobility to modified I for pt to be able to get in/OOB safely w/ proper techniques 100% of the time, to dec caregiver burden & safely function at home; 3) inc transfers to modified I for pt to transition safely from one surface to another w/o % of the time, to dec caregiver burden & safely function at home; 4) inc amb w/ RW approx. >250' w/ modified I for pt to  ambulate community distances w/o any % of the time, to dec caregiver burden & safely function at home; 5) negotiate stairs w/ S for inc safety during stair mgt inside/outside of home & dec caregiver burden; 6) pt/caregiver ed   PT Treatment Day 0   Plan   Treatment/Interventions Functional transfer training;LE strengthening/ROM;Elevations;Therapeutic exercise;Endurance training;Patient/family training;Bed mobility;Gait training;Spoke to nursing;OT;Family   PT Frequency 3-5x/wk   Discharge Recommendation   Rehab Resource Intensity Level, PT III (Minimum Resource Intensity)  (w/ inc level of support at home)   Equipment Recommended Walker  (pt has)   Additional Comments restorative for daily amb   AM-PAC Basic Mobility Inpatient   Turning in Flat Bed Without Bedrails 3   Lying on Back to Sitting on Edge of Flat Bed Without Bedrails 3   Moving Bed to Chair 3   Standing Up From Chair Using Arms 3   Walk in Room 3   Climb 3-5 Stairs With Railing 2   Basic Mobility Inpatient Raw Score 17   Basic Mobility Standardized Score 39.67   Highest Level Of Mobility   -HLM Goal 5: Stand one or more mins   JH-HLM Achieved 7: Walk 25 feet or more   End of Consult   Patient Position at End of Consult Bedside chair;All needs within reach   End of Consult Comments Pt in stable condition. All needs in reach. All lines intact.   Hx/personal factors: co-morbidities, inaccessible home, dec caregiver support, advanced age, use of AD, fall risk, assist w/ ADL's, obesity, and O2  Examination: dec mobility, dec balance, dec endurance, dec amb, risk for falls  Clinical: unpredictable (ongoing medical status, abnormal lab values, and risk for falls)  Complexity: high    Theo Reynoso

## 2024-02-28 NOTE — PLAN OF CARE
Problem: OCCUPATIONAL THERAPY ADULT  Goal: Performs self-care activities at highest level of function for planned discharge setting.  See evaluation for individualized goals.  Description: Treatment Interventions: ADL retraining, Functional transfer training, UE strengthening/ROM, Endurance training, Patient/family training, Equipment evaluation/education, Neuromuscular reeducation, Compensatory technique education, Continued evaluation, Energy conservation, Activityengagement, Cardiac education          See flowsheet documentation for full assessment, interventions and recommendations.   Note: Limitation: Decreased ADL status, Decreased UE strength, Decreased Safe judgement during ADL, Decreased endurance, Decreased self-care trans, Decreased high-level ADLs  Prognosis: Good  Assessment: Patient is a 88 y.o. year old female seen for OT eval s/p admit to [facility] 2/27/2024 with acute cystitis.  OT consulted to assess ADLs/IADLs/functional mobility and assist w/ D/C planning. PTA, was (I) with ADLs and required (A) with IADLs. Patient demonstrates the following deficits impacting occupational performance: weakness, decreased strength , decreased balance, decreased activity tolerance, limited functional reach, decreased safety awareness, ANDRADE, impaired coordination, and decreased cardiovascular endurance. These impairments, as well at pt’s steps within home environment, difficulty performing ADLs, difficulty performing IADLs, difficulty performing transfers/mobility, fall risk , functional decline , increase in O2 requirements , increased reliance on DME , and advanced age, limit pt’s ability to safely engage in all baseline areas of occupation. Pt's CLOF as follows: eating/grooming: Manjeet and supervision , UB ADLs: supervision , LB ADLs: Neto, toileting: Neto, bed mobility: Manjeet, functional transfers: supervision , functional mobility: supervision , sitting/standing tolerance: ~4 min. Pt would benefit from continued  skilled OT while in acute setting to address deficits as defined above and to maximize (I) w/ ADLs/functional mobility. Occupational performance areas to address include: grooming, bathing/shower, toilet hygiene, dressing, and health maintenance. Based on the aforementioned evaluation, functional performance deficits, and assessments, pt has been identified as a moderate complexity evaluation. At this time, recommendation for pt to receive post-acute rehabilitation services at a Level III (minimum resource intensity) due to above deficits and CLOF. OT will continue to follow pt 3-5x/wk to address the goals listed below to  w/in 10-14 days.     Rehab Resource Intensity Level, OT: III (Minimum Resource Intensity)

## 2024-02-28 NOTE — PROGRESS NOTES
Pastoral Care Progress Note    2024  Patient: Ashley Gonzales : 1935  Admission Date & Time: 2024 1335  MRN: [id number] CSN: [id number]     24 1300   Clinical Encounter Type   Visited With Patient      visited with patient and prayed for her well-being and will be available upon request.

## 2024-02-28 NOTE — PROGRESS NOTES
[facility]  Progress Note  Name: Ashley Gonzales I  MRN: [id number]  Unit/Bed#: E5 -01 I Date of Admission: 2/27/2024   Date of Service: 2/28/2024 I Hospital Day: 0    Assessment/Plan   * Acute cystitis  Assessment & Plan  Patient presented due to generalized malaise, weakness and urinary frequency with UA in the ED consistent with UTI; nitrate positive urine with innumerable bacteria  Family felt cymbalta may have been contributing to symptoms as well  Recently seen in the outpatient setting for urinary frequency and started on Bactrim  Continue rocephin day 2/3  Repeat urine cultures pending  Pt/ot recommendations pending    Acute on chronic diastolic congestive heart failure (HCC)  Assessment & Plan  Wt Readings from Last 3 Encounters:   02/28/24 82.3 kg (181 lb 7 oz)   02/21/24 84.3 kg (185 lb 12.8 oz)   10/16/23 85.7 kg (189 lb)     Patient with history of heart failure with preserved ejection fraction; 2/2021 echo with 60% EF and grade 2 diastolic dysfunction  Requiring 3 L nasal cannula while in the ED, now back to baseline 2 L  Chest x-ray showed mild pulmonary congestion; patient's son reports she is not compliant with Lasix in the outpatient setting   transition back to home dose 20 mg p.o  tomorrow          Type 2 diabetes mellitus (HCC)  Assessment & Plan  Lab Results   Component Value Date    HGBA1C 6.1 02/21/2024       Recent Labs     02/27/24  2057 02/28/24  0719 02/28/24  1108   POCGLU 147* 93 118         Blood Sugar Average: Last 72 hrs:  (P) [id number]  Hold oral medications  Continue ISS  Blood Glucose goal while inpatient is 140-180  Consistent Carbohydrate Diet        S/P CABG (coronary artery bypass graft)  Assessment & Plan  Patient with history of CAD status post CABG  Continue daily aspirin and statin    Hypertension  Assessment & Plan  Blood pressure moderately well-controlled on admission, continue amlodipine and Lopressor    COPD (chronic  obstructive pulmonary disease) (HCC)  Assessment & Plan  Patient with chronic respiratory failure and requires oxygen in the outpatient setting  Appears to be at baseline breathing and not in acute COPD exacerbation  Continue home medications and monitor      Hypothyroidism  Assessment & Plan  Continue levothyroxine 75 mcg daily  TSH 5 months prior was 1.482; follow-up in the outpatient setting    Benign familial tremor  Assessment & Plan  Noted, as per PCP takes Xanax 0.5 mg 3 times daily as needed             VTE Pharmacologic Prophylaxis:   Moderate Risk (Score 3-4) - Pharmacological DVT Prophylaxis Ordered: enoxaparin (Lovenox).    Mobility:   Basic Mobility Inpatient Raw Score: 18  -HLM Goal: 6: Walk 10 steps or more  JH-HLM Achieved: 1: Laying in bed  HLM Goal NOT achieved. Continue with multidisciplinary rounding and encourage appropriate mobility to improve upon HLM goals.    Patient Centered Rounds: I performed bedside rounds with nursing staff today.   Discussions with Specialists or Other Care Team Provider: cm    Education and Discussions with Family / Patient: Updated  (son) at bedside.    Total Time Spent on Date of Encounter in care of patient: 30 mins. This time was spent on one or more of the following: performing physical exam; counseling and coordination of care; obtaining or reviewing history; documenting in the medical record; reviewing/ordering tests, medications or procedures; communicating with other healthcare professionals and discussing with patient's family/caregivers.    Current Length of Stay: 0 day(s)  Current Patient Status: Observation   Certification Statement: The patient will continue to require additional inpatient hospital stay due to acute cystitis  Discharge Plan: Anticipate discharge in 24-48 hrs to discharge location to be determined pending rehab evaluations.    Code Status: Level 1 - Full Code    Subjective:   Patient was seen sitting in chair at bedside  today with son present. She reports feeling well today. She went on a walk and stated she did not feel sob or weak. She denies urinary complaints    Objective:     Vitals:   Temp (24hrs), Av.1 °F (36.7 °C), Min:98.1 °F (36.7 °C), Max:98.1 °F (36.7 °C)    Temp:  [98.1 °F (36.7 °C)] 98.1 °F (36.7 °C)  HR:  [59-71] 59  Resp:  [16-18] 18  BP: (116-143)/(46-65) 116/46  SpO2:  [94 %-96 %] 96 %  Body mass index is 33.19 kg/m².     Input and Output Summary (last 24 hours):     Intake/Output Summary (Last 24 hours) at 2024 1407  Last data filed at 2024 0813  Gross per 24 hour   Intake 1000 ml   Output 800 ml   Net 200 ml       Physical Exam:   Physical Exam  Vitals and nursing note reviewed.   Constitutional:       Appearance: Normal appearance.   HENT:      Head: Normocephalic.   Eyes:      General: No scleral icterus.  Cardiovascular:      Rate and Rhythm: Normal rate and regular rhythm.   Pulmonary:      Effort: Pulmonary effort is normal.      Comments: Diminished breath sounds  Abdominal:      General: Abdomen is flat. Bowel sounds are normal.      Palpations: Abdomen is soft.   Musculoskeletal:      Right lower leg: No edema.      Left lower leg: No edema.   Neurological:      Mental Status: She is oriented to person, place, and time. Mental status is at baseline.          Additional Data:     Labs:  Results from last 7 days   Lab Units 24  0456   WBC Thousand/uL 6.89   HEMOGLOBIN g/dL 13.5   HEMATOCRIT % 46.7*   PLATELETS Thousands/uL 212   NEUTROS PCT % 71   LYMPHS PCT % 13*   MONOS PCT % 11   EOS PCT % 4     Results from last 7 days   Lab Units 24  0456   SODIUM mmol/L 142   POTASSIUM mmol/L 4.0   CHLORIDE mmol/L 98   CO2 mmol/L 43*   BUN mg/dL 33*   CREATININE mg/dL 1.21   ANION GAP mmol/L 1   CALCIUM mg/dL 8.8   ALBUMIN g/dL 3.5   TOTAL BILIRUBIN mg/dL 0.31   ALK PHOS U/L 73   ALT U/L 4*   AST U/L 10*   GLUCOSE RANDOM mg/dL 96     Results from last 7 days   Lab Units 24  1410   INR   1.00     Results from last 7 days   Lab Units 02/28/24  1108 02/28/24  0719 02/27/24  2057   POC GLUCOSE mg/dl 118 93 147*     Results from last 7 days   Lab Units 02/21/24  1648   HEMOGLOBIN A1C  6.1     Results from last 7 days   Lab Units 02/28/24  0456   PROCALCITONIN ng/ml <0.05       Lines/Drains:  Invasive Devices       Peripheral Intravenous Line  Duration             Peripheral IV 02/27/24 Right Antecubital 1 day              Drain  Duration             External Urinary Catheter 1116 days                          Imaging: No pertinent imaging reviewed.    Recent Cultures (last 7 days):         Last 24 Hours Medication List:   Current Facility-Administered Medications   Medication Dose Route Frequency Provider Last Rate    acetaminophen  650 mg Oral Q6H PRN Navneet Knight MD      ALPRAZolam  0.5 mg Oral TID PRN Navneet Knight MD      amLODIPine  5 mg Oral Daily Navneet Knight MD      aspirin  81 mg Oral Daily Navneet Knight MD      bimatoprost  1 drop Ophthalmic HS Navneet Knight MD      cefTRIAXone  1,000 mg Intravenous Q24H Navneet Knight MD 1,000 mg (02/27/24 2159)    docusate sodium  100 mg Oral BID Navneet Knight MD      enoxaparin  30 mg Subcutaneous Daily Ben Cavazos MD      famotidine  20 mg Oral BID Navneet Knight MD      [START ON 2/29/2024] furosemide  20 mg Oral Daily Lissa Rodriguez PA-C      insulin lispro  1-5 Units Subcutaneous TID AC Navneet Knight MD      insulin lispro  1-5 Units Subcutaneous HS Navneet Knight MD      levothyroxine  75 mcg Oral Early Morning Navneet Knight MD      metoprolol tartrate  25 mg Oral Q12H [facility] Navneet Knight MD      umeclidinium  1 puff Inhalation Daily Navneet Knight MD      And    olodaterol HCl  2 puff Inhalation Daily Navneet Knight MD      ondansetron  4 mg Intravenous Q6H PRN Navneet Knight MD      pravastatin  80 mg Oral Daily With Dinner Navneet Knight MD      traZODone  50 mg Oral HS Navneet Knight MD          Today, Patient Was Seen By: Lissa Saldana  STERLING Rodriguez    **Please Note: This note may have been constructed using a voice recognition system.**

## 2024-02-28 NOTE — ASSESSMENT & PLAN NOTE
Wt Readings from Last 3 Encounters:   02/28/24 82.3 kg (181 lb 7 oz)   02/21/24 84.3 kg (185 lb 12.8 oz)   10/16/23 85.7 kg (189 lb)     Patient with history of heart failure with preserved ejection fraction; 2/2021 echo with 60% EF and grade 2 diastolic dysfunction  Requiring 3 L nasal cannula while in the ED, now back to baseline 2 L  Chest x-ray showed mild pulmonary congestion; patient's son reports she is not compliant with Lasix in the outpatient setting   consider transition back to home dose 20 mg p.o  tomorrow

## 2024-02-29 ENCOUNTER — HOME HEALTH ADMISSION (OUTPATIENT)
Dept: HOME HEALTH SERVICES | Facility: HOME HEALTHCARE | Age: 89
End: 2024-02-29
Payer: COMMERCIAL

## 2024-02-29 VITALS
RESPIRATION RATE: 16 BRPM | BODY MASS INDEX: 33.06 KG/M2 | WEIGHT: 180.78 LBS | HEART RATE: 67 BPM | SYSTOLIC BLOOD PRESSURE: 128 MMHG | OXYGEN SATURATION: 93 % | TEMPERATURE: 98.1 F | DIASTOLIC BLOOD PRESSURE: 53 MMHG

## 2024-02-29 PROBLEM — I71.43 INFRARENAL ABDOMINAL AORTIC ANEURYSM (AAA) WITHOUT RUPTURE (HCC): Status: ACTIVE | Noted: 2024-02-29

## 2024-02-29 LAB
BACTERIA UR CULT: ABNORMAL
GLUCOSE SERPL-MCNC: 110 MG/DL (ref 65–140)
GLUCOSE SERPL-MCNC: 119 MG/DL (ref 65–140)

## 2024-02-29 PROCEDURE — 99239 HOSP IP/OBS DSCHRG MGMT >30: CPT

## 2024-02-29 PROCEDURE — 82948 REAGENT STRIP/BLOOD GLUCOSE: CPT

## 2024-02-29 RX ORDER — CEFDINIR 300 MG/1
300 CAPSULE ORAL EVERY 12 HOURS SCHEDULED
Qty: 5 CAPSULE | Refills: 0 | Status: CANCELLED | OUTPATIENT
Start: 2024-02-29 | End: 2024-03-03

## 2024-02-29 RX ORDER — ACETAMINOPHEN 325 MG/1
650 TABLET ORAL EVERY 6 HOURS PRN
Start: 2024-02-29

## 2024-02-29 RX ORDER — CEFDINIR 300 MG/1
300 CAPSULE ORAL EVERY 12 HOURS SCHEDULED
Qty: 6 CAPSULE | Refills: 0 | Status: SHIPPED | OUTPATIENT
Start: 2024-02-29 | End: 2024-03-03

## 2024-02-29 RX ORDER — CEFDINIR 300 MG/1
300 CAPSULE ORAL EVERY 12 HOURS SCHEDULED
Status: DISCONTINUED | OUTPATIENT
Start: 2024-02-29 | End: 2024-02-29 | Stop reason: HOSPADM

## 2024-02-29 RX ADMIN — OLODATEROL RESPIMAT INHALATION SPRAY 2 PUFF: 2.5 SPRAY, METERED RESPIRATORY (INHALATION) at 09:04

## 2024-02-29 RX ADMIN — CEFDINIR 300 MG: 300 CAPSULE ORAL at 10:32

## 2024-02-29 RX ADMIN — UMECLIDINIUM 1 PUFF: 62.5 AEROSOL, POWDER ORAL at 09:04

## 2024-02-29 RX ADMIN — AMLODIPINE BESYLATE 5 MG: 5 TABLET ORAL at 09:03

## 2024-02-29 RX ADMIN — ENOXAPARIN SODIUM 30 MG: 30 INJECTION SUBCUTANEOUS at 09:03

## 2024-02-29 RX ADMIN — FUROSEMIDE 20 MG: 20 TABLET ORAL at 09:03

## 2024-02-29 RX ADMIN — METOPROLOL TARTRATE 25 MG: 25 TABLET, FILM COATED ORAL at 09:03

## 2024-02-29 RX ADMIN — FAMOTIDINE 20 MG: 20 TABLET, FILM COATED ORAL at 09:03

## 2024-02-29 RX ADMIN — LEVOTHYROXINE SODIUM 75 MCG: 75 TABLET ORAL at 05:55

## 2024-02-29 RX ADMIN — DOCUSATE SODIUM 100 MG: 100 CAPSULE, LIQUID FILLED ORAL at 09:03

## 2024-02-29 RX ADMIN — ASPIRIN 81 MG: 81 TABLET, COATED ORAL at 09:03

## 2024-02-29 NOTE — RESTORATIVE TECHNICIAN NOTE
Restorative Technician Note      Patient Name: Ashley Gonzales     Restorative Tech Visit Date: 02/29/24  Note Type: Mobility  Patient Position Upon Consult: Supine  Activity Performed: Ambulated  Assistive Device: Roller walker  Patient Position at End of Consult: Supine; All needs within reach

## 2024-02-29 NOTE — PLAN OF CARE
Problem: Potential for Falls  Goal: Patient will remain free of falls  Description: INTERVENTIONS:  - Educate patient/family on patient safety including physical limitations  - Instruct patient to call for assistance with activity   - Consult OT/PT to assist with strengthening/mobility   - Keep Call bell within reach  - Keep bed low and locked with side rails adjusted as appropriate  - Keep care items and personal belongings within reach  - Initiate and maintain comfort rounds  - Make Fall Risk Sign visible to staff  - Apply yellow socks and bracelet for high fall risk patients  - Consider moving patient to room near nurses station  2/29/2024 1050 by Gloria Kelly RN  Outcome: Progressing  2/29/2024 1050 by Gloria Kelly RN  Outcome: Progressing  2/29/2024 1049 by Gloria Kelly RN  Outcome: Progressing     Problem: Prexisting or High Potential for Compromised Skin Integrity  Goal: Skin integrity is maintained or improved  Description: INTERVENTIONS:  - Identify patients at risk for skin breakdown  - Assess and monitor skin integrity  - Assess and monitor nutrition and hydration status  - Monitor labs   - Assess for incontinence   - Turn and reposition patient  - Assist with mobility/ambulation  - Relieve pressure over bony prominences  - Avoid friction and shearing  - Provide appropriate hygiene as needed including keeping skin clean and dry  - Evaluate need for skin moisturizer/barrier cream  - Collaborate with interdisciplinary team   - Patient/family teaching  - Consider wound care consult   2/29/2024 1050 by Gloria Kelly RN  Outcome: Progressing  2/29/2024 1050 by Gloria Kelly RN  Outcome: Progressing  2/29/2024 1049 by Gloria Kelly RN  Outcome: Progressing     Problem: GENITOURINARY - ADULT  Goal: Maintains or returns to baseline urinary function  Description: INTERVENTIONS:  - Assess urinary function  - Encourage oral fluids to ensure adequate hydration if ordered  - Administer IV fluids as ordered to  ensure adequate hydration  - Administer ordered medications as needed  - Offer frequent toileting  - Follow urinary retention protocol if ordered  2/29/2024 1050 by Gloria Kelly RN  Outcome: Progressing  2/29/2024 1050 by Gloria Kelly RN  Outcome: Progressing     Problem: Potential for Falls  Goal: Patient will remain free of falls  Description: INTERVENTIONS:  - Educate patient/family on patient safety including physical limitations  - Instruct patient to call for assistance with activity   - Consult OT/PT to assist with strengthening/mobility   - Keep Call bell within reach  - Keep bed low and locked with side rails adjusted as appropriate  - Keep care items and personal belongings within reach  - Initiate and maintain comfort rounds  - Make Fall Risk Sign visible to staff  - Apply yellow socks and bracelet for high fall risk patients  - Consider moving patient to room near nurses station  2/29/2024 1051 by Gloria Kelly RN  Outcome: Progressing  2/29/2024 1050 by Gloria Kelly RN  Outcome: Progressing  2/29/2024 1050 by Gloria Kelly RN  Outcome: Progressing  2/29/2024 1049 by Gloria Kelly RN  Outcome: Progressing

## 2024-02-29 NOTE — INCIDENTAL FINDINGS
The following findings require follow up:  Radiographic finding   Finding: infrarenal abdominal aortic aneurysm    Follow up required: vascular surgery   Follow up should be done within 1 year     Please notify the following clinician to assist with the follow up:   Dr. Paz Maharaj DO

## 2024-02-29 NOTE — NURSING NOTE
Patient discharged home. Discharge instructions reviewed and questions answered. IV out. All personal belongings sent with patient.

## 2024-02-29 NOTE — DISCHARGE SUMMARY
[facility]  Discharge- Ashley Gonzales 1935, 88 y.o. female MRN: [id number]  Unit/Bed#: E5 -01 Encounter: [id number]  Primary Care Provider: Paz Maharaj DO   Date and time admitted to hospital: 2/27/2024  1:35 PM    * Acute cystitis  Assessment & Plan  Patient presented due to generalized malaise, weakness and urinary frequency with UA in the ED consistent with UTI; nitrate positive urine with innumerable bacteria  Family felt cymbalta may have been contributing to symptoms as well, discontinue   Recently seen in the outpatient setting for urinary frequency and started on Bactrim  Received IV rocephin x 2 doses continue on cefdinir 300 mg BID for an additional 3 days   Repeat urine cultures resulting in gram negative enteric rods  Pt/ot recommending minimal resource intensity     Acute on chronic diastolic congestive heart failure (HCC)  Assessment & Plan  Wt Readings from Last 3 Encounters:   02/28/24 82.3 kg (181 lb 7 oz)   02/21/24 84.3 kg (185 lb 12.8 oz)   10/16/23 85.7 kg (189 lb)     Patient with history of heart failure with preserved ejection fraction; 2/2021 echo with 60% EF and grade 2 diastolic dysfunction  Requiring 3 L nasal cannula while in the ED, now back to baseline 2 L  Chest x-ray showed mild pulmonary congestion; patient's son reports she is not compliant with Lasix in the outpatient setting   Continue on lasix 20 mg daily at home           Type 2 diabetes mellitus (HCC)  Assessment & Plan  Lab Results   Component Value Date    HGBA1C 6.1 02/21/2024       Recent Labs     02/27/24 2057 02/28/24  0719 02/28/24  1108   POCGLU 147* 93 118         Blood Sugar Average: Last 72 hrs:  (P) [id number]  Continue diet and lifestyle modifications         S/P CABG (coronary artery bypass graft)  Assessment & Plan  Patient with history of CAD status post CABG  Continue daily aspirin and statin    Hypertension  Assessment & Plan  Blood pressure moderately  well-controlled on admission, continue amlodipine and Lopressor    COPD (chronic obstructive pulmonary disease) (HCC)  Assessment & Plan  Patient with chronic respiratory failure and requires oxygen in the outpatient setting  Appears to be at baseline breathing and not in acute COPD exacerbation  Continue home medications and monitor      Infrarenal abdominal aortic aneurysm (AAA) without rupture (HCC)  Assessment & Plan  Incidentally seen on CT   Follow up with vascular surgery    Hypothyroidism  Assessment & Plan  Continue levothyroxine 75 mcg daily  TSH 5 months prior was 1.482; follow-up in the outpatient setting    Benign familial tremor  Assessment & Plan  Noted, as per PCP takes Xanax 0.5 mg 3 times daily as needed      Medical Problems       Resolved Problems  Date Reviewed: 2/29/2024   None       Discharging Physician / Practitioner: Lissa Rodriguez PA-C  PCP: Paz Maharaj DO  Admission Date:   Admission Orders (From admission, onward)       Ordered        02/28/24 1818  Inpatient Admission  Once            02/27/24 1706  Place in Observation  Once                          Discharge Date: 02/29/24    Consultations During Hospital Stay:  none    Procedures Performed:   none    Significant Findings / Test Results:   XR chest 1 view portable   Final Result by Niall Vargas MD (02/27 1606)      Cardiomegaly.      Mild vascular congestion      Small pleural effusions      Left base atelectasis and/or infiltrate            Workstation performed: [id number]         CT abdomen pelvis w contrast   Final Result by Nash Huff MD (02/27 1618)      1.  No acute findings in the abdomen or pelvis.   2.  Bilateral nonobstructing nephrolithiasis.   3.  Cholelithiasis without evidence of acute cholecystitis.   4.  Colonic diverticulosis without evidence of acute diverticulitis.   5.  A 3.9 cm infrarenal abdominal aortic aneurysm on a background of severe atherosclerosis, increased since 2017. Recommend  vascular surgery follow-up as appropriate.         The study was marked in EPIC for immediate notification.      Workstation performed: [id number]              Incidental Findings:   3.9 cm infrarenal abdominal aortic aneurysm on a background of severe atherosclerosis  I reviewed the above mentioned incidental findings with the patient and/or family and they expressed understanding.    Test Results Pending at Discharge (will require follow up):   Urine culture     Outpatient Tests Requested:  none    Complications:  none    Reason for Admission: acute cystitis    Hospital Course:   Ashley Gonzales is a 88 y.o. female patient who originally presented to the hospital on 2/27/2024 due to generalized malaise, weakness and urinary frequency. UA was positive for nitrates with innumerable bacteria. She was started on iv rocephin for 2 days. She will continue on oral cefdinir 300 mg BID for an additional 3 days. Urine culture showing gram negative enteric like rods will continue to follow. Patient will follow up with her family doctor. She was evaluated by PT/OT and was recommended minimal resource intensity.     Please see above list of diagnoses and related plan for additional information.     Condition at Discharge: stable    Discharge Day Visit / Exam:   Subjective:  patient was seen sitting in chair at bedside today. She reports feeling well. She denies any acute complaints.   Vitals: Blood Pressure: 128/53 (02/29/24 0900)  Pulse: 67 (02/29/24 0900)  Temperature: 98.1 °F (36.7 °C) (02/29/24 0726)  Temp Source: Oral (02/29/24 0726)  Respirations: 16 (02/29/24 0700)  Weight - Scale: 82 kg (180 lb 12.4 oz) (02/29/24 0600)  SpO2: 93 % (02/29/24 0900)  Exam:   Physical Exam  Vitals and nursing note reviewed.   Constitutional:       Appearance: Normal appearance.   HENT:      Head: Normocephalic and atraumatic.   Eyes:      General: No scleral icterus.  Cardiovascular:      Rate and Rhythm: Normal rate and regular rhythm.    Pulmonary:      Effort: Pulmonary effort is normal.      Breath sounds: Normal breath sounds.   Abdominal:      General: Abdomen is flat. Bowel sounds are normal.      Palpations: Abdomen is soft.   Musculoskeletal:      Right lower leg: No edema.      Left lower leg: No edema.   Skin:     General: Skin is warm and dry.   Neurological:      Mental Status: She is alert. Mental status is at baseline.   Psychiatric:         Mood and Affect: Mood normal.         Behavior: Behavior normal.         Discussion with Family: Updated  (son) via phone.    Discharge instructions/Information to patient and family:   See after visit summary for information provided to patient and family.      Provisions for Follow-Up Care:  See after visit summary for information related to follow-up care and any pertinent home health orders.      Mobility at time of Discharge:   Basic Mobility Inpatient Raw Score: 17  JH-HLM Goal: 5: Stand one or more mins  JH-HLM Achieved: 4: Move to chair/commode  HLM Goal achieved. Continue to encourage appropriate mobility.     Disposition:   Home with VNA Services (Reminder: Complete face to face encounter)    Planned Readmission: none     Discharge Statement:  I spent 60 minutes discharging the patient. This time was spent on the day of discharge. I had direct contact with the patient on the day of discharge. Greater than 50% of the total time was spent examining patient, answering all patient questions, arranging and discussing plan of care with patient as well as directly providing post-discharge instructions.  Additional time then spent on discharge activities.    Discharge Medications:  See after visit summary for reconciled discharge medications provided to patient and/or family.      **Please Note: This note may have been constructed using a voice recognition system**

## 2024-02-29 NOTE — CASE MANAGEMENT
Case Management Assessment & Discharge Planning Note    Patient name Ashley Gonzales  Location [facility] 5 /E5 -* MRN [id number]  : 1935 Date 2024       Current Admission Date: 2024  Current Admission Diagnosis:Acute cystitis   Patient Active Problem List    Diagnosis Date Noted    Infrarenal abdominal aortic aneurysm (AAA) without rupture (HCC) 2024    Acute cystitis 2024    Morbid (severe) obesity due to excess calories ([facility]) 05/10/2023    Pleural effusion 2021    Acute on chronic respiratory failure with hypoxia and hypercapnia ([facility]) 2021    History of total knee replacement 2019    Type 2 diabetes mellitus ([facility]) 2019    S/P CABG (coronary artery bypass graft) 2019    Acute on chronic diastolic congestive heart failure ([facility]) 2017    JAMEEL (obstructive sleep apnea) 2017    COPD (chronic obstructive pulmonary disease) ([facility])     Benign familial tremor     Hypertension     Hypothyroidism     Hx of arterial ischemic stroke     Esophageal reflux 2015    Clinical depression 2015    Osteoarthritis of knee 2015    Postural imbalance 10/30/2014    Insomnia 10/01/2012    Generalized osteoarthritis 2012    Female stress incontinence 2012    Generalized anxiety disorder- panic 2012    Glaucoma 2012    Hyperlipidemia 2012      LOS (days): 1  Geometric Mean LOS (GMLOS) (days):   Days to GMLOS:     OBJECTIVE:    Risk of Unplanned Readmission Score: 13.86         Current admission status: Inpatient       Preferred Pharmacy:   [id number], [address] [place], [place]  [address] [address]  Phone: [phone] Fax: [phone]    [id number] [place] - [address]  [address]  [facility] [place] [place] [address]  Phone: [phone] Fax: [phone]    [facility] - [place], [place] - [address]  [address] [facility] [address]  Phone: [phone]  Fax: [phone]    Primary Care Provider: Paz Maharaj DO    Primary Insurance: [facility]  Secondary Insurance:     ASSESSMENT:  Active Health Care Proxies    There are no active Health Care Proxies on file.       Advance Directives  Does patient have a Health Care POA?: No  Does patient have Advance Directives?: No  Primary Contact: cyril Shah [phone]    Readmission Root Cause  30 Day Readmission: No    Patient Information  Admitted from:: Home  Mental Status: Alert  During Assessment patient was accompanied by: Not accompanied during assessment  Assessment information provided by:: Son  Primary Caregiver: Self  Support Systems: Children  County of Residence: [place]  What city do you live in?: [place]  Home entry access options. Select all that apply.: No steps to enter home  Type of Current Residence: 2 story home  Living Arrangements: Lives w/ Son  Is patient a ?: No    Activities of Daily Living Prior to Admission  Functional Status: Independent  Completes ADLs independently?: No  Level of ADL dependence: Assistance  Ambulates independently?: Yes  Does patient use assisted devices?: Yes  Assisted Devices (DME) used: Home Oxygen concentrator, Portable Oxygen tanks  DME Company Name (respiratory supplies): [employer]  O2 Rate(s): 2  Does patient currently own DME?: Yes  What DME does the patient currently own?: Home Oxygen concentrator, Portable Oxygen tanks  Does patient have a history of Outpatient Therapy (PT/OT)?: No  Does the patient have a history of Short-Term Rehab?: No  Does patient have a history of HHC?: No  Does patient currently have HHC?: Yes    Current Home Health Care  Type of Current Home Care Services:  (aide comes in from 1-4pm)  Current Home Health Agency:: Other (please enter name in comment)  Current Home Health Follow-Up Provider:: PCP    Patient Information Continued  Income Source: SSI/SSD  Does patient have prescription coverage?: Yes  Does patient receive  dialysis treatments?: No  Does patient have a history of substance abuse?: No  Does patient have a history of Mental Health Diagnosis?: Yes (clinical depression)  Has patient received inpatient treatment related to mental health in the last 2 years?: No    Means of Transportation  Means of Transport to Appts:: Family transport      Social Determinants of Health (SDOH)      Flowsheet Row Most Recent Value   Housing Stability    In the last 12 months, was there a time when you were not able to pay the mortgage or rent on time? N   In the last 12 months, was there a time when you did not have a steady place to sleep or slept in a shelter (including now)? N   Transportation Needs    In the past 12 months, has lack of transportation kept you from medical appointments or from getting medications? no   In the past 12 months, has lack of transportation kept you from meetings, work, or from getting things needed for daily living? No   Food Insecurity    Within the past 12 months, you worried that your food would run out before you got the money to buy more. Never true   Within the past 12 months, the food you bought just didn't last and you didn't have money to get more. Never true   Utilities    In the past 12 months has the electric, gas, oil, or water company threatened to shut off services in your home? No            DISCHARGE DETAILS:    Discharge planning discussed with:: patient's son Pablo  Freedom of Choice: Yes  Comments - Freedom of Choice: son agreeable to home health and would like  VNA if available.  CM contacted family/caregiver?: Yes  Were Treatment Team discharge recommendations reviewed with patient/caregiver?: Yes  Did patient/caregiver verbalize understanding of patient care needs?: Yes  Were patient/caregiver advised of the risks associated with not following Treatment Team discharge recommendations?: Yes    Contacts  Patient Contacts: Pablo  Relationship to Patient:: Family  Contact Method:  Phone  Phone Number: [phone]  Reason/Outcome: Emergency Contact    Requested Home Health Care         Is the patient interested in HHC at discharge?: Yes  Home Health Discipline requested:: Nursing, Occupational Therapy, Physical Therapy  [facility]  Home Health Follow-Up Provider:: SYDNEE  Home Health Services Needed:: Gait/ADL Training, COPD Management, Diabetes Management, Strengthening/Theraputic Exercises to Improve Function, Evaluate Functional Status and Safety  Homebound Criteria Met:: Requires the Assistance of Another Person for Safe Ambulation or to Leave the Home, Uses an Assist Device (i.e. cane, walker, etc)  Supporting Clincal Findings:: Requires Oxygen    DME Referral Provided  Referral made for DME?: No    Other Referral/Resources/Interventions Provided:  Interventions: HHC  Referral Comments: Referral for home health made in [place]    Treatment Team Recommendation: Home with Home Health Care  Discharge Destination Plan:: Home with Home Health Care  Transport at Discharge : Family     Additional Comments: Patient lives in a 2  0 DIAN uses RW and oxygen from [facility] [address] base line 2 liters. Patient does get a home health aide from1-4pm Mondays and Wednesdays. Patient does not drive, family transports.

## 2024-02-29 NOTE — APP STUDENT NOTE
"[facility] Internal Medicine Progress Note  Patient: Ashley Gonzales 88 y.o. female   MRN: [id number]  PCP: Paz Maharaj DO  Unit/Bed#: E5 -01 Encounter: [id number]  Date Of Visit: 02/29/24    Assessment:    Principal Problem:    Acute cystitis  Active Problems:    COPD (chronic obstructive pulmonary disease) (HCC)    Benign familial tremor    Hypertension    Hypothyroidism    Acute on chronic diastolic congestive heart failure (HCC)    JAMEEL (obstructive sleep apnea)    S/P CABG (coronary artery bypass graft)    Type 2 diabetes mellitus ([facility])      Plan:    Acute cystitis   Assessment and Plan:  Presented to the ED with weakness and urinary frequency, patient thought this was due to recently being started on Cymbalta 30mg PO daily. Seen in outpatient setting (1/27) recently for urinary frequency and started on Bactrim 80-160mg PO BID, finished course on 2/1.  UA with signs of UTI. Large leukocytes, (+) nitrates, innumerable bacteria.  Procal (-).  Was started on ceftriaxone will awaiting urine cultures.  Urine cultures show \">100,000 cfu/ml gram negative linda enteric like\"  Consider switching to oral antibiotics today if patient continues to have improvement of symptoms  Cymbalta 30mg PO daily will be held during inpatient stay as requested by patient and her family.  No acute findings on CT abdomen/pelvis. Bilateral non-obstructing nephrolithiasis.   Chronic obstructive pulmonary disease (COPD)  Assessment and Plan:  Hx of COPD maintained on 2L O2 nasal cannula at baseline.  No acute exacerbations noted, breathing appears to be at baseline.   Continue olodaterol inhaler 2 puff daily   Acute on chronic diastolic congestive heart failure   Assessment and Plan:  Hx of CHF with preserved ejection fraction. Last ECHO from 2/2021 showed EGF of 60% and grade 2 diastolic dysfunction. On 2L O2 nasal cannula at baseline. Family reports she is non-compliant with Lasix as an outpatient.   Did reuiqre 3L O2 in the ED, has " since transitioned back to baseline 2L without symptoms.  CXR showed mild pulmonary congestion.   Given 20mg IV lasix x1 in the ED and 40mg IV lasix x1 yesterday.  Start on home dose of lasix 20mg PO daily.  No signs or symptoms of volume overload on exam.   Generalized weakness  Assessment and Plan:  Presented to the ED with complaint of generalized weakness and urinary frequency. Ambulates with walker at baseline. Continues to have weakness in setting of UTI.  PT/OT consulted, saw patient yesterday. Their recommendations are for a post-acute rehab with minimum resource intensity for continued PT/OT support to help with ambulation.   PT/OT will continue to see her in the inpatient setting.   Abdominal aortic aneurysm (AAA)  Assessment and Plan:  Incidental finding on CT abdomen/pelvis. Showed a 3.9cm infrarenal abdominal aortic aneurysm and severer atherosclerosis. This has increased since last imaging in 2017.  Radiology recommendation is to follow-up with vascular surgery as outpatient.   Status post coronary artery bypass graft (CABG)  Assessment and Plan:  Hx of CAD s/p CABG x3.  Continue aspirin 81mg PO daily  Continue pravastatin 80mg PO daily  Type 2 diabetes mellitus   Assessment and Plan:  Last A1C on 2/21 was 6.1  Hold home medications.  Continue sliding scale insulin, Accu-checks.  Hypertension  Assessment and Plan:  Blood pressures have remained stable while inpatient. Did have 1 high reading of 156/88 this morning.   Continue amlodipine 5mg PO daily  Continue metoprolol 25mg PO daily   Given 20mg IV lasix x1 in the ED and 40mg IV lasix x1 yesterday.  Start on home dose Lasix 20mg PO daily.  Benign familial tremor   Assessment and Plan:  Continue Xanax 0.5mg PRN TID for tremor.   Hypothyroidism   Assessment and Plan:  TSH upon adnission was 2.326.  Continue levothyroxine 75mcg PO daily.  Obstructive sleep apnea (JAMEEL)  Assessment and Plan:  Family reports patient is non-compliant with CPAP at home.    Offer CPAP for inpatient use.       VTE Pharmacologic Prophylaxis:     Moderate Risk (Score 3-4) - Pharmacological DVT Prophylaxis Ordered: Enoxaparin (Lovenox).      Current Length of Stay: 1 day(s)  Current Patient Status: Inpatient       Code Status: Level 1 - Full Code      Subjective:   Patient seen and examined. States multiple times that she is very anxious about falling, as she is alone all during the day. States that she felt PT/OT went well yesterday and she is looking forward to continuing with them to give her more stability.   No complaints of chest pain, SOB, dyspnea, abdominal pain, dysuria or urinary frequency/urgency.     Objective:     Vitals:   Temp (24hrs), Av.3 °F (36.8 °C), Min:98.1 °F (36.7 °C), Max:98.7 °F (37.1 °C)    Temp:  [98.1 °F (36.7 °C)-98.7 °F (37.1 °C)] 98.1 °F (36.7 °C)  HR:  [63-70] 63  Resp:  [16-17] 16  BP: (113-156)/(59-88) 156/88  SpO2:  [82 %-94 %] 94 %  Body mass index is 33.06 kg/m².     Input and Output Summary (last 24 hours):       Intake/Output Summary (Last 24 hours) at 2024 0831  Last data filed at 2024 0056  Gross per 24 hour   Intake 120 ml   Output 300 ml   Net -180 ml       Physical Exam:   Physical Exam  Cardiovascular:      Rate and Rhythm: Normal rate and regular rhythm.      Pulses: Normal pulses.      Heart sounds: Normal heart sounds.   Pulmonary:      Effort: Pulmonary effort is normal.      Breath sounds: Normal breath sounds.   Abdominal:      General: Abdomen is flat. Bowel sounds are normal.      Palpations: Abdomen is soft.   Musculoskeletal:      Right lower leg: No edema.      Left lower leg: No edema.   Skin:     General: Skin is warm.      Capillary Refill: Capillary refill takes less than 2 seconds.   Neurological:      General: No focal deficit present.      Mental Status: She is oriented to person, place, and time.   Psychiatric:         Mood and Affect: Mood is anxious.      Comments: States she is anxious about falling.            Additional Data:     Labs:  Results from last 7 days   Lab Units 02/28/24  0456   WBC Thousand/uL 6.89   HEMOGLOBIN g/dL 13.5   HEMATOCRIT % 46.7*   PLATELETS Thousands/uL 212   NEUTROS PCT % 71   LYMPHS PCT % 13*   MONOS PCT % 11   EOS PCT % 4     Results from last 7 days   Lab Units 02/28/24  0456   SODIUM mmol/L 142   POTASSIUM mmol/L 4.0   CHLORIDE mmol/L 98   CO2 mmol/L 43*   BUN mg/dL 33*   CREATININE mg/dL 1.21   ANION GAP mmol/L 1   CALCIUM mg/dL 8.8   ALBUMIN g/dL 3.5   TOTAL BILIRUBIN mg/dL 0.31   ALK PHOS U/L 73   ALT U/L 4*   AST U/L 10*   GLUCOSE RANDOM mg/dL 96     Results from last 7 days   Lab Units 02/27/24  1410   INR  1.00     Results from last 7 days   Lab Units 02/29/24  0726 02/28/24  2035 02/28/24  1530 02/28/24  1108 02/28/24  0719 02/27/24 2057   POC GLUCOSE mg/dl 110 126 141* 118 93 147*         Results from last 7 days   Lab Units 02/28/24  0456   PROCALCITONIN ng/ml <0.05       Imaging: {Radiology Review:05805}    Recent Cultures (last 7 days):     Results from last 7 days   Lab Units 02/28/24  1422   URINE CULTURE  >100,000 cfu/ml Gram Negative Alphonso Enteric Like*       Lines/Drains:  Invasive Devices       Peripheral Intravenous Line  Duration             Peripheral IV 02/27/24 Right Antecubital 1 day              Drain  Duration             External Urinary Catheter 1117 days                    Telemetry:        Last 24 Hours Medication List:   Current Facility-Administered Medications   Medication Dose Route Frequency Provider Last Rate    acetaminophen  650 mg Oral Q6H PRN Navneet Knight MD      ALPRAZolam  0.5 mg Oral TID PRN Navneet Knight MD      amLODIPine  5 mg Oral Daily Navneet Knight MD      aspirin  81 mg Oral Daily Navneet Knight MD      bimatoprost  1 drop Ophthalmic HS Navneet Knight MD      cefTRIAXone  1,000 mg Intravenous Q24H Navneet Knight MD 1,000 mg (02/28/24 2051)    docusate sodium  100 mg Oral BID Navneet Knight MD      enoxaparin  30 mg Subcutaneous Daily Ardalan  MD Macy      famotidine  20 mg Oral BID Navneet Knight MD      furosemide  20 mg Oral Daily Lissa Rodriguez PA-C      insulin lispro  1-5 Units Subcutaneous TID AC Navneet Knight MD      insulin lispro  1-5 Units Subcutaneous HS Navneet Knight MD      levothyroxine  75 mcg Oral Early Morning Navneet Knight MD      metoprolol tartrate  25 mg Oral Q12H MODESTO Navneet Knight MD      umeclidinium  1 puff Inhalation Daily Navneet Knight MD      And    olodaterol HCl  2 puff Inhalation Daily Navneet Knight MD      ondansetron  4 mg Intravenous Q6H PRN Navneet Knight MD      pravastatin  80 mg Oral Daily With Dinner Navneet Knight MD      traZODone  50 mg Oral HS Navneet Knight MD          Today, Patient Was Seen By: Kaelyn Jennings    ** Please Note: This note has been constructed using a voice recognition system. **

## 2024-02-29 NOTE — DISCHARGE INSTR - AVS FIRST PAGE
Dear Ashley Gonzales,     It was our pleasure to care for you here at [facility].  It is our hope that we were always able to exceed the expected standards for your care during your stay.  You were hospitalized due to acute cystitis.  You were cared for on the 5th floor by Lissa Rodriguez PA-C under the service of Kannan Meza, * with the [facility] who covers for your primary care physician (PCP), Paz Maharaj DO, while you were hospitalized.  If you have any questions or concerns related to this hospitalization, you may contact us at .  For follow up as well as any medication refills, we recommend that you follow up with your primary care physician.  A registered nurse will reach out to you by phone within a few days after your discharge to answer any additional questions that you may have after going home.  However, at this time we provide for you here, the most important instructions / recommendations at discharge:     Notable Medication Adjustments -   Start cefdinir 300 mg BID for an additional 3 days  Testing Required after Discharge -   none  ** Please contact your PCP to request testing orders for any of the testing recommended here **  Important follow up information -   Follow up with family doctor   Please review this entire after visit summary as additional general instructions including medication list, appointments, activity, diet, any pertinent wound care, and other additional recommendations from your care team that may be provided for you.      Sincerely,     Lissa Rodriguez PA-C

## 2024-02-29 NOTE — APP STUDENT NOTE
Discharge Summary - [facility]    Patient Information: Ashley Gonzales 88 y.o. female MRN: [id number]  Unit/Bed#: E5 -01 Encounter: [id number]      [facility] Internal Medicine Progress Note  Patient: Ashley Gonzales 88 y.o. female   MRN: [id number]  PCP: Paz Maharaj DO  Unit/Bed#: E5 -01 Encounter: [id number]  Date Of Visit: 02/29/24    Acute cystitis   Assessment and Plan:  Presented to the ED with generalized weakness and urinary frequency. Patient and family thought this was due to recently being started on Cymbalta 30mg PO daily. Seen in outpatient setting (1/27) for UTI, finished on Bactrim 80-160mg PO BID on 2/1.   Was started on ceftriaxone 1000mg IV Q24, was on for 2 days prior to cultures resulting  Cultures showed gram negative rods  Transitioned to cefdinir 300mg PO BID x 3 days to continue as outpatient   Cymbalta 30mg PO daily was held d/t patient and family request   Chronic obstructive pulmonary disease (COPD)  Assessment and Plan:  Chronically maintained on 2L O2 nasal cannula  Did require 3L O2 in the ED, has transitioned back to 2L without difficulty  No acute exacerbation noted, breathing at baseline  Continue home olodaterol inhaler 2 puffs daily   Acute on chronic diastolic congestive heart failure   Assessment and Plan:  CHF with preserved ejection fraction. Last ECHO on 2.2021 showed EF of 60% and grade 2 diastolic dysfunction. On 2L O2 nasal cannula at baseline. Family reports non-compliance with Lasix in outpatient setting.  Did require 3L O2 in the ED, has transitioned back to 2L without difficulty  CXR showed mild pulmonary congestion  Given 20mg IV lasix x1 in the ED and 40mg IV lasix x1 yesterday.  Transitioned to home lasix 20mg PO daily  Generalized weakness  Assessment and Plan:  Presented to the ED with complaint of generalized weakness and urinary frequency. Ambulates with walker at baseline. Continued to have weakness in setting of UTI over course  of her stay.  PT/OT consulted. Their recommendation is post-acute rehab with minimum resource intensity for continued support with ambulation.  Son agreeable to have them come to the home to work with patient.   Infrarenal abdominal aortic aneurysm (AAA), without rupture   Assessment and Plan:  Incidental finding on CT abdomen/pelvis. Showed a 3.9cm infrarenal abdominal aortic aneurysm and severe atherosclerosis. This has increased in size since previous imaging in 2017.  Recommendation to follow-up with vascular surgery as an outpatient.  Status post coronary artery bypass graft (CABG)  Assessment and Plan:  Hx of CAD s/p CABG x3  Continue aspiring 81mg PO daily and pravastatin 80mg PO daily.   Type 2 diabetes mellitus  Assessment and Plan:   Stable blood sugars maintained on sliding scale insulin while in the inpatient setting.  Restart home mediations.  Continue diet and lifestyle modifications.   Hypertension   Assessment and Plan:  Stable blood pressures maintained on home regimen while inpatient.  Continue home doses of amlodipine 5mg PO daily, metoprolol 25mg PO daily, Lasix 20mg PO daily.  Benign familial tremor  Assessment and Plan:  Continue home Xanax 0.5mg PRN TID for tremor.  Hypothyroidism  Assessment and Plan:  Continue home levothyroxine 75mcg PO daily.       Discharging Physician / Practitioner: Kaelyn Jennings  PCP: Paz Maharaj DO  Admission Date: 2/27/2024  Discharge Date: 02/29/24    Disposition:     Home with VNA Services (Reminder: Complete face to face encounter)    Reason for Admission: acute cystitis     Discharge Diagnoses:     Principal Problem:    Acute cystitis  Active Problems:    COPD (chronic obstructive pulmonary disease) (HCC)    Benign familial tremor    Hypertension    Hypothyroidism    Acute on chronic diastolic congestive heart failure (HCC)    JAMEEL (obstructive sleep apnea)    S/P CABG (coronary artery bypass graft)    Type 2 diabetes mellitus (HCC)    Infrarenal  "abdominal aortic aneurysm (AAA) without rupture (HCC)  Resolved Problems:    * No resolved hospital problems. *      Consultations During Hospital Stay:  PT/OT - generalized weakness    Procedures Performed:     None     Significant Findings / Test Results:     UA (+) for large leukocytes, nitrates, and innumerable bacteria   UC showed >100,000 cfu/ml gram negative rods, enteric like     Incidental Findings:   CT chest/abdomen - 3.9cm infrarenal abdominal aortic aneurysm and severe atherosclerosis, larger than previous documented in 2017.  Recommendation to follow-up with outpatient vascular surgery     Test Results Pending at Discharge (will require follow up):   None      Outpatient Tests Requested:  None     Complications:  Continued to have generalized weakness, episodes of her knees \"buckling\" when walking. PT/OT consult placed. Their recommendations were to continue at home rehab/rehab with minimum resource intensity.     Hospital Course:     Ashley Gonzales is a 88 y.o. female patient with PMH of COPD, CHF, CABG x 3, DM2, HTN, bening familial tremor, hypothyroidism, and chronic O2 use originally presented to the hospital on 2/27/2024 due to urinary frequency and generalized weakness. Patient and family originally thought this was d/t her recent Cymbalta 30mg PO daily use, this was held during inpatient stay per family and patient request. Was recently treated for UTI in the outpatient setting, finishing course of Bactrim 80-160mg PO BID on 2/1. UA was consistent with UTI by large leukocytes, (+) nitrates, and innumerable bacteria. Was on ceftriaxone 1000mg q24h for 2 days until cultures resulted. Once cultures resulted, ceftriaxone was stopped and she will be transitioned to cefdinir 300mg PO BID for an additional 3 days as an outpatient.   Family reported patient is not complaint with her Lasix 20mg PO daily in the outpatient setting, CXR did show mild pulmonary congestion requiring one dose of both 20mg and " 40mg IV lasix. Today was transitioned back to her home 20mg PO lasix.   While inpatient, continued to complain of generalized weakness when walking, PT/OT was consulted. Their recommendations included post-acute rehab with minimum resource intensity.   Incidental finding that her infrarenal abdominal aortic aneurysm had grown in size since last scan in 2019, now measuring 3.9cm. Recommendation is for outpatient follow-up with vascular surgery.     Condition at Discharge: stable     Discharge Day Visit / Exam:     Subjective:  Patient seen and examined. Very anxious, stating she is very concerned about falling at home since she is alone for the large majority of the day. Stated that PT/OT sessions went well yesterday, she is just very worried because her knees continue to buckle. Talked about how she would be continuing PT/OT in the home. Denies any symptoms of chest pain, SOB, dyspnea, leg swelling. Denies any dysuria, urinary frequency/urgency, or hematuria.     Vitals: Blood Pressure: 128/53 (02/29/24 0900)  Pulse: 67 (02/29/24 0900)  Temperature: 98.1 °F (36.7 °C) (02/29/24 0726)  Temp Source: Oral (02/29/24 0726)  Respirations: 16 (02/29/24 0700)  Weight - Scale: 82 kg (180 lb 12.4 oz) (02/29/24 0600)  SpO2: 93 % (02/29/24 0900)  Exam:   Physical Exam  Constitutional:       Appearance: Normal appearance.   Cardiovascular:      Rate and Rhythm: Normal rate and regular rhythm.   Pulmonary:      Effort: Pulmonary effort is normal.      Breath sounds: Normal breath sounds.   Abdominal:      General: Abdomen is flat. Bowel sounds are normal.      Palpations: Abdomen is soft.      Tenderness: There is no abdominal tenderness.   Musculoskeletal:      Right lower leg: No edema.      Left lower leg: No edema.   Skin:     General: Skin is warm.      Capillary Refill: Capillary refill takes less than 2 seconds.   Neurological:      General: No focal deficit present.      Mental Status: She is alert and oriented to person,  place, and time.   Psychiatric:         Mood and Affect: Mood is anxious.       Discussion with Family: ***    Discharge instructions/Information to patient and family:   See after visit summary for information provided to patient and family.      Provisions for Follow-Up Care:  See after visit summary for information related to follow-up care and any pertinent home health orders.      Planned Readmission: none.      Discharge Statement:  I spent *** minutes discharging the patient. This time was spent on the day of discharge. I had direct contact with the patient on the day of discharge. Greater than 50% of the total time was spent examining patient, answering all patient questions, arranging and discussing plan of care with patient as well as directly providing post-discharge instructions.  Additional time then spent on discharge activities.    Discharge Medications:  See after visit summary for reconciled discharge medications provided to patient and family.      ** Please Note: This note has been constructed using a voice recognition system **

## 2024-03-01 ENCOUNTER — TRANSITIONAL CARE MANAGEMENT (OUTPATIENT)
Dept: FAMILY MEDICINE CLINIC | Facility: CLINIC | Age: 89
End: 2024-03-01

## 2024-03-01 ENCOUNTER — HOME CARE VISIT (OUTPATIENT)
Dept: HOME HEALTH SERVICES | Facility: HOME HEALTHCARE | Age: 89
End: 2024-03-01

## 2024-03-01 DIAGNOSIS — G25.0 BENIGN FAMILIAL TREMOR: ICD-10-CM

## 2024-03-01 RX ORDER — ALPRAZOLAM 0.5 MG/1
0.5 TABLET ORAL 3 TIMES DAILY PRN
Qty: 100 TABLET | Refills: 0 | Status: SHIPPED | OUTPATIENT
Start: 2024-03-01

## 2024-03-01 NOTE — UTILIZATION REVIEW
NOTIFICATION OF ADMISSION DISCHARGE   This is a Notification of Discharge from [facility]. Please be advised that this patient has been discharge from our facility. Below you will find the admission and discharge date and time including the patient’s disposition.   UTILIZATION REVIEW CONTACT:  Ely Obrien MA  Utilization   Network Utilization Review Department  Phone: [phone] x carefully listen to the prompts. All voicemails are confidential.  Email: [email or web address]     ADMISSION INFORMATION  PRESENTATION DATE: 2/27/2024  1:35 PM  OBERVATION ADMISSION DATE:   INPATIENT ADMISSION DATE: 2/28/24  6:18 PM   DISCHARGE DATE: 2/29/2024  2:10 PM   DISPOSITION:Home with Home Health Care    Network Utilization Review Department  ATTENTION: Please call with any questions or concerns to [phone] and carefully listen to the prompts so that you are directed to the right person. All voicemails are confidential.   For Discharge needs, contact Care Management DC Support Team at [phone] opt. 2  Send all requests for admission clinical reviews, approved or denied determinations and any other requests to dedicated fax number below belonging to the campus where the patient is receiving treatment. List of dedicated fax numbers for the Facilities:  FACILITY NAME UR FAX NUMBER   ADMISSION DENIALS (Administrative/Medical Necessity) [phone]   DISCHARGE SUPPORT TEAM ([facility]) [phone]   PARENT CHILD HEALTH (Maternity/NICU/Pediatrics) [phone]   [facility] [phone]   [facility] [phone]   [facility] [phone]   [facility] [phone]   [facility] [phone]   [facility] [phone]   [facility] [phone]   [facility]  [place] [phone]   [facility] [phone]   [facility] [phone]   [facility] [phone]   [facility] [place] [phone]

## 2024-03-02 ENCOUNTER — HOME CARE VISIT (OUTPATIENT)
Dept: HOME HEALTH SERVICES | Facility: HOME HEALTHCARE | Age: 89
End: 2024-03-02

## 2024-03-03 ENCOUNTER — HOME CARE VISIT (OUTPATIENT)
Dept: HOME HEALTH SERVICES | Facility: HOME HEALTHCARE | Age: 89
End: 2024-03-03
Payer: COMMERCIAL

## 2024-03-03 VITALS
SYSTOLIC BLOOD PRESSURE: 118 MMHG | RESPIRATION RATE: 16 BRPM | TEMPERATURE: 96.6 F | OXYGEN SATURATION: 88 % | DIASTOLIC BLOOD PRESSURE: 60 MMHG | HEART RATE: 65 BPM

## 2024-03-03 PROCEDURE — 400013 VN SOC

## 2024-03-03 PROCEDURE — G0299 HHS/HOSPICE OF RN EA 15 MIN: HCPCS

## 2024-03-03 NOTE — CASE COMMUNICATION
[facility] has admitted your patient to Home Health service with the following disciplines:      SN, PT and OT  Response needed, please respond via in basket or tiger connect for MSW order  Primary focus of home health care: cardiac  Patient stated goals of care: To be able to go out in public and enjoy going shopping.   Anticipated visit pattern: 3w1 2w1 and next visit date: 3/5/24 CHF  See medication list - meds in home differ fro m AVS: Patient reports unable to use Stiolto Respimat 2.5-2.5 MCG/ACT inhaler and would like a different type of maintenance inhaler.  Significant clinical findings: Pulse ox 88 %. Patient reports this is normal. Patient reports tremors effecting life could possibly benefit from neurology referral. Would audelia benefit from a palliative care outpatient referral to enhance quality of life. Patient would like a [facility] MSW order for assista nce with advanced directives. Patient has a large hernia on left side and is distended. Patient reports never seeing a surgeon to assess this.   Potential barriers to goal achievement: Medication compliance, daily weight.    Thank you for allowing us to participate in the care of your patient.      Cain Washington RN

## 2024-03-04 ENCOUNTER — HOME CARE VISIT (OUTPATIENT)
Dept: HOME HEALTH SERVICES | Facility: HOME HEALTHCARE | Age: 89
End: 2024-03-04
Payer: COMMERCIAL

## 2024-03-04 ENCOUNTER — TELEPHONE (OUTPATIENT)
Age: 89
End: 2024-03-04

## 2024-03-04 VITALS — OXYGEN SATURATION: 92 % | HEART RATE: 63 BPM

## 2024-03-04 PROCEDURE — G0151 HHCP-SERV OF PT,EA 15 MIN: HCPCS

## 2024-03-04 NOTE — TELEPHONE ENCOUNTER
Patient called requesting refill for Alprazolam  Patient made aware medication was refilled on 03/01/2024 for 100 with 0 refills to [facility]. Patient instructed to contact the pharmacy to obtain refills of medication. Patient verbalized understanding.

## 2024-03-05 ENCOUNTER — HOME CARE VISIT (OUTPATIENT)
Dept: HOME HEALTH SERVICES | Facility: HOME HEALTHCARE | Age: 89
End: 2024-03-05
Payer: COMMERCIAL

## 2024-03-05 ENCOUNTER — TELEPHONE (OUTPATIENT)
Dept: HOME HEALTH SERVICES | Facility: HOME HEALTHCARE | Age: 89
End: 2024-03-05

## 2024-03-05 VITALS — HEART RATE: 84 BPM | OXYGEN SATURATION: 86 %

## 2024-03-05 PROCEDURE — G0152 HHCP-SERV OF OT,EA 15 MIN: HCPCS

## 2024-03-05 NOTE — CASE COMMUNICATION
"In basket message from: Paz Maharaj, DO [facility];     1. I will look into a \"different type of maintenance inhaler \"as the patient is unable to use that Stiolto. If patient has access to a nebulizer we can actually use the ipratropium vials as a substitute which will actually work better. If the patient does not have a nebulizer we can do this through the [facility].   2. Certainly can put a referral in  for neurology and the chronicTremors.   3. With regards to the palliative care I am assuming we would put referral into OACIS program through [facility]? Since I believe [facility] does not have that type program. I am sure that they can review and assist with advanced directives although we do have our own [facility] forms which can be reviewed with patient.   4. With regards to the hernia, if it is not necessarily symptomatic then surgical  consult is not required. Will review the chart for any recent CT abdomen which can clarify the degree and severity of the hernia. If none done recently can consider ordering this prior to surgical consultation.   Recent CT did not reveal any comment on abdominal wall or hernia. But there are some other findings that technically recommended for follow-up. This can all be discussed at TCM     IMPRESSION:     1. No acute findings in the ab domen or pelvis.   2. Bilateral nonobstructing nephrolithiasis.   3. Cholelithiasis without evidence of acute cholecystitis.   4. Colonic diverticulosis without evidence of acute diverticulitis.   5. A 3.9 cm infrarenal abdominal aortic aneurysm on a background of severe atherosclerosis, increased since 2017. Recommend vascular surgery follow-up as appropriate.     "

## 2024-03-06 ENCOUNTER — HOME CARE VISIT (OUTPATIENT)
Dept: HOME HEALTH SERVICES | Facility: HOME HEALTHCARE | Age: 89
End: 2024-03-06
Payer: COMMERCIAL

## 2024-03-06 VITALS — HEART RATE: 56 BPM | OXYGEN SATURATION: 90 %

## 2024-03-06 PROCEDURE — G0151 HHCP-SERV OF PT,EA 15 MIN: HCPCS

## 2024-03-07 ENCOUNTER — HOME CARE VISIT (OUTPATIENT)
Dept: HOME HEALTH SERVICES | Facility: HOME HEALTHCARE | Age: 89
End: 2024-03-07
Payer: COMMERCIAL

## 2024-03-07 VITALS
HEART RATE: 71 BPM | RESPIRATION RATE: 20 BRPM | DIASTOLIC BLOOD PRESSURE: 80 MMHG | TEMPERATURE: 97.3 F | SYSTOLIC BLOOD PRESSURE: 128 MMHG | OXYGEN SATURATION: 90 %

## 2024-03-07 VITALS — OXYGEN SATURATION: 91 %

## 2024-03-07 PROCEDURE — G0299 HHS/HOSPICE OF RN EA 15 MIN: HCPCS

## 2024-03-07 PROCEDURE — G0152 HHCP-SERV OF OT,EA 15 MIN: HCPCS

## 2024-03-11 ENCOUNTER — HOME CARE VISIT (OUTPATIENT)
Dept: HOME HEALTH SERVICES | Facility: HOME HEALTHCARE | Age: 89
End: 2024-03-11
Payer: COMMERCIAL

## 2024-03-11 ENCOUNTER — OFFICE VISIT (OUTPATIENT)
Dept: FAMILY MEDICINE CLINIC | Facility: CLINIC | Age: 89
End: 2024-03-11
Payer: COMMERCIAL

## 2024-03-11 VITALS
DIASTOLIC BLOOD PRESSURE: 76 MMHG | HEIGHT: 62 IN | HEART RATE: 79 BPM | WEIGHT: 189 LBS | SYSTOLIC BLOOD PRESSURE: 144 MMHG | BODY MASS INDEX: 34.78 KG/M2 | TEMPERATURE: 96 F

## 2024-03-11 VITALS — HEART RATE: 62 BPM | OXYGEN SATURATION: 88 %

## 2024-03-11 DIAGNOSIS — J96.22 ACUTE ON CHRONIC RESPIRATORY FAILURE WITH HYPOXIA AND HYPERCAPNIA (HCC): ICD-10-CM

## 2024-03-11 DIAGNOSIS — I71.43 INFRARENAL ABDOMINAL AORTIC ANEURYSM (AAA) WITHOUT RUPTURE (HCC): ICD-10-CM

## 2024-03-11 DIAGNOSIS — J96.21 ACUTE ON CHRONIC RESPIRATORY FAILURE WITH HYPOXIA AND HYPERCAPNIA (HCC): ICD-10-CM

## 2024-03-11 DIAGNOSIS — E66.01 MORBID (SEVERE) OBESITY DUE TO EXCESS CALORIES (HCC): ICD-10-CM

## 2024-03-11 DIAGNOSIS — N30.90 CYSTITIS: Primary | ICD-10-CM

## 2024-03-11 DIAGNOSIS — R93.89 ABNORMAL CHEST X-RAY: ICD-10-CM

## 2024-03-11 PROCEDURE — 87186 SC STD MICRODIL/AGAR DIL: CPT | Performed by: NURSE PRACTITIONER

## 2024-03-11 PROCEDURE — G0151 HHCP-SERV OF PT,EA 15 MIN: HCPCS

## 2024-03-11 PROCEDURE — 99496 TRANSJ CARE MGMT HIGH F2F 7D: CPT | Performed by: NURSE PRACTITIONER

## 2024-03-11 PROCEDURE — 87086 URINE CULTURE/COLONY COUNT: CPT | Performed by: NURSE PRACTITIONER

## 2024-03-11 PROCEDURE — 87077 CULTURE AEROBIC IDENTIFY: CPT | Performed by: NURSE PRACTITIONER

## 2024-03-11 PROCEDURE — 87147 CULTURE TYPE IMMUNOLOGIC: CPT | Performed by: NURSE PRACTITIONER

## 2024-03-11 NOTE — PROGRESS NOTES
Assessment & Plan     1. Cystitis  -     Urine culture; Future  -     Urine culture    2. Infrarenal abdominal aortic aneurysm (AAA) without rupture (HCC)  -     Ambulatory Referral to Vascular Surgery; Future    3. Acute on chronic respiratory failure with hypoxia and hypercapnia (HCC)    4. Morbid (severe) obesity due to excess calories (HCC)    5. Abnormal chest x-ray  -     XR chest pa & lateral; Future; Expected date: 03/11/2024    Recommend rechecking urine to make sure infection completely cleared. Reviewed imaging/labs in hospital. Will need to follow up with vascular surgery- referral provided. Discussed importance of keeping BP stable. States home readings at goal. Advised on red flag symptoms and when to call 911/go to ER. Recommend rechecking chest x ray due to weight gain and decreased breath sounds. Oxygen at goal on 2L at home per patient and family. Please call the office if you are experiencing any worsening of symptoms or no symptom improvement.        Subjective     Transitional Care Management Review:   Ashley Gonzales is a 88 y.o. female here for TCM follow up.     During the TCM phone call patient stated:  TCM Call       Date and time call was made  3/1/2024  1:09 PM    Hospital care reviewed  Records reviewed    Patient was hospitial[facility] at  [facility]    Date of Admission  02/27/24    Date of discharge  02/29/24    Diagnosis  Acute Cystitis    Disposition  Home    Were the patients medications reviewed and updated  Yes    Current Symptoms  None          TCM Call       Post hospital issues  None    Should patient be enrolled in anticoag monitoring?  No    Scheduled for follow up?  Yes    Did you obtain your prescribed medications  Yes    Do you need help managing your prescriptions or medications  No    Is transportation to your appointment needed  No    I have advised the patient to call PCP with any new or worsening symptoms  Carlene Coyne MA    Living Arrangements  Children     "Counseling  Family          Admitted 2/27 discharged 2/29.  \"Ashley Gnozales is a 88 y.o. female patient who originally presented to the hospital on 2/27/2024 due to generalized malaise, weakness and urinary frequency. UA was positive for nitrates with innumerable bacteria. She was started on iv rocephin for 2 days. She will continue on oral cefdinir 300 mg BID for an additional 3 days. Urine culture showing gram negative enteric like rods will continue to follow. Patient will follow up with her family doctor. She was evaluated by PT/OT and was recommended minimal resource intensity.\"    · CT abdomen pelvis w contrast  · Final Result by Nash Huff MD (02/27 1618)  ·    · 1.  No acute findings in the abdomen or pelvis.  · 2.  Bilateral nonobstructing nephrolithiasis.  · 3.  Cholelithiasis without evidence of acute cholecystitis.  · 4.  Colonic diverticulosis without evidence of acute diverticulitis.  · 5.  A 3.9 cm infrarenal abdominal aortic aneurysm on a background of severe atherosclerosis, increased since 2017. Recommend vascular surgery follow-up as appropriate.      She is overall feeling well- home PT set up. Oxygen in the 90s at home on 2L. O2 readings hard to obtain in office due to cold fingers- patient and family member states this is always an issue. No shortness of breath/ breathing changes. Has noticed increase weight.   Wt Readings from Last 3 Encounters:  03/11/24 : 85.7 kg (189 lb)  02/29/24 : 82 kg (180 lb 12.4 oz)  02/21/24 : 84.3 kg (185 lb 12.8 oz)    No urinary symptoms/ no fevers/ no chills. Continues with lasix.   Last chest x ray did show pulmonary effusions/ some vascular congestion.   Home PT set up.   On 2 Liters, breathing stable. No urinary symptoms. Chornic urinar freq ffom lasix  No fevers/chills f          Review of Systems   Constitutional:  Positive for fatigue and unexpected weight change. Negative for chills and fever.   Eyes:  Negative for discharge.   Respiratory:  Negative " "for shortness of breath.    Cardiovascular:  Negative for chest pain.   Gastrointestinal:  Negative for constipation and diarrhea.   Genitourinary:  Negative for difficulty urinating.   Musculoskeletal:  Negative for joint swelling.   Skin:  Negative for rash.   Neurological:  Negative for headaches.   Hematological:  Negative for adenopathy.   Psychiatric/Behavioral:  The patient is not nervous/anxious.        Objective     /76 (BP Location: Left arm, Patient Position: Sitting, Cuff Size: Large)   Pulse 79   Temp (!) 96 °F (35.6 °C) (Temporal)   Ht 5' 2\" (1.575 m)   Wt 85.7 kg (189 lb)   BMI 34.57 kg/m²      Physical Exam  Vitals and nursing note reviewed.   Constitutional:       General: She is not in acute distress.     Appearance: She is well-developed.   HENT:      Head: Normocephalic and atraumatic.   Eyes:      Conjunctiva/sclera: Conjunctivae normal.   Cardiovascular:      Rate and Rhythm: Normal rate and regular rhythm.      Heart sounds: No murmur heard.  Pulmonary:      Effort: Pulmonary effort is normal.      Breath sounds: Decreased air movement present.   Abdominal:      Palpations: Abdomen is soft.      Tenderness: There is no abdominal tenderness.   Musculoskeletal:         General: No swelling.      Cervical back: Neck supple.   Skin:     General: Skin is warm and dry.      Capillary Refill: Capillary refill takes less than 2 seconds.   Neurological:      Mental Status: She is alert.   Psychiatric:         Mood and Affect: Mood normal.       Medications have been reviewed by provider in current encounter    FLAKO Davis         "

## 2024-03-11 NOTE — PATIENT INSTRUCTIONS
Will recheck urine.     Follow up with vascular specialist.     Continue to monitor blood pressure at home, goal <130/80.     Complete chest x ray.     Please call the office if you are experiencing any worsening of symptoms or no symptom improvement.

## 2024-03-12 ENCOUNTER — HOME CARE VISIT (OUTPATIENT)
Dept: HOME HEALTH SERVICES | Facility: HOME HEALTHCARE | Age: 89
End: 2024-03-12
Payer: COMMERCIAL

## 2024-03-12 VITALS — SYSTOLIC BLOOD PRESSURE: 138 MMHG | DIASTOLIC BLOOD PRESSURE: 78 MMHG

## 2024-03-12 PROCEDURE — G0152 HHCP-SERV OF OT,EA 15 MIN: HCPCS

## 2024-03-12 PROCEDURE — G0180 MD CERTIFICATION HHA PATIENT: HCPCS | Performed by: INTERNAL MEDICINE

## 2024-03-13 ENCOUNTER — HOME CARE VISIT (OUTPATIENT)
Dept: HOME HEALTH SERVICES | Facility: HOME HEALTHCARE | Age: 89
End: 2024-03-13
Payer: COMMERCIAL

## 2024-03-13 PROCEDURE — G0151 HHCP-SERV OF PT,EA 15 MIN: HCPCS

## 2024-03-13 NOTE — CASE COMMUNICATION
OT discipline discharge 3/12/24 as pt has met OT goals.  HHCCN signed.  NOMNC signed for possible agency dc on 3/14/24.

## 2024-03-14 ENCOUNTER — HOME CARE VISIT (OUTPATIENT)
Dept: HOME HEALTH SERVICES | Facility: HOME HEALTHCARE | Age: 89
End: 2024-03-14
Payer: COMMERCIAL

## 2024-03-14 LAB
BACTERIA UR CULT: ABNORMAL
BACTERIA UR CULT: ABNORMAL

## 2024-03-14 PROCEDURE — G0299 HHS/HOSPICE OF RN EA 15 MIN: HCPCS

## 2024-03-18 VITALS
RESPIRATION RATE: 22 BRPM | TEMPERATURE: 97.5 F | HEART RATE: 84 BPM | OXYGEN SATURATION: 93 % | SYSTOLIC BLOOD PRESSURE: 128 MMHG | DIASTOLIC BLOOD PRESSURE: 80 MMHG

## 2024-03-18 RX ORDER — LEVOFLOXACIN 250 MG/1
250 TABLET, FILM COATED ORAL DAILY
Qty: 5 TABLET | Refills: 0 | Status: SHIPPED | OUTPATIENT
Start: 2024-03-18 | End: 2024-03-23

## 2024-03-19 DIAGNOSIS — N30.90 CYSTITIS: Primary | ICD-10-CM

## 2024-03-26 ENCOUNTER — APPOINTMENT (OUTPATIENT)
Dept: LAB | Facility: CLINIC | Age: 89
End: 2024-03-26
Payer: COMMERCIAL

## 2024-03-26 DIAGNOSIS — N30.90 CYSTITIS: ICD-10-CM

## 2024-03-26 PROCEDURE — 87086 URINE CULTURE/COLONY COUNT: CPT

## 2024-03-28 LAB — BACTERIA UR CULT: NORMAL

## 2024-03-29 ENCOUNTER — TELEPHONE (OUTPATIENT)
Age: 89
End: 2024-03-29

## 2024-03-29 DIAGNOSIS — G25.0 BENIGN FAMILIAL TREMOR: ICD-10-CM

## 2024-03-29 RX ORDER — ALPRAZOLAM 0.5 MG/1
0.5 TABLET ORAL 3 TIMES DAILY PRN
Qty: 100 TABLET | Refills: 0 | Status: SHIPPED | OUTPATIENT
Start: 2024-03-29

## 2024-03-29 NOTE — TELEPHONE ENCOUNTER
Reason for call:   [x] Refill   [] Prior Auth  [] Other:     Office:   [x] PCP/Provider -   [] Specialty/Provider -     Medication: ALPRAZolam (XANAX) 0.5 mg tablet     Dose/Frequency: Take 1 tablet (0.5 mg total) by mouth 3 (three) times a day as needed (TREMOR) May take one additional prn     Quantity: 100    Pharmacy: [facility] #[id number] - [place] - [address] [phone]     Does the patient have enough for 3 days?   [] Yes   [x] No - Send as HP to POD

## 2024-03-29 NOTE — TELEPHONE ENCOUNTER
Agreement is on file but overdue.  Patient has a follow-up visit in July so make notation that that will be updated at this time and UDS if clinically relevant

## 2024-04-24 ENCOUNTER — APPOINTMENT (EMERGENCY)
Dept: CT IMAGING | Facility: HOSPITAL | Age: 89
DRG: 149 | End: 2024-04-24
Payer: COMMERCIAL

## 2024-04-24 ENCOUNTER — HOSPITAL ENCOUNTER (INPATIENT)
Facility: HOSPITAL | Age: 89
LOS: 1 days | Discharge: HOME HEALTH | DRG: 149 | End: 2024-04-26
Attending: EMERGENCY MEDICINE | Admitting: INTERNAL MEDICINE
Payer: COMMERCIAL

## 2024-04-24 DIAGNOSIS — R31.21 ASYMPTOMATIC MICROSCOPIC HEMATURIA: ICD-10-CM

## 2024-04-24 DIAGNOSIS — R42 DIZZINESS: Primary | ICD-10-CM

## 2024-04-24 DIAGNOSIS — I10 HTN (HYPERTENSION): ICD-10-CM

## 2024-04-24 DIAGNOSIS — F41.1 GENERALIZED ANXIETY DISORDER: ICD-10-CM

## 2024-04-24 LAB
ALBUMIN SERPL BCP-MCNC: 3.6 G/DL (ref 3.5–5)
ALP SERPL-CCNC: 71 U/L (ref 34–104)
ALT SERPL W P-5'-P-CCNC: 3 U/L (ref 7–52)
ANION GAP SERPL CALCULATED.3IONS-SCNC: 5 MMOL/L (ref 4–13)
APTT PPP: 29 SECONDS (ref 23–37)
AST SERPL W P-5'-P-CCNC: 16 U/L (ref 13–39)
ATRIAL RATE: 70 BPM
BACTERIA UR QL AUTO: ABNORMAL /HPF
BASOPHILS # BLD AUTO: 0.08 THOUSANDS/ÂΜL (ref 0–0.1)
BASOPHILS NFR BLD AUTO: 1 % (ref 0–1)
BILIRUB SERPL-MCNC: 0.57 MG/DL (ref 0.2–1)
BILIRUB UR QL STRIP: NEGATIVE
BNP SERPL-MCNC: 70 PG/ML (ref 0–100)
BUN SERPL-MCNC: 33 MG/DL (ref 5–25)
CALCIUM SERPL-MCNC: 9 MG/DL (ref 8.4–10.2)
CARDIAC TROPONIN I PNL SERPL HS: 6 NG/L
CHLORIDE SERPL-SCNC: 101 MMOL/L (ref 96–108)
CLARITY UR: CLEAR
CO2 SERPL-SCNC: 34 MMOL/L (ref 21–32)
COLOR UR: ABNORMAL
CREAT SERPL-MCNC: 1.16 MG/DL (ref 0.6–1.3)
EOSINOPHIL # BLD AUTO: 0.32 THOUSAND/ÂΜL (ref 0–0.61)
EOSINOPHIL NFR BLD AUTO: 5 % (ref 0–6)
ERYTHROCYTE [DISTWIDTH] IN BLOOD BY AUTOMATED COUNT: 12.2 % (ref 11.6–15.1)
GFR SERPL CREATININE-BSD FRML MDRD: 42 ML/MIN/1.73SQ M
GLUCOSE SERPL-MCNC: 203 MG/DL (ref 65–140)
GLUCOSE UR STRIP-MCNC: ABNORMAL MG/DL
HCT VFR BLD AUTO: 46.3 % (ref 34.8–46.1)
HGB BLD-MCNC: 14 G/DL (ref 11.5–15.4)
HGB UR QL STRIP.AUTO: ABNORMAL
HYALINE CASTS #/AREA URNS LPF: ABNORMAL /LPF
IMM GRANULOCYTES # BLD AUTO: 0.01 THOUSAND/UL (ref 0–0.2)
IMM GRANULOCYTES NFR BLD AUTO: 0 % (ref 0–2)
INR PPP: 1.04 (ref 0.84–1.19)
KETONES UR STRIP-MCNC: NEGATIVE MG/DL
LEUKOCYTE ESTERASE UR QL STRIP: ABNORMAL
LYMPHOCYTES # BLD AUTO: 1.69 THOUSANDS/ÂΜL (ref 0.6–4.47)
LYMPHOCYTES NFR BLD AUTO: 24 % (ref 14–44)
MAGNESIUM SERPL-MCNC: 1.9 MG/DL (ref 1.9–2.7)
MCH RBC QN AUTO: 28.7 PG (ref 26.8–34.3)
MCHC RBC AUTO-ENTMCNC: 30.2 G/DL (ref 31.4–37.4)
MCV RBC AUTO: 95 FL (ref 82–98)
MONOCYTES # BLD AUTO: 0.59 THOUSAND/ÂΜL (ref 0.17–1.22)
MONOCYTES NFR BLD AUTO: 9 % (ref 4–12)
NEUTROPHILS # BLD AUTO: 4.27 THOUSANDS/ÂΜL (ref 1.85–7.62)
NEUTS SEG NFR BLD AUTO: 61 % (ref 43–75)
NITRITE UR QL STRIP: NEGATIVE
NON-SQ EPI CELLS URNS QL MICRO: ABNORMAL /HPF
NRBC BLD AUTO-RTO: 0 /100 WBCS
P AXIS: 48 DEGREES
PH UR STRIP.AUTO: 5.5 [PH]
PLATELET # BLD AUTO: 217 THOUSANDS/UL (ref 149–390)
PMV BLD AUTO: 10.4 FL (ref 8.9–12.7)
POTASSIUM SERPL-SCNC: 4.1 MMOL/L (ref 3.5–5.3)
PR INTERVAL: 254 MS
PROT SERPL-MCNC: 6.8 G/DL (ref 6.4–8.4)
PROT UR STRIP-MCNC: NEGATIVE MG/DL
PROTHROMBIN TIME: 13.9 SECONDS (ref 11.6–14.5)
QRS AXIS: 124 DEGREES
QRSD INTERVAL: 160 MS
QT INTERVAL: 430 MS
QTC INTERVAL: 464 MS
RBC # BLD AUTO: 4.88 MILLION/UL (ref 3.81–5.12)
RBC #/AREA URNS AUTO: ABNORMAL /HPF
SODIUM SERPL-SCNC: 140 MMOL/L (ref 135–147)
SP GR UR STRIP.AUTO: 1.01 (ref 1–1.03)
T WAVE AXIS: 45 DEGREES
UROBILINOGEN UR STRIP-ACNC: <2 MG/DL
VENTRICULAR RATE: 70 BPM
WBC # BLD AUTO: 6.96 THOUSAND/UL (ref 4.31–10.16)
WBC #/AREA URNS AUTO: ABNORMAL /HPF

## 2024-04-24 PROCEDURE — 83735 ASSAY OF MAGNESIUM: CPT | Performed by: EMERGENCY MEDICINE

## 2024-04-24 PROCEDURE — 36415 COLL VENOUS BLD VENIPUNCTURE: CPT | Performed by: EMERGENCY MEDICINE

## 2024-04-24 PROCEDURE — 93005 ELECTROCARDIOGRAM TRACING: CPT

## 2024-04-24 PROCEDURE — 84484 ASSAY OF TROPONIN QUANT: CPT

## 2024-04-24 PROCEDURE — 70450 CT HEAD/BRAIN W/O DYE: CPT

## 2024-04-24 PROCEDURE — 99285 EMERGENCY DEPT VISIT HI MDM: CPT | Performed by: EMERGENCY MEDICINE

## 2024-04-24 PROCEDURE — 93010 ELECTROCARDIOGRAM REPORT: CPT | Performed by: INTERNAL MEDICINE

## 2024-04-24 PROCEDURE — 99285 EMERGENCY DEPT VISIT HI MDM: CPT

## 2024-04-24 PROCEDURE — 80053 COMPREHEN METABOLIC PANEL: CPT | Performed by: EMERGENCY MEDICINE

## 2024-04-24 PROCEDURE — 84484 ASSAY OF TROPONIN QUANT: CPT | Performed by: EMERGENCY MEDICINE

## 2024-04-24 PROCEDURE — 99222 1ST HOSP IP/OBS MODERATE 55: CPT

## 2024-04-24 PROCEDURE — 85610 PROTHROMBIN TIME: CPT | Performed by: EMERGENCY MEDICINE

## 2024-04-24 PROCEDURE — 85025 COMPLETE CBC W/AUTO DIFF WBC: CPT | Performed by: EMERGENCY MEDICINE

## 2024-04-24 PROCEDURE — 81001 URINALYSIS AUTO W/SCOPE: CPT | Performed by: EMERGENCY MEDICINE

## 2024-04-24 PROCEDURE — 85730 THROMBOPLASTIN TIME PARTIAL: CPT | Performed by: EMERGENCY MEDICINE

## 2024-04-24 PROCEDURE — 83880 ASSAY OF NATRIURETIC PEPTIDE: CPT | Performed by: EMERGENCY MEDICINE

## 2024-04-24 RX ORDER — MAGNESIUM HYDROXIDE/ALUMINUM HYDROXICE/SIMETHICONE 120; 1200; 1200 MG/30ML; MG/30ML; MG/30ML
30 SUSPENSION ORAL EVERY 6 HOURS PRN
Status: DISCONTINUED | OUTPATIENT
Start: 2024-04-24 | End: 2024-04-26 | Stop reason: HOSPADM

## 2024-04-24 RX ORDER — FUROSEMIDE 20 MG/1
20 TABLET ORAL DAILY
Status: DISCONTINUED | OUTPATIENT
Start: 2024-04-25 | End: 2024-04-24

## 2024-04-24 RX ORDER — SODIUM CHLORIDE, SODIUM GLUCONATE, SODIUM ACETATE, POTASSIUM CHLORIDE, MAGNESIUM CHLORIDE, SODIUM PHOSPHATE, DIBASIC, AND POTASSIUM PHOSPHATE .53; .5; .37; .037; .03; .012; .00082 G/100ML; G/100ML; G/100ML; G/100ML; G/100ML; G/100ML; G/100ML
50 INJECTION, SOLUTION INTRAVENOUS CONTINUOUS
Status: DISPENSED | OUTPATIENT
Start: 2024-04-24 | End: 2024-04-25

## 2024-04-24 RX ORDER — PRAVASTATIN SODIUM 80 MG/1
80 TABLET ORAL
Status: DISCONTINUED | OUTPATIENT
Start: 2024-04-25 | End: 2024-04-26 | Stop reason: HOSPADM

## 2024-04-24 RX ORDER — BIMATOPROST 0.3 MG/ML
1 SOLUTION/ DROPS OPHTHALMIC DAILY
Status: DISCONTINUED | OUTPATIENT
Start: 2024-04-25 | End: 2024-04-26 | Stop reason: HOSPADM

## 2024-04-24 RX ORDER — INSULIN LISPRO 100 [IU]/ML
1-6 INJECTION, SOLUTION INTRAVENOUS; SUBCUTANEOUS
Status: DISCONTINUED | OUTPATIENT
Start: 2024-04-24 | End: 2024-04-26 | Stop reason: HOSPADM

## 2024-04-24 RX ORDER — ACETAMINOPHEN 325 MG/1
650 TABLET ORAL EVERY 6 HOURS PRN
Status: DISCONTINUED | OUTPATIENT
Start: 2024-04-24 | End: 2024-04-26 | Stop reason: HOSPADM

## 2024-04-24 RX ORDER — AMLODIPINE BESYLATE 5 MG/1
5 TABLET ORAL DAILY
Status: DISCONTINUED | OUTPATIENT
Start: 2024-04-25 | End: 2024-04-26

## 2024-04-24 RX ORDER — INSULIN LISPRO 100 [IU]/ML
1-6 INJECTION, SOLUTION INTRAVENOUS; SUBCUTANEOUS
Status: DISCONTINUED | OUTPATIENT
Start: 2024-04-25 | End: 2024-04-26 | Stop reason: HOSPADM

## 2024-04-24 RX ORDER — HEPARIN SODIUM 5000 [USP'U]/ML
5000 INJECTION, SOLUTION INTRAVENOUS; SUBCUTANEOUS EVERY 8 HOURS SCHEDULED
Status: DISCONTINUED | OUTPATIENT
Start: 2024-04-24 | End: 2024-04-26 | Stop reason: HOSPADM

## 2024-04-24 RX ORDER — ALPRAZOLAM 0.5 MG/1
0.5 TABLET ORAL 3 TIMES DAILY PRN
Status: DISCONTINUED | OUTPATIENT
Start: 2024-04-24 | End: 2024-04-26 | Stop reason: HOSPADM

## 2024-04-24 RX ORDER — MECLIZINE HYDROCHLORIDE 25 MG/1
25 TABLET ORAL ONCE
Status: COMPLETED | OUTPATIENT
Start: 2024-04-24 | End: 2024-04-24

## 2024-04-24 RX ORDER — MECLIZINE HCL 12.5 MG/1
25 TABLET ORAL EVERY 8 HOURS PRN
Status: DISCONTINUED | OUTPATIENT
Start: 2024-04-24 | End: 2024-04-26 | Stop reason: HOSPADM

## 2024-04-24 RX ORDER — FAMOTIDINE 20 MG/1
20 TABLET, FILM COATED ORAL 2 TIMES DAILY
Status: DISCONTINUED | OUTPATIENT
Start: 2024-04-25 | End: 2024-04-26 | Stop reason: HOSPADM

## 2024-04-24 RX ORDER — LEVOTHYROXINE SODIUM 0.07 MG/1
75 TABLET ORAL
Status: DISCONTINUED | OUTPATIENT
Start: 2024-04-25 | End: 2024-04-26 | Stop reason: HOSPADM

## 2024-04-24 RX ORDER — FUROSEMIDE 20 MG/1
20 TABLET ORAL DAILY
Status: DISCONTINUED | OUTPATIENT
Start: 2024-04-26 | End: 2024-04-26 | Stop reason: HOSPADM

## 2024-04-24 RX ADMIN — MECLIZINE HYDROCHLORIDE 25 MG: 25 TABLET ORAL at 22:43

## 2024-04-24 RX ADMIN — MECLIZINE HYDROCHLORIDE 25 MG: 25 TABLET ORAL at 21:31

## 2024-04-25 PROBLEM — R31.9 HEMATURIA: Status: ACTIVE | Noted: 2024-04-25

## 2024-04-25 LAB
2HR DELTA HS TROPONIN: 2 NG/L
4HR DELTA HS TROPONIN: 2 NG/L
ANION GAP SERPL CALCULATED.3IONS-SCNC: 6 MMOL/L (ref 4–13)
BASOPHILS # BLD AUTO: 0.06 THOUSANDS/ÂΜL (ref 0–0.1)
BASOPHILS NFR BLD AUTO: 1 % (ref 0–1)
BUN SERPL-MCNC: 32 MG/DL (ref 5–25)
CALCIUM SERPL-MCNC: 8.9 MG/DL (ref 8.4–10.2)
CARDIAC TROPONIN I PNL SERPL HS: 8 NG/L
CARDIAC TROPONIN I PNL SERPL HS: 8 NG/L
CHLORIDE SERPL-SCNC: 101 MMOL/L (ref 96–108)
CO2 SERPL-SCNC: 36 MMOL/L (ref 21–32)
CREAT SERPL-MCNC: 1.11 MG/DL (ref 0.6–1.3)
EOSINOPHIL # BLD AUTO: 0.31 THOUSAND/ÂΜL (ref 0–0.61)
EOSINOPHIL NFR BLD AUTO: 5 % (ref 0–6)
ERYTHROCYTE [DISTWIDTH] IN BLOOD BY AUTOMATED COUNT: 12.3 % (ref 11.6–15.1)
GFR SERPL CREATININE-BSD FRML MDRD: 44 ML/MIN/1.73SQ M
GLUCOSE SERPL-MCNC: 100 MG/DL (ref 65–140)
GLUCOSE SERPL-MCNC: 105 MG/DL (ref 65–140)
GLUCOSE SERPL-MCNC: 108 MG/DL (ref 65–140)
GLUCOSE SERPL-MCNC: 109 MG/DL (ref 65–140)
GLUCOSE SERPL-MCNC: 87 MG/DL (ref 65–140)
GLUCOSE SERPL-MCNC: 90 MG/DL (ref 65–140)
HCT VFR BLD AUTO: 44.4 % (ref 34.8–46.1)
HGB BLD-MCNC: 13.5 G/DL (ref 11.5–15.4)
IMM GRANULOCYTES # BLD AUTO: 0.01 THOUSAND/UL (ref 0–0.2)
IMM GRANULOCYTES NFR BLD AUTO: 0 % (ref 0–2)
LYMPHOCYTES # BLD AUTO: 1.44 THOUSANDS/ÂΜL (ref 0.6–4.47)
LYMPHOCYTES NFR BLD AUTO: 24 % (ref 14–44)
MAGNESIUM SERPL-MCNC: 1.9 MG/DL (ref 1.9–2.7)
MCH RBC QN AUTO: 28.8 PG (ref 26.8–34.3)
MCHC RBC AUTO-ENTMCNC: 30.4 G/DL (ref 31.4–37.4)
MCV RBC AUTO: 95 FL (ref 82–98)
MONOCYTES # BLD AUTO: 0.6 THOUSAND/ÂΜL (ref 0.17–1.22)
MONOCYTES NFR BLD AUTO: 10 % (ref 4–12)
NEUTROPHILS # BLD AUTO: 3.65 THOUSANDS/ÂΜL (ref 1.85–7.62)
NEUTS SEG NFR BLD AUTO: 60 % (ref 43–75)
NRBC BLD AUTO-RTO: 0 /100 WBCS
PLATELET # BLD AUTO: 207 THOUSANDS/UL (ref 149–390)
PMV BLD AUTO: 9.6 FL (ref 8.9–12.7)
POTASSIUM SERPL-SCNC: 3.8 MMOL/L (ref 3.5–5.3)
RBC # BLD AUTO: 4.69 MILLION/UL (ref 3.81–5.12)
SODIUM SERPL-SCNC: 143 MMOL/L (ref 135–147)
WBC # BLD AUTO: 6.07 THOUSAND/UL (ref 4.31–10.16)

## 2024-04-25 PROCEDURE — 80048 BASIC METABOLIC PNL TOTAL CA: CPT

## 2024-04-25 PROCEDURE — 84484 ASSAY OF TROPONIN QUANT: CPT

## 2024-04-25 PROCEDURE — 82948 REAGENT STRIP/BLOOD GLUCOSE: CPT

## 2024-04-25 PROCEDURE — 83735 ASSAY OF MAGNESIUM: CPT

## 2024-04-25 PROCEDURE — 97166 OT EVAL MOD COMPLEX 45 MIN: CPT

## 2024-04-25 PROCEDURE — 97163 PT EVAL HIGH COMPLEX 45 MIN: CPT

## 2024-04-25 PROCEDURE — 99232 SBSQ HOSP IP/OBS MODERATE 35: CPT | Performed by: INTERNAL MEDICINE

## 2024-04-25 PROCEDURE — 85025 COMPLETE CBC W/AUTO DIFF WBC: CPT

## 2024-04-25 RX ORDER — ESCITALOPRAM OXALATE 5 MG/1
5 TABLET ORAL DAILY
Status: DISCONTINUED | OUTPATIENT
Start: 2024-04-25 | End: 2024-04-26 | Stop reason: HOSPADM

## 2024-04-25 RX ADMIN — FAMOTIDINE 20 MG: 20 TABLET, FILM COATED ORAL at 17:06

## 2024-04-25 RX ADMIN — ESCITALOPRAM 5 MG: 5 TABLET, FILM COATED ORAL at 13:18

## 2024-04-25 RX ADMIN — ALUMINUM HYDROXIDE, MAGNESIUM HYDROXIDE, AND DIMETHICONE 30 ML: 200; 20; 200 SUSPENSION ORAL at 17:06

## 2024-04-25 RX ADMIN — ALPRAZOLAM 0.5 MG: 0.5 TABLET ORAL at 22:10

## 2024-04-25 RX ADMIN — HEPARIN SODIUM 5000 UNITS: 5000 INJECTION INTRAVENOUS; SUBCUTANEOUS at 22:10

## 2024-04-25 RX ADMIN — METOPROLOL TARTRATE 25 MG: 25 TABLET, FILM COATED ORAL at 00:19

## 2024-04-25 RX ADMIN — METOPROLOL TARTRATE 25 MG: 25 TABLET, FILM COATED ORAL at 22:10

## 2024-04-25 RX ADMIN — FAMOTIDINE 20 MG: 20 TABLET, FILM COATED ORAL at 08:37

## 2024-04-25 RX ADMIN — HEPARIN SODIUM 5000 UNITS: 5000 INJECTION INTRAVENOUS; SUBCUTANEOUS at 13:18

## 2024-04-25 RX ADMIN — UMECLIDINIUM 1 PUFF: 62.5 AEROSOL, POWDER ORAL at 08:39

## 2024-04-25 RX ADMIN — AMLODIPINE BESYLATE 5 MG: 5 TABLET ORAL at 08:37

## 2024-04-25 RX ADMIN — PRAVASTATIN SODIUM 80 MG: 80 TABLET ORAL at 16:41

## 2024-04-25 RX ADMIN — HEPARIN SODIUM 5000 UNITS: 5000 INJECTION INTRAVENOUS; SUBCUTANEOUS at 05:09

## 2024-04-25 RX ADMIN — ASPIRIN 81 MG: 81 TABLET, COATED ORAL at 08:37

## 2024-04-25 RX ADMIN — BIMATOPROST 1 DROP: 0.3 SOLUTION/ DROPS OPHTHALMIC at 08:38

## 2024-04-25 RX ADMIN — LEVOTHYROXINE SODIUM 75 MCG: 75 TABLET ORAL at 05:09

## 2024-04-25 RX ADMIN — SODIUM CHLORIDE, SODIUM GLUCONATE, SODIUM ACETATE, POTASSIUM CHLORIDE, MAGNESIUM CHLORIDE, SODIUM PHOSPHATE, DIBASIC, AND POTASSIUM PHOSPHATE 50 ML/HR: .53; .5; .37; .037; .03; .012; .00082 INJECTION, SOLUTION INTRAVENOUS at 00:23

## 2024-04-25 RX ADMIN — OLODATEROL RESPIMAT INHALATION SPRAY 2 PUFF: 2.5 SPRAY, METERED RESPIRATORY (INHALATION) at 08:43

## 2024-04-25 RX ADMIN — HEPARIN SODIUM 5000 UNITS: 5000 INJECTION INTRAVENOUS; SUBCUTANEOUS at 00:19

## 2024-04-25 RX ADMIN — METOPROLOL TARTRATE 25 MG: 25 TABLET, FILM COATED ORAL at 08:37

## 2024-04-25 NOTE — PLAN OF CARE
Problem: OCCUPATIONAL THERAPY ADULT  Goal: Performs self-care activities at highest level of function for planned discharge setting.  See evaluation for individualized goals.  Description: Treatment Interventions: ADL retraining, Functional transfer training, UE strengthening/ROM, Endurance training, Cognitive reorientation, Patient/family training, Neuromuscular reeducation, Compensatory technique education, Continued evaluation, Activityengagement, Energy conservation          See flowsheet documentation for full assessment, interventions and recommendations.   Outcome: Progressing  Note: Limitation: Decreased ADL status, Decreased UE strength, Decreased Safe judgement during ADL, Decreased endurance, Visual deficit, Decreased self-care trans, Decreased high-level ADLs  Prognosis: Good  Assessment: Patient is a 88 y.o. year old female seen for OT eval s/p admit to [facility] on 4/24/2024 with dizziness.  OT consulted to assess ADLs/IADLs/functional mobility and assist w/ D/C planning. Patient demonstrates the following deficits impacting occupational performance: decreased strength , decreased balance, decreased activity tolerance, limited functional reach, impaired memory, decreased safety awareness, dizziness, increased body habitus , impaired coordination, and decreased cardiovascular endurance. These impairments, as well at pt’s DIAN home environment, steps within home environment, limited home support, difficulty performing ADLs, difficulty performing IADLs, difficulty performing transfers/mobility, limited insight into deficits, fall risk , functional decline , increased reliance on DME , and advanced age, limit pt’s ability to safely engage in all baseline areas of occupation. Pt's CLOF as follows: eating/grooming: Manjeet and supervision , UB ADLs: supervision , LB ADLs: Neto and modA, toileting: Neto, bed mobility: DNT, functional transfers: supervision , functional mobility: supervision , sitting/standing  tolerance: ~3 min. Pt would benefit from continued skilled OT while in acute setting to address deficits as defined above and to maximize (I) w/ ADLs/functional mobility. Occupational performance areas to address include: grooming, bathing/shower, toilet hygiene, dressing, health maintenance, and functional mobility. Based on the aforementioned evaluation, functional performance deficits, and assessments, pt has been identified as a moderate complexity evaluation. At this time, recommendation for pt to receive post-acute rehabilitation services at a Level III (minimum resource intensity) due to above deficits and CLOF. OT will continue to follow pt 2-3x/wk to address the goals listed below to  w/in 10-14 days.     Rehab Resource Intensity Level, OT: III (Minimum Resource Intensity)

## 2024-04-25 NOTE — ASSESSMENT & PLAN NOTE
Continue aspirin and statin therapy  Patient denies chest pain; EKG on admission with no acute ischemic findings

## 2024-04-25 NOTE — ASSESSMENT & PLAN NOTE
"Patient with PMHx of glaucoma, COPD on 2L NC chronically, CHF, DM2, HTN, HLD, CAD, and infrarenal AAA presented to the ED secondary to complaints of increased dizziness x several weeks, but worse this week.  Pt states that following discharge from hospitalization at the end of February for UTI, she noted she would become intermittently dizzy. She states that the dizziness occurs randomly with ambulation and rest. She reports increased blurred vision over this time which is constant. She feels this is contributory as it is \"throwing her off. Has an upcoming appt with her eye doctor for this issue in the setting of known glaucoma.   Pt also endorses low p.o. intake since discharge. She denies any other symptoms such as headache, facial droop, speech difficulty, or one sided weakness  Pt ambulates with a walker at baseline, but even with this, fears she will fall when she becomes dizzy while ambulating.   CT head demonstrating \"No acute intracranial abnormality. 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 Mild chronic small vessel ischemic changes\"  Neuro checks  Monitor on telemetry  Obtain orthostatic vital signs  Brief, gentle IV fluid hydration given reported low p.o. intake  Meclizine prn, supportive care  PT/OT eval     4/25-update: Orthostatics negative, CT head negative for acute bleed or any ischemic findings.  PT/OT evaluated patient, recommend home with home health.  On exam, patient denied symptoms, but very interestingly reported that they are, at least in her mind, very closely aligned with episodes of anxiety.  Additionally, patient reports both anxiety and occasional problems seeing that contribute to her underlying symptoms of dizziness.  Patient states that the room does not spin and that, it is less likely a true dizziness than uncertainty on her feet or the feeling that she will fall when she ambulates.  So anxiety high on differential, visual changes also on differential, and EKG on admission " noted bifascicular block.  Currently being monitored on telemetry and will consider consult to cardiology as she may have sporadic episodes of bradycardia that might be contributing to her symptoms as well.  -- To treat anxiety, continue current benzos (patient takes Xanax 0.5 mg 3 times daily at home) and schedule a gentle weaning from these medications; Lexapro initiated; consider consult to behavioral health versus outpatient follow-up.

## 2024-04-25 NOTE — ASSESSMENT & PLAN NOTE
"Lab Results   Component Value Date    HGBA1C 6.1 02/21/2024       No results for input(s): \"POCGLU\" in the last 72 hours.    Blood Sugar Average: Last 72 hrs:  Pt not maintained on medication at home  Diabetic diet  SSI with accucheks    "

## 2024-04-25 NOTE — UTILIZATION REVIEW
Initial Clinical Review    OBSERVATION 4/24 CHANGED TO INPATIENT ON 4/25 @ 1223 - dizziness/anxiety/bradycardia work up continues, poss cardio consult.     Admission: Date/Time/Statement:   Admission Orders (From admission, onward)       Ordered        04/25/24 1223  INPATIENT ADMISSION  Once                          Orders Placed This Encounter   Procedures    INPATIENT ADMISSION     Standing Status:   Standing     Number of Occurrences:   1     Order Specific Question:   Level of Care     Answer:   Med Surg [16]     Order Specific Question:   Estimated length of stay     Answer:   More than 2 Midnights     Order Specific Question:   Certification     Answer:   I certify that inpatient services are medically necessary for this patient for a duration of greater than two midnights. See H&P and MD Progress Notes for additional information about the patient's course of treatment.     ED Arrival Information       Expected   -    Arrival   4/24/2024 20:30    Acuity   Urgent              Means of arrival   Ambulance    Escorted by   [facility] ([facility])    Service   Hospitalist    Admission type   Emergency              Arrival complaint   dizziness, weakness             Chief Complaint   Patient presents with    Dizziness     Pt states has been feeling dizzy the past few weeks, primarily when walking. Denies any current pain. Hx of COPD. Denies current sob.       Initial Presentation: 88 y.o. female presents to the ED via EMS from home with c/o ongoing random episodes dizziness since d/c from hospital in Feb but worsening this week.  Also notes blurred vision, decreased appetite and oral intake.  Notes med and DM compliance that is diet controlled.  PMH: COPD, dHF, DM, HLD, HTN, familial tremor, hypothyroidism, glaucoma with upcoming vision appt.  In the ED pt was HTN.  Labs - elevated BUN, abnormal UA, glucose 203.  Imaging - old infarcts CVA.  ECG - SR w/ bifasciular block.  Treated with Meclizine x 1.  On  exam on oxygen, chronically ill appearing, no other deficits.  Admitted to OBSERVATION status with Dizziness, chronic dHF, COPD, DM - oxygen, gentle IV fluids, tele, neuro checks, therapy evals, Meclizine PRN, orthostatics, daily wt, hold AM Lasix.      Anticipated Length of Stay/Certification Statement:  Patient will be admitted on an observation basis with an anticipated length of stay of less than 2 midnights secondary to dizziness.     Date: 4/25  CHANGED TO INPATIENT TODAY  Worked with therapy today.  Unsure if will need post d/c rehab at this time.  On exam no c/o CP today, pt correlates dizziness with anxiety, has concerns about falling.  Will restart Lasix on 4/26.  Considering cardio consult for intermittet bradycardia.  Will treat anxiety, continue Xanax at home dose 0.5 mg TID and wean off, start Lexapro, consider OP Psych f/u.  Orthostatics negative today.      Date: 4/26  Day 3: Has surpassed a 2nd midnight with active treatments and services.  VS stable.  Creat trending down.  Worked with therapy today and cleared for d/c.        ED Triage Vitals   Temperature Pulse Respirations Blood Pressure SpO2   04/24/24 2036 04/24/24 2036 04/24/24 2036 04/24/24 2036 04/24/24 2036   98.8 °F (37.1 °C) 72 18 (!) 140/107 95 %      Temp Source Heart Rate Source Patient Position - Orthostatic VS BP Location FiO2 (%)   04/24/24 2036 04/24/24 2036 04/24/24 2036 04/24/24 2036 --   Oral Monitor Lying Right arm       Pain Score       04/25/24 0032       No Pain          Wt Readings from Last 1 Encounters:   04/26/24 81.3 kg (179 lb 3.7 oz)     Additional Vital Signs:     Date/Time Temp Pulse Resp BP MAP (mmHg) SpO2 Calculated FIO2 (%) - Nasal Cannula Nasal Cannula O2 Flow Rate (L/min) O2 Device Patient Position - Orthostatic VS   04/26/24 1119 97.6 °F (36.4 °C) 62 18 120/62 81 98 % -- -- -- Sitting   04/26/24 0729 97.5 °F (36.4 °C) 64 18 161/80 120 96 % 28 2 L/min Nasal cannula Lying   04/26/24 0327 97.6 °F (36.4 °C) 57 18  164/74 111 94 % 28 2 L/min Nasal cannula Lying   04/25/24 2319 98.1 °F (36.7 °C) 57 18 141/56 79 95 % 28 2 L/min Nasal cannula Lying   04/25/24 2040 98.9 °F (37.2 °C) 64 20 144/84 107 96 % 28 2 L/min Nasal cannula Lying   04/25/24 2000 -- -- -- -- -- -- 28 2 L/min Nasal cannula --   04/25/24 1516 98.4 °F (36.9 °C) 69 20 169/73 -- 95 % -- -- Nasal cannula Lying     04/25/24 1130 -- 60 20 151/86 -- -- -- -- -- Sitting   04/25/24 0759 98.4 °F (36.9 °C) 61 20 169/96 -- 95 % -- -- Nasal cannula Lying   04/25/24 0222 98.6 °F (37 °C) 56 20 140/66 89 97 % 28 2 L/min Nasal cannula Lying   04/25/24 0018 97.7 °F (36.5 °C) 71 20 176/92 Abnormal  136 98 % 28 2 L/min Nasal cannula Lying   04/24/24 2348 -- 70 30 Abnormal  150/65 -- 97 % 28 2 L/min Nasal cannula Lying       Pertinent Labs/Diagnostic Test Results:     4/24 ECG - Sinus rhythm with 1st degree A-V block  Right bundle branch block , plus right ventricular hypertrophy  Left posterior fascicular block  Bifascicular block   Abnormal ECG    CT head without contrast   Final Result by Wilber Rollins MD (04/24 2150)      No acute intracranial abnormality.      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      Mild chronic small vessel ischemic changes.         Results from last 7 days   Lab Units 04/26/24  0615 04/25/24  0230 04/24/24  2118   WBC Thousand/uL 6.29 6.07 6.96   HEMOGLOBIN g/dL 13.7 13.5 14.0   HEMATOCRIT % 45.1 44.4 46.3*   PLATELETS Thousands/uL 220 207 217   TOTAL NEUT ABS Thousands/µL 3.94 3.65 4.27         Results from last 7 days   Lab Units 04/26/24  0616 04/25/24  0256 04/25/24  0256 04/24/24  2118   SODIUM mmol/L 140  --  143 140   POTASSIUM mmol/L 4.0  --  3.8 4.1   CHLORIDE mmol/L 101  --  101 101   CO2 mmol/L 36*  --  36* 34*   ANION GAP mmol/L 3*  --  6 5   BUN mg/dL 28*  --  32* 33*   CREATININE mg/dL 1.09  --  1.11 1.16   EGFR ml/min/1.73sq m 45  --  44 42   CALCIUM mg/dL 9.5  --  8.9 9.0   MAGNESIUM mg/dL 2.1 1.9  --  1.9   PHOSPHORUS mg/dL  2.7  --   --   --      Results from last 7 days   Lab Units 04/26/24  0616 04/24/24 2118   AST U/L 12* 16   ALT U/L 4* 3*   ALK PHOS U/L 71 71   TOTAL PROTEIN g/dL 6.0* 6.8   ALBUMIN g/dL 3.4* 3.6   TOTAL BILIRUBIN mg/dL 0.68 0.57     Results from last 7 days   Lab Units 04/26/24  1053 04/26/24  0727 04/25/24  2215 04/25/24  1557 04/25/24  1122 04/25/24  0802 04/25/24  0016   POC GLUCOSE mg/dl 119 98 108 109 100 105 90     Results from last 7 days   Lab Units 04/26/24  0616 04/25/24  0256 04/24/24 2118   GLUCOSE RANDOM mg/dL 92 87 203*     Results from last 7 days   Lab Units 04/25/24  0223 04/24/24  2344 04/24/24 2118   HS TNI 0HR ng/L  --   --  6   HS TNI 2HR ng/L  --  8  --    HSTNI D2 ng/L  --  2  --    HS TNI 4HR ng/L 8  --   --    HSTNI D4 ng/L 2  --   --          Results from last 7 days   Lab Units 04/24/24 2118   PROTIME seconds 13.9   INR  1.04   PTT seconds 29     Results from last 7 days   Lab Units 04/24/24 2118   BNP pg/mL 70     Results from last 7 days   Lab Units 04/24/24 2226   CLARITY UA  Clear   COLOR UA  Light Yellow   SPEC GRAV UA  1.015   PH UA  5.5   GLUCOSE UA mg/dl 70 (7/100%)*   KETONES UA mg/dl Negative   BLOOD UA  Large*   PROTEIN UA mg/dl Negative   NITRITE UA  Negative   BILIRUBIN UA  Negative   UROBILINOGEN UA (BE) mg/dl <2.0   LEUKOCYTES UA  Trace*   WBC UA /hpf 2-4*   RBC UA /hpf Innumerable*   BACTERIA UA /hpf None Seen   EPITHELIAL CELLS WET PREP /hpf Occasional             ED Treatment:   Medication Administration from 04/24/2024 2030 to 04/25/2024 0000         Date/Time Order Dose Route Action     04/24/2024 2131 EDT meclizine (ANTIVERT) tablet 25 mg 25 mg Oral Given     04/24/2024 2243 EDT meclizine (ANTIVERT) tablet 25 mg 25 mg Oral Given          Past Medical History:   Diagnosis Date    Anxiety     Cataract, bilateral     Last Assessed:3/19/2014    COPD (chronic obstructive pulmonary disease) (HCC)     Coronary artery disease     Hx of arterial ischemic stroke      Hypertension     Stroke (HCC)     Wears glasses      Present on Admission:   Hypertension   COPD (chronic obstructive pulmonary disease) (HCC)   Hypothyroidism   Chronic diastolic congestive heart failure (HCC)   Hyperlipidemia   Type 2 diabetes mellitus (HCC)   Benign familial tremor      Admitting Diagnosis: Dizziness [R42]  HTN (hypertension) [I10]  Age/Sex: 88 y.o. female  Admission Orders:  Scheduled Medications:  amLODIPine, 10 mg, Oral, Daily  aspirin, 81 mg, Oral, Daily  bimatoprost, 1 drop, Ophthalmic, Daily  escitalopram, 5 mg, Oral, Daily  famotidine, 20 mg, Oral, BID  furosemide, 20 mg, Oral, Daily  heparin (porcine), 5,000 Units, Subcutaneous, Q8H MODESTO  insulin lispro, 1-6 Units, Subcutaneous, TID AC  insulin lispro, 1-6 Units, Subcutaneous, HS  levothyroxine, 75 mcg, Oral, Early Morning  metoprolol tartrate, 25 mg, Oral, Q12H [place]  umeclidinium, 1 puff, Inhalation, Daily   And  olodaterol HCl, 2 puff, Inhalation, Daily  pravastatin, 80 mg, Oral, Daily With Dinner      Continuous IV Infusions:  IV fluids @ 50 cc/hr - d/c 4/25     PRN Meds:  acetaminophen, 650 mg, Oral, Q6H PRN  ALPRAZolam, 0.5 mg, Oral, TID PRN  aluminum-magnesium hydroxide-simethicone, 30 mL, Oral, Q6H PRN  meclizine, 25 mg, Oral, Q8H PRN    Check Mag, phos  PO Lasix  Tele  Oxygen  Neuro checks q 4 hr   Orthostatics  ADA diet, fluid restriction    Network Utilization Review Department  ATTENTION: Please call with any questions or concerns to [phone] and carefully listen to the prompts so that you are directed to the right person. All voicemails are confidential.   For Discharge needs, contact Care Management DC Support Team at [phone] opt. [phone]  Send all requests for admission clinical reviews, approved or denied determinations and any other requests to dedicated fax number below belonging to the campus where the patient is receiving treatment. List of dedicated fax numbers for the Facilities:  FACILITY NAME UR FAX NUMBER    ADMISSION DENIALS (Administrative/Medical Necessity) [phone]   DISCHARGE SUPPORT TEAM (NETWORK) [phone]   PARENT CHILD HEALTH (Maternity/NICU/Pediatrics) [phone]   [facility] [phone]   [facility] [phone]   [facility] [phone]   [facility] [phone]   [facility] [phone]   [facility] [phone]   [facility] [phone]   [facility] [phone]   [facility] [phone]   [facility] [phone]   [facility] [phone]   [facility] [phone]

## 2024-04-25 NOTE — ASSESSMENT & PLAN NOTE
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

## 2024-04-25 NOTE — PLAN OF CARE
"  Problem: PHYSICAL THERAPY ADULT  Goal: Performs mobility at highest level of function for planned discharge setting.  See evaluation for individualized goals.  Description: Treatment/Interventions: Functional transfer training, LE strengthening/ROM, Elevations, Therapeutic exercise, Endurance training, Patient/family training, Bed mobility, Gait training, Spoke to nursing, OT, Spoke to case management, Family  Equipment Recommended: Walker (pt has)       See flowsheet documentation for full assessment, interventions and recommendations.  4/25/2024 1314 by Theo Reynoso PT  Note: Prognosis: Good  Problem List: Decreased strength, Decreased endurance, Impaired balance, Decreased mobility, Impaired judgement, Impaired sensation  Assessment: Pt. 88 y.o.female presented w/ worsening dizziness since D/C from hospital in February for UTI. CT head demonstrating \"No acute intracranial abnormality. 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 Mild chronic small vessel ischemic changes\". Pt admitted for Dizziness. Pt referred to PT for mobility assessment & D/C planning w/ orders of Up and OOB as tolerated . Please see above for information re: home set-up & PLOF as well as objective findings during PT assessment. PTA, pt reports being fairly I w/ RW & assistance w/ some ADL's at baseline. Pt also on 2L O2 NC at home.  On eval, pt functioning below baseline hence will continue skilled PT to improve function & safety. Pt require S for transfers & CGAx1 for amb w/ RW + cues for techniques & safety. Gait deviations as above but no gross LOB noted.  Dec amb tolerance 2* to lightheadedness. The patient's AM-PAC Basic Mobility Inpatient Short Form Raw Score is 17. A Raw score of greater than 16 suggests the patient may benefit from discharge to home. Please also refer to the recommendation of the Physical Therapist for safe discharge planning. From PT standpoint, will recommend Level III (minimum resource intensity) rehab " services and inc family support at D/C, pending progress. No SOB & dizziness reported t/o session. Nsg staff most recent vital signs as follows: /86 (BP Location: Left arm)   Pulse 60   Temp 98.4 °F (36.9 °C) (Temporal)   Resp 20   Wt 81.6 kg (179 lb 14.4 oz)   SpO2 95%   BMI 32.90 kg/m² . At end of session, pt OOB in chair in stable condition, call bell & phone in reach, chair alarm activated. Fall precautions reinforced w/ good understanding. CM to follow. Nsg staff to continue to mobilized pt (OOB in chair for all meals & ambulate in room/unit) as tolerated to prevent further decline in function. Will also recommend Restorative for daily amb &/or daily OOB in chair as appropriate. Nsg notified. Co-eval was necessary to complete this PT eval for the pt's best interest given pt's medical acuity & complexity.        Rehab Resource Intensity Level, PT: III (Minimum Resource Intensity)    See flowsheet documentation for full assessment.

## 2024-04-25 NOTE — H&P
"[facility]  H&P  Name: Ashley Gonzales 88 y.o. female I MRN: [id number]  Unit/Bed#: ED-15 I Date of Admission: 4/24/2024   Date of Service: 4/25/2024 I Hospital Day: 0      Assessment/Plan   * Dizziness  Assessment & Plan  Patient with PMHx of glaucoma, COPD on 2L NC chronically, CHF, DM2, HTN, HLD, CAD, and infrarenal AAA presented to the ED secondary to complaints of increased dizziness x several weeks, but worse this week.  Pt states that following discharge from hospitalization at the end of February for UTI, she noted she would become intermittently dizzy. She states that the dizziness occurs randomly with ambulation and rest. She reports increased blurred vision over this time which is constant. She feels this is contributory as it is \"throwing her off. Has an upcoming appt with her eye doctor for this issue in the setting of known glaucoma.   Pt also endorses low p.o. intake since discharge. She denies any other symptoms such as headache, facial droop, speech difficulty, or one sided weakness  Pt ambulates with a walker at baseline, but even with this, fears she will fall when she becomes dizzy while ambulating.   CT head demonstrating \"No acute intracranial abnormality. 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 Mild chronic small vessel ischemic changes\"  Neuro checks  Monitor on telemetry  Obtain orthostatic vital signs  Brief, gentle IV fluid hydration given reported low p.o. intake  Meclizine prn, supportive care  PT/OT eval     Chronic diastolic congestive heart failure (HCC)  Assessment & Plan  Wt Readings from Last 3 Encounters:   03/11/24 85.7 kg (189 lb)   02/29/24 82 kg (180 lb 12.4 oz)   02/21/24 84.3 kg (185 lb 12.8 oz)     No acute exacerbation. Pt appears dry on exam.   BNP 70  Last ECHO 2/2021 EF 60% with grade II diastolic dysfunction  Monitor daily weights, I/O  Grief, gentle IV fluids  Will hold AM dose of lasix in case pt dizziness secondary to " "dehydration.          COPD (chronic obstructive pulmonary disease) ([facility])  Assessment & Plan  W/ chronic resp failure. No acute exacerbation  Pt maintaining oxygen saturations on normal 2L O2 home oxygen  Continue home inhaler regimen    Type 2 diabetes mellitus ([facility])  Assessment & Plan  Lab Results   Component Value Date    HGBA1C 6.1 02/21/2024       No results for input(s): \"POCGLU\" in the last 72 hours.    Blood Sugar Average: Last 72 hrs:  Pt not maintained on medication at home  Diabetic diet  SSI with accucheks      S/P CABG (coronary artery bypass graft)  Assessment & Plan  Continue aspirin and statin therapy    Hyperlipidemia  Assessment & Plan  Continue statin    Hypothyroidism  Assessment & Plan  Continue levothyroxine    Hypertension  Assessment & Plan  Continue amlodipine, metoprolol, and lasix     Benign familial tremor  Assessment & Plan  Continue xanax prn         VTE Pharmacologic Prophylaxis: VTE Score: 4 Moderate Risk (Score 3-4) - Pharmacological DVT Prophylaxis Ordered: heparin.  Code Status: Level 1 - Full Code   Discussion with family: Updated  (son) at bedside.    Anticipated Length of Stay: Patient will be admitted on an observation basis with an anticipated length of stay of less than 2 midnights secondary to dizziness.    Total Time Spent on Date of Encounter in care of patient: 65 minutes This time was spent on one or more of the following: performing physical exam; counseling and coordination of care; obtaining or reviewing history; documenting in the medical record; reviewing/ordering tests, medications or procedures; communicating with other healthcare professionals and discussing with patient's family/caregivers.    Chief Complaint: \"I have been getting more dizzy the last few weeks, and I am afraid I may fall\"    History of Present Illness:  Ashley Gonzales is a 88 y.o. female who presents with dizziness.  Patient reports that since being discharged from the hospital at " "the end of February, she has been having increasing episodes of dizziness.  She reports that these dizzy episodes appear to get especially worse this week.  She reports that they appear to occur randomly as they occur when she is ambulating and at rest.  She reports that she typically ambulates with a walker, however when her dizzy spells come on she is scared that she is going to fall.  She reports no falls so far.  She does report that she feels that her vision is contributing to this issue.  She reports that she has been having increased blurred vision secondary to her glaucoma in bilateral eyes.  She reports that she has a follow-up visit with her eye doctor coming up.  She states that from her blurred vision she feels that it is difficult for her to approximate distances and it is \"throwing her off.\"  Patient also reports low p.o. intake since her discharge in February.  She reports that she has not been eating and drinking well.  Besides these complaints, she denies any other neuro symptoms such as headache, facial droop, slurred speech, or one-sided weakness.  Denies any chest pain, palpitations, shortness of breath, abdominal pain, nausea, vomiting, diaphoresis, fever, chills, or urinary symptoms.  Patient does report some chronic issues with incontinence.  Patient reports that she takes her blood sugars at home and she has not noted that she has been low.  Patient's son also reports that patient heart rate and blood pressure at home have been normal per him taking it.    Review of Systems:  Review of Systems   Constitutional:  Positive for appetite change (low p.o. intake) and fatigue. Negative for chills, diaphoresis and fever.   HENT:  Negative for congestion, rhinorrhea and sore throat.    Eyes:  Positive for visual disturbance (blurred vision). Negative for photophobia.   Respiratory:  Negative for cough, shortness of breath and wheezing.    Cardiovascular:  Negative for chest pain, palpitations and leg " swelling.   Gastrointestinal:  Negative for abdominal distention, abdominal pain, blood in stool, constipation, diarrhea, nausea and vomiting.   Genitourinary:  Negative for dysuria and hematuria.   Musculoskeletal:  Positive for gait problem (secondary to dizziness). Negative for arthralgias, back pain, myalgias and neck stiffness.   Skin:  Negative for color change and rash.   Neurological:  Positive for dizziness. Negative for tremors, seizures, syncope, facial asymmetry, speech difficulty, weakness, light-headedness, numbness and headaches.   Psychiatric/Behavioral:  Negative for agitation, behavioral problems and confusion. The patient is not nervous/anxious.    All other systems reviewed and are negative.      Past Medical and Surgical History:   Past Medical History:   Diagnosis Date    Anxiety     Cataract, bilateral     Last Assessed:3/19/2014    COPD (chronic obstructive pulmonary disease) ([facility])     Coronary artery disease     Hx of arterial ischemic stroke     Hypertension     Stroke (HCC)     Wears glasses        Past Surgical History:   Procedure Laterality Date    CAROTID ENDARTERECTOMY Right     CATARACT EXTRACTION W/ INTRAOCULAR LENS  IMPLANT, BILATERAL      COLONOSCOPY N/A 9/30/2017    Procedure: COLONOSCOPY FOR CONTROL OF BLEEDING;  Surgeon: Jeb Gaxiola MD;  Location:  MAIN OR;  Service: Colorectal    CORONARY ARTERY BYPASS GRAFT      CABG X3 with LIMA to LAD,SVG to OM,SVG to distal  RCA ; Last Assessed:7/13/2015    JOINT REPLACEMENT      left TKR    KIDNEY STONE SURGERY      NEPHROSTOMY Left     with Drainage Irrigation ;Onset:1974    HI ARTHRP KNE CONDYLE&PLATU MEDIAL&LAT COMPARTMENTS Right 2/25/2016    Procedure: ARTHROPLASTY KNEE TOTAL;  Surgeon: Jeb Figueroa MD;  Location: [facility];  Service: Orthopedics       Meds/Allergies:  Prior to Admission medications    Medication Sig Start Date End Date Taking? Authorizing Provider   acetaminophen (TYLENOL) 325 mg tablet Take 2 tablets (650  mg total) by mouth every 6 (six) hours as needed for mild pain 2/29/24  Yes Lissa Rodriguez PA-C   ALPRAZolam (XANAX) 0.5 mg tablet Take 1 tablet (0.5 mg total) by mouth 3 (three) times a day as needed (TREMOR) May take one additional prn 3/29/24  Yes Paz Maharaj, DO   amLODIPine (NORVASC) 5 mg tablet TAKE 1 TABLET (5 MG TOTAL) BY MOUTH DAILY. 1/10/24  Yes Paz Maharaj, DO   aspirin (ECOTRIN LOW STRENGTH) 81 mg EC tablet Take 81 mg by mouth daily   Yes Historical Provider, MD   bimatoprost (LUMIGAN) 0.01 % ophthalmic drops Administer 1 drop to both eyes daily at bedtime 5/10/23  Yes Paz Maharaj, DO   Cholecalciferol (Vitamin D) 50 MCG (2000 UT) tablet Take 0.5 tablets (1,000 Units total) by mouth every other day 10/16/23  Yes Paz Maharaj DO   famotidine (PEPCID) 20 mg tablet TAKE 1 TABLET BY MOUTH TWICE A DAY 10/27/23  Yes Paz Maharaj, DO   furosemide (LASIX) 20 mg tablet TAKE 1 TABLET BY MOUTH EVERY DAY 10/27/23  Yes Paz Maharaj DO   levothyroxine 75 mcg tablet TAKE 1 TABLET BY MOUTH EVERY DAY IN THE MORNING 1/10/24  Yes FLAKO Davis   metoprolol tartrate (LOPRESSOR) 25 mg tablet TAKE 1 TABLET (25 MG TOTAL) BY MOUTH EVERY 12 (TWELVE) HOURS 1/10/24  Yes Paz Maharaj, DO   oxygen gas Inhale 2 L/min continuous. Indications: .   Yes Paz Maharaj, DO   Pediatric Multiple Vit-C-FA (PEDIATRIC MULTIVITAMIN) chewable tablet Chew 1 tablet daily   Yes Historical Provider, MD   Potassium Gluconate 595 (99 K) MG TABS Take by mouth   Yes Historical Provider, MD   rosuvastatin (CRESTOR) 10 MG tablet Take 1 tablet (10 mg total) by mouth daily 11/20/23  Yes Paz Maharaj DO   Stiolto Respimat 2.5-2.5 MCG/ACT inhaler INHALE 2 PUFFS BY MOUTH EVERY DAY 9/19/23  Yes Keon Iqbal, DO   traZODone (DESYREL) 50 mg tablet TAKE 1 TABLET (50 MG TOTAL) BY MOUTH DAILY AT BEDTIME CAN TITRATE UP IN 25 MG INCREMENTS IF NEEDED 2/12/24  Yes Paz Maharaj, DO     I have reviewed home  medications using recent Epic encounter.    Allergies:   Allergies   Allergen Reactions    Penicillins Itching    Penicillins Rash       Social History:  Marital Status:    Patient Pre-hospital Living Situation: Home, With other family member: son  Patient Pre-hospital Level of Mobility: walks with walker  Patient Pre-hospital Diet Restrictions: diabetic, cardiac  Substance Use History:   Social History     Substance and Sexual Activity   Alcohol Use Never     Social History     Tobacco Use   Smoking Status Never   Smokeless Tobacco Never     Social History     Substance and Sexual Activity   Drug Use Never       Family History:  Family History   Problem Relation Age of Onset    Cancer Mother         malignant neoplasm    Heart attack Father     Aneurysm Other         Aortic    Cancer Other         malignant neoplasm       Physical Exam:     Vitals:   Blood Pressure: 150/65 (04/24/24 2348)  Pulse: 70 (04/24/24 2348)  Temperature: 98.8 °F (37.1 °C) (04/24/24 2036)  Temp Source: Oral (04/24/24 2036)  Respirations: (!) 30 (04/24/24 2348)  SpO2: 97 % (04/24/24 2348)    Physical Exam  Vitals and nursing note reviewed.   Constitutional:       General: She is not in acute distress.     Appearance: She is well-developed. She is ill-appearing (chronically).   HENT:      Head: Normocephalic and atraumatic.      Nose: Nose normal. No congestion.      Mouth/Throat:      Mouth: Mucous membranes are dry.      Pharynx: Oropharynx is clear.   Eyes:      Conjunctiva/sclera: Conjunctivae normal.   Cardiovascular:      Rate and Rhythm: Normal rate and regular rhythm.      Heart sounds: Normal heart sounds. No murmur heard.     No friction rub. No gallop.   Pulmonary:      Effort: Pulmonary effort is normal. No respiratory distress.      Breath sounds: Normal breath sounds. No wheezing, rhonchi or rales.      Comments: On 2L NC  Abdominal:      General: Bowel sounds are normal. There is no distension.      Palpations: Abdomen is  soft.      Tenderness: There is no abdominal tenderness.   Musculoskeletal:      Cervical back: Neck supple.      Right lower leg: No edema.      Left lower leg: No edema.   Skin:     General: Skin is warm and dry.      Capillary Refill: Capillary refill takes less than 2 seconds.   Neurological:      General: No focal deficit present.      Mental Status: She is alert and oriented to person, place, and time. Mental status is at baseline.      Cranial Nerves: No cranial nerve deficit.      Sensory: No sensory deficit.      Motor: No weakness.      Coordination: Coordination normal.   Psychiatric:         Mood and Affect: Mood normal.         Behavior: Behavior normal.          Additional Data:     Lab Results:  Results from last 7 days   Lab Units 04/24/24 2118   WBC Thousand/uL 6.96   HEMOGLOBIN g/dL 14.0   HEMATOCRIT % 46.3*   PLATELETS Thousands/uL 217   SEGS PCT % 61   LYMPHO PCT % 24   MONO PCT % 9   EOS PCT % 5     Results from last 7 days   Lab Units 04/24/24 2118   SODIUM mmol/L 140   POTASSIUM mmol/L 4.1   CHLORIDE mmol/L 101   CO2 mmol/L 34*   BUN mg/dL 33*   CREATININE mg/dL 1.16   ANION GAP mmol/L 5   CALCIUM mg/dL 9.0   ALBUMIN g/dL 3.6   TOTAL BILIRUBIN mg/dL 0.57   ALK PHOS U/L 71   ALT U/L 3*   AST U/L 16   GLUCOSE RANDOM mg/dL 203*     Results from last 7 days   Lab Units 04/24/24  2118   INR  1.04                   Lines/Drains:  Invasive Devices       Peripheral Intravenous Line  Duration             Peripheral IV 04/24/24 Dorsal (posterior);Left Hand <1 day                        Imaging: Reviewed radiology reports from this admission including: CT head  CT head without contrast   Final Result by Wilber Rollins MD (04/24 2150)      No acute intracranial abnormality.      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      Mild chronic small vessel ischemic changes.            Workstation performed: [id number]             EKG and Other Studies Reviewed on Admission:   EKG:  similar to  previous, RBBB, NSR with 1 degree a block, bifascicular block.    ** Please Note: This note has been constructed using a voice recognition system. **

## 2024-04-25 NOTE — CASE MANAGEMENT
Case Management Discharge Planning Note    Patient name Ashley Gonzales  Location [facility] /E4 -* MRN [id number]  : 1935 Date 2024       Current Admission Date: 2024  Current Admission Diagnosis:Dizziness   Patient Active Problem List    Diagnosis Date Noted    Dizziness 2024    Infrarenal abdominal aortic aneurysm (AAA) without rupture (HCC) 2024    Acute cystitis 2024    Morbid (severe) obesity due to excess calories ([facility]) 05/10/2023    Pleural effusion 2021    Acute on chronic respiratory failure with hypoxia and hypercapnia ([facility]) 2021    History of total knee replacement 2019    Type 2 diabetes mellitus ([facility]) 2019    S/P CABG (coronary artery bypass graft) 2019    Chronic diastolic congestive heart failure ([facility]) 2017    JAMEEL (obstructive sleep apnea) 2017    COPD (chronic obstructive pulmonary disease) ([facility])     Benign familial tremor     Hypertension     Hypothyroidism     Hx of arterial ischemic stroke     Esophageal reflux 2015    Clinical depression 2015    Osteoarthritis of knee 2015    Postural imbalance 10/30/2014    Insomnia 10/01/2012    Generalized osteoarthritis 2012    Female stress incontinence 2012    Generalized anxiety disorder- panic 2012    Glaucoma 2012    Hyperlipidemia 2012      LOS (days): 0  Geometric Mean LOS (GMLOS) (days): 1.9  Days to GMLOS:1.8     OBJECTIVE:            Current admission status: Inpatient   Preferred Pharmacy:   [id number], [address], [place]  [address] [facility] [address]  Phone: [phone] Fax: [phone]    [id number] - [place] - [address]  [address]  [facility] [address]  Phone: [phone] Fax: [phone]    [facility] - [place] - [address]  [address] [facility] [place] [address]  Phone: [phone] Fax: [phone]    Primary Care Provider:  Paz Maharaj DO    Primary Insurance: [employer] REP  Secondary Insurance:     DISCHARGE DETAILS:    Discharge planning discussed with:: Patient and patients tre Magana  Freedom of Choice: Yes  Comments - [place] of Choice: [facility]- [facility] YANES contacted family/caregiver?: Yes  Were Treatment Team discharge recommendations reviewed with patient/caregiver?: Yes  Did patient/caregiver verbalize understanding of patient care needs?: Yes  Were patient/caregiver advised of the risks associated with not following Treatment Team discharge recommendations?: Yes    Contacts  Patient Contacts: Tre Magana  Relationship to Patient:: Family  Contact Method: Phone  Reason/Outcome: Emergency Contact, Discharge Planning, Continuity of Care    Requested Home Health Care         Is the patient interested in HH at discharge?: Yes  Home Health Discipline requested:: Occupational Therapy, Physical Therapy  Home Health Agency Name:: [facility]  A External Referral Reason (only applicable if external HHA name selected): Scheduling access issues  Home Health Follow-Up Provider:: PCP  Home Health Services Needed:: Evaluate Functional Status and Safety, Gait/ADL Training, Strengthening/Theraputic Exercises to Improve Function  Homebound Criteria Met:: Uses an Assist Device (i.e. cane, walker, etc), Requires the Assistance of Another Person for Safe Ambulation or to Leave the Home  Supporting Clincal Findings:: Fatigues Easliy in Short Distances, Limited Endurance    DME Referral Provided  Referral made for DME?: No    Other Referral/Resources/Interventions Provided:  Interventions: C  Referral Comments: [facility]  reserved via [employer]    Would you like to participate in our [facility] service program?  : No - Declined    Treatment Team Recommendation: Home with Home Health Care  Discharge Destination Plan:: Home with Home Health Care  Transport at Discharge : Family                 CM met with patient at bedside to review choice  in HHC agency.  Patient's daughter was on speaker phone during interaction.  CM provided choice list to patient. Cm explained that referral was sent to [facility] however they are unable to accept due to staffing. Patient and patient's daughter would like [facility].     CM reserved [facility] via [employer], SOC for Monday 4.29.24.  CM will continue to follow for discharge coordination.

## 2024-04-25 NOTE — OCCUPATIONAL THERAPY NOTE
Occupational Therapy Evaluation     Patient Name: Ashley Gonzales  Today's Date: 4/25/2024  Problem List  Principal Problem:    Dizziness  Active Problems:    COPD (chronic obstructive pulmonary disease) (HCC)    Benign familial tremor    Hypertension    Hypothyroidism    Chronic diastolic congestive heart failure (HCC)    Hyperlipidemia    S/P CABG (coronary artery bypass graft)    Type 2 diabetes mellitus (HCC)    Past Medical History  Past Medical History:   Diagnosis Date    Anxiety     Cataract, bilateral     Last Assessed:3/19/2014    COPD (chronic obstructive pulmonary disease) (HCC)     Coronary artery disease     Hx of arterial ischemic stroke     Hypertension     Stroke (HCC)     Wears glasses      Past Surgical History  Past Surgical History:   Procedure Laterality Date    CAROTID ENDARTERECTOMY Right     CATARACT EXTRACTION W/ INTRAOCULAR LENS  IMPLANT, BILATERAL      COLONOSCOPY N/A 9/30/2017    Procedure: COLONOSCOPY FOR CONTROL OF BLEEDING;  Surgeon: Jeb Gaxiola MD;  Location: [facility];  Service: Colorectal    CORONARY ARTERY BYPASS GRAFT      CABG X3 with LIMA to LAD,SVG to OM,SVG to distal  RCA ; Last Assessed:7/13/2015    JOINT REPLACEMENT      left TKR    KIDNEY STONE SURGERY      NEPHROSTOMY Left     with Drainage Irrigation ;Onset:1974    MD ARTHRP KNE CONDYLE&PLATU MEDIAL&LAT COMPARTMENTS Right 2/25/2016    Procedure: ARTHROPLASTY KNEE TOTAL;  Surgeon: Jeb Figueroa MD;  Location: [facility];  Service: Orthopedics        04/25/24 0904   OT Last Visit   OT Visit Date 04/25/24   Note Type   Note type Evaluation   Pain Assessment   Pain Assessment Tool 0-10   Pain Score No Pain   Restrictions/Precautions   Weight Bearing Precautions Per Order No   Other Precautions Chair Alarm;Bed Alarm;Telemetry;Fall Risk;O2  (2 L O2)   Home Living   Type of Home House   Home Layout 1/2 bath on main level;Bed/bath upstairs;Stairs to enter without rails  (1STE w/o HR; FOS to 2nd floor bed/bath; pt stays  "on the 1st floor during the day)   Bathroom Shower/Tub Walk-in shower   Bathroom Toilet Raised  (raised toilet on the 1st flr; standard toilet on the 2nd floor)   Bathroom Equipment Grab bars in shower   Home Equipment Walker;Other (Comment)  (uses rollator on the 1st floor; uses RW on the 2nd floor)   Additional Comments pt on 2L O2 at baseline   Prior Function   Level of [place] Independent with functional mobility;Needs assistance with IADLS;Needs assistance with ADLs   Lives With Son;Other (Comment)  (Son's S/O)   Receives Help From Family;Home health  (HHA x 2hrs Tuesdays & Thursdays)   IADLs Family/Friend/Other provides transportation;Family/Friend/Other provides medication management;Family/Friend/Other provides meals  (Receives MOWs.)   Falls in the last 6 months 0   Vocational Retired   Comments (+) home alone.   Lifestyle   Autonomy PTA, required (A) with ADLs, required (A) with IADLs, and completed functional mobility/transfers with Raul utilizing RW vs rollator . Patient lives in a 2 SH w/ 1/2 bath on main. Bedroom/full bathroom on second floor. Utilizes walk in shower w/ SC. Retired. (-) . (+) 2 L home O2. (-) falls.   Reciprocal Relationships son, son's s/o, dghtr   Service to Others caretaker   Intrinsic Gratification puzzles   General   Additional Pertinent History Comorbidities affecting pt’s functional performance include a significant PMH of: DM2, hx CABG, CHF, HTN, HLD, tremor, COPD, depression, incontinence, KIMI, OA, glaucoma, OA knee, hx TKR, acute/chronic resp failure w/ hypoxia and hypercapnia, obesity.  Patient with active OT orders and activity orders for Up and OOB as tolerated .   Family/Caregiver Present Yes  (Son's S/O, patient's dghtr)   Subjective   Subjective \"I'm dizzy all of the time\"   ADL   Where Assessed Edge of bed   Eating Assistance 6  Modified independent   Grooming Assistance 5  Supervision/Setup   UB Bathing Assistance 5  Supervision/Setup   LB Bathing " Assistance 4  Minimal Assistance   UB Dressing Assistance 5  Supervision/Setup   LB Dressing Assistance 3  Moderate Assistance   Toileting Assistance  4  Minimal Assistance   Transfers   Sit to Stand 5  Supervision   Additional items Increased time required;Other  (RW)   Stand to Sit 5  Supervision   Additional items Increased time required;Verbal cues;Other  (RW)   Toilet transfer 5  Supervision   Additional items Increased time required;Verbal cues;Standard toilet;Other  (grab bar)   Functional Mobility   Functional Mobility   (CGA)   Additional items Rolling walker   Balance   Static Sitting Good   Dynamic Sitting Fair +   Static Standing Fair   Dynamic Standing Fair -   Ambulatory Fair -   Activity Tolerance   Activity Tolerance Patient limited by fatigue   Medical Staff Made Aware Theo PT   Nurse Made Aware Yes, Dorcas WHITMAN   RUE Assessment   RUE Assessment WFL  (grossly 4-/5)   LUE Assessment   LUE Assessment WFL  (grossly 4-/5)   Hand Function   Gross Motor Coordination Functional   Fine Motor Coordination Functional   Sensation   Light Touch No apparent deficits   Proprioception   Proprioception No apparent deficits   Vision-Basic Assessment   Visual History Glaucoma   Vision - Complex Assessment   Ocular Range of Motion Intact   Tracking Intact   Acuity   (limited.)   Perception   Inattention/Neglect Appears intact   Cognition   Overall Cognitive Status WFL   Arousal/Participation Responsive;Alert;Cooperative   Attention Within functional limits   Orientation Level Oriented X4   Memory Decreased recall of precautions   Following Commands Follows one step commands without difficulty   Assessment   Limitation Decreased ADL status;Decreased UE strength;Decreased Safe judgement during ADL;Decreased endurance;Visual deficit;Decreased self-care trans;Decreased high-level ADLs   Prognosis Good   Assessment Patient is a 88 y.o. year old female seen for OT eval s/p admit to [facility] on 4/24/2024 with dizziness.  OT  consulted to assess ADLs/IADLs/functional mobility and assist w/ D/C planning. Patient demonstrates the following deficits impacting occupational performance: decreased strength , decreased balance, decreased activity tolerance, limited functional reach, impaired memory, decreased safety awareness, dizziness, increased body habitus , impaired coordination, and decreased cardiovascular endurance. These impairments, as well at pt’s DIAN home environment, steps within home environment, limited home support, difficulty performing ADLs, difficulty performing IADLs, difficulty performing transfers/mobility, limited insight into deficits, fall risk , functional decline , increased reliance on DME , and advanced age, limit pt’s ability to safely engage in all baseline areas of occupation. Pt's CLOF as follows: eating/grooming: Manjeet and supervision , UB ADLs: supervision , LB ADLs: Neto and modA, toileting: Neto, bed mobility: DNT, functional transfers: supervision , functional mobility: supervision , sitting/standing tolerance: ~3 min. Pt would benefit from continued skilled OT while in acute setting to address deficits as defined above and to maximize (I) w/ ADLs/functional mobility. Occupational performance areas to address include: grooming, bathing/shower, toilet hygiene, dressing, health maintenance, and functional mobility. Based on the aforementioned evaluation, functional performance deficits, and assessments, pt has been identified as a moderate complexity evaluation. At this time, recommendation for pt to receive post-acute rehabilitation services at a Level III (minimum resource intensity) due to above deficits and CLOF. OT will continue to follow pt 2-3x/wk to address the goals listed below to  w/in 10-14 days.   Goals   Patient Goals to return home   LTG Time Frame 10-   Plan   Treatment Interventions ADL retraining;Functional transfer training;UE strengthening/ROM;Endurance training;Cognitive  reorientation;Patient/family training;Neuromuscular reeducation;Compensatory technique education;Continued evaluation;Activityengagement;Energy conservation   Goal Expiration Date 05/09/24   OT Treatment Day 0   OT Frequency 2-3x/wk   Discharge Recommendation   Rehab Resource Intensity Level, OT III (Minimum Resource Intensity)   AM-PAC Daily Activity Inpatient   Lower Body Dressing 3   Bathing 3   Toileting 3   Upper Body Dressing 3   Grooming 3   Eating 4   Daily Activity Raw Score 19   Daily Activity Standardized Score (Calc for Raw Score >=11) 40.22   AM-PAC Applied Cognition Inpatient   Following a Speech/Presentation 3   Understanding Ordinary Conversation 4   Taking Medications 2   Remembering Where Things Are Placed or Put Away 3   Remembering List of 4-5 Errands 3   Taking Care of Complicated Tasks 3   Applied Cognition Raw Score 18   Applied Cognition Standardized Score 38.07     Occupational Therapy goals: In 7-14 days:     1- Patient will verbalize and demonstrate use of energy conservation/deep breathing technique and work simplification skills during functional activity with no verbal cues.   2- Patient will verbalize and demonstrate good body mechanics and joint protection techniques during ADLs/IADLs with no verbal cues   3- Pt will complete bed mobility at a Mod I level w/ G balance/safety demonstrated to decrease caregiver assistance required   4- Patient will increase OOB/ sitting tolerance to 2-4 hours per day for increased participation in self care and leisure tasks with no s/s of exertion.   5-Patient will increase standing tolerance time to 5 minutes with unilateral UE support to complete sink level ADLs@ mod I level    6- Pt will improve functional transfers to Mod I on/off all surfaces using DME as needed w/ G balance/safety   7- Patient will complete UB ADLs with Raul utilizing appropriate DME/AE PRN   8- Patient will complete LB ADLs with Raul utilizing appropriate DME/AE PRN   9- Patient  will complete toileting tasks with Raul with G hygiene/thoroughness utilizing appropriate DME/AE PRN   10- Pt will improve functional mobility during ADL/IADL/leisure tasks to Mod I using DME as needed w/ G balance/safety    11- Pt will be attentive 100% of the time during ongoing cognitive assessment w/ G participation to assist w/ safe d/c planning/recommendations   12- Pt will verbalize 3 potential fall hazards and identify appropriate compensatory techniques to decrease fall risk in home environment    13- Pt will increase BUE strength by 1MM grade via AROM/AAROM/PROM exercises to increase independence in ADLs and transfers       Lissa Stein, OTR/L

## 2024-04-25 NOTE — PROGRESS NOTES
[facility]  Progress Note  Name: Ashley Gonzales I  MRN: [id number]  Unit/Bed#: E4 -01 I Date of Admission: 4/24/2024   Date of Service: 4/25/2024 I Hospital Day: 0    Assessment/Plan   Type 2 diabetes mellitus (HCC)  Assessment & Plan  Lab Results   Component Value Date    HGBA1C 6.1 02/21/2024       Recent Labs     04/25/24  0016 04/25/24  0802 04/25/24  1122 04/25/24  1557   POCGLU 90 105 100 109       Blood Sugar Average: Last 72 hrs:  Pt not maintained on medication at home  Diabetic diet  SSI with accucheks(P) 101      S/P CABG (coronary artery bypass graft)  Assessment & Plan  Continue aspirin and statin therapy  Patient denies chest pain; EKG on admission with no acute ischemic findings    Hyperlipidemia  Assessment & Plan  Continue statin    Chronic diastolic congestive heart failure (HCC)  Assessment & Plan  Wt Readings from Last 3 Encounters:   04/25/24 81.6 kg (179 lb 14.4 oz)   03/11/24 85.7 kg (189 lb)   02/29/24 82 kg (180 lb 12.4 oz)     No acute exacerbation. Pt appears dry on exam.   BNP 70  Last ECHO 2/2021 EF 60% with grade II diastolic dysfunction  Monitor daily weights, I/O  Lasix to restart tomorrow          Hypothyroidism  Assessment & Plan  Continue levothyroxine    Hypertension  Assessment & Plan  Continue amlodipine, metoprolol, and lasix     Benign familial tremor  Assessment & Plan  Continue xanax prn    COPD (chronic obstructive pulmonary disease) (HCC)  Assessment & Plan  W/ chronic resp failure. No acute exacerbation  Pt maintaining oxygen saturations on normal 2L O2 home oxygen  Continue home inhaler regimen    * Dizziness  Assessment & Plan  Patient with PMHx of glaucoma, COPD on 2L NC chronically, CHF, DM2, HTN, HLD, CAD, and infrarenal AAA presented to the ED secondary to complaints of increased dizziness x several weeks, but worse this week.  Pt states that following discharge from hospitalization at the end of February for UTI, she noted she  "would become intermittently dizzy. She states that the dizziness occurs randomly with ambulation and rest. She reports increased blurred vision over this time which is constant. She feels this is contributory as it is \"throwing her off. Has an upcoming appt with her eye doctor for this issue in the setting of known glaucoma.   Pt also endorses low p.o. intake since discharge. She denies any other symptoms such as headache, facial droop, speech difficulty, or one sided weakness  Pt ambulates with a walker at baseline, but even with this, fears she will fall when she becomes dizzy while ambulating.   CT head demonstrating \"No acute intracranial abnormality. 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 Mild chronic small vessel ischemic changes\"  Neuro checks  Monitor on telemetry  Obtain orthostatic vital signs  Brief, gentle IV fluid hydration given reported low p.o. intake  Meclizine prn, supportive care  PT/OT remberto     4/25-update: Orthostatics negative, CT head negative for acute bleed or any ischemic findings.  PT/OT evaluated patient, recommend home with home health.  On exam, patient denied symptoms, but very interestingly reported that they are, at least in her mind, very closely aligned with episodes of anxiety.  Additionally, patient reports both anxiety and occasional problems seeing that contribute to her underlying symptoms of dizziness.  Patient states that the room does not spin and that, it is less likely a true dizziness than uncertainty on her feet or the feeling that she will fall when she ambulates.  So anxiety high on differential, visual changes also on differential, and EKG on admission noted bifascicular block.  Currently being monitored on telemetry and will consider consult to cardiology as she may have sporadic episodes of bradycardia that might be contributing to her symptoms as well.  -- To treat anxiety, continue current benzos (patient takes Xanax 0.5 mg 3 times daily at home) and " schedule a gentle weaning from these medications; Lexapro initiated; consider consult to behavioral health versus outpatient follow-up.               VTE Pharmacologic Prophylaxis: VTE Score: 4 Moderate Risk (Score 3-4) - Pharmacological DVT Prophylaxis Ordered: heparin.    Mobility:   Basic Mobility Inpatient Raw Score: 17  JH-HLM Goal: 5: Stand one or more mins  JH-HLM Achieved: 6: Walk 10 steps or more  JH-HLM Goal achieved. Continue to encourage appropriate mobility.    Patient Centered Rounds: I performed bedside rounds with nursing staff today.   Discussions with Specialists or Other Care Team Provider:     Education and Discussions with Family / Patient: Updated  (daughter) at bedside.    Total Time Spent on Date of Encounter in care of patient: 45 mins. This time was spent on one or more of the following: performing physical exam; counseling and coordination of care; obtaining or reviewing history; documenting in the medical record; reviewing/ordering tests, medications or procedures; communicating with other healthcare professionals and discussing with patient's family/caregivers.    Current Length of Stay: 0 day(s)  Current Patient Status: Inpatient   Certification Statement: The patient will continue to require additional inpatient hospital stay due to workup of dizziness  Discharge Plan: Anticipate discharge in 24-48 hrs to home with home services.    Code Status: Level 1 - Full Code    Subjective:   Patient seen and examined bedside, no acute distress or discomfort noted.  On exam, patient denied active symptoms, but does report a long history of dizziness and difficulty steadying herself on her feet.  She states that the symptoms are very closely aligned with episodes of anxiety that she is experienced for many years.  She reports using Xanax 3 times daily for over a year and feels that she has developed a tolerance to the medication is no longer effective.  We discussed that  benzodiazepine for that length of time is less advisable than initiating something like an SSRI.  May need to continue as needed benzos for tremor, but at this time plan to treat anxiety with SSRIs and benzo taper.  Other concerning factors are bifascicular heart block and visual changes that may be contributing to her underlying symptoms.  Otherwise, workup negative; orthostatics negative as are CT head.  Does have blood in urine, will discuss with nephrology moving forward.  All rest as above.    Objective:     Vitals:   Temp (24hrs), Av.4 °F (36.9 °C), Min:97.7 °F (36.5 °C), Max:98.8 °F (37.1 °C)    Temp:  [97.7 °F (36.5 °C)-98.8 °F (37.1 °C)] 98.4 °F (36.9 °C)  HR:  [56-72] 69  Resp:  [18-30] 20  BP: (140-176)/() 169/73  SpO2:  [95 %-98 %] 95 %  Body mass index is 32.9 kg/m².     Input and Output Summary (last 24 hours):   No intake or output data in the 24 hours ending 24 1618    Physical Exam:   Physical Exam  Vitals and nursing note reviewed.   Constitutional:       General: She is not in acute distress.     Appearance: She is well-developed.   HENT:      Head: Normocephalic and atraumatic.   Eyes:      Conjunctiva/sclera: Conjunctivae normal.   Cardiovascular:      Rate and Rhythm: Normal rate.   Pulmonary:      Effort: Pulmonary effort is normal. No respiratory distress.   Abdominal:      Palpations: Abdomen is soft.      Tenderness: There is no abdominal tenderness.   Musculoskeletal:         General: No swelling.      Cervical back: Neck supple.   Skin:     General: Skin is warm and dry.      Comments: Bilateral upper extremity ecchymotic lesions   Neurological:      Mental Status: She is alert and oriented to person, place, and time.   Psychiatric:      Comments: Anxious            Additional Data:     Labs:  Results from last 7 days   Lab Units 24  0230   WBC Thousand/uL 6.07   HEMOGLOBIN g/dL 13.5   HEMATOCRIT % 44.4   PLATELETS Thousands/uL 207   SEGS PCT % 60   LYMPHO PCT % 24    MONO PCT % 10   EOS PCT % 5     Results from last 7 days   Lab Units 04/25/24  0256 04/24/24  2118   SODIUM mmol/L 143 140   POTASSIUM mmol/L 3.8 4.1   CHLORIDE mmol/L 101 101   CO2 mmol/L 36* 34*   BUN mg/dL 32* 33*   CREATININE mg/dL 1.11 1.16   ANION GAP mmol/L 6 5   CALCIUM mg/dL 8.9 9.0   ALBUMIN g/dL  --  3.6   TOTAL BILIRUBIN mg/dL  --  0.57   ALK PHOS U/L  --  71   ALT U/L  --  3*   AST U/L  --  16   GLUCOSE RANDOM mg/dL 87 203*     Results from last 7 days   Lab Units 04/24/24  2118   INR  1.04     Results from last 7 days   Lab Units 04/25/24  1557 04/25/24  1122 04/25/24  0802 04/25/24  0016   POC GLUCOSE mg/dl 109 100 105 90               Lines/Drains:  Invasive Devices       Peripheral Intravenous Line  Duration             Peripheral IV 04/24/24 Dorsal (posterior);Left Hand <1 day                      Telemetry:  Telemetry Orders (From admission, onward)               24 Hour Telemetry Monitoring  Continuous x 24 Hours (Telem)        Question:  Reason for 24 Hour Telemetry  Answer:  Syncope suspected to be cardiac in origin                     Telemetry Reviewed: Normal Sinus Rhythm  Indication for Continued Telemetry Use: Arrthymias requiring medical therapy             Imaging: Reviewed radiology reports from this admission including: CT head    Recent Cultures (last 7 days):         Last 24 Hours Medication List:   Current Facility-Administered Medications   Medication Dose Route Frequency Provider Last Rate    acetaminophen  650 mg Oral Q6H PRN Forest Wilder PA-C      ALPRAZolam  0.5 mg Oral TID PRN Forest Wilder PA-C      aluminum-magnesium hydroxide-simethicone  30 mL Oral Q6H PRN Forest Wilder PA-C      amLODIPine  5 mg Oral Daily Forest Wiledr PA-C      aspirin  81 mg Oral Daily Forest Wilder PA-C      bimatoprost  1 drop Ophthalmic Daily Forest Wilder PA-C      escitalopram  5 mg Oral Daily Navneet Knight MD      famotidine  20 mg Oral BID Forest Wilder PA-C       [START ON 4/26/2024] furosemide  20 mg Oral Daily Forest Wilder PA-C      heparin (porcine)  5,000 Units Subcutaneous Q8H [place] Forest Wilder PA-C      insulin lispro  1-6 Units Subcutaneous TID AC Forest Wilder PA-C      insulin lispro  1-6 Units Subcutaneous HS Forest Wilder PA-C      levothyroxine  75 mcg Oral Early Morning Forest Wilder PA-C      meclizine  25 mg Oral Q8H PRN Forest Wilder PA-C      metoprolol tartrate  25 mg Oral Q12H [facility] Forest Wilder PA-C      umeclidinium  1 puff Inhalation Daily Forest Wilder PA-C      And    olodaterol HCl  2 puff Inhalation Daily Forest Wilder PA-C      pravastatin  80 mg Oral Daily With Dinner Forest Wilder PA-C          Today, Patient Was Seen By: Navneet Knight MD    **Please Note: This note may have been constructed using a voice recognition system.**

## 2024-04-25 NOTE — ED NOTES
Pt ambulated to bathroom and back to room; did really well until she sat in bed and felt lightheaded. Needed help lifting her legs     Atiya Cottre  04/24/24 2742

## 2024-04-25 NOTE — ASSESSMENT & PLAN NOTE
Wt Readings from Last 3 Encounters:   04/25/24 81.6 kg (179 lb 14.4 oz)   03/11/24 85.7 kg (189 lb)   02/29/24 82 kg (180 lb 12.4 oz)     No acute exacerbation. Pt appears dry on exam.   BNP 70  Last ECHO 2/2021 EF 60% with grade II diastolic dysfunction  Monitor daily weights, I/O  Lasix to restart tomorrow

## 2024-04-25 NOTE — PLAN OF CARE
Problem: PAIN - ADULT  Goal: Verbalizes/displays adequate comfort level or baseline comfort level  Description: Interventions:  - Encourage patient to monitor pain and request assistance  - Assess pain using appropriate pain scale  - Administer analgesics based on type and severity of pain and evaluate response  - Implement non-pharmacological measures as appropriate and evaluate response  - Consider cultural and social influences on pain and pain management  - Notify physician/advanced practitioner if interventions unsuccessful or patient reports new pain  Outcome: Progressing     Problem: INFECTION - ADULT  Goal: Absence or prevention of progression during hospitalization  Description: INTERVENTIONS:  - Assess and monitor for signs and symptoms of infection  - Monitor lab/diagnostic results  - Monitor all insertion sites, i.e. indwelling lines, tubes, and drains  - Monitor endotracheal if appropriate and nasal secretions for changes in amount and color  - Leon appropriate cooling/warming therapies per order  - Administer medications as ordered  - Instruct and encourage patient and family to use good hand hygiene technique  - Identify and instruct in appropriate isolation precautions for identified infection/condition  Outcome: Progressing  Goal: Absence of fever/infection during neutropenic period  Description: INTERVENTIONS:  - Monitor WBC    Outcome: Progressing     Problem: SAFETY ADULT  Goal: Patient will remain free of falls  Description: INTERVENTIONS:  - Educate patient/family on patient safety including physical limitations  - Instruct patient to call for assistance with activity   - Consult OT/PT to assist with strengthening/mobility   - Keep Call bell within reach  - Keep bed low and locked with side rails adjusted as appropriate  - Keep care items and personal belongings within reach  - Initiate and maintain comfort rounds  - Make Fall Risk Sign visible to staff  - Offer Toileting every 2 Hours,  in advance of need  - Initiate/Maintain bed alarm  - Obtain necessary fall risk management equipment: alarm  - Apply yellow socks and bracelet for high fall risk patients  - Consider moving patient to room near nurses station  Outcome: Progressing  Goal: Maintain or return to baseline ADL function  Description: INTERVENTIONS:  -  Assess patient's ability to carry out ADLs; assess patient's baseline for ADL function and identify physical deficits which impact ability to perform ADLs (bathing, care of mouth/teeth, toileting, grooming, dressing, etc.)  - Assess/evaluate cause of self-care deficits   - Assess range of motion  - Assess patient's mobility; develop plan if impaired  - Assess patient's need for assistive devices and provide as appropriate  - Encourage maximum independence but intervene and supervise when necessary  - Involve family in performance of ADLs  - Assess for home care needs following discharge   - Consider OT consult to assist with ADL evaluation and planning for discharge  - Provide patient education as appropriate  Outcome: Progressing  Goal: Maintains/Returns to pre admission functional level  Description: INTERVENTIONS:  - Perform AM-PAC 6 Click Basic Mobility/ Daily Activity assessment daily.  - Set and communicate daily mobility goal to care team and patient/family/caregiver.   - Collaborate with rehabilitation services on mobility goals if consulted  - Perform Range of Motion 2 times a day.  - Reposition patient every 2 hours.  - Dangle patient 2 times a day  - Stand patient 2 times a day  - Ambulate patient 2 times a day  - Out of bed to chair 2 times a day   - Out of bed for meals 2 times a day  - Out of bed for toileting  - Record patient progress and toleration of activity level   Outcome: Progressing     Problem: DISCHARGE PLANNING  Goal: Discharge to home or other facility with appropriate resources  Description: INTERVENTIONS:  - Identify barriers to discharge w/patient and  caregiver  - Arrange for needed discharge resources and transportation as appropriate  - Identify discharge learning needs (meds, wound care, etc.)  - Arrange for interpretive services to assist at discharge as needed  - Refer to Case Management Department for coordinating discharge planning if the patient needs post-hospital services based on physician/advanced practitioner order or complex needs related to functional status, cognitive ability, or social support system  Outcome: Progressing     Problem: Knowledge Deficit  Goal: Patient/family/caregiver demonstrates understanding of disease process, treatment plan, medications, and discharge instructions  Description: Complete learning assessment and assess knowledge base.  Interventions:  - Provide teaching at level of understanding  - Provide teaching via preferred learning methods  Outcome: Progressing

## 2024-04-25 NOTE — ASSESSMENT & PLAN NOTE
Wt Readings from Last 3 Encounters:   03/11/24 85.7 kg (189 lb)   02/29/24 82 kg (180 lb 12.4 oz)   02/21/24 84.3 kg (185 lb 12.8 oz)     No acute exacerbation. Pt appears dry on exam.   BNP 70  Last ECHO 2/2021 EF 60% with grade II diastolic dysfunction  Monitor daily weights, I/O  Grief, gentle IV fluids  Will hold AM dose of lasix in case pt dizziness secondary to dehydration.

## 2024-04-25 NOTE — CASE MANAGEMENT
Case Management Assessment & Discharge Planning Note    Patient name Ashley Gonzales  [facility] /[facility] -* MRN [id number]  : 1935 Date 2024       Current Admission Date: 2024  Current Admission Diagnosis:Dizziness   Patient Active Problem List    Diagnosis Date Noted    Dizziness 2024    Infrarenal abdominal aortic aneurysm (AAA) without rupture ([facility]) 2024    Acute cystitis 2024    Morbid (severe) obesity due to excess calories ([facility]) 05/10/2023    Pleural effusion 2021    Acute on chronic respiratory failure with hypoxia and hypercapnia ([facility]) 2021    History of total knee replacement 2019    Type 2 diabetes mellitus ([facility]) 2019    S/P CABG (coronary artery bypass graft) 2019    Chronic diastolic congestive heart failure ([facility]) 2017    JAMEEL (obstructive sleep apnea) 2017    COPD (chronic obstructive pulmonary disease) ([facility])     Benign familial tremor     Hypertension     Hypothyroidism     Hx of arterial ischemic stroke     Esophageal reflux 2015    Clinical depression 2015    Osteoarthritis of knee 2015    Postural imbalance 10/30/2014    Insomnia 10/01/2012    Generalized osteoarthritis 2012    Female stress incontinence 2012    Generalized anxiety disorder- panic 2012    Glaucoma 2012    Hyperlipidemia 2012      LOS (days): 0  Geometric Mean LOS (GMLOS) (days):   Days to GMLOS:     OBJECTIVE:              Current admission status: Observation       Preferred Pharmacy:   [id number], [address] [id number] [facility] [address]  Phone: [phone] Fax: [phone]    [id number] [place] [facility] [place] [facility] [address]  [address]  [facility] [address]  Phone: [phone] Fax: [phone]    [facility] [place] - [address]  [address] [facility] [address]  Phone: [phone] Fax: [phone]    [facility]  Care Provider: Paz Maharaj DO    Primary Insurance: [facility] REP  Secondary Insurance:     ASSESSMENT:  Active Health Care Proxies    There are no active Health Care Proxies on file.       Advance Directives  Does patient have a Health Care POA?: No  Was patient offered paperwork?: Yes (Pt declined)  Does patient currently have a Health Care decision maker?: No  Does patient have Advance Directives?: No  Was patient offered paperwork?: Yes (Pt declined)         Readmission Root Cause  30 Day Readmission: No    Patient Information  Admitted from:: Home  Mental Status: Alert  During Assessment patient was accompanied by: Daughter, Other-Comment (Daughter in law)  Assessment information provided by:: Patient  Primary Caregiver: Self  Support Systems: Self, Son, Daughter  County of Residence: [place]  What city do you live in?: [place]  Home entry access options. Select all that apply.: Stairs  Number of steps to enter home.: 1  Do the steps have railings?: No  Type of Current Residence: 2 [place] home  Upon entering residence, is there a bedroom on the main floor (no further steps)?: No  A bedroom is located on the following floor levels of residence (select all that apply):: 2nd Floor  Upon entering residence, is there a bathroom on the main floor (no further steps)?: Yes  Number of steps to 2nd floor from main floor: One Flight  Living Arrangements: Lives w/ Son  Is patient a ?: No    Activities of Daily Living Prior to Admission  Functional Status: Independent  Completes ADLs independently?: No  Level of ADL dependence: Assistance (Family supervises during showers and assists with getting into the shower,.)  Ambulates independently?: Yes  Does patient use assisted devices?: Yes  Assisted Devices (DME) used: Home Oxygen concentrator, Portable Oxygen concentrator, Walker  DME Company Name (respiratory supplies): [employer]  O2 Rate(s): 2L  Does patient currently own DME?: Yes  What DME does the patient  currently own?: Home Oxygen concentrator, Portable Oxygen concentrator, Walker  Does patient have a history of Outpatient Therapy (PT/OT)?: No  Does the patient have a history of Short-Term Rehab?: Yes ([facility])  Does patient have a history of HHC?: Yes ([facility])  Does patient currently have HHC?: No         Patient Information Continued  Income Source: Pension/jail  Does patient have prescription coverage?: Yes  Does patient receive dialysis treatments?: No  Does patient have a history of substance abuse?: No  Does patient have a history of Mental Health Diagnosis?: No         Means of Transportation  Means of Transport to Appts:: Family transport      Social Determinants of Health (SDOH)      Flowsheet Row Most Recent Value   Housing Stability    In the last 12 months, was there a time when you were not able to pay the mortgage or rent on time? N   In the last 12 months, how many places have you lived? 1   In the last 12 months, was there a time when you did not have a steady place to sleep or slept in a shelter (including now)? N   Transportation Needs    In the past 12 months, has lack of transportation kept you from medical appointments or from getting medications? no   In the past 12 months, has lack of transportation kept you from meetings, work, or from getting things needed for daily living? No   Food Insecurity    Within the past 12 months, you worried that your food would run out before you got the money to buy more. Never true   Within the past 12 months, the food you bought just didn't last and you didn't have money to get more. Never true   Utilities    In the past 12 months has the electric, gas, oil, or water company threatened to shut off services in your home? No            DISCHARGE DETAILS:    Discharge planning discussed with:: Patient  Freedom of Choice: Yes     CM contacted family/caregiver?: Yes  Were Treatment Team discharge recommendations reviewed with patient/caregiver?:  Yes  Did patient/caregiver verbalize understanding of patient care needs?: Yes  Were patient/caregiver advised of the risks associated with not following Treatment Team discharge recommendations?: Yes    Contacts  Patient Contacts: Daughter and Daughter in law  Relationship to Patient:: Family  Contact Method: In Person  Reason/Outcome: Emergency Contact    Requested Home Health Care         Is the patient interested in HHC at discharge?: No    DME Referral Provided  Referral made for DME?: No         Would you like to participate in our [facility] service program?  : No - Declined          CM met with patient, patient's daughter, and patient's daughter in law at bedside to introduce self and role with discharge planning. Patient reports living with her son and daughter in law in a 2 story home. Patient's bedroom is on the second floor of the home.  Patient reports being independent PTA, however she requires supervision when getting in and out of the shower.  Patient utilzies a RW at home and has a home 02 concentrator and portable 02 concentrator supplied by bttn.  Patient is on 2L of 02 at baseline.  Patient does not drive, family transports.  Patient had past HHC with [facility].  CM will continue to follow for therapy recommendations and for discharge coordination.

## 2024-04-25 NOTE — PHYSICAL THERAPY NOTE
PT EVALUATION    Pt. Name: Ashley Gonzales  Pt. Age: 88 y.o.  MRN: [id number]  LENGTH OF STAY: 0      Admitting Diagnoses:   Dizziness [R42]  HTN (hypertension) [I10]    Past Medical History:   Diagnosis Date    Anxiety     Cataract, bilateral     Last Assessed:3/19/2014    COPD (chronic obstructive pulmonary disease) (HCC)     Coronary artery disease     Hx of arterial ischemic stroke     Hypertension     Stroke (HCC)     Wears glasses        Past Surgical History:   Procedure Laterality Date    CAROTID ENDARTERECTOMY Right     CATARACT EXTRACTION W/ INTRAOCULAR LENS  IMPLANT, BILATERAL      COLONOSCOPY N/A 9/30/2017    Procedure: COLONOSCOPY FOR CONTROL OF BLEEDING;  Surgeon: Jeb Gaxiola MD;  Location: [facility];  Service: Colorectal    CORONARY ARTERY BYPASS GRAFT      CABG X3 with LIMA to LAD,SVG to OM,SVG to distal  RCA ; Last Assessed:7/13/2015    JOINT REPLACEMENT      left TKR    KIDNEY STONE SURGERY      NEPHROSTOMY Left     with Drainage Irrigation ;Onset:1974    IA ARTHRP KNE CONDYLE&PLATU MEDIAL&LAT COMPARTMENTS Right 2/25/2016    Procedure: ARTHROPLASTY KNEE TOTAL;  Surgeon: Jeb Figueroa MD;  Location: [facility];  Service: Orthopedics       Imaging Studies:  CT head without contrast   Final Result by Wilber Rollins MD (04/24 2150)      No acute intracranial abnormality.      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      Mild chronic small vessel ischemic changes.            Workstation performed: [id number]               04/25/24 0919   PT Last Visit   PT Visit Date 04/25/24   Note Type   Note type Evaluation   Pain Assessment   Pain Score No Pain   Restrictions/Precautions   Weight Bearing Precautions Per Order No   Other Precautions Chair Alarm;Bed Alarm;Telemetry;Fall Risk;O2  (2L O2 NC)   Home Living   Type of Home House   Home Layout 1/2 bath on main level;Bed/bath upstairs;Stairs to enter without rails  (1STE w/o HR; FOS to 2nd floor bed/bath; pt stays on the 1st floor  during the day)   Bathroom Shower/Tub Walk-in shower   Bathroom Toilet Raised  (raised toilet on the 1st flr; standard toilet on the 2nd floor)   Bathroom Equipment Grab bars in shower   Home Equipment Walker;Other (Comment)  (uses rollator on the 1st floor; uses RW on the 2nd floor)   Additional Comments pt on 2L O2 at baseline   Prior Function   Level of [place] Independent with functional mobility;Independent with ADLs  (w/ RW)   Lives With Son;Other (Comment)  (& son's girlfriend)   Receives Help From Family;Home health  (HHA x 2hrs Tuesdays & Thursdays)   Falls in the last 6 months 0   Vocational Retired   Comments pt home alone during the day   General   Family/Caregiver Present Yes   Cognition   Overall Cognitive Status WFL   Arousal/Participation Alert   Orientation Level Oriented X4   Following Commands Follows one step commands without difficulty   Comments pleasant & cooperative   Subjective   Subjective Pt agreeable to PT/OT evals.   RUE Assessment   RUE Assessment   (refer to OT)   LUE Assessment   LUE Assessment   (refer to OT)   RLE Assessment   RLE Assessment WFL  (3+/5 grossly)   LLE Assessment   LLE Assessment WFL  (3+/5 grossly)   Coordination   Movements are Fluid and Coordinated 1   Sensation X  (numbness to fingers)   Bed Mobility   Supine to Sit Unable to assess   Sit to Supine Unable to assess   Additional Comments pt OOB pre & post-session   Transfers   Sit to Stand 5  Supervision   Additional items Increased time required;Verbal cues   Stand to Sit 5  Supervision   Additional items Increased time required;Bed elevated   Toilet transfer 5  Supervision   Additional items Increased time required;Verbal cues;Standard toilet;Other  (grab bar)   Additional Comments cues for techniques & safety   Ambulation/Elevation   Gait pattern Forward Flexion;Wide MEHDI;Decreased foot clearance;Excessively slow   Gait Assistance 4  Minimal assist  (CGAx1)   Additional items Assist x 1;Verbal cues;Tactile  "cues   Assistive Device Rolling walker   Distance 10'x1   Ambulation/Elevation Additional Comments no gross LOB noted; dec amb tolerance 2* to lightheadedness   Balance   Static Sitting Good   Dynamic Sitting Fair +   Static Standing Fair  (w/ RW)   Dynamic Standing Fair -  (w/ RW)   Ambulatory Poor +  (w/ RW)   Endurance Deficit   Endurance Deficit Yes   Endurance Deficit Description fatigue; lightheadedness   Activity Tolerance   Activity Tolerance Patient limited by fatigue;Treatment limited secondary to medical complications (Comment)   Medical Staff Made Aware OTR Lissa   Nurse Made Aware yes, Dorcas   Assessment   Prognosis Good   Problem List Decreased strength;Decreased endurance;Impaired balance;Decreased mobility;Impaired judgement;Impaired sensation   Assessment Pt. 88 y.o.female presented w/ worsening dizziness since D/C from hospital in February for UTI. CT head demonstrating \"No acute intracranial abnormality. 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 Mild chronic small vessel ischemic changes\". Pt admitted for Dizziness. Pt referred to PT for mobility assessment & D/C planning w/ orders of Up and OOB as tolerated . Please see above for information re: home set-up & PLOF as well as objective findings during PT assessment. PTA, pt reports being fairly I w/ RW & assistance w/ some ADL's at baseline. Pt also on 2L O2 NC at home.  On eval, pt functioning below baseline hence will continue skilled PT to improve function & safety. Pt require S for transfers & CGAx1 for amb w/ RW + cues for techniques & safety. Gait deviations as above but no gross LOB noted.  Dec amb tolerance 2* to lightheadedness. The patient's AM-PAC Basic Mobility Inpatient Short Form Raw Score is 17. A Raw score of greater than 16 suggests the patient may benefit from discharge to home. Please also refer to the recommendation of the Physical Therapist for safe discharge planning. From PT standpoint, will recommend Level III " (minimum resource intensity) rehab services and inc family support at D/C, pending progress. No SOB & dizziness reported t/o session. Nsg staff most recent vital signs as follows: /86 (BP Location: Left arm)   Pulse 60   Temp 98.4 °F (36.9 °C) (Temporal)   Resp 20   Wt 81.6 kg (179 lb 14.4 oz)   SpO2 95%   BMI 32.90 kg/m² . At end of session, pt OOB in chair in stable condition, call bell & phone in reach, chair alarm activated. Fall precautions reinforced w/ good understanding. CM to follow. Nsg staff to continue to mobilized pt (OOB in chair for all meals & ambulate in room/unit) as tolerated to prevent further decline in function. Will also recommend [facility] for daily amb &/or daily OOB in chair as appropriate. Nsg notified. Co-eval was necessary to complete this PT eval for the pt's best interest given pt's medical acuity & complexity.   Goals   Patient Goals to go home   STG Expiration Date 05/05/24   Short Term Goal #1 Goals to be met in 10 days; pt will be able to: 1) inc strength & balance by 1/2 grade to improve overall functional mobility & dec fall risk; 2) inc bed mobility to modified I for pt to be able to get in/OOB safely w/ proper techniques 100% of the time, to dec caregiver burden & safely function at home; 3) inc transfers to modified I for pt to transition safely from one surface to another w/o % of the time, to dec caregiver burden & safely function at home; 4) inc amb w/ RW approx. >150' w/ S for pt to ambulate household distances w/o any % of the time, to dec caregiver burden & safely function at home; 5) negotiate stairs w/ S for inc safety during stair mgt inside/outside of home & dec caregiver burden; 6) pt/caregiver ed   PT Treatment Day 0   Plan   Treatment/Interventions Functional transfer training;LE strengthening/ROM;Elevations;Therapeutic exercise;Endurance training;Patient/family training;Bed mobility;Gait training;Spoke to nursing;OT;Spoke to case  management;Family   PT Frequency 3-5x/wk   Discharge Recommendation   Rehab Resource Intensity Level, PT III (Minimum Resource Intensity)   Equipment Recommended Walker  (pt has)   Additional Comments restorative for daily amb   AM-PAC Basic Mobility Inpatient   Turning in Flat Bed Without Bedrails 3   Lying on Back to Sitting on Edge of Flat Bed Without Bedrails 3   Moving Bed to Chair 3   Standing Up From Chair Using Arms 3   Walk in Room 3   Climb 3-5 Stairs With Railing 2   Basic Mobility Inpatient Raw Score 17   Basic Mobility Standardized Score 39.67   [facility] Highest Level Of Mobility   -[facility] Goal 5: Stand one or more mins   -[facility] Achieved 6: Walk 10 steps or more   End of Consult   Patient Position at End of Consult Bedside chair;Bed/Chair alarm activated;All needs within reach   End of Consult Comments Pt in stable condition. All needs in reach. Chair alarm activated.   Hx/personal factors: co-morbidities, inaccessible home, dec caregiver support, advanced age, telemetry, use of AD, fall risk, assist w/ ADL's, and O2  Examination: dec mobility, dec balance, dec endurance, dec amb, risk for falls  Clinical: unpredictable (ongoing medical status, abnormal lab values, and risk for falls)  Complexity: high    Theo Reynoso

## 2024-04-25 NOTE — ED PROVIDER NOTES
History  Chief Complaint   Patient presents with    Dizziness     Pt states has been feeling dizzy the past few weeks, primarily when walking. Denies any current pain. Hx of COPD. Denies current sob.     Patient is an 88-year-old female coming in today via EMS complaining of dizziness.Patient has a history of chronic CHF with EF of 60% with grade 2 diastolic dysfunction on chronic nasal cannula, diabetes, coronary artery disease status post CABG, hypertension, COPD, infrarenal AAA, hypothyroidism and benign familial tremor coming in today for dizziness.  She states has been ongoing for several weeks to months.  She states it intermittently happens but when it comes on she feels like the room is spinning and she is going to fall.  He has no known falls or injuries.  She has no associated nausea, chest pain, shortness of breath or palpitations when it occurs.  She has no focal weakness of the bilateral lower extremities or lower extremities when it occurs.  She denies any focal paresthesias throughout the bilateral per extremities and lower extremities when it occurs.  She states that she has not fallen or hit her head.  She has no recent URI      History provided by:  Patient, medical records and EMS personnel   used: No    Dizziness  Quality:  Room spinning  Severity:  Moderate  Onset quality:  Sudden  Duration: months.  Timing:  Intermittent  Progression:  Waxing and waning  Chronicity:  Recurrent  Context: not when bending over, not with bowel movement, not with ear pain, not with eye movement, not with head movement, not with inactivity, not with loss of consciousness, not with medication, not with physical activity, not when standing up and not when urinating    Relieved by:  Nothing  Worsened by:  Nothing  Ineffective treatments:  Being still  Associated symptoms: no chest pain, no palpitations, no shortness of breath, no tinnitus, no vision changes, no vomiting and no weakness    Risk  factors: no multiple medications and no new medications        Prior to Admission Medications   Prescriptions Last Dose Informant Patient Reported? Taking?   ALPRAZolam (XANAX) 0.5 mg tablet   No No   Sig: Take 1 tablet (0.5 mg total) by mouth 3 (three) times a day as needed (TREMOR) May take one additional prn   Cholecalciferol (Vitamin D) 50 MCG (2000 UT) tablet   No No   Sig: Take 0.5 tablets (1,000 Units total) by mouth every other day   Pediatric Multiple Vit-C-FA (PEDIATRIC MULTIVITAMIN) chewable tablet  Self Yes No   Sig: Chew 1 tablet daily   Potassium Gluconate 595 (99 K) MG TABS  Self Yes No   Sig: Take by mouth   Stiolto Respimat 2.5-2.5 MCG/ACT inhaler   No No   Sig: INHALE 2 PUFFS BY MOUTH EVERY DAY   acetaminophen (TYLENOL) 325 mg tablet   No No   Sig: Take 2 tablets (650 mg total) by mouth every 6 (six) hours as needed for mild pain   amLODIPine (NORVASC) 5 mg tablet   No No   Sig: TAKE 1 TABLET (5 MG TOTAL) BY MOUTH DAILY.   aspirin (ECOTRIN LOW STRENGTH) 81 mg EC tablet  Self Yes No   Sig: Take 81 mg by mouth daily   bimatoprost (LUMIGAN) 0.01 % ophthalmic drops   No No   Sig: Administer 1 drop to both eyes daily at bedtime   famotidine (PEPCID) 20 mg tablet   No No   Sig: TAKE 1 TABLET BY MOUTH TWICE A DAY   furosemide (LASIX) 20 mg tablet   No No   Sig: TAKE 1 TABLET BY MOUTH EVERY DAY   levothyroxine 75 mcg tablet   No No   Sig: TAKE 1 TABLET BY MOUTH EVERY DAY IN THE MORNING   metoprolol tartrate (LOPRESSOR) 25 mg tablet   No No   Sig: TAKE 1 TABLET (25 MG TOTAL) BY MOUTH EVERY 12 (TWELVE) HOURS   oxygen gas   Yes No   Sig: Inhale 2 L/min continuous. Indications: .   rosuvastatin (CRESTOR) 10 MG tablet   No No   Sig: Take 1 tablet (10 mg total) by mouth daily   traZODone (DESYREL) 50 mg tablet   No No   Sig: TAKE 1 TABLET (50 MG TOTAL) BY MOUTH DAILY AT BEDTIME CAN TITRATE UP IN 25 MG INCREMENTS IF NEEDED      Facility-Administered Medications: None       Past Medical History:   Diagnosis Date     Anxiety     Cataract, bilateral     Last Assessed:3/19/2014    COPD (chronic obstructive pulmonary disease) (HCC)     Coronary artery disease     Hx of arterial ischemic stroke     Hypertension     Stroke (HCC)     Wears glasses        Past Surgical History:   Procedure Laterality Date    CAROTID ENDARTERECTOMY Right     CATARACT EXTRACTION W/ INTRAOCULAR LENS  IMPLANT, BILATERAL      COLONOSCOPY N/A 9/30/2017    Procedure: COLONOSCOPY FOR CONTROL OF BLEEDING;  Surgeon: Jeb Gaxiola MD;  Location: [facility];  Service: Colorectal    CORONARY ARTERY BYPASS GRAFT      CABG X3 with LIMA to LAD,SVG to OM,SVG to distal  RCA ; Last Assessed:7/13/2015    JOINT REPLACEMENT      left TKR    KIDNEY STONE SURGERY      NEPHROSTOMY Left     with Drainage Irrigation ;Onset:1974    IA ARTHRP KNE CONDYLE&PLATU MEDIAL&LAT COMPARTMENTS Right 2/25/2016    Procedure: ARTHROPLASTY KNEE TOTAL;  Surgeon: Jeb Figueroa MD;  Location: [facility];  Service: Orthopedics       Family History   Problem Relation Age of Onset    Cancer Mother         malignant neoplasm    Heart attack Father     Aneurysm Other         Aortic    Cancer Other         malignant neoplasm     I have reviewed and agree with the history as documented.    E-Cigarette/Vaping    E-Cigarette Use Never User      E-Cigarette/Vaping Substances    Nicotine No     THC No     CBD No      Social History     Tobacco Use    Smoking status: Never    Smokeless tobacco: Never   Vaping Use    Vaping status: Never Used   Substance Use Topics    Alcohol use: Never    Drug use: Never       Review of Systems   Constitutional: Negative.  Negative for chills and fever.   HENT: Negative.  Negative for ear pain, sore throat and tinnitus.    Eyes: Negative.  Negative for pain and visual disturbance.   Respiratory: Negative.  Negative for cough and shortness of breath.    Cardiovascular: Negative.  Negative for chest pain and palpitations.   Gastrointestinal: Negative.  Negative for  abdominal pain and vomiting.   Genitourinary: Negative.  Negative for dysuria and hematuria.   Musculoskeletal: Negative.  Negative for arthralgias and back pain.   Skin:  Negative for color change and rash.   Neurological:  Positive for dizziness. Negative for seizures, syncope and weakness.   Hematological: Negative.    Psychiatric/Behavioral: Negative.     All other systems reviewed and are negative.      Physical Exam  Physical Exam  Vitals and nursing note reviewed.   Constitutional:       General: She is not in acute distress.     Appearance: She is well-developed.   HENT:      Head: Normocephalic and atraumatic.      Mouth/Throat:      Mouth: Mucous membranes are moist.   Eyes:      Extraocular Movements: Extraocular movements intact.      Conjunctiva/sclera: Conjunctivae normal.      Pupils: Pupils are equal, round, and reactive to light.   Cardiovascular:      Rate and Rhythm: Normal rate and regular rhythm.      Pulses:           Radial pulses are 2+ on the right side and 2+ on the left side.        Dorsalis pedis pulses are 2+ on the right side and 2+ on the left side.      Heart sounds: Normal heart sounds, S1 normal and S2 normal. No murmur heard.  Pulmonary:      Effort: Pulmonary effort is normal. No respiratory distress.      Breath sounds: Normal breath sounds.   Abdominal:      Palpations: Abdomen is soft.      Tenderness: There is no abdominal tenderness.   Musculoskeletal:         General: No swelling.      Cervical back: Neck supple.      Right lower leg: No edema.      Left lower leg: No edema.   Skin:     General: Skin is warm and dry.      Capillary Refill: Capillary refill takes less than 2 seconds.   Neurological:      General: No focal deficit present.      Mental Status: She is alert and oriented to person, place, and time.      GCS: GCS eye subscore is 4. GCS verbal subscore is 5. GCS motor subscore is 6.      Cranial Nerves: Cranial nerves 2-12 are intact.      Sensory: Sensation is  intact.      Motor: Motor function is intact.      Coordination: Coordination is intact.      Comments: Cranial nerves 2-12 grossly intact.  EOMI.  PERRLA.  No slurred speech.  No facial asymmetry.  No tongue deviation.  Negative pronator drift.  No nystagmus.    Patient can move bilateral upper extremities and lower extremities independently of each other pain-free with full active range of motion throughout the bilateral shoulders, elbows, wrists, hips, knees and ankles.    Manual muscle grade 5/5 throughout the bilateral upper extremities and lower extremities.      Noted resting tremor   Psychiatric:         Mood and Affect: Mood normal.         Vital Signs  ED Triage Vitals [04/24/24 2036]   Temperature Pulse Respirations Blood Pressure SpO2   98.8 °F (37.1 °C) 72 18 (!) 140/107 95 %      Temp Source Heart Rate Source Patient Position - Orthostatic VS BP Location FiO2 (%)   Oral Monitor Lying Right arm --      Pain Score       --           Vitals:    04/24/24 2036   BP: (!) 140/107   Pulse: 72   Patient Position - Orthostatic VS: Lying         Visual Acuity      ED Medications  Medications   meclizine (ANTIVERT) tablet 25 mg (25 mg Oral Given 4/24/24 2131)   meclizine (ANTIVERT) tablet 25 mg (25 mg Oral Given 4/24/24 2243)       Diagnostic Studies  Results Reviewed       Procedure Component Value Units Date/Time    UA (URINE) with reflex to Scope [[id number]]  (Abnormal) Collected: 04/24/24 2226    Lab Status: Final result Specimen: Urine, Clean Catch Updated: 04/24/24 2246     Color, UA Light Yellow     Clarity, UA Clear     Specific Gravity, UA 1.015     pH, UA 5.5     Leukocytes, UA Trace     Nitrite, UA Negative     Protein, UA Negative mg/dl      Glucose, UA 70 (7/100%) mg/dl      Ketones, UA Negative mg/dl      Urobilinogen, UA <2.0 mg/dl      Bilirubin, UA Negative     Occult Blood, UA Large    Urine Microscopic [[id number]] Collected: 04/24/24 2226    Lab Status: In process Specimen: Urine, Clean Catch  Updated: 04/24/24 2246    HS Troponin I 2hr [[id number]]     Lab Status: No result Specimen: Blood     HS Troponin 0hr (reflex protocol) [[id number]]  (Normal) Collected: 04/24/24 2118    Lab Status: Final result Specimen: Blood from Arm, Left Updated: 04/24/24 2148     hs TnI 0hr 6 ng/L     Comprehensive metabolic panel [[id number]]  (Abnormal) Collected: 04/24/24 2118    Lab Status: Final result Specimen: Blood from Arm, Left Updated: 04/24/24 2148     Sodium 140 mmol/L      Potassium 4.1 mmol/L      Chloride 101 mmol/L      CO2 34 mmol/L      ANION GAP 5 mmol/L      BUN 33 mg/dL      Creatinine 1.16 mg/dL      Glucose 203 mg/dL      Calcium 9.0 mg/dL      AST 16 U/L      ALT 3 U/L      Alkaline Phosphatase 71 U/L      Total Protein 6.8 g/dL      Albumin 3.6 g/dL      Total Bilirubin 0.57 mg/dL      eGFR 42 ml/min/1.73sq m     Narrative:      National Kid[employer] Foundation guidelines for Chronic Kidney Disease (CKD):     Stage 1 with normal or high GFR (GFR > 90 mL/min/1.73 square meters)    Stage 2 Mild CKD (GFR = 60-89 mL/min/1.73 square meters)    Stage 3A Moderate CKD (GFR = 45-59 mL/min/1.73 square meters)    Stage 3B Moderate CKD (GFR = 30-44 mL/min/1.73 square meters)    Stage 4 Severe CKD (GFR = 15-29 mL/min/1.73 square meters)    Stage 5 End Stage CKD (GFR <15 mL/min/1.73 square meters)  Note: GFR calculation is accurate only with a steady state creatinine    Magnesium [[id number]]  (Normal) Collected: 04/24/24 2118    Lab Status: Final result Specimen: Blood from Arm, Left Updated: 04/24/24 2148     Magnesium 1.9 mg/dL     B-Type Natriuretic Peptide(BNP) [[id number]]  (Normal) Collected: 04/24/24 2118    Lab Status: Final result Specimen: Blood from Arm, Left Updated: 04/24/24 2148     BNP 70 pg/mL     Protime-INR [[id number]]  (Normal) Collected: 04/24/24 2118    Lab Status: Final result Specimen: Blood from Arm, Left Updated: 04/24/24 5243     Protime 13.9 seconds      INR 1.04    APTT [[id number]]   (Normal) Collected: 04/24/24 2118    Lab Status: Final result Specimen: Blood from Arm, Left Updated: 04/24/24 2140     PTT 29 seconds     CBC and differential [[id number]]  (Abnormal) Collected: 04/24/24 2118    Lab Status: Final result Specimen: Blood from Arm, Left Updated: 04/24/24 2126     WBC 6.96 Thousand/uL      RBC 4.88 Million/uL      Hemoglobin 14.0 g/dL      Hematocrit 46.3 %      MCV 95 fL      MCH 28.7 pg      MCHC 30.2 g/dL      RDW 12.2 %      MPV 10.4 fL      Platelets 217 Thousands/uL      nRBC 0 /100 WBCs      Segmented % 61 %      Immature Grans % 0 %      Lymphocytes % 24 %      Monocytes % 9 %      Eosinophils Relative 5 %      Basophils Relative 1 %      Absolute Neutrophils 4.27 Thousands/µL      Absolute Immature Grans 0.01 Thousand/uL      Absolute Lymphocytes 1.69 Thousands/µL      Absolute Monocytes 0.59 Thousand/µL      Eosinophils Absolute 0.32 Thousand/µL      Basophils Absolute 0.08 Thousands/µL                    CT head without contrast   Final Result by Wilber Rollins MD (04/24 2150)      No acute intracranial abnormality.      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      Mild chronic small vessel ischemic changes.            Workstation performed: [id number]                    Procedures  ECG 12 Lead Documentation Only    Date/Time: 4/24/2024 9:05 PM    Performed by: Sindhu Suarez DO  Authorized by: Sindhu Suarez DO    Indications / Diagnosis:  Dizzy  ECG reviewed by me, the ED Provider: yes    Patient location:  ED  Previous ECG:     Previous ECG:  Compared to current    Comparison ECG info:  2/27/24    Similarity:  No change  Interpretation:     Interpretation: normal    Quality:     Tracing quality:  Limited by artifact  Rate:     ECG rate:  70    ECG rate assessment: normal    Rhythm:     Rhythm: sinus rhythm    QRS:     QRS axis:  Normal    QRS intervals:  Wide  Conduction:     Conduction: normal    ST segments:     ST segments:  Non-specific  T waves:      "T waves: non-specific    Comments:      TX @ 254 ms  QRS 160ms RBBB  QTC @ 464 ms  LPFB         ED Course  ED Course as of 04/24/24 2300 Wed Apr 24, 2024 2127 Patient is an 88-year-old female coming in today with dizziness.  On exam she is well-appearing no acute distress she is mildly hypertensive but neuro intact NIH 0.  Will start medical workup including CT head.  Will also give meclizine for prevention of dizziness    Disclosure: Voice to text software was used in the preparation of this document and could have resulted in translational errors.      Occasional wrong word or \"sound a like\" substitutions may have occurred due to the inherent limitations of voice recognition software.  Read the chart carefully and recognize, using context, where substitutions have occurred.       I have independently reviewed external records are available to me to the level of detail possible within the time constraints of my patient care responsibilities in the ED.       2150 Labs reviewed and without actionable derangement    Pending delta troponin urine and CT read   2208 CT No acute intracranial abnormality.     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     Mild chronic small vessel ischemic changes.        2237 Patient resting in bed.  Patient did ambulate but did still feel dizzy afterwards.  Patient and family updated on labs.  Pending urine and delta troponin.  Will repeat dose of meclizine.  She remains neuro intact in no acute distress   2259 Will plan for admission.             HEART Risk Score      Flowsheet Row Most Recent Value   Heart Score Risk Calculator    History 0 Filed at: 04/24/2024 2150   ECG 1 Filed at: 04/24/2024 2150   Age 2 Filed at: 04/24/2024 2150   Risk Factors 2 Filed at: 04/24/2024 2150   Troponin 0 Filed at: 04/24/2024 2150   HEART Score 5 Filed at: 04/24/2024 2150             Stroke Assessment       Row Name 04/24/24 2127             NIH Stroke Scale    Interval Baseline      Level of " Consciousness (1a.) 0      LOC Questions (1b.) 0      LOC Commands (1c.) 0      Best Gaze (2.) 0      Visual (3.) 0      Facial Palsy (4.) 0      Motor Arm, Left (5a.) 0      Motor Arm, Right (5b.) 0      Motor Leg, Left (6a.) 0      Motor Leg, Right (6b.) 0      Limb Ataxia (7.) 0      Sensory (8.) 0      Best Language (9.) 0      Dysarthria (10.) 0      Extinction and Inattention (11.) (Formerly Neglect) 0      Total 0                    Flowsheet Row Most Recent Value   Thrombolytic Decision Options    Thrombolytic Decision Patient not a candidate.   Patient is not a candidate options Unclear time of onset outside appropriate time window.                      SBIRT 20yo+      Flowsheet Row Most Recent Value   Initial Alcohol Screen: US AUDIT-C     1. How often do you have a drink containing alcohol? 0 Filed at: 04/24/2024 2032   2. How many drinks containing alcohol do you have on a typical day you are drinking?  0 Filed at: 04/24/2024 2032   3b. FEMALE Any Age, or MALE 65+: How often do you have 4 or more drinks on one occassion? 0 Filed at: 04/24/2024 2032   Audit-C Score 0 Filed at: 04/24/2024 2032   LUDIVINA: How many times in the past year have you...    Used an illegal drug or used a prescription medication for non-medical reasons? Never Filed at: 04/24/2024 2032                      Medical Decision Making      Differential diagnosis includes but not limited to: BPPV, Menière's, labyrinthitis, CVA, TIA, arrhthymias, ROB, electrolyte dysfunction, orthostatic hypotension, dehydration, medication reaction, OM, vestibular neuritis.       Amount and/or Complexity of Data Reviewed  External Data Reviewed: notes.     Details: CT of head in February 2021 showed no acute findings.  At last echo was in February 2021.  CT angio head and neck in May 2019 shows possible arterial injury but no evidence of arterial injury elsewhere    Labs: ordered. Decision-making details documented in ED Course.     Details: No anemia,  thrombocytopenia or leukocytosis  No acute kidney injury or electrolyte dysfunction   troponin 6 with a heart score of 5.    Radiology: ordered. Decision-making details documented in ED Course.     Details: CT head interpreted by radiologist asNo acute intracranial abnormality.     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     Mild chronic small vessel ischemic changes.       ECG/medicine tests: ordered and independent interpretation performed. Decision-making details documented in ED Course.     Details: No ischemia or arrhythmia    Risk  Decision regarding hospitalization.             Disposition  Final diagnoses:   Dizziness   HTN (hypertension)     Time reflects when diagnosis was documented in both MDM as applicable and the Disposition within this note       Time User Action Codes Description Comment    4/24/2024 11:00 PM Sindhu Suarez [R42] Dizziness     4/24/2024 11:00 PM Sindhu Suarez [I10] HTN (hypertension)           ED Disposition       ED Disposition   Admit    Condition   Stable    Date/Time   Wed Apr 24, 2024 2300    Comment   Case was discussed with [facility] and the patient's admission status was agreed to be Admission Status: observation status to the service of Dr. Fuchs .               Follow-up Information    None         Patient's Medications   Discharge Prescriptions    No medications on file       No discharge procedures on file.    PDMP Review         Value Time User    PDMP Reviewed  Yes 3/29/2024 10:55 AM Paz Maharaj DO            ED Provider  Electronically Signed by             Sindhu Suarez DO  04/24/24 2300

## 2024-04-25 NOTE — ASSESSMENT & PLAN NOTE
W/ chronic resp failure. No acute exacerbation  Pt maintaining oxygen saturations on normal 2L O2 home oxygen  Continue home inhaler regimen

## 2024-04-25 NOTE — ASSESSMENT & PLAN NOTE
Lab Results   Component Value Date    HGBA1C 6.1 02/21/2024       Recent Labs     04/25/24  0016 04/25/24  0802 04/25/24  1122 04/25/24  1557   POCGLU 90 105 100 109       Blood Sugar Average: Last 72 hrs:  Pt not maintained on medication at home  Diabetic diet  SSI with accucheks(P) 101

## 2024-04-25 NOTE — ASSESSMENT & PLAN NOTE
"Patient with PMHx of glaucoma, COPD on 2L NC chronically, CHF, DM2, HTN, HLD, CAD, and infrarenal AAA presented to the ED secondary to complaints of increased dizziness x several weeks, but worse this week.  Pt states that following discharge from hospitalization at the end of February for UTI, she noted she would become intermittently dizzy. She states that the dizziness occurs randomly with ambulation and rest. She reports increased blurred vision over this time which is constant. She feels this is contributory as it is \"throwing her off. Has an upcoming appt with her eye doctor for this issue in the setting of known glaucoma.   Pt also endorses low p.o. intake since discharge. She denies any other symptoms such as headache, facial droop, speech difficulty, or one sided weakness  Pt ambulates with a walker at baseline, but even with this, fears she will fall when she becomes dizzy while ambulating.   CT head demonstrating \"No acute intracranial abnormality. 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 Mild chronic small vessel ischemic changes\"  Neuro checks  Monitor on telemetry  Obtain orthostatic vital signs  Brief, gentle IV fluid hydration given reported low p.o. intake  Meclizine prn, supportive care  PT/OT eval   "

## 2024-04-26 VITALS
OXYGEN SATURATION: 98 % | BODY MASS INDEX: 32.78 KG/M2 | SYSTOLIC BLOOD PRESSURE: 120 MMHG | HEART RATE: 62 BPM | DIASTOLIC BLOOD PRESSURE: 62 MMHG | TEMPERATURE: 97.6 F | RESPIRATION RATE: 18 BRPM | WEIGHT: 179.23 LBS

## 2024-04-26 LAB
ALBUMIN SERPL BCP-MCNC: 3.4 G/DL (ref 3.5–5)
ALP SERPL-CCNC: 71 U/L (ref 34–104)
ALT SERPL W P-5'-P-CCNC: 4 U/L (ref 7–52)
ANION GAP SERPL CALCULATED.3IONS-SCNC: 3 MMOL/L (ref 4–13)
AST SERPL W P-5'-P-CCNC: 12 U/L (ref 13–39)
BASOPHILS # BLD AUTO: 0.08 THOUSANDS/ÂΜL (ref 0–0.1)
BASOPHILS NFR BLD AUTO: 1 % (ref 0–1)
BILIRUB SERPL-MCNC: 0.68 MG/DL (ref 0.2–1)
BUN SERPL-MCNC: 28 MG/DL (ref 5–25)
CALCIUM ALBUM COR SERPL-MCNC: 10 MG/DL (ref 8.3–10.1)
CALCIUM SERPL-MCNC: 9.5 MG/DL (ref 8.4–10.2)
CHLORIDE SERPL-SCNC: 101 MMOL/L (ref 96–108)
CO2 SERPL-SCNC: 36 MMOL/L (ref 21–32)
CREAT SERPL-MCNC: 1.09 MG/DL (ref 0.6–1.3)
EOSINOPHIL # BLD AUTO: 0.31 THOUSAND/ÂΜL (ref 0–0.61)
EOSINOPHIL NFR BLD AUTO: 5 % (ref 0–6)
ERYTHROCYTE [DISTWIDTH] IN BLOOD BY AUTOMATED COUNT: 12.1 % (ref 11.6–15.1)
GFR SERPL CREATININE-BSD FRML MDRD: 45 ML/MIN/1.73SQ M
GLUCOSE SERPL-MCNC: 119 MG/DL (ref 65–140)
GLUCOSE SERPL-MCNC: 92 MG/DL (ref 65–140)
GLUCOSE SERPL-MCNC: 98 MG/DL (ref 65–140)
HCT VFR BLD AUTO: 45.1 % (ref 34.8–46.1)
HGB BLD-MCNC: 13.7 G/DL (ref 11.5–15.4)
IMM GRANULOCYTES # BLD AUTO: 0.03 THOUSAND/UL (ref 0–0.2)
IMM GRANULOCYTES NFR BLD AUTO: 1 % (ref 0–2)
LYMPHOCYTES # BLD AUTO: 1.31 THOUSANDS/ÂΜL (ref 0.6–4.47)
LYMPHOCYTES NFR BLD AUTO: 21 % (ref 14–44)
MAGNESIUM SERPL-MCNC: 2.1 MG/DL (ref 1.9–2.7)
MCH RBC QN AUTO: 29.1 PG (ref 26.8–34.3)
MCHC RBC AUTO-ENTMCNC: 30.4 G/DL (ref 31.4–37.4)
MCV RBC AUTO: 96 FL (ref 82–98)
MONOCYTES # BLD AUTO: 0.62 THOUSAND/ÂΜL (ref 0.17–1.22)
MONOCYTES NFR BLD AUTO: 10 % (ref 4–12)
NEUTROPHILS # BLD AUTO: 3.94 THOUSANDS/ÂΜL (ref 1.85–7.62)
NEUTS SEG NFR BLD AUTO: 62 % (ref 43–75)
NRBC BLD AUTO-RTO: 0 /100 WBCS
PHOSPHATE SERPL-MCNC: 2.7 MG/DL (ref 2.3–4.1)
PLATELET # BLD AUTO: 220 THOUSANDS/UL (ref 149–390)
PMV BLD AUTO: 9.9 FL (ref 8.9–12.7)
POTASSIUM SERPL-SCNC: 4 MMOL/L (ref 3.5–5.3)
PROT SERPL-MCNC: 6 G/DL (ref 6.4–8.4)
RBC # BLD AUTO: 4.71 MILLION/UL (ref 3.81–5.12)
SODIUM SERPL-SCNC: 140 MMOL/L (ref 135–147)
WBC # BLD AUTO: 6.29 THOUSAND/UL (ref 4.31–10.16)

## 2024-04-26 PROCEDURE — 85025 COMPLETE CBC W/AUTO DIFF WBC: CPT | Performed by: INTERNAL MEDICINE

## 2024-04-26 PROCEDURE — 99239 HOSP IP/OBS DSCHRG MGMT >30: CPT | Performed by: INTERNAL MEDICINE

## 2024-04-26 PROCEDURE — 97116 GAIT TRAINING THERAPY: CPT

## 2024-04-26 PROCEDURE — 83735 ASSAY OF MAGNESIUM: CPT | Performed by: INTERNAL MEDICINE

## 2024-04-26 PROCEDURE — 82948 REAGENT STRIP/BLOOD GLUCOSE: CPT

## 2024-04-26 PROCEDURE — 97530 THERAPEUTIC ACTIVITIES: CPT

## 2024-04-26 PROCEDURE — 84100 ASSAY OF PHOSPHORUS: CPT | Performed by: INTERNAL MEDICINE

## 2024-04-26 PROCEDURE — 80053 COMPREHEN METABOLIC PANEL: CPT | Performed by: INTERNAL MEDICINE

## 2024-04-26 PROCEDURE — 97110 THERAPEUTIC EXERCISES: CPT

## 2024-04-26 RX ORDER — ESCITALOPRAM OXALATE 5 MG/1
5 TABLET ORAL DAILY
Qty: 30 TABLET | Refills: 0 | Status: SHIPPED | OUTPATIENT
Start: 2024-04-27

## 2024-04-26 RX ORDER — AMLODIPINE BESYLATE 10 MG/1
10 TABLET ORAL DAILY
Status: DISCONTINUED | OUTPATIENT
Start: 2024-04-26 | End: 2024-04-26 | Stop reason: HOSPADM

## 2024-04-26 RX ORDER — AMLODIPINE BESYLATE 10 MG/1
10 TABLET ORAL DAILY
Qty: 30 TABLET | Refills: 0 | Status: SHIPPED | OUTPATIENT
Start: 2024-04-27

## 2024-04-26 RX ORDER — MECLIZINE HYDROCHLORIDE 25 MG/1
25 TABLET ORAL EVERY 8 HOURS PRN
Qty: 30 TABLET | Refills: 0 | Status: SHIPPED | OUTPATIENT
Start: 2024-04-26

## 2024-04-26 RX ADMIN — UMECLIDINIUM 1 PUFF: 62.5 AEROSOL, POWDER ORAL at 09:17

## 2024-04-26 RX ADMIN — FAMOTIDINE 20 MG: 20 TABLET, FILM COATED ORAL at 09:17

## 2024-04-26 RX ADMIN — AMLODIPINE BESYLATE 10 MG: 10 TABLET ORAL at 09:17

## 2024-04-26 RX ADMIN — ALPRAZOLAM 0.5 MG: 0.5 TABLET ORAL at 09:18

## 2024-04-26 RX ADMIN — BIMATOPROST 1 DROP: 0.3 SOLUTION/ DROPS OPHTHALMIC at 09:17

## 2024-04-26 RX ADMIN — MECLIZINE HYDROCHLORIDE 25 MG: 12.5 TABLET ORAL at 09:31

## 2024-04-26 RX ADMIN — OLODATEROL RESPIMAT INHALATION SPRAY 2 PUFF: 2.5 SPRAY, METERED RESPIRATORY (INHALATION) at 09:17

## 2024-04-26 RX ADMIN — LEVOTHYROXINE SODIUM 75 MCG: 75 TABLET ORAL at 05:29

## 2024-04-26 RX ADMIN — ASPIRIN 81 MG: 81 TABLET, COATED ORAL at 09:17

## 2024-04-26 RX ADMIN — METOPROLOL TARTRATE 25 MG: 25 TABLET, FILM COATED ORAL at 09:17

## 2024-04-26 RX ADMIN — ESCITALOPRAM 5 MG: 5 TABLET, FILM COATED ORAL at 09:17

## 2024-04-26 RX ADMIN — HEPARIN SODIUM 5000 UNITS: 5000 INJECTION INTRAVENOUS; SUBCUTANEOUS at 05:29

## 2024-04-26 RX ADMIN — FUROSEMIDE 20 MG: 20 TABLET ORAL at 09:17

## 2024-04-26 NOTE — ASSESSMENT & PLAN NOTE
Wt Readings from Last 3 Encounters:   04/26/24 81.3 kg (179 lb 3.7 oz)   03/11/24 85.7 kg (189 lb)   02/29/24 82 kg (180 lb 12.4 oz)     No acute exacerbation. Pt appears dry on exam.   BNP 70  Last ECHO 2/2021 EF 60% with grade II diastolic dysfunction  Monitor daily weights, I/O  Lasix restarted

## 2024-04-26 NOTE — PLAN OF CARE
Problem: PHYSICAL THERAPY ADULT  Goal: Performs mobility at highest level of function for planned discharge setting.  See evaluation for individualized goals.  Description: Treatment/Interventions: Functional transfer training, LE strengthening/ROM, Elevations, Therapeutic exercise, Endurance training, Patient/family training, Bed mobility, Gait training, Spoke to nursing, OT, Spoke to case management, Family  Equipment Recommended: Walker (pt has)       See flowsheet documentation for full assessment, interventions and recommendations.  Outcome: Progressing  Note: Prognosis: Good  Problem List: Decreased endurance, Impaired balance, Decreased mobility, Obesity  Assessment: Pt seen for PT treatment session this date with interventions consisting of transfer training, gait training, and HEP, and education provided as needed for safety and direction to improve functional mobility, safety awareness, and activity tolerance. Pt agreeable to PT treatment session upon arrival, pt found SEATED OUT OF BED IN CHAIR. At end of session, pt left SEATED OUT OF BED IN CHAIR with all needs in reach. In comparison to previous session, pt with improvement in activity tolerance, endurance, standing balance, ambulation distances, ambulatory balance, and functional mobility. PT IS SHOWING PROGRESS TOWARD pt GOALS WITH IMPROVEMENTS AS NOTED, PROGRESSING WITH AMBULATION DISTANCES ' WITH USE OF RW. PT DEMONSTRATES NO GROSS LOB REQUIRES VERBAL CUES TO MAINTAIN CLOSE DISTANCE TO WALKER AT ALL TIMES.  NOTED MILD SWAY AT TIMES. PT PERFORMS SEATED B/L LE AROM AND ISOMETRIC EXERCISES X 10 REPS, TOLERATED WELL.  NO ANDRADE NOTED, FATIGUE REPORTED BY PT POST AMBULATION.    Continue to recommend  LEVEL III MINIMAL REHAB INTENSITY   at time of d/c in order to maximize pt's functional independence and safety w/ mobility. Pt continues to be functioning below baseline level. PT will continue to see pt while here in order to address the deficits  listed above and provide interventions consistent w/ POC in effort to achieve STGs.    The patient's AM-PAC Basic Mobility Inpatient Short Form Raw Score is 17. A raw score greater than 16 suggests the patient may benefit from discharge to home. Please also refer to the recommendation of the Physical Therapist for safe discharge planning.        Rehab Resource Intensity Level, PT: III (Minimum Resource Intensity)    See flowsheet documentation for full assessment.

## 2024-04-26 NOTE — DISCHARGE SUMMARY
Formerly [facility]  Discharge- Ashley Gonzales 1935, 88 y.o. 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  However also history of stones as well as hypertension  Creatinine stable at 1.11; GFR 44  No urgent need to be seen while inpatient, will refer to nephrology at discharge    Type 2 diabetes mellitus (HCC)  Assessment & Plan  Lab Results   Component Value Date    HGBA1C 6.1 02/21/2024       Recent Labs     04/25/24  1122 04/25/24  1557 04/25/24  2215 04/26/24  0727   POCGLU 100 109 108 98         Blood Sugar Average: Last 72 hrs:  Pt not maintained on medication at home  Diabetic diet  SSI with accucheks(P) [id number]      S/P CABG (coronary artery bypass graft)  Assessment & Plan  Continue aspirin and statin therapy  Patient denies chest pain; EKG on admission with no acute ischemic findings    Hyperlipidemia  Assessment & Plan  Continue statin    Chronic diastolic congestive heart failure (HCC)  Assessment & Plan  Wt Readings from Last 3 Encounters:   04/26/24 81.3 kg (179 lb 3.7 oz)   03/11/24 85.7 kg (189 lb)   02/29/24 82 kg (180 lb 12.4 oz)     No acute exacerbation. Pt appears dry on exam.   BNP 70  Last ECHO 2/2021 EF 60% with grade II diastolic dysfunction  Monitor daily weights, I/O  Lasix restarted          Hypothyroidism  Assessment & Plan  Continue levothyroxine    Hypertension  Assessment & Plan  Continue amlodipine, metoprolol, and lasix     Benign familial tremor  Assessment & Plan  Continue xanax prn    COPD (chronic obstructive pulmonary disease) ([facility])  Assessment & Plan  W/ chronic resp failure. No acute exacerbation  Pt maintaining oxygen saturations on normal 2L O2 home  "oxygen  Continue home inhaler regimen    * Dizziness  Assessment & Plan  Patient with PMHx of glaucoma, COPD on 2L NC chronically, CHF, DM2, HTN, HLD, CAD, and infrarenal AAA presented to the ED secondary to complaints of increased dizziness x several weeks, but worse this week.  Pt states that following discharge from hospitalization at the end of February for UTI, she noted she would become intermittently dizzy. She states that the dizziness occurs randomly with ambulation and rest. She reports increased blurred vision over this time which is constant. She feels this is contributory as it is \"throwing her off. Has an upcoming appt with her eye doctor for this issue in the setting of known glaucoma.   Pt also endorses low p.o. intake since discharge. She denies any other symptoms such as headache, facial droop, speech difficulty, or one sided weakness  Pt ambulates with a walker at baseline, but even with this, fears she will fall when she becomes dizzy while ambulating.   CT head demonstrating \"No acute intracranial abnormality. 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 Mild chronic small vessel ischemic changes\"  Neuro checks  Monitor on telemetry  Obtain orthostatic vital signs  Brief, gentle IV fluid hydration given reported low p.o. intake  Meclizine prn, supportive care  PT/OT eval     4/25-update: Orthostatics negative, CT head negative for acute bleed or any ischemic findings.  PT/OT evaluated patient, recommend home with home health.  On exam, patient denied symptoms, but very interestingly reported that they are, at least in her mind, very closely aligned with episodes of anxiety.  Additionally, patient reports both anxiety and occasional problems seeing that contribute to her underlying symptoms of dizziness.  Patient states that the room does not spin and that, it is less likely a true dizziness than uncertainty on her feet or the feeling that she will fall when she ambulates.  So anxiety high on " "differential, visual changes also on differential, and EKG on admission noted bifascicular block.  Currently being monitored on telemetry and will consider consult to cardiology as she may have sporadic episodes of bradycardia that might be contributing to her symptoms as well.  -- To treat anxiety, continue current benzos (patient takes Xanax 0.5 mg 3 times daily at home) and schedule a gentle weaning from these medications; Lexapro initiated; consider consult to behavioral health versus outpatient follow-up.              Medical Problems       Resolved Problems  Date Reviewed: 4/25/2024   None         Admission Date:   Admission Orders (From admission, onward)       Ordered        04/25/24 1223  INPATIENT ADMISSION  Once            04/24/24 2259  Place in Observation  Once                            Admitting Diagnosis: Dizziness [R42]  HTN (hypertension) [I10]    HPI: \"Ashley Gonzales is a 88 y.o. female who presents with dizziness.  Patient reports that since being discharged from the hospital at the end of February, she has been having increasing episodes of dizziness.  She reports that these dizzy episodes appear to get especially worse this week.  She reports that they appear to occur randomly as they occur when she is ambulating and at rest.  She reports that she typically ambulates with a walker, however when her dizzy spells come on she is scared that she is going to fall.  She reports no falls so far.  She does report that she feels that her vision is contributing to this issue.  She reports that she has been having increased blurred vision secondary to her glaucoma in bilateral eyes.  She reports that she has a follow-up visit with her eye doctor coming up.  She states that from her blurred vision she feels that it is difficult for her to approximate distances and it is \"throwing her off.\"  Patient also reports low p.o. intake since her discharge in February.  She reports that she has not been eating and " "drinking well.  Besides these complaints, she denies any other neuro symptoms such as headache, facial droop, slurred speech, or one-sided weakness.  Denies any chest pain, palpitations, shortness of breath, abdominal pain, nausea, vomiting, diaphoresis, fever, chills, or urinary symptoms.  Patient does report some chronic issues with incontinence.  Patient reports that she takes her blood sugars at home and she has not noted that she has been low.  Patient's son also reports that patient heart rate and blood pressure at home have been normal per him taking it.\"    Procedures Performed:   Orders Placed This Encounter   Procedures    ED ECG Documentation Only       Summary of Hospital Course:      Patient presented with several days of dizziness versus disequilibrium; reports room not spinning, but feels unsteady on her feet and finding it difficult to ambulate even with her walker.  Was placed on stroke pathway, fortunately no signs of ischemia discovered.  During her hospitalization, Ashley reported that dizziness often times associated with feelings of anxiety.  She was started on Lexapro 5 mg daily and instructed to try to wean off Xanax moving forward.  Patient also trialed on meclizine which seemed to work, have sent prescription of meclizine to her pharmacy.     Cleared by PT/OT for discharge home with home health.  Only other change was amlodipine increased to 10 mg from 5 mg as patient's blood pressure still relatively high during this admission.     Medication Changes:   Amlodipine changed from 5 mg to 10 mg   As needed meclizine sent to patient's pharmacy   Lexapro initiated for anxiety and depression   Recommended to patient that she trial Xanax wean as Lexapro takes effect     Significant Findings, Care, Treatment and Services Provided:     CT head negative  Patient reported symptoms waxed and waned; PT/OT recommending home with home health  Improvement of symptoms with meclizine  Started on " anxiolytics    Complications: None    Condition at Discharge: stable         Discharge instructions/Information to patient and family:   See after visit summary for information provided to patient and family.      Provisions for Follow-Up Care:  See after visit summary for information related to follow-up care and any pertinent home health orders.      PCP: Paz Maharaj DO    Disposition: Home    Planned Readmission: No    Discharge Statement   I spent 45 minutes discharging the patient. This time was spent on the day of discharge. I had direct contact with the patient on the day of discharge. Additional documentation is required if more than 30 minutes were spent on discharge.     Discharge Medications:  See after visit summary for reconciled discharge medications provided to patient and family.

## 2024-04-26 NOTE — ASSESSMENT & PLAN NOTE
Lab Results   Component Value Date    HGBA1C 6.1 02/21/2024       Recent Labs     04/25/24  1122 04/25/24  1557 04/25/24  2215 04/26/24  0727   POCGLU 100 109 108 98         Blood Sugar Average: Last 72 hrs:  Pt not maintained on medication at home  Diabetic diet  SSI with accucheks(P) [id number]

## 2024-04-26 NOTE — RESTORATIVE TECHNICIAN NOTE
Restorative Technician Note      Patient Name: Ashley Gonzales     Restorative Tech Visit Date: 04/26/24  Note Type: Mobility  Patient Position Upon Consult: Bedside chair  Activity Performed: Ambulated  Assistive Device: Roller walker  Patient Position at End of Consult: Bedside chair; All needs within reach; Bed/Chair alarm activated

## 2024-04-26 NOTE — NURSING NOTE
Patient and daughter given discharge instructions and prescriptions are to be picked up at pharmacy.

## 2024-04-26 NOTE — PLAN OF CARE
Problem: PAIN - ADULT  Goal: Verbalizes/displays adequate comfort level or baseline comfort level  Description: Interventions:  - Encourage patient to monitor pain and request assistance  - Assess pain using appropriate pain scale  - Administer analgesics based on type and severity of pain and evaluate response  - Implement non-pharmacological measures as appropriate and evaluate response  - Consider cultural and social influences on pain and pain management  - Notify physician/advanced practitioner if interventions unsuccessful or patient reports new pain  Outcome: Progressing     Problem: INFECTION - ADULT  Goal: Absence or prevention of progression during hospitalization  Description: INTERVENTIONS:  - Assess and monitor for signs and symptoms of infection  - Monitor lab/diagnostic results  - Monitor all insertion sites, i.e. indwelling lines, tubes, and drains  - Monitor endotracheal if appropriate and nasal secretions for changes in amount and color  - [place] appropriate cooling/warming therapies per order  - Administer medications as ordered  - Instruct and encourage patient and family to use good hand hygiene technique  - Identify and instruct in appropriate isolation precautions for identified infection/condition  Outcome: Progressing  Goal: Absence of fever/infection during neutropenic period  Description: INTERVENTIONS:  - Monitor WBC    Outcome: Progressing     Problem: SAFETY ADULT  Goal: Patient will remain free of falls  Description: INTERVENTIONS:  - Educate patient/family on patient safety including physical limitations  - Instruct patient to call for assistance with activity   - Consult OT/PT to assist with strengthening/mobility   - Keep Call bell within reach  - Keep bed low and locked with side rails adjusted as appropriate  - Keep care items and personal belongings within reach  - Initiate and maintain comfort rounds  - Make Fall Risk Sign visible to staff  - Apply yellow socks and bracelet  for high fall risk patients  - Consider moving patient to room near nurses station  Outcome: Progressing  Goal: Maintain or return to baseline ADL function  Description: INTERVENTIONS:  -  Assess patient's ability to carry out ADLs; assess patient's baseline for ADL function and identify physical deficits which impact ability to perform ADLs (bathing, care of mouth/teeth, toileting, grooming, dressing, etc.)  - Assess/evaluate cause of self-care deficits   - Assess range of motion  - Assess patient's mobility; develop plan if impaired  - Assess patient's need for assistive devices and provide as appropriate  - Encourage maximum independence but intervene and supervise when necessary  - Involve family in performance of ADLs  - Assess for home care needs following discharge   - Consider OT consult to assist with ADL evaluation and planning for discharge  - Provide patient education as appropriate  Outcome: Progressing  Goal: Maintains/Returns to pre admission functional level  Description: INTERVENTIONS:  - Perform AM-PAC 6 Click Basic Mobility/ Daily Activity assessment daily.  - Set and communicate daily mobility goal to care team and patient/family/caregiver.   - Collaborate with rehabilitation services on mobility goals if consulted  - Out of bed for toileting  - Record patient progress and toleration of activity level   Outcome: Progressing     Problem: DISCHARGE PLANNING  Goal: Discharge to home or other facility with appropriate resources  Description: INTERVENTIONS:  - Identify barriers to discharge w/patient and caregiver  - Arrange for needed discharge resources and transportation as appropriate  - Identify discharge learning needs (meds, wound care, etc.)  - Arrange for interpretive services to assist at discharge as needed  - Refer to Case Management Department for coordinating discharge planning if the patient needs post-hospital services based on physician/advanced practitioner order or complex needs  related to functional status, cognitive ability, or social support system  Outcome: Progressing     Problem: Knowledge Deficit  Goal: Patient/family/caregiver demonstrates understanding of disease process, treatment plan, medications, and discharge instructions  Description: Complete learning assessment and assess knowledge base.  Interventions:  - Provide teaching at level of understanding  - Provide teaching via preferred learning methods  Outcome: Progressing     Problem: Nutrition/Hydration-ADULT  Goal: Nutrient/Hydration intake appropriate for improving, restoring or maintaining nutritional needs  Description: Monitor and assess patient's nutrition/hydration status for malnutrition. Collaborate with interdisciplinary team and initiate plan and interventions as ordered.  Monitor patient's weight and dietary intake as ordered or per policy. Utilize nutrition screening tool and intervene as necessary. Determine patient's food preferences and provide high-protein, high-caloric foods as appropriate.     INTERVENTIONS:  - Monitor oral intake, urinary output, labs, and treatment plans  - Assess nutrition and hydration status and recommend course of action  - Evaluate amount of meals eaten  - Assist patient with eating if necessary   - Allow adequate time for meals  - Recommend/ encourage appropriate diets, oral nutritional supplements, and vitamin/mineral supplements  - Order, calculate, and assess calorie counts as needed  - Recommend, monitor, and adjust tube feedings and TPN/PPN based on assessed needs  - Assess need for intravenous fluids  - Provide specific nutrition/hydration education as appropriate  - Include patient/family/caregiver in decisions related to nutrition  Outcome: Progressing

## 2024-04-26 NOTE — DISCHARGE INSTR - AVS FIRST PAGE
Please remember to take all medications as directed on your medication list and follow-up with your primary care provider in 1-2 weeks.    You are encouraged to keep your med list on your person (in your wallet or purse) and please take your medication list provided in this After Visit Summary to your PCP visit following discharge.    Please ask your nurse to show you the medication list and explain when to take your medications.    Additionally, we encourage all patient's to take their actual medications with them to their primary care visit! This is to verify you have the proper medications and the proper dosages.  Remember, while medications are often listed in your computer record; that may not always be right as mistakes can occur at the pharmacy or in the computer and sometimes old medication bottles can be mistaken for newer medications.    (Note to nursing: please place patient's medication list on top of the AVS.)  ____________________________________________________________________________________    During your stay, we evaluated you for dizziness and, fortunately, you do not appear to have had a stroke.    Symptoms may be associated with anxiety, Lexapro was started to help with anxiety and depression.    I would encourage you to wean off of your Xanax in the coming weeks.  Consider taking Xanax as you have previously for the next week; and then take it twice daily for the following week; and then once daily for the week after that.  Remember, never stop Xanax all at once as this could precipitate withdrawal symptoms.  Discuss further with your primary care physician.    You have been prescribed meclizine which appear to help with symptoms while in the hospital.  Take as needed for dizziness; be aware of sedating side effects.    Feel better and please also follow-up with ophthalmology as visual changes may aggravate underlying symptoms of dizziness.

## 2024-04-26 NOTE — PHYSICAL THERAPY NOTE
PHYSICAL THERAPY NOTE          Patient Name: Ashley Gonzales  Today's Date: 4/26/2024 04/26/24 1000   Note Type   Note Type Treatment   Pain Assessment   Pain Assessment Tool 0-10   Pain Score No Pain   Restrictions/Precautions   Other Precautions Chair Alarm;Bed Alarm;Multiple lines;O2;Fall Risk;Telemetry   General   Chart Reviewed Yes   Family/Caregiver Present Yes   Cognition   Overall Cognitive Status WFL   Arousal/Participation Alert;Responsive;Cooperative   Attention Within functional limits   Orientation Level Oriented X4   Following Commands Follows one step commands without difficulty   Subjective   Subjective pt AGREEABLE TO PT.  PT OFFERS NO C/O PAIN.   Bed Mobility   Additional Comments PT OUT OF BED IN CHAIR UPON ARRIVAL   Transfers   Sit to Stand 5  Supervision   Additional items Assist x 1;Armrests;Increased time required;Verbal cues   Stand to Sit 5  Supervision   Additional items Assist x 1;Armrests;Increased time required;Verbal cues   Ambulation/Elevation   Gait pattern Forward Flexion;Excessively slow   Gait Assistance 5  Supervision  (CG- CLOSE SUPERVISION)   Additional items Assist x 1;Verbal cues   Assistive Device Rolling walker   Distance 200'X1   Ambulation/Elevation Additional Comments NO GROSS LOB NOTED.  CUES TO MAINTAIN CLOSE DISTANCE TO WALKER AT ALL TIMES.   Balance   Static Sitting Good   Dynamic Sitting Fair +   Static Standing Fair +   Dynamic Standing Fair   Ambulatory Fair   Activity Tolerance   Activity Tolerance Patient limited by fatigue;Patient tolerated treatment well   Exercises   Hip Flexion Sitting;10 reps;AROM;Bilateral   Hip Abduction Sitting;10 reps;AROM;Bilateral   Hip Adduction Sitting;10 reps;AROM;Bilateral  (AND ISOMETRIC HIP ADDUCTION X 10 REPS.)   Knee AROM Long Arc Quad Sitting;10 reps;AROM;Bilateral   Ankle Pumps Sitting;10 reps;AROM;Bilateral   Assessment   Prognosis Good    Problem List Decreased endurance;Impaired balance;Decreased mobility;Obesity   Assessment Pt seen for PT treatment session this date with interventions consisting of transfer training, gait training, and HEP, and education provided as needed for safety and direction to improve functional mobility, safety awareness, and activity tolerance. Pt agreeable to PT treatment session upon arrival, pt found SEATED OUT OF BED IN CHAIR. At end of session, pt left SEATED OUT OF BED IN CHAIR with all needs in reach. In comparison to previous session, pt with improvement in activity tolerance, endurance, standing balance, ambulation distances, ambulatory balance, and functional mobility. PT IS SHOWING PROGRESS TOWARD pt GOALS WITH IMPROVEMENTS AS NOTED, PROGRESSING WITH AMBULATION DISTANCES ' WITH USE OF RW. PT DEMONSTRATES NO GROSS LOB REQUIRES VERBAL CUES TO MAINTAIN CLOSE DISTANCE TO WALKER AT ALL TIMES.  NOTED MILD SWAY AT TIMES. PT PERFORMS SEATED B/L LE AROM AND ISOMETRIC EXERCISES X 10 REPS, TOLERATED WELL.  NO ANDRADE NOTED, FATIGUE REPORTED BY PT POST AMBULATION.    Continue to recommend  LEVEL III MINIMAL REHAB INTENSITY   at time of d/c in order to maximize pt's functional independence and safety w/ mobility. Pt continues to be functioning below baseline level. PT will continue to see pt while here in order to address the deficits listed above and provide interventions consistent w/ POC in effort to achieve STGs.    The patient's AM-[facility] Basic Mobility Inpatient Short Form Raw Score is 17. A raw score greater than 16 suggests the patient may benefit from discharge to home. Please also refer to the recommendation of the Physical Therapist for safe discharge planning.   Goals   Patient Goals TO GO HOME.   STG Expiration Date 05/05/24   PT Treatment Day 1   Plan   Treatment/Interventions Functional transfer training;LE strengthening/ROM;Therapeutic exercise;Endurance training;Patient/family training;Equipment  eval/education;Gait training   Progress Progressing toward goals   PT Frequency 3-5x/wk   Discharge Recommendation   Rehab Resource Intensity Level, PT III (Minimum Resource Intensity)   AM-PAC Basic Mobility Inpatient   Turning in Flat Bed Without Bedrails 3   Lying on Back to Sitting on Edge of Flat Bed Without Bedrails 3   Moving Bed to Chair 3   Standing Up From Chair Using Arms 3   Walk in Room 3   Climb 3-5 Stairs With Railing 2   Basic Mobility Inpatient Raw Score 17   Basic Mobility Standardized Score 39.67   [facility] Highest Level Of Mobility   -[facility] Goal 5: Stand one or more mins   -[facility] Achieved 7: Walk 25 feet or more   Education   Education Provided Mobility training;Home exercise program;Assistive device   Patient Demonstrates acceptance/verbal understanding   End of Consult   Patient Position at End of Consult Bed/Chair alarm activated;All needs within reach;Bedside chair        04/26/24 1000   Note Type   Note Type Treatment   Pain Assessment   Pain Assessment Tool 0-10   Pain Score No Pain   Restrictions/Precautions   Other Precautions Chair Alarm;Bed Alarm;Multiple lines;O2;Fall Risk;Telemetry   General   Chart Reviewed Yes   Family/Caregiver Present Yes   Cognition   Overall Cognitive Status WFL   Arousal/Participation Alert;Responsive;Cooperative   Attention Within functional limits   Orientation Level Oriented X4   Following Commands Follows one step commands without difficulty   Subjective   Subjective pt AGREEABLE TO PT.  PT OFFERS NO C/O PAIN.   Bed Mobility   Additional Comments PT OUT OF BED IN CHAIR UPON ARRIVAL   Transfers   Sit to Stand 5  Supervision   Additional items Assist x 1;Armrests;Increased time required;Verbal cues   Stand to Sit 5  Supervision   Additional items Assist x 1;Armrests;Increased time required;Verbal cues   Ambulation/Elevation   Gait pattern Forward Flexion;Excessively slow   Gait Assistance 5  Supervision  (CG- CLOSE SUPERVISION)   Additional items Assist x  1;Verbal cues   Assistive Device Rolling walker   Distance 200'X1   Ambulation/Elevation Additional Comments NO GROSS LOB NOTED.  CUES TO MAINTAIN CLOSE DISTANCE TO WALKER AT ALL TIMES.   Balance   Static Sitting Good   Dynamic Sitting Fair +   Static Standing Fair +   Dynamic Standing Fair   Ambulatory Fair   Activity Tolerance   Activity Tolerance Patient limited by fatigue;Patient tolerated treatment well   Exercises   Hip Flexion Sitting;10 reps;AROM;Bilateral   Hip Abduction Sitting;10 reps;AROM;Bilateral   Hip Adduction Sitting;10 reps;AROM;Bilateral  (AND ISOMETRIC HIP ADDUCTION X 10 REPS.)   Knee AROM Long Arc Quad Sitting;10 reps;AROM;Bilateral   Ankle Pumps Sitting;10 reps;AROM;Bilateral   Assessment   Prognosis Good   Problem List Decreased endurance;Impaired balance;Decreased mobility;Obesity   Assessment Pt seen for PT treatment session this date with interventions consisting of transfer training, gait training, and HEP, and education provided as needed for safety and direction to improve functional mobility, safety awareness, and activity tolerance. Pt agreeable to PT treatment session upon arrival, pt found SEATED OUT OF BED IN CHAIR. At end of session, pt left SEATED OUT OF BED IN CHAIR with all needs in reach. In comparison to previous session, pt with improvement in activity tolerance, endurance, standing balance, ambulation distances, ambulatory balance, and functional mobility. PT IS SHOWING PROGRESS TOWARD pt GOALS WITH IMPROVEMENTS AS NOTED, PROGRESSING WITH AMBULATION DISTANCES ' WITH USE OF RW. PT DEMONSTRATES NO GROSS LOB REQUIRES VERBAL CUES TO MAINTAIN CLOSE DISTANCE TO WALKER AT ALL TIMES.  NOTED MILD SWAY AT TIMES. PT PERFORMS SEATED B/L LE AROM AND ISOMETRIC EXERCISES X 10 REPS, TOLERATED WELL.  NO ANDRADE NOTED, FATIGUE REPORTED BY PT POST AMBULATION.    Continue to recommend  LEVEL III MINIMAL REHAB INTENSITY   at time of d/c in order to maximize pt's functional independence and  safety w/ mobility. Pt continues to be functioning below baseline level. PT will continue to see pt while here in order to address the deficits listed above and provide interventions consistent w/ POC in effort to achieve STGs.    The patient's AM-[facility] Basic Mobility Inpatient Short Form Raw Score is 17. A raw score greater than 16 suggests the patient may benefit from discharge to home. Please also refer to the recommendation of the Physical Therapist for safe discharge planning.   Goals   Patient Goals TO GO HOME.   STG Expiration Date 05/05/24   PT Treatment Day 1   Plan   Treatment/Interventions Functional transfer training;LE strengthening/ROM;Therapeutic exercise;Endurance training;Patient/family training;Equipment eval/education;Gait training   Progress Progressing toward goals   PT Frequency 3-5x/wk   Discharge Recommendation   Rehab Resource Intensity Level, PT III (Minimum Resource Intensity)   AM-PAC Basic Mobility Inpatient   Turning in Flat Bed Without Bedrails 3   Lying on Back to Sitting on Edge of Flat Bed Without Bedrails 3   Moving Bed to Chair 3   Standing Up From Chair Using Arms 3   Walk in Room 3   Climb 3-5 Stairs With Railing 2   Basic Mobility Inpatient Raw Score 17   Basic Mobility Standardized Score 39.67   [facility] Highest Level Of Mobility   -[facility] Goal 5: Stand one or more mins   -[facility] Achieved 7: Walk 25 feet or more   Education   Education Provided Mobility training;Home exercise program;Assistive device   Patient Demonstrates acceptance/verbal understanding   End of Consult   Patient Position at End of Consult Bed/Chair alarm activated;All needs within reach;Bedside chair     Azul Mclaughlin, PTA

## 2024-04-27 PROBLEM — N30.00 ACUTE CYSTITIS: Status: RESOLVED | Noted: 2024-02-27 | Resolved: 2024-04-27

## 2024-04-29 ENCOUNTER — TRANSITIONAL CARE MANAGEMENT (OUTPATIENT)
Dept: FAMILY MEDICINE CLINIC | Facility: CLINIC | Age: 89
End: 2024-04-29

## 2024-04-29 ENCOUNTER — TELEPHONE (OUTPATIENT)
Dept: NEPHROLOGY | Facility: CLINIC | Age: 89
End: 2024-04-29

## 2024-04-29 DIAGNOSIS — G25.0 BENIGN FAMILIAL TREMOR: ICD-10-CM

## 2024-04-29 NOTE — TELEPHONE ENCOUNTER
Reason for call:   [x] Refill   [] Prior Auth  [] Other:     Office:   [x] PCP/Provider - Paz Maharaj,    [] Specialty/Provider -     Medication: ALPRAZolam (XANAX) 0.5 mg tablet     Dose/Frequency:     0.5 mg, Oral, 3 times daily PRN       Quantity: 100    Pharmacy: [facility]/pharmacy #[id number] - [place], [place] - [address]     Does the patient have enough for 3 days?   [x] Yes   [] No - Send as HP to POD

## 2024-04-29 NOTE — TELEPHONE ENCOUNTER
New Patient Intake Form   Patient Details   Ashley Gonzales     1935     [id number]     Insurance Information   Name of Insurance Company [facility]   Does the patient need an insurance referral? no   If patient has VA insurance, please ask if they will be using their VA insurance.   Appointment Information   Who is calling to schedule?  If not patient, what is callers name? Child/Pablo   Referring Provider  SULMA GARCIA /[facility]    Reason for Appt (Diagnosis) R31.21 (ICD-10-CM) - Asymptomatic microscopic hematuria    Does Patient have labs/urine done at [facility]?  If not, where do they go?  List the date of last lab / urine  *Please try to get labs 2 years back if not at  yes   Has patient been hospitalized recently?  If yes, list name and location of hospital they were in yes   Has patient been seen by a Nephrologist before?  If yes, list name, location and phone number no   Has the patient had renal imaging done?  If so, list the most recent date and type of imaging yes    Does patient have a history of Kidney Stones? no   Appointment Details   Is there a referral on file? yes    Appointment Date 5/1/24    Location  [place]   Miscellaneous   Pt son will reschedule is pt unable to make appointment

## 2024-05-01 ENCOUNTER — CONSULT (OUTPATIENT)
Dept: NEPHROLOGY | Facility: CLINIC | Age: 89
End: 2024-05-01
Payer: COMMERCIAL

## 2024-05-01 VITALS
BODY MASS INDEX: 33.13 KG/M2 | DIASTOLIC BLOOD PRESSURE: 72 MMHG | HEIGHT: 62 IN | WEIGHT: 180 LBS | SYSTOLIC BLOOD PRESSURE: 132 MMHG

## 2024-05-01 DIAGNOSIS — I71.43 INFRARENAL ABDOMINAL AORTIC ANEURYSM (AAA) WITHOUT RUPTURE (HCC): ICD-10-CM

## 2024-05-01 DIAGNOSIS — N18.30 STAGE 3 CHRONIC KIDNEY DISEASE, UNSPECIFIED WHETHER STAGE 3A OR 3B CKD (HCC): ICD-10-CM

## 2024-05-01 DIAGNOSIS — Z95.1 S/P CABG (CORONARY ARTERY BYPASS GRAFT): ICD-10-CM

## 2024-05-01 DIAGNOSIS — I50.32 CHRONIC DIASTOLIC CONGESTIVE HEART FAILURE (HCC): ICD-10-CM

## 2024-05-01 DIAGNOSIS — N20.0 NEPHROLITHIASIS: ICD-10-CM

## 2024-05-01 DIAGNOSIS — I10 PRIMARY HYPERTENSION: ICD-10-CM

## 2024-05-01 DIAGNOSIS — R31.21 ASYMPTOMATIC MICROSCOPIC HEMATURIA: Primary | ICD-10-CM

## 2024-05-01 DIAGNOSIS — E66.9 OBESITY (BMI 30-39.9): ICD-10-CM

## 2024-05-01 PROCEDURE — 99204 OFFICE O/P NEW MOD 45 MIN: CPT | Performed by: INTERNAL MEDICINE

## 2024-05-01 RX ORDER — ALPRAZOLAM 0.5 MG/1
0.5 TABLET ORAL 3 TIMES DAILY PRN
Qty: 90 TABLET | Refills: 0 | Status: SHIPPED | OUTPATIENT
Start: 2024-05-01

## 2024-05-01 NOTE — PROGRESS NOTES
Nephrology Initial Consultation  Ashley Gonzales 88 y.o. female MRN: [id number]   ?         REASON FOR CONSULTATION:  Ashley Gonzales is a 88 y.o.female who was referred by Paz Maharaj DO for evaluation of Consult and Hypertension  .      ASSESSMENT / PLAN:   88 y.o.  female with pmh of multiple co-morbidities including obesity, CHF, AAA, kidney stones, s/p CEA, hypertension (x20yrs), diabetes diet controlled, COPD oxygen, JAMEEL, prior CVA mild upper extremity weakness, CAD status post CABG presents to the office for evaluation and management of hematuria.    CKD stage 3A/B:  - After review of records in [facility] as well as Care everywhere patient has a baseline creatinine of 1.1-1.3 mg/dL dating as far back as 2021. Most recent labs show a Creatinine of 1.09 mg/dL 4/26/2024. Renal function remains stable.  Blood Work after the visit  -Likely has underlying CKD secondary to age-related nephron loss plus obesity hyperfiltration plus cardiorenal syndrome plus renovascular disease plus smoking associated nephropathy plus hypertensive nephrosclerosis will still rule out paraproteinemia check SPEP UPEP free light chain ratio.  Very low likelihood of underlying GN such as IgA nephropathy or lupus had a detailed discussion with the patient and daughter we will hold off on renal biopsy and further workup at this time  -CT abdomen pelvis from 2/27/2024 no hydronephrosis multiple bilateral nonobstructing calculi largest measuring 6 mm in the lower pole on the left measuring 5 mm subcentimeter hypoattenuating renal lesion too small to characterize but likely benign.  - Acid base and lytes stable except for hypokalemia on potassium supplements 1 tab p.o. daily.  Most recent potassium level 4.0  - Clinically the patient appears to be euvolemic.  - Recommend to avoid use of NSAIDs, nephrotoxins. Caution advised with regards to exposure to IV contrast dye.   - Discussed with the patient in depth her renal status, including the  possible etiologies for CKD.   - Advised the patient that when her GFR is close to 20mL/min then will start discussing about RRT(renal replacement therapy) options such as renal transplant, peritoneal dialysis and hemodialysis.   - Informed the patient about the various options for Renal Replacement therapy.  - Discussed with the patient how we need to work together to delay the progression of CKD with optimal BP control based on their age and co-morbidities, optimal BS control with HbA1c of <7% and trying to reduce proteinuria by the use of anti-proteinuric agents.     Hypertension:  BP Readings from Last 3 Encounters:   05/01/24 132/72   04/26/24 120/62   03/14/24 128/80     - Patient is on Norvasc 10 mg p.o. daily, Lasix 20 mg p.o. daily, metoprolol 25 mg p.o. every 12.   -Discussed appropriate techniques of checking blood pressures and to call with blood pressure updates if any issues  -May consider changing Norvasc to ACE inhibitor or ARB if there is significant amount of proteinuria  - Goal BP of <  140/90 and >110 based on age and comorbidities  -Try to maintain blood pressures on the higher side due to patient's age and comorbidities  - Instructed to follow low sodium (2gm)diet.    Hemoglobin:  - Goal Hb of 10-12 g/dL  - Most recent labs suggestive of 13.7 g/dL.   -No role for IV iron    CKD-MBD(Mineral Bone Disease):  - Based on patients CKD stage following is the goal of therapy.  - Maintain calcium phosphorus product of < 55.  - Stage 3 CKD - Goal Ca 8.5-10 mg/dL , goal Phos 2.7-4.6 mg/dL  , goal iPTH 30-70 pg/mL  - Continue patient on vitamin D 1000 units every 48 hours  -Check intact PTH vitamin D after the visit and prior to next visit    Proteinuria/hematuria:  - proteinuria most likely secondary to obesity due to hyperfiltration will quantify check UA, urine micral and creatinine ratio, SPEP UPEP free light chain ratio  - most recent UA from 4/24/2024 large blood no protein positive glucose  -Patient  has had hematuria dating as far back as 2015 when innumerable RBCs.  Majority of the time prior UAs have had blood with signs of UTIs.  Discussed with patient in depth at this time renal parameters continue remained stable  overall as long as renal parameters remain stable we will hold off on renal biopsy unless deterioration in renal function.  May have some component of underlying thin basement membrane disease or IgA nephropathy versus renal scarring from nephrolithiasis.  Patient also agrees and wishes to hold off on renal biopsy, GN workup and cystoscopy with urology at this time.  The degree of proteinuria on prior workup was minimal for now we will reevaluate and see if there is some degree of increased proteinuria may consider addition of ACE inhibitor or ARB.  - check protein creatinine ratio  - currently on therapy for proteinuria with vitamin D, crestor    Lipids:  - on crestor  - goal LDL less than 70  - Management as per PCP    DM:  - management as per Primary team  - on no medication  -Most recent A1c 6.1% as of 2/21/2024  -A1c consistently less than 6.5% since 2015    CHF/AAA:  - Management as per primary team  -CT from 2/27/2024 showing 3.9 cm intrarenal abdominal aortic aneurysm on a background of severe atherosclerosis increased in size from 2017.  -Most recent echo from 2/6/2021 EF of 60% grade 2 diastolic dysfunction RVSP 55    Nephrolithiasis:  - Discussed with the patient that the most common types of kidney stones are calcium oxalate stones.   - Advised to follow a diet with increased fluid intake so that urine output is greater than 2-2.5L/day.  - Advised patient to decrease salt, protein and oxalate intake.   - Dietary handouts given.  - Ordered for 48 hr litholink stone workup analysis.   - advised patient to call 2 weeks after it is completed to review and discuss the results  - Check ca, ipth, uric acid.     Nutrition/obesity:  - Encouraged patient to follow a renal diet comprising of  moderate potassium, low phosphorus and protein restriction to 0.8gm/kg.  Advised of dietary habits and weight loss  - Will check serum albumin with next blood work.     Followup:  - Patient is to follow-up in 4 months, with lab work to be performed after the visit and then again in a few days prior to the next visit.  Advised patient to call me in 10 days to review the results if they do not hear back from me, as I may have not received the results.    Rosibel Mehta MD, [facility], 5/1/2024, 1:18 PM             HISTORY OF PRESENT ILLNESS: 88 y.o. female with multiple comorbidities including obesity, CHF, AAA, kidney stones, s/p CEA, hypertension (x20yrs), diabetes diet controlled, COPD oxygen, JAMEEL, prior CVA mild upper extremity weakness, CAD status post CABG presents to the office for evaluation and management of hematuria.  Review of record shows baseline creatinine 1.1 to 1.3 mg/dL dating as far back as 2021 most recent labs from 4/26/2024 with a creatinine 1.09 mg/dL.  Status post hospitalization April 2024 with dizziness. Presents to visit with her daughter who helps with the visit. First stone episode was 50 yrs ago and was s/p removal then no further stones since then. Home sbp about 110-130.  Never seen a nephrologist before no history of ROB needing dialysis.  Thankful for the care information that she is gone today does not drink a lot of water.  Not consuming high oxalate diet.  Wishes to hold off on urology for cystoscopy for hematuria workup does have history of prior smoking.  Wishes to hold off on renal biopsy and aggressive evaluation and workup for underlying GN.  Risks benefits stability all discussed in depth.  All questions and concerns answered no history of NSAID use.  Thankful for the care information that they have gone today.      Review of Systems   Constitutional:  Negative for chills and fever.   HENT:  Negative for congestion and sore throat.    Respiratory:  Negative for cough, shortness of  breath and wheezing.    Cardiovascular:  Negative for leg swelling.   Gastrointestinal:  Negative for diarrhea, nausea and vomiting.   Genitourinary:  Negative for difficulty urinating, dysuria and hematuria.   Musculoskeletal:  Negative for arthralgias, back pain and myalgias.   Skin:  Negative for rash and wound.   Neurological:  Positive for dizziness. Negative for light-headedness and headaches.   Psychiatric/Behavioral:  Negative for agitation and confusion.    All other systems reviewed and are negative.      PAST MEDICAL HISTORY:  Past Medical History:   Diagnosis Date   • Anxiety    • Cataract, bilateral     Last Assessed:3/19/2014   • COPD (chronic obstructive pulmonary disease) ([facility])    • Coronary artery disease    • Hx of arterial ischemic stroke    • Hypertension    • Stroke ([facility])    • Wears glasses        PROBLEM LIST    Patient Active Problem List   Diagnosis   • COPD (chronic obstructive pulmonary disease) ([facility])   • Benign familial tremor   • Hypertension   • Hypothyroidism   • Hx of arterial ischemic stroke   • Chronic diastolic congestive heart failure ([facility])   • Clinical depression   • Esophageal reflux   • Female stress incontinence   • Generalized anxiety disorder- panic   • Generalized osteoarthritis   • Glaucoma   • Hyperlipidemia   • Insomnia   • JAMEEL (obstructive sleep apnea)   • Osteoarthritis of knee   • Postural imbalance   • S/P CABG (coronary artery bypass graft)   • History of total knee replacement   • Type 2 diabetes mellitus ([facility])   • Pleural effusion   • Acute on chronic respiratory failure with hypoxia and hypercapnia ([facility])   • Obesity (BMI 30-39.9)   • Infrarenal abdominal aortic aneurysm (AAA) without rupture ([facility])   • Dizziness   • Hematuria   • Stage 3 chronic kidney disease ([facility])   • Nephrolithiasis       PAST SURGICAL HISTORY:  Past Surgical History:   Procedure Laterality Date   • CAROTID ENDARTERECTOMY Right    • CATARACT EXTRACTION W/ INTRAOCULAR LENS  IMPLANT, BILATERAL    "  • COLONOSCOPY N/A 9/30/2017    Procedure: COLONOSCOPY FOR CONTROL OF BLEEDING;  Surgeon: Jeb Gaxiola MD;  Location: [facility];  Service: Colorectal   • CORONARY ARTERY BYPASS GRAFT      CABG X3 with LIMA to LAD,SVG to OM,SVG to distal  RCA ; Last Assessed:7/13/2015   • JOINT REPLACEMENT      left TKR   • KIDNEY STONE SURGERY     • NEPHROSTOMY Left     with Drainage Irrigation ;Onset:1974   • AK ARTHRP KNE CONDYLE&PLATU MEDIAL&LAT COMPARTMENTS Right 2/25/2016    Procedure: ARTHROPLASTY KNEE TOTAL;  Surgeon: Jeb Figueroa MD;  Location: [facility];  Service: Orthopedics       SOCIAL HISTORY :   reports that she has never smoked. She has never used smokeless tobacco. She reports that she does not drink alcohol and does not use drugs.    FAMILY HISTORY:  Family History   Problem Relation Age of Onset   • Cancer Mother         malignant neoplasm   • Heart attack Father    • Aneurysm Other         Aortic   • Cancer Other         malignant neoplasm       ALLERGIES:  Allergies   Allergen Reactions   • Penicillins Itching   • Penicillins Rash           PHYSICAL EXAM:  Vitals:    05/01/24 1208   BP: 132/72   BP Location: Left arm   Patient Position: Sitting   Cuff Size: Large   Weight: 81.6 kg (180 lb)   Height: 5' 2\" (1.575 m)     Body mass index is 32.92 kg/m².    Physical Exam  Vitals reviewed.   Constitutional:       General: She is not in acute distress.     Appearance: Normal appearance. She is obese. She is not ill-appearing, toxic-appearing or diaphoretic.   HENT:      Head: Normocephalic and atraumatic.      Mouth/Throat:      Mouth: Mucous membranes are moist.      Pharynx: No oropharyngeal exudate.   Eyes:      General: No scleral icterus.  Cardiovascular:      Rate and Rhythm: Normal rate.   Pulmonary:      Effort: No respiratory distress.      Breath sounds: Normal breath sounds. No stridor. No wheezing.   Abdominal:      General: There is no distension.      Palpations: Abdomen is soft. There is no mass. "      Tenderness: There is no right CVA tenderness or left CVA tenderness.   Musculoskeletal:         General: No swelling.      Cervical back: Normal range of motion. No rigidity.   Skin:     Coloration: Skin is not jaundiced.   Neurological:      General: No focal deficit present.      Mental Status: She is alert and oriented to person, place, and time.   Psychiatric:         Mood and Affect: Mood normal.         Behavior: Behavior normal.           LABORATORY DATA:     Results from last 6 Months   Lab Units 04/26/24  0616 04/26/24  0615 04/25/24  0256 04/25/24  0256 04/25/24  0230 04/24/24  2118 02/28/24  0456   WBC Thousand/uL  --  6.29  --   --  6.07 6.96 6.89   HEMOGLOBIN g/dL  --  13.7  --   --  13.5 14.0 13.5   HEMATOCRIT %  --  45.1  --   --  44.4 46.3* 46.7*   PLATELETS Thousands/uL  --  220  --   --  207 217 212   POTASSIUM mmol/L 4.0  --   --  3.8  --  4.1 4.0   CHLORIDE mmol/L 101  --   --  101  --  101 98   CO2 mmol/L 36*  --   --  36*  --  34* 43*   BUN mg/dL 28*  --   --  32*  --  33* 33*   CREATININE mg/dL 1.09  --   --  1.11  --  1.16 1.21   CALCIUM mg/dL 9.5  --   --  8.9  --  9.0 8.8   MAGNESIUM mg/dL 2.1  --  1.9  --   --  1.9 2.2   PHOSPHORUS mg/dL 2.7  --   --   --   --   --  3.4          rest all reviewed    RADIOLOGY:  No orders to display     Rest all reviewed        MEDICATIONS:    Current Outpatient Medications:   •  acetaminophen (TYLENOL) 325 mg tablet, Take 2 tablets (650 mg total) by mouth every 6 (six) hours as needed for mild pain, Disp: , Rfl:   •  ALPRAZolam (XANAX) 0.5 mg tablet, Take 1 tablet (0.5 mg total) by mouth 3 (three) times a day as needed (TREMOR), Disp: 90 tablet, Rfl: 0  •  amLODIPine (NORVASC) 10 mg tablet, Take 1 tablet (10 mg total) by mouth daily, Disp: 30 tablet, Rfl: 0  •  aspirin (ECOTRIN LOW STRENGTH) 81 mg EC tablet, Take 81 mg by mouth daily, Disp: , Rfl:   •  bimatoprost (LUMIGAN) 0.01 % ophthalmic drops, Administer 1 drop to both eyes daily at bedtime,  "Disp: 5 mL, Rfl: 5  •  Cholecalciferol (Vitamin D) 50 MCG (2000 UT) tablet, Take 0.5 tablets (1,000 Units total) by mouth every other day, Disp: 100 tablet, Rfl: 0  •  escitalopram (LEXAPRO) 5 mg tablet, Take 1 tablet (5 mg total) by mouth daily, Disp: 30 tablet, Rfl: 0  •  famotidine (PEPCID) 20 mg tablet, TAKE 1 TABLET BY MOUTH TWICE A DAY, Disp: 180 tablet, Rfl: 1  •  furosemide (LASIX) 20 mg tablet, TAKE 1 TABLET BY MOUTH EVERY DAY, Disp: 90 tablet, Rfl: 1  •  levothyroxine 75 mcg tablet, TAKE 1 TABLET BY MOUTH EVERY DAY IN THE MORNING, Disp: 90 tablet, Rfl: 1  •  meclizine (ANTIVERT) 25 mg tablet, Take 1 tablet (25 mg total) by mouth every 8 (eight) hours as needed for dizziness or nausea, Disp: 30 tablet, Rfl: 0  •  metoprolol tartrate (LOPRESSOR) 25 mg tablet, TAKE 1 TABLET (25 MG TOTAL) BY MOUTH EVERY 12 (TWELVE) HOURS, Disp: 180 tablet, Rfl: 1  •  oxygen gas, Inhale 2 L/min continuous. Indications: ., Disp: , Rfl:   •  Pediatric Multiple Vit-C-FA (PEDIATRIC MULTIVITAMIN) chewable tablet, Chew 1 tablet daily, Disp: , Rfl:   •  Potassium Gluconate 595 (99 K) MG TABS, Take by mouth, Disp: , Rfl:   •  rosuvastatin (CRESTOR) 10 MG tablet, Take 1 tablet (10 mg total) by mouth daily, Disp: 90 tablet, Rfl: 1  •  Stiolto Respimat 2.5-2.5 MCG/ACT inhaler, INHALE 2 PUFFS BY MOUTH EVERY DAY (Patient not taking: Reported on 5/1/2024), Disp: 4 g, Rfl: 3  •  traZODone (DESYREL) 50 mg tablet, TAKE 1 TABLET (50 MG TOTAL) BY MOUTH DAILY AT BEDTIME CAN TITRATE UP IN 25 MG INCREMENTS IF NEEDED (Patient not taking: Reported on 5/1/2024), Disp: 90 tablet, Rfl: 1        Portions of the record may have been created with voice recognition software. Occasional wrong word or \"sound a like\" substitutions may have occurred due to the inherent limitations of voice recognition software. Read the chart carefully and recognize, using context, where substitutions have occurred.If you have any questions, please contact the dictating " provider.

## 2024-05-01 NOTE — PATIENT INSTRUCTIONS
"- get blood work after the visit  - get 48 hr urine after the visit and then start following the stone diet below and call me 2 weeks after to review the results  - check blood pressures routinely    - Please call me in 10 days after having your blood work done to review the results if you do not hear back from me or my office, as I may have not received the results.  - please remember to perform blood work prior to the next visit.  - Please call if the blood pressure top number is greater than 140 or less than 110 consistently.  - Please call if you are gaining more than 2lbs in 2 days for adjustment of water pills.  ~ Please AVOID the following pain medications.  LIST OF NSAIDS (NONSTEROIDAL ANTI-INFLAMMATORY DRUGS) AND RAI-2 INHIBITORS    DIFLUNISAL (DOLOBID)  IBUPROFEN (MOTRIN, ADVIL)  FLURBIPROFEN (ANSAID)  KETOPROFEN (ORUDIS, ORUVAIL)  FENOPROFEN (NALFON)  NABUMETONE (RELAFEN)  PIROXICAM (FELDENE)  NAPROXEN (ALEVE, NAPROSYN, NAPRELAN, ANAPROX)  DICLOFENAC (VOLTAREN, CATAFLAM)  INDOMETHACIN (INDOCIN)  SULINDAC (CLINORIL)  TOLMETIN (TOLETIN)  ETODOLAC (LODINE)  MELOXICAM (MOBIC)  KETOROLAC (TORADOL)  OXAPROZIN (DAYPRO)  CELECOXIB (CELEBREX)    Phosphorus diet  Follow a low phosphorus diet.    Avoid these higher phosphorus foods: Choose these lower phosphorus foods:   Milk, pudding or yogurt (from animals and from many soy varieties) Rice milk (unfortified), nondairy creamer (if it doesn't have terms in the ingredients list that contain the letters \"phos\")   Hard cheeses, ricotta or cottage cheese, fat-free cream cheese Regular and low-fat cream cheese   Ice cream or frozen yogurt Sherbet or frozen fruit pops   Soups made with higher phosphorus ingredients (milk, dried peas, beans, lentils) Soups made with lower phosphorus ingredients (broth- or water-based with other lower phosphorus ingredients)   Whole grains, including whole-grain breads, crackers, cereal, rice and pasta Refined grains, including white bread, " "crackers, cereals, rice and pasta   Quick breads, biscuits, cornbread, muffins, pancakes or waffles Homemade refined (white) dinner rolls, bagels or English muffins   Dried peas (split, black-eyed), beans (black, garbanzo, lima, kidney, navy, andersen) or lentils Green peas (canned, frozen), green beans or wax beans   Organ meats, walleye, pollock or sardines Lean beef, pork, lamb, poultry or other fish   Nuts and seeds Popcorn   Peanut butter and other nut butters Jam, jelly or honey   Chocolate, including chocolate drinks Carob (chocolate-flavored) candy, hard candy or gumdrops   Tamie and pepper-type sodas, flavored reina, bottled teas (if a term in the ingredients list contains the letters \"phos\") Lemon-lime soda, ginger ale or root beer, plain water   Follow a moderate potassium diet.          Things to do to reduce your blood pressure include working with all your physician to do the following:  ~ stop smoking if you smoke.  ~ increase cardiovascular exercise like walking and swimming.   ~ modify your diet to decrease fat and salt intake.  ~ reduce your weight if you are overweight or obese.  ~ increase the consumption of fruits, vegetables and whole grains.  ~ decrease alcohol consumption if you consume alcohol.   ~ try to minimize stress in your life with lifestyle modifications.   ~ be compliant with your anti-hypertensive medications.   ~ adjust your medications to help improve your vascular stiffness and decrease risks for heart attacks and strokes.     Kidney Stone Prevention    Fluid Intake    A high fluid intake is one of the most important cornerstones of kidney stone dietary prevention. A sufficiently dilute urine will prevent the individual chemical components of stones from becoming concentrated enough to precipitate out of solution, keeping them instead in their dissolved state. A high urine output also may reduce stone from forming through \"flushing\" out of stone components and prevention of urine " stagnation. In addition to stone benefits, increased water intake has been shown to have a multitude of other benefits, including improved alertness, better skin appearance, enhanced physical performance, reduced constipation and enhanced weight loss.    The average daily urine output for normal healthy adults is 1.2 liters a day, ranging from 1 to 2 liters in most individuals and varying with body weight and gender. In stone formers, however, a higher daily urine output is required for stone prevention and achieving a daily volume of at least 2.0 to 2.5 liters a day can significantly reduce the recurrence of future stones. In a randomized study of stone formers who were given specific instructions to increase their fluid intake compared to stone formers told to not change their diet, those given specific fluid instructions achieved a high urine output of 2.6 liters a day versus 1.0 liters a day in those not given dietary instructions. Over a period of five years, the high fluid intake group was half as likely to form new stones as compared to the normal fluid intake group (Erica et al, J Urol 1996).    We recommend that most stone formers increase their daily fluid intake by one liter (an additional two 16 oz water bottles or two tall glasses a day) in order to achieve a urine output of 2.5 liters a day. (One liter = 4.2 8-oz glasses or 34 oz). Alternatively, a 24 hour urine collection can be performed to guide fluid intake to achieve 2.5 liters of urine output in a specific patient.    Type of Fluid Intake    The type of fluid intake is generally less important than the total intake. While drinking water is our preferred recommendation because it is inexpensive and contains no calories, for stone patients who do not enjoy drinking water, any beverage will be beneficial in reducing stone risk.    Contrary to popular belief, studies have found that an increased intake of tea, coffee, and alcoholic beverage actually  reduces the risk of stones, possibly because of an associated increase in urine output (Magno blanco al, Am J of Epidemiology, 1996). While tea contains high levels of oxalate, this does not appear to result in increased stone formation for the reasons discussed below in discussion on oxalate.    Soda intake (including dara) and milk intake also do not appear to increase the risk of stones.    Citrus Fruit Juices    Citrus juices, including lemon juice and orange juice, contain citrate, which acts as a stone inhibitor for calcium based stones. Citrate seems to do this by binding calcium, making it unavailable to combine with oxalate or phosphate: a necessary first step in the formation of stones. Citrate also seems to make it more difficult for stones to grow once they've formed.    Drinking citrus juice in the form of concentrated lemon juice mixed with water has been shown to effectively increase urinary citrate levels and reduce urinary calcium levels, both of which will reduce stone risk. Orange juice will similarly increase urinary citrate levels. However, orange juice appears to also increase urinary oxalate levels ( a stone promoter). Other sources of citrate, including grapefruit juice, have had less research completed confirming their beneficial effects on urinary citrate levels. Therefore, lemon juice is typically favored over the other citrus juices as a natural method to increase urinary citrate levels. Many patients find drinking citrus juices to be an attractive alternative to pharmaceutical treatment with potassium citrate.    We recommend that stone formers consider supplementing their daily fluid intake with a mixture of 60 ml of concentrated lemon juice in one liter of water to increase their urinary citrate levels.    Salt    A high sodium intake increases the risk of stone formation by increasing calcium levels and decreasing citrate (a stone inhibitor) levels in urine. Additionally, high sodium  intake will impair the ability of medications such as hydrochlorothiazide to effectively reduce calcium levels in urine. A study of stone formers who were kept on a strict diet with a maximum daily sodium intake of 50 mmol (1200 mg) in addition to a reduced protein diet demonstrated that the low sodium diet was effective in reducing stone recurrence by 50% as compared to the low calcium diet.    We recommend that stone formers aim to follow the FDA's guideline of limiting salt intake to 2300 mg of sodium a day in the general population and 1500 mg of sodium a day in those with hypertension,  Americans, or middle aged and older adults. 2300 mg is equivalent to about 1 teaspoon of table salt.    The best way to determine the salt content of your food is to read the nutrition label. Processed foods tend to contain higher amounts of salt. Choose low sodium options whenever possible.    1 cup of canned chicken noodle soup contains 870 mg of sodium.  A fried chicken drumstick contains 310 mg of sodium.  A serving of shrimp contains 240 mg of sodium.  2 slices of white bread contains 200 mg of sodium.  15 potato chips contain 180 mg of sodium.  1 container of strawberry yogurt contains 85 mg of sodium.  1 tomato contains 20 mg of sodium.  1 apple contains 0 mg of sodium.    In addition to lowering the risk of stones, a low sodium intake helps to control or prevent high blood pressure, which can lead to heart disease, stroke, heart failure, and kidney disease.    Animal Protein    Animal protein in meat products increases the risk of stone by increasing calcium, oxalate, and uric acid levels in urine. All three of these changes increase the risk of stones. In studies comparing high meat eaters versus low meat eaters, high meat eaters were found to be at increased risk of forming stones. A randomized study of stone formers restricted to a low meat intake of 52 grams a day (equivalent to 8 oz of beef) in combination  with sodium restriction found that the combination reduced stone recurrence by 50% compared to calcium restriction alone (Erica et al, [facility] 2002).    We recommend that most stone formers try to reduce their meat intake to 6 oz a day. This includes all types of meat: beef, pork, poultry, and seafood.    The USDA has recommended a daily allowance of 5-6 oz of protein intake among adults. They also recommend choosing non-meat protein foods such as nuts and beans instead of meat sources. Protein from non-meat sources does not appear to increase the risk of stones.    A small steak contains about 3-4 oz of protein.  A quarter pound hamburger with cheese contains 4 oz of protein.  A chicken breast contains about 5 oz of protein, a chicken thigh about 2.5 oz, a chicken drumstick about 1.5 oz.  One 5 oz can of tuna contains 5 oz of protein.  1 medium egg contains 1 oz of protein.    Lowering your animal protein intake and eating more fruits and vegetables also benefits your overall health by limiting the amount of saturated fats and cholesterol in your diet. This helps to reduce your risk of cardiovascular disease.    Oxalate    While oxalate plays an important role in the development of calcium oxalate stones, dietary restriction does not appear to be effective in reducing the risk of stones in the majority of patients. About 40% of urinary oxalate comes from dietary sources while the remainder is naturally made within the liver. Therefore, reducing oxalate dietary intake does not always have a significant impact on total urinary oxalate levels.    Oxalate is found in many vegetable and fruits, including many healthy dietary choices often making it difficult to achieve a low oxalate diet. Because of these issues, oxalate avoidance is beneficial primarily in those individuals with specific abnormalities that lead to high oxalate urinary levels.    We recommend that most stone formers should maintain a normal oxalate intake  without the need for oxalate restriction. High oxalate intake should be avoided in individuals found to have high urinary oxalate levels on metabolic evaluation. Oxalate restriction may be beneficial in certain individuals with high urinary oxalate levels.    Oxalate Rich Foods    Tea (black)  Spinach  Mustard Greens  Chard  Beets  Rhubarb  Okra  Berries  Chocolate  Nuts  Sweet Potatoes        Calcium    Kidney Stone formers often question whether to stop or reduce their calcium intake. Despite the fact that calcium is a major component of 75% of stones, excessive calcium intake is vary rarely the cause of stone formation. In fact, several studies have shown that restricting calcium intake in most stone formers actually increases the number of stones they develop. This appears to happen because when less calcium is ingested, it becomes easier for oxalate (which normally binds with calcium in the gut) to be absorbed. Higher levels of oxalate in the urine then lead to an increase in stone risk. Calcium obtained from dietary sources appears to be better than calcium from supplements in regards to lowering stone risk because supplements can actually increase your risk of stones slightly (by 17%) while dietary calcium intake is instead associated with lower stone risk. If you need to take supplements, taking them during meals appear to be better in terms of stone risk.    We recommend that stone formers maintain a normal calcium intake, preferably from dietary sources. Female stone formers taking calcium supplementation to prevent osteoporosis experience a slightly increased risk of stones (17%) which needs to be balanced against their risk of osteoporosis.    Exercise to Lose Weight     Exercise and a healthy diet may help you lose weight. Your doctor may suggest specific exercises.     EXERCISE IDEAS AND TIPS     Choose low-cost things you enjoy doing, such as walking, bicycling, or exercising to workout videos.   Take  stairs instead of the elevator.   Walk during your lunch break.   Park your car further away from work or school.   Go to a gym or an exercise class.   Start with 5 to 10 minutes of exercise each day. Build up to 30 minutes of exercise 4 to 6 days a week.   Wear shoes with good support and comfortable clothes.   Stretch before and after working out.   Work out until you breathe harder and your heart beats faster.   Drink extra water when you exercise.   Do not do so much that you hurt yourself, feel dizzy, or get very short of breath.     Exercises that burn about 150 calories:   Running 1 ½ miles in 15 minutes.   Playing volleyball for 45 to 60 minutes.   Washing and waxing a car for 45 to 60 minutes.   Playing touch football for 45 minutes.   Walking 1 ¾ miles in 35 minutes.   Pushing a stroller 1 ½ miles in 30 minutes.   Playing basketball for 30 minutes.   Raking leaves for 30 minutes.   Bicycling 5 miles in 30 minutes.   Walking 2 miles in 30 minutes.   Dancing for 30 minutes.   Shoveling snow for 15 minutes.   Swimming laps for 20 minutes.   Walking up stairs for 15 minutes.   Bicycling 4 miles in 15 minutes.   Gardening for 30 to 45 minutes.   Jumping rope for 15 minutes.   Washing windows or floors for 45 to 60 minutes.

## 2024-05-02 NOTE — UTILIZATION REVIEW
NOTIFICATION OF ADMISSION DISCHARGE   This is a Notification of Discharge from [facility]. Please be advised that this patient has been discharge from our facility. Below you will find the admission and discharge date and time including the patient’s disposition.   UTILIZATION REVIEW CONTACT:  Ely Obrien MA  Utilization   Network Utilization Review Department  Phone: [phone] x carefully listen to the prompts. All voicemails are confidential.  Email: [email or web address]     ADMISSION INFORMATION  PRESENTATION DATE: 4/24/2024  8:30 PM  OBERVATION ADMISSION DATE:   INPATIENT ADMISSION DATE: 4/25/24 12:23 PM   DISCHARGE DATE: 4/26/2024  2:27 PM   DISPOSITION:Home with Home Health Care    Network Utilization Review Department  ATTENTION: Please call with any questions or concerns to [phone] and carefully listen to the prompts so that you are directed to the right person. All voicemails are confidential.   For Discharge needs, contact Care Management DC Support Team at [phone] opt. [phone]  Send all requests for admission clinical reviews, approved or denied determinations and any other requests to dedicated fax number below belonging to the campus where the patient is receiving treatment. List of dedicated fax numbers for the Facilities:  FACILITY NAME UR FAX NUMBER   ADMISSION DENIALS (Administrative/Medical Necessity) [phone]   DISCHARGE SUPPORT TEAM ([facility]) [phone]   PARENT CHILD HEALTH (Maternity/NICU/Pediatrics) [phone]   [facility] [phone]   [facility] [phone]   [facility] [phone]   [facility] [phone]   [facility] [phone]   [facility] [phone]   [facility] [phone]   [facility]  [place] [phone]   [facility] [phone]   [facility] [phone]   [facility] [phone]   [facility] [phone]

## 2024-05-06 ENCOUNTER — LAB (OUTPATIENT)
Dept: LAB | Facility: CLINIC | Age: 89
End: 2024-05-06
Payer: COMMERCIAL

## 2024-05-06 DIAGNOSIS — I10 PRIMARY HYPERTENSION: ICD-10-CM

## 2024-05-06 DIAGNOSIS — Z95.1 S/P CABG (CORONARY ARTERY BYPASS GRAFT): ICD-10-CM

## 2024-05-06 DIAGNOSIS — I71.43 INFRARENAL ABDOMINAL AORTIC ANEURYSM (AAA) WITHOUT RUPTURE (HCC): ICD-10-CM

## 2024-05-06 DIAGNOSIS — R31.21 ASYMPTOMATIC MICROSCOPIC HEMATURIA: ICD-10-CM

## 2024-05-06 DIAGNOSIS — I50.32 CHRONIC DIASTOLIC CONGESTIVE HEART FAILURE (HCC): ICD-10-CM

## 2024-05-06 DIAGNOSIS — E66.9 OBESITY (BMI 30-39.9): ICD-10-CM

## 2024-05-06 DIAGNOSIS — N18.30 STAGE 3 CHRONIC KIDNEY DISEASE, UNSPECIFIED WHETHER STAGE 3A OR 3B CKD (HCC): ICD-10-CM

## 2024-05-06 LAB
25(OH)D3 SERPL-MCNC: 59.5 NG/ML (ref 30–100)
ALBUMIN SERPL BCP-MCNC: 3.8 G/DL (ref 3.5–5)
ANION GAP SERPL CALCULATED.3IONS-SCNC: 5 MMOL/L (ref 4–13)
BUN SERPL-MCNC: 41 MG/DL (ref 5–25)
CALCIUM SERPL-MCNC: 9.7 MG/DL (ref 8.4–10.2)
CHLORIDE SERPL-SCNC: 103 MMOL/L (ref 96–108)
CO2 SERPL-SCNC: 34 MMOL/L (ref 21–32)
CREAT SERPL-MCNC: 1.16 MG/DL (ref 0.6–1.3)
GFR SERPL CREATININE-BSD FRML MDRD: 42 ML/MIN/1.73SQ M
GLUCOSE SERPL-MCNC: 66 MG/DL (ref 65–140)
MAGNESIUM SERPL-MCNC: 2.2 MG/DL (ref 1.9–2.7)
PHOSPHATE SERPL-MCNC: 3 MG/DL (ref 2.3–4.1)
POTASSIUM SERPL-SCNC: 4.5 MMOL/L (ref 3.5–5.3)
PTH-INTACT SERPL-MCNC: 74.8 PG/ML (ref 12–88)
SODIUM SERPL-SCNC: 142 MMOL/L (ref 135–147)
URATE SERPL-MCNC: 3.5 MG/DL (ref 2–7.5)

## 2024-05-06 PROCEDURE — 83521 IG LIGHT CHAINS FREE EACH: CPT

## 2024-05-06 PROCEDURE — 83970 ASSAY OF PARATHORMONE: CPT

## 2024-05-06 PROCEDURE — 86334 IMMUNOFIX E-PHORESIS SERUM: CPT

## 2024-05-06 PROCEDURE — 84550 ASSAY OF BLOOD/URIC ACID: CPT

## 2024-05-06 PROCEDURE — 84165 PROTEIN E-PHORESIS SERUM: CPT

## 2024-05-06 PROCEDURE — 80069 RENAL FUNCTION PANEL: CPT

## 2024-05-06 PROCEDURE — 82306 VITAMIN D 25 HYDROXY: CPT

## 2024-05-06 PROCEDURE — 83735 ASSAY OF MAGNESIUM: CPT

## 2024-05-06 PROCEDURE — 36415 COLL VENOUS BLD VENIPUNCTURE: CPT

## 2024-05-07 ENCOUNTER — TELEPHONE (OUTPATIENT)
Dept: NEPHROLOGY | Facility: CLINIC | Age: 89
End: 2024-05-07

## 2024-05-07 LAB
KAPPA LC FREE SER-MCNC: 52.8 MG/L (ref 3.3–19.4)
KAPPA LC FREE/LAMBDA FREE SER: 2.31 {RATIO} (ref 0.26–1.65)
LAMBDA LC FREE SERPL-MCNC: 22.9 MG/L (ref 5.7–26.3)

## 2024-05-07 NOTE — TELEPHONE ENCOUNTER
Spoke to Son he is aware renal function stable. Some labs pending if abnormal ;we will call back .

## 2024-05-07 NOTE — TELEPHONE ENCOUNTER
----- Message from Rosibel Mehta MD sent at 5/7/2024 11:31 AM EDT -----  Please let the patient know that most recent lab work in terms of renal parameters are stable.    Test results are still pending if anything abnormal comes back we will be in touch.  Will discuss further at the upcoming visit, let me know if they have any questions or concerns.    Thanks

## 2024-05-08 ENCOUNTER — TELEPHONE (OUTPATIENT)
Dept: NEPHROLOGY | Facility: CLINIC | Age: 89
End: 2024-05-08

## 2024-05-08 ENCOUNTER — TELEMEDICINE (OUTPATIENT)
Dept: FAMILY MEDICINE CLINIC | Facility: CLINIC | Age: 89
End: 2024-05-08
Payer: COMMERCIAL

## 2024-05-08 DIAGNOSIS — F41.1 GENERALIZED ANXIETY DISORDER: ICD-10-CM

## 2024-05-08 DIAGNOSIS — R42 DIZZINESS: Primary | ICD-10-CM

## 2024-05-08 DIAGNOSIS — D47.2 MONOCLONAL GAMMOPATHY: Primary | ICD-10-CM

## 2024-05-08 LAB
ALBUMIN SERPL ELPH-MCNC: 3.52 G/DL (ref 3.2–5.1)
ALBUMIN SERPL ELPH-MCNC: 53.4 % (ref 48–70)
ALPHA1 GLOB SERPL ELPH-MCNC: 0.34 G/DL (ref 0.15–0.47)
ALPHA1 GLOB SERPL ELPH-MCNC: 5.1 % (ref 1.8–7)
ALPHA2 GLOB SERPL ELPH-MCNC: 0.82 G/DL (ref 0.42–1.04)
ALPHA2 GLOB SERPL ELPH-MCNC: 12.4 % (ref 5.9–14.9)
BETA GLOB ABNORMAL SERPL ELPH-MCNC: 0.42 G/DL (ref 0.31–0.57)
BETA1 GLOB SERPL ELPH-MCNC: 6.4 % (ref 4.7–7.7)
BETA2 GLOB SERPL ELPH-MCNC: 5.8 % (ref 3.1–7.9)
BETA2+GAMMA GLOB SERPL ELPH-MCNC: 0.38 G/DL (ref 0.2–0.58)
GAMMA GLOB ABNORMAL SERPL ELPH-MCNC: 1.12 G/DL (ref 0.4–1.66)
GAMMA GLOB SERPL ELPH-MCNC: 16.9 % (ref 6.9–22.3)
IGG/ALB SER: 1.15 {RATIO} (ref 1.1–1.8)
INTERPRETATION UR IFE-IMP: NORMAL
PROT PATTERN SERPL ELPH-IMP: NORMAL
PROT SERPL-MCNC: 6.6 G/DL (ref 6.4–8.2)

## 2024-05-08 PROCEDURE — 99495 TRANSJ CARE MGMT MOD F2F 14D: CPT | Performed by: FAMILY MEDICINE

## 2024-05-08 PROCEDURE — 84165 PROTEIN E-PHORESIS SERUM: CPT | Performed by: PATHOLOGY

## 2024-05-08 PROCEDURE — 86334 IMMUNOFIX E-PHORESIS SERUM: CPT | Performed by: PATHOLOGY

## 2024-05-08 NOTE — TELEPHONE ENCOUNTER
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  Will have them office mail out the referral to them.

## 2024-05-08 NOTE — PROGRESS NOTES
Virtual TCM Visit:    Verification of patient location:    Patient is located at Home in the following state in which I hold an active license [place]    Assessment/Plan:           Behavioral Health    Generalized anxiety disorder- panic     Currently on low-dose Lexapro and clinically improved            Other    Dizziness - Primary     Recent admission-symptoms improved, rare use of meclizine           Return in about 9 weeks (around 7/10/2024) for  AWV.   Continue medications as reconciled.  Follow-up sooner if needed  History of type 2 diabetes-had goals with hemoglobin A1c at 6.1%.  Status post bypass graft surgery remote currently on aspirin and statin therapy.  Hyperlipidemia on statin.  History of chronic diastolic congestive heart failure without acute exacerbation echocardiogram not repeated.  Hypothyroidism continue levothyroxine.  Maintain antihypertensive meds with amlodipine metoprolol.  Patient with benign familial tremor as well as anxiety and will try to cut back on Xanax.  History of COPD and currently on O2 2 L home oxygen.  Workup for dizziness had included CT head with no acute findings.  Neurochecks were unremarkable.  Has upcoming appointment with eye doctor with regards to following up on glaucoma  Problem List Items Addressed This Visit        Behavioral Health    Generalized anxiety disorder- panic     Currently on low-dose Lexapro and clinically improved            Other    Dizziness - Primary     Recent admission-symptoms improved, rare use of meclizine             Reason for visit is TCM    Encounter provider Paz Maharaj DO     Provider located at [facility]  [address] [place] [address]  [address]  [place] [address] [phone]    Recent Visits  Date Type Provider Dept   05/08/24 Telemedicine Paz Maharaj DO [facility]   Showing recent visits within past 7 days and meeting all other requirements  Future  Appointments  No visits were found meeting these conditions.  Showing future appointments within next 150 days and meeting all other requirements       After connecting through [employer], the patient was identified by name and date of birth. Ashley Gonzales was informed that this is a telemedicine visit and that the visit is being conducted through the [employer] platform. She agrees to proceed..  My office door was closed. No one else was in the room.  She acknowledged consent and understanding of privacy and security of the video platform. The patient has agreed to participate and understands they can discontinue the visit at any time.    Patient is aware this is a billable service.    Transitional Care Management Review:  Ashley Gonzales is a 88 y.o. female here for TCM follow up.   Patient was admitted to [facility] 4/24/2024.  She had a previous hospitalization at the end of February for UTI and dizziness.  Chief Complaint   Patient presents with   • Dizziness       Pt states has been feeling dizzy the past few weeks, primarily when walking. Denies any current pain. Hx of COPD. Denies current sob.      Patient is an 88-year-old female coming in today via EMS complaining of dizziness.Patient has a history of chronic CHF with EF of 60% with grade 2 diastolic dysfunction on chronic nasal cannula, diabetes, coronary artery disease status post CABG, hypertension, COPD, infrarenal AAA, hypothyroidism and benign familial tremor coming in today for dizziness.  She states has been ongoing for several weeks to months.  She states it intermittently happens but when it comes on she feels like the room is spinning and she is going to fall.  He has no known falls or injuries.  She has no associated nausea, chest pain, shortness of breath or palpitations when it occurs.  She has no focal weakness of the bilateral lower extremities or lower extremities when it occurs.  She denies any focal paresthesias  throughout the bilateral per extremities and lower extremities when it occurs.  She states that she has not fallen or hit her head.  She has no recent URI     Medication list reconciled-patient no longer is on Stiolto (for some time) and trazodone had not been effective.  Overall sleep has been better patient is trying to cut down on the alprazolam.  Was started on low-dose Lexapro.  Seems to be tolerating it and she thinks it may be helping.  During the TCM phone call patient stated:    TCM Call     Date and time call was made  4/29/2024  2:31 PM    Hospital care reviewed  Records reviewed    Patient was hospitialized at  [facility]    Date of Admission  04/24/24    Date of discharge  04/26/24    Diagnosis  dizziness    Disposition  Home    Were the patients medications reviewed and updated  No    Current Symptoms  None      TCM Call     Post hospital issues  Reduced activity    Should patient be enrolled in anticoag monitoring?  No    Scheduled for follow up?  Yes    Did you obtain your prescribed medications  Yes    Do you need help managing your prescriptions or medications  No    Is transportation to your appointment needed  Yes    Specify why  Pt does not drive    I have advised the patient to call PCP with any new or worsening symptoms  Charley Webb MA    Living Arrangements  Children    Are you recieving home care services  Yes    Types of home care services  Home PT    Counseling  Family        Subjective:  Chief Complaint   Patient presents with   • Virtual Tcm       Patient ID: Ashley Gonzales is a 88 y.o. female.    HPI  Eye doctor at end of month  Review of Systems   Psychiatric/Behavioral:  Positive for sleep disturbance (variable).          Objective:    There were no vitals filed for this visit.    Physical Exam  Constitutional:       Appearance: Normal appearance.      Comments: Pleasant 88-year-old female appears younger than stated age.   Pulmonary:      Effort: Pulmonary effort is  normal.   Neurological:      Mental Status: She is alert and oriented to person, place, and time.      Comments: Last tremor noted   Psychiatric:         Mood and Affect: Mood normal.         Behavior: Behavior normal.         Thought Content: Thought content normal.         Judgment: Judgment normal.         Medications have been reviewed by provider in current encounter    I spent 25 minutes with the patient during this visit.  I have spent a total time of 25 minutes on 05/12/24 in caring for this patient including Diagnostic results, Instructions for management, Importance of tx compliance, Impressions, Counseling / Coordination of care, Documenting in the medical record, Reviewing / ordering tests, medicine, procedures  , and Obtaining or reviewing history  .   Paz Maharaj, DO      VIRTUAL VISIT DISCLAIMER    Ashley Gonzales verbally agrees to participate in Virtual Care Services. Pt is aware that Virtual Care Services could be limited without vital signs or the ability to perform a full hands-on physical exam. Ashley Gonzales understands she or the provider may request at any time to terminate the video visit and request the patient to seek care or treatment in person.

## 2024-05-08 NOTE — TELEPHONE ENCOUNTER
----- Message from Rosibel Mehta MD sent at 5/8/2024  1:59 PM EDT -----  Regarding: Mail out  Please mail out referral for hematology oncology already placed it thank you

## 2024-05-09 ENCOUNTER — APPOINTMENT (OUTPATIENT)
Dept: LAB | Facility: CLINIC | Age: 89
End: 2024-05-09
Payer: COMMERCIAL

## 2024-05-09 LAB
BACTERIA UR QL AUTO: ABNORMAL /HPF
BILIRUB UR QL STRIP: NEGATIVE
CLARITY UR: CLEAR
COLOR UR: ABNORMAL
CREAT UR-MCNC: 80.3 MG/DL
CREAT UR-MCNC: 80.3 MG/DL
GLUCOSE UR STRIP-MCNC: NEGATIVE MG/DL
HGB UR QL STRIP.AUTO: ABNORMAL
HYALINE CASTS #/AREA URNS LPF: ABNORMAL /LPF
KETONES UR STRIP-MCNC: NEGATIVE MG/DL
LEUKOCYTE ESTERASE UR QL STRIP: NEGATIVE
MICROALBUMIN UR-MCNC: 18.2 MG/L
MICROALBUMIN/CREAT 24H UR: 23 MG/G CREATININE (ref 0–30)
NITRITE UR QL STRIP: NEGATIVE
NON-SQ EPI CELLS URNS QL MICRO: ABNORMAL /HPF
PH UR STRIP.AUTO: 5.5 [PH]
PROT UR STRIP-MCNC: ABNORMAL MG/DL
PROT UR-MCNC: 14 MG/DL
PROT/CREAT UR: 0.17 MG/G{CREAT} (ref 0–0.1)
RBC #/AREA URNS AUTO: ABNORMAL /HPF
SP GR UR STRIP.AUTO: 1.01 (ref 1–1.03)
UROBILINOGEN UR STRIP-ACNC: <2 MG/DL
WBC #/AREA URNS AUTO: ABNORMAL /HPF

## 2024-05-09 PROCEDURE — 84156 ASSAY OF PROTEIN URINE: CPT

## 2024-05-09 PROCEDURE — 84166 PROTEIN E-PHORESIS/URINE/CSF: CPT

## 2024-05-09 PROCEDURE — 81001 URINALYSIS AUTO W/SCOPE: CPT

## 2024-05-09 PROCEDURE — 82043 UR ALBUMIN QUANTITATIVE: CPT

## 2024-05-09 PROCEDURE — 82570 ASSAY OF URINE CREATININE: CPT

## 2024-05-10 ENCOUNTER — TELEPHONE (OUTPATIENT)
Dept: HEMATOLOGY ONCOLOGY | Facility: CLINIC | Age: 89
End: 2024-05-10

## 2024-05-10 LAB
ALBUMIN UR ELPH-MCNC: 100 %
ALPHA1 GLOB MFR UR ELPH: 0 %
ALPHA2 GLOB MFR UR ELPH: 0 %
B-GLOBULIN MFR UR ELPH: 0 %
GAMMA GLOB MFR UR ELPH: 0 %
PROT PATTERN UR ELPH-IMP: NORMAL
PROT UR-MCNC: 14.4 MG/DL

## 2024-05-10 PROCEDURE — 84166 PROTEIN E-PHORESIS/URINE/CSF: CPT | Performed by: PATHOLOGY

## 2024-05-10 NOTE — TELEPHONE ENCOUNTER
I called Ashley in response to a referral that was received for patient to establish care with Hematology.     Outreach was made to schedule a consultation.    I left a voicemail explaining the reason for my call and advised patient to call [facility] at [phone].  Another attempt will be made to contact patient.

## 2024-05-11 DIAGNOSIS — I25.10 CORONARY ARTERY DISEASE INVOLVING NATIVE CORONARY ARTERY OF NATIVE HEART WITHOUT ANGINA PECTORIS: ICD-10-CM

## 2024-05-13 ENCOUNTER — TELEPHONE (OUTPATIENT)
Dept: HEMATOLOGY ONCOLOGY | Facility: CLINIC | Age: 89
End: 2024-05-13

## 2024-05-13 RX ORDER — ROSUVASTATIN CALCIUM 10 MG/1
10 TABLET, COATED ORAL DAILY
Qty: 90 TABLET | Refills: 1 | Status: SHIPPED | OUTPATIENT
Start: 2024-05-13

## 2024-05-13 NOTE — TELEPHONE ENCOUNTER
I called Ashley in response to a referral that was received for patient to establish care with Hematology.      Outreach was made to schedule a consultation.     I left a voicemail explaining the reason for my call and advised patient to call [facility] at [phone].  Another attempt will be made to contact patient.

## 2024-05-15 ENCOUNTER — TELEPHONE (OUTPATIENT)
Dept: HEMATOLOGY ONCOLOGY | Facility: CLINIC | Age: 89
End: 2024-05-15

## 2024-05-15 NOTE — TELEPHONE ENCOUNTER
I called Ashley in response to a referral that was received for patient to establish care with Hematology.     Outreach was made to schedule a consultation.    A consultation was scheduled for patient during this call. Patient is scheduled on 6/4/24 at 1140 with Marylin at the [facility]

## 2024-05-23 DIAGNOSIS — I10 HTN (HYPERTENSION): ICD-10-CM

## 2024-05-23 RX ORDER — AMLODIPINE BESYLATE 10 MG/1
10 TABLET ORAL DAILY
Qty: 30 TABLET | Refills: 5 | OUTPATIENT
Start: 2024-05-23

## 2024-05-23 RX ORDER — AMLODIPINE BESYLATE 10 MG/1
10 TABLET ORAL DAILY
Qty: 90 TABLET | Refills: 0 | Status: SHIPPED | OUTPATIENT
Start: 2024-05-23

## 2024-05-23 NOTE — TELEPHONE ENCOUNTER
Reason for call:   [x] Refill   [] Prior Auth  [] Other:     Office:   [x] PCP/Provider -   [] Specialty/Provider -     Medication: amLODIPine (NORVASC) 10 mg tablet Take 1 tablet (10 mg total) by mouth daily       Pharmacy: [facility]/pharmacy #[id number] - [place] - [address]     Does the patient have enough for 3 days?   [] Yes   [x] No - Send as HP to POD

## 2024-05-23 NOTE — TELEPHONE ENCOUNTER
Patient and her caregiver Savannah called in checking on the amlodipine (NORVASC) 10 mg refill. She is down to 3 pills. Patient stated she received this in the hospital but there are no refills on the bottle. Please send to [facility] on [address]. Thank you.

## 2024-05-24 DIAGNOSIS — R42 DIZZINESS: ICD-10-CM

## 2024-05-24 DIAGNOSIS — F41.1 GENERALIZED ANXIETY DISORDER: ICD-10-CM

## 2024-05-28 RX ORDER — ESCITALOPRAM OXALATE 5 MG/1
5 TABLET ORAL DAILY
Qty: 30 TABLET | Refills: 0 | Status: SHIPPED | OUTPATIENT
Start: 2024-05-28

## 2024-05-28 RX ORDER — MECLIZINE HYDROCHLORIDE 25 MG/1
25 TABLET ORAL EVERY 8 HOURS PRN
Qty: 30 TABLET | Refills: 0 | Status: SHIPPED | OUTPATIENT
Start: 2024-05-28

## 2024-05-29 ENCOUNTER — CONSULT (OUTPATIENT)
Dept: VASCULAR SURGERY | Facility: CLINIC | Age: 89
End: 2024-05-29
Payer: COMMERCIAL

## 2024-05-29 VITALS
DIASTOLIC BLOOD PRESSURE: 58 MMHG | HEART RATE: 75 BPM | HEIGHT: 62 IN | SYSTOLIC BLOOD PRESSURE: 134 MMHG | WEIGHT: 186 LBS | BODY MASS INDEX: 34.23 KG/M2 | OXYGEN SATURATION: 95 %

## 2024-05-29 DIAGNOSIS — I71.43 INFRARENAL ABDOMINAL AORTIC ANEURYSM (AAA) WITHOUT RUPTURE (HCC): Primary | ICD-10-CM

## 2024-05-29 PROCEDURE — 99203 OFFICE O/P NEW LOW 30 MIN: CPT | Performed by: SURGERY

## 2024-05-29 NOTE — ASSESSMENT & PLAN NOTE
Small AAA <4cm  Of note patient is O2 dependent  Lengthy discussion with patient with her daughter present today in the office that in the event of aneurysm rupture she WOULD NOT be interested in emergency surgery/or heroic measures to save her life.  She is interested in continued surveillance.  Will obtain a duplex in 1 yr.

## 2024-05-29 NOTE — PROGRESS NOTES
Ambulatory Visit  Name: Ashley Gonzales      : 1935      MRN: [id number]  Encounter Provider: Quita Rangel DO  Encounter Date: 2024   [facility] [place]    Assessment & Plan   1. Infrarenal abdominal aortic aneurysm (AAA) without rupture (HCC)  Assessment & Plan:  Small AAA <4cm  Of note patient is O2 dependent  Lengthy discussion with patient with her daughter present today in the office that in the event of aneurysm rupture she WOULD NOT be interested in emergency surgery/or heroic measures to save her life.  She is interested in continued surveillance.  Will obtain a duplex in 1 yr.  Orders:  -     Ambulatory Referral to Vascular Surgery  -     VAS abdominal aorta/iliac duplex; Future; Expected date: 2025      History of Present Illness     Patient is new to our office. She was referred here by Dr. Paz Maharaj. Patient had a CT ABD/Pelvis done 2024. She c/o chronic low back pain. She denies any abd pain or abd pain after eating. Patient is taking ASA 81 mg and Rosuvastatin. Patient is a former smoker.         Ashley Gonzales is a 88 y.o. female who presents to the office for small infrarenal abdominal aortic aneurysm.  She is oxygen dependent.  She is accompanied by her daughter.      Review of Systems   Constitutional: Negative.    HENT: Negative.     Eyes: Negative.    Respiratory: Negative.     Cardiovascular: Negative.    Gastrointestinal: Negative.    Endocrine: Negative.    Genitourinary: Negative.    Musculoskeletal:  Positive for back pain.   Skin: Negative.    Allergic/Immunologic: Negative.    Neurological: Negative.    Hematological: Negative.    Psychiatric/Behavioral: Negative.       Past Medical History   Past Medical History:   Diagnosis Date    Anxiety     Cataract, bilateral     Last Assessed:3/19/2014    COPD (chronic obstructive pulmonary disease) (HCC)     Coronary artery disease     Hx of arterial ischemic stroke      Hypertension     Stroke (HCC)     Wears glasses      Past Surgical History:   Procedure Laterality Date    CAROTID ENDARTERECTOMY Right     CATARACT EXTRACTION W/ INTRAOCULAR LENS  IMPLANT, BILATERAL      COLONOSCOPY N/A 9/30/2017    Procedure: COLONOSCOPY FOR CONTROL OF BLEEDING;  Surgeon: Jeb Gaxiola MD;  Location: [facility];  Service: Colorectal    CORONARY ARTERY BYPASS GRAFT      CABG X3 with LIMA to LAD,SVG to OM,SVG to distal  RCA ; Last Assessed:7/13/2015    JOINT REPLACEMENT      left TKR    KIDNEY STONE SURGERY      NEPHROSTOMY Left     with Drainage Irrigation ;Onset:1974    MT ARTHRP KNE CONDYLE&PLATU MEDIAL&LAT COMPARTMENTS Right 2/25/2016    Procedure: ARTHROPLASTY KNEE TOTAL;  Surgeon: Jeb Figueroa MD;  Location: [facility];  Service: Orthopedics     Family History   Problem Relation Age of Onset    Cancer Mother         malignant neoplasm    Heart attack Father     Aneurysm Other         Aortic    Cancer Other         malignant neoplasm     Current Outpatient Medications on File Prior to Visit   Medication Sig Dispense Refill    acetaminophen (TYLENOL) 325 mg tablet Take 2 tablets (650 mg total) by mouth every 6 (six) hours as needed for mild pain      ALPRAZolam (XANAX) 0.5 mg tablet Take 1 tablet (0.5 mg total) by mouth 3 (three) times a day as needed (TREMOR) 90 tablet 0    amLODIPine (NORVASC) 10 mg tablet Take 1 tablet (10 mg total) by mouth daily 90 tablet 0    aspirin (ECOTRIN LOW STRENGTH) 81 mg EC tablet Take 81 mg by mouth daily      bimatoprost (LUMIGAN) 0.01 % ophthalmic drops Administer 1 drop to both eyes daily at bedtime 5 mL 5    Cholecalciferol (Vitamin D) 50 MCG (2000 UT) tablet Take 0.5 tablets (1,000 Units total) by mouth every other day 100 tablet 0    escitalopram (LEXAPRO) 5 mg tablet Take 1 tablet (5 mg total) by mouth daily 30 tablet 0    famotidine (PEPCID) 20 mg tablet TAKE 1 TABLET BY MOUTH TWICE A  tablet 1    furosemide (LASIX) 20 mg tablet TAKE 1 TABLET BY  MOUTH EVERY DAY 90 tablet 1    levothyroxine 75 mcg tablet TAKE 1 TABLET BY MOUTH EVERY DAY IN THE MORNING 90 tablet 1    meclizine (ANTIVERT) 25 mg tablet Take 1 tablet (25 mg total) by mouth every 8 (eight) hours as needed for dizziness or nausea 30 tablet 0    metoprolol tartrate (LOPRESSOR) 25 mg tablet TAKE 1 TABLET (25 MG TOTAL) BY MOUTH EVERY 12 (TWELVE) HOURS 180 tablet 1    oxygen gas Inhale 2 L/min continuous. Indications: .      Pediatric Multiple Vit-C-FA (PEDIATRIC MULTIVITAMIN) chewable tablet Chew 1 tablet daily      Potassium Gluconate 595 (99 K) MG TABS Take by mouth      rosuvastatin (CRESTOR) 10 MG tablet TAKE 1 TABLET BY MOUTH EVERY DAY 90 tablet 1     No current facility-administered medications on file prior to visit.     Allergies   Allergen Reactions    Penicillins Itching    Penicillins Rash      Current Outpatient Medications on File Prior to Visit   Medication Sig Dispense Refill    acetaminophen (TYLENOL) 325 mg tablet Take 2 tablets (650 mg total) by mouth every 6 (six) hours as needed for mild pain      ALPRAZolam (XANAX) 0.5 mg tablet Take 1 tablet (0.5 mg total) by mouth 3 (three) times a day as needed (TREMOR) 90 tablet 0    amLODIPine (NORVASC) 10 mg tablet Take 1 tablet (10 mg total) by mouth daily 90 tablet 0    aspirin (ECOTRIN LOW STRENGTH) 81 mg EC tablet Take 81 mg by mouth daily      bimatoprost (LUMIGAN) 0.01 % ophthalmic drops Administer 1 drop to both eyes daily at bedtime 5 mL 5    Cholecalciferol (Vitamin D) 50 MCG (2000 UT) tablet Take 0.5 tablets (1,000 Units total) by mouth every other day 100 tablet 0    escitalopram (LEXAPRO) 5 mg tablet Take 1 tablet (5 mg total) by mouth daily 30 tablet 0    famotidine (PEPCID) 20 mg tablet TAKE 1 TABLET BY MOUTH TWICE A  tablet 1    furosemide (LASIX) 20 mg tablet TAKE 1 TABLET BY MOUTH EVERY DAY 90 tablet 1    levothyroxine 75 mcg tablet TAKE 1 TABLET BY MOUTH EVERY DAY IN THE MORNING 90 tablet 1    meclizine (ANTIVERT)  "25 mg tablet Take 1 tablet (25 mg total) by mouth every 8 (eight) hours as needed for dizziness or nausea 30 tablet 0    metoprolol tartrate (LOPRESSOR) 25 mg tablet TAKE 1 TABLET (25 MG TOTAL) BY MOUTH EVERY 12 (TWELVE) HOURS 180 tablet 1    oxygen gas Inhale 2 L/min continuous. Indications: .      Pediatric Multiple Vit-C-FA (PEDIATRIC MULTIVITAMIN) chewable tablet Chew 1 tablet daily      Potassium Gluconate 595 (99 K) MG TABS Take by mouth      rosuvastatin (CRESTOR) 10 MG tablet TAKE 1 TABLET BY MOUTH EVERY DAY 90 tablet 1     No current facility-administered medications on file prior to visit.      Social History     Tobacco Use    Smoking status: Never    Smokeless tobacco: Never   Vaping Use    Vaping status: Never Used   Substance and Sexual Activity    Alcohol use: Never    Drug use: Never    Sexual activity: Not Currently     Objective     /58 (BP Location: Left arm, Patient Position: Sitting, Cuff Size: Large)   Pulse 75   Ht 5' 2\" (1.575 m)   Wt 84.4 kg (186 lb)   SpO2 95%   BMI 34.02 kg/m²     Physical Exam  Constitutional:       General: She is not in acute distress.     Appearance: She is well-developed.   HENT:      Head: Normocephalic and atraumatic.   Eyes:      General: No scleral icterus.     Conjunctiva/sclera: Conjunctivae normal.   Neck:      Trachea: No tracheal deviation.   Cardiovascular:      Rate and Rhythm: Normal rate.   Pulmonary:      Effort: Pulmonary effort is normal.   Abdominal:      General: There is no distension.      Palpations: Abdomen is soft. There is no mass (no appreciable aortic pulsation/aneurysm).      Tenderness: There is no abdominal tenderness. There is no guarding or rebound.   Musculoskeletal:         General: Normal range of motion.      Cervical back: Normal range of motion and neck supple.   Skin:     General: Skin is warm and dry.   Neurological:      Mental Status: She is alert and oriented to person, place, and time.   Psychiatric:         Mood " and Affect: Mood normal.         Behavior: Behavior normal.         Thought Content: Thought content normal.         Judgment: Judgment normal.         CT imaging:   A 3.9 cm infrarenal abdominal aortic aneurysm on a background of severe atherosclerosis, increased since 2017. Recommend vascular surgery follow-up as appropriate.     Administrative Statements   I have spent a total time of 30 minutes on 05/29/24 In caring for this patient including Diagnostic results, Prognosis, Risks and benefits of tx options, Instructions for management, Patient and family education, Importance of tx compliance, Risk factor reductions, Impressions, Counseling / Coordination of care, Documenting in the medical record, Reviewing / ordering tests, medicine, procedures  , and Obtaining or reviewing history  .

## 2024-06-03 DIAGNOSIS — G25.0 BENIGN FAMILIAL TREMOR: ICD-10-CM

## 2024-06-04 ENCOUNTER — OFFICE VISIT (OUTPATIENT)
Dept: HEMATOLOGY ONCOLOGY | Facility: CLINIC | Age: 89
End: 2024-06-04
Payer: COMMERCIAL

## 2024-06-04 VITALS
OXYGEN SATURATION: 91 % | SYSTOLIC BLOOD PRESSURE: 128 MMHG | TEMPERATURE: 97.9 F | RESPIRATION RATE: 16 BRPM | DIASTOLIC BLOOD PRESSURE: 70 MMHG | WEIGHT: 185 LBS | HEIGHT: 62 IN | HEART RATE: 72 BPM | BODY MASS INDEX: 34.04 KG/M2

## 2024-06-04 DIAGNOSIS — D47.2 MONOCLONAL GAMMOPATHY: ICD-10-CM

## 2024-06-04 PROCEDURE — 99204 OFFICE O/P NEW MOD 45 MIN: CPT | Performed by: INTERNAL MEDICINE

## 2024-06-04 RX ORDER — ALPRAZOLAM 0.5 MG/1
0.5 TABLET ORAL 3 TIMES DAILY PRN
Qty: 90 TABLET | Refills: 0 | Status: SHIPPED | OUTPATIENT
Start: 2024-06-04

## 2024-06-04 NOTE — PROGRESS NOTES
Hematology/Oncology Outpatient Office Note  Date of Service: 2024    [facility] [place]  [address]  [facility] [place] [address]  [phone]    Reason for Consultation:   Chief Complaint   Patient presents with    Consult     Referral Physician: Rosibel Mehta MD  Primary Care Physician:  Paz Maharaj DO       History of present illness:   Ashley Goznales is a 88 y.o. female with PMHx significant for HTN, CAD s/p CABG, COPD, CHF, AAA, kidney stones, s/p CEA, diabetes, CKD stage IIIA/B.  Patient was recently evaluated by nephrology for CKD, hematuria, proteinuria.  Workup noted SPEP with immunofixation which cannot rule out small monoclonal gammopathy.  Patient referred to hematology for further evaluation.  She is accompanied by her daughter for visit today.  She notes fatigue.  She has chronic shortness of breath for which she utilizes oxygen.  Denies any bone pain.    2024: SPEP: No monoclonal bands noted.  Immunofixation: Cannot rule out small monoclonal gammopathy.  Suggest retest in 3 to 6 months, if clinically indicated.  UPEP: No monoclonal bands noted.  FK.8, FL.9, K/L ratio: 2.31    Review of systems:   Review of Systems   Constitutional: Positive for malaise/fatigue. Negative for fever.   HENT: Negative.     Cardiovascular: Negative.  Negative for chest pain, dyspnea on exertion and palpitations.   Respiratory:  Positive for shortness of breath.    Hematologic/Lymphatic: Negative.  Does not bruise/bleed easily.   Skin: Negative.    Musculoskeletal: Negative.    Gastrointestinal: Negative.  Negative for abdominal pain.   Neurological: Negative.    Psychiatric/Behavioral: Negative.          A 10-point review of system was performed, pertinent positive and negative were detailed as above. Otherwise, the 10-point review of system was negative.    Past Medical History:   Diagnosis Date    Anxiety     Cataract, bilateral     Last  Assessed:3/19/2014    COPD (chronic obstructive pulmonary disease) (HCC)     Coronary artery disease     Hx of arterial ischemic stroke     Hypertension     Stroke (HCC)     Wears glasses        Past Surgical History:   Procedure Laterality Date    CAROTID ENDARTERECTOMY Right     CATARACT EXTRACTION W/ INTRAOCULAR LENS  IMPLANT, BILATERAL      COLONOSCOPY N/A 9/30/2017    Procedure: COLONOSCOPY FOR CONTROL OF BLEEDING;  Surgeon: Jeb Gaxiola MD;  [facility];  Service: Colorectal    CORONARY ARTERY BYPASS GRAFT      CABG X3 with LIMA to LAD,SVG to OM,SVG to distal  RCA ; Last Assessed:7/13/2015    JOINT REPLACEMENT      left TKR    KIDNEY STONE SURGERY      NEPHROSTOMY Left     with Drainage Irrigation ;Onset:1974    TX ARTHRP KNE CONDYLE&PLATU MEDIAL&LAT COMPARTMENTS Right 2/25/2016    Procedure: ARTHROPLASTY KNEE TOTAL;  Surgeon: Jeb Figueroa MD;  Location: [facility];  Service: Orthopedics       Family History   Problem Relation Age of Onset    Cancer Mother         malignant neoplasm    Heart attack Father     Aneurysm Other         Aortic    Cancer Other         malignant neoplasm       Social History     Socioeconomic History    Marital status:      Spouse name: Not on file    Number of children: Not on file    Years of education: 10    Highest education level: Not on file   Occupational History    Not on file   Tobacco Use    Smoking status: Former     Average packs/day: 1 pack/day for 24.0 years (24.0 ttl pk-yrs)     Types: Cigarettes     Start date: 1951     Passive exposure: Past    Smokeless tobacco: Never   Vaping Use    Vaping status: Never Used   Substance and Sexual Activity    Alcohol use: Never    Drug use: Never    Sexual activity: Not Currently   Other Topics Concern    Not on file   Social History Narrative    ** Merged History Encounter **         No caffeine use     Social Determinants of Health     Financial Resource Strain: Low Risk  (5/10/2023)    Overall Financial  Resource Strain (CARDIA)     Difficulty of Paying Living Expenses: Not hard at all   Food Insecurity: No Food Insecurity (4/25/2024)    Hunger Vital Sign     Worried About Running Out of Food in the Last Year: Never true     Ran Out of Food in the Last Year: Never true   Transportation Needs: No Transportation Needs (4/25/2024)    PRAPARE - Transportation     Lack of Transportation (Medical): No     Lack of Transportation (Non-Medical): No   Physical Activity: Not on file   Stress: Not on file   Social Connections: Not on file   Intimate Partner Violence: Not on file   Housing Stability: Low Risk  (4/25/2024)    Housing Stability Vital Sign     Unable to Pay for Housing in the Last Year: No     Number of Places Lived in the Last Year: 1     Unstable Housing in the Last Year: No       Allergies   Allergen Reactions    Penicillins Itching    Penicillins Rash       Current Outpatient Medications   Medication Sig Dispense Refill    acetaminophen (TYLENOL) 325 mg tablet Take 2 tablets (650 mg total) by mouth every 6 (six) hours as needed for mild pain      amLODIPine (NORVASC) 10 mg tablet Take 1 tablet (10 mg total) by mouth daily 90 tablet 0    aspirin (ECOTRIN LOW STRENGTH) 81 mg EC tablet Take 81 mg by mouth daily      bimatoprost (LUMIGAN) 0.01 % ophthalmic drops Administer 1 drop to both eyes daily at bedtime 5 mL 5    Cholecalciferol (Vitamin D) 50 MCG (2000 UT) tablet Take 0.5 tablets (1,000 Units total) by mouth every other day 100 tablet 0    escitalopram (LEXAPRO) 5 mg tablet Take 1 tablet (5 mg total) by mouth daily 30 tablet 0    famotidine (PEPCID) 20 mg tablet TAKE 1 TABLET BY MOUTH TWICE A  tablet 1    furosemide (LASIX) 20 mg tablet TAKE 1 TABLET BY MOUTH EVERY DAY 90 tablet 1    levothyroxine 75 mcg tablet TAKE 1 TABLET BY MOUTH EVERY DAY IN THE MORNING 90 tablet 1    meclizine (ANTIVERT) 25 mg tablet Take 1 tablet (25 mg total) by mouth every 8 (eight) hours as needed for dizziness or nausea 30  tablet 0    metoprolol tartrate (LOPRESSOR) 25 mg tablet TAKE 1 TABLET (25 MG TOTAL) BY MOUTH EVERY 12 (TWELVE) HOURS 180 tablet 1    oxygen gas Inhale 2 L/min continuous. Indications: .      Pediatric Multiple Vit-C-FA (PEDIATRIC MULTIVITAMIN) chewable tablet Chew 1 tablet daily      Potassium Gluconate 595 (99 K) MG TABS Take by mouth      rosuvastatin (CRESTOR) 10 MG tablet TAKE 1 TABLET BY MOUTH EVERY DAY 90 tablet 1    ALPRAZolam (XANAX) 0.5 mg tablet Take 1 tablet (0.5 mg total) by mouth 3 (three) times a day as needed (TREMOR) 90 tablet 0     No current facility-administered medications for this visit.     I have reviewed the relevant past medical, surgical, social and family history. I have also reviewed allergies and medications for this patient.    Objective:      Vitals:    06/04/24 1107   BP: 128/70   Pulse: 72   Resp: 16   Temp: 97.9 °F (36.6 °C)   SpO2: 91%       Wt Readings from Last 3 Encounters:   06/04/24 83.9 kg (185 lb)   05/29/24 84.4 kg (186 lb)   05/01/24 81.6 kg (180 lb)     Physical Exam  Vitals and nursing note reviewed.   Constitutional:       General: She is not in acute distress.     Appearance: She is well-developed.   HENT:      Head: Normocephalic and atraumatic.   Eyes:      Conjunctiva/sclera: Conjunctivae normal.   Cardiovascular:      Rate and Rhythm: Normal rate.   Pulmonary:      Effort: Pulmonary effort is normal. No respiratory distress.      Comments: Oxygen via NC  Musculoskeletal:         General: No swelling.      Cervical back: Neck supple.   Skin:     General: Skin is warm and dry.   Neurological:      Mental Status: She is alert.      Gait: Gait abnormal (walker).   Psychiatric:         Mood and Affect: Mood normal.       Data Review:  Labs:  Lab data are reviewed and documented in HemOnc history.     Lab Results   Component Value Date    HGB 13.7 04/26/2024    HCT 45.1 04/26/2024    MCV 96 04/26/2024     04/26/2024    WBC 6.29 04/26/2024    NRBC 0 04/26/2024      Lab Results   Component Value Date     2015    K 4.5 2024     2024    CO2 34 (H) 2024    ANIONGAP 4 2015    BUN 41 (H) 2024    CREATININE 1.16 2024    GLUCOSE 100 2019    GLUF 103 (H) 2023    CALCIUM 9.7 2024    CORRECTEDCA 10.0 2024    AST 12 (L) 2024    ALT 4 (L) 2024    ALKPHOS 71 2024    PROT 5.4 (L) 2015    BILITOT 0.37 2015    EGFR 42 2024     Assessment/plan:     Ashley Gonzales is a 88 y.o. female with PMHx significant for HTN, CAD s/p CABG, COPD, CHF, AAA, kidney stones, s/p CEA, diabetes, CKD stage IIIA/B, being seen in consultation for abnormal SPEP.   She was recently evaluated by nephrology for CKD, hematuria, proteinuria.  Workup noted SPEP with immunofixation which cannot rule out small monoclonal gammopathy.      I met with the patient and reviewed her chart, history, labs and exam.  Labs from 2024 show SPEP with no monoclonal bands noted.  Immunofixation cannot rule out small monoclonal gammopathy.  UPEP with no monoclonal bands noted. FK.8, FL.9, K/L ratio: 2.31. She is not anemic, calcium is within normal limits and total protein is within normal limits.  Renal function remains at her baseline.  At this juncture, recommend continued observation.  She will follow-up in the office in 2-3 months with CBC, CMP, SPEP, kappa lambda ratio, quantitative immunoglobulins.    Orders Placed This Encounter   Procedures    CBC and differential    Comprehensive metabolic panel    Protein electrophoresis, serum    Beta 2 microglobulin, serum    IgG, IgA, IgM    Kappa/Lambda Light Chains Free With Ratio, Serum      Diagnosis ICD-10-CM Associated Orders   1. Monoclonal gammopathy  D47.2 Ambulatory Referral to Hematology / Oncology     CBC and differential     Comprehensive metabolic panel     Protein electrophoresis, serum     Beta 2 microglobulin, serum     IgG, IgA, IgM      Kappa/Lambda Light Chains Free With Ratio, Serum        Return in about 10 weeks (around 8/13/2024) for Office Visit, Labs - See Treatment Plan.    Flor Coulter,  6/4/2024   Hematology & Medical Oncology Physician    Disclaimer: This document was prepared using [employer] Direct technology. If a word or phrase is confusing, or does not make sense, this is likely due to recognition error which was not discovered during this clinician's review. If you believe an error has occurred, please contact me through HemOn service line for [email or web address].

## 2024-06-20 DIAGNOSIS — F41.1 GENERALIZED ANXIETY DISORDER: ICD-10-CM

## 2024-06-20 RX ORDER — ESCITALOPRAM OXALATE 5 MG/1
5 TABLET ORAL DAILY
Qty: 90 TABLET | Refills: 1 | Status: SHIPPED | OUTPATIENT
Start: 2024-06-20

## 2024-06-28 DIAGNOSIS — R42 DIZZINESS: ICD-10-CM

## 2024-06-28 RX ORDER — MECLIZINE HYDROCHLORIDE 25 MG/1
25 TABLET ORAL EVERY 8 HOURS PRN
Qty: 30 TABLET | Refills: 0 | Status: SHIPPED | OUTPATIENT
Start: 2024-06-28

## 2024-07-05 DIAGNOSIS — G25.0 BENIGN FAMILIAL TREMOR: ICD-10-CM

## 2024-07-05 RX ORDER — ALPRAZOLAM 0.5 MG/1
0.5 TABLET ORAL 3 TIMES DAILY PRN
Qty: 90 TABLET | Refills: 0 | Status: SHIPPED | OUTPATIENT
Start: 2024-07-05

## 2024-07-05 NOTE — TELEPHONE ENCOUNTER
Reason for call:   [x] Refill   [] Prior Auth  [] Other:     Office:   [x] PCP/Provider -Pryblick   [] Specialty/Provider -     Medication: Alprazolam 0.5mg    Dose/Frequency: 1 tab tid prn    Quantity: 90    Pharmacy: [facility]/pharmacy #[id number] - [place] - [address] [phone]     Does the patient have enough for 3 days?   [] Yes   [x] No - Send as HP to POD

## 2024-07-08 DIAGNOSIS — K21.9 GASTROESOPHAGEAL REFLUX DISEASE WITHOUT ESOPHAGITIS: ICD-10-CM

## 2024-07-08 RX ORDER — FAMOTIDINE 20 MG/1
TABLET, FILM COATED ORAL
Qty: 180 TABLET | Refills: 1 | Status: SHIPPED | OUTPATIENT
Start: 2024-07-08

## 2024-07-10 ENCOUNTER — OFFICE VISIT (OUTPATIENT)
Dept: FAMILY MEDICINE CLINIC | Facility: CLINIC | Age: 89
End: 2024-07-10
Payer: COMMERCIAL

## 2024-07-10 VITALS
BODY MASS INDEX: 33.93 KG/M2 | SYSTOLIC BLOOD PRESSURE: 110 MMHG | TEMPERATURE: 97.6 F | HEIGHT: 62 IN | RESPIRATION RATE: 16 BRPM | WEIGHT: 184.4 LBS | HEART RATE: 65 BPM | DIASTOLIC BLOOD PRESSURE: 60 MMHG | OXYGEN SATURATION: 88 %

## 2024-07-10 DIAGNOSIS — N18.30 STAGE 3 CHRONIC KIDNEY DISEASE, UNSPECIFIED WHETHER STAGE 3A OR 3B CKD (HCC): ICD-10-CM

## 2024-07-10 DIAGNOSIS — I50.32 CHRONIC DIASTOLIC CONGESTIVE HEART FAILURE (HCC): ICD-10-CM

## 2024-07-10 DIAGNOSIS — E78.01 FAMILIAL HYPERCHOLESTEROLEMIA: ICD-10-CM

## 2024-07-10 DIAGNOSIS — R73.03 PREDIABETES: ICD-10-CM

## 2024-07-10 DIAGNOSIS — J44.9 CHRONIC OBSTRUCTIVE PULMONARY DISEASE, UNSPECIFIED COPD TYPE (HCC): ICD-10-CM

## 2024-07-10 DIAGNOSIS — E03.9 ACQUIRED HYPOTHYROIDISM: ICD-10-CM

## 2024-07-10 DIAGNOSIS — D47.2 MGUS (MONOCLONAL GAMMOPATHY OF UNKNOWN SIGNIFICANCE): ICD-10-CM

## 2024-07-10 DIAGNOSIS — G25.0 BENIGN FAMILIAL TREMOR: ICD-10-CM

## 2024-07-10 DIAGNOSIS — I10 PRIMARY HYPERTENSION: Primary | ICD-10-CM

## 2024-07-10 PROCEDURE — 99214 OFFICE O/P EST MOD 30 MIN: CPT | Performed by: FAMILY MEDICINE

## 2024-07-10 PROCEDURE — G2211 COMPLEX E/M VISIT ADD ON: HCPCS | Performed by: FAMILY MEDICINE

## 2024-07-10 NOTE — PROGRESS NOTES
Ambulatory Visit  Name: Ashley Gonzales      : 1935      MRN: [id number]  Encounter Provider: Paz Maharaj DO  Encounter Date: 7/10/2024   Encounter department: [facility] PRIMARY CARE    Assessment & Plan   1. Primary hypertension  2. Prediabetes  Comments:  Controlled without medication, currently prediabetic range  Orders:  -     Hemoglobin A1C; Future; Expected date: 01/15/2025  3. Familial hypercholesterolemia  -     Lipid panel; Future  4. Chronic diastolic congestive heart failure (HCC)  Assessment & Plan:  Wt Readings from Last 3 Encounters:   07/10/24 83.6 kg (184 lb 6.4 oz)   24 83.9 kg (185 lb)   24 84.4 kg (186 lb)   Patient asymptomatic, stable.  I did reach out to cardiology staff to update annual cardiology visit  5. Chronic obstructive pulmonary disease, unspecified COPD type ([facility])  6. Acquired hypothyroidism  7. Benign familial tremor  Assessment & Plan:  Remote neurology consult with various trials of medication which were either not helpful or patient did not tolerate.  Currently uses alprazolam for symptom control  8. MGUS (monoclonal gammopathy of unknown significance)  Comments:  Has upcoming appoint with hematology oncology with labs prior.  9. Stage 3 chronic kidney disease, unspecified whether stage 3a or 3b CKD ([facility])  Assessment & Plan:  Lab Results   Component Value Date    EGFR 42 2024    EGFR 45 2024    EGFR 44 2024    CREATININE 1.16 2024    CREATININE 1.09 2024    CREATININE 1.11 2024   KIDNEYS/URETERS: No hydronephrosis. Multiple bilateral nonobstructing calculi, largest on the right measuring 6 mm in the lower pole and on the left measuring 5 mm in the lower pole. Subcentimeter hypoattenuating renal lesion(s), too small to   characterize but statistically likely benign, which do not warrant follow-up (Radiology 2019).     Upcoming appointments with hematology oncology (MGUS) and nephrology    Depression  Screening and Follow-up Plan: Patient's depression screening was positive with a PHQ-9 score of 5. Patient with underlying depression and was advised to continue current medications as prescribed. Depression likely due to other medical condition. Will treat underlying condition. Patient advised to follow-up with PCP for further management.     I have spent a total time of 25 minutes in caring for this patient on the day of the visit/encounter including Diagnostic results, Instructions for management, Patient and family education, Impressions, Counseling / Coordination of care, Documenting in the medical record, Reviewing / ordering tests, medicine, procedures  , Obtaining or reviewing history  , and Communicating with other healthcare professionals .     History of Present Illness     88-year-old female here for 4-month follow-up.  Accompanied by son,  Suman, who she lives with.      Chief Complaint   Patient presents with   • Follow-up     4 month follow up   Chronic diastolic congestive heart failure, COPD, hypertension, hypothyroid, controlled type 2 diabetes, generalized osteoarthritis especially of the knees.  Remote history of CABG and ischemic stroke.  Chronic tremors.  Also had consulted recently with vascular surgery secondary to small infrarenal abdominal aortic aneurysm currently less than 4 cm (patient would not want intervention)  Last formal follow-up with cardiology was June 2023  Review of Systems   Constitutional:  Positive for fatigue.   Respiratory:  Positive for shortness of breath (Some with exertion but not new).    Cardiovascular:  Negative for chest pain and leg swelling.   Genitourinary:         Occasional urge dribbling   Neurological:  Positive for tremors (About the same, some days better some days worse).   Psychiatric/Behavioral:          Poor sleep   All other systems reviewed and are negative.    Past Medical History:   Diagnosis Date   • Anxiety    • Cataract, bilateral     Last  Assessed:3/19/2014   • COPD (chronic obstructive pulmonary disease) (HCC)    • Coronary artery disease    • Hx of arterial ischemic stroke    • Hypertension    • Stroke (HCC)    • Wears glasses      Past Surgical History:   Procedure Laterality Date   • CAROTID ENDARTERECTOMY Right    • CATARACT EXTRACTION W/ INTRAOCULAR LENS  IMPLANT, BILATERAL     • COLONOSCOPY N/A 9/30/2017    Procedure: COLONOSCOPY FOR CONTROL OF BLEEDING;  Surgeon: Jeb Gaxiola MD;  [facility]: [facility];  Service: Colorectal   • CORONARY ARTERY BYPASS GRAFT      CABG X3 with LIMA to LAD,SVG to OM,SVG to distal  RCA ; Last Assessed:7/13/2015   • JOINT REPLACEMENT      left TKR   • KIDNEY STONE SURGERY     • NEPHROSTOMY Left     with Drainage Irrigation ;Onset:1974   • IN ARTHRP KNE CONDYLE&PLATU MEDIAL&LAT COMPARTMENTS Right 2/25/2016    Procedure: ARTHROPLASTY KNEE TOTAL;  Surgeon: Jeb Figueroa MD;  Location: [facility];  Service: Orthopedics     Family History   Problem Relation Age of Onset   • Cancer Mother         malignant neoplasm   • Heart attack Father    • Aneurysm Other         Aortic   • Cancer Other         malignant neoplasm     Social History     Tobacco Use   • Smoking status: Former     Average packs/day: 1 pack/day for 24.0 years (24.0 ttl pk-yrs)     Types: Cigarettes     Start date: 1951     Passive exposure: Past   • Smokeless tobacco: Never   Vaping Use   • Vaping status: Never Used   Substance and Sexual Activity   • Alcohol use: Never   • Drug use: Never   • Sexual activity: Not Currently     Current Outpatient Medications on File Prior to Visit   Medication Sig   • acetaminophen (TYLENOL) 325 mg tablet Take 2 tablets (650 mg total) by mouth every 6 (six) hours as needed for mild pain   • ALPRAZolam (XANAX) 0.5 mg tablet Take 1 tablet (0.5 mg total) by mouth 3 (three) times a day as needed (TREMOR)   • amLODIPine (NORVASC) 10 mg tablet Take 1 tablet (10 mg total) by mouth daily   • aspirin (ECOTRIN LOW STRENGTH) 81  "mg EC tablet Take 81 mg by mouth daily   • bimatoprost (LUMIGAN) 0.01 % ophthalmic drops Administer 1 drop to both eyes daily at bedtime   • Cholecalciferol (Vitamin D) 50 MCG (2000 UT) tablet Take 0.5 tablets (1,000 Units total) by mouth every other day   • escitalopram (LEXAPRO) 5 mg tablet TAKE 1 TABLET (5 MG TOTAL) BY MOUTH DAILY.   • famotidine (PEPCID) 20 mg tablet TAKE 1 TABLET BY MOUTH TWICE A DAY   • levothyroxine 75 mcg tablet TAKE 1 TABLET BY MOUTH EVERY DAY IN THE MORNING   • meclizine (ANTIVERT) 25 mg tablet Take 1 tablet (25 mg total) by mouth every 8 (eight) hours as needed for dizziness or nausea   • metoprolol tartrate (LOPRESSOR) 25 mg tablet TAKE 1 TABLET (25 MG TOTAL) BY MOUTH EVERY 12 (TWELVE) HOURS   • oxygen gas Inhale 2 L/min continuous. Indications: .   • Pediatric Multiple Vit-C-FA (PEDIATRIC MULTIVITAMIN) chewable tablet Chew 1 tablet daily   • Potassium Gluconate 595 (99 K) MG TABS Take by mouth   • rosuvastatin (CRESTOR) 10 MG tablet TAKE 1 TABLET BY MOUTH EVERY DAY     Allergies   Allergen Reactions   • Penicillins Itching   • Penicillins Rash     Immunization History   Administered Date(s) Administered   • COVID-19 PFIZER VACCINE 0.3 ML IM 03/25/2021, 04/15/2021, 01/06/2022, 09/25/2023   • COVID-19 Pfizer vac (Christopher-sucrose, gray cap) 12 yr+ IM 08/06/2022   • INFLUENZA 11/09/2016, 10/10/2017, 10/15/2019, 10/15/2020, 10/01/2021, 10/27/2022, 10/16/2023   • Influenza Split High Dose Preservative Free IM 10/15/2012, 10/15/2013, 10/15/2014, 10/28/2015, 11/09/2016, 10/10/2017   • Influenza, high dose seasonal 0.7 mL 10/16/2018, 10/15/2019, 10/15/2020, 10/01/2021, 10/27/2022, 10/16/2023   • Influenza, seasonal, injectable 12/06/2011   • Pneumococcal Conjugate 13-Valent 06/25/2015, 11/09/2016   • Pneumococcal Polysaccharide PPV23 06/25/2015   • Zoster 05/01/2017     Objective     /60   Pulse 65   Temp 97.6 °F (36.4 °C) (Temporal)   Resp 16   Ht 5' 2\" (1.575 m)   Wt 83.6 kg (184 lb " 6.4 oz)   SpO2 (!) 88%   PF (!) 2 L/min   BMI 33.73 kg/m²     Physical Exam  Vitals reviewed.   Constitutional:       Appearance: Normal appearance.      Comments: 88-year-old female appears her stated age.  Examined in wheelchair.   HENT:      Mouth/Throat:      Mouth: Mucous membranes are moist.   Eyes:      Pupils: Pupils are equal, round, and reactive to light.   Cardiovascular:      Rate and Rhythm: Normal rate and regular rhythm.   Pulmonary:      Effort: Pulmonary effort is normal.      Breath sounds: Normal breath sounds.   Abdominal:      Palpations: Abdomen is soft.   Musculoskeletal:      Right lower leg: No edema.      Left lower leg: No edema.   Skin:     Findings: No rash.   Neurological:      Mental Status: She is alert and oriented to person, place, and time.      Motor: Tremor present.   Psychiatric:         Mood and Affect: Affect is flat.         Thought Content: Thought content normal.         Cognition and Memory: Cognition normal.         Judgment: Judgment normal.

## 2024-07-16 ENCOUNTER — TELEPHONE (OUTPATIENT)
Age: 89
End: 2024-07-16

## 2024-07-16 NOTE — TELEPHONE ENCOUNTER
Can we make sure Dr. Mojica received the physician cert form from [facility]. [email or web address]. Mojica currently at home and took her paperwork with her.

## 2024-07-21 DIAGNOSIS — J96.22 ACUTE ON CHRONIC RESPIRATORY FAILURE WITH HYPOXIA AND HYPERCAPNIA (HCC): ICD-10-CM

## 2024-07-21 DIAGNOSIS — J96.21 ACUTE ON CHRONIC RESPIRATORY FAILURE WITH HYPOXIA AND HYPERCAPNIA (HCC): ICD-10-CM

## 2024-07-21 RX ORDER — FUROSEMIDE 20 MG/1
20 TABLET ORAL DAILY
Qty: 100 TABLET | Refills: 1 | Status: SHIPPED | OUTPATIENT
Start: 2024-07-21

## 2024-07-23 PROBLEM — D47.2 MGUS (MONOCLONAL GAMMOPATHY OF UNKNOWN SIGNIFICANCE): Status: ACTIVE | Noted: 2024-07-23

## 2024-07-23 NOTE — ASSESSMENT & PLAN NOTE
Wt Readings from Last 3 Encounters:   07/10/24 83.6 kg (184 lb 6.4 oz)   06/04/24 83.9 kg (185 lb)   05/29/24 84.4 kg (186 lb)   Patient asymptomatic, stable.  I did reach out to cardiology staff to update annual cardiology visit

## 2024-07-23 NOTE — ASSESSMENT & PLAN NOTE
Remote neurology consult with various trials of medication which were either not helpful or patient did not tolerate.  Currently uses alprazolam for symptom control

## 2024-07-23 NOTE — ASSESSMENT & PLAN NOTE
Lab Results   Component Value Date    EGFR 42 05/06/2024    EGFR 45 04/26/2024    EGFR 44 04/25/2024    CREATININE 1.16 05/06/2024    CREATININE 1.09 04/26/2024    CREATININE 1.11 04/25/2024   KIDNEYS/URETERS: No hydronephrosis. Multiple bilateral nonobstructing calculi, largest on the right measuring 6 mm in the lower pole and on the left measuring 5 mm in the lower pole. Subcentimeter hypoattenuating renal lesion(s), too small to   characterize but statistically likely benign, which do not warrant follow-up (Radiology June 2019).

## 2024-07-31 ENCOUNTER — APPOINTMENT (OUTPATIENT)
Dept: LAB | Facility: CLINIC | Age: 89
End: 2024-07-31
Payer: COMMERCIAL

## 2024-07-31 ENCOUNTER — TELEPHONE (OUTPATIENT)
Age: 89
End: 2024-07-31

## 2024-07-31 DIAGNOSIS — R30.0 DYSURIA: ICD-10-CM

## 2024-07-31 DIAGNOSIS — R30.0 DYSURIA: Primary | ICD-10-CM

## 2024-07-31 LAB
BACTERIA UR QL AUTO: ABNORMAL /HPF
BILIRUB UR QL STRIP: NEGATIVE
CLARITY UR: CLEAR
COLOR UR: COLORLESS
GLUCOSE UR STRIP-MCNC: NEGATIVE MG/DL
HGB UR QL STRIP.AUTO: NEGATIVE
HYALINE CASTS #/AREA URNS LPF: ABNORMAL /LPF
KETONES UR STRIP-MCNC: NEGATIVE MG/DL
LEUKOCYTE ESTERASE UR QL STRIP: NEGATIVE
NITRITE UR QL STRIP: NEGATIVE
NON-SQ EPI CELLS URNS QL MICRO: ABNORMAL /HPF
PH UR STRIP.AUTO: 6 [PH]
PROT UR STRIP-MCNC: NEGATIVE MG/DL
RBC #/AREA URNS AUTO: ABNORMAL /HPF
SP GR UR STRIP.AUTO: 1.01 (ref 1–1.03)
UROBILINOGEN UR STRIP-ACNC: <2 MG/DL
WBC #/AREA URNS AUTO: ABNORMAL /HPF

## 2024-07-31 PROCEDURE — 81001 URINALYSIS AUTO W/SCOPE: CPT

## 2024-07-31 PROCEDURE — 87086 URINE CULTURE/COLONY COUNT: CPT

## 2024-07-31 NOTE — TELEPHONE ENCOUNTER
Izzy patients daughter called in regards to her seeing patient today and patient seemed more tired than usual and was hesitant to use the bathroom. Izzy stated that patient does get UTI's and would like provider to put in a UA for patient to get done. Izzy would like a call back once order has been placed.

## 2024-08-01 LAB — BACTERIA UR CULT: NORMAL

## 2024-08-02 DIAGNOSIS — G25.0 BENIGN FAMILIAL TREMOR: ICD-10-CM

## 2024-08-02 DIAGNOSIS — R42 DIZZINESS: ICD-10-CM

## 2024-08-02 RX ORDER — ALPRAZOLAM 0.5 MG/1
0.5 TABLET ORAL 3 TIMES DAILY PRN
Qty: 90 TABLET | Refills: 0 | Status: SHIPPED | OUTPATIENT
Start: 2024-08-02

## 2024-08-02 RX ORDER — MECLIZINE HYDROCHLORIDE 25 MG/1
25 TABLET ORAL EVERY 8 HOURS PRN
Qty: 30 TABLET | Refills: 0 | Status: SHIPPED | OUTPATIENT
Start: 2024-08-02

## 2024-08-07 ENCOUNTER — APPOINTMENT (EMERGENCY)
Dept: CT IMAGING | Facility: HOSPITAL | Age: 89
End: 2024-08-07
Payer: COMMERCIAL

## 2024-08-07 ENCOUNTER — APPOINTMENT (EMERGENCY)
Dept: RADIOLOGY | Facility: HOSPITAL | Age: 89
End: 2024-08-07
Payer: COMMERCIAL

## 2024-08-07 ENCOUNTER — HOSPITAL ENCOUNTER (EMERGENCY)
Facility: HOSPITAL | Age: 89
Discharge: HOME | End: 2024-08-07
Attending: EMERGENCY MEDICINE
Payer: COMMERCIAL

## 2024-08-07 VITALS
BODY MASS INDEX: 34.07 KG/M2 | SYSTOLIC BLOOD PRESSURE: 162 MMHG | WEIGHT: 186.29 LBS | RESPIRATION RATE: 20 BRPM | DIASTOLIC BLOOD PRESSURE: 77 MMHG | HEART RATE: 69 BPM | OXYGEN SATURATION: 95 % | TEMPERATURE: 98.1 F

## 2024-08-07 DIAGNOSIS — S09.90XA MINOR HEAD INJURY WITHOUT LOSS OF CONSCIOUSNESS, INITIAL ENCOUNTER: ICD-10-CM

## 2024-08-07 DIAGNOSIS — S01.01XA SCALP LACERATION, INITIAL ENCOUNTER: ICD-10-CM
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
## 2024-08-07 LAB
ATRIAL RATE: 68 BPM
P AXIS: 38 DEGREES
PR INTERVAL: 268 MS
QRS AXIS: 109 DEGREES
QRSD INTERVAL: 158 MS
QT INTERVAL: 442 MS
QTC INTERVAL: 469 MS
T WAVE AXIS: 61 DEGREES
VENTRICULAR RATE: 68 BPM

## 2024-08-07 PROCEDURE — 93005 ELECTROCARDIOGRAM TRACING: CPT

## 2024-08-07 PROCEDURE — 93010 ELECTROCARDIOGRAM REPORT: CPT | Performed by: INTERNAL MEDICINE

## 2024-08-07 PROCEDURE — 70450 CT HEAD/BRAIN W/O DYE: CPT

## 2024-08-07 PROCEDURE — 72125 CT NECK SPINE W/O DYE: CPT

## 2024-08-07 PROCEDURE — 71046 X-RAY EXAM CHEST 2 VIEWS: CPT

## 2024-08-07 PROCEDURE — 99284 EMERGENCY DEPT VISIT MOD MDM: CPT

## 2024-08-07 PROCEDURE — 90715 TDAP VACCINE 7 YRS/> IM: CPT

## 2024-08-07 PROCEDURE — 90471 IMMUNIZATION ADMIN: CPT

## 2024-08-07 RX ORDER — GINSENG 100 MG
1 CAPSULE ORAL ONCE
Status: COMPLETED | OUTPATIENT
Start: 2024-08-07 | End: 2024-08-07

## 2024-08-07 RX ADMIN — BACITRACIN ZINC 1 SMALL APPLICATION: 500 OINTMENT TOPICAL at 11:03

## 2024-08-07 RX ADMIN — TETANUS TOXOID, REDUCED DIPHTHERIA TOXOID AND ACELLULAR PERTUSSIS VACCINE, ADSORBED 0.5 ML: 5; 2.5; 8; 8; 2.5 SUSPENSION INTRAMUSCULAR at 10:14

## 2024-08-07 NOTE — ED NOTES
"Pt ambulated independently with walker down hallway and back. Denies pain or weakness. Upon discharge, patients daughter says she will take her home. Pt does not have portable oxygen but pt states she will be \"fine without it\" because the ride home is a \"short distance\".     Olivia Doss RN  08/07/24 1129    "

## 2024-08-07 NOTE — ED PROVIDER NOTES
History  Chief Complaint   Patient presents with    Fall     Pt reports mechanical fall attempting to use bathroom.  Has hematoma and inch long laceration to posterior head.  Reports dizziness with hx of vertigo.      This is an 88-year-old female with a medical history significant for COPD on continuous O2, benign essential tremor, HTN, hypothyroidism, DM2, stroke, CHF, dizziness on meclizine who presents to the ED via EMS status post mechanical fall.  Patient is on 81 mg aspirin daily.  She reports while walking with her walker she sustained a mechanical fall, reports she tripped over her rug..  Reports she fell backwards, did strike the back of her head, did not lose consciousness, fall was unwitnessed.  She reports she has mild pain over the back of her head where there is a small hematoma present over her occiput, bleeding controlled at time of ED evaluation.  States he is otherwise asymptomatic, denies any diffuse headache, acute vision changes, neck pain, back pain, chest pain, SOB, abdominal pain, nausea, vomiting, denies hip pain or leg pain.  Patient does endorse chronic dizziness, states this does not feel different or worse today.  Per chart review has been worked up for this multiple times in the past, was admitted for the same a few months prior, currently follows with her PCP for the same issue.  Patient reports she lives with her son, also reports her daughter comes to her home on a regular basis to help with ADLs.        Prior to Admission Medications   Prescriptions Last Dose Informant Patient Reported? Taking?   ALPRAZolam (XANAX) 0.5 mg tablet   No No   Sig: Take 1 tablet (0.5 mg total) by mouth 3 (three) times a day as needed (TREMOR)   Cholecalciferol (Vitamin D) 50 MCG (2000 UT) tablet  Family Member No No   Sig: Take 0.5 tablets (1,000 Units total) by mouth every other day   Pediatric Multiple Vit-C-FA (PEDIATRIC MULTIVITAMIN) chewable tablet  Family Member Yes No   Sig: Chew 1 tablet daily    Potassium Gluconate 595 (99 K) MG TABS  Family Member Yes No   Sig: Take by mouth   acetaminophen (TYLENOL) 325 mg tablet  Family Member No No   Sig: Take 2 tablets (650 mg total) by mouth every 6 (six) hours as needed for mild pain   amLODIPine (NORVASC) 10 mg tablet  Family Member No No   Sig: Take 1 tablet (10 mg total) by mouth daily   aspirin (ECOTRIN LOW STRENGTH) 81 mg EC tablet  Family Member Yes No   Sig: Take 81 mg by mouth daily   bimatoprost (LUMIGAN) 0.01 % ophthalmic drops  Family Member No No   Sig: Administer 1 drop to both eyes daily at bedtime   escitalopram (LEXAPRO) 5 mg tablet   No No   Sig: TAKE 1 TABLET (5 MG TOTAL) BY MOUTH DAILY.   famotidine (PEPCID) 20 mg tablet   No No   Sig: TAKE 1 TABLET BY MOUTH TWICE A DAY   furosemide (LASIX) 20 mg tablet   No No   Sig: TAKE 1 TABLET BY MOUTH EVERY DAY   levothyroxine 75 mcg tablet  Family Member No No   Sig: TAKE 1 TABLET BY MOUTH EVERY DAY IN THE MORNING   meclizine (ANTIVERT) 25 mg tablet   No No   Sig: Take 1 tablet (25 mg total) by mouth every 8 (eight) hours as needed for dizziness or nausea   metoprolol tartrate (LOPRESSOR) 25 mg tablet  Family Member No No   Sig: TAKE 1 TABLET (25 MG TOTAL) BY MOUTH EVERY 12 (TWELVE) HOURS   oxygen gas  Family Member Yes No   Sig: Inhale 2 L/min continuous. Indications: .   rosuvastatin (CRESTOR) 10 MG tablet  Family Member No No   Sig: TAKE 1 TABLET BY MOUTH EVERY DAY      Facility-Administered Medications: None       Past Medical History:   Diagnosis Date    Anxiety     Cataract, bilateral     Last Assessed:3/19/2014    COPD (chronic obstructive pulmonary disease) (HCC)     Coronary artery disease     Hx of arterial ischemic stroke     Hypertension     Stroke (HCC)     Wears glasses        Past Surgical History:   Procedure Laterality Date    CAROTID ENDARTERECTOMY Right     CATARACT EXTRACTION W/ INTRAOCULAR LENS  IMPLANT, BILATERAL      COLONOSCOPY N/A 9/30/2017    Procedure: COLONOSCOPY FOR CONTROL OF  BLEEDING;  Surgeon: Jeb Gaxiola MD;  Location: [facility];  Service: Colorectal    CORONARY ARTERY BYPASS GRAFT      CABG X3 with LIMA to LAD,SVG to OM,SVG to distal  RCA ; Last Assessed:7/13/2015    JOINT REPLACEMENT      left TKR    KIDNEY STONE SURGERY      NEPHROSTOMY Left     with Drainage Irrigation ;Onset:1974    MN ARTHRP KNE CONDYLE&PLATU MEDIAL&LAT COMPARTMENTS Right 2/25/2016    Procedure: ARTHROPLASTY KNEE TOTAL;  Surgeon: Jeb Figueroa MD;  Location: [facility];  Service: Orthopedics       Family History   Problem Relation Age of Onset    Cancer Mother         malignant neoplasm    Heart attack Father     Aneurysm Other         Aortic    Cancer Other         malignant neoplasm     I have reviewed and agree with the history as documented.    E-Cigarette/Vaping    E-Cigarette Use Never User      E-Cigarette/Vaping Substances    Nicotine No     THC No     CBD No      Social History     Tobacco Use    Smoking status: Former     Average packs/day: 1 pack/day for 24.0 years (24.0 ttl pk-yrs)     Types: Cigarettes     Start date: 1951     Passive exposure: Past    Smokeless tobacco: Never   Vaping Use    Vaping status: Never Used   Substance Use Topics    Alcohol use: Never    Drug use: Never       Review of Systems   Constitutional:  Negative for chills and fever.   HENT:  Negative for facial swelling.    Eyes:  Negative for visual disturbance.   Respiratory:  Negative for cough and shortness of breath.    Cardiovascular:  Negative for chest pain and palpitations.   Gastrointestinal:  Negative for abdominal pain, diarrhea, nausea and vomiting.   Genitourinary:  Negative for difficulty urinating, dysuria, flank pain and hematuria.   Musculoskeletal:  Negative for back pain, neck pain and neck stiffness.   Skin:  Positive for wound.   Neurological:  Positive for dizziness (Reports chronic dizziness.  Denies acute changes). Negative for syncope, weakness and headaches.       Physical Exam  Physical  Exam  Vitals and nursing note reviewed.   Constitutional:       General: She is not in acute distress.     Appearance: She is well-developed. She is not toxic-appearing or diaphoretic.   HENT:      Head: Normocephalic and atraumatic. No raccoon eyes or Ramsey's sign.      Jaw: There is normal jaw occlusion. No trismus.        Right Ear: Tympanic membrane, ear canal and external ear normal.      Left Ear: Tympanic membrane, ear canal and external ear normal.      Nose:      Right Nostril: No epistaxis or septal hematoma.      Left Nostril: No epistaxis or septal hematoma.   Eyes:      Extraocular Movements: Extraocular movements intact.      Right eye: Normal extraocular motion and no nystagmus.      Left eye: Normal extraocular motion and no nystagmus.      Conjunctiva/sclera: Conjunctivae normal.      Pupils: Pupils are equal, round, and reactive to light.   Cardiovascular:      Rate and Rhythm: Normal rate and regular rhythm.      Heart sounds: No murmur heard.  Pulmonary:      Effort: Pulmonary effort is normal. No respiratory distress.      Breath sounds: Normal breath sounds.   Abdominal:      Palpations: Abdomen is soft.      Tenderness: There is no abdominal tenderness. There is no right CVA tenderness or left CVA tenderness.      Comments: Abdomen diffusely nontender on light and deep palpation.   Musculoskeletal:         General: No swelling.      Cervical back: Normal range of motion and neck supple. No rigidity. Normal range of motion.      Comments: Examination of patient's cervical, thoracic, lumbar spine shows no signs of midline tenderness or step-off deformities.  Patient has normal range of motion in bilateral upper and lower extremities with normal strength equal in bilateral upper and lower extremities.  Proximal and distal sensation intact, normal cap refill in all digits with normal distal pulses.  No signs of swelling or tenderness on exam of extremities, no signs of open wounds on extremities.    Skin:     General: Skin is warm and dry.      Capillary Refill: Capillary refill takes less than 2 seconds.   Neurological:      General: No focal deficit present.      Mental Status: She is alert and oriented to person, place, and time.      GCS: GCS eye subscore is 4. GCS verbal subscore is 5. GCS motor subscore is 6.   Psychiatric:         Mood and Affect: Mood normal.         Vital Signs  ED Triage Vitals   Temperature Pulse Respirations Blood Pressure SpO2   08/07/24 0848 08/07/24 0847 08/07/24 0847 08/07/24 0847 08/07/24 0847   98.1 °F (36.7 °C) 69 20 162/77 95 %      Temp Source Heart Rate Source Patient Position - Orthostatic VS BP Location FiO2 (%)   08/07/24 0848 08/07/24 0847 08/07/24 0847 08/07/24 0847 --   Oral Monitor Lying Right arm       Pain Score       --                  Vitals:    08/07/24 0847   BP: 162/77   Pulse: 69   Patient Position - Orthostatic VS: Lying         Visual Acuity  Visual Acuity      Flowsheet Row Most Recent Value   L Pupil Size (mm) 2   R Pupil Size (mm) 2            ED Medications  Medications   tetanus-diphtheria-acellular pertussis (BOOSTRIX) IM injection 0.5 mL (0.5 mL Intramuscular Given 8/7/24 1014)   bacitracin topical ointment 1 small application (1 small application Topical Given 8/7/24 1103)       Diagnostic Studies  Results Reviewed       None                   XR chest 2 views   ED Interpretation by Sergio Snyder PA-C (08/07 1103)   ED interpretation: Small pleural effusions, vascular congestion, appear stable from prior imaging.      Final Result by Duglas Mason MD (08/07 1211)      Small pleural effusions. Probable passive atelectasis left lung base. Pulmonary vascular congestion.            Workstation performed: [id number]         CT head without contrast   Final Result by Izaiah Vargas MD (08/07 0959)      No acute intracranial abnormality. Stable findings as above.                  Workstation performed: [id number]         CT cervical spine  without contrast   Final Result by Izaiah Vargas MD (08/07 1003)      No acute fracture. Moderate cervical spondylosis.                  Workstation performed: [id number]                    Procedures  Procedures         ED Course  ED Course as of 08/07/24 1642   Wed Aug 07, 2024   1006 CT head without contrast  IMPRESSION:     No acute intracranial abnormality. Stable findings as above.        1006 CT cervical spine without contrast  IMPRESSION:     No acute fracture. Moderate cervical spondylosis.     1122 Patient able to ambulate around the ED with her walker which is her baseline.  Stable for discharge at this time will be going home with a family member who will be watching her.                                 SBIRT 20yo+      Flowsheet Row Most Recent Value   Initial Alcohol Screen: US AUDIT-C     1. How often do you have a drink containing alcohol? 0 Filed at: 08/07/2024 1122   2. How many drinks containing alcohol do you have on a typical day you are drinking?  0 Filed at: 08/07/2024 1122   3a. Male UNDER 65: How often do you have five or more drinks on one occasion? 0 Filed at: 08/07/2024 1122   3b. FEMALE Any Age, or MALE 65+: How often do you have 4 or more drinks on one occassion? 0 Filed at: 08/07/2024 1122   Audit-C Score 0 Filed at: 08/07/2024 1122   LUDIVINA: How many times in the past year have you...    Used an illegal drug or used a prescription medication for non-medical reasons? Never Filed at: 08/07/2024 1122                      Medical Decision Making  88-year-old female presents the ED status post fall with head strike.  Patient well-appearing on exam, no neurodeficits.  CT head unremarkable, CT cervical spine unremarkable.  Patient able to ambulate with walker which is her baseline.  Advised PCP follow-up for further evaluation and management.  ED return precautions discussed with patient and her family.  Patient and family verbalized understanding and agreement with plan.    Amount and/or  Complexity of Data Reviewed  Radiology: ordered and independent interpretation performed. Decision-making details documented in ED Course.    Risk  OTC drugs.  Prescription drug management.                 Disposition  Final diagnoses:   Fall, initial encounter   Scalp laceration, initial encounter   Minor head injury without loss of consciousness, initial encounter     Time reflects when diagnosis was documented in both MDM as applicable and the Disposition within this note       Time User Action Codes Description Comment    8/7/2024 11:22 AM Sergio Snyder [W19.XXXA] Fall, initial encounter     8/7/2024 11:22 AM Sergoi Snyder [S01.01XA] Scalp laceration, initial encounter     8/7/2024 11:22 AM Sergio Snyder [S09.90XA] Minor head injury without loss of consciousness, initial encounter           ED Disposition       ED Disposition   Discharge    Condition   Stable    Date/Time   Wed Aug 7, 2024 1123    Comment   Ashley Gonzales discharge to home/self care.                   Follow-up Information       Follow up With Specialties Details Why Contact Info    Paz Maharaj DO Family Medicine Schedule an appointment as soon as possible for a visit  For re-check [address] [facility].  [address]  [facility] [address]  [phone]              Discharge Medication List as of 8/7/2024 11:23 AM        CONTINUE these medications which have NOT CHANGED    Details   acetaminophen (TYLENOL) 325 mg tablet Take 2 tablets (650 mg total) by mouth every 6 (six) hours as needed for mild pain, Starting Thu 2/29/2024, No Print      ALPRAZolam (XANAX) 0.5 mg tablet Take 1 tablet (0.5 mg total) by mouth 3 (three) times a day as needed (TREMOR), Starting Fri 8/2/2024, Normal      amLODIPine (NORVASC) 10 mg tablet Take 1 tablet (10 mg total) by mouth daily, Starting Thu 5/23/2024, Normal      aspirin (ECOTRIN LOW STRENGTH) 81 mg EC tablet Take 81 mg by mouth daily, Historical Med      bimatoprost (LUMIGAN) 0.01 % ophthalmic drops  Administer 1 drop to both eyes daily at bedtime, Starting Wed 5/10/2023, Normal      Cholecalciferol (Vitamin D) 50 MCG (2000 UT) tablet Take 0.5 tablets (1,000 Units total) by mouth every other day, Starting Mon 10/16/2023, No Print      escitalopram (LEXAPRO) 5 mg tablet TAKE 1 TABLET (5 MG TOTAL) BY MOUTH DAILY., Starting Thu 6/20/2024, Normal      famotidine (PEPCID) 20 mg tablet TAKE 1 TABLET BY MOUTH TWICE A DAY, Normal      furosemide (LASIX) 20 mg tablet TAKE 1 TABLET BY MOUTH EVERY DAY, Starting Sun 7/21/2024, Normal      levothyroxine 75 mcg tablet TAKE 1 TABLET BY MOUTH EVERY DAY IN THE MORNING, Normal      meclizine (ANTIVERT) 25 mg tablet Take 1 tablet (25 mg total) by mouth every 8 (eight) hours as needed for dizziness or nausea, Starting Fri 8/2/2024, Normal      metoprolol tartrate (LOPRESSOR) 25 mg tablet TAKE 1 TABLET (25 MG TOTAL) BY MOUTH EVERY 12 (TWELVE) HOURS, Starting Wed 1/10/2024, Normal      oxygen gas Inhale 2 L/min continuous. Indications: ., Historical Med      Pediatric Multiple Vit-C-FA (PEDIATRIC MULTIVITAMIN) chewable tablet Chew 1 tablet daily, Historical Med      Potassium Gluconate 595 (99 K) MG TABS Take by mouth, Historical Med      rosuvastatin (CRESTOR) 10 MG tablet TAKE 1 TABLET BY MOUTH EVERY DAY, Starting Mon 5/13/2024, Normal             No discharge procedures on file.    PDMP Review         Value Time User    PDMP Reviewed  Yes 8/2/2024  9:37 PM Paz Maharaj DO            ED Provider  Electronically Signed by             Sergio Snyder PA-C  08/07/24 1459

## 2024-08-07 NOTE — DISCHARGE INSTRUCTIONS
Please follow-up with your primary care provider for further evaluation and management.  Return to the ED if you develop any new or worsening symptoms discussed prior to discharge.

## 2024-08-08 ENCOUNTER — VBI (OUTPATIENT)
Dept: FAMILY MEDICINE CLINIC | Facility: CLINIC | Age: 89
End: 2024-08-08

## 2024-08-08 NOTE — TELEPHONE ENCOUNTER
08/08/24 2:01 PM    Patient contacted post ED visit, [facility] department spoke with patient/caregiver and outreach was successful.    Thank you.  Kate Hudson MA  [facility]

## 2024-08-13 DIAGNOSIS — E03.9 ACQUIRED HYPOTHYROIDISM: ICD-10-CM

## 2024-08-13 RX ORDER — LEVOTHYROXINE SODIUM 75 UG/1
TABLET ORAL
Qty: 90 TABLET | Refills: 1 | Status: SHIPPED | OUTPATIENT
Start: 2024-08-13

## 2024-08-15 ENCOUNTER — APPOINTMENT (EMERGENCY)
Dept: CT IMAGING | Facility: HOSPITAL | Age: 89
DRG: 689 | End: 2024-08-15
Payer: COMMERCIAL

## 2024-08-15 ENCOUNTER — HOSPITAL ENCOUNTER (INPATIENT)
Facility: HOSPITAL | Age: 89
LOS: 8 days | Discharge: HOME HEALTH | DRG: 689 | End: 2024-08-23
Attending: EMERGENCY MEDICINE | Admitting: INTERNAL MEDICINE
Payer: COMMERCIAL

## 2024-08-15 DIAGNOSIS — R42 DIZZINESS: ICD-10-CM

## 2024-08-15 DIAGNOSIS — N39.0 UTI (URINARY TRACT INFECTION): ICD-10-CM

## 2024-08-15 DIAGNOSIS — R41.82 ALTERED MENTAL STATUS: Primary | ICD-10-CM

## 2024-08-15 DIAGNOSIS — I10 HYPERTENSION: ICD-10-CM

## 2024-08-15 DIAGNOSIS — R25.1 SHAKING: ICD-10-CM

## 2024-08-15 PROBLEM — G93.41 METABOLIC ENCEPHALOPATHY: Status: ACTIVE | Noted: 2024-08-15
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
## 2024-08-15 LAB
2HR DELTA HS TROPONIN: 0 NG/L
4HR DELTA HS TROPONIN: -1 NG/L
ALBUMIN SERPL BCG-MCNC: 3.7 G/DL (ref 3.5–5)
ALP SERPL-CCNC: 82 U/L (ref 34–104)
ALT SERPL W P-5'-P-CCNC: 5 U/L (ref 7–52)
ANION GAP SERPL CALCULATED.3IONS-SCNC: 1 MMOL/L (ref 4–13)
APTT PPP: 27 SECONDS (ref 23–34)
AST SERPL W P-5'-P-CCNC: 12 U/L (ref 13–39)
ATRIAL RATE: 77 BPM
ATRIAL RATE: 78 BPM
ATRIAL RATE: 79 BPM
BACTERIA UR QL AUTO: ABNORMAL /HPF
BASOPHILS # BLD AUTO: 0.07 THOUSANDS/ÂΜL (ref 0–0.1)
BASOPHILS NFR BLD AUTO: 1 % (ref 0–1)
BILIRUB SERPL-MCNC: 0.66 MG/DL (ref 0.2–1)
BILIRUB UR QL STRIP: NEGATIVE
BNP SERPL-MCNC: 144 PG/ML (ref 0–100)
BUN SERPL-MCNC: 26 MG/DL (ref 5–25)
CALCIUM SERPL-MCNC: 9.7 MG/DL (ref 8.4–10.2)
CARDIAC TROPONIN I PNL SERPL HS: 8 NG/L
CARDIAC TROPONIN I PNL SERPL HS: 9 NG/L
CARDIAC TROPONIN I PNL SERPL HS: 9 NG/L
CHLORIDE SERPL-SCNC: 95 MMOL/L (ref 96–108)
CK SERPL-CCNC: 28 U/L (ref 26–192)
CLARITY UR: ABNORMAL
CO2 SERPL-SCNC: 44 MMOL/L (ref 21–32)
COLOR UR: YELLOW
CREAT SERPL-MCNC: 1.11 MG/DL (ref 0.6–1.3)
EOSINOPHIL # BLD AUTO: 0.15 THOUSAND/ÂΜL (ref 0–0.61)
EOSINOPHIL NFR BLD AUTO: 2 % (ref 0–6)
ERYTHROCYTE [DISTWIDTH] IN BLOOD BY AUTOMATED COUNT: 12.3 % (ref 11.6–15.1)
FLUAV RNA RESP QL NAA+PROBE: NEGATIVE
FLUBV RNA RESP QL NAA+PROBE: NEGATIVE
GFR SERPL CREATININE-BSD FRML MDRD: 44 ML/MIN/1.73SQ M
GLUCOSE SERPL-MCNC: 130 MG/DL (ref 65–140)
GLUCOSE SERPL-MCNC: 133 MG/DL (ref 65–140)
GLUCOSE UR STRIP-MCNC: NEGATIVE MG/DL
HCT VFR BLD AUTO: 46.9 % (ref 34.8–46.1)
HGB BLD-MCNC: 13.8 G/DL (ref 11.5–15.4)
HGB UR QL STRIP.AUTO: NEGATIVE
IMM GRANULOCYTES # BLD AUTO: 0.02 THOUSAND/UL (ref 0–0.2)
IMM GRANULOCYTES NFR BLD AUTO: 0 % (ref 0–2)
INR PPP: 1.02 (ref 0.85–1.19)
KETONES UR STRIP-MCNC: NEGATIVE MG/DL
LEUKOCYTE ESTERASE UR QL STRIP: ABNORMAL
LIPASE SERPL-CCNC: 13 U/L (ref 11–82)
LYMPHOCYTES # BLD AUTO: 1.33 THOUSANDS/ÂΜL (ref 0.6–4.47)
LYMPHOCYTES NFR BLD AUTO: 18 % (ref 14–44)
MCH RBC QN AUTO: 28 PG (ref 26.8–34.3)
MCHC RBC AUTO-ENTMCNC: 29.4 G/DL (ref 31.4–37.4)
MCV RBC AUTO: 95 FL (ref 82–98)
MONOCYTES # BLD AUTO: 0.51 THOUSAND/ÂΜL (ref 0.17–1.22)
MONOCYTES NFR BLD AUTO: 7 % (ref 4–12)
NEUTROPHILS # BLD AUTO: 5.49 THOUSANDS/ÂΜL (ref 1.85–7.62)
NEUTS SEG NFR BLD AUTO: 72 % (ref 43–75)
NITRITE UR QL STRIP: NEGATIVE
NON-SQ EPI CELLS URNS QL MICRO: ABNORMAL /HPF
NRBC BLD AUTO-RTO: 0 /100 WBCS
P AXIS: 76 DEGREES
P AXIS: 79 DEGREES
P AXIS: 92 DEGREES
PH UR STRIP.AUTO: 8.5 [PH] (ref 4.5–8)
PLATELET # BLD AUTO: 209 THOUSANDS/UL (ref 149–390)
PMV BLD AUTO: 10.1 FL (ref 8.9–12.7)
POTASSIUM SERPL-SCNC: 3.8 MMOL/L (ref 3.5–5.3)
PR INTERVAL: 242 MS
PR INTERVAL: 256 MS
PR INTERVAL: 268 MS
PROT SERPL-MCNC: 7.1 G/DL (ref 6.4–8.4)
PROT UR STRIP-MCNC: ABNORMAL MG/DL
PROTHROMBIN TIME: 13.6 SECONDS (ref 12.3–15)
QRS AXIS: 118 DEGREES
QRS AXIS: 141 DEGREES
QRS AXIS: 141 DEGREES
QRSD INTERVAL: 154 MS
QRSD INTERVAL: 154 MS
QRSD INTERVAL: 158 MS
QT INTERVAL: 414 MS
QT INTERVAL: 416 MS
QT INTERVAL: 420 MS
QTC INTERVAL: 468 MS
QTC INTERVAL: 477 MS
QTC INTERVAL: 478 MS
RBC # BLD AUTO: 4.92 MILLION/UL (ref 3.81–5.12)
RBC #/AREA URNS AUTO: ABNORMAL /HPF
RSV RNA RESP QL NAA+PROBE: NEGATIVE
SARS-COV-2 RNA RESP QL NAA+PROBE: NEGATIVE
SODIUM SERPL-SCNC: 140 MMOL/L (ref 135–147)
SP GR UR STRIP.AUTO: 1.01 (ref 1–1.03)
T WAVE AXIS: 39 DEGREES
T WAVE AXIS: 43 DEGREES
T WAVE AXIS: 59 DEGREES
TSH SERPL DL<=0.05 MIU/L-ACNC: 0.77 UIU/ML (ref 0.45–4.5)
UROBILINOGEN UR QL STRIP.AUTO: 0.2 E.U./DL
VENTRICULAR RATE: 77 BPM
VENTRICULAR RATE: 78 BPM
VENTRICULAR RATE: 79 BPM
WBC # BLD AUTO: 7.57 THOUSAND/UL (ref 4.31–10.16)
WBC #/AREA URNS AUTO: ABNORMAL /HPF

## 2024-08-15 PROCEDURE — 93010 ELECTROCARDIOGRAM REPORT: CPT

## 2024-08-15 PROCEDURE — 99285 EMERGENCY DEPT VISIT HI MDM: CPT

## 2024-08-15 PROCEDURE — 87086 URINE CULTURE/COLONY COUNT: CPT

## 2024-08-15 PROCEDURE — 83880 ASSAY OF NATRIURETIC PEPTIDE: CPT | Performed by: EMERGENCY MEDICINE

## 2024-08-15 PROCEDURE — 0241U HB NFCT DS VIR RESP RNA 4 TRGT: CPT | Performed by: EMERGENCY MEDICINE

## 2024-08-15 PROCEDURE — 70498 CT ANGIOGRAPHY NECK: CPT

## 2024-08-15 PROCEDURE — 85610 PROTHROMBIN TIME: CPT | Performed by: EMERGENCY MEDICINE

## 2024-08-15 PROCEDURE — 82550 ASSAY OF CK (CPK): CPT | Performed by: EMERGENCY MEDICINE

## 2024-08-15 PROCEDURE — 83690 ASSAY OF LIPASE: CPT | Performed by: EMERGENCY MEDICINE

## 2024-08-15 PROCEDURE — 99285 EMERGENCY DEPT VISIT HI MDM: CPT | Performed by: EMERGENCY MEDICINE

## 2024-08-15 PROCEDURE — 87040 BLOOD CULTURE FOR BACTERIA: CPT | Performed by: EMERGENCY MEDICINE

## 2024-08-15 PROCEDURE — 99222 1ST HOSP IP/OBS MODERATE 55: CPT | Performed by: INTERNAL MEDICINE

## 2024-08-15 PROCEDURE — 70496 CT ANGIOGRAPHY HEAD: CPT

## 2024-08-15 PROCEDURE — 93005 ELECTROCARDIOGRAM TRACING: CPT

## 2024-08-15 PROCEDURE — 80053 COMPREHEN METABOLIC PANEL: CPT | Performed by: EMERGENCY MEDICINE

## 2024-08-15 PROCEDURE — 84484 ASSAY OF TROPONIN QUANT: CPT | Performed by: EMERGENCY MEDICINE

## 2024-08-15 PROCEDURE — 36415 COLL VENOUS BLD VENIPUNCTURE: CPT | Performed by: EMERGENCY MEDICINE

## 2024-08-15 PROCEDURE — 84443 ASSAY THYROID STIM HORMONE: CPT | Performed by: EMERGENCY MEDICINE

## 2024-08-15 PROCEDURE — 96374 THER/PROPH/DIAG INJ IV PUSH: CPT

## 2024-08-15 PROCEDURE — 81001 URINALYSIS AUTO W/SCOPE: CPT

## 2024-08-15 PROCEDURE — 74177 CT ABD & PELVIS W/CONTRAST: CPT

## 2024-08-15 PROCEDURE — 85730 THROMBOPLASTIN TIME PARTIAL: CPT | Performed by: EMERGENCY MEDICINE

## 2024-08-15 PROCEDURE — 82948 REAGENT STRIP/BLOOD GLUCOSE: CPT

## 2024-08-15 PROCEDURE — 71260 CT THORAX DX C+: CPT

## 2024-08-15 PROCEDURE — 85025 COMPLETE CBC W/AUTO DIFF WBC: CPT | Performed by: EMERGENCY MEDICINE

## 2024-08-15 RX ORDER — BIMATOPROST 0.3 MG/ML
1 SOLUTION/ DROPS OPHTHALMIC DAILY
Status: DISCONTINUED | OUTPATIENT
Start: 2024-08-15 | End: 2024-08-15 | Stop reason: SDUPTHER

## 2024-08-15 RX ORDER — ONDANSETRON 2 MG/ML
4 INJECTION INTRAMUSCULAR; INTRAVENOUS EVERY 6 HOURS PRN
Status: DISCONTINUED | OUTPATIENT
Start: 2024-08-15 | End: 2024-08-23 | Stop reason: HOSPADM

## 2024-08-15 RX ORDER — ESCITALOPRAM OXALATE 10 MG/1
5 TABLET ORAL DAILY
Status: DISCONTINUED | OUTPATIENT
Start: 2024-08-15 | End: 2024-08-20

## 2024-08-15 RX ORDER — METOPROLOL TARTRATE 25 MG/1
25 TABLET, FILM COATED ORAL EVERY 12 HOURS SCHEDULED
Status: DISCONTINUED | OUTPATIENT
Start: 2024-08-15 | End: 2024-08-23 | Stop reason: HOSPADM

## 2024-08-15 RX ORDER — ENOXAPARIN SODIUM 100 MG/ML
40 INJECTION SUBCUTANEOUS DAILY
Status: DISCONTINUED | OUTPATIENT
Start: 2024-08-15 | End: 2024-08-23 | Stop reason: HOSPADM

## 2024-08-15 RX ORDER — ACETAMINOPHEN 325 MG/1
650 TABLET ORAL EVERY 6 HOURS PRN
Status: DISCONTINUED | OUTPATIENT
Start: 2024-08-15 | End: 2024-08-23 | Stop reason: HOSPADM

## 2024-08-15 RX ORDER — PRAVASTATIN SODIUM 80 MG/1
80 TABLET ORAL
Status: DISCONTINUED | OUTPATIENT
Start: 2024-08-15 | End: 2024-08-23 | Stop reason: HOSPADM

## 2024-08-15 RX ORDER — MAGNESIUM HYDROXIDE/ALUMINUM HYDROXICE/SIMETHICONE 120; 1200; 1200 MG/30ML; MG/30ML; MG/30ML
30 SUSPENSION ORAL EVERY 6 HOURS PRN
Status: DISCONTINUED | OUTPATIENT
Start: 2024-08-15 | End: 2024-08-23 | Stop reason: HOSPADM

## 2024-08-15 RX ORDER — FAMOTIDINE 20 MG/1
20 TABLET, FILM COATED ORAL 2 TIMES DAILY
Status: DISCONTINUED | OUTPATIENT
Start: 2024-08-15 | End: 2024-08-15

## 2024-08-15 RX ORDER — FUROSEMIDE 20 MG
20 TABLET ORAL DAILY
Status: DISCONTINUED | OUTPATIENT
Start: 2024-08-15 | End: 2024-08-23 | Stop reason: HOSPADM

## 2024-08-15 RX ORDER — SODIUM CHLORIDE, SODIUM GLUCONATE, SODIUM ACETATE, POTASSIUM CHLORIDE, MAGNESIUM CHLORIDE, SODIUM PHOSPHATE, DIBASIC, AND POTASSIUM PHOSPHATE .53; .5; .37; .037; .03; .012; .00082 G/100ML; G/100ML; G/100ML; G/100ML; G/100ML; G/100ML; G/100ML
75 INJECTION, SOLUTION INTRAVENOUS CONTINUOUS
Status: DISCONTINUED | OUTPATIENT
Start: 2024-08-15 | End: 2024-08-16

## 2024-08-15 RX ORDER — FAMOTIDINE 20 MG/1
20 TABLET, FILM COATED ORAL DAILY
Status: DISCONTINUED | OUTPATIENT
Start: 2024-08-15 | End: 2024-08-23 | Stop reason: HOSPADM

## 2024-08-15 RX ORDER — POLYETHYLENE GLYCOL 3350 17 G/17G
17 POWDER, FOR SOLUTION ORAL DAILY PRN
Status: DISCONTINUED | OUTPATIENT
Start: 2024-08-15 | End: 2024-08-23 | Stop reason: HOSPADM

## 2024-08-15 RX ORDER — LEVOTHYROXINE SODIUM 75 UG/1
75 TABLET ORAL
Status: DISCONTINUED | OUTPATIENT
Start: 2024-08-15 | End: 2024-08-23 | Stop reason: HOSPADM

## 2024-08-15 RX ORDER — AMLODIPINE BESYLATE 10 MG/1
10 TABLET ORAL DAILY
Status: DISCONTINUED | OUTPATIENT
Start: 2024-08-15 | End: 2024-08-23 | Stop reason: HOSPADM

## 2024-08-15 RX ADMIN — SODIUM CHLORIDE, SODIUM GLUCONATE, SODIUM ACETATE, POTASSIUM CHLORIDE, MAGNESIUM CHLORIDE, SODIUM PHOSPHATE, DIBASIC, AND POTASSIUM PHOSPHATE 75 ML/HR: .53; .5; .37; .037; .03; .012; .00082 INJECTION, SOLUTION INTRAVENOUS at 15:56

## 2024-08-15 RX ADMIN — FAMOTIDINE 20 MG: 20 TABLET, FILM COATED ORAL at 15:55

## 2024-08-15 RX ADMIN — PRAVASTATIN SODIUM 80 MG: 80 TABLET ORAL at 15:55

## 2024-08-15 RX ADMIN — AMLODIPINE BESYLATE 10 MG: 10 TABLET ORAL at 15:55

## 2024-08-15 RX ADMIN — ONDANSETRON 4 MG: 2 INJECTION INTRAMUSCULAR; INTRAVENOUS at 13:30

## 2024-08-15 RX ADMIN — DEXTROSE 2000 MG: 50 INJECTION, SOLUTION INTRAVENOUS at 13:10

## 2024-08-15 RX ADMIN — METOPROLOL TARTRATE 25 MG: 25 TABLET, FILM COATED ORAL at 15:55

## 2024-08-15 RX ADMIN — ENOXAPARIN SODIUM 40 MG: 40 INJECTION SUBCUTANEOUS at 15:55

## 2024-08-15 RX ADMIN — ACETAMINOPHEN 650 MG: 325 TABLET ORAL at 20:42

## 2024-08-15 RX ADMIN — IOHEXOL 100 ML: 350 INJECTION, SOLUTION INTRAVENOUS at 11:25

## 2024-08-15 RX ADMIN — ESCITALOPRAM OXALATE 5 MG: 10 TABLET ORAL at 15:55

## 2024-08-15 RX ADMIN — LEVOTHYROXINE SODIUM 75 MCG: 75 TABLET ORAL at 15:55

## 2024-08-15 RX ADMIN — FUROSEMIDE 20 MG: 20 TABLET ORAL at 15:55

## 2024-08-15 RX ADMIN — BIMATOPROST 1 DROP: 0.1 SOLUTION/ DROPS OPHTHALMIC at 22:01

## 2024-08-15 RX ADMIN — ASPIRIN 81 MG: 81 TABLET, COATED ORAL at 15:55

## 2024-08-15 NOTE — ED PROVIDER NOTES
History  Chief Complaint   Patient presents with    Altered Mental Status     Pt came in via EMS from home, per daughter pt had a fall last Thursday and since then pt has not been the same. Pt reports she is shaky, hx of dementia. Pt reports no pain.        History provided by:  Patient   used: No    Altered Mental Status  Presenting symptoms: confusion    Presenting symptoms comment:  Dizziness, increased shakiness  Severity:  Moderate  Most recent episode:  More than 2 days ago  Episode history:  Continuous  Duration:  4 days  Timing:  Intermittent  Progression:  Waxing and waning  Chronicity:  New  Context comment:  Patient had a fall on Thursday, seen to be confused, dizzy, increased shaking, nausea, loose stools  Associated symptoms: nausea    Associated symptoms: no abdominal pain, no agitation, no fever, no headaches, no rash and no vomiting    Nausea:     Severity:  Mild    Onset quality:  Gradual    Duration:  4 days    Timing:  Intermittent    Progression:  Waxing and waning      Prior to Admission Medications   Prescriptions Last Dose Informant Patient Reported? Taking?   ALPRAZolam (XANAX) 0.5 mg tablet   No Yes   Sig: Take 1 tablet (0.5 mg total) by mouth 3 (three) times a day as needed (TREMOR)   Cholecalciferol (Vitamin D) 50 MCG (2000 UT) tablet  Family Member No Yes   Sig: Take 0.5 tablets (1,000 Units total) by mouth every other day   Pediatric Multiple Vit-C-FA (PEDIATRIC MULTIVITAMIN) chewable tablet  Family Member Yes Yes   Sig: Chew 1 tablet daily   Potassium Gluconate 595 (99 K) MG TABS  Family Member Yes Yes   Sig: Take by mouth   acetaminophen (TYLENOL) 325 mg tablet More than a month Family Member No No   Sig: Take 2 tablets (650 mg total) by mouth every 6 (six) hours as needed for mild pain   amLODIPine (NORVASC) 10 mg tablet  Family Member No Yes   Sig: Take 1 tablet (10 mg total) by mouth daily   aspirin (ECOTRIN LOW STRENGTH) 81 mg EC tablet  Family Member Yes Yes    Sig: Take 81 mg by mouth daily   bimatoprost (LUMIGAN) 0.01 % ophthalmic drops  Family Member No Yes   Sig: Administer 1 drop to both eyes daily at bedtime   escitalopram (LEXAPRO) 5 mg tablet   No Yes   Sig: TAKE 1 TABLET (5 MG TOTAL) BY MOUTH DAILY.   famotidine (PEPCID) 20 mg tablet   No Yes   Sig: TAKE 1 TABLET BY MOUTH TWICE A DAY   furosemide (LASIX) 20 mg tablet   No Yes   Sig: TAKE 1 TABLET BY MOUTH EVERY DAY   levothyroxine 75 mcg tablet   No Yes   Sig: TAKE 1 TABLET BY MOUTH EVERY DAY IN THE MORNING   meclizine (ANTIVERT) 25 mg tablet   No Yes   Sig: Take 1 tablet (25 mg total) by mouth every 8 (eight) hours as needed for dizziness or nausea   metoprolol tartrate (LOPRESSOR) 25 mg tablet  Family Member No Yes   Sig: TAKE 1 TABLET (25 MG TOTAL) BY MOUTH EVERY 12 (TWELVE) HOURS   oxygen gas  Family Member Yes Yes   Sig: Inhale 2 L/min continuous. Indications: .   rosuvastatin (CRESTOR) 10 MG tablet  Family Member No Yes   Sig: TAKE 1 TABLET BY MOUTH EVERY DAY      Facility-Administered Medications: None       Past Medical History:   Diagnosis Date    Anxiety     Cataract, bilateral     Last Assessed:3/19/2014    COPD (chronic obstructive pulmonary disease) (HCC)     Coronary artery disease     Hx of arterial ischemic stroke     Hypertension     Stroke (HCC)     Wears glasses        Past Surgical History:   Procedure Laterality Date    CAROTID ENDARTERECTOMY Right     CATARACT EXTRACTION W/ INTRAOCULAR LENS  IMPLANT, BILATERAL      COLONOSCOPY N/A 9/30/2017    Procedure: COLONOSCOPY FOR CONTROL OF BLEEDING;  Surgeon: Jeb Gaxiola MD;  Location: [facility];  Service: Colorectal    CORONARY ARTERY BYPASS GRAFT      CABG X3 with LIMA to LAD,SVG to OM,SVG to distal  RCA ; Last Assessed:7/13/2015    JOINT REPLACEMENT      left TKR    KIDNEY STONE SURGERY      NEPHROSTOMY Left     with Drainage Irrigation ;Onset:1974    AK ARTHRP KNE CONDYLE&PLATU MEDIAL&LAT COMPARTMENTS Right 2/25/2016    Procedure:  ARTHROPLASTY KNEE TOTAL;  Surgeon: Jeb Figueroa MD;  Location: [facility];  Service: Orthopedics       Family History   Problem Relation Age of Onset    Cancer Mother         malignant neoplasm    Heart attack Father     Aneurysm Other         Aortic    Cancer Other         malignant neoplasm     I have reviewed and agree with the history as documented.    E-Cigarette/Vaping    E-Cigarette Use Never User      E-Cigarette/Vaping Substances    Nicotine No     THC No     CBD No      Social History     Tobacco Use    Smoking status: Former     Average packs/day: 1 pack/day for 24.0 years (24.0 ttl pk-yrs)     Types: Cigarettes     Start date: 1951     Passive exposure: Past    Smokeless tobacco: Never   Vaping Use    Vaping status: Never Used   Substance Use Topics    Alcohol use: Never    Drug use: Never       Review of Systems   Constitutional:  Negative for chills and fever.   HENT:  Negative for facial swelling, sore throat and trouble swallowing.    Eyes:  Negative for pain and visual disturbance.   Respiratory:  Negative for cough, chest tightness and shortness of breath.    Cardiovascular:  Negative for chest pain and leg swelling.   Gastrointestinal:  Positive for nausea. Negative for abdominal pain, diarrhea and vomiting.   Genitourinary:  Negative for dysuria and flank pain.   Musculoskeletal:  Negative for back pain, neck pain and neck stiffness.   Skin:  Negative for pallor and rash.   Allergic/Immunologic: Negative for environmental allergies and immunocompromised state.   Neurological:  Negative for dizziness and headaches.   Hematological:  Negative for adenopathy. Does not bruise/bleed easily.   Psychiatric/Behavioral:  Positive for confusion. Negative for agitation and behavioral problems.    All other systems reviewed and are negative.      Physical Exam  Physical Exam  Vitals and nursing note reviewed.   Constitutional:       General: She is not in acute distress.     Appearance: She is  well-developed.   HENT:      Head: Normocephalic and atraumatic.   Eyes:      Extraocular Movements: Extraocular movements intact.   Cardiovascular:      Rate and Rhythm: Normal rate and regular rhythm.   Pulmonary:      Effort: Pulmonary effort is normal. No respiratory distress.   Abdominal:      Palpations: Abdomen is soft.      Tenderness: There is no abdominal tenderness. There is no guarding or rebound.   Musculoskeletal:         General: Normal range of motion.      Cervical back: Normal range of motion and neck supple.   Skin:     General: Skin is warm and dry.   Neurological:      General: No focal deficit present.      Mental Status: She is alert and oriented to person, place, and time.      Cranial Nerves: No cranial nerve deficit.      Motor: No weakness.      Coordination: Coordination normal.   Psychiatric:         Mood and Affect: Mood normal.         Behavior: Behavior normal.         Vital Signs  ED Triage Vitals [08/15/24 0939]   Temperature Pulse Respirations Blood Pressure SpO2   99 °F (37.2 °C) 78 20 165/72 92 %      Temp Source Heart Rate Source Patient Position - Orthostatic VS BP Location FiO2 (%)   Oral Monitor Sitting Right arm --      Pain Score       No Pain           Vitals:    08/15/24 0939 08/15/24 1200 08/15/24 1413   BP: 165/72 167/74 152/74   Pulse: 78 75 78   Patient Position - Orthostatic VS: Sitting Lying Lying         Visual Acuity      ED Medications  Medications   ondansetron (ZOFRAN) injection 4 mg (4 mg Intravenous Given 8/15/24 1330)   multi-electrolyte (PLASMALYTE-A/ISOLYTE-S PH 7.4) IV solution (has no administration in time range)   acetaminophen (TYLENOL) tablet 650 mg (has no administration in time range)   polyethylene glycol (MIRALAX) packet 17 g (has no administration in time range)   aluminum-magnesium hydroxide-simethicone (MAALOX) oral suspension 30 mL (has no administration in time range)   enoxaparin (LOVENOX) subcutaneous injection 40 mg (has no administration  in time range)   ceftriaxone (ROCEPHIN) 1 g/50 mL in dextrose IVPB (has no administration in time range)   amLODIPine (NORVASC) tablet 10 mg (has no administration in time range)   aspirin (ECOTRIN LOW STRENGTH) EC tablet 81 mg (has no administration in time range)   bimatoprost (LUMIGAN) 0.03 % ophthalmic drops 1 drop (has no administration in time range)   escitalopram (LEXAPRO) tablet 5 mg (has no administration in time range)   furosemide (LASIX) tablet 20 mg (has no administration in time range)   levothyroxine tablet 75 mcg (has no administration in time range)   metoprolol tartrate (LOPRESSOR) tablet 25 mg (has no administration in time range)   pravastatin (PRAVACHOL) tablet 80 mg (has no administration in time range)   famotidine (PEPCID) tablet 20 mg (has no administration in time range)   iohexol (OMNIPAQUE) 350 MG/ML injection (SINGLE-DOSE) 100 mL (100 mL Intravenous Given 8/15/24 1125)   ceftriaxone (ROCEPHIN) 2 g/50 mL in dextrose IVPB (0 mg Intravenous Stopped 8/15/24 1332)       Diagnostic Studies  Results Reviewed       Procedure Component Value Units Date/Time    HS Troponin I 4hr [[id number]]  (Normal) Collected: 08/15/24 1356    Lab Status: Final result Specimen: Blood from Arm, Right Updated: 08/15/24 1422     hs TnI 4hr 8 ng/L      Delta 4hr hsTnI -1 ng/L     Blood culture #2 [[id number]] Collected: 08/15/24 1309    Lab Status: In process Specimen: Blood from Hand, Left Updated: 08/15/24 1318    Blood culture #1 [[id number]] Collected: 08/15/24 1301    Lab Status: In process Specimen: Blood from Arm, Right Updated: 08/15/24 1306    Urine Microscopic [[id number]]  (Abnormal) Collected: 08/15/24 1158    Lab Status: Final result Specimen: Urine, Clean Catch Updated: 08/15/24 1232     RBC, UA 10-20 /hpf      WBC, UA 10-20 /hpf      Epithelial Cells Occasional /hpf      Bacteria, UA Innumerable /hpf     Urine culture [[id number]] Collected: 08/15/24 1158    Lab Status: In process Specimen: Urine, Clean  Catch Updated: 08/15/24 1232    HS Troponin I 2hr [[id number]]  (Normal) Collected: 08/15/24 1152    Lab Status: Final result Specimen: Blood from Arm, Right Updated: 08/15/24 1223     hs TnI 2hr 9 ng/L      Delta 2hr hsTnI 0 ng/L     Urine Macroscopic, POC [[id number]]  (Abnormal) Collected: 08/15/24 1158    Lab Status: Final result Specimen: Urine Updated: 08/15/24 1159     Color, UA Yellow     Clarity, UA Cloudy     pH, UA 8.5     Leukocytes, UA Moderate     Nitrite, UA Negative     Protein, UA 30 (1+) mg/dl      Glucose, UA Negative mg/dl      Ketones, UA Negative mg/dl      Urobilinogen, UA 0.2 E.U./dl      Bilirubin, UA Negative     Occult Blood, UA Negative     Specific Gravity, UA 1.015    Narrative:      CLINITEK RESULT    FLU/RSV/COVID - if FLU/RSV clinically relevant [[id number]]  (Normal) Collected: 08/15/24 1009    Lab Status: Final result Specimen: Nares from Nose Updated: 08/15/24 1101     SARS-CoV-2 Negative     INFLUENZA A PCR Negative     INFLUENZA B PCR Negative     RSV PCR Negative    Narrative:      This test has been performed using the CoV-2/Flu/RSV plus assay on the Note [employer]pert platform. This test has been validated by the  and verified by the performing laboratory.     This test is designed to amplify and detect the following: nucleocapsid (N), envelope (E), and RNA-dependent RNA polymerase (RdRP) genes of the SARS-CoV-2 genome; matrix (M), basic polymerase (PB2), and acidic protein (PA) segments of the influenza A genome; matrix (M) and non-structural protein (NS) segments of the influenza B genome, and the nucleocapsid genes of RSV A and RSV B.     Positive results are indicative of the presence of Flu A, Flu B, RSV, and/or SARS-CoV-2 RNA. Positive results for SARS-CoV-2 or suspected novel influenza should be reported to state, local, or federal health departments according to local reporting requirements.      All results should be assessed in conjunction with clinical  presentation and other laboratory markers for clinical management.     FOR PEDIATRIC PATIENTS - copy/paste COVID Guidelines URL to browser: [email or web address]       TSH, 3rd generation with Free T4 reflex [[id number]]  (Normal) Collected: 08/15/24 1009    Lab Status: Final result Specimen: Blood from Arm, Right Updated: 08/15/24 1053     TSH 3RD GENERATON 0.767 uIU/mL     Protime-INR [[id number]]  (Normal) Collected: 08/15/24 1009    Lab Status: Final result Specimen: Blood from Arm, Right Updated: 08/15/24 1051     Protime 13.6 seconds      INR 1.02    Narrative:      INR Therapeutic Range    Indication                                             INR Range      Atrial Fibrillation                                               2.0-3.0  Hypercoagulable State                                    2.0.2.3  Left Ventricular Asist Device                            2.0-3.0  Mechanical Heart Valve                                  -    Aortic(with afib, MI, embolism, HF, LA enlargement,    and/or coagulopathy)                                     2.0-3.0 (2.5-3.5)     Mitral                                                             2.5-3.5  Prosthetic/Bioprosthetic Heart Valve               2.0-3.0  Venous thromboembolism (VTE: VT, PE        2.0-3.0    APTT [[id number]]  (Normal) Collected: 08/15/24 1009    Lab Status: Final result Specimen: Blood from Arm, Right Updated: 08/15/24 1051     PTT 27 seconds     HS Troponin 0hr (reflex protocol) [[id number]]  (Normal) Collected: 08/15/24 1009    Lab Status: Final result Specimen: Blood from Arm, Right Updated: 08/15/24 1043     hs TnI 0hr 9 ng/L     B-Type Natriuretic Peptide(BNP) [[id number]]  (Abnormal) Collected: 08/15/24 1009    Lab Status: Final result Specimen: Blood from Arm, Right Updated: 08/15/24 1042      pg/mL     Comprehensive metabolic panel [[id number]]  (Abnormal) Collected: 08/15/24 1009    Lab Status:  Final result Specimen: Blood from Arm, Right Updated: 08/15/24 1035     Sodium 140 mmol/L      Potassium 3.8 mmol/L      Chloride 95 mmol/L      CO2 44 mmol/L      ANION GAP 1 mmol/L      BUN 26 mg/dL      Creatinine 1.11 mg/dL      Glucose 133 mg/dL      Calcium 9.7 mg/dL      AST 12 U/L      ALT 5 U/L      Alkaline Phosphatase 82 U/L      Total Protein 7.1 g/dL      Albumin 3.7 g/dL      Total Bilirubin 0.66 mg/dL      eGFR 44 ml/min/1.73sq m     Narrative:      National Kidney Disease Foundation guidelines for Chronic Kidney Disease (CKD):     Stage 1 with normal or high GFR (GFR > 90 mL/min/1.73 square meters)    Stage 2 Mild CKD (GFR = 60-89 mL/min/1.73 square meters)    Stage 3A Moderate CKD (GFR = 45-59 mL/min/1.73 square meters)    Stage 3B Moderate CKD (GFR = 30-44 mL/min/1.73 square meters)    Stage 4 Severe CKD (GFR = 15-29 mL/min/1.73 square meters)    Stage 5 End Stage CKD (GFR <15 mL/min/1.73 square meters)  Note: GFR calculation is accurate only with a steady state creatinine    Lipase [[id number]]  (Normal) Collected: 08/15/24 1009    Lab Status: Final result Specimen: Blood from Arm, Right Updated: 08/15/24 1035     Lipase 13 u/L     CK [[id number]]  (Normal) Collected: 08/15/24 1009    Lab Status: Final result Specimen: Blood from Arm, Right Updated: 08/15/24 1035     Total CK 28 U/L     CBC and differential [[id number]]  (Abnormal) Collected: 08/15/24 1009    Lab Status: Final result Specimen: Blood from Arm, Right Updated: 08/15/24 1021     WBC 7.57 Thousand/uL      RBC 4.92 Million/uL      Hemoglobin 13.8 g/dL      Hematocrit 46.9 %      MCV 95 fL      MCH 28.0 pg      MCHC 29.4 g/dL      RDW 12.3 %      MPV 10.1 fL      Platelets 209 Thousands/uL      nRBC 0 /100 WBCs      Segmented % 72 %      Immature Grans % 0 %      Lymphocytes % 18 %      Monocytes % 7 %      Eosinophils Relative 2 %      Basophils Relative 1 %      Absolute Neutrophils 5.49 Thousands/µL      Absolute Immature Grans 0.02  Thousand/uL      Absolute Lymphocytes 1.33 Thousands/µL      Absolute Monocytes 0.51 Thousand/µL      Eosinophils Absolute 0.15 Thousand/µL      Basophils Absolute 0.07 Thousands/µL     Fingerstick Glucose (POCT) [[id number]]  (Normal) Collected: 08/15/24 0939    Lab Status: Final result Specimen: Blood Updated: 08/15/24 0940     POC Glucose 130 mg/dl                    CTA head and neck with and without contrast   Final Result by Kelton Nieto MD (08/15 1221)      CT Brain:  No acute intracranial abnormality. Multifocal encephalomalacia is unchanged      CT Angiography: No large vessel occlusion or high-grade stenosis seen. There is unchanged, moderate left intracranial vertebral artery stenosis due to calcified plaque. There is also mild left subclavian artery stenosis. There is no ICA stenosis by    NASCET criteria.      8 mm spiculated nodule in the right upper lobe. Based on current Fleischner Society 2017 Guidelines on incidental pulmonary nodule, either PET/CT scan evaluation, tissue sampling or short-term interval follow-up non-contrast CT follow-up (initially in 3    months) may be considered appropriate.      The study was marked in EPIC for immediate notification based on the pulmonary nodule finding.      Workstation performed: [id number]         CT chest abdomen pelvis w contrast   Final Result by Nash Piper MD (08/15 5776)      CT chest:      No evidence of acute thoracic process.      9 mm groundglass nodule in the right upper lobe new since May 2019. Based on current Fleischner Society 2017 Guidelines on incidental pulmonary nodule, follow-up noncontrast CT is recommended at 6-12 months from the initial examination to confirm    persistence; if stable at that time, additional follow-up CT is recommended for every 2 years until 5 years of stability is demonstrated. Considerations related to the patient's age and/or comorbidities may be used to alter these recommendations.       Additional chronic findings and negatives as above.      CT abdomen and pelvis:      No evidence of acute abdominopelvic process.      Colonic diverticulosis.      Cholelithiasis.      Stable suprarenal and infrarenal abdominal aortic aneurysm with maximum diameter of 3.6 cm and 4 cm respectively. According to ACR white paper for the management of incidentally detected abdominal vascular findings, for an aorta of this caliber (4.0-4.4    cm) follow-up imaging (e.g. Doppler ultrasound, CTA abdomen/pelvis) is recommended at a 1 year interval. Reference: J Am Rogelio Radiol 2013;10:789-794. Considerations related to the patient's age and/or comorbidities may be used to alter these    recommendations.      Additional chronic findings and negatives as above.         Workstation performed: [id number]                    Procedures  ECG 12 Lead Documentation Only    Date/Time: 8/15/2024 10:10 AM    Performed by: Eleazar Loco MD  Authorized by: Eleazar Loco MD    Indications / Diagnosis:  AMS  ECG reviewed by me, the ED Provider: yes    Patient location:  ED  Interpretation:     Interpretation: normal    Rate:     ECG rate assessment: normal    Rhythm:     Rhythm: sinus rhythm    Ectopy:     Ectopy: none    QRS:     QRS axis:  Normal  Conduction:     Conduction: abnormal      Abnormal conduction: complete RBBB    ST segments:     ST segments:  Normal  T waves:     T waves: normal             ED Course  ED Course as of 08/15/24 1512   Thu Aug 15, 2024   1114 WBC: 7.57   1114 Hemoglobin: 13.8   1114 Platelet Count: 209   1114 Sodium: 140   1114 Potassium: 3.8   1114 BUN(!): 26   1114 Creatinine: 1.11   1115 LIPASE: 13   1115 hs TnI 0hr: 9  Labs reviewed.   1201 Leukocytes, UA(!): Moderate  Uirne with moderate leucs.   1234 WBC, UA(!): 10-20   1234 Bacteria, UA(!): Innumerable  Urine micro noted for WBC 10-20, innumerable bacteria.   1235 CTA head and neck with and without contrast  IMPRESSION:     CT Brain:  No acute  intracranial abnormality. Multifocal encephalomalacia is unchanged     CT Angiography: No large vessel occlusion or high-grade stenosis seen. There is unchanged, moderate left intracranial vertebral artery stenosis due to calcified plaque. There is also mild left subclavian artery stenosis. There is no ICA stenosis by   NASCET criteria.     8 mm spiculated nodule in the right upper lobe. Based on current Fleischner Society 2017 Guidelines on incidental pulmonary nodule, either PET/CT scan evaluation, tissue sampling or short-term interval follow-up non-contrast CT follow-up (initially in 3   months) may be considered appropriate.     1237 CT chest abdomen pelvis w contrast  IMPRESSION:     CT chest:     No evidence of acute thoracic process.     9 mm groundglass nodule in the right upper lobe new since May 2019. Based on current Fleischner Society 2017 Guidelines on incidental pulmonary nodule, follow-up noncontrast CT is recommended at 6-12 months from the initial examination to confirm   persistence; if stable at that time, additional follow-up CT is recommended for every 2 years until 5 years of stability is demonstrated. Considerations related to the patient's age and/or comorbidities may be used to alter these recommendations.     Additional chronic findings and negatives as above.     CT abdomen and pelvis:     No evidence of acute abdominopelvic process.     Colonic diverticulosis.     Cholelithiasis.     Stable suprarenal and infrarenal abdominal aortic aneurysm with maximum diameter of 3.6 cm and 4 cm respectively. According to ACR white paper for the management of incidentally detected abdominal vascular findings, for an aorta of this caliber (4.0-4.4   cm) follow-up imaging (e.g. Doppler ultrasound, CTA abdomen/pelvis) is recommended at a 1 year interval         Note: Patient and daughter informed about the workup results, will admit for UTI, altered mental status, dose of antibiotics  given.                          SBIRT 22yo+      Flowsheet Row Most Recent Value   Initial Alcohol Screen: US AUDIT-C     1. How often do you have a drink containing alcohol? 0 Filed at: 08/15/2024 0948   2. How many drinks containing alcohol do you have on a typical day you are drinking?  0 Filed at: 08/15/2024 0948   3a. Male UNDER 65: How often do you have five or more drinks on one occasion? 0 Filed at: 08/15/2024 0948   3b. FEMALE Any Age, or MALE 65+: How often do you have 4 or more drinks on one occassion? 0 Filed at: 08/15/2024 0948   Audit-C Score 0 Filed at: 08/15/2024 0948   LUDIVINA: How many times in the past year have you...    Used an illegal drug or used a prescription medication for non-medical reasons? Never Filed at: 08/15/2024 0948                      Medical Decision Making  Patient is an 88-year-old female, with anxiety, COPD, CAD, artery ischemic stroke, hypertension, presenting with complaints of altered mental status, worsening dizziness and shaking, nausea, loose stools, according to daughter, patient fell past Thursday, was evaluated in the ER, had negative CT scan, at home, since then she has been getting worse with above symptoms. On exam, patient is conscious, alert, stable vital signs, nonfocal neuroexam, no respiratory distress, abdomen is soft, mild distention is noted.  Differential diagnosis: Altered mental status, rule out delayed bleed from recent fall, anemia, electrolyte derangement, UTI, no focal neurodeficits to suggest stroke, however with dizziness, possible posterior circulation insufficiency, will check labs, EKG, CT head and neck, will also add CT chest abdomen pelvis to evaluate for possible infectious pathology, ammonia, colitis.    Problems Addressed:  Altered mental status: acute illness or injury  Dizziness: acute illness or injury  Shaking: acute illness or injury  UTI (urinary tract infection): acute illness or injury    Amount and/or Complexity of Data  Reviewed  Labs: ordered. Decision-making details documented in ED Course.  Radiology: ordered. Decision-making details documented in ED Course.  ECG/medicine tests: ordered and independent interpretation performed. Decision-making details documented in ED Course.    Risk  Prescription drug management.  Decision regarding hospitalization.                 Disposition  Final diagnoses:   Altered mental status   Dizziness   Shaking   UTI (urinary tract infection)     Time reflects when diagnosis was documented in both MDM as applicable and the Disposition within this note       Time User Action Codes Description Comment    8/15/2024 10:10 AM AlamJanyEleazar Add [R41.82] Altered mental status     8/15/2024 10:10 AM Alam, Eleazar Add [R42] Dizziness     8/15/2024 10:10 AM Alam, Eleazar Add [R25.1] Shaking     8/15/2024  1:13 PM Alam, Eleazar Add [N39.0] UTI (urinary tract infection)           ED Disposition       ED Disposition   Admit    Condition   Stable    Date/Time   Thu Aug 15, 2024 1313    Comment   Case was discussed with Dr. Caro and the patient's admission status was agreed to be Admission Status: inpatient status to the service of Dr. Caro.               Follow-up Information    None         Current Discharge Medication List        CONTINUE these medications which have NOT CHANGED    Details   ALPRAZolam (XANAX) 0.5 mg tablet Take 1 tablet (0.5 mg total) by mouth 3 (three) times a day as needed (TREMOR)  Qty: 90 tablet, Refills: 0    Associated Diagnoses: Benign familial tremor      amLODIPine (NORVASC) 10 mg tablet Take 1 tablet (10 mg total) by mouth daily  Qty: 90 tablet, Refills: 0    Associated Diagnoses: HTN (hypertension)      aspirin (ECOTRIN LOW STRENGTH) 81 mg EC tablet Take 81 mg by mouth daily      bimatoprost (LUMIGAN) 0.01 % ophthalmic drops Administer 1 drop to both eyes daily at bedtime  Qty: 5 mL, Refills: 5    Associated Diagnoses: Glaucoma of both eyes, unspecified glaucoma type       Cholecalciferol (Vitamin D) 50 MCG (2000 UT) tablet Take 0.5 tablets (1,000 Units total) by mouth every other day  Qty: 100 tablet, Refills: 0    Associated Diagnoses: Vitamin D deficiency      escitalopram (LEXAPRO) 5 mg tablet TAKE 1 TABLET (5 MG TOTAL) BY MOUTH DAILY.  Qty: 90 tablet, Refills: 1    Associated Diagnoses: Generalized anxiety disorder      famotidine (PEPCID) 20 mg tablet TAKE 1 TABLET BY MOUTH TWICE A DAY  Qty: 180 tablet, Refills: 1    Associated Diagnoses: Gastroesophageal reflux disease without esophagitis      furosemide (LASIX) 20 mg tablet TAKE 1 TABLET BY MOUTH EVERY DAY  Qty: 100 tablet, Refills: 1    Associated Diagnoses: Acute on chronic respiratory failure with hypoxia and hypercapnia (HCC)      levothyroxine 75 mcg tablet TAKE 1 TABLET BY MOUTH EVERY DAY IN THE MORNING  Qty: 90 tablet, Refills: 1    Associated Diagnoses: Acquired hypothyroidism      meclizine (ANTIVERT) 25 mg tablet Take 1 tablet (25 mg total) by mouth every 8 (eight) hours as needed for dizziness or nausea  Qty: 30 tablet, Refills: 0    Associated Diagnoses: Dizziness      metoprolol tartrate (LOPRESSOR) 25 mg tablet TAKE 1 TABLET (25 MG TOTAL) BY MOUTH EVERY 12 (TWELVE) HOURS  Qty: 180 tablet, Refills: 1    Associated Diagnoses: Essential hypertension      oxygen gas Inhale 2 L/min continuous. Indications: .      Pediatric Multiple Vit-C-FA (PEDIATRIC MULTIVITAMIN) chewable tablet Chew 1 tablet daily      Potassium Gluconate 595 (99 K) MG TABS Take by mouth      rosuvastatin (CRESTOR) 10 MG tablet TAKE 1 TABLET BY MOUTH EVERY DAY  Qty: 90 tablet, Refills: 1    Associated Diagnoses: Coronary artery disease involving native coronary artery of native heart without angina pectoris      acetaminophen (TYLENOL) 325 mg tablet Take 2 tablets (650 mg total) by mouth every 6 (six) hours as needed for mild pain    Associated Diagnoses: Generalized weakness             No discharge procedures on file.    PDMP Review          Value Time User    PDMP Reviewed  Yes 8/2/2024  9:37 PM Paz Maharaj DO            ED Provider  Electronically Signed by             Eleazar Loco MD  08/15/24 9920

## 2024-08-15 NOTE — H&P
[facility]  H&P  Name: Ashley Gonzales 88 y.o. female I MRN: [id number]  Unit/Bed#: ED-11 I Date of Admission: 8/15/2024   Date of Service: 8/15/2024 I Hospital Day: 0      Assessment & Plan   * Metabolic encephalopathy  Assessment & Plan  Presented to ED with progressive confusion, shakiness  Baseline orientation/neuro status: patient has history of dementia however currently patient is getting more confused per daughter.  During my encounter, patient is AO x 3   normal blood glucose and LFTs   UA revealed innumerable bacteriuria with pyuria, likely etiology UTI  CT brain and CTA head and neck unremarkable   Delirium precautions, continue supportive care   PT/OT        Fall  Assessment & Plan  Patient had fall last week and has been progressively confused since fall per daughter  Imaging was unremarkable  PT/OT    UTI (urinary tract infection)  Assessment & Plan  Patient presented with confusion, shakiness and feeling sick   UA consistent with UTI  Follow urine and blood cultures  Received IV ceftriaxone in ED.  Continue IV ceftriaxone for now    S/P CABG (coronary artery bypass graft)  Assessment & Plan  Patient denies chest pain  continue aspirin and statin therapy      Chronic diastolic congestive heart failure (HCC)  Assessment & Plan  Wt Readings from Last 3 Encounters:   08/15/24 86 kg (189 lb 9.5 oz)   08/07/24 84.5 kg (186 lb 4.6 oz)   07/10/24 83.6 kg (184 lb 6.4 oz)     No acute exacerbation. Pt appears dry on exam.   , last ECHO 2/2021 EF 60% with grade II diastolic dysfunction  Monitor daily weights, I/O  Continue home Lasix          Acquired hypothyroidism  Assessment & Plan  Continue levothyroxine    Hypertension  Assessment & Plan  Elevated on presentation  continue amlodipine, metoprolol, and lasix     COPD (chronic obstructive pulmonary disease) (HCC)  Assessment & Plan  No acute exacerbation  Chronically on 2L O2 home oxygen  Continue home inhaler regimen            VTE Prophylaxis: Enoxaparin (Lovenox)  / sequential compression device   Code Status: Full code  POLST: There is no POLST form on file for this patient (pre-hospital)  Discussion with family: Discussed with daughter    Anticipated Length of Stay:  Patient will be admitted on an Inpatient basis with an anticipated length of stay of more than 2 midnights.   Justification for Hospital Stay: Metabolic encephalopathy due to UTI    Total Time for Visit, including Counseling / Coordination of Care: 45 minutes.  Greater than 50% of this total time spent on direct patient counseling and coordination of care.    Chief Complaint:   Confusion, shakiness    History of Present Illness:    Ashley Gonzales is a 88 y.o. female with PMH of HTN, hypothyroidism, chronic hypoxic respiratory failure most likely due to COPD, CAD status post CABG who presents with confusion and shakiness.  Per daughter, patient fell last week and she was discharged from ED.  Since then patient has progressively getting more confusion.  Patient is not feeling well and feeling nauseous.  She denies any head injury during fall.  Patient has baseline dementia however current mental status is now at her baseline.  She has a history of UTIs in the past and complicated UTI.    Review of Systems:    Review of Systems Patient was seen and examined at bedside. The patient is AO x 3, complaining of nausea.  She denies chest pain, palpitation, shortness of breath, V, abd pain.      Past Medical and Surgical History:     Past Medical History:   Diagnosis Date    Anxiety     Cataract, bilateral     Last Assessed:3/19/2014    COPD (chronic obstructive pulmonary disease) (HCC)     Coronary artery disease     Hx of arterial ischemic stroke     Hypertension     Stroke (HCC)     Wears glasses        Past Surgical History:   Procedure Laterality Date    CAROTID ENDARTERECTOMY Right     CATARACT EXTRACTION W/ INTRAOCULAR LENS  IMPLANT, BILATERAL      COLONOSCOPY N/A  9/30/2017    Procedure: COLONOSCOPY FOR CONTROL OF BLEEDING;  Surgeon: Jeb Gaxiola MD;  Location: [facility];  Service: Colorectal    CORONARY ARTERY BYPASS GRAFT      CABG X3 with LIMA to LAD,SVG to OM,SVG to distal  RCA ; Last Assessed:7/13/2015    JOINT REPLACEMENT      left TKR    KIDNEY STONE SURGERY      NEPHROSTOMY Left     with Drainage Irrigation ;Onset:1974    MS ARTHRP KNE CONDYLE&PLATU MEDIAL&LAT COMPARTMENTS Right 2/25/2016    Procedure: ARTHROPLASTY KNEE TOTAL;  Surgeon: Jeb Figueroa MD;  Location: [facility];  Service: Orthopedics       Meds/Allergies:    Prior to Admission medications    Medication Sig Start Date End Date Taking? Authorizing Provider   ALPRAZolam (XANAX) 0.5 mg tablet Take 1 tablet (0.5 mg total) by mouth 3 (three) times a day as needed (TREMOR) 8/2/24  Yes Paz Maharaj, DO   amLODIPine (NORVASC) 10 mg tablet Take 1 tablet (10 mg total) by mouth daily 5/23/24  Yes Paz Maharaj, DO   aspirin (ECOTRIN LOW STRENGTH) 81 mg EC tablet Take 81 mg by mouth daily   Yes Historical Provider, MD   bimatoprost (LUMIGAN) 0.01 % ophthalmic drops Administer 1 drop to both eyes daily at bedtime 5/10/23  Yes Paz Maharaj, DO   Cholecalciferol (Vitamin D) 50 MCG (2000 UT) tablet Take 0.5 tablets (1,000 Units total) by mouth every other day 10/16/23  Yes Paz Maharaj, DO   escitalopram (LEXAPRO) 5 mg tablet TAKE 1 TABLET (5 MG TOTAL) BY MOUTH DAILY. 6/20/24  Yes Paz Maharaj, DO   famotidine (PEPCID) 20 mg tablet TAKE 1 TABLET BY MOUTH TWICE A DAY 7/8/24  Yes Paz Romanybflaco, DO   furosemide (LASIX) 20 mg tablet TAKE 1 TABLET BY MOUTH EVERY DAY 7/21/24  Yes Paz Maharaj, DO   levothyroxine 75 mcg tablet TAKE 1 TABLET BY MOUTH EVERY DAY IN THE MORNING 8/13/24  Yes Paz Maharaj, DO   meclizine (ANTIVERT) 25 mg tablet Take 1 tablet (25 mg total) by mouth every 8 (eight) hours as needed for dizziness or nausea 8/2/24  Yes Paz Maharaj, DO   metoprolol tartrate (LOPRESSOR) 25 mg  "tablet TAKE 1 TABLET (25 MG TOTAL) BY MOUTH EVERY 12 (TWELVE) HOURS 1/10/24  Yes Paz Maharaj DO   oxygen gas Inhale 2 L/min continuous. Indications: .   Yes Paz Maharaj DO   Pediatric Multiple Vit-C-FA (PEDIATRIC MULTIVITAMIN) chewable tablet Chew 1 tablet daily   Yes Historical Provider, MD   Potassium Gluconate 595 (99 K) MG TABS Take by mouth   Yes Historical Provider, MD   rosuvastatin (CRESTOR) 10 MG tablet TAKE 1 TABLET BY MOUTH EVERY DAY 5/13/24  Yes Paz Maharaj DO   acetaminophen (TYLENOL) 325 mg tablet Take 2 tablets (650 mg total) by mouth every 6 (six) hours as needed for mild pain 2/29/24   Lissa Rodriguez PA-C     I have reviewed home medications with patient personally.    Allergies:   Allergies   Allergen Reactions    Penicillins Itching    Penicillins Rash       Social History:     Marital Status:    Occupation: Retired  Patient Pre-hospital Living Situation: Lives at home  Patient Pre-hospital Level of Mobility: With assistive device  Patient Pre-hospital Diet Restrictions: No restrictions  Substance Use History:   Social History     Substance and Sexual Activity   Alcohol Use Never     Social History     Tobacco Use   Smoking Status Former    Average packs/day: 1 pack/day for 24.0 years (24.0 ttl pk-yrs)    Types: Cigarettes    Start date: 1951    Passive exposure: Past   Smokeless Tobacco Never     Social History     Substance and Sexual Activity   Drug Use Never       Family History:    Family History   Problem Relation Age of Onset    Cancer Mother         malignant neoplasm    Heart attack Father     Aneurysm Other         Aortic    Cancer Other         malignant neoplasm       Physical Exam:     Vitals:   Blood Pressure: 167/74 (08/15/24 1200)  Pulse: 75 (08/15/24 1200)  Temperature: 99 °F (37.2 °C) (08/15/24 0939)  Temp Source: Oral (08/15/24 0939)  Respirations: (!) 23 (08/15/24 1200)  Height: 5' 2\" (157.5 cm) (08/15/24 0939)  Weight - Scale: 86 kg (189 lb 9.5 " oz) (08/15/24 0939)  SpO2: 97 % (08/15/24 1200)    Physical Exam    General: no acute distress.  Chronically on 2 L oxygen via NC  HEENT: NC/AT, PERRL, EOM - normal  Neck: Supple  Pulm/Chest: Normal chest wall expansion, diminished breath sounds on both side  CVS: normal S1&S2, capillary refill <2s  Abd: soft, non tender, non distended, bowel sounds +  MSK: move all 4 extremities spontaneously. , Generalized tremors noted  Skin: warm  CNS: no acute focal neuro deficit      Additional Data:     Lab Results: I have personally reviewed pertinent reports.      Results from last 7 days   Lab Units 08/15/24  1009   WBC Thousand/uL 7.57   HEMOGLOBIN g/dL 13.8   HEMATOCRIT % 46.9*   PLATELETS Thousands/uL 209   SEGS PCT % 72   LYMPHO PCT % 18   MONO PCT % 7   EOS PCT % 2     Results from last 7 days   Lab Units 08/15/24  1009   SODIUM mmol/L 140   POTASSIUM mmol/L 3.8   CHLORIDE mmol/L 95*   CO2 mmol/L 44*   BUN mg/dL 26*   CREATININE mg/dL 1.11   ANION GAP mmol/L 1*   CALCIUM mg/dL 9.7   ALBUMIN g/dL 3.7   TOTAL BILIRUBIN mg/dL 0.66   ALK PHOS U/L 82   ALT U/L 5*   AST U/L 12*   GLUCOSE RANDOM mg/dL 133     Results from last 7 days   Lab Units 08/15/24  1009   INR  1.02     Results from last 7 days   Lab Units 08/15/24  0939   POC GLUCOSE mg/dl 130               Imaging: I have personally reviewed pertinent reports.      CTA head and neck with and without contrast   Final Result by Kelton Nieto MD (08/15 1221)      CT Brain:  No acute intracranial abnormality. Multifocal encephalomalacia is unchanged      CT Angiography: No large vessel occlusion or high-grade stenosis seen. There is unchanged, moderate left intracranial vertebral artery stenosis due to calcified plaque. There is also mild left subclavian artery stenosis. There is no ICA stenosis by    NASCET criteria.      8 mm spiculated nodule in the right upper lobe. Based on current Fleischner Society 2017 Guidelines on incidental pulmonary nodule, either  PET/CT scan evaluation, tissue sampling or short-term interval follow-up non-contrast CT follow-up (initially in 3    months) may be considered appropriate.      The study was marked in EPIC for immediate notification based on the pulmonary nodule finding.      Workstation performed: [id number]         CT chest abdomen pelvis w contrast   Final Result by Nash Piper MD (08/15 1234)      CT chest:      No evidence of acute thoracic process.      9 mm groundglass nodule in the right upper lobe new since May 2019. Based on current Fleischner Society 2017 Guidelines on incidental pulmonary nodule, follow-up noncontrast CT is recommended at 6-12 months from the initial examination to confirm    persistence; if stable at that time, additional follow-up CT is recommended for every 2 years until 5 years of stability is demonstrated. Considerations related to the patient's age and/or comorbidities may be used to alter these recommendations.      Additional chronic findings and negatives as above.      CT abdomen and pelvis:      No evidence of acute abdominopelvic process.      Colonic diverticulosis.      Cholelithiasis.      Stable suprarenal and infrarenal abdominal aortic aneurysm with maximum diameter of 3.6 cm and 4 cm respectively. According to ACR white paper for the management of incidentally detected abdominal vascular findings, for an aorta of this caliber (4.0-4.4    cm) follow-up imaging (e.g. Doppler ultrasound, CTA abdomen/pelvis) is recommended at a 1 year interval. Reference: J Am Rogelio Radiol 2013;10:789-794. Considerations related to the patient's age and/or comorbidities may be used to alter these    recommendations.      Additional chronic findings and negatives as above.         Workstation performed: [id number]             EKG, Pathology, and Other Studies Reviewed on Admission:   EKG: Sinus rhythm with first-degree AV block, RBBB    Allscripts / Epic Records Reviewed: Yes     ** Please  Note: This note has been constructed using a voice recognition system. **

## 2024-08-15 NOTE — ASSESSMENT & PLAN NOTE
Patient had fall last week and has been progressively confused since fall per daughter  Imaging was unremarkable  PT/OT

## 2024-08-15 NOTE — ASSESSMENT & PLAN NOTE
Wt Readings from Last 3 Encounters:   08/15/24 86 kg (189 lb 9.5 oz)   08/07/24 84.5 kg (186 lb 4.6 oz)   07/10/24 83.6 kg (184 lb 6.4 oz)     No acute exacerbation. Pt appears dry on exam.   , last ECHO 2/2021 EF 60% with grade II diastolic dysfunction  Monitor daily weights, I/O  Continue home Lasix

## 2024-08-15 NOTE — INCIDENTAL FINDINGS
The following findings require follow up:  Radiographic finding   Findin mm spiculated nodule in the right upper lobe. Based on current Fleischner Society 2017 Guidelines on incidental pulmonary nodule, either PET/CT scan evaluation, tissue sampling or short-term interval follow-up non-contrast CT follow-up (initially in 3 months) may be considered appropriate.    Follow up required: Either PET/CT scan evaluation, tissue sampling or short-term interval follow-up non-contrast CT follow-up (initially in 3 months) may be considered appropriate.   Follow up should be done within 3 months    Please notify the following clinician to assist with the follow up:   Dr. Paz Maharaj    Incidental finding results were discussed with the Patient's POA by Nicolasa Caro MD on 08/15/24.   They expressed understanding and all questions answered.

## 2024-08-15 NOTE — PLAN OF CARE
Problem: Potential for Falls  Goal: Patient will remain free of falls  Description: INTERVENTIONS:  - Educate patient/family on patient safety including physical limitations  - Instruct patient to call for assistance with activity   - Consult OT/PT to assist with strengthening/mobility   - Keep Call bell within reach  - Keep bed low and locked with side rails adjusted as appropriate  - Keep care items and personal belongings within reach  - Initiate and maintain comfort rounds  - Make Fall Risk Sign visible to staff  - Offer Toileting every 2 Hours, in advance of need  - Initiate/Maintain bed alarm  - Obtain necessary fall risk management equipment: bed alarm, call bell,  socks  - Apply yellow socks and bracelet for high fall risk patients  - Consider moving patient to room near nurses station  Outcome: Progressing     Problem: Prexisting or High Potential for Compromised Skin Integrity  Goal: Skin integrity is maintained or improved  Description: INTERVENTIONS:  - Identify patients at risk for skin breakdown  - Assess and monitor skin integrity  - Assess and monitor nutrition and hydration status  - Monitor labs   - Assess for incontinence   - Turn and reposition patient  - Assist with mobility/ambulation  - Relieve pressure over bony prominences  - Avoid friction and shearing  - Provide appropriate hygiene as needed including keeping skin clean and dry  - Evaluate need for skin moisturizer/barrier cream  - Collaborate with interdisciplinary team   - Patient/family teaching  - Consider wound care consult   Outcome: Progressing     Problem: PAIN - ADULT  Goal: Verbalizes/displays adequate comfort level or baseline comfort level  Description: Interventions:  - Encourage patient to monitor pain and request assistance  - Assess pain using appropriate pain scale  - Administer analgesics based on type and severity of pain and evaluate response  - Implement non-pharmacological measures as appropriate and evaluate  response  - Consider cultural and social influences on pain and pain management  - Notify physician/advanced practitioner if interventions unsuccessful or patient reports new pain  Outcome: Progressing     Problem: INFECTION - ADULT  Goal: Absence or prevention of progression during hospitalization  Description: INTERVENTIONS:  - Assess and monitor for signs and symptoms of infection  - Monitor lab/diagnostic results  - Monitor all insertion sites, i.e. indwelling lines, tubes, and drains  - Monitor endotracheal if appropriate and nasal secretions for changes in amount and color  - Marion appropriate cooling/warming therapies per order  - Administer medications as ordered  - Instruct and encourage patient and family to use good hand hygiene technique  - Identify and instruct in appropriate isolation precautions for identified infection/condition  Outcome: Progressing     Problem: SAFETY ADULT  Goal: Patient will remain free of falls  Description: INTERVENTIONS:  - Educate patient/family on patient safety including physical limitations  - Instruct patient to call for assistance with activity   - Consult OT/PT to assist with strengthening/mobility   - Keep Call bell within reach  - Keep bed low and locked with side rails adjusted as appropriate  - Keep care items and personal belongings within reach  - Initiate and maintain comfort rounds  - Make Fall Risk Sign visible to staff  - Offer Toileting every 2 Hours, in advance of need  - Initiate/Maintain bed alarm  - Obtain necessary fall risk management equipment: bed alarm, call bell,  socks  - Apply yellow socks and bracelet for high fall risk patients  - Consider moving patient to room near nurses station  Outcome: Progressing  Goal: Maintain or return to baseline ADL function  Description: INTERVENTIONS:  -  Assess patient's ability to carry out ADLs; assess patient's baseline for ADL function and identify physical deficits which impact ability to perform ADLs  (bathing, care of mouth/teeth, toileting, grooming, dressing, etc.)  - Assess/evaluate cause of self-care deficits   - Assess range of motion  - Assess patient's mobility; develop plan if impaired  - Assess patient's need for assistive devices and provide as appropriate  - Encourage maximum independence but intervene and supervise when necessary  - Involve family in performance of ADLs  - Assess for home care needs following discharge   - Consider OT consult to assist with ADL evaluation and planning for discharge  - Provide patient education as appropriate  Outcome: Progressing  Goal: Maintains/Returns to pre admission functional level  Description: INTERVENTIONS:  - Perform AM-PAC 6 Click Basic Mobility/ Daily Activity assessment daily.  - Set and communicate daily mobility goal to care team and patient/family/caregiver.   - Collaborate with rehabilitation services on mobility goals if consulted  - Perform Range of Motion 3 times a day.  - Reposition patient every 2 hours.  - Dangle patient 3 times a day  - Stand patient 3 times a day  - Ambulate patient 3 times a day  - Out of bed to chair 3 times a day   - Out of bed for meals 3 times a day  - Out of bed for toileting  - Record patient progress and toleration of activity level   Outcome: Progressing     Problem: DISCHARGE PLANNING  Goal: Discharge to home or other facility with appropriate resources  Description: INTERVENTIONS:  - Identify barriers to discharge w/patient and caregiver  - Arrange for needed discharge resources and transportation as appropriate  - Identify discharge learning needs (meds, wound care, etc.)  - Arrange for interpretive services to assist at discharge as needed  - Refer to Case Management Department for coordinating discharge planning if the patient needs post-hospital services based on physician/advanced practitioner order or complex needs related to functional status, cognitive ability, or social support system  Outcome:  Progressing     Problem: Knowledge Deficit  Goal: Patient/family/caregiver demonstrates understanding of disease process, treatment plan, medications, and discharge instructions  Description: Complete learning assessment and assess knowledge base.  Interventions:  - Provide teaching at level of understanding  - Provide teaching via preferred learning methods  Outcome: Progressing

## 2024-08-15 NOTE — ASSESSMENT & PLAN NOTE
Presented to ED with progressive confusion, shakiness  Baseline orientation/neuro status: patient has history of dementia however currently patient is getting more confused per daughter.  During my encounter, patient is AO x 3   normal blood glucose and LFTs   UA revealed innumerable bacteriuria with pyuria, likely etiology UTI  CT brain and CTA head and neck unremarkable   Delirium precautions, continue supportive care   PT/OT

## 2024-08-15 NOTE — ASSESSMENT & PLAN NOTE
Patient presented with confusion, shakiness and feeling sick   UA consistent with UTI  Follow urine and blood cultures  Received IV ceftriaxone in ED.  Continue IV ceftriaxone for now

## 2024-08-16 PROBLEM — R91.1 LUNG NODULE: Status: ACTIVE | Noted: 2024-08-16

## 2024-08-16 PROBLEM — J96.11 CHRONIC RESPIRATORY FAILURE WITH HYPOXIA AND HYPERCAPNIA (HCC): Status: ACTIVE | Noted: 2024-08-16

## 2024-08-16 PROBLEM — J96.12 CHRONIC RESPIRATORY FAILURE WITH HYPOXIA AND HYPERCAPNIA (HCC): Status: ACTIVE | Noted: 2024-08-16

## 2024-08-16 LAB
ANION GAP SERPL CALCULATED.3IONS-SCNC: 3 MMOL/L (ref 4–13)
BASOPHILS # BLD AUTO: 0.07 THOUSANDS/ÂΜL (ref 0–0.1)
BASOPHILS NFR BLD AUTO: 1 % (ref 0–1)
BUN SERPL-MCNC: 20 MG/DL (ref 5–25)
CALCIUM SERPL-MCNC: 9 MG/DL (ref 8.4–10.2)
CHLORIDE SERPL-SCNC: 95 MMOL/L (ref 96–108)
CO2 SERPL-SCNC: 43 MMOL/L (ref 21–32)
CREAT SERPL-MCNC: 1.05 MG/DL (ref 0.6–1.3)
EOSINOPHIL # BLD AUTO: 0.18 THOUSAND/ÂΜL (ref 0–0.61)
EOSINOPHIL NFR BLD AUTO: 3 % (ref 0–6)
ERYTHROCYTE [DISTWIDTH] IN BLOOD BY AUTOMATED COUNT: 12.5 % (ref 11.6–15.1)
GFR SERPL CREATININE-BSD FRML MDRD: 47 ML/MIN/1.73SQ M
GLUCOSE SERPL-MCNC: 102 MG/DL (ref 65–140)
HCT VFR BLD AUTO: 41.2 % (ref 34.8–46.1)
HGB BLD-MCNC: 12.2 G/DL (ref 11.5–15.4)
IMM GRANULOCYTES # BLD AUTO: 0.02 THOUSAND/UL (ref 0–0.2)
IMM GRANULOCYTES NFR BLD AUTO: 0 % (ref 0–2)
LYMPHOCYTES # BLD AUTO: 1.1 THOUSANDS/ÂΜL (ref 0.6–4.47)
LYMPHOCYTES NFR BLD AUTO: 18 % (ref 14–44)
MCH RBC QN AUTO: 28.2 PG (ref 26.8–34.3)
MCHC RBC AUTO-ENTMCNC: 29.6 G/DL (ref 31.4–37.4)
MCV RBC AUTO: 95 FL (ref 82–98)
MONOCYTES # BLD AUTO: 0.63 THOUSAND/ÂΜL (ref 0.17–1.22)
MONOCYTES NFR BLD AUTO: 10 % (ref 4–12)
NEUTROPHILS # BLD AUTO: 4.16 THOUSANDS/ÂΜL (ref 1.85–7.62)
NEUTS SEG NFR BLD AUTO: 68 % (ref 43–75)
NRBC BLD AUTO-RTO: 0 /100 WBCS
PLATELET # BLD AUTO: 203 THOUSANDS/UL (ref 149–390)
PMV BLD AUTO: 10.7 FL (ref 8.9–12.7)
POTASSIUM SERPL-SCNC: 3.8 MMOL/L (ref 3.5–5.3)
RBC # BLD AUTO: 4.32 MILLION/UL (ref 3.81–5.12)
SODIUM SERPL-SCNC: 141 MMOL/L (ref 135–147)
VIT B12 SERPL-MCNC: 234 PG/ML (ref 180–914)
WBC # BLD AUTO: 6.16 THOUSAND/UL (ref 4.31–10.16)

## 2024-08-16 PROCEDURE — 97163 PT EVAL HIGH COMPLEX 45 MIN: CPT

## 2024-08-16 PROCEDURE — 85025 COMPLETE CBC W/AUTO DIFF WBC: CPT | Performed by: INTERNAL MEDICINE

## 2024-08-16 PROCEDURE — 99232 SBSQ HOSP IP/OBS MODERATE 35: CPT

## 2024-08-16 PROCEDURE — 99223 1ST HOSP IP/OBS HIGH 75: CPT

## 2024-08-16 PROCEDURE — 97167 OT EVAL HIGH COMPLEX 60 MIN: CPT

## 2024-08-16 PROCEDURE — 80048 BASIC METABOLIC PNL TOTAL CA: CPT | Performed by: INTERNAL MEDICINE

## 2024-08-16 PROCEDURE — 82607 VITAMIN B-12: CPT

## 2024-08-16 RX ORDER — ALPRAZOLAM 0.5 MG
0.5 TABLET ORAL 3 TIMES DAILY PRN
Status: DISCONTINUED | OUTPATIENT
Start: 2024-08-16 | End: 2024-08-17

## 2024-08-16 RX ORDER — LIDOCAINE HYDROCHLORIDE 20 MG/ML
15 SOLUTION OROPHARYNGEAL 4 TIMES DAILY PRN
Status: CANCELLED | OUTPATIENT
Start: 2024-08-16

## 2024-08-16 RX ORDER — LEVALBUTEROL INHALATION SOLUTION 1.25 MG/3ML
1.25 SOLUTION RESPIRATORY (INHALATION) EVERY 8 HOURS PRN
Status: DISCONTINUED | OUTPATIENT
Start: 2024-08-16 | End: 2024-08-23 | Stop reason: HOSPADM

## 2024-08-16 RX ADMIN — METOPROLOL TARTRATE 25 MG: 25 TABLET, FILM COATED ORAL at 22:00

## 2024-08-16 RX ADMIN — ENOXAPARIN SODIUM 40 MG: 40 INJECTION SUBCUTANEOUS at 08:09

## 2024-08-16 RX ADMIN — LEVOTHYROXINE SODIUM 75 MCG: 75 TABLET ORAL at 05:27

## 2024-08-16 RX ADMIN — DEXTROSE 1000 MG: 50 INJECTION, SOLUTION INTRAVENOUS at 12:44

## 2024-08-16 RX ADMIN — DEXTRAN 70, GLYCERIN, HYPROMELLOSE 1 DROP: 1; 2; 3 SOLUTION/ DROPS OPHTHALMIC at 22:26

## 2024-08-16 RX ADMIN — ASPIRIN 81 MG: 81 TABLET, COATED ORAL at 08:09

## 2024-08-16 RX ADMIN — BIMATOPROST 1 DROP: 0.1 SOLUTION/ DROPS OPHTHALMIC at 22:26

## 2024-08-16 RX ADMIN — ESCITALOPRAM OXALATE 5 MG: 10 TABLET ORAL at 08:09

## 2024-08-16 RX ADMIN — METOPROLOL TARTRATE 25 MG: 25 TABLET, FILM COATED ORAL at 08:10

## 2024-08-16 RX ADMIN — ONDANSETRON 4 MG: 2 INJECTION INTRAMUSCULAR; INTRAVENOUS at 06:16

## 2024-08-16 RX ADMIN — ACETAMINOPHEN 650 MG: 325 TABLET ORAL at 02:37

## 2024-08-16 RX ADMIN — FAMOTIDINE 20 MG: 20 TABLET, FILM COATED ORAL at 08:09

## 2024-08-16 RX ADMIN — AMLODIPINE BESYLATE 10 MG: 10 TABLET ORAL at 08:09

## 2024-08-16 RX ADMIN — FUROSEMIDE 20 MG: 20 TABLET ORAL at 08:10

## 2024-08-16 RX ADMIN — PRAVASTATIN SODIUM 80 MG: 80 TABLET ORAL at 16:31

## 2024-08-16 NOTE — ASSESSMENT & PLAN NOTE
CT chest displayed 8 mm spiculated nodule in the right upper lobe  Patient does have smoking history  Recommending short-term interval follow-up noncontrast CT in about 3 months  I discussed the findings with patient and daughter and they are aware that malignancy cannot be ruled out  Incidental finding no completed

## 2024-08-16 NOTE — OCCUPATIONAL THERAPY NOTE
Occupational Therapy Evaluation     Patient Name: Ashley Gonzales  Today's Date: 8/16/2024  Problem List  Principal Problem:    Metabolic encephalopathy  Active Problems:    COPD (chronic obstructive pulmonary disease) (HCC)    Benign familial tremor    Hypertension    Acquired hypothyroidism    Chronic diastolic congestive heart failure (HCC)    Generalized anxiety disorder- panic    Glaucoma    Hyperlipidemia    S/P CABG (coronary artery bypass graft)    Infrarenal abdominal aortic aneurysm (AAA) without rupture (HCC)    Stage 3 chronic kidney disease (HCC)    MGUS (monoclonal gammopathy of unknown significance)    UTI (urinary tract infection)    Fall    Lung nodule    Chronic respiratory failure with hypoxia and hypercapnia (HCC)    Past Medical History  Past Medical History:   Diagnosis Date    Anxiety     Cataract, bilateral     Last Assessed:3/19/2014    COPD (chronic obstructive pulmonary disease) (HCC)     Coronary artery disease     Hx of arterial ischemic stroke     Hypertension     Stroke (HCC)     Wears glasses      Past Surgical History  Past Surgical History:   Procedure Laterality Date    CAROTID ENDARTERECTOMY Right     CATARACT EXTRACTION W/ INTRAOCULAR LENS  IMPLANT, BILATERAL      COLONOSCOPY N/A 9/30/2017    Procedure: COLONOSCOPY FOR CONTROL OF BLEEDING;  Surgeon: Jeb Gaxiola MD;  [facility]: [facility];  Service: Colorectal    CORONARY ARTERY BYPASS GRAFT      CABG X3 with LIMA to LAD,SVG to OM,SVG to distal  RCA ; Last Assessed:7/13/2015    JOINT REPLACEMENT      left TKR    KIDNEY STONE SURGERY      NEPHROSTOMY Left     with Drainage Irrigation ;Onset:1974    [place] ARTHRP KNE CONDYLE&PLATU MEDIAL&LAT COMPARTMENTS Right 2/25/2016    Procedure: ARTHROPLASTY KNEE TOTAL;  Surgeon: Jeb Figueroa MD;  Location: [facility];  Service: Orthopedics           08/16/24 0927   Note Type   Note type Evaluation   Pain Assessment   Pain Assessment Tool 0-10   Pain Score No Pain  "  Restrictions/Precautions   Weight Bearing Precautions Per Order No   Other Precautions Fall Risk;O2;Chair Alarm;Bed Alarm;Multiple lines   Home Living   Type of Home House   Home Layout Two level;1/2 bath on main level  (1 jhonny, 12-13 steps to 2nd)   Bathroom Shower/Tub Walk-in shower   Bathroom Toilet Raised   Home Equipment Walker;Cane  (rollator)   Prior Function   Level of [place] Needs assistance with ADLs;Independent with functional mobility   Lives With Son   Falls in the last 6 months 1 to 4  (states <5)   Comments PTA pt states difficulty with her ADLs; states independence with transfers/ambulation--with rollator; +falls, +home alone, neg    Lifestyle   Autonomy PTA pt states difficulty with her ADLs; states independence with transfers/ambulation--with rollator; +falls, +home alone, neg    Reciprocal Relationships supportive son   Service to Others homemaker   Intrinsic Gratification watching TV   Subjective   Subjective \"I don't walk too much at home.\"   ADL   Where Assessed Edge of bed   Eating Assistance 6  Modified independent   Grooming Assistance 6  Modified Independent   UB Bathing Assistance 5  Supervision/Setup   LB Bathing Assistance 4  Minimal Assistance   UB Dressing Assistance 5  Supervision/Setup   LB Dressing Assistance 3  Moderate Assistance   Toileting Assistance  5  Supervision/Setup   Transfers   Sit to Stand 5  Supervision   Additional items Increased time required;Verbal cues   Stand to Sit 5  Supervision   Additional items Increased time required;Bed elevated   Additional Comments bp's=137/65(sitting in chair), 152/69(sitting in chair after ambulation); SPO2=88-91% on 2 liters, PA made aware   Functional Mobility   Functional Mobility 5  Supervision   Additional items Rolling walker   Balance   Static Sitting Fair +   Dynamic Sitting Fair   Static Standing Fair   Dynamic Standing Fair -   Activity Tolerance   Activity Tolerance Patient limited by fatigue   Medical " Staff Made Aware nsg, P.T., CM   RUE Assessment   RUE Assessment WFL   RUE Strength   RUE Overall Strength Within Functional Limits - able to perform ADL tasks with strength  (4/5 throughout)   LUE Assessment   LUE Assessment WFL   LUE Strength   LUE Overall Strength Within Functional Limits - able to perform ADL tasks with strength  (4/5 throughout)   Hand Function   Gross Motor Coordination Functional   Fine Motor Coordination Functional   Sensation   Light Touch No apparent deficits   Proprioception   Proprioception No apparent deficits   Vision-Basic Assessment   Current Vision   (glasses)   Vision - Complex Assessment   Acuity Able to read clock/calendar on wall without difficulty   Psychosocial   Psychosocial (WDL) X   Patient Behaviors/Mood Flat affect;Cooperative   Perception   Inattention/Neglect Appears intact   Cognition   Overall Cognitive Status Impaired   Arousal/Participation Alert   Attention Within functional limits   Orientation Level Oriented X4   Memory Decreased short term memory;Decreased recall of recent events;Decreased recall of precautions   Following Commands Follows one step commands with increased time or repetition   Comments hx dementia   Assessment   Limitation Decreased ADL status;Decreased UE strength;Decreased Safe judgement during ADL;Decreased cognition;Decreased endurance;Decreased high-level ADLs   Prognosis Fair   Assessment Pt is a 87y/o female admitted to the hospital 2* symptoms of increased confusion, shakiness, dizziness, and recent(last) fall. Pt noted with metabolic encephalpathy, ? UTI. Pt with PMH home O2(2liters), anxiety, COPD, CAD--s/p CABG, CVA, HTN, R CEA, L TKR. PTA pt states difficulty with her ADLs; states independence with transfers/ambulation--with rollator; +falls, +home alone, neg . During initial eval, pt demonstrated deficits with her functional balance, functional mobility, ADL status, transfer safety, b/l UE strength, activity tolerance(currently  "fair=15-20mins), and cognition(i.e.memory). Pt would benefit from continued OT tx for the above deficits.3-5xwk/1-2wks. The patient's raw score on the AM-PAC Daily Activity Inpatient Short Form is 18. A raw score of less than 19 suggests the patient may benefit from discharge to post-acute rehabilitation services. Please refer to the recommendation of the Occupational Therapist for safe discharge planning.   Goals   Patient Goals \"to get better\"   STG Time Frame   (1-7 days)   Short Term Goal #1 Pt will demonstrate improved activity tolerance to good(20-30mins) and standing tolerance to 3-5mins to assist with ADLs.   Short Term Goal #2 Pt will independently verbalize 2-3 potential fall risks/transfer safety hazards and their appropriate compensation techniques.   Short Term Goal  Pt will tolerate continued cognitive/home-safety assessment and appropriate d/c recommendations will be provided.   LTG Time Frame   (7-14 days)   Long Term Goal #1 Pt will demonstrate g/g- balance with all functional activities.   Long Term Goal #2 Pt will demonstrate mod I with their UE and LE bathing/dresssing.   Long Term Goal Pt will demonstrate proper walker/transfer safety 100% of the time.   Plan   Treatment Interventions ADL retraining;Functional transfer training;UE strengthening/ROM;Endurance training;Cognitive reorientation;Patient/family training;Equipment evaluation/education;Compensatory technique education;Continued evaluation   Goal Expiration Date 08/30/24   OT Treatment Day 0   OT Frequency 3-5x/wk   Discharge Recommendation   Rehab Resource Intensity Level, OT II (Moderate Resource Intensity)   AM-PAC Daily Activity Inpatient   Lower Body Dressing 2   Bathing 2   Toileting 3   Upper Body Dressing 3   Grooming 4   Eating 4   Daily Activity Raw Score 18   Daily Activity Standardized Score (Calc for Raw Score >=11) 38.66   AM-PAC Applied Cognition Inpatient   Following a Speech/Presentation 3   Understanding Ordinary " Conversation 3   Taking Medications 3   Remembering Where Things Are Placed or Put Away 3   Remembering List of 4-5 Errands 3   Taking Care of Complicated Tasks 3   Applied Cognition Raw Score 18   Applied Cognition Standardized Score 38.07   Parveen Anthony

## 2024-08-16 NOTE — ASSESSMENT & PLAN NOTE
Per outpatient notes she has been seen by neurology and various trials of medication which were either not help or she did not tolerate.  She currently uses alprazolam as needed for symptom control.  Confirmed PDMP

## 2024-08-16 NOTE — PHYSICAL THERAPY NOTE
PHYSICAL THERAPY EVALUATION     NAME:  Ashley Gonzales  DATE: 08/16/24    AGE:   88 y.o.  Mrn:   [id number]  ADMIT DX:  Dizziness [R42]  Shaking [R25.1]  UTI (urinary tract infection) [N39.0]  Altered mental status [R41.82]  Altered mental state [R41.82]    Patient Active Problem List   Diagnosis    COPD (chronic obstructive pulmonary disease) (HCC)    Benign familial tremor    Hypertension    Acquired hypothyroidism    Hx of arterial ischemic stroke    Chronic diastolic congestive heart failure (HCC)    Clinical depression    Esophageal reflux    Female stress incontinence    Generalized anxiety disorder- panic    Generalized osteoarthritis    Glaucoma    Hyperlipidemia    Insomnia    JAMEEL (obstructive sleep apnea)    Osteoarthritis of knee    Postural imbalance    S/P CABG (coronary artery bypass graft)    History of total knee replacement    Pleural effusion    Acute on chronic respiratory failure with hypoxia and hypercapnia (HCC)    Obesity (BMI 30-39.9)    Infrarenal abdominal aortic aneurysm (AAA) without rupture (HCC)    Dizziness    Hematuria    Stage 3 chronic kidney disease (HCC)    Nephrolithiasis    MGUS (monoclonal gammopathy of unknown significance)    Metabolic encephalopathy    UTI (urinary tract infection)    Fall    Lung nodule    Chronic respiratory failure with hypoxia and hypercapnia (HCC)       Past Medical History:   Diagnosis Date    Anxiety     Cataract, bilateral     Last Assessed:3/19/2014    COPD (chronic obstructive pulmonary disease) (HCC)     Coronary artery disease     Hx of arterial ischemic stroke     Hypertension     Stroke (HCC)     Wears glasses        Past Surgical History:   Procedure Laterality Date    CAROTID ENDARTERECTOMY Right     CATARACT EXTRACTION W/ INTRAOCULAR LENS  IMPLANT, BILATERAL      COLONOSCOPY N/A 9/30/2017    Procedure: COLONOSCOPY FOR CONTROL OF BLEEDING;  Surgeon: Jeb Gaxiola MD;  [facility]: [facility];  Service: Colorectal    CORONARY ARTERY BYPASS GRAFT       CABG X3 with LIMA to LAD,SVG to OM,SVG to distal  RCA ; Last Assessed:7/13/2015    JOINT REPLACEMENT      left TKR    KIDNEY STONE SURGERY      NEPHROSTOMY Left     with Drainage Irrigation ;Onset:1974    [place] ARTHRP KNE CONDYLE&PLATU MEDIAL&LAT COMPARTMENTS Right 2/25/2016    Procedure: ARTHROPLASTY KNEE TOTAL;  Surgeon: Jeb Figueroa MD;  Location[facility];  Service: Orthopedics       Imaging Studies:  CTA head and neck with and without contrast   Final Result by Kelton Nieto MD (08/15 7505)      CT Brain:  No acute intracranial abnormality. Multifocal encephalomalacia is unchanged      CT Angiography: No large vessel occlusion or high-grade stenosis seen. There is unchanged, moderate left intracranial vertebral artery stenosis due to calcified plaque. There is also mild left subclavian artery stenosis. There is no ICA stenosis by    NASCET criteria.      8 mm spiculated nodule in the right upper lobe. Based on current Fleischner Society 2017 Guidelines on incidental pulmonary nodule, either PET/CT scan evaluation, tissue sampling or short-term interval follow-up non-contrast CT follow-up (initially in 3    months) may be considered appropriate.      The study was marked in EPIC for immediate notification based on the pulmonary nodule finding.      Workstation performed: [id number]         CT chest abdomen pelvis w contrast   Final Result by Nash Piper MD (08/15 2984)      CT chest:      No evidence of acute thoracic process.      9 mm groundglass nodule in the right upper lobe new since May 2019. Based on current Fleischner Society 2017 Guidelines on incidental pulmonary nodule, follow-up noncontrast CT is recommended at 6-12 months from the initial examination to confirm    persistence; if stable at that time, additional follow-up CT is recommended for every 2 years until 5 years of stability is demonstrated. Considerations related to the patient's age and/or comorbidities may  be used to alter these recommendations.      Additional chronic findings and negatives as above.      CT abdomen and pelvis:      No evidence of acute abdominopelvic process.      Colonic diverticulosis.      Cholelithiasis.      Stable suprarenal and infrarenal abdominal aortic aneurysm with maximum diameter of 3.6 cm and 4 cm respectively. According to ACR white paper for the management of incidentally detected abdominal vascular findings, for an aorta of this caliber (4.0-4.4    cm) follow-up imaging (e.g. Doppler ultrasound, CTA abdomen/pelvis) is recommended at a 1 year interval. Reference: J Am Rogelio Radiol 2013;10:789-794. Considerations related to the patient's age and/or comorbidities may be used to alter these    recommendations.      Additional chronic findings and negatives as above.         Workstation performed: [id number]             Past Medical History:   Diagnosis Date    Anxiety     Cataract, bilateral     Last Assessed:3/19/2014    COPD (chronic obstructive pulmonary disease) (HCC)     Coronary artery disease     Hx of arterial ischemic stroke     Hypertension     Stroke (HCC)     Wears glasses      Length Of Stay: 1  Performed at least 2 patient identifiers during session: Name and Birthday  PHYSICAL THERAPY EVALUATION :        08/16/24 0954   PT Last Visit   PT Visit Date 08/16/24   Note Type   Note type Evaluation   Additional Comments Greeted seated in bedside chair, amenable   Pain Assessment   Pain Assessment Tool 0-10   Pain Score No Pain   Restrictions/Precautions   Weight Bearing Precautions Per Order No   Other Precautions Fall Risk;O2;Chair Alarm;Bed Alarm;Multiple lines  (2LNC)   Home Living   Type of Home House   Home Layout Two level;1/2 bath on main level  (1 jhonny, FOS steps to 2nd--stays on first floor during the day)   Bathroom Shower/Tub Walk-in shower   Bathroom Toilet Raised   Home Equipment Walker;Cane;Other (Comment)  (Rollator)   Additional Comments alone during the day but  "lives w/ supportive son and his gf,   Prior Function   Level of [place] Needs assistance with ADLs;Independent with functional mobility   Lives With Son   Falls in the last 6 months 1 to 4  (<5 per pt)   Comments PTA, pt reports functioning at independent w/ rollator for household distances, A w/ ADLs and IADLs, (+) 2LNC at baseline, alone during the day but lives w/ supportive son and his gf, (-)drive., (+) falls.   General   Additional Pertinent History Significant pmhx per chart: COPD, benign familial tremor, HTN, hypothyroidism, CHF, KIMI, glaucoma, HLD, s/p CABG (2019), ckd, AAA W/O RUPTURE (2/2024), fall, ischemic CVA, OA, stress incontinence (2012), JAMEEL, dizziness (2024), Obesity, chronic respiratory failure w/ hypoxia and hypercapnia (2/2021 and 8/2024), dementia.   Family/Caregiver Present No   Cognition   Overall Cognitive Status Impaired   Arousal/Participation Alert   Attention Within functional limits   Orientation Level Oriented X4   Memory Decreased short term memory;Decreased recall of recent events;Decreased recall of precautions   Following Commands Follows one step commands with increased time or repetition   Comments h/o dementia   Subjective   Subjective \" Not too good.\"   RLE Assessment   RLE Assessment WFL  (Hip flx 3+/5, knee ext and ankle 4-/5;(+) Tremor)   LLE Assessment   LLE Assessment WFL  (hip flx 3/5, knee ext 4-/5, ankle DF 3/5, PF 4-/5; (+) Tremor)   Vision-Basic Assessment   Visual History Glaucoma   Patient Visual Report Other (Comment)  (Blurred vision all the time)   Transfers   Sit to Stand 5  Supervision   Additional items Increased time required;Verbal cues   Stand to Sit 5  Supervision   Additional items Increased time required;Verbal cues   Additional Comments RW for stability. Cues for safe technique and hand placement   Ambulation/Elevation   Gait pattern Poor UE support;Improper Weight shift;Wide MEHDI;Decreased foot clearance;Foward flexed;Short stride;Excessively " slow;Step through pattern   Gait Assistance 5  Supervision   Additional items Assist x 1;Verbal cues   Assistive Device Rolling walker   Distance 32'+10' x1 w/ standing rest break 2/2 fatigue. Chair follow   Ambulation/Elevation Additional Comments Dec tolerance 2/2 fatigue. semi-steady step through patterning w/o gross LOB. Cued for gait , activity pacing/energy conservation, andproximity to RW   Balance   Static Sitting Fair +   Dynamic Sitting Fair   Static Standing Fair   Dynamic Standing Fair -   Ambulatory Fair -   Activity Tolerance   Activity Tolerance (S)  Patient limited by fatigue  (BP sitting pre 137/65 and post 152/69, SpO2 88-91% on 2LNC, PA aware)   Medical Staff Made Aware RN, OT, PA   Nurse Made Aware RN cleared/udpated   Assessment   Prognosis Fair   Problem List Decreased strength;Decreased endurance;Decreased mobility;Impaired balance;Impaired vision;Obesity   Assessment Pt is a 88 y.o. female y/o presenting [facility]24 with inc confusion, dizziness, and shakiness since recent fall w/ admission at [facility] w/ d/c to home per dtr. Primary dx: Metabolic encephalopathy.  CTH w/o acute findings. ospital stay w/ c/f UTI. PT consulted to assess strength/functional mobility, activity tolerance and d/c needs. Active PT orders and activity orders for Up and OOB as tolerated . PTA, pt reports functioning at independent w/ rollator for household distances, A w/ ADLs and IADLs, (+) 2LNC at baseline, alone during the day but lives w/ supportive son and his gf, (-)drive., (+) falls.     During PT IE, pt presenting with above (see flowsheet) outlined functional impairments including dec BLE strength, balance, gait, and deficits that limit functional mobility and activity tolerance relative to baseline. (+) tremor in extremities noted. Pt currently requires supervision for transfers, supervision for ambulation with RW for limited household distances w/ standing rest breaks. Pt most  "limited 2/2 fatigue w/ general deconditioning. On 2LNC throughout w/ SpO2 88-91%, PA aware. Pt currently demonstrating inc fall risk. Fall risk education provided with verbal understanding. Denied additional sxs throughout session. Recommend continued mobilization of pt with nsg staff as tolerated to prevent further decline in function.  Pt will benefit from continued PT services to progress mobility independence necessary for return to PLOF. Based on pt presentation and impairments, pt would most appropriately benefit from level II (mod) rehab intensity resources  pending progress to promote safe transition to home.   Barriers to Discharge Inaccessible home environment;Decreased caregiver support;Other (Comment)  ((+) time alone, FOS to access bedroom and full bathroom)   Goals   Patient Goals \"Get better\"   STG Expiration Date 08/30/24   Short Term Goal #1 1).  Pt will perform bed mobility with Raul demonstrating appropriate technique 100% of the time in order to improve function.2)  Perform all transfers with Raul demonstrating safe and appropriate technique 100% of the time in order to improve ability to negotiate safely in home environment.3) Amb with least restrictive AD > 100'x2 with mod I in order to demonstrate ability to negotiate in home environment. 4)  Improve overall strength and balance 1/2 grade in order to optimize ability to perform functional tasks and reduce fall risk.5) Increase activity tolerance to 45 minutes in order to improve endurance to functional tasks. 6)  Negotiate >/= flight of stairs using most appropriate technique and S in order to be able to negotiate safely in home environment. 7) PT for ongoing patient and family/caregiver education, DME needs and d/c planning in order to promote highest level of function in least restrictive environment.   PT Treatment Day 0   Plan   Treatment/Interventions Functional transfer training;LE strengthening/ROM;Elevations;Therapeutic " exercise;Endurance training;Equipment eval/education;Bed mobility;Gait training;Compensatory technique education;Spoke to nursing;OT  (PA)   PT Frequency 3-5x/wk   Discharge Recommendation   Rehab Resource Intensity Level, PT II (Moderate Resource Intensity)   Equipment Recommended Walker  (Owns)   Additional Comments The patient's AM-PAC Basic Mobility Inpatient Short Form Raw Score is 16. A Raw score of greater than 16 suggests the patient may benefit from discharge to post-acute rehabiliation. Please also refer to the recommendation of the Physical Therapist for safe discharge planning.   AM-PAC Basic Mobility Inpatient   Turning in Flat Bed Without Bedrails 3   Lying on Back to Sitting on Edge of Flat Bed Without Bedrails 2   Moving Bed to Chair 3   Standing Up From Chair Using Arms 3   Walk in Room 3   Climb 3-5 Stairs With Railing 2   Basic Mobility Inpatient Raw Score 16   Basic Mobility Standardized Score 38.32   [facility] Highest Level Of Mobility   -HLM Goal 5: Stand one or more mins   JH-HLM Achieved 7: Walk 25 feet or more   End of Consult   Patient Position at End of Consult Bedside chair;Bed/Chair alarm activated;All needs within reach;Other (comment)  (Left in care of PA. Milo)       Pt requires PT/OT co-eval for pt's best interest due to medical complexity, safety concerns, fall risk, dec activity tolerance which is decline from PLOF, significant assistance with mobility and/or cognitive-behavioral impairments.    (Please find full objective findings from PT assessment regarding body systems outlined above).     Hx/personal factors: anxiety, step(s) to enter environment, multi-level environment, limited home support, advanced age, and recent fall(s)/fall history, co-morbidities, inaccessible home, mutliple lines, use of AD, fall risk, h/o of falls, obesity, and O2, recent ED/admission  Examination: assessed/impairments of systems including multiple body structures involved; risk for falls,  impairements in locomotion, musculoskeletal, balance, endurance, posture, coordination, assessed cognition, AM-PAC score suggesting inc assistance/supervision vs baseline, gait deviations , dec activity tolerance/endurance vs baseline , tremor  Clinical: unpredictable (ongoing medical status, abnormal lab values, risk for falls, need for input for mobility technique/safety, and bed/chair alarm, )  Complexity: high       Westley Breen, PT,DPT   08/16/24

## 2024-08-16 NOTE — PLAN OF CARE
Problem: PHYSICAL THERAPY ADULT  Goal: Performs mobility at highest level of function for planned discharge setting.  See evaluation for individualized goals.  Description: Treatment/Interventions: Functional transfer training, LE strengthening/ROM, Elevations, Therapeutic exercise, Endurance training, Equipment eval/education, Bed mobility, Gait training, Compensatory technique education, Spoke to nursing, OT (PA)  Equipment Recommended: Walker (Owns)       See flowsheet documentation for full assessment, interventions and recommendations.  Note: Prognosis: Fair  Problem List: Decreased strength, Decreased endurance, Decreased mobility, Impaired balance, Impaired vision, Obesity  Assessment: Pt is a 88 y.o. female y/o presenting to [facility] on 8/15/2024 with inc confusion and shakiness since recent fall w/ admission at [facility] w/ d/c to home per dtr. Primary dx: Metabolic encephalopathy.  CTH w/o acute findings. ospital stay w/ c/f UTI. PT consulted to assess strength/functional mobility, activity tolerance and d/c needs. Active PT orders and activity orders for Up and OOB as tolerated . PTA, pt reports functioning at independent w/ rollator for household distances, A w/ ADLs and IADLs, (+) 2LNC at baseline, alone during the day but lives w/ supportive son and his gf, (-)drive., (+) falls.     During PT IE, pt presenting with above (see flowsheet) outlined functional impairments including dec BLE strength, balance, gait, and deficits that limit functional mobility and activity tolerance relative to baseline. (+) tremor in extremities noted. Pt currently requires supervision for transfers, supervision for ambulation with RW for limited household distances w/ standing rest breaks. Pt most limited 2/2 fatigue w/ general deconditioning. On 2LNC throughout w/ SpO2 88-91%, PA aware. Pt currently demonstrating inc fall risk. Fall risk education provided with verbal understanding. Denied additional sxs throughout  session. Recommend continued mobilization of pt with nsg staff as tolerated to prevent further decline in function.  Pt will benefit from continued PT services to progress mobility independence necessary for return to PLOF. Based on pt presentation and impairments, pt would most appropriately benefit from level II (mod) rehab intensity resources  pending progress to promote safe transition to home.  Barriers to Discharge: Inaccessible home environment, Decreased caregiver support, Other (Comment) ((+) time alone, FOS to access bedroom and full bathroom)     Rehab Resource Intensity Level, PT: II (Moderate Resource Intensity)    See flowsheet documentation for full assessment.

## 2024-08-16 NOTE — CONSULTS
Consultation - Geriatrics   Ashley Gonzales 88 y.o. female MRN: [id number]  Unit/Bed#: E2 -01 Encounter: [id number]      Assessment/Plan    Metabolic encephalopathy  At a baseline is alert and oriented x  4  On exam patient is alert and oriented x 4  Identify and treat reversible causes of confusion including infection, dehydration, constipation, pain, and sleep disturbance  At risk for delirium due to acute encephalopathy  Recommend delirium precautions  Maintain sleep-wake cycle, avoid nighttime interruptions  Per daughter at home, stating sleep/wake cycle opposite; awake at night, sleeping more during the day  Previously taking Xanax to help sleep at night, but was also somnolent during the day, stopped over past week  Provide adequate pain control  Avoid urinary retention and constipation  Provide frequent and early mobilization  Provide frequent redirection and reorientation as needed  Redirect unwanted behaviors as first-line therapy, avoid physical restraints as able to  Continue to monitor  Disorientation over past week since fall per daughter  Appears more forgetful    Cognitive Impairment  Mini Cog completed 2019: 3/5  CTA scan 8/15: Encephalomalacia in right parietal and occipital lobes; CT head 8/7: Chronic microvascular ischemic changes  Likely underlying cognitive impairment  Recommend follow up at [facility] for formal cognitive evaluation and supportive resources  TSH 8/15/24: 0.767  Vitamin B12 2021: 489; recommend rechecking level    Delirium Precautions  Patient at risk for delirium secondary to age, cognitive decline, poly pharmacy, hospitalization, infection, urinary retention, insomnia, immobility  Identify and treat underlying cause  Provide frequent redirection, reorientation, distraction techniques  Avoid deliriogenic medications such as tramadol, benzodiazepines, anticholinergics,  Benadryl  Treat pain, See geriatric pain protocol  Encourage Hydration/  "Nutrition  Avoid physical restraints  Use chemical restraint only when other efforts have failed, recommend zyprexa 2.5mg IM q8h prn  Monitor Qtc, if greater than 500 do not use antipsychotic medication  If QTc greater than 460, monitor and replete and deficiency of  K and Mg, recheck EKG  EKG 8/15 QTC: 468  K: 3.8  Mg 5/6/24: 2.2; recommend rechecking    Deconditioning  Baseline functioning  At risk for developing deconditioning secondary to impairment, acute encephalopathy, UTI, hospitalization, immobility, ambulatory dysfunction  COPD: baseline wears 2L NC  No prescribed inhalers/nebulizer at home  Recommend prescribing inhaler as patient sometimes becomes SOB WE  CT showing lung nodule in RUL; follow up CT in ~3 mos  Smoking history  S3CKD  Cr baseline 1.1-1.3; currently 1.05  GFR: 47  Avoid nephrotoxic medications  Consider renal dosing  Avoid hypotension  Encourage adequate hydration  CHF  Last ECHO completed 2021: EF 60%; G2DD  Euvolemic upon exam  Monitor daily weights, I/Os  At home medication Lasix 20 mg once daily    Frailty  Clinical Frail Scale: 6- Moderately Frail  Need help with all outside activities  Need help with stairs and bathing  May need assistance with dressing    Ambulatory dysfunction/falls  At a baseline ambulates with RW  Frequent falls over past year with possible causes: blurred vision, dizziness/lightheadedness, use of assistive device, tremors in hands, bilateral knees \"giving out\"  PT/OT following  Fall precautions  Out of bed as tolerated  Encourage early and frequent mobilization  Encourage adequate hydration and nutrition  Provide adequate pain management   Goal is STR  Continue with PT/OT for continued strength and balance training     Dentition/Appetite  Does not wear dentures  Current diet Henry/CHO controlled  No issues staying hydrated at home previously, issues with appetite; has decreased over past week  Can consider adding Remeron 15 mg HS to improve appetite; however would " discontinue Lexapro if starting Remeron  Would also consider decreasing/weaning off Xanax TID prn, not recommended in geriatric population for concerns of side effects    Visual Impairment  Patient does have vision impairment  Wears glasses at baseline   Recommend use of corrective lenses at all appropriate times  Encourage adequate lighting and encourage use of assistance with ambulation  Keep personal belongings close to avoid reaching  Encourage appropriate footwear at all times  Recommend large font for printed materials provided to patient      UTI  +abdominal pain, -urgency/frequency, dysuria, hematuria  UA suspicious for UTI; started on Rocephin in ED  Awaiting results of culture    COPD  2L NC supplemental oxygen at baseline  CT chest 8/15: mild multi lobar pulmonary scarring; new 9 mm groundglass nodule in RUL  No acute exacerbation at this time    Benign familial tremor  Noted in hand and head since ~2010  Has seen [facility] previously and treated with multiple medications per chart review   Appears last appointment in 2016  Currently prescribed Xanax TID prn for tremors  Does not seem to help with tremors per daughter/patient  Recommend discontinuing Lexapro as this can sometimes exacerbate tremors and suppress appetite  Follow up outpatient with Neurology     ACP review  Level 1 Full Code    Medication review  [facility] pharmacy [phone]    Alprazolam 0.5 mg p.o. 3 times daily as needed  Amlodipine 10 mg p.o. once daily  Lumigan eyedrops 1 drop both eyes daily at bedtime  Lexapro 5 mg p.o. once daily  Pepcid 20 mg p.o. twice daily  Lasix 20 mg p.o. once daily  Levothyroxine 75 mcg p.o. once every day in the morning  Antivert 25 mg p.o. every 8 hours as needed  Metoprolol tartrate 25 mg p.o. every 12 hours  Rosuvastatin 10 mg p.o once every day    Not verified with pharmacy/OTC  Tylenol 650 every 6 hours as needed  ASA 81 mg p.o. once daily  Vitamin D 1000 IUs p.o. every other day  Multivitamin  "p.o. once daily  Potassium gluconate p.o.       History of Present Illness   Physician Requesting Consult: Hans Salas DO  Reason for Consult / Principal Problem: Altered Mental Status  Hx and PE limited by: N/A  HPI: Ashley Gonzales is a 88 y.o. year old female who presented to the ED status post fall last Thursday and since then patient's daughter reports her not being the same.  Her daughter states she has had increased confusion, blurred vision, weakness in bilateral legs to the point of where looks like her knees were giving out.  Patient reported that she was shaky and dizzy.  She has needed increased assistance with ambulation over the past week.  She has a history of anxiety, bilateral cataracts, COPD, CAD, history of arterial ischemic stroke, hypertension, tremors.  She is being seen today for a geriatric consult.    She lives with her son in a two-story home.  Her bedroom is on the first floor of the home and she has a bathroom on that floor as well.  She requires help with ADLs/IADLs.  She wears reading glasses, but she states it does not help with her vision.  She does not wear dentures she does not wear hearing aids.  She uses a cane for ambulation however has also needed a rolling walker as of recent.    Upon exam today,  she is sitting in the recliner. She complains of a \"shaky feeling\" in her chest and abdomen, that sometimes feels like she has to go the bathroom, however when she goes to sit and have a bowel movement she is unable to go.  She states she also has intermittent nausea.  The feeling she described before is intermittent.  He denies any chest pain, shortness of breath, states that she frequently gets dizzy.  Over the past week she has been sleeping more often, has also had an inverted sleep-wake cycle.  She was taking Xanax at home, however daughter and son both noticed that she was more somnolent throughout the day, so they stopped giving it to her.  She has tremors in both hands " and head and daughter was explaining that they were following up with neurology for possible medication suggestion.  She has been on Xanax for the tremors, however it is not helping.      Inpatient consult to Gerontology  Consult performed by: FLAKO Saucedo  Consult ordered by: Wilberto Hernandez PA-C          Additional history obtained from: Chart review and patient evaluation.  I personally spoke with [facility] pharmacy to confirm home medications      Review of Systems   Constitutional:  Negative for chills and fever.   HENT:  Negative for ear pain and sore throat.    Eyes:  Negative for pain and visual disturbance.   Respiratory:  Negative for cough and shortness of breath.    Cardiovascular:  Negative for chest pain and palpitations.   Gastrointestinal:  Positive for nausea. Negative for abdominal pain, diarrhea and vomiting.   Genitourinary:  Negative for dysuria and hematuria.   Musculoskeletal:  Positive for gait problem. Negative for arthralgias and back pain.   Skin:  Negative for color change and rash.   Neurological:  Negative for seizures and syncope.   Psychiatric/Behavioral:  Negative for confusion (forgetful). The patient is not nervous/anxious.    All other systems reviewed and are negative.      Historical Information   Past Medical History:   Diagnosis Date    Anxiety     Cataract, bilateral     Last Assessed:3/19/2014    COPD (chronic obstructive pulmonary disease) (HCC)     Coronary artery disease     Hx of arterial ischemic stroke     Hypertension     Stroke (HCC)     Wears glasses      Past Surgical History:   Procedure Laterality Date    CAROTID ENDARTERECTOMY Right     CATARACT EXTRACTION W/ INTRAOCULAR LENS  IMPLANT, BILATERAL      COLONOSCOPY N/A 9/30/2017    Procedure: COLONOSCOPY FOR CONTROL OF BLEEDING;  Surgeon: Jeb Gaxiola MD;  Location: [facility];  Service: Colorectal    CORONARY ARTERY BYPASS GRAFT      CABG X3 with LIMA to LAD,SVG to OM,SVG to distal  RCA ; Last  Assessed:7/13/2015    JOINT REPLACEMENT      left TKR    KIDNEY STONE SURGERY      NEPHROSTOMY Left     with Drainage Irrigation ;Onset:1974    MN ARTHRP KNE CONDYLE&PLATU MEDIAL&LAT COMPARTMENTS Right 2/25/2016    Procedure: ARTHROPLASTY KNEE TOTAL;  Surgeon: Jeb Figueroa MD;  Location: [facility];  Service: Orthopedics     Social History   Social History     Substance and Sexual Activity   Alcohol Use Never     Social History     Substance and Sexual Activity   Drug Use Never     Social History     Tobacco Use   Smoking Status Former    Average packs/day: 1 pack/day for 24.0 years (24.0 ttl pk-yrs)    Types: Cigarettes    Start date: 1951    Passive exposure: Past   Smokeless Tobacco Never         Family History:   Family History   Problem Relation Age of Onset    Cancer Mother         malignant neoplasm    Heart attack Father     Aneurysm Other         Aortic    Cancer Other         malignant neoplasm       Meds/Allergies   Current meds:   Current Facility-Administered Medications   Medication Dose Route Frequency    acetaminophen (TYLENOL) tablet 650 mg  650 mg Oral Q6H PRN    aluminum-magnesium hydroxide-simethicone (MAALOX) oral suspension 30 mL  30 mL Oral Q6H PRN    amLODIPine (NORVASC) tablet 10 mg  10 mg Oral Daily    aspirin (ECOTRIN LOW STRENGTH) EC tablet 81 mg  81 mg Oral Daily    bimatoprost (LUMIGAN) 0.01 % ophthalmic solution 1 drop  1 drop Both Eyes HS    ceftriaxone (ROCEPHIN) 1 g/50 mL in dextrose IVPB  1,000 mg Intravenous Q24H    enoxaparin (LOVENOX) subcutaneous injection 40 mg  40 mg Subcutaneous Daily    escitalopram (LEXAPRO) tablet 5 mg  5 mg Oral Daily    famotidine (PEPCID) tablet 20 mg  20 mg Oral Daily    furosemide (LASIX) tablet 20 mg  20 mg Oral Daily    levothyroxine tablet 75 mcg  75 mcg Oral Early Morning    metoprolol tartrate (LOPRESSOR) tablet 25 mg  25 mg Oral Q12H MODESTO    multi-electrolyte (PLASMALYTE-A/ISOLYTE-S PH 7.4) IV solution  75 mL/hr Intravenous Continuous     "ondansetron (ZOFRAN) injection 4 mg  4 mg Intravenous Q6H PRN    polyethylene glycol (MIRALAX) packet 17 g  17 g Oral Daily PRN    pravastatin (PRAVACHOL) tablet 80 mg  80 mg Oral Daily With Dinner      Current PTA meds:  Medications Prior to Admission:     ALPRAZolam (XANAX) 0.5 mg tablet    amLODIPine (NORVASC) 10 mg tablet    aspirin (ECOTRIN LOW STRENGTH) 81 mg EC tablet    bimatoprost (LUMIGAN) 0.01 % ophthalmic drops    Cholecalciferol (Vitamin D) 50 MCG (2000 UT) tablet    escitalopram (LEXAPRO) 5 mg tablet    famotidine (PEPCID) 20 mg tablet    furosemide (LASIX) 20 mg tablet    levothyroxine 75 mcg tablet    meclizine (ANTIVERT) 25 mg tablet    metoprolol tartrate (LOPRESSOR) 25 mg tablet    oxygen gas    Pediatric Multiple Vit-C-FA (PEDIATRIC MULTIVITAMIN) chewable tablet    Potassium Gluconate 595 (99 K) MG TABS    rosuvastatin (CRESTOR) 10 MG tablet    acetaminophen (TYLENOL) 325 mg tablet     Allergies   Allergen Reactions    Penicillins Itching    Penicillins Rash       Objective   Vitals: Blood pressure 120/69, pulse 72, temperature 98.3 °F (36.8 °C), temperature source Temporal, resp. rate 20, height 5' 2\" (1.575 m), weight 86 kg (189 lb 9.5 oz), SpO2 94%, not currently breastfeeding.,Body mass index is 34.68 kg/m².      Physical Exam  Vitals and nursing note reviewed.   HENT:      Head: Normocephalic.      Right Ear: External ear normal.      Left Ear: External ear normal.      Nose: Nose normal.      Mouth/Throat:      Mouth: Mucous membranes are moist.      Pharynx: Oropharynx is clear.   Eyes:      Pupils: Pupils are equal, round, and reactive to light.   Cardiovascular:      Rate and Rhythm: Normal rate and regular rhythm.      Pulses: Normal pulses.      Heart sounds: Normal heart sounds.   Pulmonary:      Effort: Pulmonary effort is normal.      Breath sounds: Normal breath sounds.      Comments: 2L NC  Abdominal:      General: Bowel sounds are normal. There is no distension.      Palpations: " "Abdomen is soft.      Tenderness: There is no abdominal tenderness.   Musculoskeletal:         General: Normal range of motion.      Cervical back: Normal range of motion.      Right lower leg: No edema.      Left lower leg: No edema.   Skin:     General: Skin is warm and dry.      Capillary Refill: Capillary refill takes less than 2 seconds.   Neurological:      Mental Status: She is alert. Mental status is at baseline.      Motor: Weakness (intermittent BLE) present.      Gait: Gait abnormal (use of RW).   Psychiatric:         Mood and Affect: Mood normal.         Behavior: Behavior normal.         Thought Content: Thought content normal.         Judgment: Judgment normal.         Lab Results:   I have personally reviewed pertinent lab results including the following:    Lab Results:   Results from last 7 days   Lab Units 08/16/24  0508   WBC Thousand/uL 6.16   HEMOGLOBIN g/dL 12.2   HEMATOCRIT % 41.2   PLATELETS Thousands/uL 203        Results from last 7 days   Lab Units 08/16/24  0508 08/15/24  1009   POTASSIUM mmol/L 3.8 3.8   CHLORIDE mmol/L 95* 95*   CO2 mmol/L 43* 44*   BUN mg/dL 20 26*   CREATININE mg/dL 1.05 1.11   CALCIUM mg/dL 9.0 9.7   ALK PHOS U/L  --  82   ALT U/L  --  5*   AST U/L  --  12*       Imaging Studies: I have personally reviewed pertinent films in PACS      Imaging Studies: I have personally reviewed pertinent reports.   and I have personally reviewed pertinent films in PACS  EKG, Pathology, and Other Studies: I have personally reviewed pertinent reports.   and I have personally reviewed pertinent films in PACS  VTE Prophylaxis: Sequential compression device (Venodyne)  and Enoxaparin (Lovenox)    Code Status: Level 1 - Full Code    Please note:  Voice-recognition software may have been used in the preparation of this document.  Occasional wrong word or \"sound-alike\" substitutions may have occurred due to the inherent limitations of voice recognition software.  Interpretation should be " guided by context.

## 2024-08-16 NOTE — ASSESSMENT & PLAN NOTE
Stable suprarenal and infrarenal abdominal aortic aneurysm with maximum diameter of 3.6 cm and 4 cm respectively.   Continue OP vascular follow up , seen in May 2024. Plan for duplex in 1 year

## 2024-08-16 NOTE — ASSESSMENT & PLAN NOTE
Patient had mechanical fall last week in the bathroom with her walker  CT head without acute findings  PT OT recommending rehab which patient and daughter are agreeable to

## 2024-08-16 NOTE — PLAN OF CARE
Problem: OCCUPATIONAL THERAPY ADULT  Goal: Performs self-care activities at highest level of function for planned discharge setting.  See evaluation for individualized goals.  Description: Treatment Interventions: ADL retraining, Functional transfer training, UE strengthening/ROM, Endurance training, Cognitive reorientation, Patient/family training, Equipment evaluation/education, Compensatory technique education, Continued evaluation          See flowsheet documentation for full assessment, interventions and recommendations.   Note: Limitation: Decreased ADL status, Decreased UE strength, Decreased Safe judgement during ADL, Decreased cognition, Decreased endurance, Decreased high-level ADLs  Prognosis: Fair  Assessment: Pt is a 89y/o female admitted to the hospital 2* symptoms of increased confusion, shakiness, dizziness, and recent(last) fall. Pt noted with metabolic encephalpathy, ? UTI. Pt with PMH home O2(2liters), anxiety, COPD, CAD--s/p CABG, CVA, HTN, R CEA, L TKR. PTA pt states difficulty with her ADLs; states independence with transfers/ambulation--with rollator; +falls, +home alone, neg . During initial eval, pt demonstrated deficits with her functional balance, functional mobility, ADL status, transfer safety, b/l UE strength, activity tolerance(currently fair=15-20mins), and cognition(i.e.memory). Pt would benefit from continued OT tx for the above deficits.3-5xwk/1-2wks. The patient's raw score on the AM-PAC Daily Activity Inpatient Short Form is 18. A raw score of less than 19 suggests the patient may benefit from discharge to post-acute rehabilitation services. Please refer to the recommendation of the Occupational Therapist for safe discharge planning.     Rehab Resource Intensity Level, OT: II (Moderate Resource Intensity)

## 2024-08-16 NOTE — ASSESSMENT & PLAN NOTE
Patient saw Dr. Zamudio in 2018  Continue Xopenex as needed while here  No evidence of exacerbation, currently on her home O2 regimen

## 2024-08-16 NOTE — ASSESSMENT & PLAN NOTE
Wt Readings from Last 3 Encounters:   08/15/24 86 kg (189 lb 9.5 oz)   08/07/24 84.5 kg (186 lb 4.6 oz)   07/10/24 83.6 kg (184 lb 6.4 oz)     Last echocardiogram in 2021 with LVEF 60% and grade 2 diastolic dysfunction  No evidence of exacerbation here  Will see monitor daily weights, intake and output  Continue home Lasix 20 mg daily  Her PCP is in the process of getting her the outpatient annual cardiology follow-up

## 2024-08-16 NOTE — ASSESSMENT & PLAN NOTE
Lab Results   Component Value Date    EGFR 47 08/16/2024    EGFR 44 08/15/2024    EGFR 42 05/06/2024    CREATININE 1.05 08/16/2024    CREATININE 1.11 08/15/2024    CREATININE 1.16 05/06/2024   Stable at baseline, monitor

## 2024-08-16 NOTE — CASE MANAGEMENT
Case Management Assessment & Discharge Planning Note    Patient name Ashley Gonzales  Location [facility] /E2 -* MRN [id number]  : 1935 Date 2024       Current Admission Date: 8/15/2024  Current Admission Diagnosis:Metabolic encephalopathy   Patient Active Problem List    Diagnosis Date Noted Date Diagnosed    Lung nodule 2024     Chronic respiratory failure with hypoxia and hypercapnia (HCC) 2024     Metabolic encephalopathy 08/15/2024     UTI (urinary tract infection) 08/15/2024     Fall 08/15/2024     MGUS (monoclonal gammopathy of unknown significance) 2024     Stage 3 chronic kidney disease (HCC) 2024     Nephrolithiasis 2024     Hematuria 2024     Dizziness 2024     Infrarenal abdominal aortic aneurysm (AAA) without rupture ([facility]) 2024     Obesity (BMI 30-39.9) 05/10/2023     Pleural effusion 2021     Acute on chronic respiratory failure with hypoxia and hypercapnia ([facility]) 2021     History of total knee replacement 2019     S/P CABG (coronary artery bypass graft) 2019     Chronic diastolic congestive heart failure ([facility]) 2017     JAMEEL (obstructive sleep apnea) 2017     COPD (chronic obstructive pulmonary disease) ([facility])      Benign familial tremor      Hypertension      Acquired hypothyroidism      Hx of arterial ischemic stroke      Esophageal reflux 2015     Clinical depression 2015     Osteoarthritis of knee 2015     Postural imbalance 10/30/2014     Insomnia 10/01/2012     Generalized osteoarthritis 2012     Female stress incontinence 2012     Generalized anxiety disorder- panic 2012     Glaucoma 2012     Hyperlipidemia 2012       LOS (days): 1  Geometric Mean LOS (GMLOS) (days): 3.9  Days to GMLOS:3     OBJECTIVE:    Risk of Unplanned Readmission Score: 16.67         Current admission status: Inpatient       Preferred Pharmacy:   [id number] -  [place] - [address]  [address]  [place] [address]  Phone: [phone] Fax: [phone]    Primary Care Provider: Paz Maharaj DO    Primary Insurance: AARP MC REP  Secondary Insurance:     ASSESSMENT:  Active Health Care Proxies       pablo wolfe Health Care Representative - Son   Primary Phone: [phone] (Mobile)                 Advance Directives  Primary Contact: Pablo (son) [phone]              Patient Information  Admitted from:: Home  Mental Status: Alert  During Assessment patient was accompanied by: Not accompanied during assessment  Assessment information provided by:: Son  Primary Caregiver: Self  Support Systems: Family members  County of Residence: [place]  What city do you live in?: [place]  Home entry access options. Select all that apply.: Stairs  Number of steps to enter home.: 1  Type of Current Residence: 2 [place] home  Upon entering residence, is there a bedroom on the main floor (no further steps)?: Yes (recently moved bed down to 1st floor)  Upon entering residence, is there a bathroom on the main floor (no further steps)?: Yes  Living Arrangements: Lives w/ Son    Activities of Daily Living Prior to Admission  Functional Status: Independent  Completes ADLs independently?: No  Level of ADL dependence: Assistance (son assists with getting into shower, dressing, transport)  Ambulates independently?: No  Level of ambulatory dependence: Assistance  Does patient use assisted devices?: Yes  Assisted Devices (DME) used: Walker, Home Oxygen concentrator, Portable Oxygen tanks  DME Company Name (respiratory supplies): [employer]  O2 Rate(s): 2L  Does patient currently own DME?: Yes  What DME does the patient currently own?: Walker, Home Oxygen concentrator, Portable Oxygen tanks  Does patient have a history of Outpatient Therapy (PT/OT)?: No  Does the patient have a history of Short-Term Rehab?: Yes ([facility])  Does patient have a history of HHC?: Yes ([facility] and [facility])          Patient Information Continued  Income Source: SSI/SSD  Does patient have a history of substance abuse?: No  Does patient have a history of Mental Health Diagnosis?: Yes (anxiety)  Is patient receiving treatment for mental health?: Yes (medication managment)  Has patient received inpatient treatment related to mental health in the last 2 years?: No         Means of Transportation  Means of Transport to [facility]:: Family transport      Social Determinants of Health (SDOH)      Flowsheet Row Most Recent Value   Housing Stability    In the last 12 months, was there a time when you were not able to pay the mortgage or rent on time? N   In the past 12 months, how many times have you moved where you were living? 0   At any time in the past 12 months, were you homeless or living in a shelter (including now)? N   Transportation Needs    In the past 12 months, has lack of transportation kept you from medical appointments or from getting medications? no   In the past 12 months, has lack of transportation kept you from meetings, work, or from getting things needed for daily living? No   Food Insecurity    Within the past 12 months, you worried that your food would run out before you got the money to buy more. Never true   Within the past 12 months, the food you bought just didn't last and you didn't have money to get more. Never true   Utilities    In the past 12 months has the electric, gas, oil, or water company threatened to shut off services in your home? No            DISCHARGE DETAILS:    Discharge planning discussed with:: Warren YANES contacted family/caregiver?: Yes             Contacts  Patient Contacts: Warren Shah  Relationship to Patient:: Family  Reason/Outcome: Emergency Contact, Discharge Planning                                                                     Additional Comments: FARZANA spoke with warren Shah to complete assessment. Pt admitted for encephalopathy, mentation improving, gerontology consulted. S/p  fall last week in the home, PT/OT pending. Pt resides with son in 2SH. Bed was recently moved to 1st floor so pt now w/ 1st floor set up. Uses walker to ambulate and 2LO2 at baseline. Son assists with some ADLs, pt receives Meals on Wheels. Pt's daughter comes to assist on Fridays and granddaugther comes on Tuesdays. Pablo reported his sister used to be pt's POA but he is unsure if she still is or if it was revoked. Will await PT/OT recommendations. CM dept to continue to follow.

## 2024-08-16 NOTE — PROGRESS NOTES
[facility]  Progress Note  Name: Ashley Gonzales I  MRN: [id number]  Unit/Bed#: E2 -01 I Date of Admission: 8/15/2024   Date of Service: 8/16/2024 I Hospital Day: 1    Assessment & Plan   * Metabolic encephalopathy  Assessment & Plan  Patient presents with abdominal pain, decreased intake and disorientation which has worsened over the past week per daughter  Patient did have a fall last week that was mechanical  Patient CT imaging of head neck chest abdomen and pelvis without any acute findings  Patient is currently at her baseline alert and oriented x 3 however appears to be more forgetful per daughter  Suspect metabolic encephalopathy may be multifactorial in setting of age, anxiety benzodiazepine use for tremor, hypercapnia and recent dehydration with questionable UTI  Patient with normal blood glucose, LFTs, viral panel.  UA is concerning for UTI and patient has been started on antibiotics  Patient denies any upper respiratory congestion but does note loose stools over the past 2 days.  No recent antibiotic use or travel  Patient's TSH is normal, will check B12 for completeness  Patient does have chronic hypercapnia and is on her baseline oxygen 2 L  Will ask geriatrics to evaluate  PT OT evaluation, suspect patient will require rehab  Continue to monitor intake and output, will send stool studies if loose stools occur.  Tolerating diet today, can defer further IVF to avoid volume overload in setting of diastolic CHF    Chronic respiratory failure with hypoxia and hypercapnia (HCC)  Assessment & Plan  Patient is chronically hypercapnic and remains on 2 L nasal cannula  Patient also has JAMEEL and has not tolerated BiPAP or CPAP.  Her outpatient pulmonology notes in the past notes she has declined completely and she understands the risk of not treating this    Lung nodule  Assessment & Plan  CT chest displayed 8 mm spiculated nodule in the right upper lobe  Patient does have smoking  history  Recommending short-term interval follow-up noncontrast CT in about 3 months  I discussed the findings with patient and daughter and they are aware that malignancy cannot be ruled out  Incidental finding no completed    Fall  Assessment & Plan  Patient had mechanical fall last week in the bathroom with her walker  CT head without acute findings  PT OT recommending rehab which patient and daughter are agreeable to    UTI (urinary tract infection)  Assessment & Plan  Patient presented with confusion, abdominal pain, poor intake for the past few days  UA concerning for UTI, started on Rocephin.  Will continue day 2  Follow-up urine and blood cultures    MGUS (monoclonal gammopathy of unknown significance)  Assessment & Plan  She is undergoing workup with hematology oncology    Stage 3 chronic kidney disease (HCC)  Assessment & Plan  Lab Results   Component Value Date    EGFR 47 08/16/2024    EGFR 44 08/15/2024    EGFR 42 05/06/2024    CREATININE 1.05 08/16/2024    CREATININE 1.11 08/15/2024    CREATININE 1.16 05/06/2024   Stable at baseline, monitor    Infrarenal abdominal aortic aneurysm (AAA) without rupture ([facility])  Assessment & Plan  Stable suprarenal and infrarenal abdominal aortic aneurysm with maximum diameter of 3.6 cm and 4 cm respectively.   Continue OP vascular follow up , seen in May 2024. Plan for duplex in 1 year    S/P CABG (coronary artery bypass graft)  Assessment & Plan  Without chest pain.  Continue aspirin and statin      Hyperlipidemia  Assessment & Plan  Continue statin    Glaucoma  Assessment & Plan  She follows outpatient ophthalmology, continue eyedrops    Generalized anxiety disorder- panic  Assessment & Plan  Patient was started on low-dose Lexapro 5 mg during last hospitalization, continue at this time    Chronic diastolic congestive heart failure (HCC)  Assessment & Plan  Wt Readings from Last 3 Encounters:   08/15/24 86 kg (189 lb 9.5 oz)   08/07/24 84.5 kg (186 lb 4.6 oz)   07/10/24  83.6 kg (184 lb 6.4 oz)     Last echocardiogram in 2021 with LVEF 60% and grade 2 diastolic dysfunction  No evidence of exacerbation here  Will see monitor daily weights, intake and output  Continue home Lasix 20 mg daily  Her PCP is in the process of getting her the outpatient annual cardiology follow-up      Acquired hypothyroidism  Assessment & Plan  TSH normal, continue levothyroxine    Hypertension  Assessment & Plan  Pressure well-controlled, continue amlodipine, metoprolol and Lasix    Benign familial tremor  Assessment & Plan  Per outpatient notes she has been seen by neurology and various trials of medication which were either not help or she did not tolerate.  She currently uses alprazolam as needed for symptom control.  Confirmed PDMP    COPD (chronic obstructive pulmonary disease) ([facility])  Assessment & Plan  Patient saw Dr. Zamudio in 2018  Continue Xopenex as needed while here  No evidence of exacerbation, currently on her home O2 regimen         VTE Pharmacologic Prophylaxis:   Moderate Risk (Score 3-4) - Pharmacological DVT Prophylaxis Ordered: enoxaparin (Lovenox).    Mobility:   Basic Mobility Inpatient Raw Score: 12  JH-HLM Goal: 4: Move to chair/commode  JH-HLM Achieved: 4: Move to chair/commode  JH-HLM Goal achieved. Continue to encourage appropriate mobility.    Patient Centered Rounds: I performed bedside rounds with nursing staff today.   Discussions with Specialists or Other Care Team Provider: PT/OT, CM    Education and Discussions with Family / Patient: Updated  (daughter) at bedside.    Total Time Spent on Date of Encounter in care of patient: 30 mins. This time was spent on one or more of the following: performing physical exam; counseling and coordination of care; obtaining or reviewing history; documenting in the medical record; reviewing/ordering tests, medications or procedures; communicating with other healthcare professionals and discussing with patient's  family/caregivers.    Current Length of Stay: 1 day(s)  Current Patient Status: Inpatient   Certification Statement: The patient will continue to require additional inpatient hospital stay due to urine cultures, work up  Discharge Plan: Anticipate discharge in 24-48 hrs to rehab facility.    Code Status: Level 1 - Full Code    Subjective:   Patient seen and examined sitting in the chair.  She did well with therapy.  She notes she currently had some nausea this morning but it is improved she had some toast.  Denies any current abdominal pain chest pain or shortness of breath.  Her daughter is bedside and helped with information.  Patient is currently alert and oriented x 3.  Daughter feels that her mom is currently at baseline but thinks her mom would benefit from rehab    Addendum: Patient had formed stool per nursing.    Objective:     Vitals:   Temp (24hrs), Av.8 °F (37.1 °C), Min:98.3 °F (36.8 °C), Max:99.5 °F (37.5 °C)    Temp:  [98.3 °F (36.8 °C)-99.5 °F (37.5 °C)] 98.3 °F (36.8 °C)  HR:  [68-78] 72  Resp:  [18-23] 20  BP: (120-167)/(69-79) 120/69  SpO2:  [90 %-97 %] 94 %  Body mass index is 34.68 kg/m².     Input and Output Summary (last 24 hours):     Intake/Output Summary (Last 24 hours) at 2024 1033  Last data filed at 2024 0815  Gross per 24 hour   Intake 290 ml   Output 950 ml   Net -660 ml       Physical Exam:   Physical Exam  Vitals and nursing note reviewed.   Constitutional:       General: She is not in acute distress.     Appearance: She is well-developed. She is ill-appearing (chronically). She is not toxic-appearing.   HENT:      Head: Normocephalic and atraumatic.   Cardiovascular:      Rate and Rhythm: Normal rate and regular rhythm.   Pulmonary:      Effort: Pulmonary effort is normal. No respiratory distress.      Breath sounds: No wheezing or rales.      Comments: 2 L nasal cannula.  No pursed lip breathing, accessory muscle use or conversational dyspnea  Abdominal:      General:  Bowel sounds are normal.      Palpations: Abdomen is soft.      Tenderness: There is no abdominal tenderness. There is no guarding or rebound. Negative signs include Alanis's sign.   Musculoskeletal:      Right lower leg: No edema.      Left lower leg: No edema.   Skin:     General: Skin is warm and dry.      Coloration: Skin is not jaundiced.   Neurological:      Mental Status: She is alert and oriented to person, place, and time.      Comments: Intermittently forgetful   Psychiatric:         Mood and Affect: Mood normal.        Additional Data:     Labs:  Results from last 7 days   Lab Units 08/16/24  0508   WBC Thousand/uL 6.16   HEMOGLOBIN g/dL 12.2   HEMATOCRIT % 41.2   PLATELETS Thousands/uL 203   SEGS PCT % 68   LYMPHO PCT % 18   MONO PCT % 10   EOS PCT % 3     Results from last 7 days   Lab Units 08/16/24  0508 08/15/24  1009   SODIUM mmol/L 141 140   POTASSIUM mmol/L 3.8 3.8   CHLORIDE mmol/L 95* 95*   CO2 mmol/L 43* 44*   BUN mg/dL 20 26*   CREATININE mg/dL 1.05 1.11   ANION GAP mmol/L 3* 1*   CALCIUM mg/dL 9.0 9.7   ALBUMIN g/dL  --  3.7   TOTAL BILIRUBIN mg/dL  --  0.66   ALK PHOS U/L  --  82   ALT U/L  --  5*   AST U/L  --  12*   GLUCOSE RANDOM mg/dL 102 133     Results from last 7 days   Lab Units 08/15/24  1009   INR  1.02     Results from last 7 days   Lab Units 08/15/24  0939   POC GLUCOSE mg/dl 130     Lines/Drains:  Invasive Devices       Peripheral Intravenous Line  Duration             Peripheral IV 08/15/24 Proximal;Right;Ventral (anterior) Forearm 1 day                  Imaging: Reviewed radiology reports from this admission including: chest CT scan, abdominal/pelvic CT, and CT head    Recent Cultures (last 7 days):   Results from last 7 days   Lab Units 08/15/24  1309 08/15/24  1301   BLOOD CULTURE  Received in Microbiology Lab. Culture in Progress. Received in Microbiology Lab. Culture in Progress.       Last 24 Hours Medication List:   Current Facility-Administered Medications   Medication  Dose Route Frequency Provider Last Rate    acetaminophen  650 mg Oral Q6H PRN Nicolasa Caro MD      ALPRAZolam  0.5 mg Oral TID PRN Wilberto Hernandez PA-C      aluminum-magnesium hydroxide-simethicone  30 mL Oral Q6H PRN Nicolasa Caro MD      amLODIPine  10 mg Oral Daily Nicolasa Caro MD      aspirin  81 mg Oral Daily Nicolasa Caro MD      bimatoprost  1 drop Both Eyes HS Nicolasa Caro MD      cefTRIAXone  1,000 mg Intravenous Q24H Nicolasa Caro MD      enoxaparin  40 mg Subcutaneous Daily Nicolasa Caro MD      escitalopram  5 mg Oral Daily Nicolasa Caro MD      famotidine  20 mg Oral Daily Nicolasa Caro MD      furosemide  20 mg Oral Daily Nicolasa Caro MD      levalbuterol  1.25 mg Nebulization Q8H PRN Wilberto Hernandez PA-C      levothyroxine  75 mcg Oral Early Morning Nicolasa Caro MD      metoprolol tartrate  25 mg Oral Q12H MODESTO Nicolasa Caro MD      ondansetron  4 mg Intravenous Q6H PRN Nicolasa Caro MD      polyethylene glycol  17 g Oral Daily PRN Nicolasa Caro MD      pravastatin  80 mg Oral Daily With Dinner Nicolasa Caro MD          Today, Patient Was Seen By: Wilberto Hernandez PA-C    **Please Note: This note may have been constructed using a voice recognition system.**

## 2024-08-16 NOTE — UTILIZATION REVIEW
Initial Clinical Review    Admission: Date/Time/Statement:   Admission Orders (From admission, onward)       Ordered        08/15/24 1314  INPATIENT ADMISSION  Once                          Orders Placed This Encounter   Procedures    INPATIENT ADMISSION     Standing Status:   Standing     Number of Occurrences:   1     Order Specific Question:   Level of Care     Answer:   Med Surg [16]     Order Specific Question:   Estimated length of stay     Answer:   More than 2 Midnights     Order Specific Question:   Certification     Answer:   I certify that inpatient services are medically necessary for this patient for a duration of greater than two midnights. See H&P and MD Progress Notes for additional information about the patient's course of treatment.     ED Arrival Information       Expected   -    Arrival   8/15/2024 09:33    Acuity   Urgent              Means of arrival   Ambulance    Escorted by   [facility] ([facility])    Service   Hospitalist    Admission type   Emergency              Arrival complaint   altered mental state             Chief Complaint   Patient presents with    Altered Mental Status     Pt came in via EMS from home, per daughter pt had a fall last Thursday and since then pt has not been the same. Pt reports she is shaky, hx of dementia. Pt reports no pain.        Initial Presentation: 88 y.o. female presents to ED  via  EMS  from home with shaking and confusion.  Patient fell a week ago,  discharged from ED.  Has  been progressively  weaker  and more confused since  then.  + nausea   PMH  is  HTN,  CHF, chronic hypoxic  respiratory failure, likely  COPD, on home  O2  2L  NC,   CAD, S/P CABG  and  dementia,  mental status currently  baseline. U/A  consistent with UTI.  CT  brain and  CTA  head and neck  unremarkable.   Admit  Ip with  metabolic Encephalopathy,  Fall,  UTI  and plan is  PT/OT, delirium precautions, monitor labs, blood/urine cultures,  ABDIAZIZ  and continue home meds.           Anticipated Length of Stay/Certification Statement: Patient will be admitted on an Inpatient basis with an anticipated length of stay of more than 2 midnights.   Justification for Hospital Stay: Metabolic encephalopathy due to UTI     Date:    8/16   Day 2:   Continue  PT/OT.   Likely needs rehab at d/c.   Remains on  ABDIAZIZ.  Alert and oriented  X 3  this am.    Intermittently forgetful.   On  O2  2L  NC.   No  difficulty breathing.  Continue current meds.    ED Triage Vitals [08/15/24 0939]   Temperature Pulse Respirations Blood Pressure SpO2 Pain Score   99 °F (37.2 °C) 78 20 165/72 92 % No Pain     Weight (last 2 days)       Date/Time Weight    08/15/24 0939 86 (189.6)            Vital Signs (last 3 days)       Date/Time Temp Pulse Resp BP MAP (mmHg) SpO2 Calculated FIO2 (%) - Nasal Cannula Nasal Cannula O2 Flow Rate (L/min) O2 Device Patient Position - Orthostatic VS [place] Coma Scale Score Pain    08/16/24 0815 -- -- -- -- -- -- -- -- -- -- 15 No Pain    08/16/24 0700 98.3 °F (36.8 °C) 72 20 120/69 104 94 % 28 2 L/min Nasal cannula Lying -- --    08/16/24 0237 -- -- -- -- -- -- -- -- -- -- -- 4    08/15/24 2225 98.3 °F (36.8 °C) 68 18 134/69 88 90 % 28 2 L/min Nasal cannula Lying -- --    08/15/24 2042 -- -- -- -- -- -- -- -- -- -- -- 5    08/15/24 2030 -- -- -- 125/79 97 93 % 28 2 L/min Nasal cannula Lying -- --    08/15/24 1951 -- -- -- -- -- -- -- -- -- -- 15 --    08/15/24 1602 99.5 °F (37.5 °C) 74 20 143/76 95 90 % 28 2 L/min Nasal cannula Lying -- --    08/15/24 1555 -- 70 -- 131/70 -- -- -- -- -- -- -- --    08/15/24 1525 99 °F (37.2 °C) 70 22 131/70 105 95 % 28 2 L/min Nasal cannula Lying -- --    08/15/24 1416 98.9 °F (37.2 °C) -- -- -- -- -- -- -- -- -- -- --    08/15/24 1413 -- 78 20 152/74 112 91 % 28 2 L/min Nasal cannula Lying -- --    08/15/24 1410 -- -- -- -- -- -- -- -- -- -- -- No Pain    08/15/24 1200 -- 75 23 167/74 106 97 % 32 3 L/min Nasal cannula Lying -- No Pain    08/15/24 1026 --  -- -- -- -- -- -- -- Nasal cannula  -- -- --    08/15/24 1025 -- -- -- -- -- -- -- -- -- -- 15 2    08/15/24 0939 99 °F (37.2 °C) 78 20 165/72 -- 92 % 28 2 L/min Nasal cannula Sitting -- No Pain              Pertinent Labs/Diagnostic Test Results:   Radiology:  CTA head and neck with and without contrast   Final Interpretation by Kelton Nieto MD (08/15 1221)      CT Brain:  No acute intracranial abnormality. Multifocal encephalomalacia is unchanged      CT Angiography: No large vessel occlusion or high-grade stenosis seen. There is unchanged, moderate left intracranial vertebral artery stenosis due to calcified plaque. There is also mild left subclavian artery stenosis. There is no ICA stenosis by    NASCET criteria.      8 mm spiculated nodule in the right upper lobe. Based on current Fleischner Society 2017 Guidelines on incidental pulmonary nodule, either PET/CT scan evaluation, tissue sampling or short-term interval follow-up non-contrast CT follow-up (initially in 3    months) may be considered appropriate.      The study was marked in EPIC for immediate notification based on the pulmonary nodule finding.      Workstation performed: [id number]         CT chest abdomen pelvis w contrast   Final Interpretation by Nash Piper MD (08/15 3339)      CT chest:      No evidence of acute thoracic process.      9 mm groundglass nodule in the right upper lobe new since May 2019. Based on current Fleischner Society 2017 Guidelines on incidental pulmonary nodule, follow-up noncontrast CT is recommended at 6-12 months from the initial examination to confirm    persistence; if stable at that time, additional follow-up CT is recommended for every 2 years until 5 years of stability is demonstrated. Considerations related to the patient's age and/or comorbidities may be used to alter these recommendations.      Additional chronic findings and negatives as above.      CT abdomen and pelvis:      No  evidence of acute abdominopelvic process.      Colonic diverticulosis.      Cholelithiasis.      Stable suprarenal and infrarenal abdominal aortic aneurysm with maximum diameter of 3.6 cm and 4 cm respectively. According to ACR white paper for the management of incidentally detected abdominal vascular findings, for an aorta of this caliber (4.0-4.4    cm) follow-up imaging (e.g. Doppler ultrasound, CTA abdomen/pelvis) is recommended at a 1 year interval. Reference: J Am Rogelio Radiol 2013;10:789-794. Considerations related to the patient's age and/or comorbidities may be used to alter these    recommendations.      Additional chronic findings and negatives as above.         Workstation performed: [id number]                 Results from last 7 days   Lab Units 08/15/24  1009   SARS-COV-2  Negative     Results from last 7 days   Lab Units 08/16/24  0508 08/15/24  1009   WBC Thousand/uL 6.16 7.57   HEMOGLOBIN g/dL 12.2 13.8   HEMATOCRIT % 41.2 46.9*   PLATELETS Thousands/uL 203 209   TOTAL NEUT ABS Thousands/µL 4.16 5.49         Results from last 7 days   Lab Units 08/16/24  0508 08/15/24  1009   SODIUM mmol/L 141 140   POTASSIUM mmol/L 3.8 3.8   CHLORIDE mmol/L 95* 95*   CO2 mmol/L 43* 44*   ANION GAP mmol/L 3* 1*   BUN mg/dL 20 26*   CREATININE mg/dL 1.05 1.11   EGFR ml/min/1.73sq m 47 44   CALCIUM mg/dL 9.0 9.7     Results from last 7 days   Lab Units 08/15/24  1009   AST U/L 12*   ALT U/L 5*   ALK PHOS U/L 82   TOTAL PROTEIN g/dL 7.1   ALBUMIN g/dL 3.7   TOTAL BILIRUBIN mg/dL 0.66     Results from last 7 days   Lab Units 08/15/24  0939   POC GLUCOSE mg/dl 130     Results from last 7 days   Lab Units 08/16/24  0508 08/15/24  1009   GLUCOSE RANDOM mg/dL 102 133           Results from last 7 days   Lab Units 08/15/24  1009   CK TOTAL U/L 28     Results from last 7 days   Lab Units 08/15/24  1356 08/15/24  1152 08/15/24  1009   HS TNI 0HR ng/L  --   --  9   HS TNI 2HR ng/L  --  9  --    HSTNI D2 ng/L  --  0  --    HS TNI  4HR ng/L 8  --   --    HSTNI D4 ng/L -1  --   --          Results from last 7 days   Lab Units 08/15/24  1009   PROTIME seconds 13.6   INR  1.02   PTT seconds 27     Results from last 7 days   Lab Units 08/15/24  1009   TSH 3RD GENERATON uIU/mL 0.767                     Results from last 7 days   Lab Units 08/15/24  1009   BNP pg/mL 144*                     Results from last 7 days   Lab Units 08/15/24  1009   LIPASE u/L 13                 Results from last 7 days   Lab Units 08/15/24  1158   CLARITY UA  Cloudy   COLOR UA  Yellow   SPEC GRAV UA  1.015   PH UA  8.5*   GLUCOSE UA mg/dl Negative   KETONES UA mg/dl Negative   BLOOD UA  Negative   PROTEIN UA mg/dl 30 (1+)*   NITRITE UA  Negative   BILIRUBIN UA  Negative   UROBILINOGEN UA E.U./dl 0.2   LEUKOCYTES UA  Moderate*   WBC UA /hpf 10-20*   RBC UA /hpf 10-20*   BACTERIA UA /hpf Innumerable*   EPITHELIAL CELLS WET PREP /hpf Occasional     Results from last 7 days   Lab Units 08/15/24  1009   INFLUENZA A PCR  Negative   INFLUENZA B PCR  Negative   RSV PCR  Negative                             Results from last 7 days   Lab Units 08/15/24  1309 08/15/24  1301   BLOOD CULTURE  Received in Microbiology Lab. Culture in Progress. Received in Microbiology Lab. Culture in Progress.                   ED Treatment-Medication Administration from 08/15/2024 0933 to 08/15/2024 1405         Date/Time Order Dose Route Action     08/15/2024 1125 iohexol (OMNIPAQUE) 350 MG/ML injection (SINGLE-DOSE) 100 mL 100 mL Intravenous Given     08/15/2024 1310 ceftriaxone (ROCEPHIN) 2 g/50 mL in dextrose IVPB 2,000 mg Intravenous New Bag     08/15/2024 1330 ondansetron (ZOFRAN) injection 4 mg 4 mg Intravenous Given              Present on Admission:   COPD (chronic obstructive pulmonary disease) (HCC)   Hypertension   Acquired hypothyroidism   Chronic diastolic congestive heart failure (HCC)   Benign familial tremor   Infrarenal abdominal aortic aneurysm (AAA) without rupture (HCC)   Stage  3 chronic kidney disease (HCC)   MGUS (monoclonal gammopathy of unknown significance)      Admitting Diagnosis: Dizziness [R42]  Shaking [R25.1]  UTI (urinary tract infection) [N39.0]  Altered mental status [R41.82]  Altered mental state [R41.82]  Age/Sex: 88 y.o. female  Admission Orders:  Scheduled Medications:  amLODIPine, 10 mg, Oral, Daily  aspirin, 81 mg, Oral, Daily  bimatoprost, 1 drop, Both Eyes, HS  cefTRIAXone, 1,000 mg, Intravenous, Q24H  enoxaparin, 40 mg, Subcutaneous, Daily  escitalopram, 5 mg, Oral, Daily  famotidine, 20 mg, Oral, Daily  furosemide, 20 mg, Oral, Daily  levothyroxine, 75 mcg, Oral, Early Morning  metoprolol tartrate, 25 mg, Oral, Q12H [place]  pravastatin, 80 mg, Oral, Daily With Dinner      Continuous IV Infusions:     PRN Meds:  acetaminophen, 650 mg, Oral, Q6H PRN  ALPRAZolam, 0.5 mg, Oral, TID PRN  aluminum-magnesium hydroxide-simethicone, 30 mL, Oral, Q6H PRN  levalbuterol, 1.25 mg, Nebulization, Q8H PRN  ondansetron, 4 mg, Intravenous, Q6H PRN  polyethylene glycol, 17 g, Oral, Daily PRN        IP CONSULT TO GERONTOLOGY    Network Utilization Review Department  ATTENTION: Please call with any questions or concerns to [phone] and carefully listen to the prompts so that you are directed to the right person. All voicemails are confidential.   For Discharge needs, contact Care Management DC Support Team at [phone] opt. [phone]  Send all requests for admission clinical reviews, approved or denied determinations and any other requests to dedicated fax number below belonging to the campus where the patient is receiving treatment. List of dedicated fax numbers for the Facilities:  FACILITY NAME UR FAX NUMBER   ADMISSION DENIALS (Administrative/Medical Necessity) [phone]   DISCHARGE SUPPORT TEAM (NETWORK) [phone]   PARENT CHILD HEALTH (Maternity/NICU/Pediatrics) [phone]   [facility] [phone]   General acute hospital  [phone]   [facility] [phone]   [facility] [phone]   [facility] [phone]   [facility] [phone]   [facility] [phone]   [facility] [phone]   [facility] [phone]   [facility] [phone]   [facility] [phone]   [facility] [phone]

## 2024-08-16 NOTE — ASSESSMENT & PLAN NOTE
Patient presented with confusion, abdominal pain, poor intake for the past few days  UA concerning for UTI, started on Rocephin.  Will continue day 2  Follow-up urine and blood cultures

## 2024-08-16 NOTE — ASSESSMENT & PLAN NOTE
Patient presents with abdominal pain, decreased intake and disorientation which has worsened over the past week per daughter  Patient did have a fall last week that was mechanical  Patient CT imaging of head neck chest abdomen and pelvis without any acute findings  Patient is currently at her baseline alert and oriented x 3 however appears to be more forgetful per daughter  Suspect metabolic encephalopathy may be multifactorial in setting of age, anxiety benzodiazepine use for tremor, hypercapnia and recent dehydration with questionable UTI  Patient with normal blood glucose, LFTs, viral panel.  UA is concerning for UTI and patient has been started on antibiotics  Patient denies any upper respiratory congestion but does note loose stools over the past 2 days.  No recent antibiotic use or travel  Patient's TSH is normal, will check B12 for completeness  Patient does have chronic hypercapnia and is on her baseline oxygen 2 L  Will ask geriatrics to evaluate  PT OT evaluation, suspect patient will require rehab  Continue to monitor intake and output, will send stool studies if loose stools occur.  Tolerating diet today, can defer further IVF to avoid volume overload in setting of diastolic CHF

## 2024-08-16 NOTE — PLAN OF CARE
Problem: Potential for Falls  Goal: Patient will remain free of falls  Description: INTERVENTIONS:  - Educate patient/family on patient safety including physical limitations  - Instruct patient to call for assistance with activity   - Consult OT/PT to assist with strengthening/mobility   - Keep Call bell within reach  - Keep bed low and locked with side rails adjusted as appropriate  - Keep care items and personal belongings within reach  - Initiate and maintain comfort rounds  - Make Fall Risk Sign visible to staff  - Offer Toileting every 2 Hours, in advance of need  - Initiate/Maintain bed alarm  - Obtain necessary fall risk management equipment: non slip socks, call bell   - Apply yellow socks and bracelet for high fall risk patients  - Consider moving patient to room near nurses station  Outcome: Progressing     Problem: PAIN - ADULT  Goal: Verbalizes/displays adequate comfort level or baseline comfort level  Description: Interventions:  - Encourage patient to monitor pain and request assistance  - Assess pain using appropriate pain scale  - Administer analgesics based on type and severity of pain and evaluate response  - Implement non-pharmacological measures as appropriate and evaluate response  - Consider cultural and social influences on pain and pain management  - Notify physician/advanced practitioner if interventions unsuccessful or patient reports new pain  Outcome: Progressing     Problem: INFECTION - ADULT  Goal: Absence or prevention of progression during hospitalization  Description: INTERVENTIONS:  - Assess and monitor for signs and symptoms of infection  - Monitor lab/diagnostic results  - Monitor all insertion sites, i.e. indwelling lines, tubes, and drains  - Monitor endotracheal if appropriate and nasal secretions for changes in amount and color  - [place] appropriate cooling/warming therapies per order  - Administer medications as ordered  - Instruct and encourage patient and family to use  good hand hygiene technique  - Identify and instruct in appropriate isolation precautions for identified infection/condition  Outcome: Progressing     Problem: SAFETY ADULT  Goal: Patient will remain free of falls  Description: INTERVENTIONS:  - Educate patient/family on patient safety including physical limitations  - Instruct patient to call for assistance with activity   - Consult OT/PT to assist with strengthening/mobility   - Keep Call bell within reach  - Keep bed low and locked with side rails adjusted as appropriate  - Keep care items and personal belongings within reach  - Initiate and maintain comfort rounds  - Make Fall Risk Sign visible to staff  - Offer Toileting every 2 Hours, in advance of need  - Initiate/Maintain bed alarm  - Obtain necessary fall risk management equipment: non slip socks, call bell   - Apply yellow socks and bracelet for high fall risk patients  - Consider moving patient to room near nurses station  Outcome: Progressing     Problem: Knowledge Deficit  Goal: Patient/family/caregiver demonstrates understanding of disease process, treatment plan, medications, and discharge instructions  Description: Complete learning assessment and assess knowledge base.  Interventions:  - Provide teaching at level of understanding  - Provide teaching via preferred learning methods  Outcome: Progressing     Problem: DISCHARGE PLANNING  Goal: Discharge to home or other facility with appropriate resources  Description: INTERVENTIONS:  - Identify barriers to discharge w/patient and caregiver  - Arrange for needed discharge resources and transportation as appropriate  - Identify discharge learning needs (meds, wound care, etc.)  - Arrange for interpretive services to assist at discharge as needed  - Refer to Case Management Department for coordinating discharge planning if the patient needs post-hospital services based on physician/advanced practitioner order or complex needs related to functional status,  cognitive ability, or social support system  Outcome: Progressing

## 2024-08-16 NOTE — PLAN OF CARE
Problem: Potential for Falls  Goal: Patient will remain free of falls  Description: INTERVENTIONS:  - Educate patient/family on patient safety including physical limitations  - Instruct patient to call for assistance with activity   - Consult OT/PT to assist with strengthening/mobility   - Keep Call bell within reach  - Keep bed low and locked with side rails adjusted as appropriate  - Keep care items and personal belongings within reach  - Initiate and maintain comfort rounds  - Make Fall Risk Sign visible to staff  - Offer Toileting every 2 Hours, in advance of need  - Initiate/Maintain bed alarm  - Obtain necessary fall risk management equipment:   - Apply yellow socks and bracelet for high fall risk patients  - Consider moving patient to room near nurses station  Outcome: Progressing     Problem: Prexisting or High Potential for Compromised Skin Integrity  Goal: Skin integrity is maintained or improved  Description: INTERVENTIONS:  - Identify patients at risk for skin breakdown  - Assess and monitor skin integrity  - Assess and monitor nutrition and hydration status  - Monitor labs   - Assess for incontinence   - Turn and reposition patient  - Assist with mobility/ambulation  - Relieve pressure over bony prominences  - Avoid friction and shearing  - Provide appropriate hygiene as needed including keeping skin clean and dry  - Evaluate need for skin moisturizer/barrier cream  - Collaborate with interdisciplinary team   - Patient/family teaching  - Consider wound care consult   Outcome: Progressing     Problem: PAIN - ADULT  Goal: Verbalizes/displays adequate comfort level or baseline comfort level  Description: Interventions:  - Encourage patient to monitor pain and request assistance  - Assess pain using appropriate pain scale  - Administer analgesics based on type and severity of pain and evaluate response  - Implement non-pharmacological measures as appropriate and evaluate response  - Consider cultural and  social influences on pain and pain management  - Notify physician/advanced practitioner if interventions unsuccessful or patient reports new pain  Outcome: Progressing     Problem: INFECTION - ADULT  Goal: Absence or prevention of progression during hospitalization  Description: INTERVENTIONS:  - Assess and monitor for signs and symptoms of infection  - Monitor lab/diagnostic results  - Monitor all insertion sites, i.e. indwelling lines, tubes, and drains  - Monitor endotracheal if appropriate and nasal secretions for changes in amount and color  - [place] appropriate cooling/warming therapies per order  - Administer medications as ordered  - Instruct and encourage patient and family to use good hand hygiene technique  - Identify and instruct in appropriate isolation precautions for identified infection/condition  Outcome: Progressing     Problem: SAFETY ADULT  Goal: Patient will remain free of falls  Description: INTERVENTIONS:  - Educate patient/family on patient safety including physical limitations  - Instruct patient to call for assistance with activity   - Consult OT/PT to assist with strengthening/mobility   - Keep Call bell within reach  - Keep bed low and locked with side rails adjusted as appropriate  - Keep care items and personal belongings within reach  - Initiate and maintain comfort rounds  - Make Fall Risk Sign visible to staff  - Offer Toileting every 2 Hours, in advance of need  - Initiate/Maintain bed alarm  - Obtain necessary fall risk management equipment:   - Apply yellow socks and bracelet for high fall risk patients  - Consider moving patient to room near nurses station  Outcome: Progressing  Goal: Maintain or return to baseline ADL function  Description: INTERVENTIONS:  -  Assess patient's ability to carry out ADLs; assess patient's baseline for ADL function and identify physical deficits which impact ability to perform ADLs (bathing, care of mouth/teeth, toileting, grooming, dressing,  etc.)  - Assess/evaluate cause of self-care deficits   - Assess range of motion  - Assess patient's mobility; develop plan if impaired  - Assess patient's need for assistive devices and provide as appropriate  - Encourage maximum independence but intervene and supervise when necessary  - Involve family in performance of ADLs  - Assess for home care needs following discharge   - Consider OT consult to assist with ADL evaluation and planning for discharge  - Provide patient education as appropriate  Outcome: Progressing  Goal: Maintains/Returns to pre admission functional level  Description: INTERVENTIONS:  - Perform AM-PAC 6 Click Basic Mobility/ Daily Activity assessment daily.  - Set and communicate daily mobility goal to care team and patient/family/caregiver.   - Collaborate with rehabilitation services on mobility goals if consulted  - Perform Range of Motion 3 times a day.  - Reposition patient every 2 hours.  - Dangle patient 3 times a day  - Stand patient 3 times a day  - Ambulate patient 3 times a day  - Out of bed to chair 3 times a day   - Out of bed for meals 3 times a day  - Out of bed for toileting  - Record patient progress and toleration of activity level   Outcome: Progressing     Problem: DISCHARGE PLANNING  Goal: Discharge to home or other facility with appropriate resources  Description: INTERVENTIONS:  - Identify barriers to discharge w/patient and caregiver  - Arrange for needed discharge resources and transportation as appropriate  - Identify discharge learning needs (meds, wound care, etc.)  - Arrange for interpretive services to assist at discharge as needed  - Refer to Case Management Department for coordinating discharge planning if the patient needs post-hospital services based on physician/advanced practitioner order or complex needs related to functional status, cognitive ability, or social support system  Outcome: Progressing     Problem: Knowledge Deficit  Goal:  Patient/family/caregiver demonstrates understanding of disease process, treatment plan, medications, and discharge instructions  Description: Complete learning assessment and assess knowledge base.  Interventions:  - Provide teaching at level of understanding  - Provide teaching via preferred learning methods  Outcome: Progressing

## 2024-08-16 NOTE — ASSESSMENT & PLAN NOTE
Patient is chronically hypercapnic and remains on 2 L nasal cannula  Patient also has JAMEEL and has not tolerated BiPAP or CPAP.  Her outpatient pulmonology notes in the past notes she has declined completely and she understands the risk of not treating this

## 2024-08-17 PROBLEM — R82.71 BACTERIURIA: Status: ACTIVE | Noted: 2024-08-17

## 2024-08-17 PROBLEM — E53.8 LOW SERUM VITAMIN B12: Status: ACTIVE | Noted: 2024-08-17

## 2024-08-17 LAB
ANION GAP SERPL CALCULATED.3IONS-SCNC: 5 MMOL/L (ref 4–13)
BACTERIA UR CULT: NORMAL
BUN SERPL-MCNC: 18 MG/DL (ref 5–25)
CALCIUM SERPL-MCNC: 9.4 MG/DL (ref 8.4–10.2)
CHLORIDE SERPL-SCNC: 93 MMOL/L (ref 96–108)
CO2 SERPL-SCNC: 43 MMOL/L (ref 21–32)
CREAT SERPL-MCNC: 0.99 MG/DL (ref 0.6–1.3)
ERYTHROCYTE [DISTWIDTH] IN BLOOD BY AUTOMATED COUNT: 12.3 % (ref 11.6–15.1)
GFR SERPL CREATININE-BSD FRML MDRD: 51 ML/MIN/1.73SQ M
GLUCOSE SERPL-MCNC: 107 MG/DL (ref 65–140)
HCT VFR BLD AUTO: 45.3 % (ref 34.8–46.1)
HGB BLD-MCNC: 13.5 G/DL (ref 11.5–15.4)
MCH RBC QN AUTO: 28.9 PG (ref 26.8–34.3)
MCHC RBC AUTO-ENTMCNC: 29.8 G/DL (ref 31.4–37.4)
MCV RBC AUTO: 97 FL (ref 82–98)
PLATELET # BLD AUTO: 214 THOUSANDS/UL (ref 149–390)
PMV BLD AUTO: 10.6 FL (ref 8.9–12.7)
POTASSIUM SERPL-SCNC: 3.9 MMOL/L (ref 3.5–5.3)
RBC # BLD AUTO: 4.67 MILLION/UL (ref 3.81–5.12)
SODIUM SERPL-SCNC: 141 MMOL/L (ref 135–147)
WBC # BLD AUTO: 7.89 THOUSAND/UL (ref 4.31–10.16)

## 2024-08-17 PROCEDURE — 85027 COMPLETE CBC AUTOMATED: CPT

## 2024-08-17 PROCEDURE — 80048 BASIC METABOLIC PNL TOTAL CA: CPT

## 2024-08-17 PROCEDURE — 99232 SBSQ HOSP IP/OBS MODERATE 35: CPT

## 2024-08-17 RX ORDER — LANOLIN ALCOHOL/MO/W.PET/CERES
3 CREAM (GRAM) TOPICAL
Status: DISCONTINUED | OUTPATIENT
Start: 2024-08-17 | End: 2024-08-23 | Stop reason: HOSPADM

## 2024-08-17 RX ADMIN — FAMOTIDINE 20 MG: 20 TABLET, FILM COATED ORAL at 08:18

## 2024-08-17 RX ADMIN — AMLODIPINE BESYLATE 10 MG: 10 TABLET ORAL at 08:17

## 2024-08-17 RX ADMIN — BIMATOPROST 1 DROP: 0.1 SOLUTION/ DROPS OPHTHALMIC at 21:30

## 2024-08-17 RX ADMIN — ASPIRIN 81 MG: 81 TABLET, COATED ORAL at 08:18

## 2024-08-17 RX ADMIN — DEXTRAN 70, GLYCERIN, HYPROMELLOSE 1 DROP: 1; 2; 3 SOLUTION/ DROPS OPHTHALMIC at 11:29

## 2024-08-17 RX ADMIN — ONDANSETRON 4 MG: 2 INJECTION INTRAMUSCULAR; INTRAVENOUS at 03:39

## 2024-08-17 RX ADMIN — ENOXAPARIN SODIUM 40 MG: 40 INJECTION SUBCUTANEOUS at 08:17

## 2024-08-17 RX ADMIN — LEVOTHYROXINE SODIUM 75 MCG: 75 TABLET ORAL at 05:40

## 2024-08-17 RX ADMIN — METOPROLOL TARTRATE 25 MG: 25 TABLET, FILM COATED ORAL at 08:17

## 2024-08-17 RX ADMIN — PRAVASTATIN SODIUM 80 MG: 80 TABLET ORAL at 16:20

## 2024-08-17 RX ADMIN — ESCITALOPRAM OXALATE 5 MG: 10 TABLET ORAL at 08:18

## 2024-08-17 RX ADMIN — FUROSEMIDE 20 MG: 20 TABLET ORAL at 08:18

## 2024-08-17 RX ADMIN — METOPROLOL TARTRATE 25 MG: 25 TABLET, FILM COATED ORAL at 21:31

## 2024-08-17 RX ADMIN — CYANOCOBALAMIN TAB 500 MCG 1000 MCG: 500 TAB at 08:17

## 2024-08-17 NOTE — ASSESSMENT & PLAN NOTE
B12 at 234, suggest >400  Discussed with daughter and patient, will start oral supplementation, should have levels checked in 4-6 weeks with PCP

## 2024-08-17 NOTE — ASSESSMENT & PLAN NOTE
Versus bacteruria.  Due to confusion and abdominal pain with poor intake patient was started on IV Rocephin, day 3  Follow urine cultures  Blood cultures negative at 24 hours

## 2024-08-17 NOTE — ASSESSMENT & PLAN NOTE
Wt Readings from Last 3 Encounters:   08/15/24 86 kg (189 lb 9.5 oz)   08/07/24 84.5 kg (186 lb 4.6 oz)   07/10/24 83.6 kg (184 lb 6.4 oz)     Last echocardiogram in 2021 with LVEF 60% and grade 2 diastolic dysfunction  Appears clinically euvolemic  Continue home Lasix 20 mg daily  Her PCP is in the process of getting her the outpatient annual cardiology follow-up

## 2024-08-17 NOTE — PROGRESS NOTES
Count includes the [facility]  Progress Note  Name: Ashley Gonzales I  MRN: [id number]  Unit/Bed#: E2 -01 I Date of Admission: 8/15/2024   Date of Service: 8/17/2024 I Hospital Day: 2    Assessment & Plan   * Metabolic encephalopathy  Assessment & Plan  Patient presents with abdominal pain, decreased intake and disorientation which has worsened over the past week per daughter. Patient did have a fall last week that was mechanical and she hit her head  Patient CT imaging of head neck chest abdomen and pelvis without any acute findings  Patient is currently at her baseline alert and oriented x 4, intermittently forgetful but improving per daughter  Suspect metabolic encephalopathy may be multifactorial in setting of age, anxiety benzodiazepine use for tremor, hypercapnia and recent dehydration and fall with head strike  Patient with normal blood glucose, LFTs, viral panel, TSH normal  No diarrhea per nursing, her urine cultures are negative and antibiotics have been discontinued  Patient does have chronic hypercapnia and is on her baseline oxygen 2 L  Appreciate geriatric and therapy evaluations, planning for inpatient rehab    Bacteriuria  Assessment & Plan  Due to confusion and abdominal pain with poor intake patient was started on IV Rocephin  She has received 2 doses, urine cultures with mixed contaminants.  Discontinue further antibiotics  Blood cultures negative at 24 hours    Low serum vitamin B12  Assessment & Plan  B12 at 234, suggest >400  Discussed with daughter and patient, will start oral supplementation, should have levels checked in 4-6 weeks with PCP    Chronic respiratory failure with hypoxia and hypercapnia (HCC)  Assessment & Plan  Patient is chronically hypercapnic and remains on 2 L nasal cannula  Patient also has JAMEEL and has not tolerated BiPAP or CPAP.  Her outpatient pulmonology notes in the past notes she has declined completely and she understands the risk of not treating this.   Confirmed this with her.    Lung nodule  Assessment & Plan  CT chest displayed 8 mm spiculated nodule in the right upper lobe  Patient does have smoking history  Recommending short-term interval follow-up noncontrast CT in about 3 months  I discussed the findings with patient and daughter and they are aware that malignancy cannot be ruled out  Incidental finding note completed    Fall  Assessment & Plan  Patient had mechanical fall last week in the bathroom with her walker and she hit her head  CT head without acute findings  PT OT recommending rehab which patient and daughter are agreeable to, CM in process of referrals    MGUS (monoclonal gammopathy of unknown significance)  Assessment & Plan  She is undergoing workup with hematology oncology    Stage 3 chronic kidney disease (HCC)  Assessment & Plan  Lab Results   Component Value Date    EGFR 51 08/17/2024    EGFR 47 08/16/2024    EGFR 44 08/15/2024    CREATININE 0.99 08/17/2024    CREATININE 1.05 08/16/2024    CREATININE 1.11 08/15/2024   Stable at baseline, monitor    Infrarenal abdominal aortic aneurysm (AAA) without rupture (HCC)  Assessment & Plan  Stable suprarenal and infrarenal abdominal aortic aneurysm with maximum diameter of 3.6 cm and 4 cm respectively.   Continue OP vascular follow up , seen in May 2024. Plan for duplex in 1 year    S/P CABG (coronary artery bypass graft)  Assessment & Plan  Without chest pain.  Continue aspirin and statin      Hyperlipidemia  Assessment & Plan  Continue statin    Glaucoma  Assessment & Plan  She follows outpatient ophthalmology, continue eyedrops and artificial tears prn    Generalized anxiety disorder- panic  Assessment & Plan  Patient was started on low-dose Lexapro 5 mg during last hospitalization, continue at this time    Chronic diastolic congestive heart failure (HCC)  Assessment & Plan  Wt Readings from Last 3 Encounters:   08/15/24 86 kg (189 lb 9.5 oz)   08/07/24 84.5 kg (186 lb 4.6 oz)   07/10/24 83.6 kg  (184 lb 6.4 oz)     Last echocardiogram in 2021 with LVEF 60% and grade 2 diastolic dysfunction  Appears clinically euvolemic  Continue home Lasix 20 mg daily  Her PCP is in the process of getting her the outpatient annual cardiology follow-up      Acquired hypothyroidism  Assessment & Plan  TSH normal, continue levothyroxine    Hypertension  Assessment & Plan  Pressure controlled, continue amlodipine, metoprolol and Lasix    Benign familial tremor  Assessment & Plan  Per outpatient notes patient and family she has been seen by neurology and various trials of medication which were either not help or she did not tolerate.  She was given alprazolam for symptom control however this was making her somnolent and this was stopped.  PDMP confirmed  Follow-up with neurology for further medication discussions    COPD (chronic obstructive pulmonary disease) (HCC)  Assessment & Plan  Patient saw Dr. Zamudio in 2018  Continue Xopenex as needed while here  No evidence of exacerbation, currently on her home O2 regimen         VTE Pharmacologic Prophylaxis:   Moderate Risk (Score 3-4) - Pharmacological DVT Prophylaxis Ordered: enoxaparin (Lovenox).    Mobility:   Basic Mobility Inpatient Raw Score: 16  JH-HLM Goal: 5: Stand one or more mins  JH-HLM Achieved: 5: Stand (1 or more minutes)  JH-HLM Goal achieved. Continue to encourage appropriate mobility.    Patient Centered Rounds: I performed bedside rounds with nursing staff today.   Discussions with Specialists or Other Care Team Provider: Case management    Education and Discussions with Family / Patient: Updated  (daughter) via phone.    Total Time Spent on Date of Encounter in care of patient: 30 mins. This time was spent on one or more of the following: performing physical exam; counseling and coordination of care; obtaining or reviewing history; documenting in the medical record; reviewing/ordering tests, medications or procedures; communicating with other  healthcare professionals and discussing with patient's family/caregivers.    Current Length of Stay: 2 day(s)  Current Patient Status: Inpatient   Certification Statement: The patient will continue to require additional inpatient hospital stay due to rehab placement, urine cultures  Discharge Plan: Anticipate discharge in 24-48 hrs to rehab facility.    Code Status: Level 1 - Full Code    Subjective:   Seen and examined. Feels fine, just tired. Had some soup yesterday and some yogurt today but does not like blueberries and threw it up. Denies abdominal pain, nausea or vomiting currently. She is without chest pain, headache, dizziness or shortness of breath. Breathing at baseline. She does feel forgetful but thinks its from hitting her head.     Objective:     Vitals:   Temp (24hrs), Av.5 °F (36.9 °C), Min:98 °F (36.7 °C), Max:98.9 °F (37.2 °C)    Temp:  [98 °F (36.7 °C)-98.9 °F (37.2 °C)] 98.6 °F (37 °C)  HR:  [71-75] 75  Resp:  [18-20] 18  BP: (114-159)/(71-95) 159/95  SpO2:  [91 %-96 %] 96 %  Body mass index is 34.68 kg/m².     Input and Output Summary (last 24 hours):     Intake/Output Summary (Last 24 hours) at 2024 1134  Last data filed at 2024 0400  Gross per 24 hour   Intake 720 ml   Output 200 ml   Net 520 ml       Physical Exam:   Physical Exam  Vitals and nursing note reviewed.   Constitutional:       General: She is not in acute distress.     Appearance: She is well-developed. She is ill-appearing (chronically).   Cardiovascular:      Rate and Rhythm: Normal rate and regular rhythm.   Pulmonary:      Effort: Pulmonary effort is normal. No respiratory distress.      Comments: Decreased aeration throughout, on 2 L NC  Abdominal:      General: Bowel sounds are normal. There is no distension.      Palpations: Abdomen is soft.      Tenderness: There is no abdominal tenderness. There is no guarding or rebound.   Musculoskeletal:      Right lower leg: No edema.      Left lower leg: No edema.    Skin:     General: Skin is warm and dry.      Coloration: Skin is pale.   Neurological:      General: No focal deficit present.      Mental Status: She is alert and oriented to person, place, and time. Mental status is at baseline.      Motor: Tremor present.      Comments: LUE/facial tremor, improves with rest worsens with motion. She is forgeful   Psychiatric:         Mood and Affect: Mood normal.         Behavior: Behavior normal.        Additional Data:     Labs:  Results from last 7 days   Lab Units 08/17/24  0445 08/16/24  0508   WBC Thousand/uL 7.89 6.16   HEMOGLOBIN g/dL 13.5 12.2   HEMATOCRIT % 45.3 41.2   PLATELETS Thousands/uL 214 203   SEGS PCT %  --  68   LYMPHO PCT %  --  18   MONO PCT %  --  10   EOS PCT %  --  3     Results from last 7 days   Lab Units 08/17/24  0445 08/16/24  0508 08/15/24  1009   SODIUM mmol/L 141   < > 140   POTASSIUM mmol/L 3.9   < > 3.8   CHLORIDE mmol/L 93*   < > 95*   CO2 mmol/L 43*   < > 44*   BUN mg/dL 18   < > 26*   CREATININE mg/dL 0.99   < > 1.11   ANION GAP mmol/L 5   < > 1*   CALCIUM mg/dL 9.4   < > 9.7   ALBUMIN g/dL  --   --  3.7   TOTAL BILIRUBIN mg/dL  --   --  0.66   ALK PHOS U/L  --   --  82   ALT U/L  --   --  5*   AST U/L  --   --  12*   GLUCOSE RANDOM mg/dL 107   < > 133    < > = values in this interval not displayed.     Results from last 7 days   Lab Units 08/15/24  1009   INR  1.02     Results from last 7 days   Lab Units 08/15/24  0939   POC GLUCOSE mg/dl 130     Lines/Drains:  Invasive Devices       Peripheral Intravenous Line  Duration             Peripheral IV 08/15/24 Proximal;Right;Ventral (anterior) Forearm 2 days                    Recent Cultures (last 7 days):   Results from last 7 days   Lab Units 08/15/24  1309 08/15/24  1301 08/15/24  1158   BLOOD CULTURE  No Growth at 24 hrs. No Growth at 24 hrs.  --    URINE CULTURE   --   --  70,000-79,000 cfu/ml       Last 24 Hours Medication List:   Current Facility-Administered Medications   Medication  Dose Route Frequency Provider Last Rate    acetaminophen  650 mg Oral Q6H PRN Nicolasa Caro MD      aluminum-magnesium hydroxide-simethicone  30 mL Oral Q6H PRN Nicolasa Caro MD      amLODIPine  10 mg Oral Daily Nicolasa Caro MD      Artificial Tears  1 drop Both Eyes Q6H PRN Wilberto Hernandez PA-C      aspirin  81 mg Oral Daily Nicolasa Caro MD      bimatoprost  1 drop Both Eyes HS Nicolasa Caro MD      cyanocobalamin  1,000 mcg Oral Daily Wilberto Hernandez PA-C      enoxaparin  40 mg Subcutaneous Daily Nicolasa Caro MD      escitalopram  5 mg Oral Daily Nicolasa Caro MD      famotidine  20 mg Oral Daily Nicolasa Caro MD      furosemide  20 mg Oral Daily Nicolasa Caro MD      levalbuterol  1.25 mg Nebulization Q8H PRN Wilberto Hernandez PA-C      levothyroxine  75 mcg Oral Early Morning Nicolasa Caro MD      metoprolol tartrate  25 mg Oral Q12H MODESTO Nicolasa Caro MD      ondansetron  4 mg Intravenous Q6H PRN Nicolasa Caro MD      polyethylene glycol  17 g Oral Daily PRN Nicolasa Caro MD      pravastatin  80 mg Oral Daily With Dinner Nicolasa Caro MD          Today, Patient Was Seen By: Wilberto Hernandez PA-C    **Please Note: This note may have been constructed using a voice recognition system.**

## 2024-08-17 NOTE — ASSESSMENT & PLAN NOTE
Patient is chronically hypercapnic and remains on 2 L nasal cannula  Patient also has JAMEEL and has not tolerated BiPAP or CPAP.  Her outpatient pulmonology notes in the past notes she has declined completely and she understands the risk of not treating this.  Confirmed this with her.

## 2024-08-17 NOTE — ASSESSMENT & PLAN NOTE
Due to confusion and abdominal pain with poor intake patient was started on IV Rocephin  She has received 2 doses, urine cultures with mixed contaminants.  Discontinue further antibiotics  Blood cultures negative at 24 hours

## 2024-08-17 NOTE — PLAN OF CARE
Problem: Potential for Falls  Goal: Patient will remain free of falls  Description: INTERVENTIONS:  - Educate patient/family on patient safety including physical limitations  - Instruct patient to call for assistance with activity   - Consult OT/PT to assist with strengthening/mobility   - Keep Call bell within reach  - Keep bed low and locked with side rails adjusted as appropriate  - Keep care items and personal belongings within reach  - Initiate and maintain comfort rounds  - Make Fall Risk Sign visible to staff  - Offer Toileting every 2 Hours, in advance of need  - Initiate/Maintain bed alarm  - Obtain necessary fall risk management equipment:   - Apply yellow socks and bracelet for high fall risk patients  - Consider moving patient to room near nurses station  Outcome: Progressing     Problem: Prexisting or High Potential for Compromised Skin Integrity  Goal: Skin integrity is maintained or improved  Description: INTERVENTIONS:  - Identify patients at risk for skin breakdown  - Assess and monitor skin integrity  - Assess and monitor nutrition and hydration status  - Monitor labs   - Assess for incontinence   - Turn and reposition patient  - Assist with mobility/ambulation  - Relieve pressure over bony prominences  - Avoid friction and shearing  - Provide appropriate hygiene as needed including keeping skin clean and dry  - Evaluate need for skin moisturizer/barrier cream  - Collaborate with interdisciplinary team   - Patient/family teaching  - Consider wound care consult   Outcome: Progressing     Problem: PAIN - ADULT  Goal: Verbalizes/displays adequate comfort level or baseline comfort level  Description: Interventions:  - Encourage patient to monitor pain and request assistance  - Assess pain using appropriate pain scale  - Administer analgesics based on type and severity of pain and evaluate response  - Implement non-pharmacological measures as appropriate and evaluate response  - Consider cultural and  social influences on pain and pain management  - Notify physician/advanced practitioner if interventions unsuccessful or patient reports new pain  Outcome: Progressing     Problem: INFECTION - ADULT  Goal: Absence or prevention of progression during hospitalization  Description: INTERVENTIONS:  - Assess and monitor for signs and symptoms of infection  - Monitor lab/diagnostic results  - Monitor all insertion sites, i.e. indwelling lines, tubes, and drains  - Monitor endotracheal if appropriate and nasal secretions for changes in amount and color  - Cashion appropriate cooling/warming therapies per order  - Administer medications as ordered  - Instruct and encourage patient and family to use good hand hygiene technique  - Identify and instruct in appropriate isolation precautions for identified infection/condition  Outcome: Progressing     Problem: SAFETY ADULT  Goal: Patient will remain free of falls  Description: INTERVENTIONS:  - Educate patient/family on patient safety including physical limitations  - Instruct patient to call for assistance with activity   - Consult OT/PT to assist with strengthening/mobility   - Keep Call bell within reach  - Keep bed low and locked with side rails adjusted as appropriate  - Keep care items and personal belongings within reach  - Initiate and maintain comfort rounds  - Make Fall Risk Sign visible to staff  - Offer Toileting every 2 Hours, in advance of need  - Initiate/Maintain bed alarm  - Obtain necessary fall risk management equipment:   - Apply yellow socks and bracelet for high fall risk patients  - Consider moving patient to room near nurses station  Outcome: Progressing  Goal: Maintain or return to baseline ADL function  Description: INTERVENTIONS:  -  Assess patient's ability to carry out ADLs; assess patient's baseline for ADL function and identify physical deficits which impact ability to perform ADLs (bathing, care of mouth/teeth, toileting, grooming, dressing,  etc.)  - Assess/evaluate cause of self-care deficits   - Assess range of motion  - Assess patient's mobility; develop plan if impaired  - Assess patient's need for assistive devices and provide as appropriate  - Encourage maximum independence but intervene and supervise when necessary  - Involve family in performance of ADLs  - Assess for home care needs following discharge   - Consider OT consult to assist with ADL evaluation and planning for discharge  - Provide patient education as appropriate  Outcome: Progressing  Goal: Maintains/Returns to pre admission functional level  Description: INTERVENTIONS:  - Perform AM-PAC 6 Click Basic Mobility/ Daily Activity assessment daily.  - Set and communicate daily mobility goal to care team and patient/family/caregiver.   - Collaborate with rehabilitation services on mobility goals if consulted  - Perform Range of Motion 3 times a day.  - Reposition patient every 2 hours.  - Dangle patient 3 times a day  - Stand patient 3 times a day  - Ambulate patient 3 times a day  - Out of bed to chair 3 times a day   - Out of bed for meals 3 times a day  - Out of bed for toileting  - Record patient progress and toleration of activity level   Outcome: Progressing     Problem: DISCHARGE PLANNING  Goal: Discharge to home or other facility with appropriate resources  Description: INTERVENTIONS:  - Identify barriers to discharge w/patient and caregiver  - Arrange for needed discharge resources and transportation as appropriate  - Identify discharge learning needs (meds, wound care, etc.)  - Arrange for interpretive services to assist at discharge as needed  - Refer to Case Management Department for coordinating discharge planning if the patient needs post-hospital services based on physician/advanced practitioner order or complex needs related to functional status, cognitive ability, or social support system  Outcome: Progressing     Problem: Knowledge Deficit  Goal:  Patient/family/caregiver demonstrates understanding of disease process, treatment plan, medications, and discharge instructions  Description: Complete learning assessment and assess knowledge base.  Interventions:  - Provide teaching at level of understanding  - Provide teaching via preferred learning methods  Outcome: Progressing

## 2024-08-17 NOTE — ASSESSMENT & PLAN NOTE
Lab Results   Component Value Date    EGFR 51 08/17/2024    EGFR 47 08/16/2024    EGFR 44 08/15/2024    CREATININE 0.99 08/17/2024    CREATININE 1.05 08/16/2024    CREATININE 1.11 08/15/2024   Stable at baseline, monitor

## 2024-08-17 NOTE — PLAN OF CARE
Problem: Potential for Falls  Goal: Patient will remain free of falls  Description: INTERVENTIONS:  - Educate patient/family on patient safety including physical limitations  - Instruct patient to call for assistance with activity   - Consult OT/PT to assist with strengthening/mobility   - Keep Call bell within reach  - Keep bed low and locked with side rails adjusted as appropriate  - Keep care items and personal belongings within reach  - Initiate and maintain comfort rounds  - Make Fall Risk Sign visible to staff  - Offer Toileting every 2 Hours, in advance of need  - Initiate/Maintain bed alarm  - Obtain necessary fall risk management equipment: non slip socks, call bell  - Apply yellow socks and bracelet for high fall risk patients  - Consider moving patient to room near nurses station  Outcome: Progressing     Problem: Prexisting or High Potential for Compromised Skin Integrity  Goal: Skin integrity is maintained or improved  Description: INTERVENTIONS:  - Identify patients at risk for skin breakdown  - Assess and monitor skin integrity  - Assess and monitor nutrition and hydration status  - Monitor labs   - Assess for incontinence   - Turn and reposition patient  - Assist with mobility/ambulation  - Relieve pressure over bony prominences  - Avoid friction and shearing  - Provide appropriate hygiene as needed including keeping skin clean and dry  - Evaluate need for skin moisturizer/barrier cream  - Collaborate with interdisciplinary team   - Patient/family teaching  - Consider wound care consult   Outcome: Progressing     Problem: PAIN - ADULT  Goal: Verbalizes/displays adequate comfort level or baseline comfort level  Description: Interventions:  - Encourage patient to monitor pain and request assistance  - Assess pain using appropriate pain scale  - Administer analgesics based on type and severity of pain and evaluate response  - Implement non-pharmacological measures as appropriate and evaluate  response  - Consider cultural and social influences on pain and pain management  - Notify physician/advanced practitioner if interventions unsuccessful or patient reports new pain  Outcome: Progressing     Problem: DISCHARGE PLANNING  Goal: Discharge to home or other facility with appropriate resources  Description: INTERVENTIONS:  - Identify barriers to discharge w/patient and caregiver  - Arrange for needed discharge resources and transportation as appropriate  - Identify discharge learning needs (meds, wound care, etc.)  - Arrange for interpretive services to assist at discharge as needed  - Refer to Case Management Department for coordinating discharge planning if the patient needs post-hospital services based on physician/advanced practitioner order or complex needs related to functional status, cognitive ability, or social support system  Outcome: Progressing

## 2024-08-17 NOTE — ASSESSMENT & PLAN NOTE
CT chest displayed 8 mm spiculated nodule in the right upper lobe  Patient does have smoking history  Recommending short-term interval follow-up noncontrast CT in about 3 months  I discussed the findings with patient and daughter and they are aware that malignancy cannot be ruled out  Incidental finding note completed

## 2024-08-17 NOTE — ASSESSMENT & PLAN NOTE
Patient had mechanical fall last week in the bathroom with her walker and she hit her head  CT head without acute findings  PT OT recommending rehab which patient and daughter are agreeable to, CM in process of referrals

## 2024-08-17 NOTE — UTILIZATION REVIEW
Continued Stay Review   Date: 8/17   Day 3: Has surpassed a 2nd midnight with active treatments and services.   SEE INITIAL REVIEW AT BOTTOM                       Current Patient Class: Inpatient  Current Level of Care: Med surg     HPI:88 y.o. female initially admitted on  8/15     Assessment/Plan: Had some yogurt this morning, but vomited.  Denies abdominal pain  remains on 2L NC ( baseline)  urine cultures show mixed contaminants, blood cultures negative at 24 hrs antibiotics discontinued.    PT OT recommending rehab which patient and daughter are agreeable to, CM in process of referrals      ntake/Output Summary (Last 24 hours) at 8/17/2024 1134  Last data filed at 8/17/2024 0400      Gross per 24 hour   Intake 720 ml   Output 200 ml   Net 520 ml         Vital Signs (last 3 days)       Date/Time Temp Pulse Resp BP MAP (mmHg) SpO2 Calculated FIO2 (%) - Nasal Cannula Nasal Cannula O2 Flow Rate (L/min) O2 Device Patient Position - Orthostatic VS Madhuri Coma Scale Score Pain    08/17/24 0728 98.6 °F (37 °C) 75 18 159/95 126 96 % 28 2 L/min Nasal cannula Lying -- --    08/16/24 2327 98 °F (36.7 °C) 74 20 114/77 -- 91 % 28 2 L/min Nasal cannula Lying -- --    08/16/24 2200 -- 71 -- 150/71 -- -- -- -- -- -- -- No Pain    08/1935 -- -- -- -- -- -- -- -- -- -- 15 --    08/16/24 1511 98.9 °F (37.2 °C) 72 20 142/78 104 94 % 28 2 L/min Nasal cannula Lying -- --    08/15/24 0939 99 °F (37.2 °C) 78 20 165/72 -- 92 % 28 2 L/min Nasal cannula Sitting -- No Pain          Weight (last 2 days)       Date/Time Weight    08/15/24 0939 86 (189.6)              Pertinent Labs/Diagnostic Results:   Radiology:  CTA head and neck with and without contrast   Final Interpretation by Kelton Nieto MD (08/15 1221)      CT Brain:  No acute intracranial abnormality. Multifocal encephalomalacia is unchanged      CT Angiography: No large vessel occlusion or high-grade stenosis seen. There is unchanged, moderate left intracranial  vertebral artery stenosis due to calcified plaque. There is also mild left subclavian artery stenosis. There is no ICA stenosis by    NASCET criteria.      8 mm spiculated nodule in the right upper lobe. Based on current Fleischner Society 2017 Guidelines on incidental pulmonary nodule, either PET/CT scan evaluation, tissue sampling or short-term interval follow-up non-contrast CT follow-up (initially in 3    months) may be considered appropriate.         CT chest abdomen pelvis w contrast   Final Interpretation by Nash Piper MD (08/15 1234)      CT chest:      No evidence of acute thoracic process.      9 mm groundglass nodule in the right upper lobe new since May 2019. Based on current Fleischner Society 2017 Guidelines on incidental pulmonary nodule, follow-up noncontrast CT is recommended at 6-12 months from the initial examination to confirm    persistence; if stable at that time, additional follow-up CT is recommended for every 2 years until 5 years of stability is demonstrated. Considerations related to the patient's age and/or comorbidities may be used to alter these recommendations.      Additional chronic findings and negatives as above.      CT abdomen and pelvis:      No evidence of acute abdominopelvic process.      Colonic diverticulosis.      Cholelithiasis.      Stable suprarenal and infrarenal abdominal aortic aneurysm with maximum diameter of 3.6 cm and 4 cm respectively. According to ACR white paper for the management of incidentally detected abdominal vascular findings, for an aorta of this caliber (4.0-4.4    cm) follow-up imaging (e.g. Doppler ultrasound, CTA abdomen/pelvis) is recommended at a 1 year interval. Reference: J Am Rogelio Radiol 2013;10:789-794. Considerations related to the patient's age and/or comorbidities may be used to alter these    recommendations.      Additional chronic findings and negatives as above.            Results from last 7 days   Lab Units  "08/15/24  1009   SARS-COV-2  Negative     Results from last 7 days   Lab Units 08/17/24  0445 08/16/24  0508 08/15/24  1009   WBC Thousand/uL 7.89 6.16 7.57   HEMOGLOBIN g/dL 13.5 12.2 13.8   HEMATOCRIT % 45.3 41.2 46.9*   PLATELETS Thousands/uL 214 203 209   TOTAL NEUT ABS Thousands/µL  --  4.16 5.49         Results from last 7 days   Lab Units 08/17/24  0445 08/16/24  0508 08/15/24  1009   SODIUM mmol/L 141 141 140   POTASSIUM mmol/L 3.9 3.8 3.8   CHLORIDE mmol/L 93* 95* 95*   CO2 mmol/L 43* 43* 44*   ANION GAP mmol/L 5 3* 1*   BUN mg/dL 18 20 26*   CREATININE mg/dL 0.99 1.05 1.11   EGFR ml/min/1.73sq m 51 47 44   CALCIUM mg/dL 9.4 9.0 9.7     Results from last 7 days   Lab Units 08/15/24  1009   AST U/L 12*   ALT U/L 5*   ALK PHOS U/L 82   TOTAL PROTEIN g/dL 7.1   ALBUMIN g/dL 3.7   TOTAL BILIRUBIN mg/dL 0.66     Results from last 7 days   Lab Units 08/15/24  0939   POC GLUCOSE mg/dl 130     Results from last 7 days   Lab Units 08/17/24  0445 08/16/24  0508 08/15/24  1009   GLUCOSE RANDOM mg/dL 107 102 133             No results found for: \"BETA-HYDROXYBUTYRATE\"               Results from last 7 days   Lab Units 08/15/24  1009   CK TOTAL U/L 28     Results from last 7 days   Lab Units 08/15/24  1356 08/15/24  1152 08/15/24  1009   HS TNI 0HR ng/L  --   --  9   HS TNI 2HR ng/L  --  9  --    HSTNI D2 ng/L  --  0  --    HS TNI 4HR ng/L 8  --   --    HSTNI D4 ng/L -1  --   --          Results from last 7 days   Lab Units 08/15/24  1009   PROTIME seconds 13.6   INR  1.02   PTT seconds 27     Results from last 7 days   Lab Units 08/15/24  1009   TSH 3RD GENERATON uIU/mL 0.767                     Results from last 7 days   Lab Units 08/15/24  1009   BNP pg/mL 144*                     Results from last 7 days   Lab Units 08/15/24  1009   LIPASE u/L 13                 Results from last 7 days   Lab Units 08/15/24  1158   CLARITY UA  Cloudy   COLOR UA  Yellow   SPEC GRAV UA  1.015   PH UA  8.5*   GLUCOSE UA mg/dl Negative "   KETONES UA mg/dl Negative   BLOOD UA  Negative   PROTEIN UA mg/dl 30 (1+)*   NITRITE UA  Negative   BILIRUBIN UA  Negative   UROBILINOGEN UA E.U./dl 0.2   LEUKOCYTES UA  Moderate*   WBC UA /hpf 10-20*   RBC UA /hpf 10-20*   BACTERIA UA /hpf Innumerable*   EPITHELIAL CELLS WET PREP /hpf Occasional     Results from last 7 days   Lab Units 08/15/24  1009   INFLUENZA A PCR  Negative   INFLUENZA B PCR  Negative   RSV PCR  Negative                             Results from last 7 days   Lab Units 08/15/24  1309 08/15/24  1301 08/15/24  1158   BLOOD CULTURE  No Growth at 24 hrs. No Growth at 24 hrs.  --    URINE CULTURE   --   --  Culture too young- will reincubate                   Medications:   Scheduled Medications:  amLODIPine, 10 mg, Oral, Daily  aspirin, 81 mg, Oral, Daily  bimatoprost, 1 drop, Both Eyes, HS  cefTRIAXone, 1,000 mg, Intravenous, Q24H  cyanocobalamin, 1,000 mcg, Oral, Daily  enoxaparin, 40 mg, Subcutaneous, Daily  escitalopram, 5 mg, Oral, Daily  famotidine, 20 mg, Oral, Daily  furosemide, 20 mg, Oral, Daily  levothyroxine, 75 mcg, Oral, Early Morning  metoprolol tartrate, 25 mg, Oral, Q12H [place]  pravastatin, 80 mg, Oral, Daily With Dinner      Continuous IV Infusions:     PRN Meds:  acetaminophen, 650 mg, Oral, Q6H PRN  ALPRAZolam, 0.5 mg, Oral, TID PRN  aluminum-magnesium hydroxide-simethicone, 30 mL, Oral, Q6H PRN  Artificial Tears, 1 drop, Both Eyes, Q6H PRN  levalbuterol, 1.25 mg, Nebulization, Q8H PRN  ondansetron, 4 mg, Intravenous, Q6H PRN  polyethylene glycol, 17 g, Oral, Daily PRN        Discharge Plan: D     Network Utilization Review Department  ATTENTION: Please call with any questions or concerns to [phone] and carefully listen to the prompts so that you are directed to the right person. All voicemails are confidential.   For Discharge needs, contact Care Management DC Support Team at [phone] opt. [phone]  Send all requests for admission clinical reviews, approved or denied  determinations and any other requests to dedicated fax number below belonging to the campus where the patient is receiving treatment. List of dedicated fax numbers for the Facilities:  FACILITY NAME UR FAX NUMBER   ADMISSION DENIALS (Administrative/Medical Necessity) [phone]   DISCHARGE SUPPORT TEAM (NETWORK) [phone]   PARENT CHILD HEALTH (Maternity/NICU/Pediatrics) [phone]   [facility] [phone]   [facility] [phone]   [facility] [phone]   [facility] [phone]   [facility] [phone]   [facility] [phone]   [facility] [phone]   [facility] [phone]   [facility] [phone]   [facility] [phone]   [facility] [phone]   [facility] [phone]

## 2024-08-17 NOTE — ASSESSMENT & PLAN NOTE
Patient presents with abdominal pain, decreased intake and disorientation which has worsened over the past week per daughter. Patient did have a fall last week that was mechanical and she hit her head  Patient CT imaging of head neck chest abdomen and pelvis without any acute findings  Patient is currently at her baseline alert and oriented x 4, intermittently forgetful but improving per daughter  Suspect metabolic encephalopathy may be multifactorial in setting of age, anxiety benzodiazepine use for tremor, hypercapnia and recent dehydration and fall with head strike  Patient with normal blood glucose, LFTs, viral panel, TSH normal  No diarrhea per nursing, her urine cultures are negative and antibiotics have been discontinued  Patient does have chronic hypercapnia and is on her baseline oxygen 2 L  Appreciate geriatric and therapy evaluations, planning for inpatient rehab

## 2024-08-17 NOTE — ASSESSMENT & PLAN NOTE
Per outpatient notes patient and family she has been seen by neurology and various trials of medication which were either not help or she did not tolerate.  She was given alprazolam for symptom control however this was making her somnolent and this was stopped.  PDMP confirmed  Follow-up with neurology for further medication discussions

## 2024-08-18 PROBLEM — I50.32 CHRONIC DIASTOLIC CONGESTIVE HEART FAILURE (HCC): Status: RESOLVED | Noted: 2017-12-12 | Resolved: 2024-08-18

## 2024-08-18 PROBLEM — R82.71 BACTERIURIA: Status: RESOLVED | Noted: 2024-08-17 | Resolved: 2024-08-18

## 2024-08-18 PROBLEM — Z95.1 S/P CABG (CORONARY ARTERY BYPASS GRAFT): Status: RESOLVED | Noted: 2019-05-01 | Resolved: 2024-08-18

## 2024-08-18 PROBLEM — I71.43 INFRARENAL ABDOMINAL AORTIC ANEURYSM (AAA) WITHOUT RUPTURE (HCC): Status: RESOLVED | Noted: 2024-02-29 | Resolved: 2024-08-18

## 2024-08-18 PROBLEM — D47.2 MGUS (MONOCLONAL GAMMOPATHY OF UNKNOWN SIGNIFICANCE): Status: RESOLVED | Noted: 2024-07-23 | Resolved: 2024-08-18

## 2024-08-18 PROCEDURE — 99233 SBSQ HOSP IP/OBS HIGH 50: CPT | Performed by: INTERNAL MEDICINE

## 2024-08-18 RX ADMIN — ENOXAPARIN SODIUM 40 MG: 40 INJECTION SUBCUTANEOUS at 08:15

## 2024-08-18 RX ADMIN — LEVOTHYROXINE SODIUM 75 MCG: 75 TABLET ORAL at 05:17

## 2024-08-18 RX ADMIN — FAMOTIDINE 20 MG: 20 TABLET, FILM COATED ORAL at 08:15

## 2024-08-18 RX ADMIN — BIMATOPROST 1 DROP: 0.1 SOLUTION/ DROPS OPHTHALMIC at 21:43

## 2024-08-18 RX ADMIN — AMLODIPINE BESYLATE 10 MG: 10 TABLET ORAL at 08:15

## 2024-08-18 RX ADMIN — METOPROLOL TARTRATE 25 MG: 25 TABLET, FILM COATED ORAL at 21:44

## 2024-08-18 RX ADMIN — DEXTRAN 70, GLYCERIN, HYPROMELLOSE 1 DROP: 1; 2; 3 SOLUTION/ DROPS OPHTHALMIC at 08:21

## 2024-08-18 RX ADMIN — ASPIRIN 81 MG: 81 TABLET, COATED ORAL at 08:15

## 2024-08-18 RX ADMIN — PRAVASTATIN SODIUM 80 MG: 80 TABLET ORAL at 16:11

## 2024-08-18 RX ADMIN — CYANOCOBALAMIN TAB 500 MCG 1000 MCG: 500 TAB at 08:15

## 2024-08-18 RX ADMIN — METOPROLOL TARTRATE 25 MG: 25 TABLET, FILM COATED ORAL at 08:15

## 2024-08-18 RX ADMIN — FUROSEMIDE 20 MG: 20 TABLET ORAL at 08:15

## 2024-08-18 RX ADMIN — ESCITALOPRAM OXALATE 5 MG: 10 TABLET ORAL at 08:15

## 2024-08-18 NOTE — PLAN OF CARE
Problem: Potential for Falls  Goal: Patient will remain free of falls  Description: INTERVENTIONS:  - Educate patient/family on patient safety including physical limitations  - Instruct patient to call for assistance with activity   - Consult OT/PT to assist with strengthening/mobility   - Keep Call bell within reach  - Keep bed low and locked with side rails adjusted as appropriate  - Keep care items and personal belongings within reach  - Initiate and maintain comfort rounds  - Make Fall Risk Sign visible to staff  - Offer Toileting every 2 Hours, in advance of need  - Initiate/Maintain bed alarm  - Obtain necessary fall risk management equipment: non slip socks, call bell   - Apply yellow socks and bracelet for high fall risk patients  - Consider moving patient to room near nurses station  Outcome: Progressing     Problem: Prexisting or High Potential for Compromised Skin Integrity  Goal: Skin integrity is maintained or improved  Description: INTERVENTIONS:  - Identify patients at risk for skin breakdown  - Assess and monitor skin integrity  - Assess and monitor nutrition and hydration status  - Monitor labs   - Assess for incontinence   - Turn and reposition patient  - Assist with mobility/ambulation  - Relieve pressure over bony prominences  - Avoid friction and shearing  - Provide appropriate hygiene as needed including keeping skin clean and dry  - Evaluate need for skin moisturizer/barrier cream  - Collaborate with interdisciplinary team   - Patient/family teaching  - Consider wound care consult   Outcome: Progressing     Problem: PAIN - ADULT  Goal: Verbalizes/displays adequate comfort level or baseline comfort level  Description: Interventions:  - Encourage patient to monitor pain and request assistance  - Assess pain using appropriate pain scale  - Administer analgesics based on type and severity of pain and evaluate response  - Implement non-pharmacological measures as appropriate and evaluate  response  - Consider cultural and social influences on pain and pain management  - Notify physician/advanced practitioner if interventions unsuccessful or patient reports new pain  Outcome: Progressing     Problem: INFECTION - ADULT  Goal: Absence or prevention of progression during hospitalization  Description: INTERVENTIONS:  - Assess and monitor for signs and symptoms of infection  - Monitor lab/diagnostic results  - Monitor all insertion sites, i.e. indwelling lines, tubes, and drains  - Monitor endotracheal if appropriate and nasal secretions for changes in amount and color  - [place] appropriate cooling/warming therapies per order  - Administer medications as ordered  - Instruct and encourage patient and family to use good hand hygiene technique  - Identify and instruct in appropriate isolation precautions for identified infection/condition  Outcome: Progressing     Problem: SAFETY ADULT  Goal: Patient will remain free of falls  Description: INTERVENTIONS:  - Educate patient/family on patient safety including physical limitations  - Instruct patient to call for assistance with activity   - Consult OT/PT to assist with strengthening/mobility   - Keep Call bell within reach  - Keep bed low and locked with side rails adjusted as appropriate  - Keep care items and personal belongings within reach  - Initiate and maintain comfort rounds  - Make Fall Risk Sign visible to staff  - Offer Toileting every 2 Hours, in advance of need  - Initiate/Maintain bed alarm  - Obtain necessary fall risk management equipment: non slip socks, call bell   - Apply yellow socks and bracelet for high fall risk patients  - Consider moving patient to room near nurses station  Outcome: Progressing     Problem: DISCHARGE PLANNING  Goal: Discharge to home or other facility with appropriate resources  Description: INTERVENTIONS:  - Identify barriers to discharge w/patient and caregiver  - Arrange for needed discharge resources and  transportation as appropriate  - Identify discharge learning needs (meds, wound care, etc.)  - Arrange for interpretive services to assist at discharge as needed  - Refer to Case Management Department for coordinating discharge planning if the patient needs post-hospital services based on physician/advanced practitioner order or complex needs related to functional status, cognitive ability, or social support system  Outcome: Progressing     Problem: Knowledge Deficit  Goal: Patient/family/caregiver demonstrates understanding of disease process, treatment plan, medications, and discharge instructions  Description: Complete learning assessment and assess knowledge base.  Interventions:  - Provide teaching at level of understanding  - Provide teaching via preferred learning methods  Outcome: Progressing

## 2024-08-18 NOTE — ASSESSMENT & PLAN NOTE
Patient presents with abdominal pain, decreased intake and disorientation which has worsened over the past week per daughter. Patient did have a fall last week that was mechanical and she hit her head  Patient CT imaging of head neck chest abdomen and pelvis without any acute findings  Patient is currently at her baseline alert and oriented x 4, intermittently forgetful but improving per daughter  Suspect metabolic encephalopathy may be multifactorial in setting of age, anxiety benzodiazepine use for tremor, hypercapnia and recent dehydration and fall with head strike  Cleared for rehab

## 2024-08-18 NOTE — PROGRESS NOTES
[facility]  Progress Note  Name: Ashley Gonzales I  MRN: [id number]  Unit/Bed#: E2 -01 I Date of Admission: 8/15/2024   Date of Service: 8/18/2024 I Hospital Day: 3    Assessment & Plan   * Metabolic encephalopathy  Assessment & Plan  Patient presents with abdominal pain, decreased intake and disorientation which has worsened over the past week per daughter. Patient did have a fall last week that was mechanical and she hit her head  Patient CT imaging of head neck chest abdomen and pelvis without any acute findings  Patient is currently at her baseline alert and oriented x 4, intermittently forgetful but improving per daughter  Suspect metabolic encephalopathy may be multifactorial in setting of age, anxiety benzodiazepine use for tremor, hypercapnia and recent dehydration and fall with head strike  Cleared for rehab    Fall  Assessment & Plan  Patient had mechanical fall last week in the bathroom with her walker and she hit her head  CT head without acute findings  PT OT recommending rehab which patient and daughter are agreeable to, CM in process of referrals    Low serum vitamin B12  Assessment & Plan  B12 at 234, suggest >400  Discussed with daughter and patient, will start oral supplementation, should have levels checked in 4-6 weeks with PCP    Lung nodule  Assessment & Plan  CT chest displayed 8 mm spiculated nodule in the right upper lobe  Patient does have smoking history  Recommending short-term interval follow-up noncontrast CT in about 3 months  I discussed the findings with patient and daughter and they are aware that malignancy cannot be ruled out  Incidental finding note completed    Stage 3 chronic kidney disease (HCC)  Assessment & Plan  Lab Results   Component Value Date    EGFR 51 08/17/2024    EGFR 47 08/16/2024    EGFR 44 08/15/2024    CREATININE 0.99 08/17/2024    CREATININE 1.05 08/16/2024    CREATININE 1.11 08/15/2024   Stable at baseline, monitor    Benign  familial tremor  Assessment & Plan  Per outpatient notes patient and family she has been seen by neurology and various trials of medication which were either not help or she did not tolerate.  She was given alprazolam for symptom control however this was making her somnolent and this was stopped.  PDMP confirmed  Follow-up with neurology for further medication discussions    COPD (chronic obstructive pulmonary disease) (HCC)  Assessment & Plan  Patient saw Dr. Zamudio in 2018  Continue Xopenex as needed while here  No evidence of exacerbation, currently on her home O2 regimen    Bacteriuria-resolved as of 8/18/2024  Assessment & Plan  Due to confusion and abdominal pain with poor intake patient was started on IV Rocephin  She has received 2 doses, urine cultures with mixed contaminants.  Discontinue further antibiotics  Blood cultures negative at 24 hours    MGUS (monoclonal gammopathy of unknown significance)-resolved as of 8/18/2024  Assessment & Plan  She is undergoing workup with hematology oncology    Chronic diastolic congestive heart failure (HCC)-resolved as of 8/18/2024  Assessment & Plan  Wt Readings from Last 3 Encounters:   08/15/24 86 kg (189 lb 9.5 oz)   08/07/24 84.5 kg (186 lb 4.6 oz)   07/10/24 83.6 kg (184 lb 6.4 oz)     Last echocardiogram in 2021 with LVEF 60% and grade 2 diastolic dysfunction  Appears clinically euvolemic  Continue home Lasix 20 mg daily  Her PCP is in the process of getting her the outpatient annual cardiology follow-up      Acquired hypothyroidism-resolved as of 8/18/2024  Assessment & Plan  TSH normal, continue levothyroxine                 Hospital Course:     88-year-old female patient presenting with fall at home, history of COPD, benign familial tremor.  Workup notable for low B12 for which she has been started on oral supplementation.    Evaluated by physical therapy and Occupational Therapy with plans for outpatient rehab    Assessment:      Principal Problem:     Metabolic encephalopathy  Active Problems:    Fall    COPD (chronic obstructive pulmonary disease) (HCC)    Benign familial tremor    Stage 3 chronic kidney disease (HCC)    Lung nodule    Chronic respiratory failure with hypoxia and hypercapnia (HCC)    Low serum vitamin B12      Plan:    To be determined but likely tomorrow to short-term rehab       VTE Pharmacologic Prophylaxis:   Pharmacologic: Enoxaparin (Lovenox)  Mechanical VTE Prophylaxis in Place: Yes    AM-PAC Basic Mobility:  Basic Mobility Inpatient Raw Score: 17    JH-HLM Achieved: 5: Stand (1 or more minutes)  JH-HLM Goal: 5: Stand one or more mins    HLM Goal listed above. Continue with multidisciplinary rounding and encourage appropriate mobility to improve upon HLM goals.         Patient Centered Rounds: Case discussed and reviewed with nursing    Discussions with Specialists or Other Care Team Provider: Case management    Education and Discussions with Family / Patient: Patient daughter at bedside    Time Spent for Care: 80 minutes.  More than 50% of total time spent on counseling and coordination of care as described above.    Current Length of Stay: 3 day(s)    Current Patient Status: Inpatient   Certification Statement: The patient will continue to require additional inpatient hospital stay due to placement    Discharge Plan / Estimated Discharge Date: Medically stable for discharge.  Case management aware.    Code Status: Level 1 - Full Code      Subjective:   Seen and examined, no acute complaints.  No nausea no vomiting, no abdominal pain    A complete and comprehensive 14 point organ system review has been performed and all other systems are negative other than stated above.    Objective:     Vitals:   Temp (24hrs), Av.8 °F (36.6 °C), Min:97.3 °F (36.3 °C), Max:98.7 °F (37.1 °C)    Temp:  [97.3 °F (36.3 °C)-98.7 °F (37.1 °C)] 97.3 °F (36.3 °C)  HR:  [63-70] 63  Resp:  [16-20] 16  BP: (127-159)/(63-92) 149/63  SpO2:  [91 %-95 %] 93  %  Body mass index is 34.68 kg/m².     Input and Output Summary (last 24 hours):       Intake/Output Summary (Last 24 hours) at 8/18/2024 1405  Last data filed at 8/18/2024 0700  Gross per 24 hour   Intake 360 ml   Output 900 ml   Net -540 ml       Physical Exam:     General: well appearing, no acute distress  HEENT: atraumatic, PERRLA, moist mucosa, normal pharynx, normal tonsils and adenoids, normal tongue, no fluid in sinuses  Neck: Trachea midline, no carotid bruit, no masses  Respiratory: normal chest wall expansion, CTA B, no r/r/w, no rubs  Cardiovascular: RRR, no m/r/g, Normal S1 and S2  Abdomen: Soft, non-tender, non-distended, normal bowel sounds in all quadrants, no hepatosplenomegaly, no tympany  Rectal: deferred  Musculoskeletal: normal ROM in upper and lower extremities  Integumentary: warm, dry, and pink, with no rash, purpura, or petechia  Heme/Lymph: no lymphadenopathy, no bruises  Neurological: Cranial Nerves II-XII grossly intact  Psychiatric: cooperative with normal mood, affect, and cognition      Additional Data:     Labs:    Results from last 7 days   Lab Units 08/17/24  0445 08/16/24  0508   WBC Thousand/uL 7.89 6.16   HEMOGLOBIN g/dL 13.5 12.2   HEMATOCRIT % 45.3 41.2   PLATELETS Thousands/uL 214 203   SEGS PCT %  --  68   LYMPHO PCT %  --  18   MONO PCT %  --  10   EOS PCT %  --  3     Results from last 7 days   Lab Units 08/17/24  0445 08/16/24  0508 08/15/24  1009   POTASSIUM mmol/L 3.9   < > 3.8   CHLORIDE mmol/L 93*   < > 95*   CO2 mmol/L 43*   < > 44*   BUN mg/dL 18   < > 26*   CREATININE mg/dL 0.99   < > 1.11   CALCIUM mg/dL 9.4   < > 9.7   ALK PHOS U/L  --   --  82   ALT U/L  --   --  5*   AST U/L  --   --  12*    < > = values in this interval not displayed.     Results from last 7 days   Lab Units 08/15/24  1009   INR  1.02       * I Have Reviewed All Lab Data Listed Above.  * Additional Pertinent Lab Tests Reviewed: All Labs For Current Hospital Admission  Reviewed    Imaging:    Imaging Reports Reviewed Today Include: No new imaging  Imaging Personally Reviewed by Myself Includes: No new imaging    Recent Cultures (last 7 days):     Results from last 7 days   Lab Units 08/15/24  1309 08/15/24  1301 08/15/24  1158   BLOOD CULTURE  No Growth at 48 hrs. No Growth at 48 hrs.  --    URINE CULTURE   --   --  70,000-79,000 cfu/ml       Last 24 Hours Medication List:   Current Facility-Administered Medications   Medication Dose Route Frequency Provider Last Rate    acetaminophen  650 mg Oral Q6H PRN Nicolasa Caro MD      aluminum-magnesium hydroxide-simethicone  30 mL Oral Q6H PRN Nicolasa Caro MD      amLODIPine  10 mg Oral Daily Nicolasa Caro MD      Artificial Tears  1 drop Both Eyes Q6H PRN Wilberto Hernandez PA-C      aspirin  81 mg Oral Daily Nicolasa Caro MD      bimatoprost  1 drop Both Eyes HS Nicolasa Caro MD      cyanocobalamin  1,000 mcg Oral Daily Wilberto Hernandez PA-C      enoxaparin  40 mg Subcutaneous Daily Nicolasa Caro MD      escitalopram  5 mg Oral Daily Nicolasa Caro MD      famotidine  20 mg Oral Daily Nicolasa Caro MD      furosemide  20 mg Oral Daily Nicolasa Caro MD      levalbuterol  1.25 mg Nebulization Q8H PRN Wilberto Hernandez PA-C      levothyroxine  75 mcg Oral Early Morning Nicolasa Caro MD      melatonin  3 mg Oral HS PRN Antoine Liu PA-C      metoprolol tartrate  25 mg Oral Q12H [facility] Nicolasa Caro MD      ondansetron  4 mg Intravenous Q6H PRN Nicolasa Caro MD      polyethylene glycol  17 g Oral Daily PRN Nicolasa Caro MD      pravastatin  80 mg Oral Daily With Dinner Nicolasa Caro MD         AM-PAC Basic Mobility:  Basic Mobility Inpatient Raw Score: 17    JH-HLM Achieved: 5: Stand (1 or more minutes)  JH-HLM Goal: 5: Stand one or more mins    HLM Goal listed above. Continue with multidisciplinary rounding and encourage appropriate mobility to improve upon HLM goals.     Today, Patient Was Seen By: Hans Salas DO    ** Please Note: This note was completed in part utilizing [employer] software dictation.  Grammatical errors, random word insertions, spelling mistakes, and incomplete sentences may be an occasional consequence of this system secondary to software limitations, ambient noise, and hardware issues.  If you have any questions or concerns about the content, text, or information contained within the body of this dictation, please contact the provider for clarification. **

## 2024-08-18 NOTE — PLAN OF CARE
Problem: Potential for Falls  Goal: Patient will remain free of falls  Description: INTERVENTIONS:  - Educate patient/family on patient safety including physical limitations  - Instruct patient to call for assistance with activity   - Consult OT/PT to assist with strengthening/mobility   - Keep Call bell within reach  - Keep bed low and locked with side rails adjusted as appropriate  - Keep care items and personal belongings within reach  - Initiate and maintain comfort rounds  - Make Fall Risk Sign visible to staff  - Offer Toileting every 2 Hours, in advance of need  - Initiate/Maintain bed alarm  - Obtain necessary fall risk management equipment:   - Apply yellow socks and bracelet for high fall risk patients  - Consider moving patient to room near nurses station  Outcome: Progressing     Problem: Prexisting or High Potential for Compromised Skin Integrity  Goal: Skin integrity is maintained or improved  Description: INTERVENTIONS:  - Identify patients at risk for skin breakdown  - Assess and monitor skin integrity  - Assess and monitor nutrition and hydration status  - Monitor labs   - Assess for incontinence   - Turn and reposition patient  - Assist with mobility/ambulation  - Relieve pressure over bony prominences  - Avoid friction and shearing  - Provide appropriate hygiene as needed including keeping skin clean and dry  - Evaluate need for skin moisturizer/barrier cream  - Collaborate with interdisciplinary team   - Patient/family teaching  - Consider wound care consult   Outcome: Progressing     Problem: PAIN - ADULT  Goal: Verbalizes/displays adequate comfort level or baseline comfort level  Description: Interventions:  - Encourage patient to monitor pain and request assistance  - Assess pain using appropriate pain scale  - Administer analgesics based on type and severity of pain and evaluate response  - Implement non-pharmacological measures as appropriate and evaluate response  - Consider cultural and  social influences on pain and pain management  - Notify physician/advanced practitioner if interventions unsuccessful or patient reports new pain  Outcome: Progressing     Problem: INFECTION - ADULT  Goal: Absence or prevention of progression during hospitalization  Description: INTERVENTIONS:  - Assess and monitor for signs and symptoms of infection  - Monitor lab/diagnostic results  - Monitor all insertion sites, i.e. indwelling lines, tubes, and drains  - Monitor endotracheal if appropriate and nasal secretions for changes in amount and color  - [place] appropriate cooling/warming therapies per order  - Administer medications as ordered  - Instruct and encourage patient and family to use good hand hygiene technique  - Identify and instruct in appropriate isolation precautions for identified infection/condition  Outcome: Progressing     Problem: SAFETY ADULT  Goal: Patient will remain free of falls  Description: INTERVENTIONS:  - Educate patient/family on patient safety including physical limitations  - Instruct patient to call for assistance with activity   - Consult OT/PT to assist with strengthening/mobility   - Keep Call bell within reach  - Keep bed low and locked with side rails adjusted as appropriate  - Keep care items and personal belongings within reach  - Initiate and maintain comfort rounds  - Make Fall Risk Sign visible to staff  - Apply yellow socks and bracelet for high fall risk patients  - Consider moving patient to room near nurses station  Outcome: Progressing  Goal: Maintain or return to baseline ADL function  Description: INTERVENTIONS:  -  Assess patient's ability to carry out ADLs; assess patient's baseline for ADL function and identify physical deficits which impact ability to perform ADLs (bathing, care of mouth/teeth, toileting, grooming, dressing, etc.)  - Assess/evaluate cause of self-care deficits   - Assess range of motion  - Assess patient's mobility; develop plan if impaired  -  Assess patient's need for assistive devices and provide as appropriate  - Encourage maximum independence but intervene and supervise when necessary  - Involve family in performance of ADLs  - Assess for home care needs following discharge   - Consider OT consult to assist with ADL evaluation and planning for discharge  - Provide patient education as appropriate  Outcome: Progressing  Goal: Maintains/Returns to pre admission functional level  Description: INTERVENTIONS:  - Perform AM-PAC 6 Click Basic Mobility/ Daily Activity assessment daily.  - Set and communicate daily mobility goal to care team and patient/family/caregiver.   - Collaborate with rehabilitation services on mobility goals if consulted  - Perform Range of Motion 3 times a day.  - Reposition patient every 2 hours.  - Dangle patient 3 times a day  - Stand patient 3 times a day  - Ambulate patient 3 times a day  - Out of bed to chair 3 times a day   - Out of bed for meals 3 times a day  - Out of bed for toileting  - Record patient progress and toleration of activity level   Outcome: Progressing     Problem: DISCHARGE PLANNING  Goal: Discharge to home or other facility with appropriate resources  Description: INTERVENTIONS:  - Identify barriers to discharge w/patient and caregiver  - Arrange for needed discharge resources and transportation as appropriate  - Identify discharge learning needs (meds, wound care, etc.)  - Arrange for interpretive services to assist at discharge as needed  - Refer to Case Management Department for coordinating discharge planning if the patient needs post-hospital services based on physician/advanced practitioner order or complex needs related to functional status, cognitive ability, or social support system  Outcome: Progressing     Problem: Knowledge Deficit  Goal: Patient/family/caregiver demonstrates understanding of disease process, treatment plan, medications, and discharge instructions  Description: Complete learning  assessment and assess knowledge base.  Interventions:  - Provide teaching at level of understanding  - Provide teaching via preferred learning methods  Outcome: Progressing

## 2024-08-19 PROCEDURE — 97116 GAIT TRAINING THERAPY: CPT

## 2024-08-19 PROCEDURE — 97535 SELF CARE MNGMENT TRAINING: CPT

## 2024-08-19 PROCEDURE — 97530 THERAPEUTIC ACTIVITIES: CPT

## 2024-08-19 PROCEDURE — 99232 SBSQ HOSP IP/OBS MODERATE 35: CPT | Performed by: STUDENT IN AN ORGANIZED HEALTH CARE EDUCATION/TRAINING PROGRAM

## 2024-08-19 RX ADMIN — FAMOTIDINE 20 MG: 20 TABLET, FILM COATED ORAL at 08:28

## 2024-08-19 RX ADMIN — ASPIRIN 81 MG: 81 TABLET, COATED ORAL at 08:28

## 2024-08-19 RX ADMIN — METOPROLOL TARTRATE 25 MG: 25 TABLET, FILM COATED ORAL at 21:23

## 2024-08-19 RX ADMIN — CYANOCOBALAMIN TAB 500 MCG 1000 MCG: 500 TAB at 08:28

## 2024-08-19 RX ADMIN — AMLODIPINE BESYLATE 10 MG: 10 TABLET ORAL at 08:28

## 2024-08-19 RX ADMIN — METOPROLOL TARTRATE 25 MG: 25 TABLET, FILM COATED ORAL at 08:28

## 2024-08-19 RX ADMIN — ENOXAPARIN SODIUM 40 MG: 40 INJECTION SUBCUTANEOUS at 08:28

## 2024-08-19 RX ADMIN — PRAVASTATIN SODIUM 80 MG: 80 TABLET ORAL at 17:47

## 2024-08-19 RX ADMIN — BIMATOPROST 1 DROP: 0.1 SOLUTION/ DROPS OPHTHALMIC at 21:23

## 2024-08-19 RX ADMIN — FUROSEMIDE 20 MG: 20 TABLET ORAL at 08:28

## 2024-08-19 RX ADMIN — LEVOTHYROXINE SODIUM 75 MCG: 75 TABLET ORAL at 05:06

## 2024-08-19 RX ADMIN — ESCITALOPRAM OXALATE 5 MG: 10 TABLET ORAL at 08:28

## 2024-08-19 NOTE — ASSESSMENT & PLAN NOTE
Wt Readings from Last 3 Encounters:   08/15/24 86 kg (189 lb 9.5 oz)   08/07/24 84.5 kg (186 lb 4.6 oz)   07/10/24 83.6 kg (184 lb 6.4 oz)     Last echocardiogram in 2021 with LVEF 60% and grade 2 diastolic dysfunction  Appears clinically euvolemic  Continue home Lasix 20 mg daily

## 2024-08-19 NOTE — PROGRESS NOTES
[facility]  Progress Note  Name: Ashley Gonzales I  MRN: [id number]  Unit/Bed#: E2 -01 I Date of Admission: 8/15/2024   Date of Service: 8/19/2024 I Hospital Day: 4    Assessment & Plan   Chronic respiratory failure with hypoxia and hypercapnia (HCC)  Assessment & Plan  Patient is chronically hypercapnic and remains on 2 L nasal cannula  Patient also has JAMEEL and has not tolerated BiPAP or CPAP    Lung nodule  Assessment & Plan  CT chest displayed 8 mm spiculated nodule in the right upper lobe  Patient does have smoking history  Recommending short-term interval follow-up noncontrast CT in about 3 months    Fall  Assessment & Plan  Patient had mechanical fall last week in the bathroom with her walker and she hit her head  CT head without acute findings  PT OT recommending rehab which patient and daughter are agreeable to, CM in process of referrals    Stage 3 chronic kidney disease (HCC)  Assessment & Plan  Lab Results   Component Value Date    EGFR 51 08/17/2024    EGFR 47 08/16/2024    EGFR 44 08/15/2024    CREATININE 0.99 08/17/2024    CREATININE 1.05 08/16/2024    CREATININE 1.11 08/15/2024   Stable at baseline, monitor    Benign familial tremor  Assessment & Plan  Per outpatient notes patient and family she has been seen by neurology and various trials of medication which were either not help or she did not tolerate.  She was given alprazolam for symptom control however this was making her somnolent and this was stopped.  PDMP confirmed  Follow-up with neurology for further medication discussions    COPD (chronic obstructive pulmonary disease) (HCC)  Assessment & Plan  Patient saw Dr. Zamudio in 2018  Continue Xopenex as needed while here  No evidence of exacerbation, currently on her home O2 regimen    * Metabolic encephalopathy  Assessment & Plan  Patient presents with abdominal pain, decreased intake and disorientation which has worsened over the past week per daughter. Patient  did have a fall last week that was mechanical and she hit her head  Patient CT imaging of head neck chest abdomen and pelvis without any acute findings  Patient is currently at her baseline alert and oriented x 3, intermittently forgetful  Suspect metabolic encephalopathy may be multifactorial in setting of age, anxiety benzodiazepine use for tremor, hypercapnia and recent dehydration and fall with head strike  Cleared for rehab  Recommend outpatient geriatrics evaluation    Bacteriuria-resolved as of 8/18/2024  Assessment & Plan  Due to confusion and abdominal pain with poor intake patient was started on IV Rocephin  She has received 2 doses, urine cultures with mixed contaminants.  Discontinue further antibiotics  Blood cultures negative to date    MGUS (monoclonal gammopathy of unknown significance)-resolved as of 8/18/2024  Assessment & Plan  She is undergoing workup with hematology oncology    Chronic diastolic congestive heart failure (HCC)-resolved as of 8/18/2024  Assessment & Plan  Wt Readings from Last 3 Encounters:   08/15/24 86 kg (189 lb 9.5 oz)   08/07/24 84.5 kg (186 lb 4.6 oz)   07/10/24 83.6 kg (184 lb 6.4 oz)     Last echocardiogram in 2021 with LVEF 60% and grade 2 diastolic dysfunction  Appears clinically euvolemic  Continue home Lasix 20 mg daily    Acquired hypothyroidism-resolved as of 8/18/2024  Assessment & Plan  TSH normal, continue levothyroxine             VTE Pharmacologic Prophylaxis:   Pharmacologic: Enoxaparin (Lovenox)  Mechanical VTE Prophylaxis in Place: Yes    Current Length of Stay: 4 day(s)    Current Patient Status: Inpatient   Certification Statement: The patient will continue to require additional inpatient hospital stay due to pending placement    Discharge Plan: pending    Code Status: Level 1 - Full Code      Subjective:   No events overnight, no medical complaints. Tolerating diet. Daughter at bedside.     Objective:     Vitals:   No data recorded.    HR:  [75-77]  77  Resp:  [20] 20  BP: (157-178)/(70-74) 178/74  SpO2:  [95 %] 95 %  Body mass index is 34.68 kg/m².     Input and Output Summary (last 24 hours):     No intake or output data in the 24 hours ending 08/19/24 1525    Physical Exam:     Physical Exam  Vitals and nursing note reviewed.   Constitutional:       General: She is not in acute distress.     Appearance: Normal appearance. She is not ill-appearing.   HENT:      Head: Normocephalic.   Eyes:      Conjunctiva/sclera: Conjunctivae normal.   Cardiovascular:      Rate and Rhythm: Normal rate.   Pulmonary:      Effort: Pulmonary effort is normal. No respiratory distress.   Abdominal:      General: There is no distension.      Palpations: Abdomen is soft.   Musculoskeletal:         General: No swelling.      Right lower leg: No edema.      Left lower leg: No edema.   Skin:     General: Skin is warm and dry.   Neurological:      General: No focal deficit present.      Mental Status: Mental status is at baseline.         Additional Data:     Labs:    Results from last 7 days   Lab Units 08/17/24 0445 08/16/24  0508   WBC Thousand/uL 7.89 6.16   HEMOGLOBIN g/dL 13.5 12.2   HEMATOCRIT % 45.3 41.2   PLATELETS Thousands/uL 214 203   SEGS PCT %  --  68   LYMPHO PCT %  --  18   MONO PCT %  --  10   EOS PCT %  --  3     Results from last 7 days   Lab Units 08/17/24  0445 08/16/24  0508 08/15/24  1009   SODIUM mmol/L 141   < > 140   POTASSIUM mmol/L 3.9   < > 3.8   CHLORIDE mmol/L 93*   < > 95*   CO2 mmol/L 43*   < > 44*   BUN mg/dL 18   < > 26*   CREATININE mg/dL 0.99   < > 1.11   ANION GAP mmol/L 5   < > 1*   CALCIUM mg/dL 9.4   < > 9.7   ALBUMIN g/dL  --   --  3.7   TOTAL BILIRUBIN mg/dL  --   --  0.66   ALK PHOS U/L  --   --  82   ALT U/L  --   --  5*   AST U/L  --   --  12*   GLUCOSE RANDOM mg/dL 107   < > 133    < > = values in this interval not displayed.     Results from last 7 days   Lab Units 08/15/24  1009   INR  1.02     Results from last 7 days   Lab Units  08/15/24  0939   POC GLUCOSE mg/dl 130                   * I Have Reviewed All Lab Data Listed Above.  * Additional Pertinent Lab Tests Reviewed: All Labs For Current Hospital Admission Reviewed    Mobility:  Basic Mobility Inpatient Raw Score: 20  JH-HLM Goal: 6: Walk 10 steps or more  JH-HLM Achieved: 7: Walk 25 feet or more    Lines:     Invasive Devices       Peripheral Intravenous Line  Duration             Peripheral IV 08/15/24 Proximal;Right;Ventral (anterior) Forearm 4 days                       Imaging:    Imaging Reports Reviewed Today Include:     CT chest abdomen pelvis w contrast    Result Date: 8/15/2024  Impression: CT chest: No evidence of acute thoracic process. 9 mm groundglass nodule in the right upper lobe new since May 2019. Based on current Fleischner Society 2017 Guidelines on incidental pulmonary nodule, follow-up noncontrast CT is recommended at 6-12 months from the initial examination to confirm persistence; if stable at that time, additional follow-up CT is recommended for every 2 years until 5 years of stability is demonstrated. Considerations related to the patient's age and/or comorbidities may be used to alter these recommendations. Additional chronic findings and negatives as above. CT abdomen and pelvis: No evidence of acute abdominopelvic process. Colonic diverticulosis. Cholelithiasis. Stable suprarenal and infrarenal abdominal aortic aneurysm with maximum diameter of 3.6 cm and 4 cm respectively. According to ACR white paper for the management of incidentally detected abdominal vascular findings, for an aorta of this caliber (4.0-4.4 cm) follow-up imaging (e.g. Doppler ultrasound, CTA abdomen/pelvis) is recommended at a 1 year interval. Reference: J Am Rogelio Radiol 2013;10:789-794. Considerations related to the patient's age and/or comorbidities may be used to alter these recommendations. Additional chronic findings and negatives as above. Workstation performed: [id number]     CTA  head and neck with and without contrast    Result Date: 8/15/2024  Impression: CT Brain:  No acute intracranial abnormality. Multifocal encephalomalacia is unchanged CT Angiography: No large vessel occlusion or high-grade stenosis seen. There is unchanged, moderate left intracranial vertebral artery stenosis due to calcified plaque. There is also mild left subclavian artery stenosis. There is no ICA stenosis by NASCET criteria. 8 mm spiculated nodule in the right upper lobe. Based on current Fleischner Society 2017 Guidelines on incidental pulmonary nodule, either PET/CT scan evaluation, tissue sampling or short-term interval follow-up non-contrast CT follow-up (initially in 3 months) may be considered appropriate. The study was marked in EPIC for immediate notification based on the pulmonary nodule finding. Workstation performed: [id number]        Recent Cultures (last 7 days):     Results from last 7 days   Lab Units 08/15/24  1309 08/15/24  1301 08/15/24  1158   BLOOD CULTURE  No Growth at 72 hrs. No Growth at 72 hrs.  --    URINE CULTURE   --   --  70,000-79,000 cfu/ml       Last 24 Hours Medication List:   Current Facility-Administered Medications   Medication Dose Route Frequency Provider Last Rate    acetaminophen  650 mg Oral Q6H PRN Nicolasa Caro MD      aluminum-magnesium hydroxide-simethicone  30 mL Oral Q6H PRN Nicolasa Caro MD      amLODIPine  10 mg Oral Daily Nicolasa Caro MD      Artificial Tears  1 drop Both Eyes Q6H PRN Wilberto Hernandez PA-C      aspirin  81 mg Oral Daily Nicolasa Caro MD      bimatoprost  1 drop Both Eyes HS Nicolasa Caro MD      cyanocobalamin  1,000 mcg Oral Daily Wilberto Hernandez PA-C      enoxaparin  40 mg Subcutaneous Daily Nicolasa Caro MD      escitalopram  5 mg Oral Daily Nicolasa Caro MD      famotidine  20 mg Oral Daily Nicolasa Caro MD      furosemide  20 mg Oral Daily Nicolasa Caro MD      levalbuterol  1.25 mg Nebulization Q8H PRN Wilberto Hernandez PA-C      levothyroxine  75 mcg Oral Early Morning Nicolasa  MD Vania      melatonin  3 mg Oral HS PRN Antoine Liu PA-C      metoprolol tartrate  25 mg Oral Q12H MODESTO Nicolasa Caro MD      ondansetron  4 mg Intravenous Q6H PRN Nicolasa Caro MD      polyethylene glycol  17 g Oral Daily PRN Nicolasa Caro MD      pravastatin  80 mg Oral Daily With Dinner Nicolasa Caro MD          Today, Patient Was Seen By: Jarvis Ervin MD    ** Please Note: Dictation voice to text software may have been used in the creation of this document. **

## 2024-08-19 NOTE — PHYSICAL THERAPY NOTE
Physical Therapy Treatment Note     08/19/24 1107   PT Last Visit   PT Visit Date 08/19/24   Note Type   Note Type Treatment for insurance authorization   Pain Assessment   Pain Assessment Tool 0-10   Pain Score No Pain   Restrictions/Precautions   Weight Bearing Precautions Per Order No   Other Precautions Fall Risk;O2;Chair Alarm;Hard of hearing   General   Chart Reviewed Yes   Family/Caregiver Present Yes   Subjective   Subjective Pt. agreeable to PT   Transfers   Sit to Stand 5  Supervision   Additional items Verbal cues;Increased time required;Armrests   Stand to Sit 5  Supervision   Additional items Armrests;Increased time required;Verbal cues   Stand pivot 5  Supervision   Additional items Increased time required   Toilet transfer 5  Supervision   Additional items Assist x 1;Increased time required;Armrests;Raised toilet seat   Ambulation/Elevation   Gait pattern Decreased foot clearance;Forward Flexion;Foward flexed;Short stride;Excessively slow;Decreased toe off;Decreased heel strike   Gait Assistance 4  Minimal assist  (CGA)   Additional items Assist x 1;Verbal cues;Assist x 2;Other (Comment)  (2nd A for cahir follow)   Assistive Device Rolling walker   Distance 10ft, 70ft, 54 ft, 42ft   Balance   Static Sitting Good   Dynamic Sitting Fair +   Static Standing Fair   Dynamic Standing Fair -   Ambulatory Fair -   Activity Tolerance   Activity Tolerance Patient tolerated treatment well;Patient limited by fatigue   Nurse Made Aware yes   Assessment   Prognosis Good   Problem List Decreased strength;Decreased endurance;Decreased range of motion;Decreased mobility;Obesity;Impaired hearing   Assessment Pt. progressing well with voerall mobility. pt. was given CGA for safety and 2nd A for chair follow during ambulation. pt. needed seated rest between ambulation due to fatigue. Pt. able to perform pericare in sitting post urinating [place] [facility] [place]. Cues for hand placement for STS transfers. No LOB noted t/o session.  However daughter and pt. reported her R knee would give out occasionally without warning. Therapist managed the O2 tank during ambulation. Pt. seated on chair post session with all needs within reach. Will continue to follow per PT POC. Pt. noted to be fucntioning below her baseline and needs CGA/CS for safe mobility. Hence she will benefit from continued skilled PT to address the mobility deficits.   Barriers to Discharge None   Goals   Patient Goals None reported   STG Expiration Date 08/30/24   PT Treatment Day 1   Plan   Treatment/Interventions Functional transfer training;Spoke to nursing;Gait training;Equipment eval/education;Patient/family training   Progress Progressing toward goals   PT Frequency 3-5x/wk   Discharge Recommendation   Rehab Resource Intensity Level, PT II (Moderate Resource Intensity)  (vs level III pending continued progress)   Equipment Recommended Walker   AM-PAC Basic Mobility Inpatient   Turning in Flat Bed Without Bedrails 3   Lying on Back to Sitting on Edge of Flat Bed Without Bedrails 3   Moving Bed to Chair 3   Standing Up From Chair Using Arms 4   Walk in Room 4   Climb 3-5 Stairs With Railing 3   Basic Mobility Inpatient Raw Score 20   Basic Mobility Standardized Score 43.99   [facility] Highest Level Of Mobility   -HLM Goal 6: Walk 10 steps or more   -HLM Achieved 7: Walk 25 feet or more   End of Consult   Patient Position at End of Consult Bed/Chair alarm activated;All needs within reach;Bedside chair           Eula Hitchcock PTA    An AM-PAC basic mobility standardized score less than 42.9 suggest the patient may benefit from discharge to post-acute rehab services.

## 2024-08-19 NOTE — PLAN OF CARE
Problem: Potential for Falls  Goal: Patient will remain free of falls  Description: INTERVENTIONS:  - Educate patient/family on patient safety including physical limitations  - Instruct patient to call for assistance with activity   - Consult OT/PT to assist with strengthening/mobility   - Keep Call bell within reach  - Keep bed low and locked with side rails adjusted as appropriate  - Keep care items and personal belongings within reach  - Initiate and maintain comfort rounds  - Make Fall Risk Sign visible to staff  - Offer Toileting every 2 Hours, in advance of need  - Initiate/Maintain bed alarm  - Obtain necessary fall risk management equipment: gripper socks and call bell  - Apply yellow socks and bracelet for high fall risk patients  - Consider moving patient to room near nurses station  Outcome: Progressing     Problem: Prexisting or High Potential for Compromised Skin Integrity  Goal: Skin integrity is maintained or improved  Description: INTERVENTIONS:  - Identify patients at risk for skin breakdown  - Assess and monitor skin integrity  - Assess and monitor nutrition and hydration status  - Monitor labs   - Assess for incontinence   - Turn and reposition patient  - Assist with mobility/ambulation  - Relieve pressure over bony prominences  - Avoid friction and shearing  - Provide appropriate hygiene as needed including keeping skin clean and dry  - Evaluate need for skin moisturizer/barrier cream  - Collaborate with interdisciplinary team   - Patient/family teaching  - Consider wound care consult   Outcome: Progressing     Problem: PAIN - ADULT  Goal: Verbalizes/displays adequate comfort level or baseline comfort level  Description: Interventions:  - Encourage patient to monitor pain and request assistance  - Assess pain using appropriate pain scale  - Administer analgesics based on type and severity of pain and evaluate response  - Implement non-pharmacological measures as appropriate and evaluate  response  - Consider cultural and social influences on pain and pain management  - Notify physician/advanced practitioner if interventions unsuccessful or patient reports new pain  Outcome: Progressing     Problem: INFECTION - ADULT  Goal: Absence or prevention of progression during hospitalization  Description: INTERVENTIONS:  - Assess and monitor for signs and symptoms of infection  - Monitor lab/diagnostic results  - Monitor all insertion sites, i.e. indwelling lines, tubes, and drains  - Monitor endotracheal if appropriate and nasal secretions for changes in amount and color  - [place] appropriate cooling/warming therapies per order  - Administer medications as ordered  - Instruct and encourage patient and family to use good hand hygiene technique  - Identify and instruct in appropriate isolation precautions for identified infection/condition  Outcome: Progressing     Problem: SAFETY ADULT  Goal: Patient will remain free of falls  Description: INTERVENTIONS:  - Educate patient/family on patient safety including physical limitations  - Instruct patient to call for assistance with activity   - Consult OT/PT to assist with strengthening/mobility   - Keep Call bell within reach  - Keep bed low and locked with side rails adjusted as appropriate  - Keep care items and personal belongings within reach  - Initiate and maintain comfort rounds  - Make Fall Risk Sign visible to staff  - Offer Toileting every 2 Hours, in advance of need  - Initiate/Maintain bed alarm  - Obtain necessary fall risk management equipment: gripper socks and call bell  - Apply yellow socks and bracelet for high fall risk patients  - Consider moving patient to room near nurses station  Outcome: Progressing  Goal: Maintain or return to baseline ADL function  Description: INTERVENTIONS:  -  Assess patient's ability to carry out ADLs; assess patient's baseline for ADL function and identify physical deficits which impact ability to perform ADLs  (bathing, care of mouth/teeth, toileting, grooming, dressing, etc.)  - Assess/evaluate cause of self-care deficits   - Assess range of motion  - Assess patient's mobility; develop plan if impaired  - Assess patient's need for assistive devices and provide as appropriate  - Encourage maximum independence but intervene and supervise when necessary  - Involve family in performance of ADLs  - Assess for home care needs following discharge   - Consider OT consult to assist with ADL evaluation and planning for discharge  - Provide patient education as appropriate  Outcome: Progressing  Goal: Maintains/Returns to pre admission functional level  Description: INTERVENTIONS:  - Perform AM-PAC 6 Click Basic Mobility/ Daily Activity assessment daily.  - Set and communicate daily mobility goal to care team and patient/family/caregiver.   - Collaborate with rehabilitation services on mobility goals if consulted  - Perform Range of Motion 3 times a day.  - Reposition patient every 2 hours.  - Dangle patient 3 times a day  - Stand patient 3 times a day  - Ambulate patient 3 times a day  - Out of bed to chair 3 times a day   - Out of bed for meals 3 times a day  - Out of bed for toileting  - Record patient progress and toleration of activity level   Outcome: Progressing     Problem: DISCHARGE PLANNING  Goal: Discharge to home or other facility with appropriate resources  Description: INTERVENTIONS:  - Identify barriers to discharge w/patient and caregiver  - Arrange for needed discharge resources and transportation as appropriate  - Identify discharge learning needs (meds, wound care, etc.)  - Arrange for interpretive services to assist at discharge as needed  - Refer to Case Management Department for coordinating discharge planning if the patient needs post-hospital services based on physician/advanced practitioner order or complex needs related to functional status, cognitive ability, or social support system  Outcome:  Progressing     Problem: Knowledge Deficit  Goal: Patient/family/caregiver demonstrates understanding of disease process, treatment plan, medications, and discharge instructions  Description: Complete learning assessment and assess knowledge base.  Interventions:  - Provide teaching at level of understanding  - Provide teaching via preferred learning methods  Outcome: Progressing

## 2024-08-19 NOTE — ASSESSMENT & PLAN NOTE
Due to confusion and abdominal pain with poor intake patient was started on IV Rocephin  She has received 2 doses, urine cultures with mixed contaminants.  Discontinue further antibiotics  Blood cultures negative to date

## 2024-08-19 NOTE — PLAN OF CARE
Problem: PHYSICAL THERAPY ADULT  Goal: Performs mobility at highest level of function for planned discharge setting.  See evaluation for individualized goals.  Description: Treatment/Interventions: Functional transfer training, LE strengthening/ROM, Elevations, Therapeutic exercise, Endurance training, Equipment eval/education, Bed mobility, Gait training, Compensatory technique education, Spoke to nursing, OT (PA)  Equipment Recommended: Walker (Owns)       See flowsheet documentation for full assessment, interventions and recommendations.  Outcome: Progressing  Note: Prognosis: Good  Problem List: Decreased strength, Decreased endurance, Decreased range of motion, Decreased mobility, Obesity, Impaired hearing  Assessment: Pt. progressing well with voerall mobility. pt. was given CGA for safety and 2nd A for chair follow during ambulation. pt. needed seated rest between ambulation due to fatigue. Pt. able to perform pericare in sitting post urinating at the [facility] [place]. Cues for hand placement for STS transfers. No LOB noted t/o session. However daughter and pt. reported her R knee would give out occasionally without warning. Therapist managed the O2 tank during ambulation. Pt. seated on chair post session with all needs within reach. Will continue to follow per PT POC. Pt. noted to be fucntioning below her baseline and needs CGA/CS for safe mobility. Hence she will benefit from continued skilled PT to address the mobility deficits.  Barriers to Discharge: None     Rehab Resource Intensity Level, PT: II (Moderate Resource Intensity) (vs level III pending continued progress)    See flowsheet documentation for full assessment.

## 2024-08-19 NOTE — CASE MANAGEMENT
[facility] received request for authorization from Care Manager.  Authorization request submitted for: [facility]  Facility Name: [facility]   NPI: [id number]  Facility MD:  Eugene Ruiz   NPI: [id number]  Authorization initiated by contacting insurance:  [employer]  Via: H&CC Portal   Clinicals submitted via Portal attachment   Pending Reference #:[id number]     Care Manager notified: aisha matt     Updates to authorization status will be noted in chart. Please reach out to CM for updates on any clinical information.

## 2024-08-19 NOTE — PLAN OF CARE
Problem: Potential for Falls  Goal: Patient will remain free of falls  Description: INTERVENTIONS:  - Educate patient/family on patient safety including physical limitations  - Instruct patient to call for assistance with activity   - Consult OT/PT to assist with strengthening/mobility   - Keep Call bell within reach  - Keep bed low and locked with side rails adjusted as appropriate  - Keep care items and personal belongings within reach  - Initiate and maintain comfort rounds  - Make Fall Risk Sign visible to staff  - Offer Toileting every 2 Hours, in advance of need  - Initiate/Maintain Bed alarm  - Apply yellow socks and bracelet for high fall risk patients  - Consider moving patient to room near nurses station  Outcome: Progressing     Problem: Prexisting or High Potential for Compromised Skin Integrity  Goal: Skin integrity is maintained or improved  Description: INTERVENTIONS:  - Identify patients at risk for skin breakdown  - Assess and monitor skin integrity  - Assess and monitor nutrition and hydration status  - Monitor labs   - Assess for incontinence   - Turn and reposition patient  - Assist with mobility/ambulation  - Relieve pressure over bony prominences  - Avoid friction and shearing  - Provide appropriate hygiene as needed including keeping skin clean and dry  - Evaluate need for skin moisturizer/barrier cream  - Collaborate with interdisciplinary team   - Patient/family teaching  - Consider wound care consult   Outcome: Progressing     Problem: PAIN - ADULT  Goal: Verbalizes/displays adequate comfort level or baseline comfort level  Description: Interventions:  - Encourage patient to monitor pain and request assistance  - Assess pain using appropriate pain scale  - Administer analgesics based on type and severity of pain and evaluate response  - Implement non-pharmacological measures as appropriate and evaluate response  - Consider cultural and social influences on pain and pain management  -  Notify physician/advanced practitioner if interventions unsuccessful or patient reports new pain  Outcome: Progressing     Problem: INFECTION - ADULT  Goal: Absence or prevention of progression during hospitalization  Description: INTERVENTIONS:  - Assess and monitor for signs and symptoms of infection  - Monitor lab/diagnostic results  - Monitor all insertion sites, i.e. indwelling lines, tubes, and drains  - Monitor endotracheal if appropriate and nasal secretions for changes in amount and color  - [place] appropriate cooling/warming therapies per order  - Administer medications as ordered  - Instruct and encourage patient and family to use good hand hygiene technique  - Identify and instruct in appropriate isolation precautions for identified infection/condition  Outcome: Progressing     Problem: SAFETY ADULT  Goal: Patient will remain free of falls  Description: INTERVENTIONS:  - Educate patient/family on patient safety including physical limitations  - Instruct patient to call for assistance with activity   - Consult OT/PT to assist with strengthening/mobility   - Keep Call bell within reach  - Keep bed low and locked with side rails adjusted as appropriate  - Keep care items and personal belongings within reach  - Initiate and maintain comfort rounds  - Make Fall Risk Sign visible to staff  - Offer Toileting every 2 Hours, in advance of need  - Initiate/Maintain Bed alarm  - Apply yellow socks and bracelet for high fall risk patients  - Consider moving patient to room near nurses station  Outcome: Progressing  Goal: Maintain or return to baseline ADL function  Description: INTERVENTIONS:  -  Assess patient's ability to carry out ADLs; assess patient's baseline for ADL function and identify physical deficits which impact ability to perform ADLs (bathing, care of mouth/teeth, toileting, grooming, dressing, etc.)  - Assess/evaluate cause of self-care deficits   - Assess range of motion  - Assess patient's  mobility; develop plan if impaired  - Assess patient's need for assistive devices and provide as appropriate  - Encourage maximum independence but intervene and supervise when necessary  - Involve family in performance of ADLs  - Assess for home care needs following discharge   - Consider OT consult to assist with ADL evaluation and planning for discharge  - Provide patient education as appropriate  Outcome: Progressing  Goal: Maintains/Returns to pre admission functional level  Description: INTERVENTIONS:  - Perform AM-PAC 6 Click Basic Mobility/ Daily Activity assessment daily.  - Set and communicate daily mobility goal to care team and patient/family/caregiver.   - Collaborate with rehabilitation services on mobility goals if consulted  - Perform Range of Motion 3 times a day.  - Reposition patient every 2 hours.  - Dangle patient 3 times a day  - Stand patient 3 times a day  - Ambulate patient 3 times a day  - Out of bed to chair 3 times a day   - Out of bed for meals 3 times a day  - Out of bed for toileting  - Record patient progress and toleration of activity level   Outcome: Progressing

## 2024-08-19 NOTE — CASE MANAGEMENT
Case Management Discharge Planning Note    Patient name Ashley Gonzales  [facility] -* MRN [id number]  : 1935 Date 2024       Current Admission Date: 8/15/2024  Current Admission Diagnosis:Metabolic encephalopathy   Patient Active Problem List    Diagnosis Date Noted Date Diagnosed    Low serum vitamin B12 2024     Lung nodule 2024     Chronic respiratory failure with hypoxia and hypercapnia (HCC) 2024     Metabolic encephalopathy 08/15/2024     UTI (urinary tract infection) 08/15/2024     Fall 08/15/2024     Stage 3 chronic kidney disease (HCC) 2024     Nephrolithiasis 2024     Hematuria 2024     Dizziness 2024     Obesity (BMI 30-39.9) 05/10/2023     Pleural effusion 2021     Acute on chronic respiratory failure with hypoxia and hypercapnia (HCC) 2021     History of total knee replacement 2019     JAMEEL (obstructive sleep apnea) 2017     COPD (chronic obstructive pulmonary disease) ([facility])      Benign familial tremor      Hx of arterial ischemic stroke      Esophageal reflux 2015     Clinical depression 2015     Osteoarthritis of knee 2015     Postural imbalance 10/30/2014     Insomnia 10/01/2012     Generalized osteoarthritis 2012     Female stress incontinence 2012       LOS (days): 4  Geometric Mean LOS (GMLOS) (days): 3.9  Days to GMLOS:0     OBJECTIVE:  Risk of Unplanned Readmission Score: 16.53         Current admission status: Inpatient   Preferred Pharmacy:   [id number] - [place] - [address]  [address] [place] [address]  Phone: [phone] Fax: [phone]    Primary Care Provider: Paz Maharaj DO    Primary Insurance: [employer] [facility] REP  Secondary Insurance:     DISCHARGE DETAILS:    Discharge planning discussed with:: Patient and daughter (Izzy)  Freedom of Choice: Yes  Comments - Freedom of Choice: Choice list was provided and decision made for  [facility]  CM contacted family/caregiver?: Yes  Were Treatment Team discharge recommendations reviewed with patient/caregiver?: Yes  Did patient/caregiver verbalize understanding of patient care needs?: N/A- going to facility       Contacts  Patient Contacts: Izzy  Relationship to Patient:: Family  Contact Method: In Person  Reason/Outcome: Discharge Planning    Requested Home Health Care         Is the patient interested in HHC at discharge?: No    DME Referral Provided  Referral made for DME?: No    Other Referral/Resources/Interventions Provided:  Interventions: Short Term Rehab         Treatment Team Recommendation: Short Term Rehab  Discharge Destination Plan:: Short Term Rehab                                         Additional Comments: CM met with pt and daughter at the bedside. Family has decided on [facility] for [facility]. Auth will be initiated once new therapy notes have been entered.

## 2024-08-19 NOTE — RESTORATIVE TECHNICIAN NOTE
Restorative Technician Note      Patient Name: Ashley Gonzales     Restorative Tech Visit Date: 08/19/24  Note Type: Mobility  Patient Position Upon Consult: Seated edge of bed  Activity Performed: Ambulated; Range of motion  Assistive Device: Roller walker  Patient Position at End of Consult: All needs within reach; Bedside chair

## 2024-08-19 NOTE — PROGRESS NOTES
Patient:    MRN:  [id number]    Dora Request ID:  [id number]    Level of care reserved:  Skilled Nursing Facility    Partner Reserved:  [facility], [place], [place] [id number] [phone]    Clinical needs requested:  occupational therapy, physical therapy    Geography searched:  10 miles around [id number]    Start of Service:    Request sent:  9:51am EDT on 8/17/2024 by Madeline Sofia    Partner reserved:  11:39am EDT on 8/19/2024 by Jennifer Valencia    Choice list shared:  1:29pm EDT on 8/18/2024 by Sindhu Faith

## 2024-08-19 NOTE — PLAN OF CARE
Problem: OCCUPATIONAL THERAPY ADULT  Goal: Performs self-care activities at highest level of function for planned discharge setting.  See evaluation for individualized goals.  Description: Treatment Interventions: ADL retraining, Functional transfer training, UE strengthening/ROM, Endurance training, Cognitive reorientation, Patient/family training, Equipment evaluation/education, Compensatory technique education          See flowsheet documentation for full assessment, interventions and recommendations.   Outcome: Progressing  Note: Limitation: Decreased ADL status, Decreased UE strength, Decreased Safe judgement during ADL, Decreased cognition, Decreased endurance, Decreased high-level ADLs  Prognosis: Fair  Assessment: Pt seen for 38min tx session with focus on functional balance, functional mobility, ADL status, transfer safety, cognition, and education. Pt able to tolerate OOB mobility; sitting balance=g/g-, standing balance=f/f-. Pt required verbal cues/physical cues to maintain transfer safety. Pt demonstrating need for assistance with her LE ADLs. Pt able to demonstrate good cognition(i.e.orientation, memory). Reviewed proper breathing techniques; SPO2=95% on 2liters. Dtr present during tx session--states slight cognitive deficits premorbidly; home alone 1-2hrs daily; pt limits activity 2* fear of falling. Pt tolerated tx well. Will continue. The patient's raw score on the AM-PAC Daily Activity Inpatient Short Form is 21. A raw score of greater than or equal to 19 suggests the patient may benefit from discharge to home. Please refer to the recommendation of the Occupational Therapist for safe discharge planning.     Rehab Resource Intensity Level, OT: II (Moderate Resource Intensity)

## 2024-08-19 NOTE — ASSESSMENT & PLAN NOTE
CT chest displayed 8 mm spiculated nodule in the right upper lobe  Patient does have smoking history  Recommending short-term interval follow-up noncontrast CT in about 3 months

## 2024-08-19 NOTE — ASSESSMENT & PLAN NOTE
Patient is chronically hypercapnic and remains on 2 L nasal cannula  Patient also has JAMEEL and has not tolerated BiPAP or CPAP

## 2024-08-19 NOTE — ASSESSMENT & PLAN NOTE
Patient presents with abdominal pain, decreased intake and disorientation which has worsened over the past week per daughter. Patient did have a fall last week that was mechanical and she hit her head  Patient CT imaging of head neck chest abdomen and pelvis without any acute findings  Patient is currently at her baseline alert and oriented x 3, intermittently forgetful  Suspect metabolic encephalopathy may be multifactorial in setting of age, anxiety benzodiazepine use for tremor, hypercapnia and recent dehydration and fall with head strike  Cleared for rehab  Recommend outpatient geriatrics evaluation

## 2024-08-20 ENCOUNTER — APPOINTMENT (INPATIENT)
Dept: CT IMAGING | Facility: HOSPITAL | Age: 89
DRG: 689 | End: 2024-08-20
Payer: COMMERCIAL

## 2024-08-20 LAB
ANION GAP SERPL CALCULATED.3IONS-SCNC: 3 MMOL/L (ref 4–13)
ATRIAL RATE: 82 BPM
ATRIAL RATE: 84 BPM
ATRIAL RATE: 84 BPM
BACTERIA BLD CULT: NORMAL
BACTERIA BLD CULT: NORMAL
BASE EX.OXY STD BLDV CALC-SCNC: 95.5 % (ref 60–80)
BASE EXCESS BLDV CALC-SCNC: 12.8 MMOL/L
BASOPHILS # BLD AUTO: 0.07 THOUSANDS/ÂΜL (ref 0–0.1)
BASOPHILS NFR BLD AUTO: 1 % (ref 0–1)
BUN SERPL-MCNC: 27 MG/DL (ref 5–25)
CA-I BLD-SCNC: 1.17 MMOL/L (ref 1.12–1.32)
CALCIUM SERPL-MCNC: 9.6 MG/DL (ref 8.4–10.2)
CARDIAC TROPONIN I PNL SERPL HS: 15 NG/L (ref 8–18)
CHLORIDE SERPL-SCNC: 92 MMOL/L (ref 96–108)
CO2 SERPL-SCNC: 43 MMOL/L (ref 21–32)
CREAT SERPL-MCNC: 1.12 MG/DL (ref 0.6–1.3)
EOSINOPHIL # BLD AUTO: 0.17 THOUSAND/ÂΜL (ref 0–0.61)
EOSINOPHIL NFR BLD AUTO: 2 % (ref 0–6)
ERYTHROCYTE [DISTWIDTH] IN BLOOD BY AUTOMATED COUNT: 12.2 % (ref 11.6–15.1)
FOLATE SERPL-MCNC: >22.3 NG/ML
GFR SERPL CREATININE-BSD FRML MDRD: 43 ML/MIN/1.73SQ M
GLUCOSE SERPL-MCNC: 95 MG/DL (ref 65–140)
GLUCOSE SERPL-MCNC: 98 MG/DL (ref 65–140)
HCO3 BLDV-SCNC: 41.3 MMOL/L (ref 24–30)
HCT VFR BLD AUTO: 47.1 % (ref 34.8–46.1)
HGB BLD-MCNC: 14.5 G/DL (ref 11.5–15.4)
IMM GRANULOCYTES # BLD AUTO: 0.04 THOUSAND/UL (ref 0–0.2)
IMM GRANULOCYTES NFR BLD AUTO: 0 % (ref 0–2)
INR PPP: 0.99 (ref 0.85–1.19)
LACTATE SERPL-SCNC: 0.6 MMOL/L (ref 0.5–2)
LYMPHOCYTES # BLD AUTO: 2.39 THOUSANDS/ÂΜL (ref 0.6–4.47)
LYMPHOCYTES NFR BLD AUTO: 26 % (ref 14–44)
MAGNESIUM SERPL-MCNC: 2.2 MG/DL (ref 1.9–2.7)
MCH RBC QN AUTO: 28.2 PG (ref 26.8–34.3)
MCHC RBC AUTO-ENTMCNC: 30.8 G/DL (ref 31.4–37.4)
MCV RBC AUTO: 92 FL (ref 82–98)
MONOCYTES # BLD AUTO: 0.84 THOUSAND/ÂΜL (ref 0.17–1.22)
MONOCYTES NFR BLD AUTO: 9 % (ref 4–12)
NEUTROPHILS # BLD AUTO: 5.65 THOUSANDS/ÂΜL (ref 1.85–7.62)
NEUTS SEG NFR BLD AUTO: 62 % (ref 43–75)
NRBC BLD AUTO-RTO: 0 /100 WBCS
O2 CT BLDV-SCNC: 20.4 ML/DL
P AXIS: 14 DEGREES
P AXIS: 22 DEGREES
P AXIS: 66 DEGREES
PCO2 BLDV: 69.3 MM HG (ref 42–50)
PH BLDV: 7.39 [PH] (ref 7.3–7.4)
PHOSPHATE SERPL-MCNC: 3 MG/DL (ref 2.3–4.1)
PLATELET # BLD AUTO: 241 THOUSANDS/UL (ref 149–390)
PMV BLD AUTO: 10 FL (ref 8.9–12.7)
PO2 BLDV: 109 MM HG (ref 35–45)
POTASSIUM SERPL-SCNC: 3.9 MMOL/L (ref 3.5–5.3)
PR INTERVAL: 250 MS
PR INTERVAL: 262 MS
PR INTERVAL: 262 MS
PROTHROMBIN TIME: 13.3 SECONDS (ref 12.3–15)
QRS AXIS: 108 DEGREES
QRS AXIS: 111 DEGREES
QRS AXIS: 114 DEGREES
QRSD INTERVAL: 152 MS
QRSD INTERVAL: 156 MS
QRSD INTERVAL: 156 MS
QT INTERVAL: 426 MS
QT INTERVAL: 430 MS
QT INTERVAL: 442 MS
QTC INTERVAL: 502 MS
QTC INTERVAL: 503 MS
QTC INTERVAL: 522 MS
RBC # BLD AUTO: 5.15 MILLION/UL (ref 3.81–5.12)
SODIUM SERPL-SCNC: 138 MMOL/L (ref 135–147)
T WAVE AXIS: 25 DEGREES
T WAVE AXIS: 27 DEGREES
T WAVE AXIS: 45 DEGREES
VENTRICULAR RATE: 82 BPM
VENTRICULAR RATE: 84 BPM
VENTRICULAR RATE: 84 BPM
WBC # BLD AUTO: 9.16 THOUSAND/UL (ref 4.31–10.16)

## 2024-08-20 PROCEDURE — 70450 CT HEAD/BRAIN W/O DYE: CPT

## 2024-08-20 PROCEDURE — 80048 BASIC METABOLIC PNL TOTAL CA: CPT | Performed by: NURSE PRACTITIONER

## 2024-08-20 PROCEDURE — 82746 ASSAY OF FOLIC ACID SERUM: CPT | Performed by: NURSE PRACTITIONER

## 2024-08-20 PROCEDURE — 83735 ASSAY OF MAGNESIUM: CPT | Performed by: NURSE PRACTITIONER

## 2024-08-20 PROCEDURE — 99232 SBSQ HOSP IP/OBS MODERATE 35: CPT | Performed by: STUDENT IN AN ORGANIZED HEALTH CARE EDUCATION/TRAINING PROGRAM

## 2024-08-20 PROCEDURE — 84484 ASSAY OF TROPONIN QUANT: CPT | Performed by: NURSE PRACTITIONER

## 2024-08-20 PROCEDURE — 82948 REAGENT STRIP/BLOOD GLUCOSE: CPT

## 2024-08-20 PROCEDURE — 94760 N-INVAS EAR/PLS OXIMETRY 1: CPT

## 2024-08-20 PROCEDURE — 82805 BLOOD GASES W/O2 SATURATION: CPT | Performed by: NURSE PRACTITIONER

## 2024-08-20 PROCEDURE — 84100 ASSAY OF PHOSPHORUS: CPT | Performed by: NURSE PRACTITIONER

## 2024-08-20 PROCEDURE — 85025 COMPLETE CBC W/AUTO DIFF WBC: CPT | Performed by: NURSE PRACTITIONER

## 2024-08-20 PROCEDURE — 83605 ASSAY OF LACTIC ACID: CPT | Performed by: NURSE PRACTITIONER

## 2024-08-20 PROCEDURE — 93005 ELECTROCARDIOGRAM TRACING: CPT

## 2024-08-20 PROCEDURE — 85610 PROTHROMBIN TIME: CPT | Performed by: NURSE PRACTITIONER

## 2024-08-20 PROCEDURE — 93010 ELECTROCARDIOGRAM REPORT: CPT | Performed by: STUDENT IN AN ORGANIZED HEALTH CARE EDUCATION/TRAINING PROGRAM

## 2024-08-20 PROCEDURE — 82330 ASSAY OF CALCIUM: CPT | Performed by: NURSE PRACTITIONER

## 2024-08-20 PROCEDURE — NC001 PR NO CHARGE: Performed by: INTERNAL MEDICINE

## 2024-08-20 RX ORDER — METOPROLOL TARTRATE 1 MG/ML
5 INJECTION, SOLUTION INTRAVENOUS ONCE
Status: COMPLETED | OUTPATIENT
Start: 2024-08-20 | End: 2024-08-20

## 2024-08-20 RX ORDER — MIRTAZAPINE 15 MG/1
15 TABLET, FILM COATED ORAL
Status: DISCONTINUED | OUTPATIENT
Start: 2024-08-20 | End: 2024-08-23 | Stop reason: HOSPADM

## 2024-08-20 RX ORDER — LABETALOL HYDROCHLORIDE 5 MG/ML
5 INJECTION, SOLUTION INTRAVENOUS ONCE
Status: DISCONTINUED | OUTPATIENT
Start: 2024-08-20 | End: 2024-08-20

## 2024-08-20 RX ORDER — LOSARTAN POTASSIUM 25 MG/1
25 TABLET ORAL DAILY
Status: DISCONTINUED | OUTPATIENT
Start: 2024-08-20 | End: 2024-08-23 | Stop reason: HOSPADM

## 2024-08-20 RX ADMIN — PRAVASTATIN SODIUM 80 MG: 80 TABLET ORAL at 18:10

## 2024-08-20 RX ADMIN — AMLODIPINE BESYLATE 10 MG: 10 TABLET ORAL at 09:45

## 2024-08-20 RX ADMIN — MIRTAZAPINE 15 MG: 15 TABLET, FILM COATED ORAL at 21:41

## 2024-08-20 RX ADMIN — BIMATOPROST 1 DROP: 0.1 SOLUTION/ DROPS OPHTHALMIC at 21:59

## 2024-08-20 RX ADMIN — LOSARTAN POTASSIUM 25 MG: 25 TABLET, FILM COATED ORAL at 09:45

## 2024-08-20 RX ADMIN — CYANOCOBALAMIN TAB 500 MCG 1000 MCG: 500 TAB at 09:45

## 2024-08-20 RX ADMIN — ENOXAPARIN SODIUM 40 MG: 40 INJECTION SUBCUTANEOUS at 09:28

## 2024-08-20 RX ADMIN — LEVOTHYROXINE SODIUM 75 MCG: 75 TABLET ORAL at 06:24

## 2024-08-20 RX ADMIN — METOROPROLOL TARTRATE 5 MG: 5 INJECTION, SOLUTION INTRAVENOUS at 08:36

## 2024-08-20 RX ADMIN — FAMOTIDINE 20 MG: 20 TABLET, FILM COATED ORAL at 09:45

## 2024-08-20 RX ADMIN — FUROSEMIDE 20 MG: 20 TABLET ORAL at 09:45

## 2024-08-20 RX ADMIN — ASPIRIN 81 MG: 81 TABLET, COATED ORAL at 09:27

## 2024-08-20 RX ADMIN — METOPROLOL TARTRATE 25 MG: 25 TABLET, FILM COATED ORAL at 21:41

## 2024-08-20 RX ADMIN — DEXTRAN 70, GLYCERIN, HYPROMELLOSE 1 DROP: 1; 2; 3 SOLUTION/ DROPS OPHTHALMIC at 13:26

## 2024-08-20 RX ADMIN — ESCITALOPRAM OXALATE 5 MG: 10 TABLET ORAL at 09:45

## 2024-08-20 NOTE — ASSESSMENT & PLAN NOTE
Patient presents with abdominal pain, decreased intake and disorientation which has worsened over the past week per daughter. Patient did have a fall last week that was mechanical and she hit her head  Patient CT imaging of head neck chest abdomen and pelvis without any acute findings  Patient is currently at her baseline alert and oriented x 3, intermittently forgetful with likely cognitive impairment  Suspect metabolic encephalopathy may be multifactorial in setting of age, anxiety benzodiazepine use for tremor, hypercapnia and recent dehydration and fall with head strike  Appreciate geriatrics evaluation, recommending discontinuing Lexapro and starting Remeron 15 mg at bedtime  Patient was a rapid response on 08/20 with increased lethargy and elevated blood pressure  CT head was negative  Suspect patient's symptoms due to encephalopathy with likely dementia and hypercarbia with history of COPD with chronic respiratory failure  Plan to monitor overnight

## 2024-08-20 NOTE — PROGRESS NOTES
[facility]  Progress Note  Name: Ashley Gonzales I  MRN: [id number]  Unit/Bed#: E2 -01 I Date of Admission: 8/15/2024   Date of Service: 8/20/2024 I Hospital Day: 5    Assessment & Plan   Chronic respiratory failure with hypoxia and hypercapnia (HCC)  Assessment & Plan  Patient is chronically hypercapnic and remains on 2 L nasal cannula  Patient also has JAMEEL and has not tolerated BiPAP or CPAP    Lung nodule  Assessment & Plan  CT chest displayed 8 mm spiculated nodule in the right upper lobe  Patient does have smoking history  Recommending short-term interval follow-up noncontrast CT in about 3 months    Fall  Assessment & Plan  Patient had mechanical fall last week in the bathroom with her walker and she hit her head  CT head without acute findings  PT OT recommending rehab    Stage 3 chronic kidney disease (HCC)  Assessment & Plan  Lab Results   Component Value Date    EGFR 43 08/20/2024    EGFR 51 08/17/2024    EGFR 47 08/16/2024    CREATININE 1.12 08/20/2024    CREATININE 0.99 08/17/2024    CREATININE 1.05 08/16/2024   Stable at baseline, monitor    Benign familial tremor  Assessment & Plan  Per outpatient notes patient and family she has been seen by neurology and various trials of medication which were either not help or she did not tolerate.  She was given alprazolam for symptom control however this was making her somnolent and this was stopped.  PDMP confirmed  Follow-up with neurology for further medication discussions    COPD (chronic obstructive pulmonary disease) (HCC)  Assessment & Plan  Patient saw Dr. Zamudio in 2018  Continue Xopenex as needed  No evidence of exacerbation    * Metabolic encephalopathy  Assessment & Plan  Patient presents with abdominal pain, decreased intake and disorientation which has worsened over the past week per daughter. Patient did have a fall last week that was mechanical and she hit her head  Patient CT imaging of head neck chest abdomen and  pelvis without any acute findings  Patient is currently at her baseline alert and oriented x 3, intermittently forgetful with likely cognitive impairment  Suspect metabolic encephalopathy may be multifactorial in setting of age, anxiety benzodiazepine use for tremor, hypercapnia and recent dehydration and fall with head strike  Appreciate geriatrics evaluation, recommending discontinuing Lexapro and starting Remeron 15 mg at bedtime  Patient was a rapid response on 08/20 with increased lethargy and elevated blood pressure  CT head was negative  Suspect patient's symptoms due to encephalopathy with likely dementia and hypercarbia with history of COPD with chronic respiratory failure  Plan to monitor overnight    Bacteriuria-resolved as of 8/18/2024  Assessment & Plan  Due to confusion and abdominal pain with poor intake patient was started on IV Rocephin  She has received 2 doses, urine cultures with mixed contaminants.  Discontinue further antibiotics  Blood cultures negative to date    MGUS (monoclonal gammopathy of unknown significance)-resolved as of 8/18/2024  Assessment & Plan  She is undergoing workup with hematology oncology    Chronic diastolic congestive heart failure (HCC)-resolved as of 8/18/2024  Assessment & Plan  Wt Readings from Last 3 Encounters:   08/15/24 86 kg (189 lb 9.5 oz)   08/07/24 84.5 kg (186 lb 4.6 oz)   07/10/24 83.6 kg (184 lb 6.4 oz)     Last echocardiogram in 2021 with LVEF 60% and grade 2 diastolic dysfunction  Appears clinically euvolemic  Continue home Lasix 20 mg daily    Acquired hypothyroidism-resolved as of 8/18/2024  Assessment & Plan  TSH normal, continue levothyroxine         Hypertension  Blood pressure elevated with systolic in the 200s  Add losartan 25 mg daily, continue amlodipine and metoprolol    VTE Pharmacologic Prophylaxis:   Pharmacologic: Enoxaparin (Lovenox)  Mechanical VTE Prophylaxis in Place: Yes    Current Length of Stay: 5 day(s)    Current Patient Status:  Inpatient   Certification Statement: The patient will continue to require additional inpatient hospital stay due to monitor blood pressure, mental status    Discharge Plan: 24 to 48 hours    Code Status: Level 1 - Full Code      Subjective:   Patient was a rapid response earlier this morning.  Reportedly lethargic and did not respond to sternal rubs.  Eventually awoken when taken down for CT scan.  Answering questions appropriately and tolerating diet.  Discussed current findings with daughter at bedside.    Objective:     Vitals:   Temp (24hrs), Av.5 °F (36.9 °C), Min:97.5 °F (36.4 °C), Max:99.1 °F (37.3 °C)    Temp:  [97.5 °F (36.4 °C)-99.1 °F (37.3 °C)] (P) 99 °F (37.2 °C)  HR:  [67-86] 81  Resp:  [18-20] (P) 20  BP: (103-222)/() 113/66  SpO2:  [94 %-99 %] 99 %  Body mass index is 34.68 kg/m².     Input and Output Summary (last 24 hours):       Intake/Output Summary (Last 24 hours) at 2024 1439  Last data filed at 2024 2201  Gross per 24 hour   Intake --   Output 150 ml   Net -150 ml       Physical Exam:     Physical Exam  Vitals and nursing note reviewed.   Constitutional:       General: She is not in acute distress.     Appearance: She is obese. She is not ill-appearing.   HENT:      Head: Normocephalic.   Eyes:      Conjunctiva/sclera: Conjunctivae normal.   Cardiovascular:      Rate and Rhythm: Normal rate.   Pulmonary:      Effort: Pulmonary effort is normal. No respiratory distress.   Abdominal:      General: There is no distension.      Palpations: Abdomen is soft.   Musculoskeletal:         General: No swelling.      Right lower leg: No edema.      Left lower leg: No edema.   Skin:     General: Skin is warm and dry.   Neurological:      Mental Status: Mental status is at baseline. She is disoriented.           Additional Data:     Labs:    Results from last 7 days   Lab Units 24  0830   WBC Thousand/uL 9.16   HEMOGLOBIN g/dL 14.5   HEMATOCRIT % 47.1*   PLATELETS Thousands/uL 241    SEGS PCT % 62   LYMPHO PCT % 26   MONO PCT % 9   EOS PCT % 2     Results from last 7 days   Lab Units 08/20/24  0830 08/16/24  0508 08/15/24  1009   SODIUM mmol/L 138   < > 140   POTASSIUM mmol/L 3.9   < > 3.8   CHLORIDE mmol/L 92*   < > 95*   CO2 mmol/L 43*   < > 44*   BUN mg/dL 27*   < > 26*   CREATININE mg/dL 1.12   < > 1.11   ANION GAP mmol/L 3*   < > 1*   CALCIUM mg/dL 9.6   < > 9.7   ALBUMIN g/dL  --   --  3.7   TOTAL BILIRUBIN mg/dL  --   --  0.66   ALK PHOS U/L  --   --  82   ALT U/L  --   --  5*   AST U/L  --   --  12*   GLUCOSE RANDOM mg/dL 98   < > 133    < > = values in this interval not displayed.     Results from last 7 days   Lab Units 08/20/24  0831   INR  0.99     Results from last 7 days   Lab Units 08/20/24  0815 08/15/24  0939   POC GLUCOSE mg/dl 95 130         Results from last 7 days   Lab Units 08/20/24  0830   LACTIC ACID mmol/L 0.6           * I Have Reviewed All Lab Data Listed Above.  * Additional Pertinent Lab Tests Reviewed: All Labs For Current Hospital Admission Reviewed    Mobility:  Basic Mobility Inpatient Raw Score: 18  [facility] Goal: 6: Walk 10 steps or more  [facility] Achieved: 2: Bed activities/Dependent transfer    Lines:     Invasive Devices       Peripheral Intravenous Line  Duration             Peripheral IV 08/15/24 Proximal;Right;Ventral (anterior) Forearm 5 days    Peripheral IV 08/20/24 Left Antecubital <1 day                       Imaging:    Imaging Reports Reviewed Today Include:     CT head wo contrast    Result Date: 8/20/2024  Impression: No acute intracranial hemorrhage seen No mass effect or midline shift seen Stable CT Workstation performed: [id number]     CT chest abdomen pelvis w contrast    Result Date: 8/15/2024  Impression: CT chest: No evidence of acute thoracic process. 9 mm groundglass nodule in the right upper lobe new since May 2019. Based on current Fleischner Society 2017 Guidelines on incidental pulmonary nodule, follow-up noncontrast CT is  recommended at 6-12 months from the initial examination to confirm persistence; if stable at that time, additional follow-up CT is recommended for every 2 years until 5 years of stability is demonstrated. Considerations related to the patient's age and/or comorbidities may be used to alter these recommendations. Additional chronic findings and negatives as above. CT abdomen and pelvis: No evidence of acute abdominopelvic process. Colonic diverticulosis. Cholelithiasis. Stable suprarenal and infrarenal abdominal aortic aneurysm with maximum diameter of 3.6 cm and 4 cm respectively. According to ACR white paper for the management of incidentally detected abdominal vascular findings, for an aorta of this caliber (4.0-4.4 cm) follow-up imaging (e.g. Doppler ultrasound, CTA abdomen/pelvis) is recommended at a 1 year interval. Reference: J Am Rogelio Radiol 2013;10:789-794. Considerations related to the patient's age and/or comorbidities may be used to alter these recommendations. Additional chronic findings and negatives as above. Workstation performed: [id number]     CTA head and neck with and without contrast    Result Date: 8/15/2024  Impression: CT Brain:  No acute intracranial abnormality. Multifocal encephalomalacia is unchanged CT Angiography: No large vessel occlusion or high-grade stenosis seen. There is unchanged, moderate left intracranial vertebral artery stenosis due to calcified plaque. There is also mild left subclavian artery stenosis. There is no ICA stenosis by NASCET criteria. 8 mm spiculated nodule in the right upper lobe. Based on current Fleischner Society 2017 Guidelines on incidental pulmonary nodule, either PET/CT scan evaluation, tissue sampling or short-term interval follow-up non-contrast CT follow-up (initially in 3 months) may be considered appropriate. The study was marked in EPIC for immediate notification based on the pulmonary nodule finding. Workstation performed: [id number]        Recent  Cultures (last 7 days):     Results from last 7 days   Lab Units 08/15/24  1309 08/15/24  1301 08/15/24  1158   BLOOD CULTURE  No Growth After 4 Days. No Growth After 4 Days.  --    URINE CULTURE   --   --  70,000-79,000 cfu/ml       Last 24 Hours Medication List:   Current Facility-Administered Medications   Medication Dose Route Frequency Provider Last Rate    acetaminophen  650 mg Oral Q6H PRN Nicolasa Caro MD      aluminum-magnesium hydroxide-simethicone  30 mL Oral Q6H PRN Nicolasa Caro MD      amLODIPine  10 mg Oral Daily Nicolasa Caro MD      Artificial Tears  1 drop Both Eyes Q6H PRN Wilberto Hernandez PA-C      aspirin  81 mg Oral Daily Nicolasa Caro MD      bimatoprost  1 drop Both Eyes HS Nicolasa Caro MD      cyanocobalamin  1,000 mcg Oral Daily Wilberto Hernandez PA-C      enoxaparin  40 mg Subcutaneous Daily Nicolasa Caro MD      famotidine  20 mg Oral Daily Nicolasa Caro MD      furosemide  20 mg Oral Daily Nicolasa Caro MD      levalbuterol  1.25 mg Nebulization Q8H PRN Wilberto Hernandez PA-C      levothyroxine  75 mcg Oral Early Morning Nicolasa Caro MD      losartan  25 mg Oral Daily Jarvis Ervin MD      melatonin  3 mg Oral HS PRN Antoine Liu PA-C      metoprolol tartrate  25 mg Oral Q12H [facility] Nicolasa Caro MD      mirtazapine  15 mg Oral HS Jarvis Ervin MD      ondansetron  4 mg Intravenous Q6H PRN Nicolasa Caro MD      polyethylene glycol  17 g Oral Daily PRN Nicolasa Caro MD      pravastatin  80 mg Oral Daily With Dinner Nicolasa Caro MD          Today, Patient Was Seen By: Jarvis Ervin MD    ** Please Note: Dictation voice to text software may have been used in the creation of this document. **

## 2024-08-20 NOTE — PHYSICAL THERAPY NOTE
Physical Therapy Cancellation Note      PT session cancelled as pt. Not appropriate medically per RN. Pt. Was a rapid response this morning and noted to be very fatigued. Will continue to follow as appropriate.

## 2024-08-20 NOTE — ASSESSMENT & PLAN NOTE
Lab Results   Component Value Date    EGFR 43 08/20/2024    EGFR 51 08/17/2024    EGFR 47 08/16/2024    CREATININE 1.12 08/20/2024    CREATININE 0.99 08/17/2024    CREATININE 1.05 08/16/2024   Stable at baseline, monitor

## 2024-08-20 NOTE — RAPID RESPONSE
Rapid Response Note  Ashley Gonzales 88 y.o. female MRN: [id number]  Unit/Bed#: E2 -01 Encounter: [id number]    Rapid Response Notification(s):   Response called date/time:  8/20/2024 8:12 AM  Response team arrival date/time:  8/20/2024 8:15 AM  Response end date/time:  8/20/2024 8:35 AM  Level of care:  Medsur  [facility]  Primary reason for rapid response call:  Acute change in neuro status and acute change in BP    Rapid Response Intervention(s):   Airway:  None  Breathing:  None  Circulation:  Electrocardiogram  Fluids administered:  None  Medications administered:  Other (comment)  labetalol     Assessment:   Rapid response was called for 88-year-old female with altered mental status.  Patient's blood pressure was running in 200s and heart rate was in 80s and patient was given labetalol IV 5 mg with metoprolol 5 mg once.  Because of acute change in altered mental status (last known well was 4 AM) as per the daughter when she talked to the daughter.  At the baseline patient is AO x 4 and has chronic tremors (mostly parkinsonism tremors).    Plan:   CBC  CMP  Lactic acid  Magnesium  Phosphorus  VBG  Stat CT head without contrast  PT/INR  Troponin  Ionized calcium     Rapid Response Outcome:   Transfer:  Remain on floor  Primary service notified of transfer: Yes    Code Status: Level 1 (Full Code)      Family notified: Yes, Name of Family member contacted . Daughter was informed          Background/Situation:   Ashley Gonzales is a 88 y.o. female with possible history of hypothyroidism, chronic hypoxic respiratory failure, HTN, CAD status post CABG presented for confusion.  As per the family member, many of the family members in the family has baseline shakiness and tremors.  POA: Metabolic encephalopathy in the background of UTI.    Review of Systems   Reason unable to perform ROS: ROS could not be performed because patient was not responding to verbal commands, GCS score less  than 8.   All other systems reviewed and are negative.      Objective:   Vitals:    08/20/24 0830 08/20/24 0840 08/20/24 0945 08/20/24 1100   BP: (!) 200/98 (!) 177/84 (!) 191/86 120/63   BP Location:  Right arm  Right arm   Pulse: 81      Resp:       Temp:       TempSrc:       SpO2: 99%      Weight:       Height:         Physical Exam  Constitutional:       General: She is not in acute distress.     Appearance: She is obese. She is ill-appearing. She is not toxic-appearing or diaphoretic.      Comments: After patient situation got better, neurological examination was performed it was unremarkable.  Motor and sensory examination was unremarkable  Cerebellar exam was unremarkable.  Bilateral equal pupillary reflexes   Cardiovascular:      Rate and Rhythm: Normal rate and regular rhythm.      Pulses: Normal pulses.      Heart sounds: Normal heart sounds. No murmur heard.     No friction rub. No gallop.   Pulmonary:      Effort: Pulmonary effort is normal. No respiratory distress.      Breath sounds: No stridor. No wheezing, rhonchi or rales.   Chest:      Chest wall: No tenderness.   Musculoskeletal:      Right lower leg: Edema present.      Left lower leg: Edema present.      Comments: Bilateral 1+ trace pitting edema on the lower legs   Neurological:      Mental Status: She is disoriented.      GCS: GCS eye subscore is 1. GCS verbal subscore is 1. GCS motor subscore is 1.      Comments: Pill-rolling tremor  Chronic baseline oral tremors

## 2024-08-20 NOTE — CODE DOCUMENTATION
RRT called due to patient being unresponsive to external rub and stimuli.BP was elevated, patient was on NS 6lpm. RRT team arrived. EKG and blood samples ordered. Patient transported by RN and ICU RN to CT scan.

## 2024-08-20 NOTE — CASE MANAGEMENT
Received call from Angie @[facility] stating that she would like to discuss pt further , from a therapy stand point she is doing pretty well transferring with supervision , walking with contact guard and assist with wheelchair follow for distances up to 70 feet . Calling to discuss any other barriers or other skilled needs for discharge .also stated that the case was being sent to the medical director at this time . Requesting call back to angie @  [phone] option 8 cm notified aisha matt

## 2024-08-20 NOTE — PLAN OF CARE
Problem: Potential for Falls  Goal: Patient will remain free of falls  Description: INTERVENTIONS:  - Educate patient/family on patient safety including physical limitations  - Instruct patient to call for assistance with activity   - Consult OT/PT to assist with strengthening/mobility   - Keep Call bell within reach  - Keep bed low and locked with side rails adjusted as appropriate  - Keep care items and personal belongings within reach  - Initiate and maintain comfort rounds  - Make Fall Risk Sign visible to staff  - Offer Toileting every 2 Hours, in advance of need  - Initiate/Maintain bed alarm  - Obtain necessary fall risk management equipment  - Apply yellow socks and bracelet for high fall risk patients  - Consider moving patient to room near nurses station  Outcome: Progressing     Problem: Prexisting or High Potential for Compromised Skin Integrity  Goal: Skin integrity is maintained or improved  Description: INTERVENTIONS:  - Identify patients at risk for skin breakdown  - Assess and monitor skin integrity  - Assess and monitor nutrition and hydration status  - Monitor labs   - Assess for incontinence   - Turn and reposition patient  - Assist with mobility/ambulation  - Relieve pressure over bony prominences  - Avoid friction and shearing  - Provide appropriate hygiene as needed including keeping skin clean and dry  - Evaluate need for skin moisturizer/barrier cream  - Collaborate with interdisciplinary team   - Patient/family teaching  - Consider wound care consult   Outcome: Progressing     Problem: PAIN - ADULT  Goal: Verbalizes/displays adequate comfort level or baseline comfort level  Description: Interventions:  - Encourage patient to monitor pain and request assistance  - Assess pain using appropriate pain scale  - Administer analgesics based on type and severity of pain and evaluate response  - Implement non-pharmacological measures as appropriate and evaluate response  - Consider cultural and  social influences on pain and pain management  - Notify physician/advanced practitioner if interventions unsuccessful or patient reports new pain  Outcome: Progressing     Problem: INFECTION - ADULT  Goal: Absence or prevention of progression during hospitalization  Description: INTERVENTIONS:  - Assess and monitor for signs and symptoms of infection  - Monitor lab/diagnostic results  - Monitor all insertion sites, i.e. indwelling lines, tubes, and drains  - Monitor endotracheal if appropriate and nasal secretions for changes in amount and color  - [place] appropriate cooling/warming therapies per order  - Administer medications as ordered  - Instruct and encourage patient and family to use good hand hygiene technique  - Identify and instruct in appropriate isolation precautions for identified infection/condition  Outcome: Progressing     Problem: SAFETY ADULT  Goal: Patient will remain free of falls  Description: INTERVENTIONS:  - Educate patient/family on patient safety including physical limitations  - Instruct patient to call for assistance with activity   - Consult OT/PT to assist with strengthening/mobility   - Keep Call bell within reach  - Keep bed low and locked with side rails adjusted as appropriate  - Keep care items and personal belongings within reach  - Initiate and maintain comfort rounds  - Make Fall Risk Sign visible to staff  - Offer Toileting every 2 Hours, in advance of need  - Initiate/Maintain bed alarm  - Obtain necessary fall risk management equipment  - Apply yellow socks and bracelet for high fall risk patients  - Consider moving patient to room near nurses station  Outcome: Progressing  Goal: Maintain or return to baseline ADL function  Description: INTERVENTIONS:  -  Assess patient's ability to carry out ADLs; assess patient's baseline for ADL function and identify physical deficits which impact ability to perform ADLs (bathing, care of mouth/teeth, toileting, grooming, dressing,  etc.)  - Assess/evaluate cause of self-care deficits   - Assess range of motion  - Assess patient's mobility; develop plan if impaired  - Assess patient's need for assistive devices and provide as appropriate  - Encourage maximum independence but intervene and supervise when necessary  - Involve family in performance of ADLs  - Assess for home care needs following discharge   - Consider OT consult to assist with ADL evaluation and planning for discharge  - Provide patient education as appropriate  Outcome: Progressing  Goal: Maintains/Returns to pre admission functional level  Description: INTERVENTIONS:  - Perform AM-PAC 6 Click Basic Mobility/ Daily Activity assessment daily.  - Set and communicate daily mobility goal to care team and patient/family/caregiver.   - Collaborate with rehabilitation services on mobility goals if consulted  - Perform Range of Motion 4 times a day.  - Reposition patient every 2 hours.  - Dangle patient 4 times a day  - Stand patient 4 times a day  - Ambulate patient 4 times a day  - Out of bed to chair 4 times a day   - Out of bed for meals 4  Problem: DISCHARGE PLANNING  Goal: Discharge to home or other facility with appropriate resources  Description: INTERVENTIONS:  - Identify barriers to discharge w/patient and caregiver  - Arrange for needed discharge resources and transportation as appropriate  - Identify discharge learning needs (meds, wound care, etc.)  - Arrange for interpretive services to assist at discharge as needed  - Refer to Case Management Department for coordinating discharge planning if the patient needs post-hospital services based on physician/advanced practitioner order or complex needs related to functional status, cognitive ability, or social support system  Outcome: Progressing     Problem: Knowledge Deficit  Goal: Patient/family/caregiver demonstrates understanding of disease process, treatment plan, medications, and discharge instructions  Description:  Complete learning assessment and assess knowledge base.  Interventions:  - Provide teaching at level of understanding  - Provide teaching via preferred learning methods  Outcome: Progressing    times a day  - Out of bed for toileting  - Record patient progress and toleration of activity level   Outcome: Progressing

## 2024-08-20 NOTE — ASSESSMENT & PLAN NOTE
Patient had mechanical fall last week in the bathroom with her walker and she hit her head  CT head without acute findings  PT OT recommending rehab

## 2024-08-20 NOTE — PLAN OF CARE
Problem: Potential for Falls  Goal: Patient will remain free of falls  Description: INTERVENTIONS:  - Educate patient/family on patient safety including physical limitations  - Instruct patient to call for assistance with activity   - Consult OT/PT to assist with strengthening/mobility   - Keep Call bell within reach  - Keep bed low and locked with side rails adjusted as appropriate  - Keep care items and personal belongings within reach  - Initiate and maintain comfort rounds  - Make Fall Risk Sign visible to staff  - Offer Toileting every 2 Hours, in advance of need  - Initiate/Maintain bed alarm  - Apply yellow socks and bracelet for high fall risk patients  - Consider moving patient to room near nurses station  Outcome: Progressing     Problem: Prexisting or High Potential for Compromised Skin Integrity  Goal: Skin integrity is maintained or improved  Description: INTERVENTIONS:  - Identify patients at risk for skin breakdown  - Assess and monitor skin integrity  - Assess and monitor nutrition and hydration status  - Monitor labs   - Assess for incontinence   - Turn and reposition patient  - Assist with mobility/ambulation  - Relieve pressure over bony prominences  - Avoid friction and shearing  - Provide appropriate hygiene as needed including keeping skin clean and dry  - Evaluate need for skin moisturizer/barrier cream  - Collaborate with interdisciplinary team   - Patient/family teaching  - Consider wound care consult   Outcome: Progressing     Problem: PAIN - ADULT  Goal: Verbalizes/displays adequate comfort level or baseline comfort level  Description: Interventions:  - Encourage patient to monitor pain and request assistance  - Assess pain using appropriate pain scale  - Administer analgesics based on type and severity of pain and evaluate response  - Implement non-pharmacological measures as appropriate and evaluate response  - Consider cultural and social influences on pain and pain management  -  Notify physician/advanced practitioner if interventions unsuccessful or patient reports new pain  Outcome: Progressing     Problem: INFECTION - ADULT  Goal: Absence or prevention of progression during hospitalization  Description: INTERVENTIONS:  - Assess and monitor for signs and symptoms of infection  - Monitor lab/diagnostic results  - Monitor all insertion sites, i.e. indwelling lines, tubes, and drains  - Monitor endotracheal if appropriate and nasal secretions for changes in amount and color  - [place] appropriate cooling/warming therapies per order  - Administer medications as ordered  - Instruct and encourage patient and family to use good hand hygiene technique  - Identify and instruct in appropriate isolation precautions for identified infection/condition  Outcome: Progressing     Problem: SAFETY ADULT  Goal: Patient will remain free of falls  Description: INTERVENTIONS:  - Educate patient/family on patient safety including physical limitations  - Instruct patient to call for assistance with activity   - Consult OT/PT to assist with strengthening/mobility   - Keep Call bell within reach  - Keep bed low and locked with side rails adjusted as appropriate  - Keep care items and personal belongings within reach  - Initiate and maintain comfort rounds  - Make Fall Risk Sign visible to staff  - Offer Toileting every 2 Hours, in advance of need  - Initiate/Maintain Bed alarm  - Apply yellow socks and bracelet for high fall risk patients  - Consider moving patient to room near nurses station  Outcome: Progressing  Goal: Maintain or return to baseline ADL function  Description: INTERVENTIONS:  -  Assess patient's ability to carry out ADLs; assess patient's baseline for ADL function and identify physical deficits which impact ability to perform ADLs (bathing, care of mouth/teeth, toileting, grooming, dressing, etc.)  - Assess/evaluate cause of self-care deficits   - Assess range of motion  - Assess patient's  mobility; develop plan if impaired  - Assess patient's need for assistive devices and provide as appropriate  - Encourage maximum independence but intervene and supervise when necessary  - Involve family in performance of ADLs  - Assess for home care needs following discharge   - Consider OT consult to assist with ADL evaluation and planning for discharge  - Provide patient education as appropriate  Outcome: Progressing  Goal: Maintains/Returns to pre admission functional level  Description: INTERVENTIONS:  - Perform AM-PAC 6 Click Basic Mobility/ Daily Activity assessment daily.  - Set and communicate daily mobility goal to care team and patient/family/caregiver.   - Collaborate with rehabilitation services on mobility goals if consulted  - Perform Range of Motion 3 times a day.  - Reposition patient every 2 hours.  - Dangle patient 3 times a day  - Stand patient 3 times a day  - Ambulate patient 3 times a day  - Out of bed to chair 3 times a day   - Out of bed for meals 3 times a day  - Out of bed for toileting  - Record patient progress and toleration of activity level   Outcome: Progressing

## 2024-08-21 LAB
ANION GAP SERPL CALCULATED.3IONS-SCNC: 2 MMOL/L (ref 4–13)
BUN SERPL-MCNC: 27 MG/DL (ref 5–25)
CALCIUM SERPL-MCNC: 9.4 MG/DL (ref 8.4–10.2)
CHLORIDE SERPL-SCNC: 96 MMOL/L (ref 96–108)
CO2 SERPL-SCNC: 43 MMOL/L (ref 21–32)
CREAT SERPL-MCNC: 1.18 MG/DL (ref 0.6–1.3)
ERYTHROCYTE [DISTWIDTH] IN BLOOD BY AUTOMATED COUNT: 12.3 % (ref 11.6–15.1)
GFR SERPL CREATININE-BSD FRML MDRD: 41 ML/MIN/1.73SQ M
GLUCOSE SERPL-MCNC: 98 MG/DL (ref 65–140)
HCT VFR BLD AUTO: 47.3 % (ref 34.8–46.1)
HGB BLD-MCNC: 14.2 G/DL (ref 11.5–15.4)
MCH RBC QN AUTO: 27.8 PG (ref 26.8–34.3)
MCHC RBC AUTO-ENTMCNC: 30 G/DL (ref 31.4–37.4)
MCV RBC AUTO: 93 FL (ref 82–98)
PLATELET # BLD AUTO: 231 THOUSANDS/UL (ref 149–390)
PMV BLD AUTO: 10.1 FL (ref 8.9–12.7)
POTASSIUM SERPL-SCNC: 4 MMOL/L (ref 3.5–5.3)
RBC # BLD AUTO: 5.11 MILLION/UL (ref 3.81–5.12)
SODIUM SERPL-SCNC: 141 MMOL/L (ref 135–147)
WBC # BLD AUTO: 8.89 THOUSAND/UL (ref 4.31–10.16)

## 2024-08-21 PROCEDURE — 99232 SBSQ HOSP IP/OBS MODERATE 35: CPT | Performed by: STUDENT IN AN ORGANIZED HEALTH CARE EDUCATION/TRAINING PROGRAM

## 2024-08-21 PROCEDURE — 85027 COMPLETE CBC AUTOMATED: CPT | Performed by: STUDENT IN AN ORGANIZED HEALTH CARE EDUCATION/TRAINING PROGRAM

## 2024-08-21 PROCEDURE — 97116 GAIT TRAINING THERAPY: CPT

## 2024-08-21 PROCEDURE — 97530 THERAPEUTIC ACTIVITIES: CPT

## 2024-08-21 PROCEDURE — 80048 BASIC METABOLIC PNL TOTAL CA: CPT | Performed by: STUDENT IN AN ORGANIZED HEALTH CARE EDUCATION/TRAINING PROGRAM

## 2024-08-21 PROCEDURE — 97535 SELF CARE MNGMENT TRAINING: CPT

## 2024-08-21 RX ADMIN — ENOXAPARIN SODIUM 40 MG: 40 INJECTION SUBCUTANEOUS at 09:01

## 2024-08-21 RX ADMIN — BIMATOPROST 1 DROP: 0.1 SOLUTION/ DROPS OPHTHALMIC at 21:13

## 2024-08-21 RX ADMIN — MIRTAZAPINE 15 MG: 15 TABLET, FILM COATED ORAL at 21:13

## 2024-08-21 RX ADMIN — METOPROLOL TARTRATE 25 MG: 25 TABLET, FILM COATED ORAL at 21:13

## 2024-08-21 RX ADMIN — ASPIRIN 81 MG: 81 TABLET, COATED ORAL at 09:02

## 2024-08-21 RX ADMIN — CYANOCOBALAMIN TAB 500 MCG 1000 MCG: 500 TAB at 09:02

## 2024-08-21 RX ADMIN — LEVOTHYROXINE SODIUM 75 MCG: 75 TABLET ORAL at 05:13

## 2024-08-21 RX ADMIN — PRAVASTATIN SODIUM 80 MG: 80 TABLET ORAL at 15:39

## 2024-08-21 RX ADMIN — FAMOTIDINE 20 MG: 20 TABLET, FILM COATED ORAL at 09:02

## 2024-08-21 NOTE — OCCUPATIONAL THERAPY NOTE
Occupational Therapy Progress Note     Patient Name: Ashley Gonzales  Today's Date: 8/21/2024  Problem List  Principal Problem:    Metabolic encephalopathy  Active Problems:    COPD (chronic obstructive pulmonary disease) (HCC)    Benign familial tremor    Stage 3 chronic kidney disease (HCC)    Fall    Lung nodule    Chronic respiratory failure with hypoxia and hypercapnia (HCC)    Low serum vitamin B12          08/21/24 1420   OT Last Visit   OT Visit Date 08/21/24   Note Type   Note Type Treatment for insurance authorization   Pain Assessment   Pain Assessment Tool 0-10   Pain Score 4   Pain Location/Orientation Orientation: Right;Location: Knee   Restrictions/Precautions   Weight Bearing Precautions Per Order No   Other Precautions Fall Risk;O2;Chair Alarm;Cognitive  (2 L o2)   ADL   Where Assessed Chair   UB Bathing Assistance 5  Supervision/Setup   LB Bathing Assistance 4  Minimal Assistance   UB Dressing Assistance 5  Supervision/Setup   LB Dressing Assistance 4  Minimal Assistance   Toileting Assistance  5  Supervision/Setup   Functional Standing Tolerance   Time 2-3 min   Activity mobility. static standing.   Comments RW for support   Bed Mobility   Supine to Sit 5  Supervision   Additional items Increased time required;Verbal cues   Sit to Supine 4  Minimal assistance   Additional items Assist x 1;Increased time required;LE management   Transfers   Sit to Stand 5  Supervision   Additional items Armrests;Increased time required   Stand to Sit 5  Supervision   Additional items Increased time required;Armrests   Toilet transfer 5  Supervision   Functional Mobility   Functional Mobility   (CGA)   Additional Comments RW, short functional distances   Additional items Rolling walker   Therapeutic Exercise - ROM   UE-ROM Yes   ROM- Right Upper Extremities   R Shoulder AROM;Flexion;Extension;Horizontal ABduction;ABduction   R Elbow AROM;Elbow flexion;Elbow extension   R Position Seated   R Weight/Reps/Sets 3 x10    ROM - Left Upper Extremities    L Shoulder AROM;Flexion;ABduction;Extension;Horizontal ABduction   L Elbow AROM;Elbow flexion;Elbow extension   L Position Seated   L Weight/Reps/Sets 3 x10   Cognition   Overall Cognitive Status Impaired   Arousal/Participation Cooperative   Attention Attends with cues to redirect   Orientation Level Oriented to person;Oriented to place;Oriented to time   Memory Decreased short term memory;Decreased recall of recent events;Decreased recall of precautions   Following Commands Follows one step commands without difficulty   Activity Tolerance   Activity Tolerance Patient tolerated treatment well;Patient limited by pain   Medical Staff Made Aware RN, CM   Assessment   Assessment Pt seen for skilled OT tx this date. Tx focused on improving strength, activity tolerance and balance, safety awareness to increase independence with self care tasks. Pt tolerated session well. Pt was limited by weakness. Greeted up in chair and agreeable to skilled OT. Family at bedside.  Pt performed UB dressing/ bathing with supervision, LB dressing / bathing modA , Toileting supervision,  bed mobility Neto, transfers supervision, mobility with RW CGA. Pt demonstrated poor+ standing balance during functional tasks, no LOB noted. Pt demonstrated ability to safely and appropriately attend to all tasks during session. Pt required moderate verbal cuing during session to safely complete tasks. Pt completed 3x10 of various BUE exercises to increase strength. Performed while seated. Tolerated well. Current OT DC recommendations for pt is level 2 resources.   Plan   Treatment Interventions ADL retraining;Functional transfer training;UE strengthening/ROM;Endurance training;Cognitive reorientation;Patient/family training;Equipment evaluation/education;Compensatory technique education;Continued evaluation   Goal Expiration Date 09/02/24   OT Treatment Day 2   OT Frequency 3-5x/wk   Discharge Recommendation   Rehab  Resource Intensity Level, OT II (Moderate Resource Intensity)   Additional Comments  The patient's raw score on the AM-PAC Daily Activity Inpatient Short Form is 20. A raw score of greater than or equal to 19 suggests the patient may benefit from discharge to home. Please refer to the recommendation of the Occupational Therapist for safe discharge planning.   AM-PAC Daily Activity Inpatient   Lower Body Dressing 3   Bathing 3   Toileting 3   Upper Body Dressing 3   Grooming 4   Eating 4   Daily Activity Raw Score 20   Daily Activity Standardized Score (Calc for Raw Score >=11) 42.03   AM-[facility] Applied Cognition Inpatient   Following a Speech/Presentation 4   Understanding Ordinary Conversation 4   Taking Medications 2   Remembering Where Things Are Placed or Put Away 3   Remembering List of 4-5 Errands 2   Taking Care of Complicated Tasks 2   Applied Cognition Raw Score 17   Applied Cognition Standardized Score 36.52   Maribeth Meraz, OT

## 2024-08-21 NOTE — CASE MANAGEMENT
Per CM, SNF auth voided. H&CC Portal stated voided but also offers P2P. Message sent in portal to cancel auth at this time. Per CM, patient will be re-seen by therapy and will resubmit for auth once patient is medically stable.

## 2024-08-21 NOTE — ASSESSMENT & PLAN NOTE
Patient had mechanical fall last week in the bathroom with her walker and she hit her head  CT head without acute findings  PT OT recommending rehab  Case mgmt following, auth pending

## 2024-08-21 NOTE — PLAN OF CARE
Problem: Potential for Falls  Goal: Patient will remain free of falls  Description: INTERVENTIONS:  - Educate patient/family on patient safety including physical limitations  - Instruct patient to call for assistance with activity   - Consult OT/PT to assist with strengthening/mobility   - Keep Call bell within reach  - Keep bed low and locked with side rails adjusted as appropriate  - Keep care items and personal belongings within reach  - Initiate and maintain comfort rounds  - Make Fall Risk Sign visible to staff  - Offer Toileting every 2 Hours, in advance of need  - Initiate/Maintain bed alarm  - Obtain necessary fall risk management equipment: Walker  - Apply yellow socks and bracelet for high fall risk patients  - Consider moving patient to room near nurses station  Outcome: Progressing     Problem: Prexisting or High Potential for Compromised Skin Integrity  Goal: Skin integrity is maintained or improved  Description: INTERVENTIONS:  - Identify patients at risk for skin breakdown  - Assess and monitor skin integrity  - Assess and monitor nutrition and hydration status  - Monitor labs   - Assess for incontinence   - Turn and reposition patient  - Assist with mobility/ambulation  - Relieve pressure over bony prominences  - Avoid friction and shearing  - Provide appropriate hygiene as needed including keeping skin clean and dry  - Evaluate need for skin moisturizer/barrier cream  - Collaborate with interdisciplinary team   - Patient/family teaching  - Consider wound care consult   Outcome: Progressing     Problem: PAIN - ADULT  Goal: Verbalizes/displays adequate comfort level or baseline comfort level  Description: Interventions:  - Encourage patient to monitor pain and request assistance  - Assess pain using appropriate pain scale  - Administer analgesics based on type and severity of pain and evaluate response  - Implement non-pharmacological measures as appropriate and evaluate response  - Consider  cultural and social influences on pain and pain management  - Notify physician/advanced practitioner if interventions unsuccessful or patient reports new pain  Outcome: Progressing     Problem: SAFETY ADULT  Goal: Patient will remain free of falls  Description: INTERVENTIONS:  - Educate patient/family on patient safety including physical limitations  - Instruct patient to call for assistance with activity   - Consult OT/PT to assist with strengthening/mobility   - Keep Call bell within reach  - Keep bed low and locked with side rails adjusted as appropriate  - Keep care items and personal belongings within reach  - Initiate and maintain comfort rounds  - Make Fall Risk Sign visible to staff  - Apply yellow socks and bracelet for high fall risk patients  - Consider moving patient to room near nurses station  Outcome: Progressing  Goal: Maintain or return to baseline ADL function  Description: INTERVENTIONS:  -  Assess patient's ability to carry out ADLs; assess patient's baseline for ADL function and identify physical deficits which impact ability to perform ADLs (bathing, care of mouth/teeth, toileting, grooming, dressing, etc.)  - Assess/evaluate cause of self-care deficits   - Assess range of motion  - Assess patient's mobility; develop plan if impaired  - Assess patient's need for assistive devices and provide as appropriate  - Encourage maximum independence but intervene and supervise when necessary  - Involve family in performance of ADLs  - Assess for home care needs following discharge   - Consider OT consult to assist with ADL evaluation and planning for discharge  - Provide patient education as appropriate  Outcome: Progressing  Goal: Maintains/Returns to pre admission functional level  Description: INTERVENTIONS:  - Perform AM-PAC 6 Click Basic Mobility/ Daily Activity assessment daily.  - Set and communicate daily mobility goal to care team and patient/family/caregiver.   - Collaborate with rehabilitation  services on mobility goals if consulted  - Out of bed for toileting  - Record patient progress and toleration of activity level   Outcome: Progressing     Problem: Knowledge Deficit  Goal: Patient/family/caregiver demonstrates understanding of disease process, treatment plan, medications, and discharge instructions  Description: Complete learning assessment and assess knowledge base.  Interventions:  - Provide teaching at level of understanding  - Provide teaching via preferred learning methods  Outcome: Progressing

## 2024-08-21 NOTE — ASSESSMENT & PLAN NOTE
Patient presents with abdominal pain, decreased intake and disorientation which has worsened over the past week per daughter. Patient did have a fall last week that was mechanical and she hit her head  Patient CT imaging of head neck chest abdomen and pelvis without any acute findings  Patient is currently at her baseline alert and oriented x 3, intermittently forgetful with likely cognitive impairment  Suspect metabolic encephalopathy may be multifactorial in setting of age, anxiety benzodiazepine use for tremor, hypercapnia and recent dehydration and fall with head strike  Appreciate geriatrics evaluation, recommending discontinuing Lexapro and starting Remeron 15 mg at bedtime  Patient was a rapid response on 08/20 with increased lethargy and elevated blood pressure  CT head was negative  Suspect patient's symptoms due to encephalopathy with likely dementia and hypercarbia with history of COPD with chronic respiratory failure  No events overnight, mental status improved today.  Medical stable for discharge to STR

## 2024-08-21 NOTE — PLAN OF CARE
Problem: OCCUPATIONAL THERAPY ADULT  Goal: Performs self-care activities at highest level of function for planned discharge setting.  See evaluation for individualized goals.  Description: Treatment Interventions: ADL retraining, Functional transfer training, UE strengthening/ROM, Endurance training, Cognitive reorientation, Patient/family training, Equipment evaluation/education, Compensatory technique education, Continued evaluation          See flowsheet documentation for full assessment, interventions and recommendations.   Outcome: Progressing  Note: Limitation: Decreased ADL status, Decreased UE strength, Decreased Safe judgement during ADL, Decreased cognition, Decreased endurance, Decreased high-level ADLs  Prognosis: Fair  Assessment: Pt seen for skilled OT tx this date. Tx focused on improving strength, activity tolerance and balance, safety awareness to increase independence with self care tasks. Pt tolerated session well. Pt was limited by weakness. Greeted up in chair and agreeable to skilled OT. Family at bedside.  Pt performed UB dressing/ bathing with supervision, LB dressing / bathing modA , Toileting supervision,  bed mobility Neto, transfers supervision, mobility with RW CGA. Pt demonstrated poor+ standing balance during functional tasks, no LOB noted. Pt demonstrated ability to safely and appropriately attend to all tasks during session. Pt required moderate verbal cuing during session to safely complete tasks. Pt completed 3x10 of various BUE exercises to increase strength. Performed while seated. Tolerated well. Current OT DC recommendations for pt is level 2 resources.     Rehab Resource Intensity Level, OT: II (Moderate Resource Intensity)

## 2024-08-21 NOTE — PLAN OF CARE
Problem: PHYSICAL THERAPY ADULT  Goal: Performs mobility at highest level of function for planned discharge setting.  See evaluation for individualized goals.  Description: Treatment/Interventions: Functional transfer training, LE strengthening/ROM, Elevations, Therapeutic exercise, Endurance training, Equipment eval/education, Bed mobility, Gait training, Compensatory technique education, Spoke to nursing, OT (PA)  Equipment Recommended: Walker (Owns)       See flowsheet documentation for full assessment, interventions and recommendations.  Outcome: Progressing  Note: Prognosis: Good  Problem List: Decreased strength, Decreased endurance, Impaired balance, Decreased mobility, Impaired judgement, Obesity, Impaired vision  Assessment: Pt. noted with decerased tolerance to actiivities this session. Min A provided for trasnfers and ambulation and patient noted to fatigue easily with exertion and needed to rest during ambulation. Cues for steading LEs during amb. given as LE noted to be mildly unstable. Chair follow and total A in managing O2 tank given during ambulation. Pt. noted with ANDRADE. Cues for hand palcement for STS transfers given. Pt. seated on recliner post session with alarm engaged and daughter present in the room. Will continue to follow per PT POC. pt. is fucntioning below her baseline and needs A for all safe mobility due to gross weakness and decreased endurance.  Barriers to Discharge: None     Rehab Resource Intensity Level, PT: II (Moderate Resource Intensity) (STR)    See flowsheet documentation for full assessment.

## 2024-08-21 NOTE — ASSESSMENT & PLAN NOTE
Lab Results   Component Value Date    EGFR 41 08/21/2024    EGFR 43 08/20/2024    EGFR 51 08/17/2024    CREATININE 1.18 08/21/2024    CREATININE 1.12 08/20/2024    CREATININE 0.99 08/17/2024   Stable at baseline, monitor

## 2024-08-21 NOTE — PHYSICAL THERAPY NOTE
Physical Therapy Treatment Note     08/21/24 0957   PT Last Visit   PT Visit Date 08/21/24   Note Type   Note Type Treatment for insurance authorization   Pain Assessment   Pain Assessment Tool 0-10   Pain Score No Pain   Restrictions/Precautions   Weight Bearing Precautions Per Order No   Other Precautions Fall Risk;O2;Chair Alarm;Cognitive  (Tremors)   General   Chart Reviewed Yes   Family/Caregiver Present Yes   Subjective   Subjective Pt. agreeable to PT   Transfers   Sit to Stand 5  Supervision   Additional items Assist x 1;Armrests;Increased time required;Verbal cues   Stand to Sit 5  Supervision   Additional items Assist x 1;Armrests;Increased time required;Verbal cues   Stand pivot 4  Minimal assistance   Additional items Assist x 1;Increased time required;Verbal cues   Ambulation/Elevation   Gait pattern Improper Weight shift;Decreased foot clearance;Narrow MEHDI;Inconsistent fuentes;Short stride;Excessively slow;Decreased heel strike;Decreased toe off  (Mild instability)   Gait Assistance 4  Minimal assist   Additional items Assist x 1;Verbal cues;Assist x 2;Other (Comment)  (2nd A for chair follow\)   Assistive Device Rolling walker   Distance 50ft, 60ft, 40ft   Balance   Static Sitting Good   Dynamic Sitting Fair +   Static Standing Fair   Dynamic Standing Fair -   Ambulatory Poor +   Activity Tolerance   Activity Tolerance Patient limited by fatigue;Patient tolerated treatment well   Nurse Made Aware yes   Assessment   Prognosis Good   Problem List Decreased strength;Decreased endurance;Impaired balance;Decreased mobility;Impaired judgement;Obesity;Impaired vision   Assessment Pt. noted with decerased tolerance to actiivities this session. Min A provided for trasnfers and ambulation and patient noted to fatigue easily with exertion and needed to rest during ambulation. Cues for steading LEs during amb. given as LE noted to be mildly unstable. Chair follow and total A in managing O2 tank given during  ambulation. Pt. noted with ANDRADE. Cues for hand palcement for STS transfers given. Pt. seated on recliner post session with alarm engaged and daughter present in the room. Will continue to follow per PT POC. pt. is fucntioning below her baseline and needs A for all safe mobility due to gross weakness and decreased endurance.   Barriers to Discharge None   Goals   Patient Goals None reported   STG Expiration Date 08/30/24   PT Treatment Day 2   Plan   Treatment/Interventions Functional transfer training;Family;Spoke to nursing;Gait training;Equipment eval/education;Patient/family training   Progress Slow progress, decreased activity tolerance   PT Frequency 3-5x/wk   Discharge Recommendation   Rehab Resource Intensity Level, PT II (Moderate Resource Intensity)  (STR)   Equipment Recommended Walker   AM-PAC Basic Mobility Inpatient   Turning in Flat Bed Without Bedrails 3   Lying on Back to Sitting on Edge of Flat Bed Without Bedrails 3   Moving Bed to Chair 4   Standing Up From Chair Using Arms 4   Walk in Room 3   Climb 3-5 Stairs With Railing 2   Basic Mobility Inpatient Raw Score 19   Basic Mobility Standardized Score 42.48   [facility] Highest Level Of Mobility   -HLM Goal 6: Walk 10 steps or more   -HLM Achieved 7: Walk 25 feet or more           Eula Hitchcock PTA    An AM-PAC basic mobility standardized score less than 42.9 suggest the patient may benefit from discharge to post-acute rehab services.

## 2024-08-21 NOTE — PROGRESS NOTES
[facility]  Progress Note  Name: Ashley Gonzales I  MRN: [id number]  Unit/Bed#: E2 -01 I Date of Admission: 8/15/2024   Date of Service: 8/21/2024 I Hospital Day: 6    Assessment & Plan   Chronic respiratory failure with hypoxia and hypercapnia (HCC)  Assessment & Plan  Patient is chronically hypercapnic and remains on 2 L nasal cannula  Patient also has JAMEEL and has not tolerated BiPAP or CPAP    Lung nodule  Assessment & Plan  CT chest displayed 8 mm spiculated nodule in the right upper lobe  Patient does have smoking history  Recommending short-term interval follow-up noncontrast CT in about 3 months    Fall  Assessment & Plan  Patient had mechanical fall last week in the bathroom with her walker and she hit her head  CT head without acute findings  PT OT recommending rehab  Case mgmt following, auth pending    Stage 3 chronic kidney disease (HCC)  Assessment & Plan  Lab Results   Component Value Date    EGFR 41 08/21/2024    EGFR 43 08/20/2024    EGFR 51 08/17/2024    CREATININE 1.18 08/21/2024    CREATININE 1.12 08/20/2024    CREATININE 0.99 08/17/2024   Stable at baseline, monitor    Benign familial tremor  Assessment & Plan  Per outpatient notes patient and family she has been seen by neurology and various trials of medication which were either not help or she did not tolerate.  She was given alprazolam for symptom control however this was making her somnolent and this was stopped.  PDMP confirmed  Follow-up with neurology for further medication discussions    COPD (chronic obstructive pulmonary disease) (HCC)  Assessment & Plan  Patient saw Dr. Zamudio in 2018  Continue Xopenex as needed  No evidence of exacerbation    * Metabolic encephalopathy  Assessment & Plan  Patient presents with abdominal pain, decreased intake and disorientation which has worsened over the past week per daughter. Patient did have a fall last week that was mechanical and she hit her head  Patient CT  imaging of head neck chest abdomen and pelvis without any acute findings  Patient is currently at her baseline alert and oriented x 3, intermittently forgetful with likely cognitive impairment  Suspect metabolic encephalopathy may be multifactorial in setting of age, anxiety benzodiazepine use for tremor, hypercapnia and recent dehydration and fall with head strike  Appreciate geriatrics evaluation, recommending discontinuing Lexapro and starting Remeron 15 mg at bedtime  Patient was a rapid response on 08/20 with increased lethargy and elevated blood pressure  CT head was negative  Suspect patient's symptoms due to encephalopathy with likely dementia and hypercarbia with history of COPD with chronic respiratory failure  No events overnight, mental status improved today.  Medical stable for discharge to [facility]    Bacteriuria-resolved as of 8/18/2024  Assessment & Plan  Due to confusion and abdominal pain with poor intake patient was started on IV Rocephin  She has received 2 doses, urine cultures with mixed contaminants.  Discontinue further antibiotics  Blood cultures negative to date    MGUS (monoclonal gammopathy of unknown significance)-resolved as of 8/18/2024  Assessment & Plan  She is undergoing workup with hematology oncology    Glaucoma-resolved as of 8/18/2024  Assessment & Plan  She follows outpatient ophthalmology, continue eyedrops and artificial tears prn    Generalized anxiety disorder- panic-resolved as of 8/18/2024  Assessment & Plan  Patient was started on low-dose Lexapro 5 mg during last hospitalization, continue at this time    Chronic diastolic congestive heart failure (HCC)-resolved as of 8/18/2024  Assessment & Plan  Wt Readings from Last 3 Encounters:   08/15/24 86 kg (189 lb 9.5 oz)   08/07/24 84.5 kg (186 lb 4.6 oz)   07/10/24 83.6 kg (184 lb 6.4 oz)     Last echocardiogram in 2021 with LVEF 60% and grade 2 diastolic dysfunction  Appears clinically euvolemic  Continue home Lasix 20 mg  daily    Acquired hypothyroidism-resolved as of 2024  Assessment & Plan  Continue levothyroxine           VTE Pharmacologic Prophylaxis:   Pharmacologic: Enoxaparin (Lovenox)  Mechanical VTE Prophylaxis in Place: Yes    Current Length of Stay: 6 day(s)    Current Patient Status: Inpatient   Certification Statement: The patient will continue to require additional inpatient hospital stay due to pending placement    Discharge Plan: Pending    Code Status: Level 1 - Full Code      Subjective:   No events overnight.  Reports doing well.  Alert and oriented answering questions appropriately.  Daughter at bedside.    Objective:     Vitals:   Temp (24hrs), Av.6 °F (37 °C), Min:98.2 °F (36.8 °C), Max:98.8 °F (37.1 °C)    Temp:  [98.2 °F (36.8 °C)-98.8 °F (37.1 °C)] 98.7 °F (37.1 °C)  HR:  [72-78] 78  Resp:  [18] 18  BP: (106-138)/(64-79) 106/64  SpO2:  [93 %-96 %] 95 %  Body mass index is 34.68 kg/m².     Input and Output Summary (last 24 hours):       Intake/Output Summary (Last 24 hours) at 2024 1446  Last data filed at 2024 1242  Gross per 24 hour   Intake 240 ml   Output --   Net 240 ml       Physical Exam:     Physical Exam  Vitals and nursing note reviewed.   Constitutional:       General: She is not in acute distress.     Appearance: She is obese. She is not ill-appearing.   HENT:      Head: Normocephalic.   Eyes:      Conjunctiva/sclera: Conjunctivae normal.   Cardiovascular:      Rate and Rhythm: Normal rate.   Pulmonary:      Effort: Pulmonary effort is normal. No respiratory distress.   Abdominal:      General: There is no distension.      Palpations: Abdomen is soft.   Musculoskeletal:         General: No swelling.      Right lower leg: No edema.      Left lower leg: No edema.   Skin:     General: Skin is warm and dry.   Neurological:      Mental Status: Mental status is at baseline. She is disoriented.       Additional Data:     Labs:    Results from last 7 days   Lab Units 24  0519  08/20/24  0830   WBC Thousand/uL 8.89 9.16   HEMOGLOBIN g/dL 14.2 14.5   HEMATOCRIT % 47.3* 47.1*   PLATELETS Thousands/uL 231 241   SEGS PCT %  --  62   LYMPHO PCT %  --  26   MONO PCT %  --  9   EOS PCT %  --  2     Results from last 7 days   Lab Units 08/21/24  0519 08/16/24  0508 08/15/24  1009   SODIUM mmol/L 141   < > 140   POTASSIUM mmol/L 4.0   < > 3.8   CHLORIDE mmol/L 96   < > 95*   CO2 mmol/L 43*   < > 44*   BUN mg/dL 27*   < > 26*   CREATININE mg/dL 1.18   < > 1.11   ANION GAP mmol/L 2*   < > 1*   CALCIUM mg/dL 9.4   < > 9.7   ALBUMIN g/dL  --   --  3.7   TOTAL BILIRUBIN mg/dL  --   --  0.66   ALK PHOS U/L  --   --  82   ALT U/L  --   --  5*   AST U/L  --   --  12*   GLUCOSE RANDOM mg/dL 98   < > 133    < > = values in this interval not displayed.     Results from last 7 days   Lab Units 08/20/24  0831   INR  0.99     Results from last 7 days   Lab Units 08/20/24  0815 08/15/24  0939   POC GLUCOSE mg/dl 95 130         Results from last 7 days   Lab Units 08/20/24  0830   LACTIC ACID mmol/L 0.6           * I Have Reviewed All Lab Data Listed Above.  * Additional Pertinent Lab Tests Reviewed: All Labs For Current Hospital Admission Reviewed    Mobility:  Basic Mobility Inpatient Raw Score: 19  -[facility] Goal: 6: Walk 10 steps or more  -[facility] Achieved: 7: Walk 25 feet or more    Lines:     Invasive Devices       Peripheral Intravenous Line  Duration             Peripheral IV 08/20/24 Left Antecubital 1 day                       Imaging:    Imaging Reports Reviewed Today Include:     CT head wo contrast    Result Date: 8/20/2024  Impression: No acute intracranial hemorrhage seen No mass effect or midline shift seen Stable CT Workstation performed: [id number]     CT chest abdomen pelvis w contrast    Result Date: 8/15/2024  Impression: CT chest: No evidence of acute thoracic process. 9 mm groundglass nodule in the right upper lobe new since May 2019. Based on current Fleischner Society 2017 Guidelines on  incidental pulmonary nodule, follow-up noncontrast CT is recommended at 6-12 months from the initial examination to confirm persistence; if stable at that time, additional follow-up CT is recommended for every 2 years until 5 years of stability is demonstrated. Considerations related to the patient's age and/or comorbidities may be used to alter these recommendations. Additional chronic findings and negatives as above. CT abdomen and pelvis: No evidence of acute abdominopelvic process. Colonic diverticulosis. Cholelithiasis. Stable suprarenal and infrarenal abdominal aortic aneurysm with maximum diameter of 3.6 cm and 4 cm respectively. According to ACR white paper for the management of incidentally detected abdominal vascular findings, for an aorta of this caliber (4.0-4.4 cm) follow-up imaging (e.g. Doppler ultrasound, CTA abdomen/pelvis) is recommended at a 1 year interval. Reference: J Am Rogelio Radiol 2013;10:789-794. Considerations related to the patient's age and/or comorbidities may be used to alter these recommendations. Additional chronic findings and negatives as above. Workstation performed: [id number]     CTA head and neck with and without contrast    Result Date: 8/15/2024  Impression: CT Brain:  No acute intracranial abnormality. Multifocal encephalomalacia is unchanged CT Angiography: No large vessel occlusion or high-grade stenosis seen. There is unchanged, moderate left intracranial vertebral artery stenosis due to calcified plaque. There is also mild left subclavian artery stenosis. There is no ICA stenosis by NASCET criteria. 8 mm spiculated nodule in the right upper lobe. Based on current Fleischner Society 2017 Guidelines on incidental pulmonary nodule, either PET/CT scan evaluation, tissue sampling or short-term interval follow-up non-contrast CT follow-up (initially in 3 months) may be considered appropriate. The study was marked in EPIC for immediate notification based on the pulmonary nodule  finding. Workstation performed: [id number]        Recent Cultures (last 7 days):     Results from last 7 days   Lab Units 08/15/24  1309 08/15/24  1301 08/15/24  1158   BLOOD CULTURE  No Growth After 5 Days. No Growth After 5 Days.  --    URINE CULTURE   --   --  70,000-79,000 cfu/ml       Last 24 Hours Medication List:   Current Facility-Administered Medications   Medication Dose Route Frequency Provider Last Rate    acetaminophen  650 mg Oral Q6H PRN Nicolasa Caro MD      aluminum-magnesium hydroxide-simethicone  30 mL Oral Q6H PRN Nicolasa Caro MD      amLODIPine  10 mg Oral Daily Nicolasa Caro MD      Artificial Tears  1 drop Both Eyes Q6H PRN Wilberto Hernandez PA-C      aspirin  81 mg Oral Daily Nicolasa Caro MD      bimatoprost  1 drop Both Eyes HS Nicolasa Caro MD      cyanocobalamin  1,000 mcg Oral Daily Wilberto Hernandez PA-C      enoxaparin  40 mg Subcutaneous Daily Nicolasa Caro MD      famotidine  20 mg Oral Daily Nicolasa Caro MD      furosemide  20 mg Oral Daily Nicolasa Caro MD      levalbuterol  1.25 mg Nebulization Q8H PRN Wilberto Hernandez PA-C      levothyroxine  75 mcg Oral Early Morning Nicolasa Caro MD      losartan  25 mg Oral Daily Jarvis Ervin MD      melatonin  3 mg Oral HS PRN Antoine Liu PA-C      metoprolol tartrate  25 mg Oral Q12H [facility] Nicolasa Caro MD      mirtazapine  15 mg Oral HS Jarvis Ervin MD      ondansetron  4 mg Intravenous Q6H PRN Nicolasa Caro MD      polyethylene glycol  17 g Oral Daily PRN Nicolasa Caro MD      pravastatin  80 mg Oral Daily With Dinner Nicolasa Caro MD          Today, Patient Was Seen By: Jarvis Ervin MD    ** Please Note: Dictation voice to text software may have been used in the creation of this document. **

## 2024-08-21 NOTE — PLAN OF CARE
Problem: Potential for Falls  Goal: Patient will remain free of falls  Description: INTERVENTIONS:  - Educate patient/family on patient safety including physical limitations  - Instruct patient to call for assistance with activity   - Consult OT/PT to assist with strengthening/mobility   - Keep Call bell within reach  - Keep bed low and locked with side rails adjusted as appropriate  - Keep care items and personal belongings within reach  - Initiate and maintain comfort rounds  - Make Fall Risk Sign visible to staff  - Offer Toileting every 2 Hours, in advance of need  - Initiate/Maintain bed alarm  - Obtain necessary fall risk management equipment: gripper socks and call bell  - Apply yellow socks and bracelet for high fall risk patients  - Consider moving patient to room near nurses station  Outcome: Progressing     Problem: Prexisting or High Potential for Compromised Skin Integrity  Goal: Skin integrity is maintained or improved  Description: INTERVENTIONS:  - Identify patients at risk for skin breakdown  - Assess and monitor skin integrity  - Assess and monitor nutrition and hydration status  - Monitor labs   - Assess for incontinence   - Turn and reposition patient  - Assist with mobility/ambulation  - Relieve pressure over bony prominences  - Avoid friction and shearing  - Provide appropriate hygiene as needed including keeping skin clean and dry  - Evaluate need for skin moisturizer/barrier cream  - Collaborate with interdisciplinary team   - Patient/family teaching  - Consider wound care consult   Outcome: Progressing     Problem: PAIN - ADULT  Goal: Verbalizes/displays adequate comfort level or baseline comfort level  Description: Interventions:  - Encourage patient to monitor pain and request assistance  - Assess pain using appropriate pain scale  - Administer analgesics based on type and severity of pain and evaluate response  - Implement non-pharmacological measures as appropriate and evaluate  response  - Consider cultural and social influences on pain and pain management  - Notify physician/advanced practitioner if interventions unsuccessful or patient reports new pain  Outcome: Progressing     Problem: INFECTION - ADULT  Goal: Absence or prevention of progression during hospitalization  Description: INTERVENTIONS:  - Assess and monitor for signs and symptoms of infection  - Monitor lab/diagnostic results  - Monitor all insertion sites, i.e. indwelling lines, tubes, and drains  - Monitor endotracheal if appropriate and nasal secretions for changes in amount and color  - [place] appropriate cooling/warming therapies per order  - Administer medications as ordered  - Instruct and encourage patient and family to use good hand hygiene technique  - Identify and instruct in appropriate isolation precautions for identified infection/condition  Outcome: Progressing     Problem: SAFETY ADULT  Goal: Patient will remain free of falls  Description: INTERVENTIONS:  - Educate patient/family on patient safety including physical limitations  - Instruct patient to call for assistance with activity   - Consult OT/PT to assist with strengthening/mobility   - Keep Call bell within reach  - Keep bed low and locked with side rails adjusted as appropriate  - Keep care items and personal belongings within reach  - Initiate and maintain comfort rounds  - Make Fall Risk Sign visible to staff  - Offer Toileting every 2 Hours, in advance of need  - Initiate/Maintain bed alarm  - Obtain necessary fall risk management equipment: gripper socks and call bell  - Apply yellow socks and bracelet for high fall risk patients  - Consider moving patient to room near nurses station  Outcome: Progressing  Goal: Maintain or return to baseline ADL function  Description: INTERVENTIONS:  -  Assess patient's ability to carry out ADLs; assess patient's baseline for ADL function and identify physical deficits which impact ability to perform ADLs  (bathing, care of mouth/teeth, toileting, grooming, dressing, etc.)  - Assess/evaluate cause of self-care deficits   - Assess range of motion  - Assess patient's mobility; develop plan if impaired  - Assess patient's need for assistive devices and provide as appropriate  - Encourage maximum independence but intervene and supervise when necessary  - Involve family in performance of ADLs  - Assess for home care needs following discharge   - Consider OT consult to assist with ADL evaluation and planning for discharge  - Provide patient education as appropriate  Outcome: Progressing  Goal: Maintains/Returns to pre admission functional level  Description: INTERVENTIONS:  - Perform AM-PAC 6 Click Basic Mobility/ Daily Activity assessment daily.  - Set and communicate daily mobility goal to care team and patient/family/caregiver.   - Collaborate with rehabilitation services on mobility goals if consulted  - Perform Range of Motion 3 times a day.  - Reposition patient every 2 hours.  - Dangle patient 3 times a day  - Stand patient 3 times a day  - Ambulate patient 3 times a day  - Out of bed to chair 3 times a day   - Out of bed for meals 3 times a day  - Out of bed for toileting  - Record patient progress and toleration of activity level   Outcome: Progressing     Problem: DISCHARGE PLANNING  Goal: Discharge to home or other facility with appropriate resources  Description: INTERVENTIONS:  - Identify barriers to discharge w/patient and caregiver  - Arrange for needed discharge resources and transportation as appropriate  - Identify discharge learning needs (meds, wound care, etc.)  - Arrange for interpretive services to assist at discharge as needed  - Refer to Case Management Department for coordinating discharge planning if the patient needs post-hospital services based on physician/advanced practitioner order or complex needs related to functional status, cognitive ability, or social support system  Outcome:  Progressing     Problem: Knowledge Deficit  Goal: Patient/family/caregiver demonstrates understanding of disease process, treatment plan, medications, and discharge instructions  Description: Complete learning assessment and assess knowledge base.  Interventions:  - Provide teaching at level of understanding  - Provide teaching via preferred learning methods  Outcome: Progressing

## 2024-08-22 PROCEDURE — 99232 SBSQ HOSP IP/OBS MODERATE 35: CPT | Performed by: STUDENT IN AN ORGANIZED HEALTH CARE EDUCATION/TRAINING PROGRAM

## 2024-08-22 RX ADMIN — FAMOTIDINE 20 MG: 20 TABLET, FILM COATED ORAL at 08:18

## 2024-08-22 RX ADMIN — ENOXAPARIN SODIUM 40 MG: 40 INJECTION SUBCUTANEOUS at 08:18

## 2024-08-22 RX ADMIN — ASPIRIN 81 MG: 81 TABLET, COATED ORAL at 08:18

## 2024-08-22 RX ADMIN — LOSARTAN POTASSIUM 25 MG: 25 TABLET, FILM COATED ORAL at 08:19

## 2024-08-22 RX ADMIN — AMLODIPINE BESYLATE 10 MG: 10 TABLET ORAL at 08:18

## 2024-08-22 RX ADMIN — METOPROLOL TARTRATE 25 MG: 25 TABLET, FILM COATED ORAL at 21:07

## 2024-08-22 RX ADMIN — LEVOTHYROXINE SODIUM 75 MCG: 75 TABLET ORAL at 05:10

## 2024-08-22 RX ADMIN — CYANOCOBALAMIN TAB 500 MCG 1000 MCG: 500 TAB at 08:19

## 2024-08-22 RX ADMIN — BIMATOPROST 1 DROP: 0.1 SOLUTION/ DROPS OPHTHALMIC at 21:57

## 2024-08-22 RX ADMIN — MIRTAZAPINE 15 MG: 15 TABLET, FILM COATED ORAL at 21:04

## 2024-08-22 RX ADMIN — FUROSEMIDE 20 MG: 20 TABLET ORAL at 08:19

## 2024-08-22 RX ADMIN — PRAVASTATIN SODIUM 80 MG: 80 TABLET ORAL at 17:52

## 2024-08-22 RX ADMIN — METOPROLOL TARTRATE 25 MG: 25 TABLET, FILM COATED ORAL at 08:18

## 2024-08-22 NOTE — PROGRESS NOTES
[facility]  Progress Note  Name: Ashley Gonzales I  MRN: [id number]  Unit/Bed#: E2 -01 I Date of Admission: 8/15/2024   Date of Service: 8/22/2024 I Hospital Day: 7    Assessment & Plan   Chronic respiratory failure with hypoxia and hypercapnia (HCC)  Assessment & Plan  Patient is chronically hypercapnic and remains on 2 L nasal cannula  Patient also has JAMEEL and has not tolerated BiPAP or CPAP    Lung nodule  Assessment & Plan  CT chest displayed 8 mm spiculated nodule in the right upper lobe  Patient does have smoking history  Recommending short-term interval follow-up noncontrast CT in about 3 months    Fall  Assessment & Plan  Patient had mechanical fall last week in the bathroom with her walker and she hit her head  CT head without acute findings  PT OT recommending rehab  Case mgmt following, auth pending    Stage 3 chronic kidney disease (HCC)  Assessment & Plan  Lab Results   Component Value Date    EGFR 41 08/21/2024    EGFR 43 08/20/2024    EGFR 51 08/17/2024    CREATININE 1.18 08/21/2024    CREATININE 1.12 08/20/2024    CREATININE 0.99 08/17/2024   Stable at baseline, monitor    Benign familial tremor  Assessment & Plan  Per outpatient notes patient and family she has been seen by neurology and various trials of medication which were either not help or she did not tolerate.  She was given alprazolam for symptom control however this was making her somnolent and this was stopped.  PDMP confirmed  Follow-up with neurology for further medication discussions    COPD (chronic obstructive pulmonary disease) (HCC)  Assessment & Plan  Patient saw Dr. Zamudio in 2018  Continue Xopenex as needed  No evidence of exacerbation    * Metabolic encephalopathy  Assessment & Plan  Patient presents with abdominal pain, decreased intake and disorientation which has worsened over the past week per daughter. Patient did have a fall last week that was mechanical and she hit her head  Patient CT  imaging of head neck chest abdomen and pelvis without any acute findings  Patient is currently at her baseline alert and oriented x 3, intermittently forgetful with likely cognitive impairment  Suspect metabolic encephalopathy may be multifactorial in setting of age, anxiety benzodiazepine use for tremor, hypercapnia and recent dehydration and fall with head strike  Appreciate geriatrics evaluation, recommending discontinuing Lexapro and starting Remeron 15 mg at bedtime  Patient was a rapid response on 08/20 with increased lethargy and elevated blood pressure  CT head was negative  Suspect patient's symptoms due to encephalopathy with likely dementia and hypercarbia with history of COPD with chronic respiratory failure  No events overnight, mental status improved today.  Medical stable for [facility]    Bacteriuria-resolved as of 8/18/2024  Assessment & Plan  Due to confusion and abdominal pain with poor intake patient was started on IV Rocephin  She has received 2 doses, urine cultures with mixed contaminants.  Discontinue further antibiotics  Blood cultures negative to date    MGUS (monoclonal gammopathy of unknown significance)-resolved as of 8/18/2024  Assessment & Plan  She is undergoing workup with hematology oncology    Generalized anxiety disorder- panic-resolved as of 8/18/2024  Assessment & Plan  Patient was started on low-dose Lexapro 5 mg during last hospitalization, continue at this time    Chronic diastolic congestive heart failure (HCC)-resolved as of 8/18/2024  Assessment & Plan  Wt Readings from Last 3 Encounters:   08/15/24 86 kg (189 lb 9.5 oz)   08/07/24 84.5 kg (186 lb 4.6 oz)   07/10/24 83.6 kg (184 lb 6.4 oz)     Last echocardiogram in 2021 with LVEF 60% and grade 2 diastolic dysfunction  Appears clinically euvolemic  Continue home Lasix 20 mg daily    Acquired hypothyroidism-resolved as of 8/18/2024  Assessment & Plan  Continue levothyroxine             VTE Pharmacologic Prophylaxis:    Pharmacologic: Enoxaparin (Lovenox)  Mechanical VTE Prophylaxis in Place: Yes    Current Length of Stay: 7 day(s)    Current Patient Status: Inpatient   Certification Statement: The patient will continue to require additional inpatient hospital stay due to pending placement    Discharge Plan: pending    Code Status: Level 1 - Full Code      Subjective:   No events overnight. Nursing staff reports poor sleep. Denies any complaints of pain, fevers or chills. Daughter at bedside, updated.    Objective:     Vitals:   Temp (24hrs), Av °F (37.2 °C), Min:98.5 °F (36.9 °C), Max:99.6 °F (37.6 °C)    Temp:  [98.5 °F (36.9 °C)-99.6 °F (37.6 °C)] 98.5 °F (36.9 °C)  HR:  [72-80] 78  Resp:  [18-20] 20  BP: (121-179)/(69-83) 121/83  SpO2:  [91 %-94 %] 91 %  Body mass index is 34.68 kg/m².     Input and Output Summary (last 24 hours):       Intake/Output Summary (Last 24 hours) at 2024 1516  Last data filed at 2024 1002  Gross per 24 hour   Intake --   Output 200 ml   Net -200 ml       Physical Exam:     Physical Exam  Vitals and nursing note reviewed.   Constitutional:       General: She is not in acute distress.     Appearance: She is obese. She is not ill-appearing.   HENT:      Head: Normocephalic.   Eyes:      Conjunctiva/sclera: Conjunctivae normal.   Cardiovascular:      Rate and Rhythm: Normal rate.   Pulmonary:      Effort: Pulmonary effort is normal. No respiratory distress.   Abdominal:      General: There is no distension.      Palpations: Abdomen is soft.   Musculoskeletal:         General: No swelling.      Right lower leg: No edema.      Left lower leg: No edema.   Skin:     General: Skin is warm and dry.   Neurological:      Mental Status: Mental status is at baseline. She is disoriented.         Additional Data:     Labs:    Results from last 7 days   Lab Units 24  0519 24  0830   WBC Thousand/uL 8.89 9.16   HEMOGLOBIN g/dL 14.2 14.5   HEMATOCRIT % 47.3* 47.1*   PLATELETS Thousands/uL  231 241   SEGS PCT %  --  62   LYMPHO PCT %  --  26   MONO PCT %  --  9   EOS PCT %  --  2     Results from last 7 days   Lab Units 08/21/24  0519   SODIUM mmol/L 141   POTASSIUM mmol/L 4.0   CHLORIDE mmol/L 96   CO2 mmol/L 43*   BUN mg/dL 27*   CREATININE mg/dL 1.18   ANION GAP mmol/L 2*   CALCIUM mg/dL 9.4   GLUCOSE RANDOM mg/dL 98     Results from last 7 days   Lab Units 08/20/24  0831   INR  0.99     Results from last 7 days   Lab Units 08/20/24  0815   POC GLUCOSE mg/dl 95         Results from last 7 days   Lab Units 08/20/24  0830   LACTIC ACID mmol/L 0.6           * I Have Reviewed All Lab Data Listed Above.  * Additional Pertinent Lab Tests Reviewed: All Labs For Current Hospital Admission Reviewed    Mobility:  Basic Mobility Inpatient Raw Score: 19  -HL Goal: 6: Walk 10 steps or more  JH-HLM Achieved: 6: Walk 10 steps or more    Lines:     Invasive Devices       Peripheral Intravenous Line  Duration             Peripheral IV 08/20/24 Left Antecubital 2 days                       Imaging:    Imaging Reports Reviewed Today Include:     CT head wo contrast    Result Date: 8/20/2024  Impression: No acute intracranial hemorrhage seen No mass effect or midline shift seen Stable CT Workstation performed: [id number]     CT chest abdomen pelvis w contrast    Result Date: 8/15/2024  Impression: CT chest: No evidence of acute thoracic process. 9 mm groundglass nodule in the right upper lobe new since May 2019. Based on current Fleischner Society 2017 Guidelines on incidental pulmonary nodule, follow-up noncontrast CT is recommended at 6-12 months from the initial examination to confirm persistence; if stable at that time, additional follow-up CT is recommended for every 2 years until 5 years of stability is demonstrated. Considerations related to the patient's age and/or comorbidities may be used to alter these recommendations. Additional chronic findings and negatives as above. CT abdomen and pelvis: No evidence  of acute abdominopelvic process. Colonic diverticulosis. Cholelithiasis. Stable suprarenal and infrarenal abdominal aortic aneurysm with maximum diameter of 3.6 cm and 4 cm respectively. According to ACR white paper for the management of incidentally detected abdominal vascular findings, for an aorta of this caliber (4.0-4.4 cm) follow-up imaging (e.g. Doppler ultrasound, CTA abdomen/pelvis) is recommended at a 1 year interval. Reference: J Am Rogelio Radiol 2013;10:789-794. Considerations related to the patient's age and/or comorbidities may be used to alter these recommendations. Additional chronic findings and negatives as above. Workstation performed: [id number]     CTA head and neck with and without contrast    Result Date: 8/15/2024  Impression: CT Brain:  No acute intracranial abnormality. Multifocal encephalomalacia is unchanged CT Angiography: No large vessel occlusion or high-grade stenosis seen. There is unchanged, moderate left intracranial vertebral artery stenosis due to calcified plaque. There is also mild left subclavian artery stenosis. There is no ICA stenosis by NASCET criteria. 8 mm spiculated nodule in the right upper lobe. Based on current Fleischner Society 2017 Guidelines on incidental pulmonary nodule, either PET/CT scan evaluation, tissue sampling or short-term interval follow-up non-contrast CT follow-up (initially in 3 months) may be considered appropriate. The study was marked in EPIC for immediate notification based on the pulmonary nodule finding. Workstation performed: [id number]        Recent Cultures (last 7 days):           Last 24 Hours Medication List:   Current Facility-Administered Medications   Medication Dose Route Frequency Provider Last Rate    acetaminophen  650 mg Oral Q6H PRN Nicolasa Caro MD      aluminum-magnesium hydroxide-simethicone  30 mL Oral Q6H PRN Nicolasa Caro MD      amLODIPine  10 mg Oral Daily Nicolasa Caro MD      Artificial Tears  1 drop Both Eyes Q6H PRN Wilberto Hernandez  STERLING      aspirin  81 mg Oral Daily Nicolasa Caro MD      bimatoprost  1 drop Both Eyes HS Nicolasa Caro MD      cyanocobalamin  1,000 mcg Oral Daily Wilberto Hernandez PA-C      enoxaparin  40 mg Subcutaneous Daily Nicolasa Caro MD      famotidine  20 mg Oral Daily Nicolasa Caro MD      furosemide  20 mg Oral Daily Nicolasa Caro MD      levalbuterol  1.25 mg Nebulization Q8H PRN Wilberto Hernandez PA-C      levothyroxine  75 mcg Oral Early Morning Nicolasa Caro MD      losartan  25 mg Oral Daily Jarvis Ervin MD      melatonin  3 mg Oral HS PRN Antoine Liu PA-C      metoprolol tartrate  25 mg Oral Q12H MODESTO Nicolasa Caro MD      mirtazapine  15 mg Oral HS Jarvis Ervin MD      ondansetron  4 mg Intravenous Q6H PRN Nicolasa Caro MD      polyethylene glycol  17 g Oral Daily PRN Nicolasa Caro MD      pravastatin  80 mg Oral Daily With Dinner Nicolasa Caro MD          Today, Patient Was Seen By: Jarvis Ervin MD    ** Please Note: Dictation voice to text software may have been used in the creation of this document. **

## 2024-08-22 NOTE — CASE MANAGEMENT
[facility] received request for authorization from Care Manager.  Authorization request submitted for: [facility]  Facility Name: [facility]   NPI:[id number]  Facility MD:Dr. Eugene Ruiz     NPI: [id number]  Authorization initiated by contacting insurance:  [employer]  Via: H&CC Portal   Clinicals submitted via Portal attachment   Pending Reference #:[id number]     Care Manager notified: Jennifer HURT      Updates to authorization status will be noted in chart. Please reach out to CM for updates on any clinical information.

## 2024-08-22 NOTE — CASE MANAGEMENT
Rec'd VM from Rocio @ [facility] ([phone]) stating auth being sent to MD for review. FARZANA notified: Jennifer Valencia

## 2024-08-22 NOTE — PLAN OF CARE
Problem: Potential for Falls  Goal: Patient will remain free of falls  Description: INTERVENTIONS:  - Educate patient/family on patient safety including physical limitations  - Instruct patient to call for assistance with activity   - Consult OT/PT to assist with strengthening/mobility   - Keep Call bell within reach  - Keep bed low and locked with side rails adjusted as appropriate  - Keep care items and personal belongings within reach  - Initiate and maintain comfort rounds  - Make Fall Risk Sign visible to staff  - Offer Toileting every 2 Hours, in advance of need  - Initiate/Maintain bed alarm  - Obtain necessary fall risk management equipment: gripper socks and call bell  - Apply yellow socks and bracelet for high fall risk patients  - Consider moving patient to room near nurses station  Outcome: Progressing     Problem: Prexisting or High Potential for Compromised Skin Integrity  Goal: Skin integrity is maintained or improved  Description: INTERVENTIONS:  - Identify patients at risk for skin breakdown  - Assess and monitor skin integrity  - Assess and monitor nutrition and hydration status  - Monitor labs   - Assess for incontinence   - Turn and reposition patient  - Assist with mobility/ambulation  - Relieve pressure over bony prominences  - Avoid friction and shearing  - Provide appropriate hygiene as needed including keeping skin clean and dry  - Evaluate need for skin moisturizer/barrier cream  - Collaborate with interdisciplinary team   - Patient/family teaching  - Consider wound care consult   Outcome: Progressing     Problem: PAIN - ADULT  Goal: Verbalizes/displays adequate comfort level or baseline comfort level  Description: Interventions:  - Encourage patient to monitor pain and request assistance  - Assess pain using appropriate pain scale  - Administer analgesics based on type and severity of pain and evaluate response  - Implement non-pharmacological measures as appropriate and evaluate  response  - Consider cultural and social influences on pain and pain management  - Notify physician/advanced practitioner if interventions unsuccessful or patient reports new pain  Outcome: Progressing     Problem: INFECTION - ADULT  Goal: Absence or prevention of progression during hospitalization  Description: INTERVENTIONS:  - Assess and monitor for signs and symptoms of infection  - Monitor lab/diagnostic results  - Monitor all insertion sites, i.e. indwelling lines, tubes, and drains  - Monitor endotracheal if appropriate and nasal secretions for changes in amount and color  - [place] appropriate cooling/warming therapies per order  - Administer medications as ordered  - Instruct and encourage patient and family to use good hand hygiene technique  - Identify and instruct in appropriate isolation precautions for identified infection/condition  Outcome: Progressing     Problem: SAFETY ADULT  Goal: Patient will remain free of falls  Description: INTERVENTIONS:  - Educate patient/family on patient safety including physical limitations  - Instruct patient to call for assistance with activity   - Consult OT/PT to assist with strengthening/mobility   - Keep Call bell within reach  - Keep bed low and locked with side rails adjusted as appropriate  - Keep care items and personal belongings within reach  - Initiate and maintain comfort rounds  - Make Fall Risk Sign visible to staff  - Offer Toileting every 2 Hours, in advance of need  - Initiate/Maintain bed alarm  - Obtain necessary fall risk management equipment: gripper socks and call bell  - Apply yellow socks and bracelet for high fall risk patients  - Consider moving patient to room near nurses station  Outcome: Progressing  Goal: Maintain or return to baseline ADL function  Description: INTERVENTIONS:  -  Assess patient's ability to carry out ADLs; assess patient's baseline for ADL function and identify physical deficits which impact ability to perform ADLs  (bathing, care of mouth/teeth, toileting, grooming, dressing, etc.)  - Assess/evaluate cause of self-care deficits   - Assess range of motion  - Assess patient's mobility; develop plan if impaired  - Assess patient's need for assistive devices and provide as appropriate  - Encourage maximum independence but intervene and supervise when necessary  - Involve family in performance of ADLs  - Assess for home care needs following discharge   - Consider OT consult to assist with ADL evaluation and planning for discharge  - Provide patient education as appropriate  Outcome: Progressing  Goal: Maintains/Returns to pre admission functional level  Description: INTERVENTIONS:  - Perform AM-PAC 6 Click Basic Mobility/ Daily Activity assessment daily.  - Set and communicate daily mobility goal to care team and patient/family/caregiver.   - Collaborate with rehabilitation services on mobility goals if consulted  - Perform Range of Motion 3 times a day.  - Reposition patient every 2 hours.  - Dangle patient 3 times a day  - Stand patient 3 times a day  - Ambulate patient 3 times a day  - Out of bed to chair 3 times a day   - Out of bed for meals 3 times a day  - Out of bed for toileting  - Record patient progress and toleration of activity level   Outcome: Progressing     Problem: DISCHARGE PLANNING  Goal: Discharge to home or other facility with appropriate resources  Description: INTERVENTIONS:  - Identify barriers to discharge w/patient and caregiver  - Arrange for needed discharge resources and transportation as appropriate  - Identify discharge learning needs (meds, wound care, etc.)  - Arrange for interpretive services to assist at discharge as needed  - Refer to Case Management Department for coordinating discharge planning if the patient needs post-hospital services based on physician/advanced practitioner order or complex needs related to functional status, cognitive ability, or social support system  Outcome:  Progressing     Problem: Knowledge Deficit  Goal: Patient/family/caregiver demonstrates understanding of disease process, treatment plan, medications, and discharge instructions  Description: Complete learning assessment and assess knowledge base.  Interventions:  - Provide teaching at level of understanding  - Provide teaching via preferred learning methods  Outcome: Progressing

## 2024-08-22 NOTE — PLAN OF CARE
Problem: Potential for Falls  Goal: Patient will remain free of falls  Description: INTERVENTIONS:  - Educate patient/family on patient safety including physical limitations  - Instruct patient to call for assistance with activity   - Consult OT/PT to assist with strengthening/mobility   - Keep Call bell within reach  - Keep bed low and locked with side rails adjusted as appropriate  - Keep care items and personal belongings within reach  - Initiate and maintain comfort rounds  - Make Fall Risk Sign visible to staff  - Offer Toileting every 2 Hours, in advance of need  - Initiate/Maintain bed alarm  - Apply yellow socks and bracelet for high fall risk patients  - Consider moving patient to room near nurses station  Outcome: Progressing     Problem: Prexisting or High Potential for Compromised Skin Integrity  Goal: Skin integrity is maintained or improved  Description: INTERVENTIONS:  - Identify patients at risk for skin breakdown  - Assess and monitor skin integrity  - Assess and monitor nutrition and hydration status  - Monitor labs   - Assess for incontinence   - Turn and reposition patient  - Assist with mobility/ambulation  - Relieve pressure over bony prominences  - Avoid friction and shearing  - Provide appropriate hygiene as needed including keeping skin clean and dry  - Evaluate need for skin moisturizer/barrier cream  - Collaborate with interdisciplinary team   - Patient/family teaching  - Consider wound care consult   Outcome: Progressing     Problem: PAIN - ADULT  Goal: Verbalizes/displays adequate comfort level or baseline comfort level  Description: Interventions:  - Encourage patient to monitor pain and request assistance  - Assess pain using appropriate pain scale  - Administer analgesics based on type and severity of pain and evaluate response  - Implement non-pharmacological measures as appropriate and evaluate response  - Consider cultural and social influences on pain and pain management  -  Notify physician/advanced practitioner if interventions unsuccessful or patient reports new pain  Outcome: Progressing     Problem: INFECTION - ADULT  Goal: Absence or prevention of progression during hospitalization  Description: INTERVENTIONS:  - Assess and monitor for signs and symptoms of infection  - Monitor lab/diagnostic results  - Monitor all insertion sites, i.e. indwelling lines, tubes, and drains  - Monitor endotracheal if appropriate and nasal secretions for changes in amount and color  - [place] appropriate cooling/warming therapies per order  - Administer medications as ordered  - Instruct and encourage patient and family to use good hand hygiene technique  - Identify and instruct in appropriate isolation precautions for identified infection/condition  Outcome: Progressing

## 2024-08-23 ENCOUNTER — HOME HEALTH ADMISSION (OUTPATIENT)
Dept: HOME HEALTH SERVICES | Facility: HOME HEALTHCARE | Age: 89
End: 2024-08-23
Payer: COMMERCIAL

## 2024-08-23 VITALS
WEIGHT: 189.6 LBS | TEMPERATURE: 97.1 F | SYSTOLIC BLOOD PRESSURE: 184 MMHG | RESPIRATION RATE: 18 BRPM | DIASTOLIC BLOOD PRESSURE: 77 MMHG | HEART RATE: 63 BPM | BODY MASS INDEX: 34.89 KG/M2 | OXYGEN SATURATION: 96 % | HEIGHT: 62 IN

## 2024-08-23 DIAGNOSIS — I10 ESSENTIAL HYPERTENSION: ICD-10-CM

## 2024-08-23 PROCEDURE — 97530 THERAPEUTIC ACTIVITIES: CPT

## 2024-08-23 PROCEDURE — 97116 GAIT TRAINING THERAPY: CPT

## 2024-08-23 PROCEDURE — 99239 HOSP IP/OBS DSCHRG MGMT >30: CPT | Performed by: STUDENT IN AN ORGANIZED HEALTH CARE EDUCATION/TRAINING PROGRAM

## 2024-08-23 RX ORDER — LOSARTAN POTASSIUM 25 MG/1
25 TABLET ORAL DAILY
Qty: 30 TABLET | Refills: 0 | Status: SHIPPED | OUTPATIENT
Start: 2024-08-24 | End: 2024-09-23

## 2024-08-23 RX ORDER — MIRTAZAPINE 15 MG/1
15 TABLET, FILM COATED ORAL
Qty: 30 TABLET | Refills: 0 | Status: SHIPPED | OUTPATIENT
Start: 2024-08-23 | End: 2024-09-22

## 2024-08-23 RX ORDER — METOPROLOL TARTRATE 25 MG/1
25 TABLET, FILM COATED ORAL EVERY 12 HOURS SCHEDULED
Qty: 180 TABLET | Refills: 1 | Status: SHIPPED | OUTPATIENT
Start: 2024-08-23

## 2024-08-23 RX ADMIN — FUROSEMIDE 20 MG: 20 TABLET ORAL at 08:00

## 2024-08-23 RX ADMIN — ASPIRIN 81 MG: 81 TABLET, COATED ORAL at 08:00

## 2024-08-23 RX ADMIN — AMLODIPINE BESYLATE 10 MG: 10 TABLET ORAL at 08:00

## 2024-08-23 RX ADMIN — CYANOCOBALAMIN TAB 500 MCG 1000 MCG: 500 TAB at 08:00

## 2024-08-23 RX ADMIN — METOPROLOL TARTRATE 25 MG: 25 TABLET, FILM COATED ORAL at 08:00

## 2024-08-23 RX ADMIN — FAMOTIDINE 20 MG: 20 TABLET, FILM COATED ORAL at 08:00

## 2024-08-23 RX ADMIN — LEVOTHYROXINE SODIUM 75 MCG: 75 TABLET ORAL at 08:00

## 2024-08-23 RX ADMIN — LOSARTAN POTASSIUM 25 MG: 25 TABLET, FILM COATED ORAL at 08:00

## 2024-08-23 RX ADMIN — ENOXAPARIN SODIUM 40 MG: 40 INJECTION SUBCUTANEOUS at 08:00

## 2024-08-23 NOTE — PLAN OF CARE
Problem: PHYSICAL THERAPY ADULT  Goal: Performs mobility at highest level of function for planned discharge setting.  See evaluation for individualized goals.  Description: Treatment/Interventions: Functional transfer training, LE strengthening/ROM, Elevations, Therapeutic exercise, Endurance training, Equipment eval/education, Bed mobility, Gait training, Compensatory technique education, Spoke to nursing, OT (PA)  Equipment Recommended: Walker (Owns)       See flowsheet documentation for full assessment, interventions and recommendations.  Outcome: Progressing  Note: Prognosis: Good  Problem List: Decreased endurance, Impaired balance, Decreased mobility, Decreased coordination, Decreased cognition, Impaired judgement, Obesity  Assessment: Pt. noted with decreased tolerance and cognition this session hence session was limited in her mobility. Pt. needed encourgament and distraction to participate in activities. Pt. needed repepated and consistent cueing for safe mobility. Total A provided to pull underwear down and up when standing. Pt. seated in chair with alarm engaged and all needs withinr each. Will continue to follow epr PT POC. Pt. will benefit from continued skilled PT to address her mobility deficits.  Barriers to Discharge: None     Rehab Resource Intensity Level, PT: II (Moderate Resource Intensity) (STR vs home w/ faly 24/7 A)    See flowsheet documentation for full assessment.

## 2024-08-23 NOTE — DISCHARGE SUMMARY
[facility]  Discharge- Ashley Gonzales 1935, 88 y.o. female MRN: [id number]  Unit/Bed#: E2 -01 Encounter: [id number]  Primary Care Provider: Paz Maharaj DO   Date and time admitted to hospital: 8/15/2024  9:33 AM    Chronic respiratory failure with hypoxia and hypercapnia (HCC)  Assessment & Plan  Patient is chronically hypercapnic and remains on 2 L nasal cannula  Patient also has JAMEEL and has not tolerated BiPAP or CPAP    Lung nodule  Assessment & Plan  CT chest displayed 8 mm spiculated nodule in the right upper lobe  Patient does have smoking history  Recommending short-term interval follow-up noncontrast CT in about 3 months    Fall  Assessment & Plan  Patient had mechanical fall last week in the bathroom with her walker and she hit her head  CT head without acute findings  Recommend rehab but family prefers home    Stage 3 chronic kidney disease (HCC)  Assessment & Plan  Lab Results   Component Value Date    EGFR 41 08/21/2024    EGFR 43 08/20/2024    EGFR 51 08/17/2024    CREATININE 1.18 08/21/2024    CREATININE 1.12 08/20/2024    CREATININE 0.99 08/17/2024   Stable at baseline, monitor    Benign familial tremor  Assessment & Plan  Per outpatient notes patient and family she has been seen by neurology and various trials of medication which were either not help or she did not tolerate.  She was given alprazolam for symptom control however this was making her somnolent and this was stopped.  PDMP confirmed  Follow-up with neurology for further medication discussions    COPD (chronic obstructive pulmonary disease) (HCC)  Assessment & Plan  Patient saw Dr. Zamudio in 2018  Continue Xopenex as needed  No evidence of exacerbation    * Metabolic encephalopathy  Assessment & Plan  Patient presents with abdominal pain, decreased intake and disorientation which has worsened over the past week per daughter. Patient did have a fall last week that was mechanical and she hit her  head  Patient CT imaging of head neck chest abdomen and pelvis without any acute findings  Patient is currently at her baseline alert and oriented x 3, intermittently forgetful with likely cognitive impairment  Suspect metabolic encephalopathy may be multifactorial in setting of age, anxiety benzodiazepine use for tremor, hypercapnia and recent dehydration and fall with head strike  Appreciate geriatrics evaluation, recommending discontinuing Lexapro and starting Remeron 15 mg at bedtime  Patient was a rapid response on 08/20 with increased lethargy and elevated blood pressure  CT head was negative  Suspect patient's symptoms due to encephalopathy with likely dementia and hypercarbia with history of COPD with chronic respiratory failure  No events overnight, mental status improved  Recommend to follow up with geriatrics outpatient, referral given    Bacteriuria-resolved as of 8/18/2024  Assessment & Plan  Due to confusion and abdominal pain with poor intake patient was started on IV Rocephin  She has received 2 doses, urine cultures with mixed contaminants.  Discontinue further antibiotics  Blood cultures negative to date    MGUS (monoclonal gammopathy of unknown significance)-resolved as of 8/18/2024  Assessment & Plan  She is undergoing workup with hematology oncology    Chronic diastolic congestive heart failure (HCC)-resolved as of 8/18/2024  Assessment & Plan  Wt Readings from Last 3 Encounters:   08/15/24 86 kg (189 lb 9.5 oz)   08/07/24 84.5 kg (186 lb 4.6 oz)   07/10/24 83.6 kg (184 lb 6.4 oz)     Last echocardiogram in 2021 with LVEF 60% and grade 2 diastolic dysfunction  Appears clinically euvolemic  Continue home Lasix 20 mg daily    Acquired hypothyroidism-resolved as of 8/18/2024  Assessment & Plan  Continue levothyroxine        Discharging Physician / Practitioner: Jarvis Ervin MD  PCP: Paz Maharaj DO  Admission Date:   Admission Orders (From admission, onward)       Ordered        08/15/24  1314  INPATIENT ADMISSION  Once                          Discharge Date: 08/23/24    Medical Problems       Resolved Problems  Date Reviewed: 8/20/2024            Resolved    Hypertension 8/18/2024     Resolved by  Hans Salas DO    Acquired hypothyroidism 8/18/2024     Resolved by  Hans Salas,     Chronic diastolic congestive heart failure (HCC) 8/18/2024     Resolved by  Hans Salas,     Generalized anxiety disorder- panic 8/18/2024     Resolved by  Hans Salas,     Glaucoma 8/18/2024     Resolved by  Hans Salas,     Hyperlipidemia 8/18/2024     Resolved by  Hans Salas, DO    S/P CABG (coronary artery bypass graft) 8/18/2024     Resolved by  Hans Salas, DO    Infrarenal abdominal aortic aneurysm (AAA) without rupture (HCC) 8/18/2024     Resolved by  Hans Salas,     MGUS (monoclonal gammopathy of unknown significance) 8/18/2024     Resolved by  Hans Salas,     Bacteriuria 8/18/2024     Resolved by  Hans Salas,           Consultations During Hospital Stay:  Geriatrics    Procedures Performed:   none    Significant Findings / Test Results:   CT head wo contrast    Result Date: 8/20/2024  Impression: No acute intracranial hemorrhage seen No mass effect or midline shift seen Stable CT Workstation performed: [id number]     CT chest abdomen pelvis w contrast    Result Date: 8/15/2024  Impression: CT chest: No evidence of acute thoracic process. 9 mm groundglass nodule in the right upper lobe new since May 2019. Based on current Fleischner Society 2017 Guidelines on incidental pulmonary nodule, follow-up noncontrast CT is recommended at 6-12 months from the initial examination to confirm persistence; if stable at that time, additional follow-up CT is recommended for every 2 years until 5 years of stability is demonstrated. Considerations related to the patient's age and/or comorbidities may be used to alter these recommendations. Additional chronic  findings and negatives as above. CT abdomen and pelvis: No evidence of acute abdominopelvic process. Colonic diverticulosis. Cholelithiasis. Stable suprarenal and infrarenal abdominal aortic aneurysm with maximum diameter of 3.6 cm and 4 cm respectively. According to ACR white paper for the management of incidentally detected abdominal vascular findings, for an aorta of this caliber (4.0-4.4 cm) follow-up imaging (e.g. Doppler ultrasound, CTA abdomen/pelvis) is recommended at a 1 year interval. Reference: J Am Rogelio Radiol 2013;10:789-794. Considerations related to the patient's age and/or comorbidities may be used to alter these recommendations. Additional chronic findings and negatives as above. Workstation performed: [id number]     CTA head and neck with and without contrast    Result Date: 8/15/2024  Impression: CT Brain:  No acute intracranial abnormality. Multifocal encephalomalacia is unchanged CT Angiography: No large vessel occlusion or high-grade stenosis seen. There is unchanged, moderate left intracranial vertebral artery stenosis due to calcified plaque. There is also mild left subclavian artery stenosis. There is no ICA stenosis by NASCET criteria. 8 mm spiculated nodule in the right upper lobe. Based on current Fleischner Society 2017 Guidelines on incidental pulmonary nodule, either PET/CT scan evaluation, tissue sampling or short-term interval follow-up non-contrast CT follow-up (initially in 3 months) may be considered appropriate. The study was marked in EPIC for immediate notification based on the pulmonary nodule finding. Workstation performed: [id number]        Incidental Findings:   Spiculated lung nodule, recommend outpatient follow-up with repeat CT imaging in 3 months, can consider PET CT scan    Test Results Pending at Discharge (will require follow up):   none     Outpatient Tests Requested:  none    Complications:    Rapid response, due to lethargy    Reason for Admission: [facility]  "Course:     Ashley Gonzales is a 88 y.o. female patient who originally presented to the hospital on 8/15/2024 due to change in mental status.  Has had progression of confusion, prior ED evaluation.  Suspected cognitive impairment and likely dementia.  CT head was unremarkable, labs and vitals have been fairly stable.  Initially suspected possible urinary tract infection and completed course of antibiotics with urine cultures negative.  Geriatrics was consulted, recommended discontinuing Lexapro and added Remeron to assist with sleep and appetite.  Recommended for short-term rehab and was awaiting insurance Auth.  She was a rapid due to increased lethargy and was difficult to arouse in the morning.  Repeat imaging and tests were all negative.  Patient eventually came to and was back to her previous baseline.  As family has been the primary caretakers, was willing to wait for rehab but then reconsidered.  Patient was discharged home.  Referral for geriatrics was given on discharge.    Please see above list of diagnoses and related plan for additional information.     Condition at Discharge: fair     Discharge Day Visit / Exam:     Subjective:    No events overnight.  Per daughter and staff, reported poor sleep overnight.  Has been tolerating diet.  She is alert and oriented answering questions appropriately.  Vitals: Blood Pressure: (!) 184/77 (08/23/24 0800)  Pulse: 63 (08/23/24 0800)  Temperature: (!) 97.1 °F (36.2 °C) (08/23/24 0712)  Temp Source: Temporal (08/23/24 0712)  Respirations: 18 (08/23/24 0712)  Height: 5' 2\" (157.5 cm) (08/15/24 0939)  Weight - Scale: 86 kg (189 lb 9.5 oz) (08/15/24 0939)  SpO2: 96 % (08/23/24 0712)  Exam:   Physical Exam  Vitals and nursing note reviewed.   Constitutional:       General: She is not in acute distress.     Appearance: She is obese. She is not ill-appearing.   HENT:      Head: Normocephalic.   Eyes:      Conjunctiva/sclera: Conjunctivae normal.   Cardiovascular:      Rate " and Rhythm: Normal rate.   Pulmonary:      Effort: Pulmonary effort is normal. No respiratory distress.   Abdominal:      General: There is no distension.      Palpations: Abdomen is soft.   Musculoskeletal:         General: No swelling.      Right lower leg: No edema.      Left lower leg: No edema.   Skin:     General: Skin is warm and dry.   Neurological:      Mental Status: Mental status is at baseline. She is disoriented.         Discharge instructions/Information to patient and family:   See after visit summary for information provided to patient and family.      Provisions for Follow-Up Care:  See after visit summary for information related to follow-up care and any pertinent home health orders.      Disposition:     Home    Discharge Statement:  I spent 40 minutes discharging the patient. This time was spent on the day of discharge. I had direct contact with the patient on the day of discharge. Greater than 50% of the total time was spent examining patient, answering all patient questions, arranging and discussing plan of care with patient as well as directly providing post-discharge instructions.  Additional time then spent on discharge activities.    Discharge Medications:  See after visit summary for reconciled discharge medications provided to patient and family.      ** Please Note: This note has been constructed using a voice recognition system **

## 2024-08-23 NOTE — ASSESSMENT & PLAN NOTE
Patient had mechanical fall last week in the bathroom with her walker and she hit her head  CT head without acute findings  Recommend rehab but family prefers home

## 2024-08-23 NOTE — PLAN OF CARE
Problem: Potential for Falls  Goal: Patient will remain free of falls  Description: INTERVENTIONS:  - Educate patient/family on patient safety including physical limitations  - Instruct patient to call for assistance with activity   - Consult OT/PT to assist with strengthening/mobility   - Keep Call bell within reach  - Keep bed low and locked with side rails adjusted as appropriate  - Keep care items and personal belongings within reach  - Initiate and maintain comfort rounds  - Make Fall Risk Sign visible to staff  - Offer Toileting every 2 Hours, in advance of need  - Initiate/Maintain bed alarm  - Obtain necessary fall risk management equipment: yellow socks, yellow bracelet, bed alarm  - Apply yellow socks and bracelet for high fall risk patients  - Consider moving patient to room near nurses station  Outcome: Progressing     Problem: Prexisting or High Potential for Compromised Skin Integrity  Goal: Skin integrity is maintained or improved  Description: INTERVENTIONS:  - Identify patients at risk for skin breakdown  - Assess and monitor skin integrity  - Assess and monitor nutrition and hydration status  - Monitor labs   - Assess for incontinence   - Turn and reposition patient  - Assist with mobility/ambulation  - Relieve pressure over bony prominences  - Avoid friction and shearing  - Provide appropriate hygiene as needed including keeping skin clean and dry  - Evaluate need for skin moisturizer/barrier cream  - Collaborate with interdisciplinary team   - Patient/family teaching  - Consider wound care consult   Outcome: Progressing     Problem: PAIN - ADULT  Goal: Verbalizes/displays adequate comfort level or baseline comfort level  Description: Interventions:  - Encourage patient to monitor pain and request assistance  - Assess pain using appropriate pain scale  - Administer analgesics based on type and severity of pain and evaluate response  - Implement non-pharmacological measures as appropriate and  evaluate response  - Consider cultural and social influences on pain and pain management  - Notify physician/advanced practitioner if interventions unsuccessful or patient reports new pain  Outcome: Progressing     Problem: INFECTION - ADULT  Goal: Absence or prevention of progression during hospitalization  Description: INTERVENTIONS:  - Assess and monitor for signs and symptoms of infection  - Monitor lab/diagnostic results  - Monitor all insertion sites, i.e. indwelling lines, tubes, and drains  - Monitor endotracheal if appropriate and nasal secretions for changes in amount and color  - [place] appropriate cooling/warming therapies per order  - Administer medications as ordered  - Instruct and encourage patient and family to use good hand hygiene technique  - Identify and instruct in appropriate isolation precautions for identified infection/condition  Outcome: Progressing     Problem: SAFETY ADULT  Goal: Patient will remain free of falls  Description: INTERVENTIONS:  - Educate patient/family on patient safety including physical limitations  - Instruct patient to call for assistance with activity   - Consult OT/PT to assist with strengthening/mobility   - Keep Call bell within reach  - Keep bed low and locked with side rails adjusted as appropriate  - Keep care items and personal belongings within reach  - Initiate and maintain comfort rounds  - Make Fall Risk Sign visible to staff  - Offer Toileting every 2 Hours, in advance of need  - Initiate/Maintain bed alarm  - Obtain necessary fall risk management equipment: yellow socks, yellow bracelet, bed alarm  - Apply yellow socks and bracelet for high fall risk patients  - Consider moving patient to room near nurses station  Outcome: Progressing  Goal: Maintain or return to baseline ADL function  Description: INTERVENTIONS:  -  Assess patient's ability to carry out ADLs; assess patient's baseline for ADL function and identify physical deficits which impact  ability to perform ADLs (bathing, care of mouth/teeth, toileting, grooming, dressing, etc.)  - Assess/evaluate cause of self-care deficits   - Assess range of motion  - Assess patient's mobility; develop plan if impaired  - Assess patient's need for assistive devices and provide as appropriate  - Encourage maximum independence but intervene and supervise when necessary  - Involve family in performance of ADLs  - Assess for home care needs following discharge   - Consider OT consult to assist with ADL evaluation and planning for discharge  - Provide patient education as appropriate  Outcome: Progressing  Goal: Maintains/Returns to pre admission functional level  Description: INTERVENTIONS:  - Perform AM-PAC 6 Click Basic Mobility/ Daily Activity assessment daily.  - Set and communicate daily mobility goal to care team and patient/family/caregiver.   - Collaborate with rehabilitation services on mobility goals if consulted  - Perform Range of Motion 3 times a day.  - Reposition patient every 2 hours.  - Dangle patient 3 times a day  - Stand patient 3 times a day  - Ambulate patient 3 times a day  - Out of bed to chair 3 times a day   - Out of bed for meals 3 times a day  - Out of bed for toileting  - Record patient progress and toleration of activity level   Outcome: Progressing     Problem: DISCHARGE PLANNING  Goal: Discharge to home or other facility with appropriate resources  Description: INTERVENTIONS:  - Identify barriers to discharge w/patient and caregiver  - Arrange for needed discharge resources and transportation as appropriate  - Identify discharge learning needs (meds, wound care, etc.)  - Arrange for interpretive services to assist at discharge as needed  - Refer to Case Management Department for coordinating discharge planning if the patient needs post-hospital services based on physician/advanced practitioner order or complex needs related to functional status, cognitive ability, or social support  system  Outcome: Progressing     Problem: Knowledge Deficit  Goal: Patient/family/caregiver demonstrates understanding of disease process, treatment plan, medications, and discharge instructions  Description: Complete learning assessment and assess knowledge base.  Interventions:  - Provide teaching at level of understanding  - Provide teaching via preferred learning methods  Outcome: Progressing

## 2024-08-23 NOTE — PHYSICAL THERAPY NOTE
Physical Therapy Treatment Note     08/23/24 1033   PT Last Visit   PT Visit Date 08/23/24   Note Type   Note Type Treatment   Pain Assessment   Pain Assessment Tool 0-10   Pain Score No Pain   Restrictions/Precautions   Weight Bearing Precautions Per Order No   Other Precautions Fall Risk;O2;Cognitive;Chair Alarm   General   Chart Reviewed Yes   Family/Caregiver Present Yes   Subjective   Subjective Pt. agreeable to PT. Pt. noted with decreased cognition hwoever improved mentation as session progressed   Transfers   Sit to Stand   (CGA)   Additional items Armrests;Increased time required;Verbal cues   Stand to Sit   (CGA)   Additional items Increased time required;Verbal cues;Armrests   Stand pivot 4  Minimal assistance   Additional items Assist x 1;Increased time required;Verbal cues   Toilet transfer 4  Minimal assistance   Additional items Assist x 1;Increased time required;Commode;Verbal cues;Armrests   Ambulation/Elevation   Gait pattern Improper Weight shift;Decreased foot clearance;Short stride;Excessively slow;Decreased toe off;Decreased heel strike   Gait Assistance 4  Minimal assist   Additional items Assist x 1;Verbal cues   Assistive Device Rolling walker   Distance 10ft x 2   Balance   Static Sitting Good   Dynamic Sitting Fair +   Static Standing Fair   Dynamic Standing Fair -   Ambulatory Poor +   Endurance Deficit   Endurance Deficit Yes   Endurance Deficit Description fatigue   Activity Tolerance   Activity Tolerance Patient tolerated treatment well;Patient limited by fatigue   Nurse Made Aware yes   Exercises   TKR Sitting;Bilateral;AROM;20 reps   Assessment   Prognosis Good   Problem List Decreased endurance;Impaired balance;Decreased mobility;Decreased coordination;Decreased cognition;Impaired judgement;Obesity   Assessment Pt. noted with decreased tolerance and cognition this session hence session was limited in her mobility. Pt. needed encourgament and distraction to participate in activities.  Pt. needed repepated and consistent cueing for safe mobility. Total A provided to pull underwear down and up when standing. Pt. seated in chair with alarm engaged and all needs withinr each. Will continue to follow epr PT POC. Pt. will benefit from continued skilled PT to address her mobility deficits.   Barriers to Discharge None   Goals   Patient Goals None reproted   STG Expiration Date 08/30/24   PT Treatment Day 3   Plan   Treatment/Interventions Functional transfer training;LE strengthening/ROM;Therapeutic exercise;Gait training;Equipment eval/education;Patient/family training;Cognitive reorientation;Spoke to nursing;Family;Spoke to case management   Progress Slow progress, cognitive deficits   PT Frequency 3-5x/wk   Discharge Recommendation   Rehab Resource Intensity Level, PT II (Moderate Resource Intensity)  (STR vs home w/ faly 24/7 A)   Equipment Recommended Walker   AM-PAC Basic Mobility Inpatient   Turning in Flat Bed Without Bedrails 3   Lying on Back to Sitting on Edge of Flat Bed Without Bedrails 3   Moving Bed to Chair 3   Standing Up From Chair Using Arms 3   Walk in Room 3   Climb 3-5 Stairs With Railing 2   Basic Mobility Inpatient Raw Score 17   Basic Mobility Standardized Score 39.67   [facility] Highest Level Of Mobility   -HLM Goal 5: Stand one or more mins   -HLM Achieved 6: Walk 10 steps or more   End of Consult   Patient Position at End of Consult All needs within reach;Bed/Chair alarm activated;Bedside chair           Eula Hitchcock PTA    An AM-PAC basic mobility standardized score less than 42.9 suggest the patient may benefit from discharge to post-acute rehab services.

## 2024-08-23 NOTE — PROGRESS NOTES
Patient:    MRN:  [id number]    [employer] Request ID:  [id number]    Level of care reserved:  Home Health Agency    Partner Reserved:  [facility], [place] [address] [phone]    Clinical needs requested:    Geography searched:  [phone]    Start of Service:    Request sent:  12:42pm EDT on 8/23/2024 by Jennifer Valencia    Partner reserved:  1:23pm EDT on 8/23/2024 by Jennifer Valencia    Choice list shared:  1:20pm EDT on 8/23/2024 by Jennifer Valencia

## 2024-08-23 NOTE — ASSESSMENT & PLAN NOTE
Patient presents with abdominal pain, decreased intake and disorientation which has worsened over the past week per daughter. Patient did have a fall last week that was mechanical and she hit her head  Patient CT imaging of head neck chest abdomen and pelvis without any acute findings  Patient is currently at her baseline alert and oriented x 3, intermittently forgetful with likely cognitive impairment  Suspect metabolic encephalopathy may be multifactorial in setting of age, anxiety benzodiazepine use for tremor, hypercapnia and recent dehydration and fall with head strike  Appreciate geriatrics evaluation, recommending discontinuing Lexapro and starting Remeron 15 mg at bedtime  Patient was a rapid response on 08/20 with increased lethargy and elevated blood pressure  CT head was negative  Suspect patient's symptoms due to encephalopathy with likely dementia and hypercarbia with history of COPD with chronic respiratory failure  No events overnight, mental status improved  Recommend to follow up with geriatrics outpatient, referral given

## 2024-08-23 NOTE — CASE MANAGEMENT
Case Management Discharge Planning Note    Patient name Ashley Gonzales  Location [facility] /E2 -* MRN [id number]  : 1935 Date 2024       Current Admission Date: 8/15/2024  Current Admission Diagnosis:Metabolic encephalopathy   Patient Active Problem List    Diagnosis Date Noted Date Diagnosed    Low serum vitamin B12 2024     Lung nodule 2024     Chronic respiratory failure with hypoxia and hypercapnia (HCC) 2024     Metabolic encephalopathy 08/15/2024     UTI (urinary tract infection) 08/15/2024     Fall 08/15/2024     Stage 3 chronic kidney disease (HCC) 2024     Nephrolithiasis 2024     Hematuria 2024     Dizziness 2024     Obesity (BMI 30-39.9) 05/10/2023     Pleural effusion 2021     Acute on chronic respiratory failure with hypoxia and hypercapnia (HCC) 2021     History of total knee replacement 2019     JAMEEL (obstructive sleep apnea) 2017     COPD (chronic obstructive pulmonary disease) ([facility])      Benign familial tremor      Hx of arterial ischemic stroke      Esophageal reflux 2015     Clinical depression 2015     Osteoarthritis of knee 2015     Postural imbalance 10/30/2014     Insomnia 10/01/2012     Generalized osteoarthritis 2012     Female stress incontinence 2012       LOS (days): 8  Geometric Mean LOS (GMLOS) (days): 3.9  Days to GMLOS:-4.1     OBJECTIVE:  Risk of Unplanned Readmission Score: 19.93         Current admission status: Inpatient   Preferred Pharmacy:   [id number] - [place] - [address]  [address]  [place] [address]  Phone: [phone] Fax: [phone]    Primary Care Provider: Paz Maharaj DO    Primary Insurance: [employer] REP  Secondary Insurance:     DISCHARGE DETAILS:    Discharge planning discussed with:: Patient and daughter (Izzy)  Freedom of Choice: Yes  Comments - Freedom of Choice: Family would like to bring pt home with home  therapy. [facility]ference for [facility]. [facility] accepting  CM contacted family/caregiver?: Yes  Were Treatment Team discharge recommendations reviewed with patient/caregiver?: Yes  Did patient/caregiver verbalize understanding of patient care needs?: Yes       Contacts  Patient Contacts: Izzy  Relationship to Patient:: Family  Contact Method: In Person  Reason/Outcome: Discharge Planning    Requested Home Health Care         Is the patient interested in [facility] at discharge?: Yes  Home Health Discipline requested:: Nursing, Physical Therapy, Occupational Therapy  Home Health Agency Name:: [facility]  Home Health Follow-Up Provider:: PCP  Home Health Services Needed:: Evaluate Functional Status and Safety, Gait/ADL Training, Strengthening/Theraputic Exercises to Improve Function, Oxygen Via Nasal Cannula  Homebound Criteria Met:: Uses an Assist Device (i.e. cane, walker, etc)  Supporting Clincal Findings:: Limited Endurance, Fatigues Easliy in Short Distances, Requires Oxygen    DME Referral Provided  Referral made for DME?: No    Other Referral/Resources/Interventions Provided:  Interventions: HHC         Treatment Team Recommendation: Home with Home Health Care  Discharge Destination Plan:: Home with Home Health Care  Transport at Discharge : Family                       Accompanied by: Family member     IMM Given (Date):: 08/23/24  IMM Given to:: Patient

## 2024-08-23 NOTE — PLAN OF CARE
Problem: Potential for Falls  Goal: Patient will remain free of falls  Description: INTERVENTIONS:  - Educate patient/family on patient safety including physical limitations  - Instruct patient to call for assistance with activity   - Consult OT/PT to assist with strengthening/mobility   - Keep Call bell within reach  - Keep bed low and locked with side rails adjusted as appropriate  - Keep care items and personal belongings within reach  - Initiate and maintain comfort rounds  - Make Fall Risk Sign visible to staff  - Offer Toileting every 2 Hours, in advance of need  - Initiate/Maintain bed alarm  - Obtain necessary fall risk management equipment: yellow socks, yellow bracelet, bed alarm  - Apply yellow socks and bracelet for high fall risk patients  - Consider moving patient to room near nurses station  Outcome: Progressing     Problem: Prexisting or High Potential for Compromised Skin Integrity  Goal: Skin integrity is maintained or improved  Description: INTERVENTIONS:  - Identify patients at risk for skin breakdown  - Assess and monitor skin integrity  - Assess and monitor nutrition and hydration status  - Monitor labs   - Assess for incontinence   - Turn and reposition patient  - Assist with mobility/ambulation  - Relieve pressure over bony prominences  - Avoid friction and shearing  - Provide appropriate hygiene as needed including keeping skin clean and dry  - Evaluate need for skin moisturizer/barrier cream  - Collaborate with interdisciplinary team   - Patient/family teaching  - Consider wound care consult   Outcome: Progressing     Problem: PAIN - ADULT  Goal: Verbalizes/displays adequate comfort level or baseline comfort level  Description: Interventions:  - Encourage patient to monitor pain and request assistance  - Assess pain using appropriate pain scale  - Administer analgesics based on type and severity of pain and evaluate response  - Implement non-pharmacological measures as appropriate and  evaluate response  - Consider cultural and social influences on pain and pain management  - Notify physician/advanced practitioner if interventions unsuccessful or patient reports new pain  Outcome: Progressing     Problem: INFECTION - ADULT  Goal: Absence or prevention of progression during hospitalization  Description: INTERVENTIONS:  - Assess and monitor for signs and symptoms of infection  - Monitor lab/diagnostic results  - Monitor all insertion sites, i.e. indwelling lines, tubes, and drains  - Monitor endotracheal if appropriate and nasal secretions for changes in amount and color  - [facility] appropriate cooling/warming therapies per order  - Administer medications as ordered  - Instruct and encourage patient and family to use good hand hygiene technique  - Identify and instruct in appropriate isolation precautions for identified infection/condition  Outcome: Progressing     Problem: SAFETY ADULT  Goal: Patient will remain free of falls  Description: INTERVENTIONS:  - Educate patient/family on patient safety including physical limitations  - Instruct patient to call for assistance with activity   - Consult OT/PT to assist with strengthening/mobility   - Keep Call bell within reach  - Keep bed low and locked with side rails adjusted as appropriate  - Keep care items and personal belongings within reach  - Initiate and maintain comfort rounds  - Make Fall Risk Sign visible to staff  - Offer Toileting every 2 Hours, in advance of need  - Initiate/Maintain bed alarm  - Obtain necessary fall risk management equipment: yellow socks, yellow bracelet, bed alarm  - Apply yellow socks and bracelet for high fall risk patients  - Consider moving patient to room near nurses station  Outcome: Progressing  Goal: Maintain or return to baseline ADL function  Description: INTERVENTIONS:  - Educate patient/family on patient safety including physical limitations  - Instruct patient to call for assistance with activity   -  Consult OT/PT to assist with strengthening/mobility   - Keep Call bell within reach  - Keep bed low and locked with side rails adjusted as appropriate  - Keep care items and personal belongings within reach  - Initiate and maintain comfort rounds  - Make Fall Risk Sign visible to staff  - Offer Toileting every 2 Hours, in advance of need  - Initiate/Maintain bed alarm  - Obtain necessary fall risk management equipment: yellow socks, yellow bracelet, bed alarm  - Apply yellow socks and bracelet for high fall risk patients  - Consider moving patient to room near nurses station  Outcome: Progressing  Goal: Maintains/Returns to pre admission functional level  Description: INTERVENTIONS:  -  Assess patient's ability to carry out ADLs; assess patient's baseline for ADL function and identify physical deficits which impact ability to perform ADLs (bathing, care of mouth/teeth, toileting, grooming, dressing, etc.)  - Assess/evaluate cause of self-care deficits   - Assess range of motion  - Assess patient's mobility; develop plan if impaired  - Assess patient's need for assistive devices and provide as appropriate  - Encourage maximum independence but intervene and supervise when necessary  - Involve family in performance of ADLs  - Assess for home care needs following discharge   - Consider OT consult to assist with ADL evaluation and planning for discharge  - Provide patient education as appropriate  Outcome: Progressing     Problem: DISCHARGE PLANNING  Goal: Discharge to home or other facility with appropriate resources  Description: INTERVENTIONS:  - Identify barriers to discharge w/patient and caregiver  - Arrange for needed discharge resources and transportation as appropriate  - Identify discharge learning needs (meds, wound care, etc.)  - Arrange for interpretive services to assist at discharge as needed  - Refer to Case Management Department for coordinating discharge planning if the patient needs post-hospital  services based on physician/advanced practitioner order or complex needs related to functional status, cognitive ability, or social support system  Outcome: Progressing     Problem: Knowledge Deficit  Goal: Patient/family/caregiver demonstrates understanding of disease process, treatment plan, medications, and discharge instructions  Description: Complete learning assessment and assess knowledge base.  Interventions:  - Provide teaching at level of understanding  - Provide teaching via preferred learning methods  Outcome: Progressing

## 2024-08-25 ENCOUNTER — HOME CARE VISIT (OUTPATIENT)
Dept: HOME HEALTH SERVICES | Facility: HOME HEALTHCARE | Age: 89
End: 2024-08-25
Payer: COMMERCIAL

## 2024-08-25 VITALS
RESPIRATION RATE: 20 BRPM | OXYGEN SATURATION: 94 % | DIASTOLIC BLOOD PRESSURE: 58 MMHG | SYSTOLIC BLOOD PRESSURE: 120 MMHG | TEMPERATURE: 97.8 F | HEART RATE: 88 BPM

## 2024-08-25 PROCEDURE — 400013 VN SOC

## 2024-08-25 PROCEDURE — G0299 HHS/HOSPICE OF RN EA 15 MIN: HCPCS

## 2024-08-25 NOTE — CASE COMMUNICATION
"Medication discrepancies or Major drug interactions:  -N/a  Abnormal clinical findings: SOB with minimal exertion. Wearing 2L NC ATC. BL 1 and % toes r&b.     This report is informational only, no response is needed  [facility] has Admitted your patient to Home Health service with the following disciplines: SN, PT and OT  Patient stated goals of care: \"to be more independent and go for a walk outside\"  Potential barriers to goal ach ievement: fear of falling, fall risk, at risk mariano score, oxygen dependent, cognitive impairment, baseline tremor, impaired functional mobility, [place], comorbidities, >5 medications  Primary focus of home health care: Musculoskeletal  Anticipated visit pattern and next visit date: 3w2.2w2. next anticipated SN visit 8/28/24  Thank you for allowing us to participate in the care of your patient.      Izabel Slater [facility]      "

## 2024-08-26 ENCOUNTER — APPOINTMENT (OUTPATIENT)
Dept: LAB | Facility: CLINIC | Age: 89
End: 2024-08-26
Payer: COMMERCIAL

## 2024-08-26 ENCOUNTER — PATIENT OUTREACH (OUTPATIENT)
Dept: CASE MANAGEMENT | Facility: OTHER | Age: 89
End: 2024-08-26

## 2024-08-26 ENCOUNTER — HOME CARE VISIT (OUTPATIENT)
Dept: HOME HEALTH SERVICES | Facility: HOME HEALTHCARE | Age: 89
End: 2024-08-26
Payer: COMMERCIAL

## 2024-08-26 ENCOUNTER — TELEPHONE (OUTPATIENT)
Age: 89
End: 2024-08-26

## 2024-08-26 VITALS — HEART RATE: 72 BPM | OXYGEN SATURATION: 98 %

## 2024-08-26 VITALS — DIASTOLIC BLOOD PRESSURE: 50 MMHG | OXYGEN SATURATION: 98 % | HEART RATE: 61 BPM | SYSTOLIC BLOOD PRESSURE: 114 MMHG

## 2024-08-26 DIAGNOSIS — D47.2 MONOCLONAL PARAPROTEINEMIA: Primary | ICD-10-CM

## 2024-08-26 DIAGNOSIS — Z71.89 COMPLEX CARE COORDINATION: Primary | ICD-10-CM

## 2024-08-26 PROCEDURE — 83521 IG LIGHT CHAINS FREE EACH: CPT

## 2024-08-26 PROCEDURE — G0152 HHCP-SERV OF OT,EA 15 MIN: HCPCS

## 2024-08-26 PROCEDURE — G0151 HHCP-SERV OF PT,EA 15 MIN: HCPCS

## 2024-08-26 NOTE — PROGRESS NOTES
Contacted patient for f/u post hospitalization for metabolic encephalopathy and spoke to son Pablo.  Patient lives with son, but daughter Izzy stay with her during the day.   Son requested CM contact daughter.  Called placed to daughter Izzy.  Patient is receiving home health services of SN, PT/OT.  Patient is oriented and back to baseline.  She is on oxygen at 2 L, wears BIPAP at night.  SpO2 in the 90's.  Patient does have sob with exertion but no wheezing or cough noted.  Daughter is weighing patient daily, reviewed weight parameters to monitor and report.  No c/o chest pain or LE edema.  Nutritional intake adequate, she follows a renal diet and watches sodium intake.  She stays within fluid restrictions.  Daughter manages medications and patient takes all as prescribed.  Follow up appointments scheduled.  Family transports to appointments.  Daughter denies needing additional assistance at home, family very supportive. Daughter was agreeable to enroll in complex care management and be the primary contact.  Arranged to contact her back in a few weeks for f/u.

## 2024-08-26 NOTE — TELEPHONE ENCOUNTER
Patient's daughter called in to schedule a new patient apt. Daughter Izzy stated patient received a referral for Geriatric medicine for alter mental status.     Izzy stated patient was recently admitted in the hospital. Attempted to warm transfer to office, but was unsuccessful.     Please advise & assist with schedule if needed.     Izzy: [phone]

## 2024-08-26 NOTE — UTILIZATION REVIEW
NOTIFICATION OF ADMISSION DISCHARGE   This is a Notification of Discharge from [facility]. Please be advised that this patient has been discharge from our facility. Below you will find the admission and discharge date and time including the patient’s disposition.   UTILIZATION REVIEW CONTACT:  Ely Obrien MA  Utilization   Network Utilization Review Department  Phone: [phone] x carefully listen to the prompts. All voicemails are confidential.  Email: [email or web address]     ADMISSION INFORMATION  PRESENTATION DATE: 8/15/2024  9:33 AM  OBERVATION ADMISSION DATE: N/A  INPATIENT ADMISSION DATE: 8/15/24  1:14 PM   DISCHARGE DATE: 8/23/2024  3:52 PM   DISPOSITION:Home with Home Health Care    Network Utilization Review Department  ATTENTION: Please call with any questions or concerns to [phone] and carefully listen to the prompts so that you are directed to the right person. All voicemails are confidential.   For Discharge needs, contact Care Management DC Support Team at [phone] opt. 2  Send all requests for admission clinical reviews, approved or denied determinations and any other requests to dedicated fax number below belonging to the campus where the patient is receiving treatment. List of dedicated fax numbers for the Facilities:  FACILITY NAME UR FAX NUMBER   ADMISSION DENIALS (Administrative/Medical Necessity) [phone]   DISCHARGE SUPPORT TEAM ([facility]) [phone]   PARENT CHILD HEALTH (Maternity/NICU/Pediatrics) [phone]   General acute hospital [phone]   [facility] [phone]   [facility] [phone]   [facility] [phone]   [facility] [phone]   [facility] [phone]   [facility] [phone]   [facility]  [place] [phone]   [facility] [phone]   [facility] [phone]   [facility] [phone]   [facility] [phone]

## 2024-08-27 LAB
KAPPA LC FREE SER-MCNC: 42.2 MG/L (ref 3.3–19.4)
KAPPA LC FREE/LAMBDA FREE SER: 1.79 {RATIO} (ref 0.26–1.65)
LAMBDA LC FREE SERPL-MCNC: 23.6 MG/L (ref 5.7–26.3)

## 2024-08-28 ENCOUNTER — HOME CARE VISIT (OUTPATIENT)
Dept: HOME HEALTH SERVICES | Facility: HOME HEALTHCARE | Age: 89
End: 2024-08-28
Payer: COMMERCIAL

## 2024-08-28 VITALS — DIASTOLIC BLOOD PRESSURE: 62 MMHG | SYSTOLIC BLOOD PRESSURE: 110 MMHG | HEART RATE: 78 BPM | OXYGEN SATURATION: 96 %

## 2024-08-28 VITALS
DIASTOLIC BLOOD PRESSURE: 50 MMHG | RESPIRATION RATE: 26 BRPM | WEIGHT: 180 LBS | OXYGEN SATURATION: 96 % | SYSTOLIC BLOOD PRESSURE: 112 MMHG | HEART RATE: 68 BPM | TEMPERATURE: 98.7 F | BODY MASS INDEX: 32.92 KG/M2

## 2024-08-28 VITALS — OXYGEN SATURATION: 93 % | DIASTOLIC BLOOD PRESSURE: 60 MMHG | SYSTOLIC BLOOD PRESSURE: 110 MMHG | HEART RATE: 64 BPM

## 2024-08-28 PROCEDURE — 84165 PROTEIN E-PHORESIS SERUM: CPT | Performed by: STUDENT IN AN ORGANIZED HEALTH CARE EDUCATION/TRAINING PROGRAM

## 2024-08-28 PROCEDURE — G0151 HHCP-SERV OF PT,EA 15 MIN: HCPCS

## 2024-08-28 PROCEDURE — 86334 IMMUNOFIX E-PHORESIS SERUM: CPT | Performed by: STUDENT IN AN ORGANIZED HEALTH CARE EDUCATION/TRAINING PROGRAM

## 2024-08-28 PROCEDURE — G0152 HHCP-SERV OF OT,EA 15 MIN: HCPCS

## 2024-08-28 PROCEDURE — G0299 HHS/HOSPICE OF RN EA 15 MIN: HCPCS

## 2024-08-28 NOTE — CASE COMMUNICATION
"ADDENDUM 8/28/24 focus of care changed to neurologic per coding recommendations. No response is needed. Thank you    Medication discrepancies or Major drug interactions:   -N/a   Abnormal clinical findings: SOB with minimal exertion. Wearing 2L NC ATC. BL 1 and % toes r&b.   This report is informational only, no response is needed   [facility] has Admitted your patient to Home Health service with the following disciplines: SN, PT an d OT   Patient stated goals of care: \"to be more independent and go for a walk outside\"   Potential barriers to goal achievement: fear of falling, fall risk, at risk mariano score, oxygen dependent, cognitive impairment, baseline tremor, impaired functional mobility, [place], comorbidities, >5 medications   Primary focus of home health care: Neurological  Anticipated visit pattern and next visit date: 3w2.2w2. next anticipated SN visit 8/28/ 24   Thank you for allowing us to participate in the care of your patient.   Izabel Medellin RN   [facility]  "

## 2024-08-29 ENCOUNTER — OFFICE VISIT (OUTPATIENT)
Dept: HEMATOLOGY ONCOLOGY | Facility: CLINIC | Age: 89
End: 2024-08-29
Payer: COMMERCIAL

## 2024-08-29 ENCOUNTER — TELEPHONE (OUTPATIENT)
Dept: HOME HEALTH SERVICES | Facility: HOME HEALTHCARE | Age: 89
End: 2024-08-29

## 2024-08-29 VITALS
TEMPERATURE: 97.2 F | SYSTOLIC BLOOD PRESSURE: 118 MMHG | HEART RATE: 84 BPM | DIASTOLIC BLOOD PRESSURE: 52 MMHG | HEIGHT: 62 IN | RESPIRATION RATE: 20 BRPM | OXYGEN SATURATION: 95 % | WEIGHT: 184 LBS | BODY MASS INDEX: 33.86 KG/M2

## 2024-08-29 DIAGNOSIS — R77.8 ABNORMAL SPEP: Primary | ICD-10-CM

## 2024-08-29 PROCEDURE — 99213 OFFICE O/P EST LOW 20 MIN: CPT | Performed by: INTERNAL MEDICINE

## 2024-08-29 NOTE — PROGRESS NOTES
Hematology/Oncology Outpatient Office Note  Date of Service: 2024    [facility] [address]  [place] [id number]  [phone]    Reason for Consultation:   Chief Complaint   Patient presents with    Follow-up     Referral Physician: No ref. provider found  Primary Care Physician:  Paz Maharaj DO     Assessment/plan:     Ashley Gonzales is a 88 y.o. female with PMHx significant for HTN, CAD s/p CABG, COPD, CHF, AAA, kidney stones, s/p CEA, diabetes, CKD stage IIIA/B, presenting for f/u visit for abnormal SPEP. She was recently evaluated by nephrology for CKD, hematuria, proteinuria.  Labs from 2024 show SPEP with no monoclonal bands noted.  Immunofixation cannot rule out small monoclonal gammopathy.  UPEP with no monoclonal bands noted. FK.8, FL.9, K/L ratio: 2.31. She is not anemic, calcium is within normal limits and total protein is within normal limits.  Renal function remains at her baseline.  Most recent labs from 2024 with SPEP and serum immunofixation showing no monoclonal immunoglobulins.  I met with the patient and her daughter and reviewed her recent labs.  Low suspicion for an underlying plasma cell dyscrasia.  At this juncture, patient will continue to follow with her PCP and nephrology.  Oncology follow-up on an as-needed basis.    No diagnosis found.    Return if symptoms worsen or fail to improve.    Flor Coulter DO 2024   Hematology & Medical Oncology Physician    History of present illness:   Ashley Gonzales is a 88 y.o. female with PMHx significant for HTN, CAD s/p CABG, COPD, CHF, AAA, kidney stones, s/p CEA, diabetes, CKD stage IIIA/B.  Patient was recently evaluated by nephrology for CKD, hematuria, proteinuria.  Workup noted SPEP with immunofixation which cannot rule out small monoclonal gammopathy.  Patient referred to hematology for further evaluation.  She is accompanied by her  daughter for visit today.  She notes fatigue.  She has chronic shortness of breath for which she utilizes oxygen.  Denies any bone pain.     2024: SPEP: No monoclonal bands noted.  Immunofixation: Cannot rule out small monoclonal gammopathy.  Suggest retest in 3 to 6 months, if clinically indicated.  UPEP: No monoclonal bands noted.  FK.8, FL.9, K/L ratio: 2.31    Interval History:   Patient presents for follow-up.  Denies any respiratory symptoms or bone pain.    2024: SPEP: Serum immunofixation shows no monoclonal immunoglobulins.  FK.2, FL.6, K/L ratio: 1.79  Ig, IgM: 74, IgA: 246    Review of systems:   Review of Systems   Constitutional: Positive for malaise/fatigue.   HENT: Negative.     Cardiovascular: Negative.    Respiratory:  Positive for shortness of breath (baseline).    Musculoskeletal: Negative.    Gastrointestinal: Negative.    Neurological: Negative.         A 10-point review of system was performed, pertinent positive and negative were detailed as above. Otherwise, the 10-point review of system was negative.    Past Medical History:   Diagnosis Date    Anxiety     Cataract, bilateral     Last Assessed:3/19/2014    COPD (chronic obstructive pulmonary disease) (HCC)     Coronary artery disease     Hx of arterial ischemic stroke     Hypertension     Stroke (HCC)     Wears glasses        Past Surgical History:   Procedure Laterality Date    CAROTID ENDARTERECTOMY Right     CATARACT EXTRACTION W/ INTRAOCULAR LENS  IMPLANT, BILATERAL      COLONOSCOPY N/A 2017    Procedure: COLONOSCOPY FOR CONTROL OF BLEEDING;  Surgeon: Jeb Gaxiola MD;  Location: [facility];  Service: Colorectal    CORONARY ARTERY BYPASS GRAFT      CABG X3 with LIMA to LAD,SVG to OM,SVG to distal  RCA ; Last Assessed:2015    JOINT REPLACEMENT      left TKR    KIDNEY STONE SURGERY      NEPHROSTOMY Left     with Drainage Irrigation ;Onset:    MT ARTHRP KNE CONDYLE&PLATU MEDIAL&LAT  COMPARTMENTS Right 2/25/2016    Procedure: ARTHROPLASTY KNEE TOTAL;  Surgeon: Jeb Figueroa MD;  Location: [facility];  Service: Orthopedics       Family History   Problem Relation Age of Onset    Cancer Mother         malignant neoplasm    Heart attack Father     Aneurysm Other         Aortic    Cancer Other         malignant neoplasm       Social History     Socioeconomic History    Marital status:      Spouse name: Not on file    Number of children: Not on file    Years of education: 10    Highest education level: Not on file   Occupational History    Not on file   Tobacco Use    Smoking status: Former     Average packs/day: 1 pack/day for 24.0 years (24.0 ttl pk-yrs)     Types: Cigarettes     Start date: 1951     Passive exposure: Past    Smokeless tobacco: Never   Vaping Use    Vaping status: Never Used   Substance and Sexual Activity    Alcohol use: Never    Drug use: Never    Sexual activity: Not Currently   Other Topics Concern    Not on file   Social History Narrative    ** Merged History Encounter **         No caffeine use     Social Determinants of Health     Financial Resource Strain: Low Risk  (5/10/2023)    Overall Financial Resource Strain (CARDIA)     Difficulty of Paying Living Expenses: Not hard at all   Food Insecurity: No Food Insecurity (8/16/2024)    Hunger Vital Sign     Worried About Running Out of Food in the Last Year: Never true     Ran Out of Food in the Last Year: Never true   Transportation Needs: No Transportation Needs (8/16/2024)    PRAPARE - Transportation     Lack of Transportation (Medical): No     Lack of Transportation (Non-Medical): No   Physical Activity: Not on file   Stress: Not on file   Social Connections: Not on file   Intimate Partner Violence: Not on file   Housing Stability: Low Risk  (8/16/2024)    Housing Stability Vital Sign     Unable to Pay for Housing in the Last Year: No     Number of Times Moved in the Last Year: 0     Homeless in the Last Year: No        Allergies   Allergen Reactions    Penicillins Itching    Penicillins Rash       Current Outpatient Medications   Medication Sig Dispense Refill    acetaminophen (TYLENOL) 325 mg tablet Take 2 tablets (650 mg total) by mouth every 6 (six) hours as needed for mild pain      amLODIPine (NORVASC) 10 mg tablet Take 1 tablet (10 mg total) by mouth daily 90 tablet 0    aspirin (ECOTRIN LOW STRENGTH) 81 mg EC tablet Take 81 mg by mouth daily      bimatoprost (LUMIGAN) 0.01 % ophthalmic drops Administer 1 drop to both eyes daily at bedtime 5 mL 5    bisacodyl 5 mg EC tablet Take 5 mg by mouth daily as needed for constipation.      Cholecalciferol (Vitamin D) 50 MCG (2000 UT) tablet Take 0.5 tablets (1,000 Units total) by mouth every other day 100 tablet 0    famotidine (PEPCID) 20 mg tablet TAKE 1 TABLET BY MOUTH TWICE A  tablet 1    furosemide (LASIX) 20 mg tablet TAKE 1 TABLET BY MOUTH EVERY  tablet 1    levothyroxine 75 mcg tablet TAKE 1 TABLET BY MOUTH EVERY DAY IN THE MORNING 90 tablet 1    losartan (COZAAR) 25 mg tablet Take 1 tablet (25 mg total) by mouth daily 30 tablet 0    metoprolol tartrate (LOPRESSOR) 25 mg tablet TAKE 1 TABLET (25 MG TOTAL) BY MOUTH EVERY 12 (TWELVE) HOURS 180 tablet 1    mirtazapine (REMERON) 15 mg tablet Take 1 tablet (15 mg total) by mouth daily at bedtime 30 tablet 0    oxygen gas Inhale 2 L/min continuous. nasal cannula  Indications: .      Pediatric Multiple Vit-C-FA (PEDIATRIC MULTIVITAMIN) chewable tablet Chew 1 tablet daily by mouth      Potassium Gluconate 595 (99 K) MG TABS Take 550 mg by mouth daily      rosuvastatin (CRESTOR) 10 MG tablet TAKE 1 TABLET BY MOUTH EVERY DAY 90 tablet 1     No current facility-administered medications for this visit.     I have reviewed the relevant past medical, surgical, social and family history. I have also reviewed allergies and medications for this patient.    Objective:      Vitals:    08/29/24 1006   BP: 118/52   Pulse: 84    Resp: 20   Temp: (!) 97.2 °F (36.2 °C)   SpO2: 95%       Wt Readings from Last 3 Encounters:   08/29/24 83.5 kg (184 lb)   08/28/24 81.6 kg (180 lb)   08/15/24 86 kg (189 lb 9.5 oz)     Physical Exam  Vitals and nursing note reviewed.   Constitutional:       General: She is not in acute distress.     Appearance: She is well-developed.   HENT:      Head: Normocephalic and atraumatic.   Eyes:      Conjunctiva/sclera: Conjunctivae normal.   Cardiovascular:      Rate and Rhythm: Normal rate.   Pulmonary:      Effort: Pulmonary effort is normal. No respiratory distress.      Comments: Oxygen via nasal cannula  Musculoskeletal:         General: No swelling.      Cervical back: Neck supple.   Skin:     General: Skin is dry.   Neurological:      General: No focal deficit present.      Mental Status: She is alert and oriented to person, place, and time.         Data Review:  Labs:  Lab data are reviewed and documented in [facility] history.     Lab Results   Component Value Date    HGB 13.2 08/26/2024    HCT 45.8 08/26/2024    MCV 98 08/26/2024     08/26/2024    WBC 10.14 08/26/2024    NRBC 0 08/26/2024     Lab Results   Component Value Date     07/16/2015    K 3.8 08/26/2024    CL 98 08/26/2024    CO2 40 (H) 08/26/2024    ANIONGAP 4 07/16/2015    BUN 48 (H) 08/26/2024    CREATININE 1.26 08/26/2024    GLUCOSE 100 05/01/2019    GLUF 127 (H) 08/26/2024    CALCIUM 9.0 08/26/2024    CORRECTEDCA 10.0 04/26/2024    AST 16 08/26/2024    ALT 11 08/26/2024    ALKPHOS 64 08/26/2024    PROT 5.4 (L) 07/05/2015    BILITOT 0.37 07/05/2015    EGFR 38 08/26/2024     Lab Results   Component Value Date    DGPHXHUP62 234 08/16/2024    OURLTRUL37 489 10/18/2021    XGJDIAZJ76 526 02/05/2021    TVRIASQB07 283 07/01/2020    VJEPZWQY21 357 02/08/2015       Disclaimer: This document was prepared using [employer] Direct technology. If a word or phrase is confusing, or does not make sense, this is likely due to recognition error which  was not discovered during this clinician's review. If you believe an error has occurred, please contact me through [employer] service line for mikel?cation.

## 2024-08-30 ENCOUNTER — HOME CARE VISIT (OUTPATIENT)
Dept: HOME HEALTH SERVICES | Facility: HOME HEALTHCARE | Age: 89
End: 2024-08-30
Payer: COMMERCIAL

## 2024-08-30 ENCOUNTER — TELEPHONE (OUTPATIENT)
Age: 89
End: 2024-08-30

## 2024-08-30 NOTE — TELEPHONE ENCOUNTER
Patient's daughter called to see if patient's PCP could review her lab results from the hospital and advise if she should be on something for her B12 deficiency. She also would like to set up her post-hospitalization appointment and was warm transferred to clerical staff.

## 2024-09-03 ENCOUNTER — TELEPHONE (OUTPATIENT)
Age: 89
End: 2024-09-03

## 2024-09-03 ENCOUNTER — HOME CARE VISIT (OUTPATIENT)
Dept: HOME HEALTH SERVICES | Facility: HOME HEALTHCARE | Age: 89
End: 2024-09-03
Payer: COMMERCIAL

## 2024-09-03 ENCOUNTER — RA CDI HCC (OUTPATIENT)
Dept: OTHER | Facility: HOSPITAL | Age: 89
End: 2024-09-03

## 2024-09-03 VITALS — DIASTOLIC BLOOD PRESSURE: 56 MMHG | OXYGEN SATURATION: 98 % | HEART RATE: 65 BPM | SYSTOLIC BLOOD PRESSURE: 106 MMHG

## 2024-09-03 PROCEDURE — G0152 HHCP-SERV OF OT,EA 15 MIN: HCPCS

## 2024-09-03 PROCEDURE — G0299 HHS/HOSPICE OF RN EA 15 MIN: HCPCS

## 2024-09-03 NOTE — TELEPHONE ENCOUNTER
Emre damon RN called from [facility] in regards to them seeing the patient twice a week and are looking for patients primary diagnoses so they can bill properly. Emre would like a call back with diagnoses code at [phone].

## 2024-09-04 ENCOUNTER — HOME CARE VISIT (OUTPATIENT)
Dept: HOME HEALTH SERVICES | Facility: HOME HEALTHCARE | Age: 89
End: 2024-09-04
Payer: COMMERCIAL

## 2024-09-04 ENCOUNTER — OFFICE VISIT (OUTPATIENT)
Dept: NEPHROLOGY | Facility: CLINIC | Age: 89
End: 2024-09-04
Payer: COMMERCIAL

## 2024-09-04 VITALS
BODY MASS INDEX: 33.49 KG/M2 | WEIGHT: 182 LBS | SYSTOLIC BLOOD PRESSURE: 120 MMHG | HEIGHT: 62 IN | HEART RATE: 61 BPM | OXYGEN SATURATION: 93 % | DIASTOLIC BLOOD PRESSURE: 50 MMHG

## 2024-09-04 VITALS — OXYGEN SATURATION: 90 % | HEART RATE: 63 BPM

## 2024-09-04 DIAGNOSIS — N20.0 NEPHROLITHIASIS: ICD-10-CM

## 2024-09-04 DIAGNOSIS — I10 HYPERTENSION: ICD-10-CM

## 2024-09-04 DIAGNOSIS — N18.32 STAGE 3B CHRONIC KIDNEY DISEASE (HCC): Primary | ICD-10-CM

## 2024-09-04 DIAGNOSIS — E66.9 OBESITY (BMI 30-39.9): ICD-10-CM

## 2024-09-04 DIAGNOSIS — N25.81 SECONDARY HYPERPARATHYROIDISM OF RENAL ORIGIN (HCC): ICD-10-CM

## 2024-09-04 DIAGNOSIS — R31.9 HEMATURIA, UNSPECIFIED TYPE: ICD-10-CM

## 2024-09-04 DIAGNOSIS — R80.1 PERSISTENT PROTEINURIA: ICD-10-CM

## 2024-09-04 PROCEDURE — G0151 HHCP-SERV OF PT,EA 15 MIN: HCPCS

## 2024-09-04 PROCEDURE — 99214 OFFICE O/P EST MOD 30 MIN: CPT | Performed by: INTERNAL MEDICINE

## 2024-09-04 RX ORDER — LOSARTAN POTASSIUM 50 MG/1
50 TABLET ORAL DAILY
Qty: 90 TABLET | Refills: 3 | Status: SHIPPED | OUTPATIENT
Start: 2024-09-04

## 2024-09-04 RX ORDER — CALCITRIOL 0.25 UG/1
0.25 CAPSULE, LIQUID FILLED ORAL 3 TIMES WEEKLY
Qty: 45 CAPSULE | Refills: 3 | Status: SHIPPED | OUTPATIENT
Start: 2024-09-04

## 2024-09-04 NOTE — PATIENT INSTRUCTIONS
"- stop potassium pills  - stop norvasc  - stop vit D  - start calcitriol 0.25mcg three times a week  - increase losartan to 50mg once a day  - get 24 hr urine test done  - get blood work in 1 month and call with updates  - get blood work in 3months    - Please call me in 10 days after having your blood work done to review the results if you do not hear back from me or my office, as I may have not received the results.  - please remember to perform blood work prior to the next visit.  - Please call if the blood pressure top number is greater than 140 or less than 110 consistently.  - Please call if you are gaining more than 2lbs in 2 days for adjustment of water pills.  ~ Please AVOID the following pain medications.  LIST OF NSAIDS (NONSTEROIDAL ANTI-INFLAMMATORY DRUGS) AND RAI-2 INHIBITORS    DIFLUNISAL (DOLOBID)  IBUPROFEN (MOTRIN, ADVIL)  FLURBIPROFEN (ANSAID)  KETOPROFEN (ORUDIS, ORUVAIL)  FENOPROFEN (NALFON)  NABUMETONE (RELAFEN)  PIROXICAM (FELDENE)  NAPROXEN (ALEVE, NAPROSYN, NAPRELAN, ANAPROX)  DICLOFENAC (VOLTAREN, CATAFLAM)  INDOMETHACIN (INDOCIN)  SULINDAC (CLINORIL)  TOLMETIN (TOLETIN)  ETODOLAC (LODINE)  MELOXICAM (MOBIC)  KETOROLAC (TORADOL)  OXAPROZIN (DAYPRO)  CELECOXIB (CELEBREX)    Phosphorus diet  Follow a low phosphorus diet.    Avoid these higher phosphorus foods: Choose these lower phosphorus foods:   Milk, pudding or yogurt (from animals and from many soy varieties) Rice milk (unfortified), nondairy creamer (if it doesn't have terms in the ingredients list that contain the letters \"phos\")   Hard cheeses, ricotta or cottage cheese, fat-free cream cheese Regular and low-fat cream cheese   Ice cream or frozen yogurt Sherbet or frozen fruit pops   Soups made with higher phosphorus ingredients (milk, dried peas, beans, lentils) Soups made with lower phosphorus ingredients (broth- or water-based with other lower phosphorus ingredients)   Whole grains, including whole-grain breads, crackers, cereal, " "rice and pasta Refined grains, including white bread, crackers, cereals, rice and pasta   Quick breads, biscuits, cornbread, muffins, pancakes or waffles Homemade refined (white) dinner rolls, bagels or English muffins   Dried peas (split, black-eyed), beans (black, garbanzo, lima, kidney, navy, andersen) or lentils Green peas (canned, frozen), green beans or wax beans   Organ meats, walleye, pollock or sardines Lean beef, pork, lamb, poultry or other fish   Nuts and seeds Popcorn   Peanut butter and other nut butters Jam, jelly or honey   Chocolate, including chocolate drinks Carob (chocolate-flavored) candy, hard candy or gumdrops   Tamie and pepper-type sodas, flavored reina, bottled teas (if a term in the ingredients list contains the letters \"phos\") Lemon-lime soda, ginger ale or root beer, plain water   Kidney Stone Prevention    Fluid Intake    A high fluid intake is one of the most important cornerstones of kidney stone dietary prevention. A sufficiently dilute urine will prevent the individual chemical components of stones from becoming concentrated enough to precipitate out of solution, keeping them instead in their dissolved state. A high urine output also may reduce stone from forming through \"flushing\" out of stone components and prevention of urine stagnation. In addition to stone benefits, increased water intake has been shown to have a multitude of other benefits, including improved alertness, better skin appearance, enhanced physical performance, reduced constipation and enhanced weight loss.    The average daily urine output for normal healthy adults is 1.2 liters a day, ranging from 1 to 2 liters in most individuals and varying with body weight and gender. In stone formers, however, a higher daily urine output is required for stone prevention and achieving a daily volume of at least 2.0 to 2.5 liters a day can significantly reduce the recurrence of future stones. In a randomized study of stone " formers who were given specific instructions to increase their fluid intake compared to stone formers told to not change their diet, those given specific fluid instructions achieved a high urine output of 2.6 liters a day versus 1.0 liters a day in those not given dietary instructions. Over a period of five years, the high fluid intake group was half as likely to form new stones as compared to the normal fluid intake group (Erica et al, J Urol 1996).    We recommend that most stone formers increase their daily fluid intake by one liter (an additional two 16 oz water bottles or two tall glasses a day) in order to achieve a urine output of 2.5 liters a day. (One liter = 4.2 8-oz glasses or 34 oz). Alternatively, a 24 hour urine collection can be performed to guide fluid intake to achieve 2.5 liters of urine output in a specific patient.    Type of Fluid Intake    The type of fluid intake is generally less important than the total intake. While drinking water is our preferred recommendation because it is inexpensive and contains no calories, for stone patients who do not enjoy drinking water, any beverage will be beneficial in reducing stone risk.    Contrary to popular belief, studies have found that an increased intake of tea, coffee, and alcoholic beverage actually reduces the risk of stones, possibly because of an associated increase in urine output (Magno et al, Am J of Epidemiology, 1996). While tea contains high levels of oxalate, this does not appear to result in increased stone formation for the reasons discussed below in discussion on oxalate.    Soda intake (including dara) and milk intake also do not appear to increase the risk of stones.    Citrus Fruit Juices    Citrus juices, including lemon juice and orange juice, contain citrate, which acts as a stone inhibitor for calcium based stones. Citrate seems to do this by binding calcium, making it unavailable to combine with oxalate or phosphate: a  necessary first step in the formation of stones. Citrate also seems to make it more difficult for stones to grow once they've formed.    Drinking citrus juice in the form of concentrated lemon juice mixed with water has been shown to effectively increase urinary citrate levels and reduce urinary calcium levels, both of which will reduce stone risk. Orange juice will similarly increase urinary citrate levels. However, orange juice appears to also increase urinary oxalate levels ( a stone promoter). Other sources of citrate, including grapefruit juice, have had less research completed confirming their beneficial effects on urinary citrate levels. Therefore, lemon juice is typically favored over the other citrus juices as a natural method to increase urinary citrate levels. Many patients find drinking citrus juices to be an attractive alternative to pharmaceutical treatment with potassium citrate.    We recommend that stone formers consider supplementing their daily fluid intake with a mixture of 60 ml of concentrated lemon juice in one liter of water to increase their urinary citrate levels.    Salt    A high sodium intake increases the risk of stone formation by increasing calcium levels and decreasing citrate (a stone inhibitor) levels in urine. Additionally, high sodium intake will impair the ability of medications such as hydrochlorothiazide to effectively reduce calcium levels in urine. A study of stone formers who were kept on a strict diet with a maximum daily sodium intake of 50 mmol (1200 mg) in addition to a reduced protein diet demonstrated that the low sodium diet was effective in reducing stone recurrence by 50% as compared to the low calcium diet.    We recommend that stone formers aim to follow the FDA's guideline of limiting salt intake to 2300 mg of sodium a day in the general population and 1500 mg of sodium a day in those with hypertension,  Americans, or middle aged and older adults. 2300 mg  is equivalent to about 1 teaspoon of table salt.    The best way to determine the salt content of your food is to read the nutrition label. Processed foods tend to contain higher amounts of salt. Choose low sodium options whenever possible.    1 cup of canned chicken noodle soup contains 870 mg of sodium.  A fried chicken drumstick contains 310 mg of sodium.  A serving of shrimp contains 240 mg of sodium.  2 slices of white bread contains 200 mg of sodium.  15 potato chips contain 180 mg of sodium.  1 container of strawberry yogurt contains 85 mg of sodium.  1 tomato contains 20 mg of sodium.  1 apple contains 0 mg of sodium.    In addition to lowering the risk of stones, a low sodium intake helps to control or prevent high blood pressure, which can lead to heart disease, stroke, heart failure, and kidney disease.    Animal Protein    Animal protein in meat products increases the risk of stone by increasing calcium, oxalate, and uric acid levels in urine. All three of these changes increase the risk of stones. In studies comparing high meat eaters versus low meat eaters, high meat eaters were found to be at increased risk of forming stones. A randomized study of stone formers restricted to a low meat intake of 52 grams a day (equivalent to 8 oz of beef) in combination with sodium restriction found that the combination reduced stone recurrence by 50% compared to calcium restriction alone (Erica et al, [facility] 2002).    We recommend that most stone formers try to reduce their meat intake to 6 oz a day. This includes all types of meat: beef, pork, poultry, and seafood.    The USDA has recommended a daily allowance of 5-6 oz of protein intake among adults. They also recommend choosing non-meat protein foods such as nuts and beans instead of meat sources. Protein from non-meat sources does not appear to increase the risk of stones.    A small steak contains about 3-4 oz of protein.  A quarter pound hamburger with cheese  contains 4 oz of protein.  A chicken breast contains about 5 oz of protein, a chicken thigh about 2.5 oz, a chicken drumstick about 1.5 oz.  One 5 oz can of tuna contains 5 oz of protein.  1 medium egg contains 1 oz of protein.    Lowering your animal protein intake and eating more fruits and vegetables also benefits your overall health by limiting the amount of saturated fats and cholesterol in your diet. This helps to reduce your risk of cardiovascular disease.    Oxalate    While oxalate plays an important role in the development of calcium oxalate stones, dietary restriction does not appear to be effective in reducing the risk of stones in the majority of patients. About 40% of urinary oxalate comes from dietary sources while the remainder is naturally made within the liver. Therefore, reducing oxalate dietary intake does not always have a significant impact on total urinary oxalate levels.    Oxalate is found in many vegetable and fruits, including many healthy dietary choices often making it difficult to achieve a low oxalate diet. Because of these issues, oxalate avoidance is beneficial primarily in those individuals with specific abnormalities that lead to high oxalate urinary levels.    We recommend that most stone formers should maintain a normal oxalate intake without the need for oxalate restriction. High oxalate intake should be avoided in individuals found to have high urinary oxalate levels on metabolic evaluation. Oxalate restriction may be beneficial in certain individuals with high urinary oxalate levels.    Oxalate Rich Foods    Tea (black)  Spinach  Mustard Greens  Chard  Beets  Rhubarb  Okra  Berries  Chocolate  Nuts  Sweet Potatoes        Calcium    Kidney Stone formers often question whether to stop or reduce their calcium intake. Despite the fact that calcium is a major component of 75% of stones, excessive calcium intake is vary rarely the cause of stone formation. In fact, several studies  have shown that restricting calcium intake in most stone formers actually increases the number of stones they develop. This appears to happen because when less calcium is ingested, it becomes easier for oxalate (which normally binds with calcium in the gut) to be absorbed. Higher levels of oxalate in the urine then lead to an increase in stone risk. Calcium obtained from dietary sources appears to be better than calcium from supplements in regards to lowering stone risk because supplements can actually increase your risk of stones slightly (by 17%) while dietary calcium intake is instead associated with lower stone risk. If you need to take supplements, taking them during meals appear to be better in terms of stone risk.    We recommend that stone formers maintain a normal calcium intake, preferably from dietary sources. Female stone formers taking calcium supplementation to prevent osteoporosis experience a slightly increased risk of stones (17%) which needs to be balanced against their risk of osteoporosis.Things to do to reduce your blood pressure include working with all your physician to do the following:  ~ stop smoking if you smoke.  ~ increase cardiovascular exercise like walking and swimming.   ~ modify your diet to decrease fat and salt intake.  ~ reduce your weight if you are overweight or obese.  ~ increase the consumption of fruits, vegetables and whole grains.  ~ decrease alcohol consumption if you consume alcohol.   ~ try to minimize stress in your life with lifestyle modifications.   ~ be compliant with your anti-hypertensive medications.   ~ adjust your medications to help improve your vascular stiffness and decrease risks for heart attacks and strokes. Follow a moderate potassium diet.      Exercise to Lose Weight     Exercise and a healthy diet may help you lose weight. Your doctor may suggest specific exercises.     EXERCISE IDEAS AND TIPS     Choose low-cost things you enjoy doing, such as  walking, bicycling, or exercising to workout videos.   Take stairs instead of the elevator.   Walk during your lunch break.   Park your car further away from work or school.   Go to a gym or an exercise class.   Start with 5 to 10 minutes of exercise each day. Build up to 30 minutes of exercise 4 to 6 days a week.   Wear shoes with good support and comfortable clothes.   Stretch before and after working out.   Work out until you breathe harder and your heart beats faster.   Drink extra water when you exercise.   Do not do so much that you hurt yourself, feel dizzy, or get very short of breath.     Exercises that burn about 150 calories:   Running 1 ½ miles in 15 minutes.   Playing volleyball for 45 to 60 minutes.   Washing and waxing a car for 45 to 60 minutes.   Playing touch football for 45 minutes.   Walking 1 ¾ miles in 35 minutes.   Pushing a stroller 1 ½ miles in 30 minutes.   Playing basketball for 30 minutes.   Raking leaves for 30 minutes.   Bicycling 5 miles in 30 minutes.   Walking 2 miles in 30 minutes.   Dancing for 30 minutes.   Shoveling snow for 15 minutes.   Swimming laps for 20 minutes.   Walking up stairs for 15 minutes.   Bicycling 4 miles in 15 minutes.   Gardening for 30 to 45 minutes.   Jumping rope for 15 minutes.   Washing windows or floors for 45 to 60 minutes.

## 2024-09-04 NOTE — PROGRESS NOTES
Nephrology Follow up Consultation  Ashley Gonzales 88 y.o. female MRN: [id number]            BACKGROUND:  Ashley Gonzales is a 88 y.o.female who was referred by Paz Maharaj DO for evaluation of Follow-up and Chronic Kidney Disease  .      ASSESSMENT / PLAN:   88 y.o.  female with pmh of multiple co-morbidities including obesity, CHF, AAA, kidney stones, s/p CEA, hypertension (x20yrs), diabetes diet controlled, COPD oxygen, JAMEEL, prior CVA mild upper extremity weakness, CAD status post CABG presents to the office for routine follow-up.    CKD stage 3A/B:  - After review of records in [facility] as well as Care everywhere patient has a baseline creatinine of 1.1-1.3 mg/dL dating as far back as 2021. Most recent labs show a Creatinine of 1.26 mg/dL 8/26/24. Renal function remains stable.  Check blood work in 1 month and then again in 3 months and then again prior to next visit  -Likely has underlying CKD secondary to age-related nephron loss plus obesity hyperfiltration plus cardiorenal syndrome plus renovascular disease plus smoking associated nephropathy plus hypertensive nephrosclerosis .  Very low likelihood of underlying GN such as IgA nephropathy or lupus had a detailed discussion with the patient and daughter we will hold off on renal biopsy and further workup at this time  -Did have abnormal SPEP UPEP was referred over to hematology for further evaluation of paraproteinemia  -CT abdomen pelvis from 2/27/2024 no hydronephrosis multiple bilateral nonobstructing calculi largest measuring 6 mm in the lower pole on the left measuring 5 mm subcentimeter hypoattenuating renal lesion too small to characterize but likely benign.  - Acid base and lytes stable except for hypokalemia on potassium supplements 1 tab p.o. daily.  Most recent potassium level 3.8.  DC potassium supplements for now as we will be increasing losartan dosage advised patient of appropriate potassium diet  - Clinically the patient appears to be  euvolemic.  - Recommend to avoid use of NSAIDs, nephrotoxins. Caution advised with regards to exposure to IV contrast dye.   - Discussed with the patient in depth her renal status, including the possible etiologies for CKD.   - Advised the patient that when her GFR is close to 20mL/min then will start discussing about RRT(renal replacement therapy) options such as renal transplant, peritoneal dialysis and hemodialysis.   - Informed the patient about the various options for Renal Replacement therapy.  - Discussed with the patient how we need to work together to delay the progression of CKD with optimal BP control based on their age and co-morbidities, optimal BS control with HbA1c of <7% and trying to reduce proteinuria by the use of anti-proteinuric agents.   -Will refer over to CKD education kidney smart at next visit     hypertension:  BP Readings from Last 3 Encounters:   05/01/24 132/72   04/26/24 120/62   03/14/24 128/80     - Patient is on Norvasc 10 mg p.o. daily, Lasix 20 mg p.o. daily, metoprolol 25 mg p.o. every 12, losartan 25mg po q24.   -Discussed appropriate techniques of checking blood pressures and to call with blood pressure updates if any issues  -DC Norvasc due to concerns for lower extremity edema secondary to that.  Increase losartan to 50 mg p.o. daily which will also help with her hypokalemia check blood work in 1 month call with blood pressure updates in 1 month.  - Goal BP of <  140/90 and >110 based on age and comorbidities  - Instructed to follow low sodium (2gm)diet.    Hemoglobin:  - Goal Hb of 10-12 g/dL  - Most recent labs suggestive of 13.2 g/dL.   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  - Maintain calcium phosphorus product of < 55.  - Stage 3 CKD - Goal Ca 8.5-10 mg/dL , goal Phos 2.7-4.6 mg/dL  , goal iPTH 30-70 pg/mL  -Hold vitamin D for now and start Calcitrol  0.25 mcg 3 times a week  -Check intact PTH vitamin D  prior to next visit and in 3 months  -Most recent vitamin D level 52.7 and intact PTH of 173.4 as of 8/26/2024    Proteinuria/hematuria:  - proteinuria most likely secondary to obesity due to hyperfiltration   -Most recent protein creatinine ratio 300 mg, most recent micral and creatinine ratio too small to be quantified as of 8/26/2024.    -Did have abnormal SPEP UPEP however immunofixation within normal limits no signs of paraproteinemia following up with hematology, last seen 8/29/2024 and cleared for no evidence of paraproteinemia.    -Patient has had hematuria dating as far back as 2015 when innumerable RBCs.  Majority of the time prior UAs have had blood with signs of UTIs.  Discussed with patient in depth at this time renal parameters continue remained stable  overall as long as renal parameters remain stable we will hold off on renal biopsy unless deterioration in renal function.  May have some component of underlying thin basement membrane disease or IgA nephropathy versus renal scarring from nephrolithiasis.  Patient also agrees and wishes to hold off on renal biopsy, GN workup and cystoscopy with urology at this time.  The degree of proteinuria on prior workup was minimal for now we will reevaluate and see if there is some degree of increased proteinuria may consider addition of ACE inhibitor or ARB.  -Most recent UA from 8/26/2024 no blood no RBCs noted no role for renal biopsy  - check protein creatinine ratio  - currently on therapy for proteinuria with vitamin D, crestor, losartan    Lipids:  - on crestor  - goal LDL less than 70  - Management as per PCP    DM:  - management as per Primary team  - on no medication  -Most recent A1c 6.1% as of 2/21/2024  -A1c consistently less than 6.5% since 2015    CHF/AAA:  - Management as per primary team  -CT from 2/27/2024 showing 3.9 cm intrarenal abdominal aortic aneurysm on a background of severe  atherosclerosis increased in size from 2017.  -Most recent echo from 2/6/2021 EF of 60% grade 2 diastolic dysfunction RVSP 55    Nephrolithiasis:  - Discussed with the patient that the most common types of kidney stones are calcium oxalate stones.   - Advised to follow a diet with increased fluid intake so that urine output is greater than 2-2.5L/day.  - Advised patient to decrease salt, protein and oxalate intake.   - Dietary handouts given.  - Ordered for 24 hr stone workup analysis.   - advised patient to call 2 weeks after it is completed to review and discuss the results    Nutrition/obesity:  - Encouraged patient to follow a renal diet comprising of moderate potassium, low phosphorus and protein restriction to 0.8gm/kg.  Advised of dietary habits and weight loss  - Will check serum albumin with next blood work.     Followup:  - Patient is to follow-up in 6 months, with lab work to be performed in 1 month then again in 3 months and then again a few days prior to the next visit.  Advised patient to call me in 10 days to review the results if they do not hear back from me, as I may have not received the results.  Rosibel Mehta MD, [facility], 9/4/2024, 3:17 PM             SUBJECTIVE: 88 y.o. female presents to the office for routine follow-up with her daughter feels well has no complaints, no NSAID use thankful for the care information that she is gone today since last visit is on losartan.  Agrees with current plan with regards to discontinuation of potassium pills and Norvasc and going up on the losartan due to concerns for trace edema secondary to Norvasc.  Happy to hear renal parameters are stable.  Did submit 24-hour urine however cannot be found in the system likely was discarded by the lab agreeable to repeating.  Trying to increase fluid intake.  Since last visit was seen and cleared by hematology      Review of Systems   Constitutional:  Negative for chills and fever.   HENT:  Negative for congestion.     Respiratory:  Negative for shortness of breath and wheezing.    Cardiovascular:  Negative for leg swelling.   Gastrointestinal:  Negative for constipation and diarrhea.   Genitourinary:  Negative for difficulty urinating and dysuria.   Musculoskeletal:  Negative for back pain.   Neurological:  Negative for dizziness and headaches.   Psychiatric/Behavioral:  Negative for agitation and confusion.    All other systems reviewed and are negative.      PAST MEDICAL HISTORY:  Past Medical History:   Diagnosis Date   • Anxiety    • Cataract, bilateral     Last Assessed:3/19/2014   • COPD (chronic obstructive pulmonary disease) ([facility])    • Coronary artery disease    • Hx of arterial ischemic stroke    • Hypertension    • Stroke ([facility])    • Wears glasses        PROBLEM LIST    Patient Active Problem List   Diagnosis   • COPD (chronic obstructive pulmonary disease) ([facility])   • Benign familial tremor   • Hx of arterial ischemic stroke   • Clinical depression   • Esophageal reflux   • Female stress incontinence   • Generalized osteoarthritis   • Insomnia   • JAMEEL (obstructive sleep apnea)   • Osteoarthritis of knee   • Postural imbalance   • History of total knee replacement   • Pleural effusion   • Acute on chronic respiratory failure with hypoxia and hypercapnia ([facility])   • Obesity (BMI 30-39.9)   • Dizziness   • Hematuria   • Stage 3 chronic kidney disease ([facility])   • Nephrolithiasis   • Metabolic encephalopathy   • UTI (urinary tract infection)   • Fall   • Lung nodule   • Chronic respiratory failure with hypoxia and hypercapnia ([facility])   • Low serum vitamin B12   • Persistent proteinuria   • Secondary hyperparathyroidism of renal origin ([facility])       PAST SURGICAL HISTORY:  Past Surgical History:   Procedure Laterality Date   • CAROTID ENDARTERECTOMY Right    • CATARACT EXTRACTION W/ INTRAOCULAR LENS  IMPLANT, BILATERAL     • COLONOSCOPY N/A 9/30/2017    Procedure: COLONOSCOPY FOR CONTROL OF BLEEDING;  Surgeon: Jeb Gaxiola MD;   "Location: [facility];  Service: Colorectal   • CORONARY ARTERY BYPASS GRAFT      CABG X3 with LIMA to LAD,SVG to OM,SVG to distal  RCA ; Last Assessed:7/13/2015   • JOINT REPLACEMENT      left TKR   • KIDNEY STONE SURGERY     • NEPHROSTOMY Left     with Drainage Irrigation ;Onset:1974   • MO ARTHRP KNE CONDYLE&PLATU MEDIAL&LAT COMPARTMENTS Right 2/25/2016    Procedure: ARTHROPLASTY KNEE TOTAL;  Surgeon: Jeb Figueroa MD;  Location: [facility];  Service: Orthopedics       SOCIAL HISTORY :   reports that she has quit smoking. Her smoking use included cigarettes. She started smoking about 73 years ago. She has a 24 pack-year smoking history. She has been exposed to tobacco smoke. She has never used smokeless tobacco. She reports that she does not drink alcohol and does not use drugs.    FAMILY HISTORY:  Family History   Problem Relation Age of Onset   • Cancer Mother         malignant neoplasm   • Heart attack Father    • Aneurysm Other         Aortic   • Cancer Other         malignant neoplasm       ALLERGIES:  Allergies   Allergen Reactions   • Penicillins Itching   • Penicillins Rash           PHYSICAL EXAM:  Vitals:    09/04/24 1446   BP: 120/50   BP Location: Left arm   Patient Position: Sitting   Cuff Size: Adult   Pulse: 61   SpO2: 93%   Weight: 82.6 kg (182 lb)   Height: 5' 2\" (1.575 m)     Body mass index is 33.29 kg/m².    Physical Exam  Vitals reviewed.   Constitutional:       General: She is not in acute distress.     Appearance: Normal appearance. She is obese. She is not ill-appearing, toxic-appearing or diaphoretic.   HENT:      Head: Normocephalic and atraumatic.      Nose:      Comments: Nasal cannula in place     Mouth/Throat:      Pharynx: No oropharyngeal exudate.   Eyes:      General: No scleral icterus.  Cardiovascular:      Rate and Rhythm: Normal rate.   Pulmonary:      Effort: No respiratory distress.      Breath sounds: Normal breath sounds. No stridor. No wheezing.   Abdominal:      " General: There is no distension.      Palpations: Abdomen is soft. There is no mass.      Tenderness: There is no abdominal tenderness.   Musculoskeletal:         General: Swelling present.      Cervical back: Normal range of motion. No rigidity.      Comments: Trace to 1+ edema bilateral lower extremity dominantly ankles   Skin:     Coloration: Skin is not jaundiced.   Neurological:      General: No focal deficit present.      Mental Status: She is alert and oriented to person, place, and time.   Psychiatric:         Mood and Affect: Mood normal.         Behavior: Behavior normal.         LABORATORY DATA:     Results from last 6 Months   Lab Units 08/26/24  0837 08/21/24  0519 08/20/24  0830 08/15/24  1009 05/06/24  1202   WBC Thousand/uL 10.14 8.89 9.16   < >  --    HEMOGLOBIN g/dL 13.2 14.2 14.5   < >  --    HEMATOCRIT % 45.8 47.3* 47.1*   < >  --    PLATELETS Thousands/uL 259 231 241   < >  --    POTASSIUM mmol/L 3.8 4.0 3.9   < > 4.5   CHLORIDE mmol/L 98 96 92*   < > 103   CO2 mmol/L 40* 43* 43*   < > 34*   BUN mg/dL 48* 27* 27*   < > 41*   CREATININE mg/dL 1.26 1.18 1.12   < > 1.16   CALCIUM mg/dL 9.0 9.4 9.6   < > 9.7   MAGNESIUM mg/dL 2.2  --  2.2  --  2.2   PHOSPHORUS mg/dL 2.9  --  3.0  --  3.0    < > = values in this interval not displayed.          rest all reviewed    RADIOLOGY:  No orders to display     Rest all reviewed        MEDICATIONS:    Current Outpatient Medications:   •  acetaminophen (TYLENOL) 325 mg tablet, Take 2 tablets (650 mg total) by mouth every 6 (six) hours as needed for mild pain, Disp: , Rfl:   •  aspirin (ECOTRIN LOW STRENGTH) 81 mg EC tablet, Take 81 mg by mouth daily, Disp: , Rfl:   •  bimatoprost (LUMIGAN) 0.01 % ophthalmic drops, Administer 1 drop to both eyes daily at bedtime, Disp: 5 mL, Rfl: 5  •  bisacodyl 5 mg EC tablet, Take 5 mg by mouth daily as needed for constipation., Disp: , Rfl:   •  calcitriol (ROCALTROL) 0.25 mcg capsule, Take 1 capsule (0.25 mcg total) by mouth  "3 (three) times a week, Disp: 45 capsule, Rfl: 3  •  famotidine (PEPCID) 20 mg tablet, TAKE 1 TABLET BY MOUTH TWICE A DAY, Disp: 180 tablet, Rfl: 1  •  furosemide (LASIX) 20 mg tablet, TAKE 1 TABLET BY MOUTH EVERY DAY, Disp: 100 tablet, Rfl: 1  •  levothyroxine 75 mcg tablet, TAKE 1 TABLET BY MOUTH EVERY DAY IN THE MORNING, Disp: 90 tablet, Rfl: 1  •  losartan (COZAAR) 50 mg tablet, Take 1 tablet (50 mg total) by mouth daily, Disp: 90 tablet, Rfl: 3  •  metoprolol tartrate (LOPRESSOR) 25 mg tablet, TAKE 1 TABLET (25 MG TOTAL) BY MOUTH EVERY 12 (TWELVE) HOURS, Disp: 180 tablet, Rfl: 1  •  mirtazapine (REMERON) 15 mg tablet, Take 1 tablet (15 mg total) by mouth daily at bedtime, Disp: 30 tablet, Rfl: 0  •  oxygen gas, Inhale 2 L/min continuous. nasal cannula  Indications: ., Disp: , Rfl:   •  Pediatric Multiple Vit-C-FA (PEDIATRIC MULTIVITAMIN) chewable tablet, Chew 1 tablet daily by mouth, Disp: , Rfl:   •  rosuvastatin (CRESTOR) 10 MG tablet, TAKE 1 TABLET BY MOUTH EVERY DAY, Disp: 90 tablet, Rfl: 1          Portions of the record may have been created with voice recognition software. Occasional wrong word or \"sound a like\" substitutions may have occurred due to the inherent limitations of voice recognition software. Read the chart carefully and recognize, using context, where substitutions have occurred.If you have any questions, please contact the dictating provider.      "

## 2024-09-05 ENCOUNTER — HOME CARE VISIT (OUTPATIENT)
Dept: HOME HEALTH SERVICES | Facility: HOME HEALTHCARE | Age: 89
End: 2024-09-05
Payer: COMMERCIAL

## 2024-09-05 VITALS
OXYGEN SATURATION: 96 % | SYSTOLIC BLOOD PRESSURE: 130 MMHG | HEART RATE: 67 BPM | DIASTOLIC BLOOD PRESSURE: 68 MMHG | TEMPERATURE: 97.8 F

## 2024-09-05 VITALS — OXYGEN SATURATION: 95 % | DIASTOLIC BLOOD PRESSURE: 52 MMHG | SYSTOLIC BLOOD PRESSURE: 108 MMHG | HEART RATE: 67 BPM

## 2024-09-05 VITALS
SYSTOLIC BLOOD PRESSURE: 100 MMHG | OXYGEN SATURATION: 97 % | DIASTOLIC BLOOD PRESSURE: 52 MMHG | HEART RATE: 64 BPM | RESPIRATION RATE: 120 BRPM | TEMPERATURE: 98.7 F

## 2024-09-05 LAB
DME PARACHUTE DELIVERY DATE ACTUAL: NORMAL
DME PARACHUTE DELIVERY DATE REQUESTED: NORMAL
DME PARACHUTE ITEM DESCRIPTION: NORMAL
DME PARACHUTE ORDER STATUS: NORMAL
DME PARACHUTE SUPPLIER NAME: NORMAL
DME PARACHUTE SUPPLIER PHONE: NORMAL

## 2024-09-05 PROCEDURE — G0299 HHS/HOSPICE OF RN EA 15 MIN: HCPCS

## 2024-09-05 PROCEDURE — G0152 HHCP-SERV OF OT,EA 15 MIN: HCPCS

## 2024-09-06 ENCOUNTER — OFFICE VISIT (OUTPATIENT)
Dept: FAMILY MEDICINE CLINIC | Facility: CLINIC | Age: 89
End: 2024-09-06
Payer: COMMERCIAL

## 2024-09-06 ENCOUNTER — HOME CARE VISIT (OUTPATIENT)
Dept: HOME HEALTH SERVICES | Facility: HOME HEALTHCARE | Age: 89
End: 2024-09-06
Payer: COMMERCIAL

## 2024-09-06 VITALS
OXYGEN SATURATION: 92 % | WEIGHT: 181.2 LBS | HEIGHT: 62 IN | SYSTOLIC BLOOD PRESSURE: 110 MMHG | BODY MASS INDEX: 33.34 KG/M2 | TEMPERATURE: 97.8 F | HEART RATE: 60 BPM | DIASTOLIC BLOOD PRESSURE: 70 MMHG

## 2024-09-06 DIAGNOSIS — R41.82 ALTERED MENTAL STATUS: ICD-10-CM

## 2024-09-06 DIAGNOSIS — G93.41 METABOLIC ENCEPHALOPATHY: ICD-10-CM

## 2024-09-06 DIAGNOSIS — J44.9 CHRONIC OBSTRUCTIVE PULMONARY DISEASE, UNSPECIFIED COPD TYPE (HCC): Primary | ICD-10-CM

## 2024-09-06 DIAGNOSIS — J96.11 CHRONIC RESPIRATORY FAILURE WITH HYPOXIA AND HYPERCAPNIA (HCC): ICD-10-CM

## 2024-09-06 DIAGNOSIS — J96.12 CHRONIC RESPIRATORY FAILURE WITH HYPOXIA AND HYPERCAPNIA (HCC): ICD-10-CM

## 2024-09-06 DIAGNOSIS — N25.81 SECONDARY HYPERPARATHYROIDISM OF RENAL ORIGIN (HCC): ICD-10-CM

## 2024-09-06 DIAGNOSIS — E03.9 ACQUIRED HYPOTHYROIDISM: ICD-10-CM

## 2024-09-06 PROCEDURE — G2211 COMPLEX E/M VISIT ADD ON: HCPCS | Performed by: FAMILY MEDICINE

## 2024-09-06 PROCEDURE — 99214 OFFICE O/P EST MOD 30 MIN: CPT | Performed by: FAMILY MEDICINE

## 2024-09-06 RX ORDER — MIRTAZAPINE 30 MG/1
30 TABLET, FILM COATED ORAL
Qty: 30 TABLET | Refills: 0 | Status: SHIPPED | OUTPATIENT
Start: 2024-09-06 | End: 2024-10-06

## 2024-09-06 NOTE — ASSESSMENT & PLAN NOTE
I did review patient's hospital stay including lab work and imaging that was completed.  At this time, this issue is resolved and patient has no new complaints

## 2024-09-06 NOTE — PROGRESS NOTES
Ambulatory Visit  Name: Ashley Gonzales      : 1935      MRN: [id number]  Encounter Provider: Gloria Sainz MD  Encounter Date: 2024   Encounter department: [facility] PRIMARY [facility]ARE    Assessment & Plan   1. Chronic obstructive pulmonary disease, unspecified COPD type (HCC)  Assessment & Plan:  Continue oxygen supplementation, currently on 2 L  2. Metabolic encephalopathy  Assessment & Plan:  I did review patient's hospital stay including lab work and imaging that was completed.  At this time, this issue is resolved and patient has no new complaints  3. Acquired hypothyroidism  Assessment & Plan:  Doing well with her levothyroxine 75 mcg, continue as prescribed  4. Secondary hyperparathyroidism of renal origin (HCC)  Assessment & Plan:  Continue follow-up with endocrinology  5. Chronic respiratory failure with hypoxia and hypercapnia (HCC)  Assessment & Plan:  Continue oxygen supplementation.  6. Altered mental status  -     mirtazapine (REMERON) 30 mg tablet; Take 1 tablet (30 mg total) by mouth daily at bedtime       History of Present Illness     She presents today for a hospital follow-up after being admitted due to altered mental status.  Thought to be due to metabolic encephalopathy secondary to hypercarbia.  Was started on Remeron and is doing well with this, and is interested in increasing the dose.  AMS has improved.  No new complaints today.        Review of Systems   Constitutional:  Negative for activity change, appetite change, chills, fatigue and fever.   HENT:  Negative for congestion, rhinorrhea, sneezing and sore throat.    Eyes:  Negative for pain, discharge, redness and itching.   Respiratory:  Negative for cough, chest tightness, shortness of breath and wheezing.    Cardiovascular:  Negative for chest pain and palpitations.   Gastrointestinal:  Negative for abdominal distention, abdominal pain, constipation, diarrhea, nausea and vomiting.   Musculoskeletal:  Positive  "for gait problem. Negative for arthralgias, back pain, joint swelling and myalgias.   Skin:  Negative for rash.   Neurological:  Negative for dizziness, weakness, numbness and headaches.   Hematological:  Negative for adenopathy.   Psychiatric/Behavioral:  Negative for confusion and dysphoric mood. The patient is not nervous/anxious.    All other systems reviewed and are negative.      Objective     /70   Pulse 60   Temp 97.8 °F (36.6 °C) (Temporal)   Ht 5' 2\" (1.575 m)   Wt 82.2 kg (181 lb 3.2 oz)   SpO2 92%   BMI 33.14 kg/m²     Physical Exam  Vitals reviewed.   Constitutional:       General: She is not in acute distress.     Appearance: Normal appearance. She is well-developed. She is not ill-appearing or toxic-appearing.   HENT:      Head: Normocephalic and atraumatic.      Right Ear: External ear normal.      Left Ear: External ear normal.      Nose: Nose normal. No congestion or rhinorrhea.      Mouth/Throat:      Mouth: Mucous membranes are moist.   Eyes:      General: No scleral icterus.        Right eye: No discharge.         Left eye: No discharge.      Extraocular Movements: Extraocular movements intact.      Conjunctiva/sclera: Conjunctivae normal.      Pupils: Pupils are equal, round, and reactive to light.   Cardiovascular:      Rate and Rhythm: Normal rate and regular rhythm.      Pulses: Normal pulses.      Heart sounds: Normal heart sounds. No murmur heard.  Pulmonary:      Effort: Pulmonary effort is normal. No respiratory distress.      Breath sounds: Normal breath sounds.      Comments: On O2 supplementation  Abdominal:      General: Abdomen is flat. There is no distension.      Palpations: Abdomen is soft. There is no mass.      Tenderness: There is no abdominal tenderness.      Hernia: No hernia is present.   Musculoskeletal:         General: No swelling, tenderness, deformity or signs of injury. Normal range of motion.      Cervical back: Normal range of motion.   Skin:     " General: Skin is warm and dry.      Findings: No bruising, erythema, lesion or rash.   Neurological:      General: No focal deficit present.      Mental Status: She is alert.      Motor: No weakness.      Gait: Gait abnormal.   Psychiatric:         Mood and Affect: Mood normal.         Behavior: Behavior normal.       Administrative Statements

## 2024-09-09 ENCOUNTER — HOME CARE VISIT (OUTPATIENT)
Dept: HOME HEALTH SERVICES | Facility: HOME HEALTHCARE | Age: 89
End: 2024-09-09
Payer: COMMERCIAL

## 2024-09-09 VITALS — HEART RATE: 57 BPM | OXYGEN SATURATION: 94 %

## 2024-09-09 PROCEDURE — G0151 HHCP-SERV OF PT,EA 15 MIN: HCPCS

## 2024-09-10 ENCOUNTER — HOME CARE VISIT (OUTPATIENT)
Dept: HOME HEALTH SERVICES | Facility: HOME HEALTHCARE | Age: 89
End: 2024-09-10
Payer: COMMERCIAL

## 2024-09-10 VITALS
HEART RATE: 65 BPM | OXYGEN SATURATION: 96 % | TEMPERATURE: 97.3 F | RESPIRATION RATE: 18 BRPM | DIASTOLIC BLOOD PRESSURE: 60 MMHG | SYSTOLIC BLOOD PRESSURE: 120 MMHG

## 2024-09-10 VITALS — DIASTOLIC BLOOD PRESSURE: 54 MMHG | SYSTOLIC BLOOD PRESSURE: 104 MMHG | HEART RATE: 75 BPM | OXYGEN SATURATION: 97 %

## 2024-09-10 PROCEDURE — G0299 HHS/HOSPICE OF RN EA 15 MIN: HCPCS

## 2024-09-10 PROCEDURE — G0152 HHCP-SERV OF OT,EA 15 MIN: HCPCS

## 2024-09-11 ENCOUNTER — HOME CARE VISIT (OUTPATIENT)
Dept: HOME HEALTH SERVICES | Facility: HOME HEALTHCARE | Age: 89
End: 2024-09-11
Payer: COMMERCIAL

## 2024-09-11 VITALS — HEART RATE: 66 BPM | OXYGEN SATURATION: 93 %

## 2024-09-11 PROCEDURE — G0151 HHCP-SERV OF PT,EA 15 MIN: HCPCS

## 2024-09-12 ENCOUNTER — HOME CARE VISIT (OUTPATIENT)
Dept: HOME HEALTH SERVICES | Facility: HOME HEALTHCARE | Age: 89
End: 2024-09-12
Payer: COMMERCIAL

## 2024-09-12 VITALS
HEART RATE: 63 BPM | TEMPERATURE: 97.4 F | DIASTOLIC BLOOD PRESSURE: 60 MMHG | OXYGEN SATURATION: 96 % | RESPIRATION RATE: 18 BRPM | SYSTOLIC BLOOD PRESSURE: 130 MMHG

## 2024-09-12 PROCEDURE — G0299 HHS/HOSPICE OF RN EA 15 MIN: HCPCS

## 2024-09-16 ENCOUNTER — PATIENT OUTREACH (OUTPATIENT)
Dept: CASE MANAGEMENT | Facility: OTHER | Age: 89
End: 2024-09-16

## 2024-09-16 PROBLEM — N39.0 UTI (URINARY TRACT INFECTION): Status: RESOLVED | Noted: 2024-08-15 | Resolved: 2024-09-16

## 2024-09-16 NOTE — PROGRESS NOTES
Contacted patient's daughter for f/u.  Patient is receiving home health services of . She also receives aide services through the [facility] of 4 hours per week.  Her daughter is with her daily.  Patient remains on 2 L of oxygen, uses BIPAP at night.  PO in the 90's.  No c/o wheezing or cough, becomes winded on exertion.  Weight is stable.  No c/o chest pain or LE edema.  Daughter reports patient has good appetite, following low sodium diet and fluid restrictions.  She is taking all medications as prescribed.  She had f/u with PCP last week.  Other appointments scheduled.  Family takes patient to all appointments.  Daughter feels there are no needs at this time.  Agreeable to monthly outreach.

## 2024-09-17 ENCOUNTER — HOME CARE VISIT (OUTPATIENT)
Dept: HOME HEALTH SERVICES | Facility: HOME HEALTHCARE | Age: 89
End: 2024-09-17
Payer: COMMERCIAL

## 2024-09-17 VITALS
HEART RATE: 64 BPM | RESPIRATION RATE: 16 BRPM | TEMPERATURE: 98 F | OXYGEN SATURATION: 96 % | SYSTOLIC BLOOD PRESSURE: 140 MMHG | DIASTOLIC BLOOD PRESSURE: 58 MMHG

## 2024-09-17 PROCEDURE — G0300 HHS/HOSPICE OF LPN EA 15 MIN: HCPCS

## 2024-09-18 ENCOUNTER — HOME CARE VISIT (OUTPATIENT)
Dept: HOME HEALTH SERVICES | Facility: HOME HEALTHCARE | Age: 89
End: 2024-09-18
Payer: COMMERCIAL

## 2024-09-18 NOTE — CASE COMMUNICATION
-NOM[facility] signed on 9/17 for discharge on 9/19. 9/19 visit is listed as discharge visit. Libtayo Pregnancy And Lactation Text: This medication is contraindicated in pregnancy and when breast feeding.

## 2024-09-19 ENCOUNTER — HOME CARE VISIT (OUTPATIENT)
Dept: HOME HEALTH SERVICES | Facility: HOME HEALTHCARE | Age: 89
End: 2024-09-19
Payer: COMMERCIAL

## 2024-09-19 PROCEDURE — G0299 HHS/HOSPICE OF RN EA 15 MIN: HCPCS

## 2024-09-22 VITALS
TEMPERATURE: 97.2 F | RESPIRATION RATE: 20 BRPM | HEART RATE: 68 BPM | OXYGEN SATURATION: 96 % | SYSTOLIC BLOOD PRESSURE: 128 MMHG | DIASTOLIC BLOOD PRESSURE: 80 MMHG

## 2024-10-01 DIAGNOSIS — R41.82 ALTERED MENTAL STATUS: ICD-10-CM

## 2024-10-02 RX ORDER — MIRTAZAPINE 30 MG/1
30 TABLET, FILM COATED ORAL
Qty: 90 TABLET | Refills: 1 | Status: SHIPPED | OUTPATIENT
Start: 2024-10-02

## 2024-10-11 ENCOUNTER — APPOINTMENT (OUTPATIENT)
Dept: LAB | Facility: CLINIC | Age: 89
End: 2024-10-11
Payer: COMMERCIAL

## 2024-10-11 DIAGNOSIS — R31.9 HEMATURIA, UNSPECIFIED TYPE: ICD-10-CM

## 2024-10-11 DIAGNOSIS — I10 HYPERTENSION: ICD-10-CM

## 2024-10-11 DIAGNOSIS — N25.81 SECONDARY HYPERPARATHYROIDISM OF RENAL ORIGIN (HCC): ICD-10-CM

## 2024-10-11 DIAGNOSIS — N18.32 STAGE 3B CHRONIC KIDNEY DISEASE (HCC): ICD-10-CM

## 2024-10-11 DIAGNOSIS — E66.9 OBESITY (BMI 30-39.9): ICD-10-CM

## 2024-10-11 DIAGNOSIS — N20.0 NEPHROLITHIASIS: ICD-10-CM

## 2024-10-11 DIAGNOSIS — R80.1 PERSISTENT PROTEINURIA: ICD-10-CM

## 2024-10-11 LAB
ALBUMIN SERPL BCG-MCNC: 3.3 G/DL (ref 3.5–5)
ANION GAP SERPL CALCULATED.3IONS-SCNC: 7 MMOL/L (ref 4–13)
BUN SERPL-MCNC: 45 MG/DL (ref 5–25)
CALCIUM ALBUM COR SERPL-MCNC: 9.7 MG/DL (ref 8.3–10.1)
CALCIUM SERPL-MCNC: 9.1 MG/DL (ref 8.4–10.2)
CHLORIDE SERPL-SCNC: 98 MMOL/L (ref 96–108)
CO2 SERPL-SCNC: 39 MMOL/L (ref 21–32)
CREAT SERPL-MCNC: 1.15 MG/DL (ref 0.6–1.3)
GFR SERPL CREATININE-BSD FRML MDRD: 42 ML/MIN/1.73SQ M
GLUCOSE SERPL-MCNC: 87 MG/DL (ref 65–140)
PHOSPHATE SERPL-MCNC: 3 MG/DL (ref 2.3–4.1)
POTASSIUM SERPL-SCNC: 4.2 MMOL/L (ref 3.5–5.3)
SODIUM SERPL-SCNC: 144 MMOL/L (ref 135–147)

## 2024-10-11 PROCEDURE — 36415 COLL VENOUS BLD VENIPUNCTURE: CPT

## 2024-10-11 PROCEDURE — 80069 RENAL FUNCTION PANEL: CPT

## 2024-10-15 ENCOUNTER — APPOINTMENT (OUTPATIENT)
Dept: LAB | Facility: CLINIC | Age: 89
End: 2024-10-15
Payer: COMMERCIAL

## 2024-10-15 PROCEDURE — 84392 ASSAY OF URINE SULFATE: CPT

## 2024-10-15 PROCEDURE — 84105 ASSAY OF URINE PHOSPHORUS: CPT

## 2024-10-15 PROCEDURE — 82340 ASSAY OF CALCIUM IN URINE: CPT

## 2024-10-15 PROCEDURE — 83945 ASSAY OF OXALATE: CPT

## 2024-10-15 PROCEDURE — 84300 ASSAY OF URINE SODIUM: CPT

## 2024-10-15 PROCEDURE — 82131 AMINO ACIDS SINGLE QUANT: CPT

## 2024-10-15 PROCEDURE — 84133 ASSAY OF URINE POTASSIUM: CPT

## 2024-10-15 PROCEDURE — 82140 ASSAY OF AMMONIA: CPT

## 2024-10-15 PROCEDURE — 83935 ASSAY OF URINE OSMOLALITY: CPT

## 2024-10-15 PROCEDURE — 82507 ASSAY OF CITRATE: CPT

## 2024-10-15 PROCEDURE — 84560 ASSAY OF URINE/URIC ACID: CPT

## 2024-10-15 PROCEDURE — 83735 ASSAY OF MAGNESIUM: CPT

## 2024-10-15 PROCEDURE — 82436 ASSAY OF URINE CHLORIDE: CPT

## 2024-10-15 PROCEDURE — 81003 URINALYSIS AUTO W/O SCOPE: CPT

## 2024-10-15 PROCEDURE — 82570 ASSAY OF URINE CREATININE: CPT

## 2024-10-16 ENCOUNTER — PATIENT OUTREACH (OUTPATIENT)
Dept: CASE MANAGEMENT | Facility: OTHER | Age: 89
End: 2024-10-16

## 2024-10-16 ENCOUNTER — OFFICE VISIT (OUTPATIENT)
Dept: CARDIOLOGY CLINIC | Facility: CLINIC | Age: 89
End: 2024-10-16
Payer: COMMERCIAL

## 2024-10-16 VITALS
HEIGHT: 62 IN | DIASTOLIC BLOOD PRESSURE: 62 MMHG | BODY MASS INDEX: 34.41 KG/M2 | HEART RATE: 60 BPM | WEIGHT: 187 LBS | SYSTOLIC BLOOD PRESSURE: 130 MMHG

## 2024-10-16 DIAGNOSIS — E78.5 DYSLIPIDEMIA: ICD-10-CM

## 2024-10-16 DIAGNOSIS — I25.10 CORONARY ARTERY DISEASE INVOLVING NATIVE CORONARY ARTERY OF NATIVE HEART WITHOUT ANGINA PECTORIS: Primary | ICD-10-CM

## 2024-10-16 DIAGNOSIS — I10 BENIGN ESSENTIAL HTN: ICD-10-CM

## 2024-10-16 PROCEDURE — 99214 OFFICE O/P EST MOD 30 MIN: CPT | Performed by: INTERNAL MEDICINE

## 2024-10-16 NOTE — PROGRESS NOTES
Contacted patients daughter for f/u.  She reports patient is managing well at home.  She was discharged from home health services.  She has caregiver assistance for 4 hours per week through the [facility].   Her weight is staying consistently between 1-2 lbs.  She denies chest pain or LE edema.  Patient is sob with exertion but denies wheezing or cough.  She wears oxygen at 2 L, SpO2 91%.  She uses BIPAP at night.  Patient has a good appetite and is following low sodium diet and fluid restrictions.  She takes all medications as prescribed.  She had appointment with cardiology today.  PCP f/u in November.  Family will transport to appointments.  Daughter states the only issue currently is the patient's anxiety.  She has appointment with [facility] 11/12 and will address it at that visit.  She denies any additional needs at this time.  She declined further outreach.

## 2024-10-16 NOTE — PROGRESS NOTES
"      Cardiology             Ashley Gonzales  1935  [id number]              Assessment/Plan:     CAD status post CABG x3  PVCs  Benign essential hypertension  Dyslipidemia  Chronic right bundle branch block  Chronic diastolic CHF          Patient without symptoms of angina, continue low-dose aspirin, metoprolol, rosuvastatin  Blood pressure well-controlled, continue metoprolol, losartan  Prior lipid panel reviewed from 8/2024, well-controlled, continue rosuvastatin   Continue furosemide 20 mg daily, volume status compensated.  Prior renal function reviewed from 8/2024, controlled      Follow-up in 1 year          Interval History:      This is a very pleasant 88-year-old female with a history of CVA 02/2015 with MRA neck revealing greater than 70% bilateral internal carotid artery stenosis with diminished flow in the right MCA.  CTA revealed greater than 90% high-grade stenosis on the right and she thereafter underwent right CEA.  She had a NSTEMI 06/2015 with cardiac catheterization revealing three-vessel CAD.  Thereafter she underwent CABG x3 with LIMA to LAD, SVG to OM, SVG to RCA in 2015.  She lives with her son.     She was hospitalized with a fall 05/2019, and had a closed fracture of cervical vertebrae with non operative management.     She was subsequently hospitalized 02/2021 with CHF.  Her discharge weight was 204 lb.     She presents today for follow-up with no complaints.  Her shortness of breath is chronic but stable.  She remains on supplemental oxygen.  She denies lower extreme edema, orthopnea, PND.          Vitals:  Vitals:    10/16/24 1033   BP: 130/62   BP Location: Right arm   Patient Position: Sitting   Cuff Size: Large   Pulse: 60   Weight: 84.8 kg (187 lb)   Height: 5' 2\" (1.575 m)         Past Medical History:   Diagnosis Date    Anxiety     Cataract, bilateral     Last Assessed:3/19/2014    COPD (chronic obstructive pulmonary disease) (HCC)     Coronary artery disease     Hx of arterial " ischemic stroke     Hypertension     Stroke (HCC)     Wears glasses      Social History     Socioeconomic History    Marital status:      Spouse name: Not on file    Number of children: Not on file    Years of education: 10    Highest education level: Not on file   Occupational History    Not on file   Tobacco Use    Smoking status: Former     Average packs/day: 1 pack/day for 24.0 years (24.0 ttl pk-yrs)     Types: Cigarettes     Start date: 1951     Passive exposure: Past    Smokeless tobacco: Never   Vaping Use    Vaping status: Never Used   Substance and Sexual Activity    Alcohol use: Never    Drug use: Never    Sexual activity: Not Currently   Other Topics Concern    Not on file   Social History Narrative    ** Merged History Encounter **         No caffeine use     Social Determinants of Health     Financial Resource Strain: Low Risk  (5/10/2023)    Overall Financial Resource Strain (CARDIA)     Difficulty of Paying Living Expenses: Not hard at all   Food Insecurity: No Food Insecurity (8/16/2024)    Hunger Vital Sign     Worried About Running Out of Food in the Last Year: Never true     Ran Out of Food in the Last Year: Never true   Transportation Needs: No Transportation Needs (8/16/2024)    PRAPARE - Transportation     Lack of Transportation (Medical): No     Lack of Transportation (Non-Medical): No   Physical Activity: Not on file   Stress: Not on file   Social Connections: Not on file   Intimate Partner Violence: Not on file   Housing Stability: Low Risk  (8/16/2024)    Housing Stability Vital Sign     Unable to Pay for Housing in the Last Year: No     Number of Times Moved in the Last Year: 0     Homeless in the Last Year: No      Family History   Problem Relation Age of Onset    Cancer Mother         malignant neoplasm    Heart attack Father     Aneurysm Other         Aortic    Cancer Other         malignant neoplasm     Past Surgical History:   Procedure Laterality Date    CAROTID  ENDARTERECTOMY Right     CATARACT EXTRACTION W/ INTRAOCULAR LENS  IMPLANT, BILATERAL      COLONOSCOPY N/A 9/30/2017    Procedure: COLONOSCOPY FOR CONTROL OF BLEEDING;  Surgeon: Jeb Gaxiola MD;  Location: [facility];  Service: Colorectal    CORONARY ARTERY BYPASS GRAFT      CABG X3 with LIMA to LAD,SVG to OM,SVG to distal  RCA ; Last Assessed:7/13/2015    JOINT REPLACEMENT      left TKR    KIDNEY STONE SURGERY      NEPHROSTOMY Left     with Drainage Irrigation ;Onset:1974    SD ARTHRP KNE CONDYLE&PLATU MEDIAL&LAT COMPARTMENTS Right 2/25/2016    Procedure: ARTHROPLASTY KNEE TOTAL;  Surgeon: Jeb Figueroa MD;  Location: [facility];  Service: Orthopedics       Current Outpatient Medications:     acetaminophen (TYLENOL) 325 mg tablet, Take 2 tablets (650 mg total) by mouth every 6 (six) hours as needed for mild pain, Disp: , Rfl:     aspirin (ECOTRIN LOW STRENGTH) 81 mg EC tablet, Take 81 mg by mouth daily, Disp: , Rfl:     bimatoprost (LUMIGAN) 0.01 % ophthalmic drops, Administer 1 drop to both eyes daily at bedtime, Disp: 5 mL, Rfl: 5    bisacodyl 5 mg EC tablet, Take 5 mg by mouth daily as needed for constipation, Disp: , Rfl:     calcitriol (ROCALTROL) 0.25 mcg capsule, Take 1 capsule (0.25 mcg total) by mouth 3 (three) times a week, Disp: 45 capsule, Rfl: 3    famotidine (PEPCID) 20 mg tablet, TAKE 1 TABLET BY MOUTH TWICE A DAY, Disp: 180 tablet, Rfl: 1    furosemide (LASIX) 20 mg tablet, TAKE 1 TABLET BY MOUTH EVERY DAY, Disp: 100 tablet, Rfl: 1    levothyroxine 75 mcg tablet, TAKE 1 TABLET BY MOUTH EVERY DAY IN THE MORNING, Disp: 90 tablet, Rfl: 1    losartan (COZAAR) 50 mg tablet, Take 1 tablet (50 mg total) by mouth daily, Disp: 90 tablet, Rfl: 3    metoprolol tartrate (LOPRESSOR) 25 mg tablet, TAKE 1 TABLET (25 MG TOTAL) BY MOUTH EVERY 12 (TWELVE) HOURS, Disp: 180 tablet, Rfl: 1    mirtazapine (REMERON) 30 mg tablet, TAKE 1 TABLET BY MOUTH EVERYDAY AT BEDTIME, Disp: 90 tablet, Rfl: 1    oxygen gas, Inhale  2 L/min continuous. nasal cannula  Indications: ., Disp: , Rfl:     Pediatric Multiple Vit-C-FA (PEDIATRIC MULTIVITAMIN) chewable tablet, Chew 1 tablet daily by mouth, Disp: , Rfl:     rosuvastatin (CRESTOR) 10 MG tablet, TAKE 1 TABLET BY MOUTH EVERY DAY, Disp: 90 tablet, Rfl: 1        Review of Systems:  Review of Systems   Constitutional:  Negative for activity change, fever and unexpected weight change.   HENT:  Negative for facial swelling, nosebleeds and voice change.    Respiratory:  Negative for chest tightness, shortness of breath and wheezing.    Cardiovascular:  Negative for chest pain, palpitations and leg swelling.   Gastrointestinal:  Negative for abdominal distention.   Genitourinary:  Negative for hematuria.   Musculoskeletal:  Negative for arthralgias.   Skin:  Negative for color change, pallor, rash and wound.   Neurological:  Negative for dizziness, seizures, syncope and light-headedness.   Psychiatric/Behavioral:  Negative for agitation.          Physical Exam:  Physical Exam  Vitals reviewed.   Constitutional:       Appearance: She is well-developed.   HENT:      Head: Normocephalic and atraumatic.   Cardiovascular:      Rate and Rhythm: Normal rate and regular rhythm.      Heart sounds: Normal heart sounds.   Pulmonary:      Effort: Pulmonary effort is normal.      Breath sounds: Normal breath sounds.   Abdominal:      Palpations: Abdomen is soft.   Musculoskeletal:         General: Normal range of motion.      Cervical back: Normal range of motion and neck supple.   Skin:     General: Skin is warm and dry.   Neurological:      Mental Status: She is alert and oriented to person, place, and time.   Psychiatric:         Behavior: Behavior normal.         Thought Content: Thought content normal.         Judgment: Judgment normal.         This note was completed in part utilizing [employer] Direct Software.  Grammatical errors, random word insertions, spelling mistakes, and incomplete sentences  can be an occasional consequence of this system secondary to software limitations, ambient noise, and hardware issues.  If you have any questions or concerns about the content, text, or information contained within the body of this dictation, please contact the provider for clarification.

## 2024-10-22 DIAGNOSIS — R82.991 HYPOCITRATURIA: Primary | ICD-10-CM

## 2024-10-22 LAB
AMMONIA UR-SCNC: 9 MMOL/24 HR (ref 15–60)
CA H2 PHOS DIHYD 24H SATFR UR: 0.18 (ref 0.5–2)
CALCIUM 24H UR-MRATE: 25 MG/24 HR
CALCIUM/CREAT UR: 67 MG/G CREAT (ref 51–262)
CALCIUM/KG BODY WEIGHT: 0.3 MG/24 HR/KG
CAOX INDEX 24H UR-RTO: 3.18 (ref 6–10)
CHLORIDE 24H UR-SRATE: 45 MMOL/24 HR (ref 70–250)
CITRATE 24H UR-MCNC: 33 MG/24 HR
COMMENT: ABNORMAL
CREAT UR-MCNC: 373 MG/24 HR
CREAT/BW 24H UR-RELMRAT: 4.5 MG/24 HR/KG (ref 8.7–20.3)
CYSTINE 24H UR-MCNC: ABNORMAL MG/DL
MAGNESIUM 24H UR-MRATE: 15 MG/24 HR (ref 30–120)
OXALATE UR-MCNC: 11 MG/24 HR (ref 20–40)
PH 24H UR: 5.61 [PH] (ref 5.8–6.2)
PHOSPHATE 24H UR-MRATE: 325 MG/24 HR (ref 600–1200)
POTASSIUM 24H UR-SCNC: 17 MMOL/24 HR (ref 20–100)
PROTEIN CATABOLIC RATE 24H UR-MRATE: 0.5 G/KG/24 HR (ref 0.8–1.4)
SODIUM 24H UR-SRATE: 56 MMOL/24 HR (ref 50–150)
SPECIMEN VOL 24H UR: 580 ML/24 HR (ref 500–4000)
SULFATE 24H UR-SRATE: 20 MEQ/24 HR (ref 20–80)
URATE 24H SATFR UR: 1.43
URATE 24H UR-MRATE: 214 MG/24 HR
UUN 24H UR-MRATE: 4.45 G/24 HR (ref 6–14)

## 2024-10-22 RX ORDER — POTASSIUM CITRATE 15 MEQ/1
1 TABLET, EXTENDED RELEASE ORAL 2 TIMES DAILY WITH MEALS
Qty: 180 TABLET | Refills: 3 | Status: SHIPPED | OUTPATIENT
Start: 2024-10-22

## 2024-10-23 DIAGNOSIS — H40.9 GLAUCOMA OF BOTH EYES, UNSPECIFIED GLAUCOMA TYPE: ICD-10-CM

## 2024-11-02 NOTE — PROGRESS NOTES
Patient:    MRN:  [id number]    Dora Request ID:  [id number]    Level of care reserved:  Home Health Agency    Partner Reserved:  [facility] [phone]    Clinical needs requested:    Geography searched:  [phone]    Start of Service:    Request sent:  11:28am EDT on 4/25/2024 by Nano Benjamin    Partner reserved:  1:56pm EDT on 4/25/2024 by Nano Benjamin    Choice list shared:  1:51pm EDT on 4/25/2024 by Nano Benjamin   labs, us, ekg, cxr normal. Will have patient follow up with PCP/GYN for mammography. Pt is well appearing walking with steady gait, stable for discharge and follow up without fail with medical doctor. I had a detailed discussion with the patient and/or guardian regarding the historical points, exam findings, and any diagnostic results supporting the discharge diagnosis. Pt educated on care and need for follow up. Strict return instructions and red flag signs and symptoms discussed with patient. Questions answered. Pt shows understanding of discharge information and agrees to follow.

## 2024-11-06 ENCOUNTER — RA CDI HCC (OUTPATIENT)
Dept: OTHER | Facility: HOSPITAL | Age: 89
End: 2024-11-06

## 2024-11-06 DIAGNOSIS — I25.10 CORONARY ARTERY DISEASE INVOLVING NATIVE CORONARY ARTERY OF NATIVE HEART WITHOUT ANGINA PECTORIS: ICD-10-CM

## 2024-11-06 RX ORDER — ROSUVASTATIN CALCIUM 10 MG/1
10 TABLET, COATED ORAL DAILY
Qty: 90 TABLET | Refills: 1 | Status: SHIPPED | OUTPATIENT
Start: 2024-11-06

## 2024-11-06 NOTE — PROGRESS NOTES
HCC coding opportunities          Chart Reviewed number of suggestions sent to Provider: 1     Patients Insurance  I13.0    DM??   Medicare Insurance: A[employer] Medicare Complete

## 2024-11-08 ENCOUNTER — TELEPHONE (OUTPATIENT)
Age: 89
End: 2024-11-08

## 2024-11-08 NOTE — TELEPHONE ENCOUNTER
[facility]  [address] [place] [address], [address]  [place] [address]  [phone]    Social Work Intake    MSW spoke with patient's daughter, Izzy, to complete social work intake via phone, for patient's upcoming geriatric assessment on 11/12/24 with FLAKO Lee.     Social/Family History   Marital status:     Does patient have children?  daughter, Izzy, son, Pablo, and son, Suman - all children live locally  (If yes, where do the children live?)   Family members assisting patient at home: Son aPblo lives patient.  Daughter is home with patient in the mornings and Pablo is home with patient in the evening  Who is available to provide care in case of illness or crisis (please specify relation to patient / level of care able to provide)? 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 homecare, waiver services, meal delivery service, or respite care?): Home care aide 2X/week for 2 hours and is getting Meals on Wheels        Financial   Total Monthly income: Not disclosed     Source of income: Social Security   Meet current needs? Yes     Funds available for home care?  No, family supports patient with this currently     Funds available for nursing home? No    Do you rent or own your home? Rent - pablo rents the home       ACP REVIEW:  Does patient have POA: Yes - Izzy  Does patient have a Living will:  "No  Any legal assistance needed for healthcare planning?: No  For all patients, we encourage seeing a  to establish a Financial Power of , a Health Care Power of , and an Advanced Directive (living will). Particularly for patients experiencing memory concerns, we strongly recommend that this is completed as soon as possible.     Safety Assessment  Is the patient still driving? No Any concerns with patients ability to drive and recent driving accidents or citations in the last year? N/A  If the patient is not driving, do they have access to transportation?  Children support with transportation    Is the patient taking medications as prescribed? Yes     Is there a system in place for medication management? Daughter manages medications and gives them to patient to take  Has the patient been involved in any scams? No    Are firearms or weapons in the home? No  Recommendations per [employer]: removing them from the home, keep ammunition stored separately, encourage patient to consider \"gifting\" them, if necessary, ask local law enforcement for assistance in removing the firearms from the home. The family may receive compensation from a gun buy-back program.    Does the patient use an assistive device?  Rolator Walker, bedside commode  Any difficulty with gait or balance?  Does well with the rolator  Do you need any medical equipment at this time?  Would like to get patient a raised toilet seat   Does the patient have difficulty with hearing or vision?  Hearing is poor but does not wear hearing aides.  Patient does have glaucoma, cataracts removed and had corrective surgery.  Patient does have complaints about vision.    Any falls in the last year?  Yes - some with injury and/or ED visits     Any history of wandering? No    Does the patient live alone?  Home with sonPablo  What is the layout of the home, do they have difficulty with stairs? Rented town home.  First floor set up with bathroom " for patient  Do you feel comfortable leaving the patient home alone?  Yes, for short periods of time with check in's and cameras in the home.    Have you noticed any burned pans or other signs of issues with the stove or other appliances? No - they do not allow patient to use the stove  Do you have any concerns about the patient's cooking or eating habits?  Family prepares meals and patient gets MOW - good appetite  Have any of the patient's utilities been shut off in the last 12 months? No  Are there any concerns with pests, mold, lead, heat, water access? No  Do you have working smoke detectors and fire extinguishers in the home? Yes    To the best of your knowledge has the patient experienced any violence or abuse in the last 12 months? No  Have you thought about when it will no longer be safe for the patient to be left alone or any future planning if their memory were to decline and they have an increase in care needs? Family declines any wish for placement.  The plan is to remain at home with support and care      Caregiver Review  Do you feel like you have a basic understanding of dementia/memory loss?  Yes, patient's spouse also had Alzheimer's for 12 years and family cared for him     Do you have any needs in caring for a person living with dementia?  Details to dx specific     Do you have social supports? Yes      Education/resources to be provided to patient/family in person or via mail:   MSW to provide the following resources during visit:   -Home care, Waiver, AAA  -LVAIP Resource Guide 2024  -[facility]:  Health Program    Daughter feels patient would benefit from having a 3:1 commode, due to hx of knee surgery.  If appropriate, could this be ordered at visit?

## 2024-11-12 ENCOUNTER — OFFICE VISIT (OUTPATIENT)
Age: 89
End: 2024-11-12
Payer: COMMERCIAL

## 2024-11-12 VITALS
DIASTOLIC BLOOD PRESSURE: 52 MMHG | OXYGEN SATURATION: 93 % | BODY MASS INDEX: 33.55 KG/M2 | TEMPERATURE: 97.8 F | WEIGHT: 182.3 LBS | HEART RATE: 68 BPM | SYSTOLIC BLOOD PRESSURE: 136 MMHG | RESPIRATION RATE: 18 BRPM | HEIGHT: 62 IN

## 2024-11-12 DIAGNOSIS — F33.9 DEPRESSION, RECURRENT (HCC): ICD-10-CM

## 2024-11-12 DIAGNOSIS — F41.9 ANXIETY: ICD-10-CM

## 2024-11-12 DIAGNOSIS — R41.3 MEMORY CHANGE: Primary | ICD-10-CM

## 2024-11-12 DIAGNOSIS — M15.9 GENERALIZED OSTEOARTHRITIS: ICD-10-CM

## 2024-11-12 DIAGNOSIS — E53.8 LOW SERUM VITAMIN B12: ICD-10-CM

## 2024-11-12 DIAGNOSIS — R41.82 ALTERED MENTAL STATUS: ICD-10-CM

## 2024-11-12 PROCEDURE — 99205 OFFICE O/P NEW HI 60 MIN: CPT | Performed by: NURSE PRACTITIONER

## 2024-11-12 RX ORDER — BUSPIRONE HYDROCHLORIDE 5 MG/1
5 TABLET ORAL 2 TIMES DAILY
Qty: 60 TABLET | Refills: 0 | Status: SHIPPED | OUTPATIENT
Start: 2024-11-12

## 2024-11-12 NOTE — PROGRESS NOTES
Assessment & Plan:   Geriatric Evaluation  Memory  Pt supported with adl, and dependent for iadls.  Memory loss:  daughter reports patient becomes more confused is she retains cO2. Patient feels she is doing ok  MOCA-b 14/22.  Deficits in language, abstraction, delayed recall and orientation   CT head 8/20/2024:  No acute intracranial hemorrhage seen No mass effect or midline shift seen Stable CT  Gerilabs:  8/16/2024 B12 234; 8/20/2024 folate >22.3  History, physical, and cognitive screening are most consistent with:  MCI vs progression to mild dementia  Contributing factors:  history of stroke, h/o hospital delirium  Further eval warrants the following:  increase social, cognitive, and physical activities. Patient suffers from anxiety. Will start buspirone 5 mg twice daily. Will re assess at care conference how patient is doing on medication. Patient and or family to call prior with any questions or concerns.   Memory meds:  discuss at care conference  Cont supportive cares with support of family  Caregiver stress concerns:  daughter denies concerns at this time   SW needs this visit:  see intake note  Discussed safe environment  Wandering:  daughter denies any wandering. Some items to consider would be door video surveillance, ID bracelet, Tile GPS  if becomes a concern  Home:  Discussed ensure pt has phone and reinforce to not use stove or shower while gone.  Level of care:  assisted living level of care   Fall preventions:  change in position slowly, continued use of AD for gait stability, removal of clutter and ensure adequate lighting in walk areas  Medication management:  daughter manages medications  Scam safety:  Do not answer suspicious mail, phone calls, email, or text message without having second person review d/t safety risk.  Do not give out personal info to questionable sources- read company safety policies for how they might contact you.  Continue to engage in physical, cognitive, social  activity.  Cont doing games, puzzles, trivia, current event discussions  Cont daily walks or exercise video  Cont outings with friends and family.  Avoid social isolation.  Referral to cognitive therapy:  patient does not feel this is needed at this time. Will re assess at care conference  Optimize chronic and acute conditions  Cont to f/u with providers and ensure medication compliance  Vascular risk factors:  JAMEEL, CKD, BMI 33  Chronic condition concerns:  optimize all acute and chronic conditions   Ensure good diet (ex Mediterranean diet) and adequate hydration  Monitor for changes in behavior/mood- if noted, notify provider for eval  Encourage mindfulness and positive socialization for general wellness.  Do not overwhelm pt with info.    Do one task at a time. Use slower pace.  Keep to one conversation at a time.  Do not multitask.  Decision making:  At this point, recommend all large decisions be made with support of family.  In future, if capacity is of concern, would refer to neuropsychology for full eval.  Encourage independence as able.  Recommended next follow up: one month for care conference. Patient and or family to call prior with any questions or concerns    Mobility  Baseline ambulation: steady with use of roller walker.  Fall type: patient has had prior falls in the past  Fall precautions    Mood  Baseline mood:  stable  GDS 7  Continue:  increase social interactions and avoid isolation. Continue mirtazipine 30 mg.   Will start buspar 5mg bid to help anxiety.  Referral in to  for med management/talk therapy (pt has h/o anxiety, has trial'd multiple medications in the past)  Would avoid benzo's.   Continue routine follow up with providers for long term management  Encourage relaxation techniques, emotional support     Medication Review  Medications are appropriate for current conditions  Patient is at risk for increased side effects due to polypharmacy  Review all OTC medication with pharmacist for  cognitive side effects   Patient is taking the following medications that meet Beer's Criteria to monitor/avoid:  none  Avoid medications that worsen cognition - check with provider or pharmacist before starting new or OTC meds    5.  Other Geriatric Syndromes:  JAMEEL- continue use of oxygen and cpap at night. Patient c/o difficulty sleeping with her machine. Recommend discussing with respiratory provider for possible other mask options to allow for healthy sleep cycle.   Generalized OA:  denies pain today.  Cont prn tylenol/topical analgesics.  Avoid NSAids.      HPI:  We had the pleasure of evaluating Ashley Gonzales who is a 88 y.o. female in Geriatric Consultation today.  Previous MOCA:  none.  Comorbidities include COPD, JAMEEL, CKD, .   Ms. Gonzales is in the office with her daughter She lives with her son.     Memory report per daughter (d/t memory concerns): says mom is ok. Says she had a fall in aug and was having mental confusion and was diagnosed with metabolic encephalopathy. Says she has a lot of anxiety an she is not sleeping. Says she was recommended to follow up here. Says she lives with the brother. Says she helps with adls . Says she can be left alone for about 4 hours and they do have cameras in the home. Says she has not had a fall since August. Says she did not go to rehab after inpatient stay but did get therapy. Says she does not do finances, daughter manages meds. Says they do get aides for couple hours a week. Says they have cameras in the home. Says her confusion gets worse when she retains CO2. Says she has been on different meds to try to manage her anxiety. Says she has never been a social person so it is hard to get her to do things. Says she is not sleeping great and patient complains she Is not sleeping well.     Memory report per patient:  says she is ok. Says she lives with son. Says daughter helps her shower and she dresses her self. Says daughter manages medications. Denies cooking or  cleaning. Says appetite is good. Says she does not sleep well, says gets about 2 hours of sleep a night. Denies changes with hearing or vision. Son does the finances. Has a cell and does ok with it. Denies falls since the one in August. Patient says she reads a lot. Says she does do some exercises for physical activities. Says she does not do much social activities. Denies concerns at this time. Says she thinks the not being able to drive or do finances are more physical than cognitive problems.     Current activities:  Cognitive:  does watch tv and does read books. Says she watches the news Physical:  does do here exercises  Social:  does not do anything socially . Says she has social interaction with family .     She has difficulty finding the right word while speaking: Yes  Patient requires repeat information or ask the same question repeatedly: No  She has some problems operating household appliance such as TV remote, kitchen appliances, computer.    Functional concerns:  Bathing dep. Daughter helps  Dressing dep but pt picks clothes out  Feeding yourself says she can feed herself but she does shake a lot.   Edmond says she can go by herself but does require more physical help to go  [facility] does have some stress incontinence   Transferring independent with  Uses : roller walker    Can you make your own light meals No   Do light cleaning/chores No  Do you take care of your own medications No- daughter manages  Do you do your own shopping No  Do you handle your own financial affairs such as balancing your checkbook, paying bills, investments: No  Do you use a cell phone Yes  Do you drive: No     says she stopped about 10 years ago      Psychosocial concerns:  Have you or your family noted any change in your mood or personality:Yes  Are you currently or have you been treated in the past for depression or anxiety: Yes  Any hallucination or delusion: No- says she did have when she was in hospital now it has  "cleared up   Sleep Issues: Yes- says she's on remeron but says pt complains she isnt sleeping. Says the cpap may also may be contributing  Hearing and vision issue: Yes- says she struggles with vision. C/o things looking distorted. Denies change to hearing  ADL/IADL:  supported with adls/ dependent with iadls  Appetite/swallow:  says appetite is great. Denies concerns with swallowing   Pain:  denies pain    Family Review of Behavior [facility]:    pacing. No    agressive/combative behavior. No    agitated. No   wandering. No   resistance to care. No   hoarding/hiding objects.No    suspicious  No  withdrawn No  rummaging/pillaging.No    misplacing/losing objects No  personal hygiene problems.No  forgetfulness of actions Yes     Past Medical, surgical, social, medication and allergy history and patients previous records reviewed.    Family member with dementia and what type?no  Does patient have previous diagnosis of dementia?  no   Does patient take any \"memory medications\"? no  Stroke history Yes  Have you had any head trauma No  Does patient have history of alcohol abuse No    ROS:  Review of Systems   Constitutional:  Negative for appetite change.   HENT:  Negative for hearing loss and trouble swallowing.    Eyes:  Negative for visual disturbance.   Respiratory:  Negative for chest tightness and shortness of breath.    Cardiovascular:  Negative for chest pain and leg swelling.   Gastrointestinal:  Negative for abdominal pain and constipation.   Genitourinary: Negative.    Musculoskeletal: Negative.    Neurological:  Positive for light-headedness (when anxiety is bad). Negative for dizziness and headaches.   Psychiatric/Behavioral:  Positive for suicidal ideas. Negative for agitation and dysphoric mood. The patient is nervous/anxious.        Allergies:   Allergies   Allergen Reactions    Penicillins Itching    Penicillins Rash       Medications:      Current Outpatient Medications:     acetaminophen (TYLENOL) 325 mg " tablet, Take 2 tablets (650 mg total) by mouth every 6 (six) hours as needed for mild pain, Disp: , Rfl:     aspirin (ECOTRIN LOW STRENGTH) 81 mg EC tablet, Take 81 mg by mouth daily, Disp: , Rfl:     bimatoprost (LUMIGAN) 0.01 % ophthalmic drops, Administer 1 drop to both eyes daily at bedtime, Disp: 5 mL, Rfl: 0    bisacodyl 5 mg EC tablet, Take 5 mg by mouth daily as needed for constipation, Disp: , Rfl:     busPIRone (BUSPAR) 5 mg tablet, Take 1 tablet (5 mg total) by mouth 2 (two) times a day, Disp: 60 tablet, Rfl: 0    calcitriol (ROCALTROL) 0.25 mcg capsule, Take 1 capsule (0.25 mcg total) by mouth 3 (three) times a week, Disp: 45 capsule, Rfl: 3    famotidine (PEPCID) 20 mg tablet, TAKE 1 TABLET BY MOUTH TWICE A DAY, Disp: 180 tablet, Rfl: 1    furosemide (LASIX) 20 mg tablet, TAKE 1 TABLET BY MOUTH EVERY DAY, Disp: 100 tablet, Rfl: 1    levothyroxine 75 mcg tablet, TAKE 1 TABLET BY MOUTH EVERY DAY IN THE MORNING, Disp: 90 tablet, Rfl: 1    losartan (COZAAR) 50 mg tablet, Take 1 tablet (50 mg total) by mouth daily, Disp: 90 tablet, Rfl: 3    metoprolol tartrate (LOPRESSOR) 25 mg tablet, TAKE 1 TABLET (25 MG TOTAL) BY MOUTH EVERY 12 (TWELVE) HOURS, Disp: 180 tablet, Rfl: 1    mirtazapine (REMERON) 30 mg tablet, TAKE 1 TABLET BY MOUTH EVERYDAY AT BEDTIME, Disp: 90 tablet, Rfl: 1    oxygen gas, Inhale 2 L/min continuous. nasal cannula  Indications: ., Disp: , Rfl:     Pediatric Multiple Vit-C-FA (PEDIATRIC MULTIVITAMIN) chewable tablet, Chew 1 tablet daily by mouth, Disp: , Rfl:     Potassium Citrate ER 15 MEQ (1620 MG) TBCR, Take 1 tablet by mouth 2 (two) times a day with meals, Disp: 180 tablet, Rfl: 3    rosuvastatin (CRESTOR) 10 MG tablet, TAKE 1 TABLET BY MOUTH EVERY DAY, Disp: 90 tablet, Rfl: 1    Vitals:  Vitals:    11/12/24 1133   BP: 136/52   Pulse: 68   Resp: 18   Temp: 97.8 °F (36.6 °C)   SpO2: 93%       History:  Past Medical History:   Diagnosis Date    Anxiety     Cataract, bilateral     Last  Assessed:3/19/2014    COPD (chronic obstructive pulmonary disease) (HCC)     Coronary artery disease     Hx of arterial ischemic stroke     Hypertension     Stroke (HCC)     Wears glasses      Past Surgical History:   Procedure Laterality Date    CAROTID ENDARTERECTOMY Right     CATARACT EXTRACTION W/ INTRAOCULAR LENS  IMPLANT, BILATERAL      COLONOSCOPY N/A 9/30/2017    Procedure: COLONOSCOPY FOR CONTROL OF BLEEDING;  Surgeon: Jeb Gaxiola MD;  Location: [facility];  Service: Colorectal    CORONARY ARTERY BYPASS GRAFT      CABG X3 with LIMA to LAD,SVG to OM,SVG to distal  RCA ; Last Assessed:7/13/2015    JOINT REPLACEMENT      left TKR    KIDNEY STONE SURGERY      NEPHROSTOMY Left     with Drainage Irrigation ;Onset:1974    [place] ARTHRP KNE CONDYLE&PLATU MEDIAL&LAT COMPARTMENTS Right 2/25/2016    Procedure: ARTHROPLASTY KNEE TOTAL;  Surgeon: Jeb Figueroa MD;  Location: [facility];  Service: Orthopedics     Family History   Problem Relation Age of Onset    Cancer Mother         malignant neoplasm    Heart attack Father     Aneurysm Other         Aortic    Cancer Other         malignant neoplasm     Social History     Socioeconomic History    Marital status:      Spouse name: Not on file    Number of children: Not on file    Years of education: 10    Highest education level: Not on file   Occupational History    Not on file   Tobacco Use    Smoking status: Former     Average packs/day: 1 pack/day for 24.0 years (24.0 ttl pk-yrs)     Types: Cigarettes     Start date: 1951     Passive exposure: Past    Smokeless tobacco: Never   Vaping Use    Vaping status: Never Used   Substance and Sexual Activity    Alcohol use: Never    Drug use: Never    Sexual activity: Not Currently   Other Topics Concern    Not on file   Social History Narrative    ** Merged History Encounter **         No caffeine use     Social Drivers of Health     Financial Resource Strain: Low Risk  (5/10/2023)    Overall Financial Resource Strain  (CARDIA)     Difficulty of Paying Living Expenses: Not hard at all   Food Insecurity: No Food Insecurity (8/16/2024)    Nursing - Inadequate Food Risk Classification     Worried About Running Out of Food in the Last Year: Never true     Ran Out of Food in the Last Year: Never true     Ran Out of Food in the Last Year: Not on file   Transportation Needs: No Transportation Needs (9/19/2024)    OASIS : Transportation     Lack of Transportation (Medical): No     Lack of Transportation (Non-Medical): No     Patient Unable or Declines to Respond: No   Physical Activity: Not on file   Stress: Not on file   Social Connections: Not on file   Intimate Partner Violence: Not on file   Housing Stability: Low Risk  (8/16/2024)    Housing Stability Vital Sign     Unable to Pay for Housing in the Last Year: No     Number of Times Moved in the Last Year: 0     Homeless in the Last Year: No       Past Surgical History:   Procedure Laterality Date    CAROTID ENDARTERECTOMY Right     CATARACT EXTRACTION W/ INTRAOCULAR LENS  IMPLANT, BILATERAL      COLONOSCOPY N/A 9/30/2017    Procedure: COLONOSCOPY FOR CONTROL OF BLEEDING;  Surgeon: Jeb Gaxiola MD;  Location:  MAIN OR;  Service: Colorectal    CORONARY ARTERY BYPASS GRAFT      CABG X3 with LIMA to LAD,SVG to OM,SVG to distal  RCA ; Last Assessed:7/13/2015    JOINT REPLACEMENT      left TKR    KIDNEY STONE SURGERY      NEPHROSTOMY Left     with Drainage Irrigation ;Onset:1974    [place] ARTHRP KNE CONDYLE&PLATU MEDIAL&LAT COMPARTMENTS Right 2/25/2016    Procedure: ARTHROPLASTY KNEE TOTAL;  Surgeon: Jeb Figueroa MD;  Location: [facility];  Service: Orthopedics       Physical Exam  Observed Ambulation:  roller walker  Physical Exam  Constitutional:       General: She is not in acute distress.     Appearance: Normal appearance. She is not ill-appearing, toxic-appearing or diaphoretic.   Cardiovascular:      Rate and Rhythm: Normal rate and regular rhythm.      Heart sounds: No  murmur heard.     No friction rub. No gallop. No S3 or S4 sounds.   Pulmonary:      Effort: Pulmonary effort is normal. No respiratory distress.      Breath sounds: No wheezing, rhonchi or rales.      Comments: Breath sounds diminished   Abdominal:      General: Bowel sounds are normal. There is no distension.      Palpations: Abdomen is soft.      Tenderness: There is no abdominal tenderness. There is no guarding.      Hernia: No hernia is present.   Skin:     General: Skin is warm and dry.   Neurological:      General: No focal deficit present.      Mental Status: She is alert and oriented to person, place, and time.      Cranial Nerves: No cranial nerve deficit.      Motor: Tremor present. No weakness.      Coordination: Coordination normal.      Gait: Gait abnormal.       I have spent a total time of 62 minutes in caring for this patient on the day of the visit/encounter including Diagnostic results, Prognosis, Risks and benefits of tx options, Instructions for management, Patient and family education, Importance of tx compliance, Risk factor reductions, Impressions, Counseling / Coordination of care, Documenting in the medical record, Reviewing / ordering tests, medicine, procedures  , Obtaining or reviewing history  , and Communicating with other healthcare professionals .

## 2024-11-13 ENCOUNTER — TELEPHONE (OUTPATIENT)
Age: 89
End: 2024-11-13

## 2024-11-13 PROBLEM — R41.3 MEMORY CHANGE: Status: ACTIVE | Noted: 2024-11-13

## 2024-11-13 NOTE — TELEPHONE ENCOUNTER
Pt daughter, Izzy called, is requesting a return call P# [phone]    Cancelled today's  apt 11/13 @ 4pm with provider due to transportation issues.    Attempted to reschedule, 1st available with provider is not until May 2025.      Izzy is requesting a return call with an earlier date - time must be between 10am and 2pm *prefers Dr. Maharaj*  Please advise.

## 2024-11-20 ENCOUNTER — APPOINTMENT (OUTPATIENT)
Dept: LAB | Facility: CLINIC | Age: 89
End: 2024-11-20
Payer: COMMERCIAL

## 2024-11-20 DIAGNOSIS — R80.1 PERSISTENT PROTEINURIA: ICD-10-CM

## 2024-11-20 DIAGNOSIS — E66.9 OBESITY (BMI 30-39.9): ICD-10-CM

## 2024-11-20 DIAGNOSIS — I10 HYPERTENSION: ICD-10-CM

## 2024-11-20 DIAGNOSIS — N25.81 SECONDARY HYPERPARATHYROIDISM OF RENAL ORIGIN (HCC): ICD-10-CM

## 2024-11-20 DIAGNOSIS — N18.32 STAGE 3B CHRONIC KIDNEY DISEASE (HCC): ICD-10-CM

## 2024-11-20 DIAGNOSIS — N20.0 NEPHROLITHIASIS: ICD-10-CM

## 2024-11-20 DIAGNOSIS — R31.9 HEMATURIA, UNSPECIFIED TYPE: ICD-10-CM

## 2024-11-20 DIAGNOSIS — R82.991 HYPOCITRATURIA: ICD-10-CM

## 2024-11-20 LAB
25(OH)D3 SERPL-MCNC: 39.9 NG/ML (ref 30–100)
ALBUMIN SERPL BCG-MCNC: 3.6 G/DL (ref 3.5–5)
ANION GAP SERPL CALCULATED.3IONS-SCNC: 5 MMOL/L (ref 4–13)
BUN SERPL-MCNC: 48 MG/DL (ref 5–25)
CALCIUM SERPL-MCNC: 8.9 MG/DL (ref 8.4–10.2)
CHLORIDE SERPL-SCNC: 100 MMOL/L (ref 96–108)
CO2 SERPL-SCNC: 41 MMOL/L (ref 21–32)
CREAT SERPL-MCNC: 1.43 MG/DL (ref 0.6–1.3)
GFR SERPL CREATININE-BSD FRML MDRD: 32 ML/MIN/1.73SQ M
GLUCOSE P FAST SERPL-MCNC: 126 MG/DL (ref 65–99)
PHOSPHATE SERPL-MCNC: 3.9 MG/DL (ref 2.3–4.1)
POTASSIUM SERPL-SCNC: 4.8 MMOL/L (ref 3.5–5.3)
PTH-INTACT SERPL-MCNC: 121.3 PG/ML (ref 12–88)
SODIUM SERPL-SCNC: 146 MMOL/L (ref 135–147)

## 2024-11-20 PROCEDURE — 80069 RENAL FUNCTION PANEL: CPT

## 2024-11-20 PROCEDURE — 82306 VITAMIN D 25 HYDROXY: CPT

## 2024-11-20 PROCEDURE — 83970 ASSAY OF PARATHORMONE: CPT

## 2024-11-20 PROCEDURE — 36415 COLL VENOUS BLD VENIPUNCTURE: CPT

## 2024-11-21 ENCOUNTER — RESULTS FOLLOW-UP (OUTPATIENT)
Dept: OTHER | Facility: HOSPITAL | Age: 89
End: 2024-11-21

## 2024-11-21 DIAGNOSIS — N18.32 STAGE 3B CHRONIC KIDNEY DISEASE (HCC): Primary | ICD-10-CM

## 2024-11-21 NOTE — TELEPHONE ENCOUNTER
Left message with her son Pablo and he going to relay the message. Patient is scheduled for 12/6 @ 10am.

## 2024-11-22 NOTE — TELEPHONE ENCOUNTER
Pt's daughter called back to check if appt was able to be rescheduled. Advised her of new appt details for 12/6 @ 10am.

## 2024-11-26 ENCOUNTER — TELEPHONE (OUTPATIENT)
Age: 89
End: 2024-11-26

## 2024-11-26 NOTE — TELEPHONE ENCOUNTER
I called Izzy the daughter to follow up on her questions about the buspar. Azul Szymanski says that if it's making thing worse to stop the medication. Azul also said that she will discuss other options when they come in for care conference.

## 2024-11-27 ENCOUNTER — TELEPHONE (OUTPATIENT)
Age: 89
End: 2024-11-27

## 2024-11-27 ENCOUNTER — TELEPHONE (OUTPATIENT)
Dept: PSYCHIATRY | Facility: CLINIC | Age: 89
End: 2024-11-27

## 2024-11-27 NOTE — TELEPHONE ENCOUNTER
Called Pt regarding a routine referral, in an attempt to verify services needed/interested, and advise of current wait list. Spoke to Pt's son, Pablo, whom stated that Pt is interested in [facility] services. Pt added to proper [facility].   Packet with outside resources will be sent out as well.    Closing referral.

## 2024-12-02 ENCOUNTER — TELEPHONE (OUTPATIENT)
Dept: NEPHROLOGY | Facility: CLINIC | Age: 89
End: 2024-12-02

## 2024-12-05 DIAGNOSIS — F41.9 ANXIETY: ICD-10-CM

## 2024-12-06 ENCOUNTER — OFFICE VISIT (OUTPATIENT)
Dept: FAMILY MEDICINE CLINIC | Facility: CLINIC | Age: 89
End: 2024-12-06
Payer: COMMERCIAL

## 2024-12-06 VITALS
BODY MASS INDEX: 35.11 KG/M2 | HEIGHT: 62 IN | DIASTOLIC BLOOD PRESSURE: 60 MMHG | TEMPERATURE: 97.5 F | WEIGHT: 190.8 LBS | SYSTOLIC BLOOD PRESSURE: 100 MMHG

## 2024-12-06 DIAGNOSIS — N18.32 STAGE 3B CHRONIC KIDNEY DISEASE (HCC): ICD-10-CM

## 2024-12-06 DIAGNOSIS — Z23 ENCOUNTER FOR IMMUNIZATION: ICD-10-CM

## 2024-12-06 DIAGNOSIS — B37.2 CANDIDAL INTERTRIGO: ICD-10-CM

## 2024-12-06 DIAGNOSIS — E03.9 ACQUIRED HYPOTHYROIDISM: ICD-10-CM

## 2024-12-06 DIAGNOSIS — Z00.00 MEDICARE ANNUAL WELLNESS VISIT, SUBSEQUENT: Primary | ICD-10-CM

## 2024-12-06 DIAGNOSIS — I10 PRIMARY HYPERTENSION: ICD-10-CM

## 2024-12-06 DIAGNOSIS — F01.A0 MILD VASCULAR DEMENTIA WITHOUT BEHAVIORAL DISTURBANCE, PSYCHOTIC DISTURBANCE, MOOD DISTURBANCE, OR ANXIETY (HCC): ICD-10-CM

## 2024-12-06 DIAGNOSIS — G25.0 BENIGN FAMILIAL TREMOR: ICD-10-CM

## 2024-12-06 PROCEDURE — G0008 ADMIN INFLUENZA VIRUS VAC: HCPCS

## 2024-12-06 PROCEDURE — G0439 PPPS, SUBSEQ VISIT: HCPCS | Performed by: FAMILY MEDICINE

## 2024-12-06 PROCEDURE — 90662 IIV NO PRSV INCREASED AG IM: CPT

## 2024-12-06 PROCEDURE — 99213 OFFICE O/P EST LOW 20 MIN: CPT | Performed by: FAMILY MEDICINE

## 2024-12-06 RX ORDER — BUSPIRONE HYDROCHLORIDE 5 MG/1
5 TABLET ORAL 2 TIMES DAILY
Qty: 180 TABLET | Refills: 1 | Status: SHIPPED | OUTPATIENT
Start: 2024-12-06 | End: 2024-12-06

## 2024-12-06 RX ORDER — KETOCONAZOLE 20 MG/G
CREAM TOPICAL DAILY
Qty: 60 G | Refills: 1 | Status: SHIPPED | OUTPATIENT
Start: 2024-12-06

## 2024-12-06 NOTE — PROGRESS NOTES
Name: Ashley Gonzales      : 1935      MRN: [id number]  Encounter Provider: Paz Maharaj DO  Encounter Date: 2024   Encounter department: [facility] PRIMARY [facility]ARE    Assessment & Plan  Medicare annual wellness visit, subsequent         Primary hypertension  Continue same medical regimen.       Acquired hypothyroidism    Orders:  •  TSH, 3rd generation; Future    Mild vascular dementia without behavioral disturbance, psychotic disturbance, mood disturbance, or anxiety (HCC)  PER  GERIATRIC MEDICINE   MOCA:-b .  independent adls/dependent iadls  Most consistent with mild vascular dementia   Engage in regular social, physical, cognitive activity.  Optimize acute and chronic conditions  Frequent reminders and close monitoring for safety  Monitor for acute changes in behavior/personality (agitation or lethargy).  Notify pcp/ED for reversible causes eval.          Encounter for immunization    Orders:  •  influenza vaccine, high-dose, PF 0.5 mL (Fluzone High Dose)    Candidal intertrigo    Orders:  •  ketoconazole (NIZORAL) 2 % cream; Apply topically daily    Stage 3b chronic kidney disease (HCC)  Lab Results   Component Value Date    EGFR 34 2024    EGFR 32 2024    EGFR 42 10/11/2024    CREATININE 1.37 (H) 2024    CREATININE 1.43 (H) 2024    CREATININE 1.15 10/11/2024          Benign familial tremor  Off benzodiazepine and relatively stable.  Continues on Remeron at bedtime         Depression Screening and Follow-up Plan: Patient was screened for depression during today's encounter. They screened negative with a PHQ-9 score of 0.    Falls Plan of Care: balance, strength, and gait training instructions were provided. Home safety education provided.       Preventive health issues were discussed with patient, and age appropriate screening tests were ordered as noted in patient's After Visit Summary. Personalized health advice and appropriate referrals for health  education or preventive services given if needed, as noted in patient's After Visit Summary.    History of Present Illness     89-year-old female here for wellness visit accompanied by daughter Izzy.  Patient lives in her home with her son seems so.        Chief Complaint   Patient presents with   • Medicare Wellness Visit   Patient is now following with [facility].  She is off her benzodiazepine  Current team members include cardiology and nephrology.  Patient Care Team:  Paz Maharaj,  as PCP - General (Family Medicine)  Paz Maharaj, DO as PCP - PCP-[facility] Attributed-Roster  Paz Maharaj, DO as PCP - PCP-[facility] ([facility])  DO Ivory Bradshaw DO Judith Pryblick, DO Sana Rab Akbar, MD (Nephrology)    Review of Systems   Constitutional:  Positive for fatigue.   Genitourinary:         No incontinence   Neurological:  Positive for tremors.   Psychiatric/Behavioral:  The patient is nervous/anxious.      Component      Latest Ref Rng 11/20/2024   Albumin      3.5 - 5.0 g/dL 3.6    Calcium      8.4 - 10.2 mg/dL 8.9    Phosphorus      2.3 - 4.1 mg/dL 3.9    BUN      5 - 25 mg/dL 48 (H)    Creatinine      0.60 - 1.30 mg/dL 1.43 (H)    Sodium      135 - 147 mmol/L 146    Potassium      3.5 - 5.3 mmol/L 4.8    Chloride      96 - 108 mmol/L 100    Carbon Dioxide      21 - 32 mmol/L 41 (H)    ANION GAP      4 - 13 mmol/L 5    GFR, Calculated      ml/min/1.73sq m 32    GLUCOSE, FASTING      65 - 99 mg/dL 126 (H)    PTH      12.0 - 88.0 pg/mL 121.3 (H)    VITAMIN D, 25-HYDROXY      30.0 - 100.0 ng/mL 39.9       Component      Latest Ref Rng 8/15/2024   TSH 3RD GENERATON      0.450 - 4.500 uIU/mL 0.767        Annual Wellness Visit Questionnaire   Ashley is here for her Subsequent Wellness visit.     Health Risk Assessment:   Patient rates overall health as good. Patient feels that their physical health rating is slightly worse. Patient is satisfied with their life. Eyesight was  rated as same. Hearing was rated as same. Patient feels that their emotional and mental health rating is same. Patients states they are sometimes angry. Patient states they are often unusually tired/fatigued. Pain experienced in the last 7 days has been none. Patient states that she has experienced weight loss or gain in last 6 months.     Depression Screening:   PHQ-9 Score: 0      Fall Risk Screening:   In the past year, patient has experienced: history of falling in past year    Number of falls: 2 or more  Injured during fall?: Yes    Feels unsteady when standing or walking?: Yes    Worried about falling?: Yes      Urinary Incontinence Screening:   Patient has leaked urine accidently in the last six months.     Home Safety:  Patient has trouble with stairs inside or outside of their home. Patient has working smoke alarms and has working carbon monoxide detector. Home safety hazards include: none.     Nutrition:   Current diet is Regular.     Medications:   Patient is currently taking over-the-counter supplements. OTC medications include: see medication list. Patient is able to manage medications.     Activities of Daily Living (ADLs)/Instrumental Activities of Daily Living (IADLs):   Walk and transfer into and out of bed and chair?: Yes  Dress and groom yourself?: Yes    Bathe or shower yourself?: No    Feed yourself? Yes  Do your laundry/housekeeping?: No  Manage your money, pay your bills and track your expenses?: No  Make your own meals?: No    Do your own shopping?: No    Previous Hospitalizations:   Any hospitalizations or ED visits within the last 12 months?: Yes    How many hospitalizations have you had in the last year?: 3-4    Advance Care Planning:   Living will: No    Durable POA for healthcare: No    Advanced directive: No      PREVENTIVE SCREENINGS      Cardiovascular Screening:    General: Screening Not Indicated and History Lipid Disorder      Diabetes Screening:     General: Screening Not  Indicated and History Diabetes      Colorectal Cancer Screening:     General: Screening Not Indicated      Cervical Cancer Screening:    General: Screening Not Indicated      Abdominal Aortic Aneurysm (AAA) Screening:        General: Screening Not Indicated and History AAA      Lung Cancer Screening:     General: Screening Not Indicated    Screening, Brief Intervention, and Referral to Treatment (SBIRT)    Screening      AUDIT-C Screenin) How often did you have a drink containing alcohol in the past year? never  2) How many drinks did you have on a typical day when you were drinking in the past year? 0  3) How often did you have 6 or more drinks on one occasion in the past year? never    AUDIT-C Score: 0  Interpretation: Score 0-2 (female): Negative screen for alcohol misuse    Single Item Drug Screening:  How often have you used an illegal drug (including marijuana) or a prescription medication for non-medical reasons in the past year? never    Single Item Drug Screen Score: 0  Interpretation: Negative screen for possible drug use disorder    Social Drivers of Health     Financial Resource Strain: Low Risk  (5/10/2023)    Overall Financial Resource Strain (CARDIA)    • Difficulty of Paying Living Expenses: Not hard at all   Food Insecurity: No Food Insecurity (2024)    Hunger Vital Sign    • Worried About Running Out of Food in the Last Year: Never true    • Ran Out of Food in the Last Year: Never true   Transportation Needs: No Transportation Needs (2024)    PRAPARE - Transportation    • Lack of Transportation (Medical): No    • Lack of Transportation (Non-Medical): No   Housing Stability: Low Risk  (2024)    Housing Stability Vital Sign    • Unable to Pay for Housing in the Last Year: No    • Number of Times Moved in the Last Year: 0    • Homeless in the Last Year: No   Utilities: Not At Risk (2024)    [facility] Utilities    • Threatened with loss of utilities: No     No results  "found.    Objective   /60   Temp 97.5 °F (36.4 °C) (Temporal)   Ht 5' 2\" (1.575 m)   Wt 86.5 kg (190 lb 12.8 oz)   BMI 34.90 kg/m²     Physical Exam    "

## 2024-12-06 NOTE — PATIENT INSTRUCTIONS

## 2024-12-09 NOTE — PROGRESS NOTES
[facility]  [address], [address]  [place] [id number]  [phone]    Care Conference    NAME: Ashley Gonzales  AGE: 89 y.o. SEX: female  YOB: 1935  DATE OF ASSESSMENT: 11/12/2024  DATE OF CONFERENCE: 12/19/2024    Family Present: patient and daughter  Staff Present: FLAKO Lee, FLAKO Iglesias    Patient / Family Goals of Care: Review cognitive decline and associated symptoms, discuss treatment options and care planning.     Medical Concerns (Current/Historical): Mobility, Mood, JAMEEL (obstructive sleep apnea), Generalized OA    Geriatric Syndromes/Age Related Syndromes: Mild dementia    Neuropsychological  Mild dementia, vascular  Ed Cognitive Assessment - BLIND: 14/22  Geriatric Depression Screen: 7/15    History, physical, and cognitive screening are most consistent with:  Mild dementia, vascular type  Contributing factors:  history of stroke, history of hospital delirium, anxiety, poor sleep  Memory medications:  discussed limited benefits from aricept/namenda in vascular dementia- pt would like to hold at this time, would revisit if signs of AD in the future.  Continue supportive cares - with support of family  Caregiver stress concerns:  daughter denies concerns at this time   Discussed safe environment  Wandering:  daughter denies any wandering. Some items to consider would be door video surveillance, ID bracelet, Tile GPS  if becomes a concern  Home:  Discussed ensure patient has phone and reinforce to not use stove or shower while gone.  Level of care:  assisted living level of care - family able to provide at this time  Fall preventions:  change in position slowly, continued use of AD for gait stability, removal of clutter and ensure adequate lighting in walk areas  Medication management:  daughter manages medications  Scam safety:  Do not answer suspicious mail, phone calls, email, or text message without having second person  review due to safety risk.  Do not give out personal info to questionable sources - read company safety policies for how they might contact you.  Continue to engage in physical, cognitive, social activity.  Continue doing games, puzzles, trivia, current event discussions  Continue daily walks or exercise video  Continue outings with friends and family.  Avoid social isolation.  Referral to cognitive therapy:  [facility] as pt does not like to go out.  Optimize chronic and acute conditions  Continue to follow up with providers and ensure medication compliance  Vascular risk factors:  obstructive sleep apnea, chronic kidney disease, BMI 33  Chronic condition concerns:  cont to follow up with pcp to minimize vascular risk factors.  Cont chronic use of O2.   Ensure good diet (ex Mediterranean diet) and adequate hydration  Monitor for changes in behavior/mood - if noted, notify provider for evaluation  Encourage mindfulness and positive socialization for general wellness.  Do not overwhelm patient with information.    Do one task at a time. Use slower pace.  Keep to one conversation at a time.  Do not multitask.  Encourage independence as able.    Decision-making capacity: At this point, recommend all large decisions be made with support of family.  In future, if capacity is of concern, would refer to neuropsychology for full evaluation  Staging: FAST stage 4  Driving safety: pt does not drive    Remain active physically, mentally and socially  Pharmaceutical and non-pharmaceutical interventions discussed  Engage in cognitively challenging exercises such as crosswords and puzzles  Maintain chronic conditions under control  Repeat cognitive assessment in 6 months    Diagnostic Studies  Review of blood work  TSH (8/15/24): 0.767  Vitamin B12 (8/16/24): 234  Folate (8/20/24): >22.3  Review of previous imaging  CT head without contrast (8/20/24)  IMPRESSION:  No acute intracranial hemorrhage seen  No mass effect or midline  shift seen  Stable CT    Physical Finding Impacting Function   Observed ambulation:  walker, sl slower, fairly steady   Fall Risk: moderate   Activities of Daily Living: Supported (physical supports)   Instrumental Activities of Daily Living: Dependent    Encourage appropriate footwear at all times  Review fall risk prevention tips and adjust within the home environment as needed    Medications Reviewed   Medications seem appropriate for present conditions  Check with PCP before using over the counter medications  Avoid over the counter medications that can affect cognition (e.g., Benadryl, Tylenol PM)   Avoid NSAIDs due to risk of GI bleed and renal impairment    Other Findings   Overall health  BMI: 34.90 kg/m2  Maintain well-balanced diet  Continue following with primary care physician regularly  Mobility  Baseline ambulation: steady with use of roller walker.  Fall type: patient has had prior falls in the past  Fall precautions  Mood  Baseline mood:  stable  GDS 7  Continue:  increase social interactions and avoid isolation. Continue mirtazipine 30 mg.   Pt did not tolerate buspar.  Would like to restart lexapro which was working.  Will restart per plan above.  Would titrate down remeron if lexapro is working and remeron is not helping much.  Encourage relaxation techniques, emotional support    JAMEEL (obstructive sleep apnea)  Continue use of oxygen and cpap at night. Patient c/o difficulty sleeping with her machine. Recommend discussing with respiratory provider for possible other mask options to allow for healthy sleep cycle.   Generalized OA   Denies pain today.  Cont prn tylenol/topical analgesics.  Avoid NSAids.     Recommended Health Maintenance   Immunizations, if not contraindicated:   Influenza vaccine yearly  Pneumo vaccine every 5 years (65 years and over)  Shingles vaccine  COVID-19 vaccine    Social / Safety Concerns  Consider an Adult Day Program for positive socialization, physical exercise,  cognitive stimulation and family respite  Consider a home care aide to assist with daily care needs  Stay in touch with family and friends  Plan self-care activities for your mental well-being each week  Consider volunteering through [employer] ([phone]) or [employer]  Recommend review of fall risk prevention tips  Recommend use of fall precautions including fall alert device  For wandering prevention: consider use of fall alert with GPS, SafeReturn bracelet, [employer] LifeSaver bracelet, video surveillance, or alarm systems  Consider assistance for medication administration such as blister packaging or use of an automated pill dispenser  Recommend contacting your local [facility] Agency on Aging for possible eligible programs such as OPTIONS, Caregiver Support Program, or WAIVER    Long Term Care Issues/acp  Maintain updated advance directives and provide a copy to your primary care provider  Consider caregiver support groups and educational resources through the Alzheimer's Association; access Alzheimer's Association 24/7 Helpline at 1-122.712.7482  [facility] does offer a monthly caregiver support group. If interested, please speak with a .  Utilize reorientation and redirection as needed (dependent on situation)  Educational information provided    Patient and family verbalized understanding of above care plan.    For care coordination purposes, this care plan will be shared with your primary care provider. With any questions, please contact our office at [phone].

## 2024-12-17 ENCOUNTER — TELEPHONE (OUTPATIENT)
Age: 89
End: 2024-12-17

## 2024-12-17 NOTE — TELEPHONE ENCOUNTER
Called to let Izzy know that I spoke to Azul and she aggred that we can make the care conference on 12/19 a virtual. I verified which phone number to call her with

## 2024-12-17 NOTE — TELEPHONE ENCOUNTER
Daughter Izzy called to request of apt on 12/19 with Azul Szymanski can be a virtual apt.     Please return a call to Izzy.    Please advise  Thank you

## 2024-12-19 ENCOUNTER — TELEMEDICINE (OUTPATIENT)
Age: 89
End: 2024-12-19
Payer: COMMERCIAL

## 2024-12-19 ENCOUNTER — TELEPHONE (OUTPATIENT)
Age: 89
End: 2024-12-19

## 2024-12-19 DIAGNOSIS — Z71.89 ACP (ADVANCE CARE PLANNING): ICD-10-CM

## 2024-12-19 DIAGNOSIS — F41.9 ANXIETY AND DEPRESSION: ICD-10-CM

## 2024-12-19 DIAGNOSIS — R41.3 MEMORY CHANGE: ICD-10-CM

## 2024-12-19 DIAGNOSIS — F32.A ANXIETY AND DEPRESSION: ICD-10-CM

## 2024-12-19 DIAGNOSIS — F01.A0 MILD VASCULAR DEMENTIA WITHOUT BEHAVIORAL DISTURBANCE, PSYCHOTIC DISTURBANCE, MOOD DISTURBANCE, OR ANXIETY (HCC): Primary | ICD-10-CM

## 2024-12-19 DIAGNOSIS — E53.8 LOW SERUM VITAMIN B12: ICD-10-CM

## 2024-12-19 PROCEDURE — 99483 ASSMT & CARE PLN PT COG IMP: CPT | Performed by: NURSE PRACTITIONER

## 2024-12-19 RX ORDER — ESCITALOPRAM OXALATE 5 MG/1
5 TABLET ORAL DAILY
Qty: 30 TABLET | Refills: 1 | Status: SHIPPED | OUTPATIENT
Start: 2024-12-19

## 2024-12-19 RX ORDER — MULTIVITAMIN WITH IRON
500 TABLET ORAL DAILY
Qty: 30 TABLET | Refills: 1 | Status: SHIPPED | OUTPATIENT
Start: 2024-12-19

## 2024-12-19 NOTE — PROGRESS NOTES
Virtual Regular Visit  Name: Ashley Gonzales      : 1935      MRN: [id number]  Encounter Provider: FLAKO Yang  Encounter Date: 2024   Encounter department: [facility]      Verification of patient location:  Patient is located at Home in the following state in which I hold an active license [place] :  Assessment & Plan  Mild vascular dementia without behavioral disturbance, psychotic disturbance, mood disturbance, or anxiety (HCC)         Anxiety and depression    Orders:    escitalopram (LEXAPRO) 5 mg tablet; Take 1 tablet (5 mg total) by mouth daily    Memory change    Orders:    Basic metabolic panel; Future    vitamin B-12 (VITAMIN B-12) 500 mcg tablet; Take 1 tablet (500 mcg total) by mouth daily    Vitamin B12; Future    Low serum vitamin B12    Orders:    vitamin B-12 (VITAMIN B-12) 500 mcg tablet; Take 1 tablet (500 mcg total) by mouth daily    Vitamin B12; Future    ACP (advance care planning)  Has ACP docs on file per epic check  Cont to discuss and update goc as needed.         Mild vascular dementia without behavioral disturbance, psychotic disturbance, mood disturbance, or anxiety (HCC)  MOCA:-b .  independent adls/dependent iadls  Most consistent with mild vascular dementia   Engage in regular social, physical, cognitive activity.  Optimize acute and chronic conditions  Frequent reminders and close monitoring for safety  Monitor for acute changes in behavior/personality (agitation or lethargy).  Notify pcp/ED for reversible causes eval.         Anxiety and depression  Lexapro was stopped when Remeron started  Pt would like to resume lexapro.  We discussed that potential interaction between two is inc risk for serotonin syndrome (monitor cns depression, hyponatremia, psychomotor impairment).  They are aware to notify office of agitation/confusion, fever, rapid heart rate, muscle twitching)  Will check bmp in 4 wks to monitor sodium       Memory change      "    Low serum vitamin B12  B12 234 - discussed that levels >400 are found to be more neuro protective.  Pt to start 500mcg vitamin b12 daily, will recheck level in 6 months.         Encounter provider FLAKO Yang    The patient was identified by name and date of birth. Ashley Gonzales was informed that this is a telemedicine visit and that the visit is being conducted through the Epic Embedded platform. She agrees to proceed..  My office door was closed. No one else was in the room.  She acknowledged consent and understanding of privacy and security of the video platform. The patient has agreed to participate and understands they can discontinue the visit at any time.    Patient is aware this is a billable service.     History of Present Illness     Mrs Gonzales is seen today via video with her daughter for memory care conference.  She reports no new medications, no hospital visits, no recent falls.  She denies pain  She is having increased anxiety since lexapro stopped and would like to return to previous dose.  Remeron helps a little with sleep, but doing nothing for anxiety.  She is also curious if she should take vitamin b12 supplement.  Time spent today reviewing memory loss ladder, definition of dementia, types of dementia, contributing factors to cognitive changes, interventions to help delay progression of memory loss, safety concerns to be aware of, and acute changes to notify pcp of.  We also touched on some medications that can contribute to worsening cognition as well as available \"memory medications.\"         Review of Systems   Constitutional:  Negative for activity change and appetite change.   HENT:  Negative for hearing loss.    Eyes:  Negative for visual disturbance.   Respiratory:  Negative for cough and shortness of breath.    Cardiovascular:  Negative for chest pain.   Gastrointestinal:  Negative for abdominal pain.   Musculoskeletal:  Negative for back pain.   Psychiatric/Behavioral:  " Positive for decreased concentration (forgetful). Negative for dysphoric mood and sleep disturbance. The patient is nervous/anxious.        Objective   There were no vitals taken for this visit.    Physical Exam  Vitals and nursing note reviewed.   Constitutional:       General: She is not in acute distress.     Appearance: Normal appearance. She is not ill-appearing.   Pulmonary:      Effort: Pulmonary effort is normal. No respiratory distress.      Breath sounds: No wheezing (none audible).      Comments: Nc o2 noted  Musculoskeletal:         General: Normal range of motion.   Skin:     General: Skin is dry.   Neurological:      General: No focal deficit present.      Mental Status: She is alert. Mental status is at baseline.      Comments: Oriented to person, mostly tps  Pleasant, cooperative, engages in convo   Psychiatric:         Mood and Affect: Mood normal.         Behavior: Behavior normal.         Visit Time  Total Visit Duration: 25 minutes

## 2024-12-19 NOTE — TELEPHONE ENCOUNTER
LM with daughter, Izzy to confirm 6 month follow-up appt for patient. Appt can be either virtual or in person.

## 2024-12-19 NOTE — ASSESSMENT & PLAN NOTE
Orders:    vitamin B-12 (VITAMIN B-12) 500 mcg tablet; Take 1 tablet (500 mcg total) by mouth daily    Vitamin B12; Future

## 2024-12-19 NOTE — PROGRESS NOTES
[facility]  [address], [address]  [place] [address]  [phone]    Care Conference: Resources Provided    MSW provided the following resources, along with a copy of care plan:  [address] [place] [id number]    General Information  - 10 Ways to Love Your Brain  - Memory loss ladder  - Packet with Alzheimer's Association information including: definition of dementia, communication tips for different stages, common behaviors and response strategies  -Vascular Dementia packet    Caregiver Support  - Flyer for [facility] Virtual Caregiver Support Group (meeting 2x per month by Zoom)  - Alzheimer's Association Rx Pad (guide to website and 24/7 helpline, 1-123.350.4499)    Safety  - CDC fall prevention / home safety checklist    Community Resources  - [facility] Aging in Place Resource Guide (contains information about higher levels of care, homecare, etc.)  - [employer] [facility]: What is Dementia & Where to Begin brochure & Resource Guide    Other  - [facility]: Caregiver Website QR code to scan for resources/education    Resources provided at initial assessment: Home care/Waiver/AAA, LVAIP Resource Guide 2024[facility]:  Health Program

## 2024-12-19 NOTE — ASSESSMENT & PLAN NOTE
Orders:    Basic metabolic panel; Future    vitamin B-12 (VITAMIN B-12) 500 mcg tablet; Take 1 tablet (500 mcg total) by mouth daily    Vitamin B12; Future

## 2024-12-26 ENCOUNTER — TELEPHONE (OUTPATIENT)
Age: 89
End: 2024-12-26

## 2024-12-26 ENCOUNTER — HOSPITAL ENCOUNTER (EMERGENCY)
Facility: HOSPITAL | Age: 89
Discharge: HOME | End: 2024-12-26
Attending: EMERGENCY MEDICINE
Payer: COMMERCIAL

## 2024-12-26 ENCOUNTER — APPOINTMENT (EMERGENCY)
Dept: RADIOLOGY | Facility: HOSPITAL | Age: 89
End: 2024-12-26
Payer: COMMERCIAL

## 2024-12-26 ENCOUNTER — APPOINTMENT (EMERGENCY)
Dept: CT IMAGING | Facility: HOSPITAL | Age: 89
End: 2024-12-26
Payer: COMMERCIAL

## 2024-12-26 VITALS
DIASTOLIC BLOOD PRESSURE: 58 MMHG | BODY MASS INDEX: 39.23 KG/M2 | TEMPERATURE: 98.1 F | WEIGHT: 214.51 LBS | RESPIRATION RATE: 16 BRPM | OXYGEN SATURATION: 97 % | SYSTOLIC BLOOD PRESSURE: 132 MMHG | HEART RATE: 62 BPM

## 2024-12-26 DIAGNOSIS — R25.1 SHAKING: Primary | ICD-10-CM

## 2024-12-26 LAB
ALBUMIN SERPL BCG-MCNC: 3.6 G/DL (ref 3.5–5)
ALP SERPL-CCNC: 72 U/L (ref 34–104)
ALT SERPL W P-5'-P-CCNC: 5 U/L (ref 7–52)
ANION GAP SERPL CALCULATED.3IONS-SCNC: 0 MMOL/L (ref 4–13)
APTT PPP: 29 SECONDS (ref 23–34)
AST SERPL W P-5'-P-CCNC: 11 U/L (ref 13–39)
BASOPHILS # BLD AUTO: 0.08 THOUSANDS/ÂΜL (ref 0–0.1)
BASOPHILS NFR BLD AUTO: 1 % (ref 0–1)
BILIRUB SERPL-MCNC: 0.41 MG/DL (ref 0.2–1)
BILIRUB UR QL STRIP: NEGATIVE
BUN SERPL-MCNC: 40 MG/DL (ref 5–25)
CALCIUM SERPL-MCNC: 9.3 MG/DL (ref 8.4–10.2)
CHLORIDE SERPL-SCNC: 100 MMOL/L (ref 96–108)
CLARITY UR: CLEAR
CO2 SERPL-SCNC: 44 MMOL/L (ref 21–32)
COLOR UR: YELLOW
CREAT SERPL-MCNC: 1.37 MG/DL (ref 0.6–1.3)
EOSINOPHIL # BLD AUTO: 0.08 THOUSAND/ÂΜL (ref 0–0.61)
EOSINOPHIL NFR BLD AUTO: 1 % (ref 0–6)
ERYTHROCYTE [DISTWIDTH] IN BLOOD BY AUTOMATED COUNT: 12.5 % (ref 11.6–15.1)
GFR SERPL CREATININE-BSD FRML MDRD: 34 ML/MIN/1.73SQ M
GLUCOSE SERPL-MCNC: 113 MG/DL (ref 65–140)
GLUCOSE UR STRIP-MCNC: NEGATIVE MG/DL
HCT VFR BLD AUTO: 40.9 % (ref 34.8–46.1)
HGB BLD-MCNC: 11.8 G/DL (ref 11.5–15.4)
HGB UR QL STRIP.AUTO: NEGATIVE
IMM GRANULOCYTES # BLD AUTO: 0.02 THOUSAND/UL (ref 0–0.2)
IMM GRANULOCYTES NFR BLD AUTO: 0 % (ref 0–2)
INR PPP: 0.96 (ref 0.85–1.19)
KETONES UR STRIP-MCNC: NEGATIVE MG/DL
LACTATE SERPL-SCNC: 0.5 MMOL/L (ref 0.5–2)
LEUKOCYTE ESTERASE UR QL STRIP: NEGATIVE
LYMPHOCYTES # BLD AUTO: 1.2 THOUSANDS/ÂΜL (ref 0.6–4.47)
LYMPHOCYTES NFR BLD AUTO: 14 % (ref 14–44)
MCH RBC QN AUTO: 29 PG (ref 26.8–34.3)
MCHC RBC AUTO-ENTMCNC: 28.9 G/DL (ref 31.4–37.4)
MCV RBC AUTO: 101 FL (ref 82–98)
MONOCYTES # BLD AUTO: 0.51 THOUSAND/ÂΜL (ref 0.17–1.22)
MONOCYTES NFR BLD AUTO: 6 % (ref 4–12)
NEUTROPHILS # BLD AUTO: 6.55 THOUSANDS/ÂΜL (ref 1.85–7.62)
NEUTS SEG NFR BLD AUTO: 78 % (ref 43–75)
NITRITE UR QL STRIP: NEGATIVE
NRBC BLD AUTO-RTO: 0 /100 WBCS
PH UR STRIP.AUTO: 5.5 [PH] (ref 4.5–8)
PLATELET # BLD AUTO: 238 THOUSANDS/UL (ref 149–390)
PMV BLD AUTO: 10.2 FL (ref 8.9–12.7)
POTASSIUM SERPL-SCNC: 5 MMOL/L (ref 3.5–5.3)
PROT SERPL-MCNC: 6.8 G/DL (ref 6.4–8.4)
PROT UR STRIP-MCNC: NEGATIVE MG/DL
PROTHROMBIN TIME: 13 SECONDS (ref 12.3–15)
RBC # BLD AUTO: 4.07 MILLION/UL (ref 3.81–5.12)
SODIUM SERPL-SCNC: 144 MMOL/L (ref 135–147)
SP GR UR STRIP.AUTO: 1.01 (ref 1–1.03)
UROBILINOGEN UR QL STRIP.AUTO: 0.2 E.U./DL
WBC # BLD AUTO: 8.44 THOUSAND/UL (ref 4.31–10.16)

## 2024-12-26 PROCEDURE — 81003 URINALYSIS AUTO W/O SCOPE: CPT

## 2024-12-26 PROCEDURE — 70450 CT HEAD/BRAIN W/O DYE: CPT

## 2024-12-26 PROCEDURE — 36415 COLL VENOUS BLD VENIPUNCTURE: CPT | Performed by: PHYSICIAN ASSISTANT

## 2024-12-26 PROCEDURE — 85025 COMPLETE CBC W/AUTO DIFF WBC: CPT | Performed by: PHYSICIAN ASSISTANT

## 2024-12-26 PROCEDURE — 99285 EMERGENCY DEPT VISIT HI MDM: CPT | Performed by: PHYSICIAN ASSISTANT

## 2024-12-26 PROCEDURE — 96360 HYDRATION IV INFUSION INIT: CPT

## 2024-12-26 PROCEDURE — 71045 X-RAY EXAM CHEST 1 VIEW: CPT

## 2024-12-26 PROCEDURE — 85610 PROTHROMBIN TIME: CPT | Performed by: PHYSICIAN ASSISTANT

## 2024-12-26 PROCEDURE — 80053 COMPREHEN METABOLIC PANEL: CPT | Performed by: PHYSICIAN ASSISTANT

## 2024-12-26 PROCEDURE — 83605 ASSAY OF LACTIC ACID: CPT | Performed by: PHYSICIAN ASSISTANT

## 2024-12-26 PROCEDURE — 99285 EMERGENCY DEPT VISIT HI MDM: CPT

## 2024-12-26 PROCEDURE — 85730 THROMBOPLASTIN TIME PARTIAL: CPT | Performed by: PHYSICIAN ASSISTANT

## 2024-12-26 RX ADMIN — SODIUM CHLORIDE 500 ML: 0.9 INJECTION, SOLUTION INTRAVENOUS at 13:21

## 2024-12-26 NOTE — TELEPHONE ENCOUNTER
Spoke with pts daughter, she will take pt to the ER, with her age, her confusion and visible shaking she needs to be evaluated immediately.

## 2024-12-26 NOTE — TELEPHONE ENCOUNTER
Patients daughter called in and states that patient was shaking a lot this morning and doesn't have a fever but Is extremely cold and has no appetite and slightly confused and urine is dark. Daughter states that she does have a hard time getting patient out of the home and there are no open appointments today for a virtual. I did advise that patient needs to be seen due to possible UTI. I was unable to reach a nurse to have patient triaged so I did call the office to see if patient could be fit in somewhere. Patients daughter was transferred to the office for further assistance

## 2024-12-27 ENCOUNTER — VBI (OUTPATIENT)
Dept: FAMILY MEDICINE CLINIC | Facility: CLINIC | Age: 89
End: 2024-12-27

## 2024-12-27 NOTE — TELEPHONE ENCOUNTER
12/27/24 9:19 AM    Patient contacted post ED visit, first outreach attempt made. Message was left for patient to return a call to the VBI Department at [facility]: Phone [phone].    Thank you.  Madeline Hurt MA  [facility]

## 2024-12-27 NOTE — TELEPHONE ENCOUNTER
12/27/24 9:40 AM    Patient contacted post ED visit, VBI department spoke with patient/caregiver and outreach was successful.    Thank you.  Madeline Hurt MA  [facility]

## 2024-12-30 ENCOUNTER — TELEPHONE (OUTPATIENT)
Dept: FAMILY MEDICINE CLINIC | Facility: CLINIC | Age: 89
End: 2024-12-30

## 2024-12-30 ENCOUNTER — TELEPHONE (OUTPATIENT)
Age: 89
End: 2024-12-30

## 2024-12-30 NOTE — TELEPHONE ENCOUNTER
I spoke with patient's daughter, Izzy regarding conversation via My Chart with FLAKO. I instructed Izzy, per FLAKO Iglesias, to stop the Lexapro as it was causing patient to be increasingly lethargic and confused. Izzy reports that patient has not had the medication since 12/27 and today, 12/30, Izzy has seen some slight improvement with the confusion and patient being a little less lethargic. Encouraged to call back to office or message via My Chart with any other questions/concerns.

## 2024-12-30 NOTE — TELEPHONE ENCOUNTER
[facility] faxed over a physician's order to be completed. Place in folder for Dr. Mojica to sign.

## 2024-12-31 NOTE — ASSESSMENT & PLAN NOTE
Lab Results   Component Value Date    EGFR 34 12/26/2024    EGFR 32 11/20/2024    EGFR 42 10/11/2024    CREATININE 1.37 (H) 12/26/2024    CREATININE 1.43 (H) 11/20/2024    CREATININE 1.15 10/11/2024

## 2024-12-31 NOTE — ASSESSMENT & PLAN NOTE
PER  GERIATRIC MEDICINE   MOCA:-b 14/22.  independent adls/dependent iadls  Most consistent with mild vascular dementia   Engage in regular social, physical, cognitive activity.  Optimize acute and chronic conditions  Frequent reminders and close monitoring for safety  Monitor for acute changes in behavior/personality (agitation or lethargy).  Notify pcp/ED for reversible causes eval.

## 2025-01-05 DIAGNOSIS — K21.9 GASTROESOPHAGEAL REFLUX DISEASE WITHOUT ESOPHAGITIS: ICD-10-CM

## 2025-01-05 RX ORDER — FAMOTIDINE 20 MG/1
20 TABLET, FILM COATED ORAL 2 TIMES DAILY
Qty: 180 TABLET | Refills: 1 | Status: SHIPPED | OUTPATIENT
Start: 2025-01-05

## 2025-01-10 DIAGNOSIS — E53.8 LOW SERUM VITAMIN B12: ICD-10-CM

## 2025-01-10 DIAGNOSIS — F41.9 ANXIETY AND DEPRESSION: ICD-10-CM

## 2025-01-10 DIAGNOSIS — R41.3 MEMORY CHANGE: ICD-10-CM

## 2025-01-10 DIAGNOSIS — F32.A ANXIETY AND DEPRESSION: ICD-10-CM

## 2025-01-15 DIAGNOSIS — R41.3 MEMORY CHANGE: ICD-10-CM

## 2025-01-15 DIAGNOSIS — E53.8 LOW SERUM VITAMIN B12: ICD-10-CM

## 2025-01-15 RX ORDER — ESCITALOPRAM OXALATE 5 MG/1
5 TABLET ORAL DAILY
Qty: 90 TABLET | Refills: 1 | OUTPATIENT
Start: 2025-01-15

## 2025-01-15 RX ORDER — MULTIVITAMIN WITH IRON
500 TABLET ORAL DAILY
Qty: 90 TABLET | Refills: 1 | Status: SHIPPED | OUTPATIENT
Start: 2025-01-15

## 2025-01-15 RX ORDER — CYANOCOBALAMIN (VITAMIN B-12) 500 MCG
500 TABLET ORAL DAILY
Qty: 90 TABLET | Refills: 1 | OUTPATIENT
Start: 2025-01-15

## 2025-01-17 ENCOUNTER — APPOINTMENT (OUTPATIENT)
Dept: LAB | Facility: CLINIC | Age: OVER 89
End: 2025-01-17
Payer: COMMERCIAL

## 2025-01-17 DIAGNOSIS — R41.3 MEMORY CHANGE: ICD-10-CM

## 2025-01-17 DIAGNOSIS — E03.9 ACQUIRED HYPOTHYROIDISM: ICD-10-CM

## 2025-01-17 LAB
ANION GAP SERPL CALCULATED.3IONS-SCNC: 8 MMOL/L (ref 4–13)
BUN SERPL-MCNC: 46 MG/DL (ref 5–25)
CALCIUM SERPL-MCNC: 9.3 MG/DL (ref 8.4–10.2)
CHLORIDE SERPL-SCNC: 98 MMOL/L (ref 96–108)
CO2 SERPL-SCNC: 37 MMOL/L (ref 21–32)
CREAT SERPL-MCNC: 1.26 MG/DL (ref 0.6–1.3)
GFR SERPL CREATININE-BSD FRML MDRD: 37 ML/MIN/1.73SQ M
GLUCOSE P FAST SERPL-MCNC: 100 MG/DL (ref 65–99)
POTASSIUM SERPL-SCNC: 4.6 MMOL/L (ref 3.5–5.3)
SODIUM SERPL-SCNC: 143 MMOL/L (ref 135–147)
TSH SERPL DL<=0.05 MIU/L-ACNC: 1.69 UIU/ML (ref 0.45–4.5)

## 2025-01-17 PROCEDURE — 84443 ASSAY THYROID STIM HORMONE: CPT

## 2025-01-17 PROCEDURE — 80048 BASIC METABOLIC PNL TOTAL CA: CPT

## 2025-01-17 PROCEDURE — 36415 COLL VENOUS BLD VENIPUNCTURE: CPT

## 2025-01-20 ENCOUNTER — TELEPHONE (OUTPATIENT)
Age: OVER 89
End: 2025-01-20

## 2025-01-20 ENCOUNTER — APPOINTMENT (EMERGENCY)
Dept: RADIOLOGY | Facility: HOSPITAL | Age: OVER 89
End: 2025-01-20
Payer: COMMERCIAL

## 2025-01-20 ENCOUNTER — HOSPITAL ENCOUNTER (EMERGENCY)
Facility: HOSPITAL | Age: OVER 89
Discharge: HOME | End: 2025-01-20
Attending: EMERGENCY MEDICINE
Payer: COMMERCIAL

## 2025-01-20 VITALS
HEART RATE: 70 BPM | BODY MASS INDEX: 37.46 KG/M2 | DIASTOLIC BLOOD PRESSURE: 65 MMHG | TEMPERATURE: 98.9 F | OXYGEN SATURATION: 97 % | RESPIRATION RATE: 20 BRPM | WEIGHT: 204.81 LBS | SYSTOLIC BLOOD PRESSURE: 140 MMHG

## 2025-01-20 DIAGNOSIS — R60.0 PERIPHERAL EDEMA: Primary | ICD-10-CM

## 2025-01-20 LAB
ANION GAP SERPL CALCULATED.3IONS-SCNC: 2 MMOL/L (ref 4–13)
ATRIAL RATE: 67 BPM
ATRIAL RATE: 69 BPM
BASOPHILS # BLD AUTO: 0.07 THOUSANDS/ΜL (ref 0–0.1)
BASOPHILS NFR BLD AUTO: 1 % (ref 0–1)
BNP SERPL-MCNC: 164 PG/ML (ref 0–100)
BUN SERPL-MCNC: 39 MG/DL (ref 5–25)
CALCIUM SERPL-MCNC: 9.3 MG/DL (ref 8.4–10.2)
CARDIAC TROPONIN I PNL SERPL HS: 10 NG/L (ref ?–50)
CHLORIDE SERPL-SCNC: 97 MMOL/L (ref 96–108)
CO2 SERPL-SCNC: 44 MMOL/L (ref 21–32)
CREAT SERPL-MCNC: 1.21 MG/DL (ref 0.6–1.3)
EOSINOPHIL # BLD AUTO: 0.35 THOUSAND/ΜL (ref 0–0.61)
EOSINOPHIL NFR BLD AUTO: 4 % (ref 0–6)
ERYTHROCYTE [DISTWIDTH] IN BLOOD BY AUTOMATED COUNT: 12.3 % (ref 11.6–15.1)
GFR SERPL CREATININE-BSD FRML MDRD: 39 ML/MIN/1.73SQ M
GLUCOSE SERPL-MCNC: 105 MG/DL (ref 65–140)
HCT VFR BLD AUTO: 40 % (ref 34.8–46.1)
HGB BLD-MCNC: 11.7 G/DL (ref 11.5–15.4)
IMM GRANULOCYTES # BLD AUTO: 0.03 THOUSAND/UL (ref 0–0.2)
IMM GRANULOCYTES NFR BLD AUTO: 0 % (ref 0–2)
LYMPHOCYTES # BLD AUTO: 1.9 THOUSANDS/ΜL (ref 0.6–4.47)
LYMPHOCYTES NFR BLD AUTO: 23 % (ref 14–44)
MCH RBC QN AUTO: 29 PG (ref 26.8–34.3)
MCHC RBC AUTO-ENTMCNC: 29.3 G/DL (ref 31.4–37.4)
MCV RBC AUTO: 99 FL (ref 82–98)
MONOCYTES # BLD AUTO: 0.74 THOUSAND/ΜL (ref 0.17–1.22)
MONOCYTES NFR BLD AUTO: 9 % (ref 4–12)
NEUTROPHILS # BLD AUTO: 5.19 THOUSANDS/ΜL (ref 1.85–7.62)
NEUTS SEG NFR BLD AUTO: 63 % (ref 43–75)
NRBC BLD AUTO-RTO: 0 /100 WBCS
P AXIS: 81 DEGREES
P AXIS: 96 DEGREES
PLATELET # BLD AUTO: 262 THOUSANDS/UL (ref 149–390)
PMV BLD AUTO: 10.1 FL (ref 8.9–12.7)
POTASSIUM SERPL-SCNC: 4.5 MMOL/L (ref 3.5–5.3)
PR INTERVAL: 224 MS
PR INTERVAL: 226 MS
QRS AXIS: 103 DEGREES
QRS AXIS: 104 DEGREES
QRSD INTERVAL: 146 MS
QRSD INTERVAL: 152 MS
QT INTERVAL: 412 MS
QT INTERVAL: 414 MS
QTC INTERVAL: 435 MS
QTC INTERVAL: 443 MS
RBC # BLD AUTO: 4.03 MILLION/UL (ref 3.81–5.12)
SODIUM SERPL-SCNC: 143 MMOL/L (ref 135–147)
T WAVE AXIS: 58 DEGREES
T WAVE AXIS: 59 DEGREES
VENTRICULAR RATE: 67 BPM
VENTRICULAR RATE: 69 BPM
WBC # BLD AUTO: 8.28 THOUSAND/UL (ref 4.31–10.16)

## 2025-01-20 PROCEDURE — 99284 EMERGENCY DEPT VISIT MOD MDM: CPT

## 2025-01-20 PROCEDURE — 93005 ELECTROCARDIOGRAM TRACING: CPT

## 2025-01-20 PROCEDURE — 84484 ASSAY OF TROPONIN QUANT: CPT | Performed by: EMERGENCY MEDICINE

## 2025-01-20 PROCEDURE — 71045 X-RAY EXAM CHEST 1 VIEW: CPT

## 2025-01-20 PROCEDURE — 83880 ASSAY OF NATRIURETIC PEPTIDE: CPT | Performed by: EMERGENCY MEDICINE

## 2025-01-20 PROCEDURE — 99285 EMERGENCY DEPT VISIT HI MDM: CPT | Performed by: EMERGENCY MEDICINE

## 2025-01-20 PROCEDURE — 80048 BASIC METABOLIC PNL TOTAL CA: CPT | Performed by: EMERGENCY MEDICINE

## 2025-01-20 PROCEDURE — 93010 ELECTROCARDIOGRAM REPORT: CPT | Performed by: INTERNAL MEDICINE

## 2025-01-20 PROCEDURE — 36415 COLL VENOUS BLD VENIPUNCTURE: CPT | Performed by: EMERGENCY MEDICINE

## 2025-01-20 PROCEDURE — 96374 THER/PROPH/DIAG INJ IV PUSH: CPT

## 2025-01-20 PROCEDURE — 85025 COMPLETE CBC W/AUTO DIFF WBC: CPT | Performed by: EMERGENCY MEDICINE

## 2025-01-20 RX ORDER — FUROSEMIDE 10 MG/ML
40 INJECTION INTRAMUSCULAR; INTRAVENOUS ONCE
Status: COMPLETED | OUTPATIENT
Start: 2025-01-20 | End: 2025-01-20

## 2025-01-20 RX ADMIN — FUROSEMIDE 40 MG: 10 INJECTION, SOLUTION INTRAVENOUS at 15:47

## 2025-01-20 NOTE — TELEPHONE ENCOUNTER
Patient has bilateral swelling in legs and is retaining fluid. Patient unable to come in for an appt and nothing available with Dr Maharaj. Patient daughter is going to take her to the ER to get checked out.

## 2025-01-20 NOTE — TELEPHONE ENCOUNTER
Med Check:  Any GI upset: No  Bad dreams or nightmares: No  Dizziness or unsteadiness when standing or walking? - No  Headaches - No  Anything you want the provider to know? - They see no change and don't know if it is working. But they don't have any concerns.

## 2025-01-20 NOTE — DISCHARGE INSTRUCTIONS
Start to keep a daily weight log  Contact your Primary Care Provider to discuss any changes to your home furosemide dose.    Return for any trouble breathing, persistent vomiting, fever more than 101, worsening symptoms or for any concerns.

## 2025-01-20 NOTE — TELEPHONE ENCOUNTER
Patients daughter Izzy called in regards to patient retaining water in her feet and ankles and would like to know if provider would like to adjust dosage on patients Lasix or will provider has different advise. Patients daughter would like a call back.

## 2025-01-20 NOTE — ED PROVIDER NOTES
Time reflects when diagnosis was documented in both MDM as applicable and the Disposition within this note       Time User Action Codes Description Comment    1/20/2025  4:02 PM Angelica Benjamin Add [R60.0] Peripheral edema           ED Disposition       ED Disposition   Discharge    Condition   Stable    Date/Time   Mon Jan 20, 2025  4:02 PM    Comment   Ashley Gonzales discharge to home/self care.                   Assessment & Plan       Medical Decision Making  Pt w/ reported increased LE edema, on furosemide.  Mild edema noted on exam.  No signs of infection. Pt w/ baseline chronic respiratory failure and is on 2L NC at home w/ no reported changes in breathing, but on exam is noted to have diminished bs b/l ?related to copd/body habitus.  Will get EKG to r/o arrhythmia, ischemic changes.  Will get labs to r/o acute life threatening metabolic abnl, cardiac ischemia, significant anemia.  Will get CXR to r/o pulm edema, effusion.    Problems Addressed:  Peripheral edema: chronic illness or injury with exacerbation, progression, or side effects of treatment    Amount and/or Complexity of Data Reviewed  External Data Reviewed: notes.     Details: Outpt visits  Labs: ordered. Decision-making details documented in ED Course.  Radiology: ordered.  ECG/medicine tests: ordered and independent interpretation performed.    Risk  Prescription drug management.        ED Course as of 01/20/25 1734   Mon Jan 20, 2025   1420 Last Echo 2021:    LEFT VENTRICLE:  Systolic function was normal. Ejection fraction was estimated to be 60 %.  There were no regional wall motion abnormalities.  There was mild concentric hypertrophy.  Features were consistent with a pseudonormal left ventricular filling pattern, with concomitant abnormal relaxation and increased filling pressure (grade 2 diastolic dysfunction).  Doppler parameters were consistent with high ventricular filling pressure.     RIGHT VENTRICLE:  The ventricle was  dilated.  Systolic function was normal.     MITRAL VALVE:  There was moderate annular calcification.     AORTIC VALVE:  There was mild regurgitation.     TRICUSPID VALVE:  There was mild regurgitation.  Estimated peak PA pressure was 55 mmHg.  The findings suggest moderate pulmonary hypertension.     1537 BNP(!): 164  144 five months ago   1538 Creatinine: 1.21  1.26-1.37 in the last 3 weeks   1538 WBC: 8.28   1538 Segmented %: 63   1538 Immature Grans %: 0   1539 W/u unremarkable. Will give dose of furosemide IV for symptom management and encouraged to contact PCP about any changes to home furosemide dose       Medications   furosemide (LASIX) injection 40 mg (40 mg Intravenous Given 1/20/25 1547)       ED Risk Strat Scores                                              History of Present Illness       Chief Complaint   Patient presents with    Leg Swelling     Coming from home, R leg swelling starting yesterday, denies any pain, denies SOB or chest pain, pt on lasix, 2L O2 at home       Past Medical History:   Diagnosis Date    Anxiety     Cataract, bilateral     Last Assessed:3/19/2014    COPD (chronic obstructive pulmonary disease) (HCC)     Coronary artery disease     Hx of arterial ischemic stroke     Hypertension     Stroke (HCC)     Wears glasses       Past Surgical History:   Procedure Laterality Date    CAROTID ENDARTERECTOMY Right     CATARACT EXTRACTION W/ INTRAOCULAR LENS  IMPLANT, BILATERAL      COLONOSCOPY N/A 9/30/2017    Procedure: COLONOSCOPY FOR CONTROL OF BLEEDING;  Surgeon: Jeb Gaxiola MD;  Location: [facility];  Service: Colorectal    CORONARY ARTERY BYPASS GRAFT      CABG X3 with LIMA to LAD,SVG to OM,SVG to distal  RCA ; Last Assessed:7/13/2015    JOINT REPLACEMENT      left TKR    KIDNEY STONE SURGERY      NEPHROSTOMY Left     with Drainage Irrigation ;Onset:1974    NC ARTHRP KNE CONDYLE&PLATU MEDIAL&LAT COMPARTMENTS Right 2/25/2016    Procedure: ARTHROPLASTY KNEE TOTAL;  Surgeon: Jeb  MD [place];  Location: [facility] OR;  Service: Orthopedics      Family History   Problem Relation Age of Onset    Cancer Mother         malignant neoplasm    Heart attack Father     Aneurysm Other         Aortic    Cancer Other         malignant neoplasm      Social History     Tobacco Use    Smoking status: Former     Average packs/day: 1 pack/day for 24.0 years (24.0 ttl pk-yrs)     Types: Cigarettes     Start date: 1951     Passive exposure: Past    Smokeless tobacco: Never   Vaping Use    Vaping status: Never Used   Substance Use Topics    Alcohol use: Never    Drug use: Never      E-Cigarette/Vaping    E-Cigarette Use Never User       E-Cigarette/Vaping Substances    Nicotine No     THC No     CBD No       I have reviewed and agree with the history as documented.     Patient presents with:  Leg Swelling: Coming from home, R leg swelling starting yesterday, denies any pain, denies SOB or chest pain, pt on lasix, 2L O2 at home    89y F w/ hx of COPD, Hypertension, Hypothyroid, CAGB, DM, Chronic Respiratory Failure on 2L NC at home, GERD, JAMEEL, CAD, Stroke here for evaluation of worsening LE edema for the last week. Denies any redness to the LE and denies any pain.  Pt on furosemide and reports taking as prescribed and that there have been no recent changes to her dose.      Pt denies any f/c/s, no cough/congestion. Denies cp, no palpitations, no change in baseline oxygen requirement and no change in baseline dyspnea.  Appetite at baseline, no n/v, no changes in urination    Wt Readings from Last 3 Encounters:  01/20/25 : 92.9 kg (204 lb 12.9 oz)  12/26/24 : 97.3 kg (214 lb 8.1 oz)  12/06/24 : 86.5 kg (190 lb 12.8 oz)          History provided by:  Patient and medical records   used: No        Review of Systems   All other systems reviewed and are negative.          Objective       ED Triage Vitals   Temperature Pulse Blood Pressure Respirations SpO2 Patient Position - Orthostatic VS   01/20/25  1346 01/20/25 1346 01/20/25 1400 01/20/25 1346 01/20/25 1346 01/20/25 1346   98.9 °F (37.2 °C) 70 (!) 183/82 22 94 % Lying      Temp Source Heart Rate Source BP Location FiO2 (%) Pain Score    01/20/25 1346 01/20/25 1345 01/20/25 1346 -- 01/20/25 1346    Oral Monitor Left arm  No Pain      Vitals      Date and Time Temp Pulse SpO2 Resp BP Pain Score FACES Pain Rating User   01/20/25 1600 -- 70 97 % 20 140/65 -- -- AEN   01/20/25 1545 -- 70 99 % 20 152/67 -- -- AEN   01/20/25 1530 -- 68 100 % 22 143/63 -- -- AEN   01/20/25 1400 -- 66 99 % 40 183/82 -- -- AEN   01/20/25 1346 98.9 °F (37.2 °C) 70 94 % 22 -- No Pain -- AEN            Physical Exam  Vitals and nursing note reviewed.   Constitutional:       General: She is not in acute distress.     Appearance: Normal appearance. She is not ill-appearing, toxic-appearing or diaphoretic.   HENT:      Mouth/Throat:      Mouth: Mucous membranes are moist.   Eyes:      Conjunctiva/sclera: Conjunctivae normal.   Cardiovascular:      Rate and Rhythm: Normal rate and regular rhythm.      Heart sounds: No murmur heard.  Pulmonary:      Effort: Tachypnea present.      Breath sounds: Decreased breath sounds present.   Abdominal:      Palpations: Abdomen is soft.   Musculoskeletal:      Cervical back: Normal range of motion.      Right lower leg: Edema present.      Left lower leg: Edema present.      Comments: Trace ankle/pre-tibial swelling   Skin:     General: Skin is warm.   Neurological:      General: No focal deficit present.      Mental Status: She is alert. Mental status is at baseline.         Results Reviewed       Procedure Component Value Units Date/Time    HS Troponin I 4hr [[id number]]     Lab Status: No result Specimen: Blood     HS Troponin 0hr (reflex protocol) [[id number]]  (Normal) Collected: 01/20/25 1441    Lab Status: Final result Specimen: Blood from Arm, Right Updated: 01/20/25 1511     hs TnI 0hr 10 ng/L     HS Troponin I 2hr [[id number]]     Lab Status: No  result Specimen: Blood     B-Type Natriuretic Peptide(BNP) [[id number]]  (Abnormal) Collected: 01/20/25 1441    Lab Status: Final result Specimen: Blood from Arm, Right Updated: 01/20/25 1511      pg/mL     Basic metabolic panel [[id number]]  (Abnormal) Collected: 01/20/25 1441    Lab Status: Final result Specimen: Blood from Arm, Right Updated: 01/20/25 1508     Sodium 143 mmol/L      Potassium 4.5 mmol/L      Chloride 97 mmol/L      CO2 44 mmol/L      ANION GAP 2 mmol/L      BUN 39 mg/dL      Creatinine 1.21 mg/dL      Glucose 105 mg/dL      Calcium 9.3 mg/dL      eGFR 39 ml/min/1.73sq m     Narrative:      National Kid[employer] Foundation guidelines for Chronic Kidney Disease (CKD):     Stage 1 with normal or high GFR (GFR > 90 mL/min/1.73 square meters)    Stage 2 Mild CKD (GFR = 60-89 mL/min/1.73 square meters)    Stage 3A Moderate CKD (GFR = 45-59 mL/min/1.73 square meters)    Stage 3B Moderate CKD (GFR = 30-44 mL/min/1.73 square meters)    Stage 4 Severe CKD (GFR = 15-29 mL/min/1.73 square meters)    Stage 5 End Stage CKD (GFR <15 mL/min/1.73 square meters)  Note: GFR calculation is accurate only with a steady state creatinine    CBC and differential [[id number]]  (Abnormal) Collected: 01/20/25 1441    Lab Status: Final result Specimen: Blood from Arm, Right Updated: 01/20/25 1448     WBC 8.28 Thousand/uL      RBC 4.03 Million/uL      Hemoglobin 11.7 g/dL      Hematocrit 40.0 %      MCV 99 fL      MCH 29.0 pg      MCHC 29.3 g/dL      RDW 12.3 %      MPV 10.1 fL      Platelets 262 Thousands/uL      nRBC 0 /100 WBCs      Segmented % 63 %      Immature Grans % 0 %      Lymphocytes % 23 %      Monocytes % 9 %      Eosinophils Relative 4 %      Basophils Relative 1 %      Absolute Neutrophils 5.19 Thousands/µL      Absolute Immature Grans 0.03 Thousand/uL      Absolute Lymphocytes 1.90 Thousands/µL      Absolute Monocytes 0.74 Thousand/µL      Eosinophils Absolute 0.35 Thousand/µL      Basophils Absolute  0.07 Thousands/µL             XR chest 1 view portable   ED Interpretation by Angelica Benjamin DO (01/20 1539)   Xray reviewed and independently interpreted by me: no acute findings.  Formal reading per radiology        Final Interpretation by Td Alcocer MD (01/20 1532)      No acute cardiopulmonary disease.            Workstation performed: [id number]             ECG 12 Lead Documentation Only    Date/Time: 1/20/2025 2:55 PM    Performed by: Angelica Benjamin DO  Authorized by: Angelica Benjamin DO    Indications / Diagnosis:  Increased edema  ECG reviewed by me, the ED Provider: yes    Patient location:  ED  Previous ECG:     Previous ECG:  Compared to current    Similarity:  No change  Interpretation:     Interpretation: non-specific    Rate:     ECG rate:  62  Rhythm:     Rhythm: sinus rhythm and A-V block    Conduction:     Conduction: abnormal      Abnormal conduction: complete RBBB and 1st degree    ST segments:     ST segments:  Normal  T waves:     T waves: normal        ED Medication and Procedure Management   Prior to Admission Medications   Prescriptions Last Dose Informant Patient Reported? Taking?   Pediatric Multiple Vit-C-FA (PEDIATRIC MULTIVITAMIN) chewable tablet  Family Member, Self Yes No   Sig: Chew 1 tablet daily by mouth   acetaminophen (TYLENOL) 325 mg tablet  Family Member, Self No No   Sig: Take 2 tablets (650 mg total) by mouth every 6 (six) hours as needed for mild pain   aspirin (ECOTRIN LOW STRENGTH) 81 mg EC tablet  Family Member, Self Yes No   Sig: Take 81 mg by mouth daily   bimatoprost (LUMIGAN) 0.01 % ophthalmic drops   No No   Sig: Administer 1 drop to both eyes daily at bedtime   bisacodyl 5 mg EC tablet  Family Member, Self Yes No   Sig: Take 5 mg by mouth daily as needed for constipation   calcitriol (ROCALTROL) 0.25 mcg capsule   No No   Sig: Take 1 capsule (0.25 mcg total) by mouth 3 (three) times a week   famotidine (PEPCID) 20 mg tablet   No No   Sig: TAKE 1  TABLET BY MOUTH TWICE A DAY   furosemide (LASIX) 20 mg tablet  Family Member, Self No No   Sig: TAKE 1 TABLET BY MOUTH EVERY DAY   ketoconazole (NIZORAL) 2 % cream   No No   Sig: Apply topically daily   levothyroxine 75 mcg tablet  Family Member, Self No No   Sig: TAKE 1 TABLET BY MOUTH EVERY DAY IN THE MORNING   losartan (COZAAR) 50 mg tablet   No No   Sig: Take 1 tablet (50 mg total) by mouth daily   metoprolol tartrate (LOPRESSOR) 25 mg tablet  Family Member, Self No No   Sig: TAKE 1 TABLET (25 MG TOTAL) BY MOUTH EVERY 12 (TWELVE) HOURS   mirtazapine (REMERON) 30 mg tablet   No No   Sig: TAKE 1 TABLET BY MOUTH EVERYDAY AT BEDTIME   oxygen gas  Family Member, Self Yes No   Sig: Inhale 2 L/min continuous. nasal cannula  Indications: .   rosuvastatin (CRESTOR) 10 MG tablet   No No   Sig: TAKE 1 TABLET BY MOUTH EVERY DAY   vitamin B-12 (VITAMIN B-12) 500 mcg tablet   No No   Sig: Take 1 tablet (500 mcg total) by mouth daily      Facility-Administered Medications: None     Discharge Medication List as of 1/20/2025  4:03 PM        CONTINUE these medications which have NOT CHANGED    Details   acetaminophen (TYLENOL) 325 mg tablet Take 2 tablets (650 mg total) by mouth every 6 (six) hours as needed for mild pain, Starting Thu 2/29/2024, No Print      aspirin (ECOTRIN LOW STRENGTH) 81 mg EC tablet Take 81 mg by mouth daily, Historical Med      bimatoprost (LUMIGAN) 0.01 % ophthalmic drops Administer 1 drop to both eyes daily at bedtime, Starting Thu 10/24/2024, Normal      bisacodyl 5 mg EC tablet Take 5 mg by mouth daily as needed for constipation, Historical Med      calcitriol (ROCALTROL) 0.25 mcg capsule Take 1 capsule (0.25 mcg total) by mouth 3 (three) times a week, Starting Wed 9/4/2024, Normal      famotidine (PEPCID) 20 mg tablet TAKE 1 TABLET BY MOUTH TWICE A DAY, Starting Sun 1/5/2025, Normal      furosemide (LASIX) 20 mg tablet TAKE 1 TABLET BY MOUTH EVERY DAY, Starting Sun 7/21/2024, Normal      ketoconazole  (NIZORAL) 2 % cream Apply topically daily, Starting Fri 12/6/2024, Normal      levothyroxine 75 mcg tablet TAKE 1 TABLET BY MOUTH EVERY DAY IN THE MORNING, Normal      losartan (COZAAR) 50 mg tablet Take 1 tablet (50 mg total) by mouth daily, Starting Wed 9/4/2024, Normal      metoprolol tartrate (LOPRESSOR) 25 mg tablet TAKE 1 TABLET (25 MG TOTAL) BY MOUTH EVERY 12 (TWELVE) HOURS, Starting Fri 8/23/2024, Normal      mirtazapine (REMERON) 30 mg tablet TAKE 1 TABLET BY MOUTH EVERYDAY AT BEDTIME, Starting Wed 10/2/2024, Normal      oxygen gas Inhale 2 L/min continuous. nasal cannula  Indications: ., Historical Med      Pediatric Multiple Vit-C-FA (PEDIATRIC MULTIVITAMIN) chewable tablet Chew 1 tablet daily by mouth, Historical Med      rosuvastatin (CRESTOR) 10 MG tablet TAKE 1 TABLET BY MOUTH EVERY DAY, Starting Wed 11/6/2024, Normal      vitamin B-12 (VITAMIN B-12) 500 mcg tablet Take 1 tablet (500 mcg total) by mouth daily, Starting Wed 1/15/2025, Normal           No discharge procedures on file.  ED SEPSIS DOCUMENTATION   Time reflects when diagnosis was documented in both MDM as applicable and the Disposition within this note       Time User Action Codes Description Comment    1/20/2025  4:02 PM Angelica Benjamin Add [R60.0] Peripheral edema                  Angelica Benjamin,   01/20/25 0444

## 2025-01-21 ENCOUNTER — VBI (OUTPATIENT)
Dept: FAMILY MEDICINE CLINIC | Facility: CLINIC | Age: OVER 89
End: 2025-01-21

## 2025-01-21 NOTE — TELEPHONE ENCOUNTER
I called and spoke with the patient's daughter and made her aware she verbalized understanding and agreement with plan.

## 2025-01-21 NOTE — TELEPHONE ENCOUNTER
01/21/25 8:52 AM    Patient contacted post ED visit, [facility] department spoke with patient/caregiver and outreach was successful.    Thank you.  Connie Brown  [facility]

## 2025-01-25 ENCOUNTER — RESULTS FOLLOW-UP (OUTPATIENT)
Dept: FAMILY MEDICINE CLINIC | Facility: CLINIC | Age: OVER 89
End: 2025-01-25

## 2025-02-06 ENCOUNTER — TELEPHONE (OUTPATIENT)
Dept: FAMILY MEDICINE CLINIC | Facility: CLINIC | Age: OVER 89
End: 2025-02-06

## 2025-02-06 NOTE — TELEPHONE ENCOUNTER
Spoke with pts daughter, she is going into her mychart now to take a look at your recommendations. She said she will get back to us after she looks at it, preferred to look at message .

## 2025-02-06 NOTE — TELEPHONE ENCOUNTER
Spoke with pts daughter, she is going to go on mychart now so she can take a look at the message. She will follow the advice give and get back to us.

## 2025-02-10 ENCOUNTER — TELEPHONE (OUTPATIENT)
Dept: LAB | Facility: HOSPITAL | Age: OVER 89
End: 2025-02-10

## 2025-02-11 DIAGNOSIS — J96.21 ACUTE ON CHRONIC RESPIRATORY FAILURE WITH HYPOXIA AND HYPERCAPNIA (HCC): ICD-10-CM

## 2025-02-11 DIAGNOSIS — J96.22 ACUTE ON CHRONIC RESPIRATORY FAILURE WITH HYPOXIA AND HYPERCAPNIA (HCC): ICD-10-CM

## 2025-02-11 RX ORDER — FUROSEMIDE 20 MG/1
20 TABLET ORAL DAILY
Qty: 100 TABLET | Refills: 1 | Status: SHIPPED | OUTPATIENT
Start: 2025-02-11

## 2025-02-13 DIAGNOSIS — I10 ESSENTIAL HYPERTENSION: ICD-10-CM

## 2025-02-13 DIAGNOSIS — E03.9 ACQUIRED HYPOTHYROIDISM: ICD-10-CM

## 2025-02-13 RX ORDER — METOPROLOL TARTRATE 25 MG/1
25 TABLET, FILM COATED ORAL EVERY 12 HOURS SCHEDULED
Qty: 180 TABLET | Refills: 1 | Status: SHIPPED | OUTPATIENT
Start: 2025-02-13

## 2025-02-13 RX ORDER — LEVOTHYROXINE SODIUM 75 UG/1
75 TABLET ORAL EVERY MORNING
Qty: 90 TABLET | Refills: 1 | Status: SHIPPED | OUTPATIENT
Start: 2025-02-13

## 2025-02-21 ENCOUNTER — APPOINTMENT (OUTPATIENT)
Dept: LAB | Facility: HOSPITAL | Age: OVER 89
End: 2025-02-21
Payer: COMMERCIAL

## 2025-02-21 DIAGNOSIS — I10 HYPERTENSION: ICD-10-CM

## 2025-02-21 DIAGNOSIS — N25.81 SECONDARY HYPERPARATHYROIDISM OF RENAL ORIGIN (HCC): ICD-10-CM

## 2025-02-21 DIAGNOSIS — R31.9 HEMATURIA, UNSPECIFIED TYPE: ICD-10-CM

## 2025-02-21 DIAGNOSIS — N20.0 NEPHROLITHIASIS: ICD-10-CM

## 2025-02-21 DIAGNOSIS — R80.1 PERSISTENT PROTEINURIA: ICD-10-CM

## 2025-02-21 DIAGNOSIS — N18.32 STAGE 3B CHRONIC KIDNEY DISEASE (HCC): ICD-10-CM

## 2025-02-21 DIAGNOSIS — E66.9 OBESITY (BMI 30-39.9): ICD-10-CM

## 2025-02-21 LAB
25(OH)D3 SERPL-MCNC: 50.5 NG/ML (ref 30–100)
MAGNESIUM SERPL-MCNC: 2.1 MG/DL (ref 1.9–2.7)
PTH-INTACT SERPL-MCNC: 96.4 PG/ML (ref 12–88)

## 2025-02-21 PROCEDURE — 83735 ASSAY OF MAGNESIUM: CPT

## 2025-02-21 PROCEDURE — 83970 ASSAY OF PARATHORMONE: CPT

## 2025-02-21 PROCEDURE — 82306 VITAMIN D 25 HYDROXY: CPT

## 2025-02-21 PROCEDURE — 36415 COLL VENOUS BLD VENIPUNCTURE: CPT

## 2025-02-24 ENCOUNTER — RESULTS FOLLOW-UP (OUTPATIENT)
Dept: NEPHROLOGY | Facility: CLINIC | Age: OVER 89
End: 2025-02-24

## 2025-02-24 DIAGNOSIS — N18.32 STAGE 3B CHRONIC KIDNEY DISEASE (HCC): Primary | ICD-10-CM

## 2025-03-18 ENCOUNTER — HOSPITAL ENCOUNTER (INPATIENT)
Facility: HOSPITAL | Age: OVER 89
LOS: 3 days | Discharge: HOME HEALTH | End: 2025-03-21
Attending: EMERGENCY MEDICINE | Admitting: INTERNAL MEDICINE
Payer: COMMERCIAL

## 2025-03-18 ENCOUNTER — APPOINTMENT (EMERGENCY)
Dept: RADIOLOGY | Facility: HOSPITAL | Age: OVER 89
End: 2025-03-18
Payer: COMMERCIAL

## 2025-03-18 ENCOUNTER — PATIENT MESSAGE (OUTPATIENT)
Dept: FAMILY MEDICINE CLINIC | Facility: CLINIC | Age: OVER 89
End: 2025-03-18

## 2025-03-18 DIAGNOSIS — B37.2 CANDIDAL INTERTRIGO: ICD-10-CM

## 2025-03-18 DIAGNOSIS — J44.9 CHRONIC OBSTRUCTIVE PULMONARY DISEASE, UNSPECIFIED COPD TYPE (HCC): ICD-10-CM

## 2025-03-18 DIAGNOSIS — E66.9 OBESITY (BMI 30-39.9): ICD-10-CM

## 2025-03-18 DIAGNOSIS — E66.2 OBESITY HYPOVENTILATION SYNDROME (HCC): ICD-10-CM

## 2025-03-18 DIAGNOSIS — Z86.73 HX OF ARTERIAL ISCHEMIC STROKE: ICD-10-CM

## 2025-03-18 DIAGNOSIS — E03.9 ACQUIRED HYPOTHYROIDISM: ICD-10-CM

## 2025-03-18 DIAGNOSIS — G47.33 OSA (OBSTRUCTIVE SLEEP APNEA): ICD-10-CM

## 2025-03-18 DIAGNOSIS — R09.02 HYPOXIA: ICD-10-CM

## 2025-03-18 DIAGNOSIS — R53.1 GENERALIZED WEAKNESS: ICD-10-CM

## 2025-03-18 DIAGNOSIS — J96.12 CHRONIC RESPIRATORY FAILURE WITH HYPOXIA AND HYPERCAPNIA (HCC): ICD-10-CM

## 2025-03-18 DIAGNOSIS — R53.83 FATIGUE: Primary | ICD-10-CM

## 2025-03-18 DIAGNOSIS — N18.32 STAGE 3B CHRONIC KIDNEY DISEASE (HCC): ICD-10-CM

## 2025-03-18 DIAGNOSIS — J96.11 CHRONIC RESPIRATORY FAILURE WITH HYPOXIA AND HYPERCAPNIA (HCC): ICD-10-CM

## 2025-03-18 PROBLEM — R60.0 PERIPHERAL EDEMA: Status: ACTIVE | Noted: 2025-03-18

## 2025-03-18 LAB
2HR DELTA HS TROPONIN: 0 NG/L
ALBUMIN SERPL BCG-MCNC: 3.6 G/DL (ref 3.5–5)
ALP SERPL-CCNC: 68 U/L (ref 34–104)
ALT SERPL W P-5'-P-CCNC: 6 U/L (ref 7–52)
ANION GAP SERPL CALCULATED.3IONS-SCNC: 4 MMOL/L (ref 4–13)
AST SERPL W P-5'-P-CCNC: 11 U/L (ref 13–39)
ATRIAL RATE: 73 BPM
BASOPHILS # BLD AUTO: 0.07 THOUSANDS/ÂΜL (ref 0–0.1)
BASOPHILS NFR BLD AUTO: 1 % (ref 0–1)
BILIRUB SERPL-MCNC: 0.36 MG/DL (ref 0.2–1)
BILIRUB UR QL STRIP: NEGATIVE
BNP SERPL-MCNC: 147 PG/ML (ref 0–100)
BUN SERPL-MCNC: 47 MG/DL (ref 5–25)
CALCIUM SERPL-MCNC: 9.3 MG/DL (ref 8.4–10.2)
CARDIAC TROPONIN I PNL SERPL HS: 11 NG/L (ref ?–50)
CARDIAC TROPONIN I PNL SERPL HS: 11 NG/L (ref ?–50)
CHLORIDE SERPL-SCNC: 97 MMOL/L (ref 96–108)
CLARITY UR: CLEAR
CO2 SERPL-SCNC: 42 MMOL/L (ref 21–32)
COLOR UR: NORMAL
CREAT SERPL-MCNC: 1.35 MG/DL (ref 0.6–1.3)
EOSINOPHIL # BLD AUTO: 0.27 THOUSAND/ÂΜL (ref 0–0.61)
EOSINOPHIL NFR BLD AUTO: 3 % (ref 0–6)
ERYTHROCYTE [DISTWIDTH] IN BLOOD BY AUTOMATED COUNT: 12.5 % (ref 11.6–15.1)
FLUAV RNA RESP QL NAA+PROBE: NEGATIVE
FLUBV RNA RESP QL NAA+PROBE: NEGATIVE
GFR SERPL CREATININE-BSD FRML MDRD: 34 ML/MIN/1.73SQ M
GLUCOSE SERPL-MCNC: 107 MG/DL (ref 65–140)
GLUCOSE UR STRIP-MCNC: NEGATIVE MG/DL
HCT VFR BLD AUTO: 40.9 % (ref 34.8–46.1)
HGB BLD-MCNC: 12 G/DL (ref 11.5–15.4)
HGB UR QL STRIP.AUTO: NEGATIVE
IMM GRANULOCYTES # BLD AUTO: 0.04 THOUSAND/UL (ref 0–0.2)
IMM GRANULOCYTES NFR BLD AUTO: 0 % (ref 0–2)
KETONES UR STRIP-MCNC: NEGATIVE MG/DL
LEUKOCYTE ESTERASE UR QL STRIP: NEGATIVE
LYMPHOCYTES # BLD AUTO: 1.2 THOUSANDS/ÂΜL (ref 0.6–4.47)
LYMPHOCYTES NFR BLD AUTO: 13 % (ref 14–44)
MCH RBC QN AUTO: 28.7 PG (ref 26.8–34.3)
MCHC RBC AUTO-ENTMCNC: 29.3 G/DL (ref 31.4–37.4)
MCV RBC AUTO: 98 FL (ref 82–98)
MONOCYTES # BLD AUTO: 0.8 THOUSAND/ÂΜL (ref 0.17–1.22)
MONOCYTES NFR BLD AUTO: 8 % (ref 4–12)
NEUTROPHILS # BLD AUTO: 7.11 THOUSANDS/ÂΜL (ref 1.85–7.62)
NEUTS SEG NFR BLD AUTO: 75 % (ref 43–75)
NITRITE UR QL STRIP: NEGATIVE
NRBC BLD AUTO-RTO: 0 /100 WBCS
P AXIS: 81 DEGREES
PH UR STRIP.AUTO: 5 [PH]
PLATELET # BLD AUTO: 265 THOUSANDS/UL (ref 149–390)
PMV BLD AUTO: 10.3 FL (ref 8.9–12.7)
POTASSIUM SERPL-SCNC: 3.9 MMOL/L (ref 3.5–5.3)
PR INTERVAL: 246 MS
PROT SERPL-MCNC: 6.9 G/DL (ref 6.4–8.4)
PROT UR STRIP-MCNC: NEGATIVE MG/DL
QRS AXIS: 88 DEGREES
QRSD INTERVAL: 162 MS
QT INTERVAL: 430 MS
QTC INTERVAL: 473 MS
RBC # BLD AUTO: 4.18 MILLION/UL (ref 3.81–5.12)
RSV RNA RESP QL NAA+PROBE: NEGATIVE
SARS-COV-2 RNA RESP QL NAA+PROBE: NEGATIVE
SODIUM SERPL-SCNC: 143 MMOL/L (ref 135–147)
SP GR UR STRIP.AUTO: 1.01 (ref 1–1.03)
T WAVE AXIS: 32 DEGREES
TSH SERPL DL<=0.05 MIU/L-ACNC: 1.47 UIU/ML (ref 0.45–4.5)
UROBILINOGEN UR STRIP-ACNC: <2 MG/DL
VENTRICULAR RATE: 73 BPM
WBC # BLD AUTO: 9.49 THOUSAND/UL (ref 4.31–10.16)

## 2025-03-18 PROCEDURE — 96366 THER/PROPH/DIAG IV INF ADDON: CPT

## 2025-03-18 PROCEDURE — 87081 CULTURE SCREEN ONLY: CPT | Performed by: INTERNAL MEDICINE

## 2025-03-18 PROCEDURE — 93005 ELECTROCARDIOGRAM TRACING: CPT

## 2025-03-18 PROCEDURE — 99285 EMERGENCY DEPT VISIT HI MDM: CPT

## 2025-03-18 PROCEDURE — 99291 CRITICAL CARE FIRST HOUR: CPT | Performed by: EMERGENCY MEDICINE

## 2025-03-18 PROCEDURE — 94644 CONT INHLJ TX 1ST HOUR: CPT

## 2025-03-18 PROCEDURE — 83880 ASSAY OF NATRIURETIC PEPTIDE: CPT

## 2025-03-18 PROCEDURE — 96375 TX/PRO/DX INJ NEW DRUG ADDON: CPT

## 2025-03-18 PROCEDURE — 81003 URINALYSIS AUTO W/O SCOPE: CPT

## 2025-03-18 PROCEDURE — 84443 ASSAY THYROID STIM HORMONE: CPT

## 2025-03-18 PROCEDURE — 93010 ELECTROCARDIOGRAM REPORT: CPT | Performed by: INTERNAL MEDICINE

## 2025-03-18 PROCEDURE — 36415 COLL VENOUS BLD VENIPUNCTURE: CPT

## 2025-03-18 PROCEDURE — 94640 AIRWAY INHALATION TREATMENT: CPT

## 2025-03-18 PROCEDURE — 99223 1ST HOSP IP/OBS HIGH 75: CPT | Performed by: INTERNAL MEDICINE

## 2025-03-18 PROCEDURE — 0241U HB NFCT DS VIR RESP RNA 4 TRGT: CPT

## 2025-03-18 PROCEDURE — 80053 COMPREHEN METABOLIC PANEL: CPT

## 2025-03-18 PROCEDURE — 85025 COMPLETE CBC W/AUTO DIFF WBC: CPT

## 2025-03-18 PROCEDURE — 94760 N-INVAS EAR/PLS OXIMETRY 1: CPT

## 2025-03-18 PROCEDURE — 96365 THER/PROPH/DIAG IV INF INIT: CPT

## 2025-03-18 PROCEDURE — 84484 ASSAY OF TROPONIN QUANT: CPT

## 2025-03-18 PROCEDURE — 94664 DEMO&/EVAL PT USE INHALER: CPT

## 2025-03-18 PROCEDURE — 71046 X-RAY EXAM CHEST 2 VIEWS: CPT

## 2025-03-18 RX ORDER — METOPROLOL TARTRATE 25 MG/1
25 TABLET, FILM COATED ORAL EVERY 12 HOURS SCHEDULED
Status: DISCONTINUED | OUTPATIENT
Start: 2025-03-18 | End: 2025-03-21 | Stop reason: HOSPADM

## 2025-03-18 RX ORDER — HEPARIN SODIUM 5000 [USP'U]/ML
5000 INJECTION, SOLUTION INTRAVENOUS; SUBCUTANEOUS EVERY 8 HOURS SCHEDULED
Status: DISCONTINUED | OUTPATIENT
Start: 2025-03-18 | End: 2025-03-21 | Stop reason: HOSPADM

## 2025-03-18 RX ORDER — BISACODYL 5 MG/1
5 TABLET, DELAYED RELEASE ORAL DAILY PRN
Status: DISCONTINUED | OUTPATIENT
Start: 2025-03-18 | End: 2025-03-21 | Stop reason: HOSPADM

## 2025-03-18 RX ORDER — BIMATOPROST 0.3 MG/ML
1 SOLUTION/ DROPS OPHTHALMIC DAILY
Status: DISCONTINUED | OUTPATIENT
Start: 2025-03-18 | End: 2025-03-21 | Stop reason: HOSPADM

## 2025-03-18 RX ORDER — MAGNESIUM SULFATE HEPTAHYDRATE 40 MG/ML
2 INJECTION, SOLUTION INTRAVENOUS ONCE
Status: COMPLETED | OUTPATIENT
Start: 2025-03-18 | End: 2025-03-18

## 2025-03-18 RX ORDER — LEVALBUTEROL INHALATION SOLUTION 1.25 MG/3ML
1.25 SOLUTION RESPIRATORY (INHALATION)
Status: DISCONTINUED | OUTPATIENT
Start: 2025-03-18 | End: 2025-03-19

## 2025-03-18 RX ORDER — LOSARTAN POTASSIUM 50 MG/1
50 TABLET ORAL DAILY
Status: DISCONTINUED | OUTPATIENT
Start: 2025-03-19 | End: 2025-03-21 | Stop reason: HOSPADM

## 2025-03-18 RX ORDER — ACETAMINOPHEN 325 MG/1
650 TABLET ORAL EVERY 6 HOURS PRN
Status: DISCONTINUED | OUTPATIENT
Start: 2025-03-18 | End: 2025-03-21 | Stop reason: HOSPADM

## 2025-03-18 RX ORDER — METHYLPREDNISOLONE SODIUM SUCCINATE 40 MG/ML
40 INJECTION, POWDER, LYOPHILIZED, FOR SOLUTION INTRAMUSCULAR; INTRAVENOUS EVERY 12 HOURS SCHEDULED
Status: DISCONTINUED | OUTPATIENT
Start: 2025-03-19 | End: 2025-03-19

## 2025-03-18 RX ORDER — LEVALBUTEROL INHALATION SOLUTION 1.25 MG/3ML
SOLUTION RESPIRATORY (INHALATION)
Status: COMPLETED
Start: 2025-03-18 | End: 2025-03-18

## 2025-03-18 RX ORDER — PRAVASTATIN SODIUM 40 MG
40 TABLET ORAL
Status: DISCONTINUED | OUTPATIENT
Start: 2025-03-18 | End: 2025-03-21 | Stop reason: HOSPADM

## 2025-03-18 RX ORDER — ALBUTEROL SULFATE 0.83 MG/ML
5 SOLUTION RESPIRATORY (INHALATION) ONCE
Status: DISCONTINUED | OUTPATIENT
Start: 2025-03-18 | End: 2025-03-18

## 2025-03-18 RX ORDER — METHYLPREDNISOLONE SODIUM SUCCINATE 125 MG/2ML
125 INJECTION, POWDER, LYOPHILIZED, FOR SOLUTION INTRAMUSCULAR; INTRAVENOUS ONCE
Status: COMPLETED | OUTPATIENT
Start: 2025-03-18 | End: 2025-03-18

## 2025-03-18 RX ORDER — MIRTAZAPINE 15 MG/1
30 TABLET, FILM COATED ORAL
Status: DISCONTINUED | OUTPATIENT
Start: 2025-03-18 | End: 2025-03-21 | Stop reason: HOSPADM

## 2025-03-18 RX ORDER — FORMOTEROL FUMARATE 20 UG/2ML
20 SOLUTION RESPIRATORY (INHALATION)
Status: DISCONTINUED | OUTPATIENT
Start: 2025-03-18 | End: 2025-03-18

## 2025-03-18 RX ORDER — CALCITRIOL 0.25 UG/1
0.25 CAPSULE, LIQUID FILLED ORAL 3 TIMES WEEKLY
Status: DISCONTINUED | OUTPATIENT
Start: 2025-03-19 | End: 2025-03-21 | Stop reason: HOSPADM

## 2025-03-18 RX ORDER — LEVOTHYROXINE SODIUM 75 UG/1
75 TABLET ORAL
Status: DISCONTINUED | OUTPATIENT
Start: 2025-03-19 | End: 2025-03-21 | Stop reason: HOSPADM

## 2025-03-18 RX ORDER — ASPIRIN 81 MG/1
81 TABLET ORAL DAILY
Status: DISCONTINUED | OUTPATIENT
Start: 2025-03-19 | End: 2025-03-21 | Stop reason: HOSPADM

## 2025-03-18 RX ORDER — BUDESONIDE 0.5 MG/2ML
0.5 INHALANT ORAL
Status: DISCONTINUED | OUTPATIENT
Start: 2025-03-18 | End: 2025-03-18

## 2025-03-18 RX ORDER — ALBUTEROL SULFATE 5 MG/ML
10 SOLUTION RESPIRATORY (INHALATION) ONCE
Status: COMPLETED | OUTPATIENT
Start: 2025-03-18 | End: 2025-03-18

## 2025-03-18 RX ORDER — FUROSEMIDE 40 MG/1
40 TABLET ORAL DAILY
Status: DISCONTINUED | OUTPATIENT
Start: 2025-03-19 | End: 2025-03-21 | Stop reason: HOSPADM

## 2025-03-18 RX ORDER — FAMOTIDINE 20 MG/1
10 TABLET, FILM COATED ORAL DAILY
Status: DISCONTINUED | OUTPATIENT
Start: 2025-03-19 | End: 2025-03-21 | Stop reason: HOSPADM

## 2025-03-18 RX ORDER — FAMOTIDINE 20 MG/1
20 TABLET, FILM COATED ORAL DAILY
Status: DISCONTINUED | OUTPATIENT
Start: 2025-03-19 | End: 2025-03-18 | Stop reason: SDUPTHER

## 2025-03-18 RX ORDER — SODIUM CHLORIDE FOR INHALATION 0.9 %
12 VIAL, NEBULIZER (ML) INHALATION ONCE
Status: COMPLETED | OUTPATIENT
Start: 2025-03-18 | End: 2025-03-18

## 2025-03-18 RX ADMIN — METHYLPREDNISOLONE SODIUM SUCCINATE 125 MG: 125 INJECTION, POWDER, FOR SOLUTION INTRAMUSCULAR; INTRAVENOUS at 15:00

## 2025-03-18 RX ADMIN — PRAVASTATIN SODIUM 40 MG: 40 TABLET ORAL at 19:51

## 2025-03-18 RX ADMIN — MIRTAZAPINE 30 MG: 15 TABLET, FILM COATED ORAL at 21:15

## 2025-03-18 RX ADMIN — MAGNESIUM SULFATE HEPTAHYDRATE 2 G: 40 INJECTION, SOLUTION INTRAVENOUS at 15:00

## 2025-03-18 RX ADMIN — LEVALBUTEROL HYDROCHLORIDE 1.25 MG: 1.25 SOLUTION RESPIRATORY (INHALATION) at 20:05

## 2025-03-18 RX ADMIN — HEPARIN SODIUM 5000 UNITS: 5000 INJECTION INTRAVENOUS; SUBCUTANEOUS at 21:15

## 2025-03-18 RX ADMIN — BIMATOPROST 1 DROP: 0.3 SOLUTION/ DROPS OPHTHALMIC at 19:51

## 2025-03-18 RX ADMIN — ALBUTEROL SULFATE 10 MG: 2.5 SOLUTION RESPIRATORY (INHALATION) at 15:09

## 2025-03-18 RX ADMIN — IPRATROPIUM BROMIDE 1 MG: 0.5 SOLUTION RESPIRATORY (INHALATION) at 15:09

## 2025-03-18 RX ADMIN — ISODIUM CHLORIDE 12 ML: 0.03 SOLUTION RESPIRATORY (INHALATION) at 15:09

## 2025-03-18 NOTE — PLAN OF CARE

## 2025-03-18 NOTE — ASSESSMENT & PLAN NOTE
With exacerbation.  On exam she has diminished breath sounds and poor air exchange  She had significant improvement in breathing and mental status with IV steroids and magnesium sulfate given in the ED.  Will continue Solu-Medrol 40 mg IV twice daily  Will consult pulmonology.

## 2025-03-18 NOTE — H&P
H&P - Hospitalist   Name: Ashley Gonzales 89 y.o. female I MRN: [id number]  Unit/Bed#: ED-03 I Date of Admission: 3/18/2025   Date of Service: 3/18/2025 I Hospital Day: 0     Assessment & Plan  Metabolic encephalopathy  Likely secondary to hypercarbia  when she did not use her CPAP at night on top of COPD and sleep apnea.  Mental status is back to baseline now, confirmed by patient and daughter.  Continue oxygen by nasal cannula 2 L during the day and CPAP at night.  COPD (chronic obstructive pulmonary disease) (HCC)  With exacerbation.  On exam she has diminished breath sounds and poor air exchange  She had significant improvement in breathing and mental status with IV steroids and magnesium sulfate given in the ED.  Will continue Solu-Medrol 40 mg IV twice daily  Will consult pulmonology.  Chronic respiratory failure with hypoxia and hypercapnia (HCC)  On chronic oxygen at 2 L and CPAP at night.  JAMEEL (obstructive sleep apnea)  Order CPAP at night for comfort.  Patient and daughter unsure of her CPAP settings.  Peripheral edema  In the setting of CKD and diastolic CHF.  Creatinine is at baseline  She has chronically elevated BNP.  This is lower than last level  Continue Lasix 40 mg daily  Stage 3 chronic kidney disease (HCC)    Previous  nephrology consult reviewed.  Baseline creatinine 1.1-1.3  Stable.  Repeat BMP in AM.    VTE Pharmacologic Prophylaxis:   Heparin  Code Status: Prior DNR  Discussion with family: Updated  (daughter) at bedside.    Anticipated Length of Stay: Patient will be admitted on an inpatient basis with an anticipated length of stay of greater than 2 midnights secondary to COPD.    History of Present Illness   Chief Complaint: Confusion    Ashley Gonzales is a 89 y.o. female with a PMH of COPD, sleep apnea, diastolic CHF, chronic respiratory failure who presents with confusion.  Her daughter noticed that the patient was lethargic and could not hold a conversation which was  unusual for her.  She reportedly forgot to use her CPAP at night.  She was brought to the hospital and was given updraft nebulizations, Solu-Medrol and magnesium sulfate which significantly improved her mental status and breathing.  According to the patient  she feels back to normal.  Her daughter, her mental status is at baseline.    Review of Systems   Constitutional: Negative.    HENT: Negative.     Respiratory:  Positive for shortness of breath.    Cardiovascular:  Positive for leg swelling.   Gastrointestinal: Negative.    Genitourinary: Negative.    Musculoskeletal: Negative.    Skin: Negative.    Neurological: Negative.    Psychiatric/Behavioral:  Positive for confusion.        Historical Information   Past Medical History:   Diagnosis Date    Anxiety     Cataract, bilateral     Last Assessed:3/19/2014    COPD (chronic obstructive pulmonary disease) ([facility])     Coronary artery disease     Hx of arterial ischemic stroke     Hypertension     Stroke (HCC)     Wears glasses      Past Surgical History:   Procedure Laterality Date    CAROTID ENDARTERECTOMY Right     CATARACT EXTRACTION W/ INTRAOCULAR LENS  IMPLANT, BILATERAL      COLONOSCOPY N/A 9/30/2017    Procedure: COLONOSCOPY FOR CONTROL OF BLEEDING;  Surgeon: Jeb Gaxiola MD;  Location:  MAIN OR;  Service: Colorectal    CORONARY ARTERY BYPASS GRAFT      CABG X3 with LIMA to LAD,SVG to OM,SVG to distal  RCA ; Last Assessed:7/13/2015    JOINT REPLACEMENT      left TKR    KIDNEY STONE SURGERY      NEPHROSTOMY Left     with Drainage Irrigation ;Onset:1974    DE ARTHRP KNE CONDYLE&PLATU MEDIAL&LAT COMPARTMENTS Right 2/25/2016    Procedure: ARTHROPLASTY KNEE TOTAL;  Surgeon: Jeb Figueroa MD;  Location: [facility];  Service: Orthopedics     Social History     Tobacco Use    Smoking status: Former     Average packs/day: 1 pack/day for 24.0 years (24.0 ttl pk-yrs)     Types: Cigarettes     Start date: 1951     Passive exposure: Past    Smokeless tobacco: Never    Vaping Use    Vaping status: Never Used   Substance and Sexual Activity    Alcohol use: Never    Drug use: Never    Sexual activity: Not Currently     E-Cigarette/Vaping    E-Cigarette Use Never User      E-Cigarette/Vaping Substances    Nicotine No     THC No     CBD No      Family History   Problem Relation Age of Onset    Cancer Mother         malignant neoplasm    Heart attack Father     Aneurysm Other         Aortic    Cancer Other         malignant neoplasm     Social History:  Marital Status:    Occupation: none  Patient Pre-hospital Living Situation: Home  Patient Pre-hospital Level of Mobility: walks with walker  Patient Pre-hospital Diet Restrictions: none    Meds/Allergies   I have reviewed home medications using recent Epic encounter.  Prior to Admission medications    Medication Sig Start Date End Date Taking? Authorizing Provider   acetaminophen (TYLENOL) 325 mg tablet Take 2 tablets (650 mg total) by mouth every 6 (six) hours as needed for mild pain 2/29/24  Yes Lissa Rodriguez PA-C   aspirin (ECOTRIN LOW STRENGTH) 81 mg EC tablet Take 81 mg by mouth daily   Yes Historical Provider, MD   bimatoprost (LUMIGAN) 0.01 % ophthalmic drops Administer 1 drop to both eyes daily at bedtime 10/24/24  Yes Paz Maharaj DO   bisacodyl 5 mg EC tablet Take 5 mg by mouth daily as needed for constipation   Yes Historical Provider, MD   calcitriol (ROCALTROL) 0.25 mcg capsule Take 1 capsule (0.25 mcg total) by mouth 3 (three) times a week 9/4/24  Yes Rosibel Mehta MD   famotidine (PEPCID) 20 mg tablet TAKE 1 TABLET BY MOUTH TWICE A DAY 1/5/25  Yes Paz Maharaj DO   furosemide (LASIX) 20 mg tablet TAKE 1 TABLET BY MOUTH EVERY DAY 2/11/25  Yes Paz Maharaj DO   ketoconazole (NIZORAL) 2 % cream Apply topically daily 12/6/24  Yes Paz Maharaj DO   levothyroxine 75 mcg tablet TAKE 1 TABLET BY MOUTH EVERY DAY IN THE MORNING 2/13/25  Yes Paz Maharaj DO   losartan (COZAAR) 50 mg tablet  Take 1 tablet (50 mg total) by mouth daily 9/4/24  Yes Rosibel Mehta MD   metoprolol tartrate (LOPRESSOR) 25 mg tablet TAKE 1 TABLET (25 MG TOTAL) BY MOUTH EVERY 12 (TWELVE) HOURS 2/13/25  Yes Paz Maharaj DO   mirtazapine (REMERON) 30 mg tablet TAKE 1 TABLET BY MOUTH EVERYDAY AT BEDTIME 10/2/24  Yes Paz Maharaj, DO   oxygen gas Inhale 2 L/min continuous. nasal cannula  Indications: .   Yes Paz Maharaj, DO   Pediatric Multiple Vit-C-FA (PEDIATRIC MULTIVITAMIN) chewable tablet Chew 1 tablet daily by mouth   Yes Historical Provider, MD   rosuvastatin (CRESTOR) 10 MG tablet TAKE 1 TABLET BY MOUTH EVERY DAY 11/6/24  Yes Paz Maharaj, DO   vitamin B-12 (VITAMIN B-12) 500 mcg tablet Take 1 tablet (500 mcg total) by mouth daily 1/15/25  Yes FLAKO Yang     Allergies   Allergen Reactions    Penicillins Itching    Penicillins Rash       Objective :  HR:  [65] 65  BP: (122)/(58) 122/58  Resp:  [22] 22  SpO2:  [92 %-100 %] 100 %  O2 Device: Nasal cannula  Nasal Cannula O2 Flow Rate (L/min):  [4 L/min] 4 L/min    Physical Exam  Vitals reviewed.   Constitutional:       Appearance: She is not ill-appearing.   HENT:      Head: Normocephalic and atraumatic.      Nose: No congestion or rhinorrhea.   Eyes:      General: No scleral icterus.  Cardiovascular:      Rate and Rhythm: Normal rate and regular rhythm.   Pulmonary:      Breath sounds: No wheezing.      Comments: Diminished breath sounds  Poor air exchange  Abdominal:      Tenderness: There is no abdominal tenderness. There is no guarding.   Musculoskeletal:      Right lower leg: Edema present.      Left lower leg: Edema present.   Skin:     General: Skin is warm and dry.   Neurological:      Comments: Awake alert cooperative   Psychiatric:         Mood and Affect: Mood normal.         Behavior: Behavior normal.          Lines/Drains:            Lab Results: I have reviewed the following results:  Results from last 7 days   Lab Units 03/18/25  1674    WBC Thousand/uL 9.49   HEMOGLOBIN g/dL 12.0   HEMATOCRIT % 40.9   PLATELETS Thousands/uL 265   SEGS PCT % 75   LYMPHO PCT % 13*   MONO PCT % 8   EOS PCT % 3     Results from last 7 days   Lab Units 03/18/25  1508   SODIUM mmol/L 143   POTASSIUM mmol/L 3.9   CHLORIDE mmol/L 97   CO2 mmol/L 42*   BUN mg/dL 47*   CREATININE mg/dL 1.35*   ANION GAP mmol/L 4   CALCIUM mg/dL 9.3   ALBUMIN g/dL 3.6   TOTAL BILIRUBIN mg/dL 0.36   ALK PHOS U/L 68   ALT U/L 6*   AST U/L 11*   GLUCOSE RANDOM mg/dL 107             Lab Results   Component Value Date    HGBA1C 6.0 (H) 02/21/2025    HGBA1C 6.1 02/21/2024    HGBA1C 6.1 (H) 09/23/2023           Chest x-ray showed previous CABG   No obvious infiltrate or effusion    Administrative Statements       ** Please Note: This note has been constructed using a voice recognition system. **

## 2025-03-18 NOTE — ASSESSMENT & PLAN NOTE
In the setting of CKD and diastolic CHF.  Creatinine is at baseline  She has chronically elevated BNP.  This is lower than last level  Continue Lasix 40 mg daily   Patient warm and well perfused on exam. End organ function is preserved. Continue with medications as above. Currently listed status 1 by exception due to persistent lactic acidosis

## 2025-03-18 NOTE — ASSESSMENT & PLAN NOTE
Likely secondary to hypercarbia  when she did not use her CPAP at night on top of COPD and sleep apnea.  Mental status is back to baseline now, confirmed by patient and daughter.  Continue oxygen by nasal cannula 2 L during the day and CPAP at night.

## 2025-03-18 NOTE — ED PROVIDER NOTES
Time reflects when diagnosis was documented in both MDM as applicable and the Disposition within this note       Time User Action Codes Description Comment    3/18/2025  2:32 PM Anurag Fagan [R53.83] Fatigue     3/18/2025  2:32 PM Anurag Fagan [R53.1] Generalized weakness     3/18/2025  2:32 PM Anurag Fagan [R09.02] Hypoxia     3/18/2025  2:32 PM Anurag Fagan [J44.9] Chronic obstructive pulmonary disease, unspecified COPD type (HCC)     3/18/2025  2:32 PM Anurag Fagan [J96.11,  J96.12] Chronic respiratory failure with hypoxia and hypercapnia (HCC)     3/18/2025  2:32 PM Anurag Fagan [Z86.73] Hx of arterial ischemic stroke     3/18/2025  2:32 PM Anurag Fagan [G47.33] JAMEEL (obstructive sleep apnea)     3/18/2025  2:32 PM Anurag Fagan [E66.9] Obesity (BMI 30-39.9)     3/18/2025  2:32 PM Anurag Fagan [N18.32] Stage 3b chronic kidney disease (HCC)     3/18/2025  2:32 PM Anurag Fagan [E03.9] Acquired hypothyroidism           ED Disposition       ED Disposition   Admit    Condition   Stable    Date/Time   Tue Mar 18, 2025  2:33 PM    Comment   --             Assessment & Plan       Medical Decision Making  Patient is an 89-year-old female presenting for evaluation of fatigue and shortness of breath.  Likely COPD exacerbation brought on by viral illness.  Will assess for pneumonia ACS thyroid CHF COVID flu.  Will obtain labs EKG chest x-ray treat symptomatically admit for hypoxia and COPD exacerbation    Labs that have resulted so far largely unremarkable has some increased bicarb to be expected in COPD, initial troponin mildly elevated at 11, otherwise no acute abnormalities or derangements in need of correcting.  Still pending TSH and BNP.  See ED course for EKG.  Chest x-ray no infiltrate or edema.  Patient signed out pending the rest of labs resulting, to be admitted.  Hemodynamically stable at the time of signout    Amount and/or Complexity  of Data Reviewed  Labs: ordered.  Radiology: ordered and independent interpretation performed.  ECG/medicine tests:  Decision-making details documented in ED Course.    Risk  Prescription drug management.  Decision regarding hospitalization.        ED Course as of 03/18/25 1546   Tue Mar 18, 2025   1545 ECG 12 lead  Normal sinus rhythm 73 with first-degree AV block.  RBBB.  No ischemic changes.  No change from prior       Medications   magnesium sulfate 2 g/50 mL IVPB (premix) 2 g (2 g Intravenous New Bag 3/18/25 1500)   albuterol inhalation solution 10 mg (10 mg Nebulization Given 3/18/25 1509)   ipratropium (ATROVENT) 0.02 % inhalation solution 1 mg (1 mg Nebulization Given 3/18/25 1509)   sodium chloride 0.9 % inhalation solution 12 mL (12 mL Nebulization Given 3/18/25 1509)   methylPREDNISolone sodium succinate (Solu-MEDROL) injection 125 mg (125 mg Intravenous Given 3/18/25 1500)       ED Risk Strat Scores                            SBIRT 22yo+      Flowsheet Row Most Recent Value   Initial Alcohol Screen: US AUDIT-C     1. How often do you have a drink containing alcohol? 0 Filed at: 03/18/2025 1417   2. How many drinks containing alcohol do you have on a typical day you are drinking?  0 Filed at: 03/18/2025 1417   3b. FEMALE Any Age, or MALE 65+: How often do you have 4 or more drinks on one occassion? 0 Filed at: 03/18/2025 1417   Audit-C Score 0 Filed at: 03/18/2025 1417   LUDIVINA: How many times in the past year have you...    Used an illegal drug or used a prescription medication for non-medical reasons? Never Filed at: 03/18/2025 1417                            History of Present Illness       Chief Complaint   Patient presents with    Fatigue     Pt arrived via EMS feeling more tired than usual and. States feeling nauseous and SOB off and on.        Past Medical History:   Diagnosis Date    Anxiety     Cataract, bilateral     Last Assessed:3/19/2014    COPD (chronic obstructive pulmonary disease) (HCC)      Coronary artery disease     Hx of arterial ischemic stroke     Hypertension     Stroke (HCC)     Wears glasses       Past Surgical History:   Procedure Laterality Date    CAROTID ENDARTERECTOMY Right     CATARACT EXTRACTION W/ INTRAOCULAR LENS  IMPLANT, BILATERAL      COLONOSCOPY N/A 9/30/2017    Procedure: COLONOSCOPY FOR CONTROL OF BLEEDING;  Surgeon: Jeb Gaxiola MD;  Location: [facility];  Service: Colorectal    CORONARY ARTERY BYPASS GRAFT      CABG X3 with LIMA to LAD,SVG to OM,SVG to distal  RCA ; Last Assessed:7/13/2015    JOINT REPLACEMENT      left TKR    KIDNEY STONE SURGERY      NEPHROSTOMY Left     with Drainage Irrigation ;Onset:1974    IA ARTHRP KNE CONDYLE&PLATU MEDIAL&LAT COMPARTMENTS Right 2/25/2016    Procedure: ARTHROPLASTY KNEE TOTAL;  Surgeon: Jeb Figueroa MD;  Location: [facility];  Service: Orthopedics      Family History   Problem Relation Age of Onset    Cancer Mother         malignant neoplasm    Heart attack Father     Aneurysm Other         Aortic    Cancer Other         malignant neoplasm      Social History     Tobacco Use    Smoking status: Former     Average packs/day: 1 pack/day for 24.0 years (24.0 ttl pk-yrs)     Types: Cigarettes     Start date: 1951     Passive exposure: Past    Smokeless tobacco: Never   Vaping Use    Vaping status: Never Used   Substance Use Topics    Alcohol use: Never    Drug use: Never      E-Cigarette/Vaping    E-Cigarette Use Never User       E-Cigarette/Vaping Substances    Nicotine No     THC No     CBD No       I have reviewed and agree with the history as documented.     Patient is an 89-year-old female past medical history of COPD prior CVA CKD hypothyroid hypertension that presents for evaluation of shortness of breath and fatigue.  Per EMS wears 2 L nasal cannula at baseline was hypoxic in the 80s resolved with 4 L nasal cannula.  Did not receive any medications en route.  Patient reports that she has been short of breath but is unable to  quantify for how long.  Also complaining of worsening fatigue and generalized weakness.  Denying any other complaints at this time          Review of Systems   Constitutional:  Positive for fatigue. Negative for chills and fever.   HENT:  Negative for ear pain and sore throat.    Eyes:  Negative for pain and visual disturbance.   Respiratory:  Positive for shortness of breath. Negative for cough.    Cardiovascular:  Negative for chest pain and palpitations.   Gastrointestinal:  Negative for abdominal pain, nausea and vomiting.   Genitourinary:  Negative for dysuria and hematuria.   Musculoskeletal:  Negative for arthralgias and back pain.   Skin:  Negative for color change and rash.   Neurological:  Negative for seizures and syncope.   All other systems reviewed and are negative.          Objective       ED Triage Vitals [03/18/25 1502]   Temp Pulse Blood Pressure Respirations SpO2 Patient Position - Orthostatic VS   -- 65 122/58 22 92 % Lying      Temp src Heart Rate Source BP Location FiO2 (%) Pain Score    -- Monitor Left arm -- --      Vitals      Date and Time Temp Pulse SpO2 Resp BP Pain Score FACES Pain Rating User   03/18/25 1512 -- -- 100 % -- -- -- -- RK   03/18/25 1502 -- 65 92 % 22 122/58 -- -- DH            Physical Exam  Vitals and nursing note reviewed.   Constitutional:       General: She is not in acute distress.     Appearance: She is well-developed. She is obese. She is ill-appearing (chronically).   HENT:      Head: Normocephalic and atraumatic.      Mouth/Throat:      Mouth: Mucous membranes are moist.   Eyes:      Extraocular Movements: Extraocular movements intact.      Conjunctiva/sclera: Conjunctivae normal.   Cardiovascular:      Rate and Rhythm: Normal rate and regular rhythm.      Heart sounds: No murmur heard.  Pulmonary:      Effort: Pulmonary effort is normal. No respiratory distress.      Breath sounds: Examination of the right-upper field reveals decreased breath sounds. Examination  of the left-upper field reveals decreased breath sounds. Examination of the right-middle field reveals decreased breath sounds. Examination of the left-middle field reveals decreased breath sounds. Examination of the right-lower field reveals decreased breath sounds. Examination of the left-lower field reveals decreased breath sounds. Decreased breath sounds present. No wheezing, rhonchi or rales.   Abdominal:      Palpations: Abdomen is soft.      Tenderness: There is no abdominal tenderness.   Musculoskeletal:         General: Normal range of motion.      Cervical back: Normal range of motion and neck supple.      Right lower le+ Pitting Edema present.      Left lower le+ Pitting Edema present.   Skin:     General: Skin is warm and dry.      Capillary Refill: Capillary refill takes less than 2 seconds.   Neurological:      Mental Status: She is alert.         Results Reviewed       Procedure Component Value Units Date/Time    HS Troponin 0hr (reflex protocol) [[id number]]  (Normal) Collected: 25 1508    Lab Status: Final result Specimen: Blood from Arm, Right Updated: 25 1543     hs TnI 0hr 11 ng/L     HS Troponin I 2hr [[id number]]     Lab Status: No result Specimen: Blood     Comprehensive metabolic panel [[id number]]  (Abnormal) Collected: 25 1508    Lab Status: Final result Specimen: Blood from Arm, Right Updated: 25 1536     Sodium 143 mmol/L      Potassium 3.9 mmol/L      Chloride 97 mmol/L      CO2 42 mmol/L      ANION GAP 4 mmol/L      BUN 47 mg/dL      Creatinine 1.35 mg/dL      Glucose 107 mg/dL      Calcium 9.3 mg/dL      AST 11 U/L      ALT 6 U/L      Alkaline Phosphatase 68 U/L      Total Protein 6.9 g/dL      Albumin 3.6 g/dL      Total Bilirubin 0.36 mg/dL      eGFR 34 ml/min/1.73sq m     Narrative:      National Kidney Disease Foundation guidelines for Chronic Kidney Disease (CKD):     Stage 1 with normal or high GFR (GFR > 90 mL/min/1.73 square meters)    Stage 2 Mild  CKD (GFR = 60-89 mL/min/1.73 square meters)    Stage 3A Moderate CKD (GFR = 45-59 mL/min/1.73 square meters)    Stage 3B Moderate CKD (GFR = 30-44 mL/min/1.73 square meters)    Stage 4 Severe CKD (GFR = 15-29 mL/min/1.73 square meters)    Stage 5 End Stage CKD (GFR <15 mL/min/1.73 square meters)  Note: GFR calculation is accurate only with a steady state creatinine    CBC and differential [[id number]]  (Abnormal) Collected: 03/18/25 1508    Lab Status: Final result Specimen: Blood from Arm, Right Updated: 03/18/25 1520     WBC 9.49 Thousand/uL      RBC 4.18 Million/uL      Hemoglobin 12.0 g/dL      Hematocrit 40.9 %      MCV 98 fL      MCH 28.7 pg      MCHC 29.3 g/dL      RDW 12.5 %      MPV 10.3 fL      Platelets 265 Thousands/uL      nRBC 0 /100 WBCs      Segmented % 75 %      Immature Grans % 0 %      Lymphocytes % 13 %      Monocytes % 8 %      Eosinophils Relative 3 %      Basophils Relative 1 %      Absolute Neutrophils 7.11 Thousands/µL      Absolute Immature Grans 0.04 Thousand/uL      Absolute Lymphocytes 1.20 Thousands/µL      Absolute Monocytes 0.80 Thousand/µL      Eosinophils Absolute 0.27 Thousand/µL      Basophils Absolute 0.07 Thousands/µL     TSH, 3rd generation with Free T4 reflex [[id number]] Collected: 03/18/25 1508    Lab Status: In process Specimen: Blood from Arm, Right Updated: 03/18/25 1517    B-Type Natriuretic Peptide(BNP) [[id number]] Collected: 03/18/25 1508    Lab Status: In process Specimen: Blood from Arm, Right Updated: 03/18/25 1517    FLU/RSV/COVID - if FLU/RSV clinically relevant (2hr TAT) [[id number]] Collected: 03/18/25 1508    Lab Status: In process Specimen: Nares from Nose Updated: 03/18/25 1517            XR chest 2 views   ED Interpretation by Anurag Fagan DO (03/18 1456)   Wet read-no acute cardiopulmonary disease no change from prior          Procedures    ED Medication and Procedure Management   Prior to Admission Medications   Prescriptions Last Dose Informant Patient  Reported? Taking?   Pediatric Multiple Vit-C-FA (PEDIATRIC MULTIVITAMIN) chewable tablet  Family Member, Self Yes No   Sig: Chew 1 tablet daily by mouth   acetaminophen (TYLENOL) 325 mg tablet  Family Member, Self No No   Sig: Take 2 tablets (650 mg total) by mouth every 6 (six) hours as needed for mild pain   aspirin (ECOTRIN LOW STRENGTH) 81 mg EC tablet  Family Member, Self Yes No   Sig: Take 81 mg by mouth daily   bimatoprost (LUMIGAN) 0.01 % ophthalmic drops   No No   Sig: Administer 1 drop to both eyes daily at bedtime   bisacodyl 5 mg EC tablet  Family Member, Self Yes No   Sig: Take 5 mg by mouth daily as needed for constipation   calcitriol (ROCALTROL) 0.25 mcg capsule   No No   Sig: Take 1 capsule (0.25 mcg total) by mouth 3 (three) times a week   famotidine (PEPCID) 20 mg tablet   No No   Sig: TAKE 1 TABLET BY MOUTH TWICE A DAY   furosemide (LASIX) 20 mg tablet   No No   Sig: TAKE 1 TABLET BY MOUTH EVERY DAY   ketoconazole (NIZORAL) 2 % cream   No No   Sig: Apply topically daily   levothyroxine 75 mcg tablet   No No   Sig: TAKE 1 TABLET BY MOUTH EVERY DAY IN THE MORNING   losartan (COZAAR) 50 mg tablet   No No   Sig: Take 1 tablet (50 mg total) by mouth daily   metoprolol tartrate (LOPRESSOR) 25 mg tablet   No No   Sig: TAKE 1 TABLET (25 MG TOTAL) BY MOUTH EVERY 12 (TWELVE) HOURS   mirtazapine (REMERON) 30 mg tablet   No No   Sig: TAKE 1 TABLET BY MOUTH EVERYDAY AT BEDTIME   oxygen gas  Family Member, Self Yes No   Sig: Inhale 2 L/min continuous. nasal cannula  Indications: .   rosuvastatin (CRESTOR) 10 MG tablet   No No   Sig: TAKE 1 TABLET BY MOUTH EVERY DAY   vitamin B-12 (VITAMIN B-12) 500 mcg tablet   No No   Sig: Take 1 tablet (500 mcg total) by mouth daily      Facility-Administered Medications: None     Patient's Medications   Discharge Prescriptions    No medications on file     No discharge procedures on file.  ED SEPSIS DOCUMENTATION   Time reflects when diagnosis was documented in both MDM as  applicable and the Disposition within this note       Time User Action Codes Description Comment    3/18/2025  2:32 PM Anurag Fagan [R53.83] Fatigue     3/18/2025  2:32 PM Anurag Fagan [R53.1] Generalized weakness     3/18/2025  2:32 PM Anurag Fagan [R09.02] Hypoxia     3/18/2025  2:32 PM Anurag Faagn [J44.9] Chronic obstructive pulmonary disease, unspecified COPD type (HCC)     3/18/2025  2:32 PM Anurag Fagan [J96.11,  J96.12] Chronic respiratory failure with hypoxia and hypercapnia (HCC)     3/18/2025  2:32 PM Anurag Fagan [Z86.73] Hx of arterial ischemic stroke     3/18/2025  2:32 PM Anurag Fagan [G47.33] JAMEEL (obstructive sleep apnea)     3/18/2025  2:32 PM Anurag Fagan [E66.9] Obesity (BMI 30-39.9)     3/18/2025  2:32 PM Anurag Fagan [N18.32] Stage 3b chronic kidney disease (HCC)     3/18/2025  2:32 PM Anurag Fagan [E03.9] Acquired hypothyroidism                  Anurag Fagan DO  03/19/25 0643

## 2025-03-18 NOTE — ED ATTENDING ATTESTATION
3/18/2025  I, Izaiah Mack MD, saw and evaluated the patient. I have discussed the patient with the resident/non-physician practitioner and agree with the resident's/non-physician practitioner's findings, Plan of Care, and MDM as documented in the resident's/non-physician practitioner's note, except where noted. All available labs and Radiology studies were reviewed.  I was present for key portions of any procedure(s) performed by the resident/non-physician practitioner and I was immediately available to provide assistance.       At this point I agree with the current assessment done in the Emergency Department.  I have conducted an independent evaluation of this patient a history and physical is as follows:    89-year-old female presents for evaluation of nauseous, shortness of breath with, fatigue over the past 2 days.  Patient is normally on 2 L of oxygen at home.  10 systems reviewed otherwise) exam no distress, lungs with the area decreased air entry bilateral, faint wheezing, cardiac normal, abdomen normal, skin normal.  Medical Decision Making Semones weakness, shortness of breath-will do cardiac workup to rule out ACS, pneumonia, pneumothorax congestive heart failure, dysrhythmia, myocarditis, pericarditis, electrode abnormality, anemia, heart neb, admit    ED Course  ED Course as of 03/18/25 1611   Tue Mar 18, 2025   1430 Critical Care Time Statement: Upon my evaluation, this patient had a high probability of imminent or life-threatening deterioration due to copd exacerbation, which required my direct attention, intervention, and personal management.  I spent a total of 35 minutes directly providing critical care services, including interpretation of complex medical databases, evaluating for the presence of life-threatening injuries or illnesses, management of organ system failure(s) , complex medical decision making (to support/prevent further life-threatening deterioration)., and interpretation of  hemodynamic data. This time is exclusive of procedures, teaching, treating other patients, family meetings, and any prior time recorded by providers other than myself.             Critical Care Time  Procedures

## 2025-03-19 PROBLEM — I50.33 DIASTOLIC DYSFUNCTION WITH ACUTE ON CHRONIC HEART FAILURE (HCC): Status: ACTIVE | Noted: 2025-03-19

## 2025-03-19 LAB
ARTERIAL PATENCY WRIST A: YES
ATRIAL RATE: 84 BPM
BASE EXCESS BLDA CALC-SCNC: 4.7 MMOL/L
BASO STIPL BLD QL SMEAR: PRESENT
BASOPHILS # BLD MANUAL: 0 THOUSAND/UL (ref 0–0.1)
BASOPHILS NFR MAR MANUAL: 0 % (ref 0–1)
EOSINOPHIL # BLD MANUAL: 0 THOUSAND/UL (ref 0–0.4)
EOSINOPHIL NFR BLD MANUAL: 0 % (ref 0–6)
ERYTHROCYTE [DISTWIDTH] IN BLOOD BY AUTOMATED COUNT: 12.6 % (ref 11.6–15.1)
HCO3 BLDA-SCNC: 32.5 MMOL/L (ref 22–28)
HCT VFR BLD AUTO: 36.3 % (ref 34.8–46.1)
HGB BLD-MCNC: 10.5 G/DL (ref 11.5–15.4)
LYMPHOCYTES # BLD AUTO: 0.77 THOUSAND/UL (ref 0.6–4.47)
LYMPHOCYTES # BLD AUTO: 7 % (ref 14–44)
MACROCYTES BLD QL AUTO: PRESENT
MCH RBC QN AUTO: 28.8 PG (ref 26.8–34.3)
MCHC RBC AUTO-ENTMCNC: 28.9 G/DL (ref 31.4–37.4)
MCV RBC AUTO: 100 FL (ref 82–98)
MONOCYTES # BLD AUTO: 0.22 THOUSAND/UL (ref 0–1.22)
MONOCYTES NFR BLD: 2 % (ref 4–12)
NASAL CANNULA: ABNORMAL
NEUTROPHILS # BLD MANUAL: 10.07 THOUSAND/UL (ref 1.85–7.62)
NEUTS SEG NFR BLD AUTO: 91 % (ref 43–75)
O2 CT BLDA-SCNC: 15.1 ML/DL (ref 16–23)
OXYHGB MFR BLDA: 91.5 % (ref 94–97)
P AXIS: 118 DEGREES
PCO2 BLDA: 65.6 MM HG (ref 36–44)
PH BLDA: 7.31 [PH] (ref 7.35–7.45)
PLATELET # BLD AUTO: 249 THOUSANDS/UL (ref 149–390)
PLATELET BLD QL SMEAR: ADEQUATE
PMV BLD AUTO: 10.2 FL (ref 8.9–12.7)
PO2 BLDA: 65.1 MM HG (ref 75–129)
PR INTERVAL: 200 MS
QRS AXIS: 95 DEGREES
QRSD INTERVAL: 164 MS
QT INTERVAL: 426 MS
QTC INTERVAL: 503 MS
RBC # BLD AUTO: 3.64 MILLION/UL (ref 3.81–5.12)
RBC MORPH BLD: PRESENT
SPECIMEN SOURCE: ABNORMAL
T WAVE AXIS: 27 DEGREES
VENTRICULAR RATE: 84 BPM
WBC # BLD AUTO: 11.07 THOUSAND/UL (ref 4.31–10.16)

## 2025-03-19 PROCEDURE — 94640 AIRWAY INHALATION TREATMENT: CPT

## 2025-03-19 PROCEDURE — 99232 SBSQ HOSP IP/OBS MODERATE 35: CPT | Performed by: HOSPITALIST

## 2025-03-19 PROCEDURE — 94760 N-INVAS EAR/PLS OXIMETRY 1: CPT

## 2025-03-19 PROCEDURE — 99223 1ST HOSP IP/OBS HIGH 75: CPT | Performed by: INTERNAL MEDICINE

## 2025-03-19 PROCEDURE — 85027 COMPLETE CBC AUTOMATED: CPT | Performed by: INTERNAL MEDICINE

## 2025-03-19 PROCEDURE — 85007 BL SMEAR W/DIFF WBC COUNT: CPT | Performed by: INTERNAL MEDICINE

## 2025-03-19 PROCEDURE — 82805 BLOOD GASES W/O2 SATURATION: CPT | Performed by: STUDENT IN AN ORGANIZED HEALTH CARE EDUCATION/TRAINING PROGRAM

## 2025-03-19 PROCEDURE — 36600 WITHDRAWAL OF ARTERIAL BLOOD: CPT

## 2025-03-19 PROCEDURE — 93010 ELECTROCARDIOGRAM REPORT: CPT | Performed by: INTERNAL MEDICINE

## 2025-03-19 RX ORDER — LEVALBUTEROL INHALATION SOLUTION 1.25 MG/3ML
1.25 SOLUTION RESPIRATORY (INHALATION)
Status: DISCONTINUED | OUTPATIENT
Start: 2025-03-19 | End: 2025-03-21 | Stop reason: HOSPADM

## 2025-03-19 RX ORDER — PREDNISONE 20 MG/1
40 TABLET ORAL DAILY
Status: DISCONTINUED | OUTPATIENT
Start: 2025-03-20 | End: 2025-03-21 | Stop reason: HOSPADM

## 2025-03-19 RX ADMIN — MIRTAZAPINE 30 MG: 15 TABLET, FILM COATED ORAL at 21:32

## 2025-03-19 RX ADMIN — HEPARIN SODIUM 5000 UNITS: 5000 INJECTION INTRAVENOUS; SUBCUTANEOUS at 16:03

## 2025-03-19 RX ADMIN — IPRATROPIUM BROMIDE 0.5 MG: 0.5 SOLUTION RESPIRATORY (INHALATION) at 20:05

## 2025-03-19 RX ADMIN — BIMATOPROST 1 DROP: 0.3 SOLUTION/ DROPS OPHTHALMIC at 09:04

## 2025-03-19 RX ADMIN — HEPARIN SODIUM 5000 UNITS: 5000 INJECTION INTRAVENOUS; SUBCUTANEOUS at 06:27

## 2025-03-19 RX ADMIN — LEVALBUTEROL HYDROCHLORIDE 1.25 MG: 1.25 SOLUTION RESPIRATORY (INHALATION) at 13:23

## 2025-03-19 RX ADMIN — LEVALBUTEROL HYDROCHLORIDE 1.25 MG: 1.25 SOLUTION RESPIRATORY (INHALATION) at 08:07

## 2025-03-19 RX ADMIN — LEVOTHYROXINE SODIUM 75 MCG: 0.07 TABLET ORAL at 06:27

## 2025-03-19 RX ADMIN — LEVALBUTEROL HYDROCHLORIDE 1.25 MG: 1.25 SOLUTION RESPIRATORY (INHALATION) at 20:05

## 2025-03-19 RX ADMIN — METOPROLOL TARTRATE 25 MG: 25 TABLET, FILM COATED ORAL at 09:06

## 2025-03-19 RX ADMIN — IPRATROPIUM BROMIDE 0.5 MG: 0.5 SOLUTION RESPIRATORY (INHALATION) at 08:49

## 2025-03-19 RX ADMIN — FAMOTIDINE 10 MG: 20 TABLET, FILM COATED ORAL at 09:06

## 2025-03-19 RX ADMIN — METHYLPREDNISOLONE SODIUM SUCCINATE 40 MG: 40 INJECTION, POWDER, FOR SOLUTION INTRAMUSCULAR; INTRAVENOUS at 09:04

## 2025-03-19 RX ADMIN — ASPIRIN 81 MG: 81 TABLET, COATED ORAL at 09:06

## 2025-03-19 RX ADMIN — HEPARIN SODIUM 5000 UNITS: 5000 INJECTION INTRAVENOUS; SUBCUTANEOUS at 21:43

## 2025-03-19 RX ADMIN — IPRATROPIUM BROMIDE 0.5 MG: 0.5 SOLUTION RESPIRATORY (INHALATION) at 13:23

## 2025-03-19 RX ADMIN — PRAVASTATIN SODIUM 40 MG: 40 TABLET ORAL at 16:03

## 2025-03-19 RX ADMIN — LOSARTAN POTASSIUM 50 MG: 50 TABLET, FILM COATED ORAL at 09:07

## 2025-03-19 RX ADMIN — CALCITRIOL 0.25 MCG: 0.25 CAPSULE, LIQUID FILLED ORAL at 09:06

## 2025-03-19 RX ADMIN — FUROSEMIDE 40 MG: 40 TABLET ORAL at 09:06

## 2025-03-19 NOTE — ASSESSMENT & PLAN NOTE
Wt Readings from Last 3 Encounters:   03/18/25 92.9 kg (204 lb 12.9 oz)   01/20/25 92.9 kg (204 lb 12.9 oz)   12/26/24 97.3 kg (214 lb 8.1 oz)

## 2025-03-19 NOTE — ASSESSMENT & PLAN NOTE
In the setting of CKD and diastolic CHF.  Creatinine is at baseline  She has chronically elevated BNP.  This is lower than last level  Continue Lasix 40 mg daily

## 2025-03-19 NOTE — PLAN OF CARE

## 2025-03-19 NOTE — CONSULTS
"Consultation - Pulmonology   Name: Ashley Gonzales 89 y.o. female I MRN: [id number]  Unit/Bed#: E5 -01 I Date of Admission: 3/18/2025   Date of Service: 3/19/2025 I Hospital Day: 1       Inpatient consult to Pulmonology     Date/Time  3/19/2025 8:03 AM     Performed by  Joe Romero DO   Authorized by  Og Degroot MD         Physician Requesting Evaluation: Alvaro Ortez DO   Reason for Evaluation / Principal Problem: COPD exacerbation    Assessment & Plan  Chronic respiratory failure with hypoxia and hypercapnia (HCC)    COPD (chronic obstructive pulmonary disease) (HCC)  Mild COPD  No maintenance inhalers  Home O2 2L     JAMEEL (obstructive sleep apnea)  Currently on BiPAP, managed by PMD. Patient does not currently follow with pulmonary or sleep medicine.  Stage 3 chronic kidney disease (HCC)  Baseline Cr ~1.1, currently 1.3  Metabolic encephalopathy    Peripheral edema    Diastolic dysfunction with acute on chronic heart failure (HCC)      Plan:  Maintain SpO2 88-92%, wean off supplemental oxygen as tolerated  Obtain ABG  Discontinue Solu-Medrol.  Start prednisone 40 mg p.o. daily  Continue with Atrovent and Xopenex nebs  Daily weights  Monitor I's and O's  BiPAP at night. Patient brought her BiPAP to the hospital.    History of Present Illness   Ashley Gonzales is a 89 y.o. female with PMHx of mild COPD, JAMEEL (on BiPAP however non-compliant), CKD, HFpEF who presents with AMS after not wearing her BiPAP.  She was found unresponsive at home, brought to the emergency department and after being placed on BiPAP patient became alert and oriented x 3.  She feels uncomfortable wearing her BiPAP and often does not wear her BiPAP due to the \"air feeling very hot\". The daughter who was at bedside notes that the patient has not had any fevers or chills, shortness of breath or productive cough over the last few weeks.  Patient also agrees that she has not had any shortness of breath or " productive cough.  Patient has no sick contacts no recent travels outside the state or country.  Patient also denies chest pain, wheezing, lower extremity swelling.    Review of Systems   Constitutional:  Negative for diaphoresis and fever.   Respiratory:  Negative for cough and shortness of breath.    Cardiovascular:  Negative for chest pain, palpitations and leg swelling.   Gastrointestinal:  Negative for abdominal distention and abdominal pain.   Genitourinary:  Negative for difficulty urinating.   Musculoskeletal:  Negative for back pain.       Historical Information   Medical History Review: I have reviewed the patient's PMH, PSH, Social History, Family History, Meds, and Allergies   Tobacco History: Quit years ago.  Family History:non-contributory    Objective :  Temp:  [97 °F (36.1 °C)-98.8 °F (37.1 °C)] 97.9 °F (36.6 °C)  HR:  [65-92] 89  BP: (104-122)/(38-58) 114/48  Resp:  [18-22] 18  SpO2:  [90 %-100 %] 92 %  O2 Device: Nasal cannula  Nasal Cannula O2 Flow Rate (L/min):  [3 L/min-5 L/min] 3 L/min    Physical Exam  HENT:      Head: Normocephalic.      Nose: No congestion.      Mouth/Throat:      Pharynx: No oropharyngeal exudate.   Eyes:      Conjunctiva/sclera: Conjunctivae normal.   Cardiovascular:      Rate and Rhythm: Normal rate and regular rhythm.      Heart sounds: No murmur heard.     No friction rub. No gallop.   Pulmonary:      Effort: No respiratory distress.      Breath sounds: No stridor. No wheezing or rhonchi.   Abdominal:      General: Abdomen is flat.   Musculoskeletal:      Cervical back: Normal range of motion.   Skin:     General: Skin is warm.      Capillary Refill: Capillary refill takes less than 2 seconds.   Neurological:      General: No focal deficit present.      Mental Status: She is alert.   Psychiatric:         Mood and Affect: Mood normal.           Lab Results: I have reviewed the following results:  .     03/18/25  1508 03/18/25  1706 03/19/25  0645   WBC 9.49  --  11.07*    HGB 12.0  --  10.5*   HCT 40.9  --  36.3     --  249   SODIUM 143  --   --    K 3.9  --   --    CL 97  --   --    CO2 42*  --   --    BUN 47*  --   --    CREATININE 1.35*  --   --    GLUC 107  --   --    AST 11*  --   --    ALT 6*  --   --    ALB 3.6  --   --    TBILI 0.36  --   --    ALKPHOS 68  --   --    HSTNI0 11  --   --    HSTNI2  --  11  --    *  --   --      ABG: No new results in last 24 hours.    Pulmonary function testing:   PFTs 3/2015 - Ratio 69%, FVC 78%, FEV1 72%, TLC 76%, RV 79%, DLCO 50% - mild obstructive airflow defect, mildly reduced TLC, moderately reduced diffusion  Home PSG 11/27/17 - AHI 9.8 with significant desaturations, colby 56%     EKG, Pathology, and Other Studies: I have personally reviewed pertinent reports.    TTE 2/2021 - EF 60%, grade II diastolic dysfunction, dilated RV with normal function     TTE 1/2016 - EF 55-60%, grade I diastolic dysfunction, normal RV size/function    Joe Romero DO PGY-V  Pulmonary Critical Care Fellow  [facility]

## 2025-03-19 NOTE — ASSESSMENT & PLAN NOTE
Lab Results   Component Value Date    EGFR 34 03/18/2025    EGFR 29 02/21/2025    EGFR 39 01/20/2025    CREATININE 1.35 (H) 03/18/2025    CREATININE 1.53 (H) 02/21/2025    CREATININE 1.21 01/20/2025

## 2025-03-19 NOTE — CASE MANAGEMENT
Case Management Assessment & Discharge Planning Note    Patient name Ashley Gonzales  Location [facility] /E5 -* MRN [id number]  : 1935 Date 3/19/2025       Current Admission Date: 3/18/2025  Current Admission Diagnosis:Chronic respiratory failure with hypoxia and hypercapnia (HCC)   Patient Active Problem List    Diagnosis Date Noted Date Diagnosed    Diastolic dysfunction with acute on chronic heart failure (HCC) 2025     Peripheral edema 2025     Anxiety and depression 2024     ACP (advance care planning) 2024     Mild vascular dementia without behavioral disturbance, psychotic disturbance, mood disturbance, or anxiety ([facility]) 2024     Depression, recurrent ([facility]) 2024     Acquired hypothyroidism 2024     Persistent proteinuria 2024     Secondary hyperparathyroidism of renal origin ([facility]) 2024     Low serum vitamin B12 2024     Lung nodule 2024     Chronic respiratory failure with hypoxia and hypercapnia ([facility]) 2024     Metabolic encephalopathy 08/15/2024     Fall 08/15/2024     Stage 3 chronic kidney disease ([facility]) 2024     Nephrolithiasis 2024     Hematuria 2024     Dizziness 2024     Obesity (BMI 30-39.9) 05/10/2023     Pleural effusion 2021     Acute on chronic respiratory failure with hypoxia and hypercapnia ([facility]) 2021     History of total knee replacement 2019     JAMEEL (obstructive sleep apnea) 2017     COPD (chronic obstructive pulmonary disease) ([facility])      Benign familial tremor      Hx of arterial ischemic stroke      Esophageal reflux 2015     Clinical depression 2015     Osteoarthritis of knee 2015     Postural imbalance 10/30/2014     Insomnia 10/01/2012     Generalized osteoarthritis 2012     Female stress incontinence 2012       LOS (days): 1  Geometric Mean LOS (GMLOS) (days): 3.4  Days to GMLOS:2.5     OBJECTIVE:    Risk of Unplanned  Readmission Score: 22.58         Current admission status: Inpatient       Preferred Pharmacy:   [id number] - [place] - [address]  [address]  [place] [address]  Phone: [phone] Fax: [phone]    Primary Care Provider: Paz Maharaj DO    Primary Insurance: AARP MC REP  Secondary Insurance:     ASSESSMENT:  Active Health Care Proxies       Izzy Gonzales First Alternate Health Care Agent - Daughter   Primary Phone: [phone] (Mobile)                 Advance Directives  Does patient have a Health Care POA?: Yes  Does patient have Advance Directives?: Yes  Primary Contact: daughter Izzy [phone]    Readmission Root Cause  30 Day Readmission: No    Patient Information  Admitted from:: Home  Mental Status: Alert  During Assessment patient was accompanied by: Not accompanied during assessment  Assessment information provided by:: Daughter  Primary Caregiver: Family  Caregiver's Name:: Izzy, tre  Caregiver's Relationship to Patient:: Family Member  Caregiver's Telephone Number:: [phone]  Support Systems: Family members  County of Residence: [place]  What city do you live in?: [place]  Home entry access options. Select all that apply.: Stairs  Number of steps to enter home.: 1  Type of Current Residence: [address] home  Upon entering residence, is there a bedroom on the main floor (no further steps)?: Yes  Upon entering residence, is there a bathroom on the main floor (no further steps)?: Yes  Living Arrangements: Lives w/ Children  Is patient a ?: No    Activities of Daily Living Prior to Admission  Functional Status: Assistance  Completes ADLs independently?: No  Level of ADL dependence: Assistance  Ambulates independently?: Yes  Does patient use assisted devices?: Yes  Assisted Devices (DME) used: Walker, Home Oxygen concentrator, Portable Oxygen tanks  DME Company Name (respiratory supplies): [employer]  O2 Rate(s): 2 liters  Does patient currently own  DME?: Yes  What DME does the patient currently own?: Walker, Portable Oxygen tanks, Home Oxygen concentrator  Does patient have a history of Outpatient Therapy (PT/OT)?: No  Does the patient have a history of Short-Term Rehab?: Yes (JENNIFER)  Does patient have a history of HHC?: Yes (LUISA Leija)  Does patient currently have HHC?: No    Patient Information Continued  Income Source: SSI/SSD  Does patient have prescription coverage?: Yes  Does patient receive dialysis treatments?: No  Does patient have a history of substance abuse?: No  Does patient have a history of Mental Health Diagnosis?: Yes (anxiety and depression)  Has patient received inpatient treatment related to mental health in the last 2 years?: No         Means of Transportation  Means of Transport to Appts:: Family transport          DISCHARGE DETAILS:    Discharge planning discussed with:: patient's daughter Izzy YANES contacted family/caregiver?: Yes    Contacts  Patient Contacts: Izzy  Relationship to Patient:: Family  Contact Method: Phone  Phone Number: [phone]  Reason/Outcome: Discharge Planning    Additional Comments: Patient lives w/ son in a 2 SH [address]hon[address]. Patient gets assistance w/ ADLs from daughter and grandchild. Patient owns the DME listed above, wears oxygen at baseline 2 Liters. PT OT eval pending.

## 2025-03-19 NOTE — PROGRESS NOTES
Progress Note - Hospitalist   Name: Ashley Gonzales 89 y.o. female I MRN: [id number]  Unit/Bed#: E5 -01 I Date of Admission: 3/18/2025   Date of Service: 3/19/2025 I Hospital Day: 1    Assessment & Plan  Metabolic encephalopathy  Likely secondary to hypercarbia  when she did not use her CPAP at night on top of COPD and sleep apnea.  Mental status is back to baseline now, confirmed by patient and daughter.  Continue oxygen by nasal cannula 2 L during the day and CPAP at night.  COPD (chronic obstructive pulmonary disease) (HCC)  With exacerbation.  Improved on Solu-Medrol.  Pulmonary saw her and changed her to prednisone  Chronic respiratory failure with hypoxia and hypercapnia (HCC)  On chronic oxygen at 2 L and CPAP at night.    She came in with acute respiratory failure thought to be due to acute COPD exacerbation    She quickly improved on IV steroids.  Pulmonary now transitioning to oral  JAMEEL (obstructive sleep apnea)  Order CPAP at night for comfort.  Patient and daughter unsure of her CPAP settings.  Peripheral edema  In the setting of CKD and diastolic CHF.  Creatinine is at baseline  She has chronically elevated BNP.  This is lower than last level  Continue Lasix 40 mg daily  Stage 3 chronic kidney disease (HCC)  Lab Results   Component Value Date    EGFR 34 03/18/2025    EGFR 29 02/21/2025    EGFR 39 01/20/2025    CREATININE 1.35 (H) 03/18/2025    CREATININE 1.53 (H) 02/21/2025    CREATININE 1.21 01/20/2025     Diastolic dysfunction with acute on chronic heart failure (HCC)  Wt Readings from Last 3 Encounters:   03/18/25 92.9 kg (204 lb 12.9 oz)   01/20/25 92.9 kg (204 lb 12.9 oz)   12/26/24 97.3 kg (214 lb 8.1 oz)               VTE Pharmacologic Prophylaxis:                 Current Length of Stay: 1 day(s)  Current Patient Status: Inpatient   Certification Statement:     Discharge Plan:         Subjective   Doing much better.  She says she is back to her baseline breathing.  No wheezing.  No shortness  of breath.  She is on nasal cannula oxygen    Objective   Vitals:   Temp (24hrs), Av.2 °F (36.8 °C), Min:97 °F (36.1 °C), Max:98.8 °F (37.1 °C)    Temp:  [97 °F (36.1 °C)-98.8 °F (37.1 °C)] 98.6 °F (37 °C)  HR:  [65-92] 65  Resp:  [18-22] 19  BP: (104-126)/(38-58) 115/56  SpO2:  [90 %-97 %] 93 %  Body mass index is 37.46 kg/m².         Physical Exam  Vitals and nursing note reviewed.   Constitutional:       General: She is not in acute distress.     Appearance: She is well-developed.   HENT:      Head: Normocephalic and atraumatic.   Eyes:      Conjunctiva/sclera: Conjunctivae normal.   Cardiovascular:      Rate and Rhythm: Normal rate and regular rhythm.      Heart sounds: No murmur heard.  Pulmonary:      Effort: Pulmonary effort is normal. No respiratory distress.      Breath sounds: Normal breath sounds.   Abdominal:      Palpations: Abdomen is soft.      Tenderness: There is no abdominal tenderness.   Musculoskeletal:         General: No swelling.      Cervical back: Neck supple.      Right lower leg: No edema.      Left lower leg: No edema.   Skin:     General: Skin is warm and dry.      Capillary Refill: Capillary refill takes less than 2 seconds.   Neurological:      Mental Status: She is alert.   Psychiatric:         Mood and Affect: Mood normal.           Lab results  Results from last 7 days   Lab Units 25  0645 25  0645 25  1508   WBC Thousand/uL  --  11.07* 9.49   HEMOGLOBIN g/dL  --  10.5* 12.0   PLATELETS Thousands/uL  --  249 265   MCV fL  --  100* 98   TOTAL NEUT ABS Thousand/uL 10.07*  --   --      Results from last 7 days   Lab Units 25  1508   SODIUM mmol/L 143   POTASSIUM mmol/L 3.9   CHLORIDE mmol/L 97   CO2 mmol/L 42*   ANION GAP mmol/L 4   BUN mg/dL 47*   CREATININE mg/dL 1.35*   CALCIUM mg/dL 9.3   ALBUMIN g/dL 3.6   TOTAL BILIRUBIN mg/dL 0.36   ALK PHOS U/L 68   ALT U/L 6*   AST U/L 11*   EGFR ml/min/1.73sq m 34   GLUCOSE RANDOM mg/dL 107                 Last 24  Hours Medication List:     Current Facility-Administered Medications:     acetaminophen (TYLENOL) tablet 650 mg, Q6H PRN    aspirin (ECOTRIN LOW STRENGTH) EC tablet 81 mg, Daily    bimatoprost (LUMIGAN) 0.03 % ophthalmic drops 1 drop, Daily    bisacodyl (DULCOLAX) EC tablet 5 mg, Daily PRN    calcitriol (ROCALTROL) capsule 0.25 mcg, Once per day on Monday Wednesday Friday    famotidine (PEPCID) tablet 10 mg, Daily    furosemide (LASIX) tablet 40 mg, Daily    heparin (porcine) subcutaneous injection 5,000 Units, Q8H [place] **AND** Platelet count, Once    levalbuterol (XOPENEX) inhalation solution 1.25 mg, TID **AND** ipratropium (ATROVENT) 0.02 % inhalation solution 0.5 mg, TID    levothyroxine tablet 75 mcg, Early Morning    losartan (COZAAR) tablet 50 mg, Daily    metoprolol tartrate (LOPRESSOR) tablet 25 mg, Q12H [place]    mirtazapine (REMERON) tablet 30 mg, HS    pravastatin (PRAVACHOL) tablet 40 mg, Daily With Dinner    [START ON 3/20/2025] predniSONE tablet 40 mg, Daily

## 2025-03-19 NOTE — PLAN OF CARE

## 2025-03-19 NOTE — UTILIZATION REVIEW
Initial Clinical Review    Admission: Date/Time/Statement:   Admission Orders (From admission, onward)       Ordered        03/18/25 1710  INPATIENT ADMISSION  Once                          Orders Placed This Encounter   Procedures    INPATIENT ADMISSION     Standing Status:   Standing     Number of Occurrences:   1     Level of Care:   Med Surg [16]     Estimated length of stay:   More than 2 Midnights     Certification:   I certify that inpatient services are medically necessary for this patient for a duration of greater than two midnights. See H&P and MD Progress Notes for additional information about the patient's course of treatment.     ED Arrival Information       Expected   -    Arrival   3/18/2025 14:11    Acuity   Emergent              Means of arrival   Ambulance    Escorted by   [facility] ([facility])    Service   Hospitalist    Admission type   Emergency              Arrival complaint   sob             Chief Complaint   Patient presents with    Fatigue     Pt arrived via EMS feeling more tired than usual and. States feeling nauseous and SOB off and on.        Initial Presentation: 89 y.o. female presents to ED via  EMS  from home with increased lethargy, unable to hold a  conversation, unusual for her per daughter.  Reportedly forgot to  use  her CPAP the night prior to this. Given nebulizers,  IV  s/medrol and IV  MG  in ED  with improvement in mental status and breathing.  PMH  is  COPD, on  O2 2L  NC and  CPAP at night,  sleep apnea,  CHF and chronic respiratory failure.  Daughter states mental status back to baseline after ED  meds. Admit  Ip with  Metabolic Encephalopathy,  COPD, Peripheral edema and plan is  IV s/medrol, pulmonary consult, monitor labs, O2 2L and continue home meds.       Anticipated Length of Stay/Certification Statement: Patient will be admitted on an inpatient basis with an anticipated length of stay of greater than 2 midnights secondary to COPD.     Date:   3/19    Day 2:   Pulmonary consult  Need O2  sats   88-92 %.  O2 sats  92  %  on  3 L  NC.    D/C  IV  s/medrol and transition to prednisone.  Continue nebulizers.  No wheezing noted on  exam.      Date:   3/20     Day 3: Has surpassed a 2nd midnight with active treatments and services.  On O2  2L  NC  with sats  91  %.     Attempt  BIPAP   4 hours/daily.    Feels  anxious/confused, alert and oriented  X 3.   Did not use  CPAP  during the night.  Continue nebulizers.  Now on  day 2/5 prednisone.  Continue  PT/OT.          ED Treatment-Medication Administration from 03/18/2025 1411 to 03/18/2025 1817         Date/Time Order Dose Route Action     03/18/2025 1509 albuterol inhalation solution 10 mg 10 mg Nebulization Given     03/18/2025 1509 ipratropium (ATROVENT) 0.02 % inhalation solution 1 mg 1 mg Nebulization Given     03/18/2025 1509 sodium chloride 0.9 % inhalation solution 12 mL 12 mL Nebulization Given     03/18/2025 1500 methylPREDNISolone sodium succinate (Solu-MEDROL) injection 125 mg 125 mg Intravenous Given     03/18/2025 1500 magnesium sulfate 2 g/50 mL IVPB (premix) 2 g 2 g Intravenous New Bag            Scheduled Medications:  aspirin, 81 mg, Oral, Daily  bimatoprost, 1 drop, Ophthalmic, Daily  calcitriol, 0.25 mcg, Oral, Once per day on Monday Wednesday Friday  famotidine, 10 mg, Oral, Daily  furosemide, 40 mg, Oral, Daily  heparin (porcine), 5,000 Units, Subcutaneous, Q8H MODESTO  levalbuterol, 1.25 mg, Nebulization, TID   And  ipratropium, 0.5 mg, Nebulization, TID  levothyroxine, 75 mcg, Oral, Early Morning  losartan, 50 mg, Oral, Daily  metoprolol tartrate, 25 mg, Oral, Q12H MODESTO  mirtazapine, 30 mg, Oral, HS  pravastatin, 40 mg, Oral, Daily With Dinner  [START ON 3/20/2025] predniSONE, 40 mg, Oral, Daily  IV  s/medrol - D/C   3/19      Continuous IV Infusions:     PRN Meds:  acetaminophen, 650 mg, Oral, Q6H PRN  bisacodyl, 5 mg, Oral, Daily PRN      ED Triage Vitals   Temperature Pulse Respirations Blood  Pressure SpO2 Pain Score   03/18/25 1835 03/18/25 1502 03/18/25 1502 03/18/25 1502 03/18/25 1502 03/18/25 1908   (!) 97 °F (36.1 °C) 65 22 122/58 92 % No Pain     Weight (last 2 days)       Date/Time Weight    03/18/25 19:08:43 92.9 (204.81)            Vital Signs (last 3 days)       Date/Time Temp Pulse Resp BP MAP (mmHg) SpO2 Calculated FIO2 (%) - Nasal Cannula Nasal Cannula O2 Flow Rate (L/min) O2 Device Patient Position - Orthostatic VS Madhuri Coma Scale Score Pain    03/19/25 1325 -- -- -- -- -- -- 28 2 L/min Nasal cannula -- -- --    03/19/25 11:05:43 98.6 °F (37 °C) 65 19 115/56 76 93 % -- -- -- Sitting -- --    03/19/25 0903 -- -- -- -- -- -- 28 2 L/min Nasal cannula -- -- No Pain    03/19/25 08:19:40 -- 77 18 126/52 77 97 % -- -- -- Sitting -- --    03/19/25 0815 -- -- -- -- -- 93 % 32 3 L/min Nasal cannula -- -- --    03/19/25 0700 -- -- -- -- -- -- 32 3 L/min -- -- -- --    03/18/25 23:12:56 -- 89 -- 114/48 70 92 % -- -- -- -- -- --    03/18/25 23:06:57 97.9 °F (36.6 °C) 82 18 104/41 62 93 % -- -- -- -- -- --    03/18/25 2300 -- -- -- -- -- -- 40 5 L/min Nasal cannula -- -- No Pain    03/18/25 2202 -- 92 -- -- -- -- 36 4 L/min CPAP -- -- --    03/18/25 21:18:10 -- 87 -- 104/38 60 92 % -- -- -- -- -- --    03/18/25 2115 -- 90 -- 104/38 -- -- -- -- -- -- -- --    03/18/25 2005 -- 88 -- -- -- 92 % 40 5 L/min Nasal cannula -- -- --    03/18/25 19:55:44 98.8 °F (37.1 °C) 92 -- -- -- 90 % -- -- -- -- -- --    03/18/25 19:08:43 98.8 °F (37.1 °C) 88 20 122/58 -- -- -- -- -- -- -- No Pain    03/18/25 18:42:15 -- 85 -- -- -- 94 % -- -- -- -- -- --    03/18/25 1835 97 °F (36.1 °C) 85 22 -- -- 96 % -- -- Nasal cannula Lying -- --    03/18/25 1702 -- -- -- -- -- -- -- -- -- -- 15 --    03/18/25 1512 -- -- -- -- -- 100 % -- -- -- -- -- --    03/18/25 1502 -- 65 22 122/58 -- 92 % 36 4 L/min Nasal cannula Lying -- --    03/18/25 1500 -- -- -- -- -- -- -- -- Nasal cannula -- -- --              Pertinent Labs/Diagnostic  Test Results:   Radiology:  XR chest 2 views   ED Interpretation by Anurag Fagan DO (03/18 1456)   Wet read-no acute cardiopulmonary disease no change from prior      Final Interpretation by Sergio Lui MD (03/18 2029)      Stable findings without acute cardiopulmonary abnormalities.      Workstation performed: [id number]           Cardiology:        Results from last 7 days   Lab Units 03/18/25  1508   SARS-COV-2  Negative     Results from last 7 days   Lab Units 03/19/25  0645 03/18/25  1508   WBC Thousand/uL 11.07* 9.49   HEMOGLOBIN g/dL 10.5* 12.0   HEMATOCRIT % 36.3 40.9   PLATELETS Thousands/uL 249 265   TOTAL NEUT ABS Thousands/µL  --  7.11         Results from last 7 days   Lab Units 03/18/25  1508   SODIUM mmol/L 143   POTASSIUM mmol/L 3.9   CHLORIDE mmol/L 97   CO2 mmol/L 42*   ANION GAP mmol/L 4   BUN mg/dL 47*   CREATININE mg/dL 1.35*   EGFR ml/min/1.73sq m 34   CALCIUM mg/dL 9.3     Results from last 7 days   Lab Units 03/18/25  1508   AST U/L 11*   ALT U/L 6*   ALK PHOS U/L 68   TOTAL PROTEIN g/dL 6.9   ALBUMIN g/dL 3.6   TOTAL BILIRUBIN mg/dL 0.36         Results from last 7 days   Lab Units 03/18/25  1508   GLUCOSE RANDOM mg/dL 107      Results from last 7 days   Lab Units 03/19/25  0916   PH ART  7.313*   PCO2 ART mm Hg 65.6*   PO2 ART mm Hg 65.1*   HCO3 ART mmol/L 32.5*   BASE EXC ART mmol/L 4.7   O2 CONTENT ART mL/dL 15.1*   O2 HGB, ARTERIAL % 91.5*   ABG SOURCE  Radial, Left                 Results from last 7 days   Lab Units 03/18/25  1706 03/18/25  1508   HS TNI 0HR ng/L  --  11   HS TNI 2HR ng/L 11  --    HSTNI D2 ng/L 0  --              Results from last 7 days   Lab Units 03/18/25  1508   TSH 3RD GENERATON uIU/mL 1.474                     Results from last 7 days   Lab Units 03/18/25  1508   BNP pg/mL 147*                   Results from last 7 days   Lab Units 03/18/25 2036   CLARITY UA  Clear   COLOR UA  Light Yellow   SPEC GRAV UA  1.010   PH UA  5.0   GLUCOSE UA mg/dl Negative    KETONES UA mg/dl Negative   BLOOD UA  Negative   PROTEIN UA mg/dl Negative   NITRITE UA  Negative   BILIRUBIN UA  Negative   UROBILINOGEN UA (BE) mg/dl <2.0   LEUKOCYTES UA  Negative     Results from last 7 days   Lab Units 03/18/25  1508   INFLUENZA A PCR  Negative   INFLUENZA B PCR  Negative   RSV PCR  Negative               Present on Admission:   Metabolic encephalopathy   Chronic respiratory failure with hypoxia and hypercapnia (HCC)   COPD (chronic obstructive pulmonary disease) (HCC)   JAMEEL (obstructive sleep apnea)   Stage 3 chronic kidney disease (HCC)      Admitting Diagnosis: Acquired hypothyroidism [E03.9]  Fatigue [R53.83]  SOB (shortness of breath) [R06.02]  JAMEEL (obstructive sleep apnea) [G47.33]  Hypoxia [R09.02]  Obesity (BMI 30-39.9) [E66.9]  Generalized weakness [R53.1]  Hx of arterial ischemic stroke [Z86.73]  Chronic respiratory failure with hypoxia and hypercapnia (HCC) [J96.11, J96.12]  Chronic obstructive pulmonary disease, unspecified COPD type (HCC) [J44.9]  Stage 3b chronic kidney disease (HCC) [N18.32]  Age/Sex: 89 y.o. female    Network Utilization Review Department  ATTENTION: Please call with any questions or concerns to [phone] and carefully listen to the prompts so that you are directed to the right person. All voicemails are confidential.   For Discharge needs, contact Care Management DC Support Team at [phone] opt. 2  Send all requests for admission clinical reviews, approved or denied determinations and any other requests to dedicated fax number below belonging to the campus where the patient is receiving treatment. List of dedicated fax numbers for the Facilities:  FACILITY NAME UR FAX NUMBER   ADMISSION DENIALS (Administrative/Medical Necessity) [phone]   DISCHARGE SUPPORT TEAM (NETWORK) [phone]   PARENT CHILD HEALTH (Maternity/NICU/Pediatrics) [phone]   [facility] [phone]   [facility]  [phone]   [facility] [phone]   [facility] [phone]   [facility] [phone]   [facility] [phone]   [facility] [phone]   [facility] [phone]   [facility] [phone]   [facility] [phone]   [facility] [phone]   [facility] [phone]

## 2025-03-19 NOTE — ASSESSMENT & PLAN NOTE
On chronic oxygen at 2 L and CPAP at night.    She came in with acute respiratory failure thought to be due to acute COPD exacerbation    She quickly improved on IV steroids.  Pulmonary now transitioning to oral

## 2025-03-19 NOTE — ASSESSMENT & PLAN NOTE
Currently on BiPAP, managed by PMD. Patient does not currently follow with pulmonary or sleep medicine.

## 2025-03-19 NOTE — SEPSIS NOTE
[facility] Advisories       Sepsis Time Zero - SLIM       Date Triggers    03/18/25 5538 File Doc Flowsheets  Rule - CER: Hospitalist Service  [[id number]]  Rule - CER: Is Not Comfort Care [[id number]]  Flowsheet Row - LARISSA: Pulse [8] (Value: 92)  Flowsheet Row - LARISSA: Resp [9] (Value: 22)  Flowsheet Row - LARISSA: MAP (mmHg) [096577] (Value: 60)                      Sepsis Time Zero Documentation: The patient was evaluated and does NOT meet criteria for severe sepsis/septic shock   Not meetings SIRS

## 2025-03-20 LAB
ANION GAP SERPL CALCULATED.3IONS-SCNC: 5 MMOL/L (ref 4–13)
BASOPHILS # BLD AUTO: 0.04 THOUSANDS/ÂΜL (ref 0–0.1)
BASOPHILS NFR BLD AUTO: 0 % (ref 0–1)
BUN SERPL-MCNC: 57 MG/DL (ref 5–25)
CALCIUM SERPL-MCNC: 8.8 MG/DL (ref 8.4–10.2)
CHLORIDE SERPL-SCNC: 98 MMOL/L (ref 96–108)
CO2 SERPL-SCNC: 38 MMOL/L (ref 21–32)
CREAT SERPL-MCNC: 1.62 MG/DL (ref 0.6–1.3)
EOSINOPHIL # BLD AUTO: 0.06 THOUSAND/ÂΜL (ref 0–0.61)
EOSINOPHIL NFR BLD AUTO: 1 % (ref 0–6)
ERYTHROCYTE [DISTWIDTH] IN BLOOD BY AUTOMATED COUNT: 12.9 % (ref 11.6–15.1)
GFR SERPL CREATININE-BSD FRML MDRD: 27 ML/MIN/1.73SQ M
GLUCOSE SERPL-MCNC: 104 MG/DL (ref 65–140)
HCT VFR BLD AUTO: 33.1 % (ref 34.8–46.1)
HGB BLD-MCNC: 9.7 G/DL (ref 11.5–15.4)
IMM GRANULOCYTES # BLD AUTO: 0.07 THOUSAND/UL (ref 0–0.2)
IMM GRANULOCYTES NFR BLD AUTO: 1 % (ref 0–2)
LYMPHOCYTES # BLD AUTO: 1.46 THOUSANDS/ÂΜL (ref 0.6–4.47)
LYMPHOCYTES NFR BLD AUTO: 13 % (ref 14–44)
MAGNESIUM SERPL-MCNC: 2.4 MG/DL (ref 1.9–2.7)
MCH RBC QN AUTO: 29.2 PG (ref 26.8–34.3)
MCHC RBC AUTO-ENTMCNC: 29.3 G/DL (ref 31.4–37.4)
MCV RBC AUTO: 100 FL (ref 82–98)
MONOCYTES # BLD AUTO: 0.91 THOUSAND/ÂΜL (ref 0.17–1.22)
MONOCYTES NFR BLD AUTO: 8 % (ref 4–12)
MRSA NOSE QL CULT: NORMAL
NEUTROPHILS # BLD AUTO: 8.99 THOUSANDS/ÂΜL (ref 1.85–7.62)
NEUTS SEG NFR BLD AUTO: 77 % (ref 43–75)
NRBC BLD AUTO-RTO: 0 /100 WBCS
PLATELET # BLD AUTO: 236 THOUSANDS/UL (ref 149–390)
PMV BLD AUTO: 10.3 FL (ref 8.9–12.7)
POTASSIUM SERPL-SCNC: 4.2 MMOL/L (ref 3.5–5.3)
RBC # BLD AUTO: 3.32 MILLION/UL (ref 3.81–5.12)
SODIUM SERPL-SCNC: 141 MMOL/L (ref 135–147)
WBC # BLD AUTO: 11.53 THOUSAND/UL (ref 4.31–10.16)

## 2025-03-20 PROCEDURE — 99232 SBSQ HOSP IP/OBS MODERATE 35: CPT | Performed by: INTERNAL MEDICINE

## 2025-03-20 PROCEDURE — 80048 BASIC METABOLIC PNL TOTAL CA: CPT | Performed by: HOSPITALIST

## 2025-03-20 PROCEDURE — 94640 AIRWAY INHALATION TREATMENT: CPT

## 2025-03-20 PROCEDURE — 99232 SBSQ HOSP IP/OBS MODERATE 35: CPT | Performed by: HOSPITALIST

## 2025-03-20 PROCEDURE — 94002 VENT MGMT INPAT INIT DAY: CPT

## 2025-03-20 PROCEDURE — 94760 N-INVAS EAR/PLS OXIMETRY 1: CPT

## 2025-03-20 PROCEDURE — 85025 COMPLETE CBC W/AUTO DIFF WBC: CPT | Performed by: HOSPITALIST

## 2025-03-20 PROCEDURE — 83735 ASSAY OF MAGNESIUM: CPT | Performed by: HOSPITALIST

## 2025-03-20 RX ORDER — KETOCONAZOLE 20 MG/G
CREAM TOPICAL DAILY
Qty: 60 G | Refills: 0 | Status: SHIPPED | OUTPATIENT
Start: 2025-03-20

## 2025-03-20 RX ADMIN — LEVALBUTEROL HYDROCHLORIDE 1.25 MG: 1.25 SOLUTION RESPIRATORY (INHALATION) at 08:11

## 2025-03-20 RX ADMIN — IPRATROPIUM BROMIDE 0.5 MG: 0.5 SOLUTION RESPIRATORY (INHALATION) at 19:31

## 2025-03-20 RX ADMIN — BIMATOPROST 1 DROP: 0.3 SOLUTION/ DROPS OPHTHALMIC at 08:45

## 2025-03-20 RX ADMIN — MIRTAZAPINE 30 MG: 15 TABLET, FILM COATED ORAL at 21:06

## 2025-03-20 RX ADMIN — FAMOTIDINE 10 MG: 20 TABLET, FILM COATED ORAL at 08:45

## 2025-03-20 RX ADMIN — IPRATROPIUM BROMIDE 0.5 MG: 0.5 SOLUTION RESPIRATORY (INHALATION) at 14:25

## 2025-03-20 RX ADMIN — LEVOTHYROXINE SODIUM 75 MCG: 0.07 TABLET ORAL at 05:36

## 2025-03-20 RX ADMIN — HEPARIN SODIUM 5000 UNITS: 5000 INJECTION INTRAVENOUS; SUBCUTANEOUS at 05:36

## 2025-03-20 RX ADMIN — METOPROLOL TARTRATE 25 MG: 25 TABLET, FILM COATED ORAL at 21:06

## 2025-03-20 RX ADMIN — PREDNISONE 40 MG: 20 TABLET ORAL at 08:45

## 2025-03-20 RX ADMIN — IPRATROPIUM BROMIDE 0.5 MG: 0.5 SOLUTION RESPIRATORY (INHALATION) at 08:11

## 2025-03-20 RX ADMIN — HEPARIN SODIUM 5000 UNITS: 5000 INJECTION INTRAVENOUS; SUBCUTANEOUS at 21:06

## 2025-03-20 RX ADMIN — HEPARIN SODIUM 5000 UNITS: 5000 INJECTION INTRAVENOUS; SUBCUTANEOUS at 15:25

## 2025-03-20 RX ADMIN — LEVALBUTEROL HYDROCHLORIDE 1.25 MG: 1.25 SOLUTION RESPIRATORY (INHALATION) at 14:25

## 2025-03-20 RX ADMIN — LEVALBUTEROL HYDROCHLORIDE 1.25 MG: 1.25 SOLUTION RESPIRATORY (INHALATION) at 19:31

## 2025-03-20 RX ADMIN — PRAVASTATIN SODIUM 40 MG: 40 TABLET ORAL at 15:25

## 2025-03-20 RX ADMIN — ASPIRIN 81 MG: 81 TABLET, COATED ORAL at 08:45

## 2025-03-20 NOTE — ASSESSMENT & PLAN NOTE
Lab Results   Component Value Date    EGFR 27 03/20/2025    EGFR 34 03/18/2025    EGFR 29 02/21/2025    CREATININE 1.62 (H) 03/20/2025    CREATININE 1.35 (H) 03/18/2025    CREATININE 1.53 (H) 02/21/2025

## 2025-03-20 NOTE — OCCUPATIONAL THERAPY NOTE
Occupational Therapy Cancel Note:    Patient Name: Ashley Gonzales  Today's Date: 3/20/2025    OT consult received. Chart reviewed. RN requested to defer OT evaluation at this time 2* hypercapnia. Will cx and complete OT eval at later time as medically appropriate.    Krystal Degroot, OTR/L

## 2025-03-20 NOTE — PHYSICAL THERAPY NOTE
Physical Therapy Cancellation Note:    Pt currently getting breathing CPAP treatment due to high Co2 per nursing. Cancel PT services for today and will cont to follow as able to initiate PT IE.

## 2025-03-20 NOTE — PROGRESS NOTES
Progress Note - Pulmonology   Name: Ashley Gonzales 89 y.o. female I MRN: [id number]  Unit/Bed#: E5 -01 I Date of Admission: 3/18/2025   Date of Service: 3/20/2025 I Hospital Day: 2    Assessment & Plan  Chronic respiratory failure with hypoxia and hypercapnia ([facility])    COPD (chronic obstructive pulmonary disease) ([facility])  Mild COPD  No maintenance inhalers  Home O2 2L   JAMEEL (obstructive sleep apnea)  Currently on CPAP (EPAP 8), managed by PMD. Patient does not currently follow with pulmonary or sleep medicine.    Patient has failed CPAP, has mild COPD only. Would benefit from BIPAP for chronic hypercapnic respiratory failure 2/2 OHS.  Stage 3 chronic kidney disease ([facility])  Baseline Cr ~1.1, currently 1.3  Metabolic encephalopathy    Peripheral edema    Diastolic dysfunction with acute on chronic heart failure ([facility])      Plan:  Maintain SpO2 88-92%, wean off supplemental oxygen as tolerated.  Will attempt to wear BiPAP for a total of 4 hours during the day.  Overnight pulse oximetry testing.  Patient may qualify for BiPAP given a.m. ABG showing hypercapnia.  Continue with Prednisone 40 mg p.o. daily (day 2 of 5)  Continue with Atrovent and Xopenex nebs  Daily weights  Monitor I's and O's    24 Hour Events : Patient notes that she feels anxious and confused.  However she is alert and oriented x 3.  Daughter is at bedside.  Patient did not wear her CPAP overnight.    Objective :  Temp:  [97.8 °F (36.6 °C)-98.6 °F (37 °C)] 97.8 °F (36.6 °C)  HR:  [65-84] 77  BP: ()/(32-58) 107/36  Resp:  [18-19] 18  SpO2:  [90 %-93 %] 90 %  O2 Device: Nasal cannula  Nasal Cannula O2 Flow Rate (L/min):  [2 L/min] 2 L/min    Physical Exam  HENT:      Head: Normocephalic.      Nose: No congestion.      Mouth/Throat:      Pharynx: Oropharynx is clear.   Eyes:      Conjunctiva/sclera: Conjunctivae normal.   Cardiovascular:      Rate and Rhythm: Normal rate and regular rhythm.      Heart sounds: No murmur heard.     No friction  rub. No gallop.   Pulmonary:      Effort: Pulmonary effort is normal. No respiratory distress.      Breath sounds: No stridor. No wheezing or rhonchi.   Abdominal:      General: Abdomen is flat. There is no distension.      Palpations: There is no mass.      Tenderness: There is no abdominal tenderness.      Hernia: No hernia is present.   Musculoskeletal:         General: Normal range of motion.      Cervical back: Normal range of motion. No rigidity.      Right lower leg: Edema present.      Left lower leg: Edema present.   Skin:     General: Skin is warm.      Capillary Refill: Capillary refill takes less than 2 seconds.   Neurological:      General: No focal deficit present.      Mental Status: She is alert and oriented to person, place, and time.   Psychiatric:         Mood and Affect: Mood normal.           Lab Results: I have reviewed the following results:   .     03/20/25  0539   WBC 11.53*   HGB 9.7*   HCT 33.1*      SODIUM 141   K 4.2   CL 98   CO2 38*   BUN 57*   CREATININE 1.62*   GLUC 104   MG 2.4     ABG:   .     03/19/25  0916   PHART 7.313*   CKY7OBH 65.6*   PO2ART 65.1*   LWV5LHK 32.5*   BEART 4.7       Pulmonary function testing:   PFTs 3/2015 - Ratio 69%, FVC 78%, FEV1 72%, TLC 76%, RV 79%, DLCO 50% - mild obstructive airflow defect, mildly reduced TLC, moderately reduced diffusion  Home PSG 11/27/17 - AHI 9.8 with significant desaturations, colby 56%     EKG, Pathology, and Other Studies: I have personally reviewed pertinent reports.    TTE 2/2021 - EF 60%, grade II diastolic dysfunction, dilated RV with normal function     TTE 1/2016 - EF 55-60%, grade I diastolic dysfunction, normal RV size/function     Joe Romero DO PGY-V  Pulmonary Critical Care Fellow  [facility]

## 2025-03-20 NOTE — PLAN OF CARE

## 2025-03-20 NOTE — PROGRESS NOTES
Progress Note - Hospitalist   Name: Ashley Gonzales 89 y.o. female I MRN: [id number]  Unit/Bed#: E5 -01 I Date of Admission: 3/18/2025   Date of Service: 3/20/2025 I Hospital Day: 2    Assessment & Plan  Metabolic encephalopathy  Likely secondary to hypercarbia  when she did not use her CPAP at night on top of COPD and sleep apnea.  Mental status is back to baseline now, confirmed by patient and daughter.  Continue oxygen by nasal cannula 2 L during the day and CPAP at night.  COPD (chronic obstructive pulmonary disease) (HCC)  With exacerbation.  Improved on Solu-Medrol.  Pulmonary saw her and changed her to prednisone  Chronic respiratory failure with hypoxia and hypercapnia (HCC)  On chronic oxygen at 2 L and CPAP at night.    She came in with acute respiratory failure thought to be due to acute COPD exacerbation    She quickly improved on IV steroids.  Pulmonary now transitioning to oral steroids    Trying to get patient qualified for BIPAP  JAMEEL (obstructive sleep apnea)  Order CPAP at night for comfort.  Patient and daughter unsure of her CPAP settings.  Peripheral edema  In the setting of CKD and diastolic CHF.  Creatinine is at baseline  She has chronically elevated BNP.  This is lower than last level  Continue Lasix 40 mg daily  Stage 3 chronic kidney disease (HCC)  Lab Results   Component Value Date    EGFR 27 03/20/2025    EGFR 34 03/18/2025    EGFR 29 02/21/2025    CREATININE 1.62 (H) 03/20/2025    CREATININE 1.35 (H) 03/18/2025    CREATININE 1.53 (H) 02/21/2025     Diastolic dysfunction with acute on chronic heart failure (HCC)  Wt Readings from Last 3 Encounters:   03/18/25 92.9 kg (204 lb 12.9 oz)   01/20/25 92.9 kg (204 lb 12.9 oz)   12/26/24 97.3 kg (214 lb 8.1 oz)         Patient examines as euvolemic      VTE Pharmacologic Prophylaxis:                 Current Length of Stay: 2 day(s)  Current Patient Status: Inpatient   Certification Statement:     Discharge Plan:         Subjective    Doing  better  Less SOB.    Objective   Vitals:   Temp (24hrs), Av °F (36.7 °C), Min:97.8 °F (36.6 °C), Max:98.2 °F (36.8 °C)    Temp:  [97.8 °F (36.6 °C)-98.2 °F (36.8 °C)] 98.2 °F (36.8 °C)  HR:  [69-84] 77  Resp:  [18-22] 22  BP: ()/(32-58) 104/42  SpO2:  [90 %-92 %] 92 %  Body mass index is 37.46 kg/m².         Physical Exam  Vitals and nursing note reviewed.   Constitutional:       General: She is not in acute distress.     Appearance: She is well-developed.   HENT:      Head: Normocephalic and atraumatic.   Eyes:      Conjunctiva/sclera: Conjunctivae normal.   Cardiovascular:      Rate and Rhythm: Normal rate and regular rhythm.      Heart sounds: No murmur heard.  Pulmonary:      Effort: Pulmonary effort is normal. No respiratory distress.      Breath sounds: Normal breath sounds.   Abdominal:      Palpations: Abdomen is soft.      Tenderness: There is no abdominal tenderness.   Musculoskeletal:         General: No swelling.      Cervical back: Neck supple.      Right lower leg: No edema.      Left lower leg: No edema.   Skin:     General: Skin is warm and dry.      Capillary Refill: Capillary refill takes less than 2 seconds.   Neurological:      Mental Status: She is alert.   Psychiatric:         Mood and Affect: Mood normal.           Lab results  Results from last 7 days   Lab Units 25  0539 25  0645 25  0645 25  1508   WBC Thousand/uL 11.53*  --  11.07* 9.49   HEMOGLOBIN g/dL 9.7*  --  10.5* 12.0   PLATELETS Thousands/uL 236  --  249 265   MCV fL 100*  --  100* 98   TOTAL NEUT ABS Thousand/uL  --  10.07*  --   --      Results from last 7 days   Lab Units 25  0539 25  1508   SODIUM mmol/L 141 143   POTASSIUM mmol/L 4.2 3.9   CHLORIDE mmol/L 98 97   CO2 mmol/L 38* 42*   ANION GAP mmol/L 5 4   BUN mg/dL 57* 47*   CREATININE mg/dL 1.62* 1.35*   CALCIUM mg/dL 8.8 9.3   ALBUMIN g/dL  --  3.6   TOTAL BILIRUBIN mg/dL  --  0.36   ALK PHOS U/L  --  68   ALT U/L  --  6*   AST  U/L  --  11*   EGFR ml/min/1.73sq m 27 34   GLUCOSE RANDOM mg/dL 104 107     Results from last 7 days   Lab Units 03/20/25  0539   MAGNESIUM mg/dL 2.4             Last 24 Hours Medication List:     Current Facility-Administered Medications:     acetaminophen (TYLENOL) tablet 650 mg, Q6H PRN    aspirin (ECOTRIN LOW STRENGTH) EC tablet 81 mg, Daily    bimatoprost (LUMIGAN) 0.03 % ophthalmic drops 1 drop, Daily    bisacodyl (DULCOLAX) EC tablet 5 mg, Daily PRN    calcitriol (ROCALTROL) capsule 0.25 mcg, Once per day on Monday Wednesday Friday    famotidine (PEPCID) tablet 10 mg, Daily    furosemide (LASIX) tablet 40 mg, Daily    heparin (porcine) subcutaneous injection 5,000 Units, Q8H MODESTO **AND** Platelet count, Once    levalbuterol (XOPENEX) inhalation solution 1.25 mg, TID **AND** ipratropium (ATROVENT) 0.02 % inhalation solution 0.5 mg, TID    levothyroxine tablet 75 mcg, Early Morning    losartan (COZAAR) tablet 50 mg, Daily    metoprolol tartrate (LOPRESSOR) tablet 25 mg, Q12H MODESTO    mirtazapine (REMERON) tablet 30 mg, HS    pravastatin (PRAVACHOL) tablet 40 mg, Daily With Dinner    predniSONE tablet 40 mg, Daily

## 2025-03-20 NOTE — ASSESSMENT & PLAN NOTE
Currently on CPAP (EPAP 8), managed by PMD. Patient does not currently follow with pulmonary or sleep medicine.    Patient has failed CPAP, has mild COPD only. Would benefit from BIPAP for chronic hypercapnic respiratory failure 2/2 OHS.

## 2025-03-20 NOTE — ASSESSMENT & PLAN NOTE
Wt Readings from Last 3 Encounters:   03/18/25 92.9 kg (204 lb 12.9 oz)   01/20/25 92.9 kg (204 lb 12.9 oz)   12/26/24 97.3 kg (214 lb 8.1 oz)         Patient examines as euvolemic

## 2025-03-21 ENCOUNTER — HOME HEALTH ADMISSION (OUTPATIENT)
Dept: HOME HEALTH SERVICES | Facility: HOME HEALTHCARE | Age: OVER 89
End: 2025-03-21
Payer: COMMERCIAL

## 2025-03-21 VITALS
WEIGHT: 204.81 LBS | RESPIRATION RATE: 18 BRPM | TEMPERATURE: 97.9 F | OXYGEN SATURATION: 92 % | BODY MASS INDEX: 37.69 KG/M2 | HEIGHT: 62 IN | SYSTOLIC BLOOD PRESSURE: 163 MMHG | HEART RATE: 72 BPM | DIASTOLIC BLOOD PRESSURE: 67 MMHG

## 2025-03-21 PROBLEM — E66.2 OBESITY HYPOVENTILATION SYNDROME (HCC): Status: ACTIVE | Noted: 2025-03-21

## 2025-03-21 PROCEDURE — 99239 HOSP IP/OBS DSCHRG MGMT >30: CPT | Performed by: HOSPITALIST

## 2025-03-21 PROCEDURE — 99232 SBSQ HOSP IP/OBS MODERATE 35: CPT | Performed by: INTERNAL MEDICINE

## 2025-03-21 PROCEDURE — 97162 PT EVAL MOD COMPLEX 30 MIN: CPT

## 2025-03-21 PROCEDURE — 94660 CPAP INITIATION&MGMT: CPT

## 2025-03-21 PROCEDURE — 97166 OT EVAL MOD COMPLEX 45 MIN: CPT

## 2025-03-21 PROCEDURE — 94760 N-INVAS EAR/PLS OXIMETRY 1: CPT

## 2025-03-21 PROCEDURE — 94640 AIRWAY INHALATION TREATMENT: CPT

## 2025-03-21 PROCEDURE — 94762 N-INVAS EAR/PLS OXIMTRY CONT: CPT

## 2025-03-21 RX ORDER — PREDNISONE 10 MG/1
TABLET ORAL
Qty: 42 TABLET | Refills: 0 | Status: ON HOLD | OUTPATIENT
Start: 2025-03-21

## 2025-03-21 RX ORDER — NYSTATIN 100000 [USP'U]/G
POWDER TOPICAL 2 TIMES DAILY
Status: DISCONTINUED | OUTPATIENT
Start: 2025-03-21 | End: 2025-03-21 | Stop reason: HOSPADM

## 2025-03-21 RX ADMIN — NYSTATIN: 100000 POWDER TOPICAL at 18:20

## 2025-03-21 RX ADMIN — FUROSEMIDE 40 MG: 40 TABLET ORAL at 09:19

## 2025-03-21 RX ADMIN — LEVALBUTEROL HYDROCHLORIDE 1.25 MG: 1.25 SOLUTION RESPIRATORY (INHALATION) at 08:41

## 2025-03-21 RX ADMIN — PREDNISONE 40 MG: 20 TABLET ORAL at 09:20

## 2025-03-21 RX ADMIN — PRAVASTATIN SODIUM 40 MG: 40 TABLET ORAL at 16:29

## 2025-03-21 RX ADMIN — HEPARIN SODIUM 5000 UNITS: 5000 INJECTION INTRAVENOUS; SUBCUTANEOUS at 05:35

## 2025-03-21 RX ADMIN — CALCITRIOL 0.25 MCG: 0.25 CAPSULE, LIQUID FILLED ORAL at 09:34

## 2025-03-21 RX ADMIN — IPRATROPIUM BROMIDE 0.5 MG: 0.5 SOLUTION RESPIRATORY (INHALATION) at 14:08

## 2025-03-21 RX ADMIN — FAMOTIDINE 10 MG: 20 TABLET, FILM COATED ORAL at 09:19

## 2025-03-21 RX ADMIN — BIMATOPROST 1 DROP: 0.3 SOLUTION/ DROPS OPHTHALMIC at 09:22

## 2025-03-21 RX ADMIN — NYSTATIN: 100000 POWDER TOPICAL at 09:34

## 2025-03-21 RX ADMIN — METOPROLOL TARTRATE 25 MG: 25 TABLET, FILM COATED ORAL at 09:19

## 2025-03-21 RX ADMIN — LEVALBUTEROL HYDROCHLORIDE 1.25 MG: 1.25 SOLUTION RESPIRATORY (INHALATION) at 14:08

## 2025-03-21 RX ADMIN — HEPARIN SODIUM 5000 UNITS: 5000 INJECTION INTRAVENOUS; SUBCUTANEOUS at 14:07

## 2025-03-21 RX ADMIN — ASPIRIN 81 MG: 81 TABLET, COATED ORAL at 09:20

## 2025-03-21 RX ADMIN — LEVOTHYROXINE SODIUM 75 MCG: 0.07 TABLET ORAL at 05:35

## 2025-03-21 RX ADMIN — LOSARTAN POTASSIUM 50 MG: 50 TABLET, FILM COATED ORAL at 09:19

## 2025-03-21 RX ADMIN — IPRATROPIUM BROMIDE 0.5 MG: 0.5 SOLUTION RESPIRATORY (INHALATION) at 08:41

## 2025-03-21 NOTE — DISCHARGE INSTR - OTHER ORDERS
Skin Care Plan:   1-Remedy Silicone Cream/Hydraguard lotion to bilateral heels twice daily and as needed.  2-Elevate heels off of bed/chair surface to offload pressure.  3-Offloading air cushion in chair when out of bed.  4-Apply lotion to body daily and as needed.  5-Turn/reposition every 2 hours for pressure re-distribution on skin.   6-Buttock/sacrum--cleanse with soap and water, pat dry.  Apply Remedy Zinc Oxide/Calazime paste three times daily and as needed with incontinence care.

## 2025-03-21 NOTE — ASSESSMENT & PLAN NOTE
Mild COPD  PFTs 3/2015 - Ratio 69%, FVC 78%, FEV1 72%, TLC 76%, RV 79%, DLCO 50% - mild obstructive airflow defect, mildly reduced TLC, moderately reduced diffusion  Home O2 2L

## 2025-03-21 NOTE — ASSESSMENT & PLAN NOTE
On chronic oxygen at 2 L and CPAP at night.    Seen by pulmonary.  They actually noticed that her machine was both capable CPAP and BiPAP settings.  So they felt she would do better with BiPAP.  Desi change active BiPAP at night.  Patient had not worn her CPAP the night prior to admission status prior to it because of the confusion.      Acute respiratory failure was ruled out.  This is just chronic respiratory failure.

## 2025-03-21 NOTE — PLAN OF CARE
Problem: Potential for Falls  Goal: Patient will remain free of falls  Description: INTERVENTIONS:  - Educate patient/family on patient safety including physical limitations  - Instruct patient to call for assistance with activity   - Consult OT/PT to assist with strengthening/mobility   - Keep Call bell within reach  - Keep bed low and locked with side rails adjusted as appropriate  - Keep care items and personal belongings within reach  - Initiate and maintain comfort rounds  - Make Fall Risk Sign visible to staff  - Offer Toileting every 2 Hours, in advance of need  - Initiate/Maintain bed alarm  - Obtain necessary fall risk management equipment:   - Apply yellow socks and bracelet for high fall risk patients  - Consider moving patient to room near nurses station  Outcome: Progressing     Problem: PAIN - ADULT  Goal: Verbalizes/displays adequate comfort level or baseline comfort level  Description: Interventions:  - Encourage patient to monitor pain and request assistance  - Assess pain using appropriate pain scale  - Administer analgesics based on type and severity of pain and evaluate response  - Implement non-pharmacological measures as appropriate and evaluate response  - Consider cultural and social influences on pain and pain management  - Notify physician/advanced practitioner if interventions unsuccessful or patient reports new pain  Outcome: Progressing     Problem: INFECTION - ADULT  Goal: Absence or prevention of progression during hospitalization  Description: INTERVENTIONS:  - Assess and monitor for signs and symptoms of infection  - Monitor lab/diagnostic results  - Monitor all insertion sites, i.e. indwelling lines, tubes, and drains  - Monitor endotracheal if appropriate and nasal secretions for changes in amount and color  - Sears appropriate cooling/warming therapies per order  - Administer medications as ordered  - Instruct and encourage patient and family to use good hand hygiene  technique  - Identify and instruct in appropriate isolation precautions for identified infection/condition  Outcome: Progressing  Goal: Absence of fever/infection during neutropenic period  Description: INTERVENTIONS:  - Monitor WBC    Outcome: Progressing     Problem: SAFETY ADULT  Goal: Patient will remain free of falls  Description: INTERVENTIONS:  - Educate patient/family on patient safety including physical limitations  - Instruct patient to call for assistance with activity   - Consult OT/PT to assist with strengthening/mobility   - Keep Call bell within reach  - Keep bed low and locked with side rails adjusted as appropriate  - Keep care items and personal belongings within reach  - Initiate and maintain comfort rounds  - Make Fall Risk Sign visible to staff  - Offer Toileting every 2 Hours, in advance of need  - Initiate/Maintain bed alarm  - Obtain necessary fall risk management equipment:   - Apply yellow socks and bracelet for high fall risk patients  - Consider moving patient to room near nurses station  Outcome: Progressing

## 2025-03-21 NOTE — OCCUPATIONAL THERAPY NOTE
Occupational Therapy Evaluation     Patient Name: Ashley Gonzales  Today's Date: 3/21/2025  Problem List  Principal Problem:    Chronic respiratory failure with hypoxia and hypercapnia (HCC)  Active Problems:    COPD (chronic obstructive pulmonary disease) (HCC)    JAMEEL (obstructive sleep apnea)    Stage 3 chronic kidney disease (HCC)    Metabolic encephalopathy    Peripheral edema    Diastolic dysfunction with acute on chronic heart failure (HCC)    Past Medical History  Past Medical History:   Diagnosis Date    Anxiety     Cataract, bilateral     Last Assessed:3/19/2014    COPD (chronic obstructive pulmonary disease) (HCC)     Coronary artery disease     Hx of arterial ischemic stroke     Hypertension     Stroke (HCC)     Wears glasses      Past Surgical History  Past Surgical History:   Procedure Laterality Date    CAROTID ENDARTERECTOMY Right     CATARACT EXTRACTION W/ INTRAOCULAR LENS  IMPLANT, BILATERAL      COLONOSCOPY N/A 9/30/2017    Procedure: COLONOSCOPY FOR CONTROL OF BLEEDING;  Surgeon: Jeb Gaxiola MD;  [facility];  Service: Colorectal    CORONARY ARTERY BYPASS GRAFT      CABG X3 with LIMA to LAD,SVG to OM,SVG to distal  RCA ; Last Assessed:7/13/2015    JOINT REPLACEMENT      left TKR    KIDNEY STONE SURGERY      NEPHROSTOMY Left     with Drainage Irrigation ;Onset:1974    NH ARTHRP KNE CONDYLE&PLATU MEDIAL&LAT COMPARTMENTS Right 2/25/2016    Procedure: ARTHROPLASTY KNEE TOTAL;  Surgeon: Jeb Figueroa MD;  Location: [facility];  Service: Orthopedics         03/21/25 0827   OT Last Visit   OT Visit Date 03/21/25   Note Type   Note type Evaluation   Additional Comments greeted in supine and agreeable   Pain Assessment   Pain Assessment Tool 0-10   Pain Score No Pain   Restrictions/Precautions   Weight Bearing Precautions Per Order No   Other Precautions Cognitive;Chair Alarm;Bed Alarm;Multiple lines;Fall Risk;O2  (2 Lo2)   Home Living   Type of Home House   Home Layout Two level;Able to  live on main level with bedroom/bathroom;1/2 bath on main level  (1 DIAN)   Bathroom Shower/Tub Walk-in shower   Bathroom Toilet Standard   Bathroom Equipment Shower chair   Home Equipment Walker;Cane  (rollator)   Prior Function   Level of [place] Needs assistance with ADLs;Needs assistance with functional mobility;Needs assistance with IADLS   Lives With Son  (DIL)   Receives Help From Family  (supportive DTR as well)   IADLs Family/Friend/Other provides transportation;Family/Friend/Other provides meals;Family/Friend/Other provides medication management   Falls in the last 6 months 1 to 4   Vocational Retired   ADL   Where Assessed Edge of bed   Eating Assistance 5  Supervision/Setup   Grooming Assistance 5  Supervision/Setup   UB Bathing Assistance 5  Supervision/Setup   LB Bathing Assistance 4  Minimal Assistance   UB Dressing Assistance 5  Supervision/Setup   LB Dressing Assistance 4  Minimal Assistance   Toileting Assistance  5  Supervision/Setup   Bed Mobility   Supine to Sit 5  Supervision   Additional items Assist x 1;HOB elevated;Bedrails;Increased time required   Transfers   Sit to Stand 5  Supervision   Additional items Increased time required;Verbal cues   Stand to Sit 5  Supervision   Additional items Increased time required;Verbal cues   Toilet transfer 5  Supervision   Additional items Assist x 1;Increased time required;Verbal cues;Standard toilet  (grab bar)   Additional Comments cues for hand placement and safety.   Functional Mobility   Functional Mobility 5  Supervision   Additional Comments Ax1, RW, short functional distances in room   Additional items Rolling walker   Balance   Static Sitting Good   Dynamic Sitting Fair +   Static Standing Fair   Dynamic Standing Fair -   Ambulatory Fair -   Activity Tolerance   Activity Tolerance Patient tolerated treatment well;Patient limited by fatigue   Medical Staff Made Aware PT   Nurse Made Aware RN   RUE Assessment   RUE Assessment WFL   AURELIA  Assessment   LUE Assessment WFL   Hand Function   Gross Motor Coordination Functional   Fine Motor Coordination Functional   Psychosocial   Psychosocial (WDL) WDL   Cognition   Overall Cognitive Status Impaired   Arousal/Participation Cooperative   Attention Within functional limits   Orientation Level Oriented to person;Oriented to place   Memory Decreased recall of recent events;Decreased recall of precautions   Following Commands Follows one step commands without difficulty   Comments pleasant and cooperative   Assessment   Limitation Decreased ADL status;Decreased UE strength;Decreased cognition;Decreased endurance;Decreased self-care trans;Decreased high-level ADLs   Prognosis Good   Assessment Ashley Gonzales is a 89 y.o. female seen for OT evaluation s/p admit to [facility] on 3/18/2025 w/ Chronic respiratory failure with hypoxia and hypercapnia (HCC).  Comorbidities affecting pt's functional performance at time of assessment include:   COPD, sleep apnea, diastolic CHF, chronic respiratory failure  . Pt with active OT orders and activity orders for Up and OOB as tolerated. Personal factors affecting pt at time of IE include:DIAN home environment, difficulty performing ADLs, difficulty performing IADLs, and difficulty performing transfers/mobility. Prior to admission, pt lives with son and DIL in a 2 level home with 1 dian. Pt has a 1st floor setup with 1/2 bath. A for ADLs, IADLs and mobility with rollator. Not alone. 2 falls. Supportive DTR as well that helps often Upon evaluation: Pt currently requires supervision for UB ADLs, [place] for LB ADLs, supervision for toileting, supervision for bed mobility, supervision for functional transfers, and close supervision RW mobility 2* the following deficits impacting occupational performance:weakness, decreased strength , decreased balance, and decreased activity tolerance. Pt to benefit from continued skilled OT tx while in the hospital to address deficits as defined above  and maximize level of functional independence w ADL's and functional mobility. Occupational Performance areas to address include: bathing/shower, toilet hygiene, dressing, and community mobility. From OT standpoint, recommendation at time of d/c would be level 3 resources. OT to follow pt on caseload 1-3x/wk.   Goals   STG Time Frame 3-5   Short Term Goal #1 Pt will improve activity tolerance to G for min 30 min txment sessions for increase engagement in functional tasks   Short Term Goal #2 Pt will demonstrate G carryover of pt/caregiver education and training as appropriate w/o cues w/ good tolerance to increase safety during functional tasks   LTG Time Frame 10-14   Long Term Goal #1 Pt will complete toileting w/ mod I w/ G hygiene/thoroughness using DME as needed   Long Term Goal #2 Pt will improve functional transfers to Mod I on/off all surfaces using DME as needed w/ G balance/safety   Long Term Goal Pt will demonstrate G carryover of pt/caregiver education and training as appropriate w/o cues w/ good tolerance to increase safety during functional tasks   Plan   Treatment Interventions ADL retraining;Functional transfer training;UE strengthening/ROM;Cognitive reorientation;Patient/family training;Equipment evaluation/education;Fine motor coordination activities;Activityengagement;Energy conservation   Goal Expiration Date 04/04/25   OT Treatment Day 0   OT Frequency 1-2x/wk;2-3x/wk   Discharge Recommendation   Rehab Resource Intensity Level, OT III (Minimum Resource Intensity)   AM-PAC Daily Activity Inpatient   Lower Body Dressing 2   Bathing 3   Toileting 3   Upper Body Dressing 3   Grooming 3   Eating 4   Daily Activity Raw Score 18   Daily Activity Standardized Score (Calc for Raw Score >=11) 38.66   AM-PAC Applied Cognition Inpatient   Following a Speech/Presentation 4   Understanding Ordinary Conversation 3   Taking Medications 2   Remembering Where Things Are Placed or Put Away 3   Remembering List of  4-5 Errands 2   Taking Care of Complicated Tasks 2   Applied Cognition Raw Score 16   Applied Cognition Standardized Score 35.03   Maribeth Meraz, OT

## 2025-03-21 NOTE — PLAN OF CARE
Problem: PHYSICAL THERAPY ADULT  Goal: Performs mobility at highest level of function for planned discharge setting.  See evaluation for individualized goals.  Description: Treatment/Interventions: Functional transfer training, LE strengthening/ROM, Elevations, Therapeutic exercise, Endurance training, Patient/family training, Equipment eval/education, Gait training, Bed mobility, OT, Family          See flowsheet documentation for full assessment, interventions and recommendations.  Note: Prognosis: Good  Problem List: Decreased mobility  Assessment: Ashley Gonzales is a 89 y.o. female admitted to [facility] on 3/18/2025 for Chronic respiratory failure with hypoxia and hypercapnia (HCC). PT was consulted and pt was seen on 3/21/2025 for mobility assessment and d/c planning. Pt presents w high fall risk, multiple lines including supplemental oxygen. At baseline gets A for ADLs and IADLs. Uses Rollator to ambulate. Pt is currently functioning at a S for bed mobility, transfers and ambulation w RW. No acute deficits impacting function; likely at baseline. O2 stable on baseline oxygen needs. Will maintain on caseload to progress mobility as tolerated while hospitalized.  Barriers to Discharge: None     Rehab Resource Intensity Level, PT: No post-acute rehabilitation needs    See flowsheet documentation for full assessment.

## 2025-03-21 NOTE — ASSESSMENT & PLAN NOTE
Home PSG 11/27/17 - AHI 9.8 with significant desaturations, colby 56%  No maintenance inhalers  Currently on CPAP (EPAP 8), managed by PMD. Patient does not currently follow with pulmonary or sleep medicine.    Patient has failed CPAP, has mild COPD only. Would benefit from BIPAP for chronic hypercapnic respiratory failure 2/2 OHS.

## 2025-03-21 NOTE — CASE MANAGEMENT
Case Management Discharge Planning Note    Patient name Ashley Gonzales  [facility]  /[facility] -* MRN [id number]  : 1935 Date 3/21/2025       Current Admission Date: 3/18/2025  Current Admission Diagnosis:Chronic respiratory failure with hypoxia and hypercapnia (HCC)   Patient Active Problem List    Diagnosis Date Noted Date Diagnosed    Obesity hypoventilation syndrome (HCC) 2025     Diastolic dysfunction with acute on chronic heart failure (HCC) 2025     Peripheral edema 2025     Anxiety and depression 2024     ACP (advance care planning) 2024     Mild vascular dementia without behavioral disturbance, psychotic disturbance, mood disturbance, or anxiety ([facility]) 2024     Depression, recurrent ([facility]) 2024     Acquired hypothyroidism 2024     Persistent proteinuria 2024     Secondary hyperparathyroidism of renal origin ([facility]) 2024     Low serum vitamin B12 2024     Lung nodule 2024     Chronic respiratory failure with hypoxia and hypercapnia ([facility]) 2024     Metabolic encephalopathy 08/15/2024     Fall 08/15/2024     Stage 3 chronic kidney disease ([facility]) 2024     Nephrolithiasis 2024     Hematuria 2024     Dizziness 2024     Obesity (BMI 30-39.9) 05/10/2023     Pleural effusion 2021     Acute on chronic respiratory failure with hypoxia and hypercapnia ([facility]) 2021     History of total knee replacement 2019     JAMEEL (obstructive sleep apnea) 2017     COPD (chronic obstructive pulmonary disease) ([facility])      Benign familial tremor      Hx of arterial ischemic stroke      Esophageal reflux 2015     Clinical depression 2015     Osteoarthritis of knee 2015     Postural imbalance 10/30/2014     Insomnia 10/01/2012     Generalized osteoarthritis 2012     Female stress incontinence 2012       LOS (days): 3  Geometric Mean LOS (GMLOS) (days): 3.4  Days to GMLOS:0.5      OBJECTIVE:  Risk of Unplanned Readmission Score: 22.31         Current admission status: Inpatient   Preferred Pharmacy:   [id number] - [place] - [address]  [address] [place] [address]  Phone: [phone] Fax: [phone]    Primary Care Provider: Paz Maharaj DO    Primary Insurance: A[employer] [facility] REP  Secondary Insurance:     DISCHARGE DETAILS:                                Requested Home Health Care         Is the patient interested in HHC at discharge?: Yes  Home Health Discipline requested:: Nursing, Occupational Therapy  Home Health Agency Name:: [facility] Follow-Up Provider:: PCP  Home Health Services Needed:: Oxygen Via Nasal Cannula, Evaluate Functional Status and Safety, COPD Management  Homebound Criteria Met:: Requires the Assistance of Another Person for Safe Ambulation or to Leave the Home, Uses an Assist Device (i.e. cane, walker, etc)  Supporting Clincal Findings:: Limited Endurance, Cognitive Deficit Requiring the Assistance of Others                   Treatment Team Recommendation: Home with Home Health Care  Discharge Destination Plan:: Home with Home Health Care  Transport at Discharge : Family                             IMM Given (Date):: 03/21/25  IMM Given to:: Family  Family notified:: daughter  Additional Comments: Patient expected to be discharged today, home with [facility] for nursing and OT.  Daughter to provide d/c transportation.  No need for BIPAP order.

## 2025-03-21 NOTE — PHYSICAL THERAPY NOTE
PHYSICAL THERAPY EVALUATION          Patient Name: Ashley Gonzales  Today's Date: 3/21/2025       03/21/25 0837   PT Last Visit   PT Visit Date 03/21/25   Note Type   Note type Evaluation   Pain Assessment   Pain Assessment Tool 0-10   Pain Score No Pain   Restrictions/Precautions   Other Precautions Chair Alarm;Bed Alarm;O2;Multiple lines;Fall Risk  (2L)   Home Living   Type of Home House   Home Layout Two level;1/2 bath on main level;Bed/bath upstairs;Able to live on main level with bedroom/bathroom;Stairs to enter without rails   Bathroom Shower/Tub Walk-in shower   Bathroom Toilet Raised   Home Equipment Walker;Other (Comment)  (Rollator, 2L O2)   Additional Comments first fl set up w 1/2 bath and bed. FOS to full bathroom   Prior Function   Level of [place] Independent with functional mobility;Needs assistance with ADLs;Needs assistance with IADLS   Lives With Son   Receives Help From Family  (dtr)   Falls in the last 6 months 1 to 4   Comments per dtr, pt gets A for ADLs, IADLs. she is home alone but they have cameras set up and are in frequent contact. mostly household ambulator. uses Rollator. crawls up the steps to get to shower   General   Additional Pertinent History pt admitted 3/18/25 for chronic respiratory failure w hypoxia, hypercapnia. up and oob orders.   Family/Caregiver Present Yes   Cognition   Overall Cognitive Status WFL   Arousal/Participation Cooperative   Orientation Level Oriented to person;Oriented to place;Oriented to time   Following Commands Follows one step commands without difficulty   Bed Mobility   Supine to Sit 5  Supervision   Additional items HOB elevated;Bedrails;Increased time required   Transfers   Sit to Stand 5  Supervision   Additional items Increased time required;Other  (RW)   Stand to Sit 5  Supervision   Additional items Increased time required;Other  (RW)   Toilet transfer 5  Supervision   Additional items Increased time required;Standard  toilet;Other  (RW, grab bar)   Additional Comments cues for hand placement sit>stand off toilet   Ambulation/Elevation   Gait pattern Improper Weight shift;Short stride   Gait Assistance 5  Supervision   Additional items Assist x 1   Assistive Device Rolling walker   Distance 9'x2   Balance   Static Standing Fair   Dynamic Standing Fair -   Ambulatory Fair -   Endurance Deficit   Endurance Deficit No  (O2 90-93% on 2L during session)   Activity Tolerance   Activity Tolerance Patient tolerated treatment well   Medical Staff Made Aware Izzy OT   Assessment   Prognosis Good   Problem List Decreased mobility   Assessment Ashley Gonzales is a 89 y.o. female admitted to [facility] on 3/18/2025 for Chronic respiratory failure with hypoxia and hypercapnia (HCC). PT was consulted and pt was seen on 3/21/2025 for mobility assessment and d/c planning. Pt presents w high fall risk, multiple lines including supplemental oxygen. At baseline gets A for ADLs and IADLs. Uses Rollator to ambulate. Pt is currently functioning at a S for bed mobility, transfers and ambulation w RW. No acute deficits impacting function; likely at baseline. O2 stable on baseline oxygen needs. Will maintain on caseload to progress mobility as tolerated while hospitalized.   Barriers to Discharge None   Goals   Patient Goals go home soon   STG Expiration Date 03/31/25   Short Term Goal #1 1) Pt will perform bed mobility mod I demonstrating appropriate technique 100% of the time in order to improve function. 2) Pt will perform all transfers mod I demonstrating safe technique 100% of the time in order to improve ability to negotiate safely in home environment. 3) Amb with least restrictive AD > 50'x1 with mod I in order to demonstrate ability to negotiate in home environment. 4) Improve overall strength and balance 1/2 grade in order to optimize ability to perform functional tasks and reduce fall risk. 5) Improve am-pac by 2 to demonstrate functional progress and  return to baseline function/reduced caregiver burden. 6) Negotiate stairs using most appropriate technique and CGA in order to be able to negotiate safely in home environment.   Plan   Treatment/Interventions Functional transfer training;LE strengthening/ROM;Elevations;Therapeutic exercise;Endurance training;Patient/family training;Equipment eval/education;Gait training;Bed mobility;OT;Family   PT Frequency 1-2x/wk   Discharge Recommendation   Rehab Resource Intensity Level, PT No post-acute rehabilitation needs   AM-PAC Basic Mobility Inpatient   Turning in Flat Bed Without Bedrails 3   Lying on Back to Sitting on Edge of Flat Bed Without Bedrails 3   Moving Bed to Chair 3   Standing Up From Chair Using Arms 3   Walk in Room 3   Climb 3-5 Stairs With Railing 3   Basic Mobility Inpatient Raw Score 18   Basic Mobility Standardized Score 41.05   [facility] Highest Level Of Mobility   -HLM Goal 6: Walk 10 steps or more   -HLM Achieved 6: Walk 10 steps or more   End of Consult   Patient Position at End of Consult Bedside chair;Bed/Chair alarm activated;All needs within reach   History: co - morbidities including age, use of assistive device, assist for adl's, urrent experience including fall risk, multiple lines  Exam: impairments in systems including multiple body structures involved; neuromuscular (balance, gait, transfers), cardiopulmonary (vitals), cognition; activity limitations  (difficulties executing an action); participation restrictions (problems associated w involvement in life situations), AM-PAC  Clinical: stable/unpredictable  Complexity: moderate    Shruthi Eddy, PT

## 2025-03-21 NOTE — PROGRESS NOTES
Patient:  ANA PAULA MIGUEL    MRN:  [id number]    Aidin Request ID:  [id number]    Level of care reserved:  Home Health Agency    Partner Reserved:  [facility], [place] [address] [phone]    Clinical needs requested:    Geography searched:  [id number]    Start of Service:    Request sent:  1:46pm EDT on 3/21/2025 by Yasmin Pearce    Partner reserved:  3:01pm EDT on 3/21/2025 by Yasmin Pearce    Choice list shared:  3:01pm EDT on 3/21/2025 by Yasmin Pearce

## 2025-03-21 NOTE — ASSESSMENT & PLAN NOTE
Wt Readings from Last 3 Encounters:   03/18/25 92.9 kg (204 lb 12.9 oz)   01/20/25 92.9 kg (204 lb 12.9 oz)   12/26/24 97.3 kg (214 lb 8.1 oz)         Acute CHF was ruled out.  This is chronic

## 2025-03-21 NOTE — DISCHARGE SUMMARY
DIscharge Summary - Hospitalist   Name: Ashley Gonzales 89 y.o. female I MRN: [id number]  Unit/Bed#: E5 -01 I Date of Admission: 3/18/2025   Date of Service: 3/21/2025 I Hospital Day: 3    Assessment & Plan  Metabolic encephalopathy  Likely due to not wearing CPAP.  She quickly improved when she was placed back on her CPAP.  COPD (chronic obstructive pulmonary disease) (HCC)  Thought to be a mild COPD exacerbation.  Improved on Solu-Medrol.  Send out on prednisone taper.  Chronic respiratory failure with hypoxia and hypercapnia (HCC)  On chronic oxygen at 2 L and CPAP at night.    Seen by pulmonary.  They actually noticed that her machine was both capable CPAP and BiPAP settings.  So they felt she would do better with BiPAP.  Desi change active BiPAP at night.  Patient had not worn her CPAP the night prior to admission status prior to it because of the confusion.      Acute respiratory failure was ruled out.  This is just chronic respiratory failure.  JAMEEL (obstructive sleep apnea)  Order CPAP at night for comfort.  Patient and daughter unsure of her CPAP settings.  Peripheral edema  In the setting of CKD and diastolic CHF.  Creatinine is at baseline  She has chronically elevated BNP.  This is lower than last level  Continue Lasix 40 mg daily  Stage 3 chronic kidney disease (HCC)  Lab Results   Component Value Date    EGFR 27 03/20/2025    EGFR 34 03/18/2025    EGFR 29 02/21/2025    CREATININE 1.62 (H) 03/20/2025    CREATININE 1.35 (H) 03/18/2025    CREATININE 1.53 (H) 02/21/2025     Diastolic dysfunction with acute on chronic heart failure (HCC)  Wt Readings from Last 3 Encounters:   03/18/25 92.9 kg (204 lb 12.9 oz)   01/20/25 92.9 kg (204 lb 12.9 oz)   12/26/24 97.3 kg (214 lb 8.1 oz)         Acute CHF was ruled out.  This is chronic    Obesity hypoventilation syndrome (HCC)        Medical Problems       Resolved Problems  Date Reviewed: 3/18/2025   None        Discharging Physician / Practitioner: Alvaro  GENNARO Ortez DO  PCP: Paz Maharaj DO  Admission Date:   Admission Orders (From admission, onward)       Ordered        03/18/25 1710  INPATIENT ADMISSION  Once                        Discharge Date: 03/21/25    Consultations During Hospital Stay:  IP CONSULT TO PULMONOLOGY     Procedures Performed:   * No surgery found *       Lab Results:   Results from last 7 days   Lab Units 03/20/25  0539 03/19/25  0645 03/19/25  0645 03/18/25  1508   WBC Thousand/uL 11.53*  --  11.07* 9.49   HEMOGLOBIN g/dL 9.7*  --  10.5* 12.0   HEMATOCRIT % 33.1*  --  36.3 40.9   MCV fL 100*  --  100* 98   TOTAL NEUT ABS Thousand/uL  --  10.07*  --   --    PLATELETS Thousands/uL 236  --  249 265     Results from last 7 days   Lab Units 03/20/25  0539 03/18/25  1508   SODIUM mmol/L 141 143   POTASSIUM mmol/L 4.2 3.9   CHLORIDE mmol/L 98 97   CO2 mmol/L 38* 42*   BUN mg/dL 57* 47*   CREATININE mg/dL 1.62* 1.35*   CALCIUM mg/dL 8.8 9.3   ALBUMIN g/dL  --  3.6   TOTAL BILIRUBIN mg/dL  --  0.36   ALK PHOS U/L  --  68   ALT U/L  --  6*   AST U/L  --  11*   EGFR ml/min/1.73sq m 27 34   GLUCOSE RANDOM mg/dL 104 107                   Reason for Admission:   Fatigue (Pt arrived via EMS feeling more tired than usual and. States feeling nauseous and SOB off and on. )    Hospital Course:   Ashley Gonzales is a 89 y.o. female patient who originally presented to the hospital on 3/18/2025 due to confusion.  Patient had not worn her CPAP the night before admission.  Then came in confused with the daughter.  Likely just from not wearing the CPAP.  May be a mild acute COPD exacerbation.  She quickly improved on IV Solu-Medrol and restarting her CPAP machine.  Seen by pulmonary.  They felt she was okay to go home.  They did note that her machine can both do CPAP and BiPAP and they thought she would do better on BiPAP.  So she adjusted the settings of her home machine and she is can to go home with BiPAP nightly.        Please see above list of diagnoses and  "related plan for additional information.     Condition at Discharge: stable     Discharge Day Visit / Exam:   Subjective:  feels well  Baseline breathing    Vitals: Blood Pressure: 163/67 (03/21/25 1629)  Pulse: 72 (03/21/25 1629)  Temperature: 97.9 °F (36.6 °C) (03/21/25 1629)  Temp Source: Oral (03/21/25 0711)  Respirations: 18 (03/21/25 1629)  Height: 5' 2\" (157.5 cm) (03/18/25 1908)  Weight - Scale: 92.9 kg (204 lb 12.9 oz) (03/18/25 1908)  SpO2: 92 % (03/21/25 1629)    Exam:   Physical Exam  Vitals and nursing note reviewed.   Constitutional:       General: She is not in acute distress.     Appearance: She is well-developed.   HENT:      Head: Normocephalic and atraumatic.   Eyes:      Conjunctiva/sclera: Conjunctivae normal.   Cardiovascular:      Rate and Rhythm: Normal rate and regular rhythm.      Heart sounds: No murmur heard.  Pulmonary:      Effort: Pulmonary effort is normal. No respiratory distress.      Breath sounds: Normal breath sounds.   Abdominal:      Palpations: Abdomen is soft.      Tenderness: There is no abdominal tenderness.   Musculoskeletal:         General: No swelling.      Cervical back: Neck supple.      Right lower leg: No edema.      Left lower leg: No edema.   Skin:     General: Skin is warm and dry.      Capillary Refill: Capillary refill takes less than 2 seconds.   Neurological:      Mental Status: She is alert.   Psychiatric:         Mood and Affect: Mood normal.           Discharge instructions/Information to patient and family:   See after visit summary for information provided to patient and family.      Provisions for Follow-Up Care:  See after visit summary for information related to follow-up care and any pertinent home health orders.        Disposition:   Home       Discharge Medications:  See after visit summary for reconciled discharge medications provided to patient and family.      Administrative Statements   I spent 36  minutes discharging the patient. This time was " spent on the day of discharge. I had direct contact with the patient on the day of discharge. Greater than 50% of the total time was spent examining patient, answering all patient questions, arranging and discussing plan of care with patient as well as directly providing post-discharge instructions.  Additional time then spent on discharge activities.    **Please Note: This note may have been constructed using a voice recognition system**

## 2025-03-21 NOTE — WOUND OSTOMY CARE
Consult Note - Wound   Ashley Gonazles 89 y.o. female MRN: [id number]  Unit/Bed#: E5 -01 Encounter: [id number]      History and Present Illness:  89 year old female presented to the hospital with confusion.  Patient's history significant for COPD, CHF, CKD, stroke.    Assessment Findings:   Patient agreeable to assessment.  She is sitting up in the chair, able to stand with minimal assist x 1 and walker--offloading air cushion applied.  Incontinent of urine.  Bilateral heels intact and blanchable.  Skin folds intact.  Preventative dressing to bridge of nose due to Bipap use--skin under dressing intact without redness.   Buttocks and sacrum--pink, intact, and blanchable.  There is blanchable hyperpigmented/pink scar tissue to left buttock.  Patient's family reports patient recently had a wound to this area.  They have been using zinc oxide paste at home.          Skin Care Plan:   1-Silicone Cream/Hydraguard lotion to bilateral heels twice daily and as needed.  2-Elevate heels off of bed/chair surface to offload pressure.  3-Offloading air cushion in chair when out of bed.  4-Apply moisturizing skin cream to body daily and as needed.  5-Turn/reposition every 2 hours for pressure re-distribution on skin.   6-Buttock/sacrum--cleanse with soap and water, pat dry.  Apply Zinc Oxide/Calazime paste three times daily and as needed with incontinence care.    Wound care team will sign-off at this time.  Plan of care reviewed with primary RN.    Irina Khanna RN, BSN, CWON

## 2025-03-21 NOTE — PLAN OF CARE
Problem: Potential for Falls  Goal: Patient will remain free of falls  Description: INTERVENTIONS:  - Educate patient/family on patient safety including physical limitations  - Instruct patient to call for assistance with activity   - Consult OT/PT to assist with strengthening/mobility   - Keep Call bell within reach  - Keep bed low and locked with side rails adjusted as appropriate  - Keep care items and personal belongings within reach  - Initiate and maintain comfort rounds  - Make Fall Risk Sign visible to staff  - Offer Toileting every 2 Hours, in advance of need  - Initiate/Maintain bed alarm  - Obtain necessary fall risk management equipment:   - Apply yellow socks and bracelet for high fall risk patients  - Consider moving patient to room near nurses station  Outcome: Progressing     Problem: PAIN - ADULT  Goal: Verbalizes/displays adequate comfort level or baseline comfort level  Description: Interventions:  - Encourage patient to monitor pain and request assistance  - Assess pain using appropriate pain scale  - Administer analgesics based on type and severity of pain and evaluate response  - Implement non-pharmacological measures as appropriate and evaluate response  - Consider cultural and social influences on pain and pain management  - Notify physician/advanced practitioner if interventions unsuccessful or patient reports new pain  Outcome: Progressing     Problem: INFECTION - ADULT  Goal: Absence or prevention of progression during hospitalization  Description: INTERVENTIONS:  - Assess and monitor for signs and symptoms of infection  - Monitor lab/diagnostic results  - Monitor all insertion sites, i.e. indwelling lines, tubes, and drains  - Monitor endotracheal if appropriate and nasal secretions for changes in amount and color  - [place] appropriate cooling/warming therapies per order  - Administer medications as ordered  - Instruct and encourage patient and family to use good hand hygiene  technique  - Identify and instruct in appropriate isolation precautions for identified infection/condition  Outcome: Progressing  Goal: Absence of fever/infection during neutropenic period  Description: INTERVENTIONS:  - Monitor WBC    Outcome: Progressing     Problem: SAFETY ADULT  Goal: Patient will remain free of falls  Description: INTERVENTIONS:  - Educate patient/family on patient safety including physical limitations  - Instruct patient to call for assistance with activity   - Consult OT/PT to assist with strengthening/mobility   - Keep Call bell within reach  - Keep bed low and locked with side rails adjusted as appropriate  - Keep care items and personal belongings within reach  - Initiate and maintain comfort rounds  - Make Fall Risk Sign visible to staff  - Offer Toileting every 2 Hours, in advance of need  - Initiate/Maintain bed alarm  - Obtain necessary fall risk management equipment:   - Apply yellow socks and bracelet for high fall risk patients  - Consider moving patient to room near nurses station  Outcome: Progressing  Goal: Maintain or return to baseline ADL function  Description: INTERVENTIONS:  -  Assess patient's ability to carry out ADLs; assess patient's baseline for ADL function and identify physical deficits which impact ability to perform ADLs (bathing, care of mouth/teeth, toileting, grooming, dressing, etc.)  - Assess/evaluate cause of self-care deficits   - Assess range of motion  - Assess patient's mobility; develop plan if impaired  - Assess patient's need for assistive devices and provide as appropriate  - Encourage maximum independence but intervene and supervise when necessary  - Involve family in performance of ADLs  - Assess for home care needs following discharge   - Consider OT consult to assist with ADL evaluation and planning for discharge  - Provide patient education as appropriate  Outcome: Progressing  Goal: Maintains/Returns to pre admission functional  level  Description: INTERVENTIONS:  - Perform AM-PAC 6 Click Basic Mobility/ Daily Activity assessment daily.  - Set and communicate daily mobility goal to care team and patient/family/caregiver.   - Collaborate with rehabilitation services on mobility goals if consulted  - Out of bed for toileting  - Record patient progress and toleration of activity level   Outcome: Progressing     Problem: DISCHARGE PLANNING  Goal: Discharge to home or other facility with appropriate resources  Description: INTERVENTIONS:  - Identify barriers to discharge w/patient and caregiver  - Arrange for needed discharge resources and transportation as appropriate  - Identify discharge learning needs (meds, wound care, etc.)  - Arrange for interpretive services to assist at discharge as needed  - Refer to Case Management Department for coordinating discharge planning if the patient needs post-hospital services based on physician/advanced practitioner order or complex needs related to functional status, cognitive ability, or social support system  Outcome: Progressing     Problem: Knowledge Deficit  Goal: Patient/family/caregiver demonstrates understanding of disease process, treatment plan, medications, and discharge instructions  Description: Complete learning assessment and assess knowledge base.  Interventions:  - Provide teaching at level of understanding  - Provide teaching via preferred learning methods  Outcome: Progressing     Problem: Prexisting or High Potential for Compromised Skin Integrity  Goal: Skin integrity is maintained or improved  Description: INTERVENTIONS:- Identify patients at risk for skin breakdown- Assess and monitor skin integrity- Assess and monitor nutrition and hydration status- Monitor labs - Assess for incontinence - Turn and reposition patient- Assist with mobility/ambulation- Relieve pressure over bony prominences- Avoid friction and shearing- Provide appropriate hygiene as needed including keeping skin  clean and dry- Evaluate need for skin moisturizer/barrier cream- Collaborate with interdisciplinary team - Patient/family teaching- Consider wound care consult   Outcome: Progressing

## 2025-03-21 NOTE — PROGRESS NOTES
Progress Note - Pulmonology   Name: Ashley Gonzales 89 y.o. female I MRN: [id number]  Unit/Bed#: E5 -01 I Date of Admission: 3/18/2025   Date of Service: 3/21/2025 I Hospital Day: 3    Assessment & Plan  Chronic respiratory failure with hypoxia and hypercapnia ([facility])    COPD (chronic obstructive pulmonary disease) ([facility])  Mild COPD  PFTs 3/2015 - Ratio 69%, FVC 78%, FEV1 72%, TLC 76%, RV 79%, DLCO 50% - mild obstructive airflow defect, mildly reduced TLC, moderately reduced diffusion  Home O2 2L   JAMEEL (obstructive sleep apnea)  Home PSG 11/27/17 - AHI 9.8 with significant desaturations, colby 56%  No maintenance inhalers  Currently on CPAP (EPAP 8), managed by PMD. Patient does not currently follow with pulmonary or sleep medicine.    Patient has failed CPAP, has mild COPD only. Would benefit from BIPAP for chronic hypercapnic respiratory failure 2/2 OHS.  Obesity hypoventilation syndrome (HCC)    Stage 3 chronic kidney disease ([facility])  Baseline Cr ~1.1, currently 1.3  Metabolic encephalopathy    Peripheral edema    Diastolic dysfunction with acute on chronic heart failure (HCC)      Plan:  Maintain SpO2 88-92%, wean off supplemental oxygen as tolerated.  Cw bipap qhs  Reaching out to DME regarding any other testing needing to be done to qualify for BiPAP. No plans for overnight pulse oximetry testing at this time.   Continue with Prednisone 40 mg p.o. daily (day 3 of 5)  Continue with Atrovent and Xopenex nebs    24 Hour Events : Wore BiPAP overnight. Feels much less confused today. No SOB, productive cough.       Objective :  Temp:  [97.2 °F (36.2 °C)-98.2 °F (36.8 °C)] 97.9 °F (36.6 °C)  HR:  [59-90] 59  BP: (104-168)/(36-65) 168/65  Resp:  [18-24] 24  SpO2:  [90 %-94 %] 94 %  O2 Device: Nasal cannula  Nasal Cannula O2 Flow Rate (L/min):  [2 L/min-4 L/min] 4 L/min    Physical Exam  HENT:      Head: Normocephalic.      Nose: No congestion.      Mouth/Throat:      Pharynx: Oropharynx is clear.   Eyes:       Conjunctiva/sclera: Conjunctivae normal.   Cardiovascular:      Rate and Rhythm: Normal rate and regular rhythm.      Heart sounds: No murmur heard.     No friction rub. No gallop.   Pulmonary:      Effort: No respiratory distress.      Breath sounds: No stridor. No wheezing or rhonchi.   Abdominal:      General: Abdomen is flat. There is no distension.      Palpations: There is no mass.      Tenderness: There is no abdominal tenderness.      Hernia: No hernia is present.   Musculoskeletal:         General: Normal range of motion.      Cervical back: Normal range of motion.      Right lower leg: No edema.      Left lower leg: No edema.   Skin:     Capillary Refill: Capillary refill takes less than 2 seconds.   Neurological:      General: No focal deficit present.      Mental Status: She is alert and oriented to person, place, and time.   Psychiatric:         Mood and Affect: Mood normal.           Lab Results: I have reviewed the following results:   No new results in last 24 hours.  ABG: No new results in last 24 hours.    Pulmonary function testing:   PFTs 3/2015 - Ratio 69%, FVC 78%, FEV1 72%, TLC 76%, RV 79%, DLCO 50% - mild obstructive airflow defect, mildly reduced TLC, moderately reduced diffusion  Home PSG 11/27/17 - AHI 9.8 with significant desaturations, colby 56%     EKG, Pathology, and Other Studies: I have personally reviewed pertinent reports.    TTE 2/2021 - EF 60%, grade II diastolic dysfunction, dilated RV with normal function     TTE 1/2016 - EF 55-60%, grade I diastolic dysfunction, normal RV size/function     Joe Romero DO PGY-V  Pulmonary Critical Care Fellow  [facility]

## 2025-03-21 NOTE — ASSESSMENT & PLAN NOTE
Order CPAP at night for comfort.  Patient and daughter unsure of her CPAP settings.   Patient states that she can come in for 2:30pm on Tuesday, 8/6/19 at 2:30pm.  Jose

## 2025-03-21 NOTE — PLAN OF CARE
Problem: OCCUPATIONAL THERAPY ADULT  Goal: Performs self-care activities at highest level of function for planned discharge setting.  See evaluation for individualized goals.  Description: Treatment Interventions: ADL retraining, Functional transfer training, UE strengthening/ROM, Cognitive reorientation, Patient/family training, Equipment evaluation/education, Fine motor coordination activities, Activityengagement, Energy conservation          See flowsheet documentation for full assessment, interventions and recommendations.   Note: Limitation: Decreased ADL status, Decreased UE strength, Decreased cognition, Decreased endurance, Decreased self-care trans, Decreased high-level ADLs  Prognosis: Good  Assessment: Ashley Gonzales is a 89 y.o. female seen for OT evaluation s/p admit to [facility] on 3/18/2025 w/ Chronic respiratory failure with hypoxia and hypercapnia (HCC).  Comorbidities affecting pt's functional performance at time of assessment include:   COPD, sleep apnea, diastolic CHF, chronic respiratory failure  . Pt with active OT orders and activity orders for Up and OOB as tolerated. Personal factors affecting pt at time of IE include:DIAN home environment, difficulty performing ADLs, difficulty performing IADLs, and difficulty performing transfers/mobility. Prior to admission, pt lives with son and DIL in a 2 level home with 1 dian. Pt has a 1st floor setup with 1/2 bath. A for ADLs, IADLs and mobility with rollator. Not alone. 2 falls. Supportive DTR as well that helps often Upon evaluation: Pt currently requires supervision for UB ADLs, [place] for LB ADLs, supervision for toileting, supervision for bed mobility, supervision for functional transfers, and close supervision RW mobility 2* the following deficits impacting occupational performance:weakness, decreased strength , decreased balance, and decreased activity tolerance. Pt to benefit from continued skilled OT tx while in the hospital to address deficits as  defined above and maximize level of functional independence w ADL's and functional mobility. Occupational Performance areas to address include: bathing/shower, toilet hygiene, dressing, and community mobility. From OT standpoint, recommendation at time of d/c would be level 3 resources. OT to follow pt on caseload 1-3x/wk.     Rehab Resource Intensity Level, OT: III (Minimum Resource Intensity)           No

## 2025-03-24 ENCOUNTER — PATIENT OUTREACH (OUTPATIENT)
Dept: CASE MANAGEMENT | Facility: OTHER | Age: OVER 89
End: 2025-03-24

## 2025-03-24 ENCOUNTER — HOME CARE VISIT (OUTPATIENT)
Dept: HOME HEALTH SERVICES | Facility: HOME HEALTHCARE | Age: OVER 89
End: 2025-03-24
Payer: COMMERCIAL

## 2025-03-24 VITALS
HEART RATE: 61 BPM | OXYGEN SATURATION: 91 % | RESPIRATION RATE: 18 BRPM | DIASTOLIC BLOOD PRESSURE: 55 MMHG | TEMPERATURE: 97.8 F | SYSTOLIC BLOOD PRESSURE: 108 MMHG

## 2025-03-24 PROCEDURE — 400013 VN SOC

## 2025-03-24 PROCEDURE — G0299 HHS/HOSPICE OF RN EA 15 MIN: HCPCS

## 2025-03-24 NOTE — UTILIZATION REVIEW
NOTIFICATION OF ADMISSION DISCHARGE   This is a Notification of Discharge from [facility]. Please be advised that this patient has been discharge from our facility. Below you will find the admission and discharge date and time including the patient’s disposition.   UTILIZATION REVIEW CONTACT:  Ely Obrien MA  Utilization   Network Utilization Review Department  Phone: [phone] x carefully listen to the prompts. All voicemails are confidential.  Email: [email or web address]     ADMISSION INFORMATION  PRESENTATION DATE: 3/18/2025  2:11 PM  OBERVATION ADMISSION DATE: N/A  INPATIENT ADMISSION DATE: 3/18/25  5:10 PM   DISCHARGE DATE: 3/21/2025  7:45 PM   DISPOSITION:Home with Home Health Care    Network Utilization Review Department  ATTENTION: Please call with any questions or concerns to [phone] and carefully listen to the prompts so that you are directed to the right person. All voicemails are confidential.   For Discharge needs, contact Care Management DC Support Team at [phone] opt. 2  Send all requests for admission clinical reviews, approved or denied determinations and any other requests to dedicated fax number below belonging to the campus where the patient is receiving treatment. List of dedicated fax numbers for the Facilities:  FACILITY NAME UR FAX NUMBER   ADMISSION DENIALS (Administrative/Medical Necessity) [phone]   DISCHARGE SUPPORT TEAM ([facility]) [phone]   PARENT CHILD HEALTH (Maternity/NICU/Pediatrics) [phone]   [facility] [phone]   [facility] [phone]   [facility] [phone]   [facility] [phone]   [facility] [phone]   [facility] [phone]   [facility] [phone]   [facility]  [place] [phone]   [facility] [phone]   [facility] [phone]   [facility] [phone]   [facility] [phone]

## 2025-03-24 NOTE — Clinical Note
Medication discrepancies or Major drug interactions None.  Abnormal clinical findings Skin intact. Patient stated she is having a lot of trouble sleeping. Wondering if MD is able to call her and discuss increasing her Remeron at .   This report is informational only, no response is needed   ke's A has Admitted your patient to Home Health service with the following disciplines: SN and OT  Patient stated goals of care To get stronger.   Potential barriers to goal achievement None.  Primary focus of home health care:Respiratory  Anticipated visit pattern and next visit date 2w2 Thursday.  Thank you for allowing us to participate in the care of your patient.

## 2025-03-24 NOTE — PROGRESS NOTES
"Outpatient Care Management note- follow up call    Chart review completed    Call initiated and RN CM role explained    Patient with h/o COPD, depression, OA, stress incontinence, insomnia, JAMEEL with CPAP, chronic respiratory failure, obesity, CKD3, falls, CHF, hypothyroidism, anxiety    Patient was admitted to  [facility] from 3/18/25 to 3/21/25 initially for SOB and confusion. She has chronic respiratory failure, using chronic 2 L O2 and CPAP. Noted that the patient did not use her CPAP machine the night before her admission. The patient used CPAP while inpatient and improved. Her equipment was found to have bipap setting and pulmonology recommended she use that setting instead. She was d/c to home with [facility],  [facility]- OT and nursing and a Prednisone taper.     Spoke with daughter Izzy, on consent and preferred contact. She reports Ashley is \"great.\" She states they found that her CPAP machine was on a heat setting and that was the cause of her intolerance. She reports she is doing much better with the bipap. She confirmed receipt of the steroids. [facility] nursing saw the patient today. Izzy states Ashley has a healing pressure wound that will continue to be monitored by [facility]. They use an air cushion. She reports although the bipap has improved her breathing, she still has insomnia. She is taking Remeron but discussed today with the [facility] nurse who reached out to the PCP regarding a possible increased dose. This RN CM advised she call the PCP for a TCM appt as well.     Ashley is scheduled with pulmonology for 4/15/25. She has a referral to pulm rehab but Izzy isn't sure they can get her there multiple times per week. Advised to discuss at upcoming appt.     Offered to provide education regarding CHF but Izzy feels confident in their everyday treatment plan. She reports they weigh daily and monitor for s/s such as leg edema, SOB and she has a BP and pulse ox machine.     Izzy declined the need for further RN CM " follow up. Will close this referral.

## 2025-03-25 ENCOUNTER — HOME CARE VISIT (OUTPATIENT)
Dept: HOME HEALTH SERVICES | Facility: HOME HEALTHCARE | Age: OVER 89
End: 2025-03-25
Payer: COMMERCIAL

## 2025-03-25 VITALS — DIASTOLIC BLOOD PRESSURE: 64 MMHG | HEART RATE: 58 BPM | OXYGEN SATURATION: 92 % | SYSTOLIC BLOOD PRESSURE: 104 MMHG

## 2025-03-25 PROCEDURE — G0152 HHCP-SERV OF OT,EA 15 MIN: HCPCS

## 2025-03-25 NOTE — CASE COMMUNICATION
OT remberto completed on 3/25.  OT to see pt 2 week 3 for ADL training, endurance and strengthening of UE related to safety with ADL's, ADL transfer training, energy conservation and caregiver training.  Pt and daughter in agreement with OT POC for pt to return to her prior functional level.

## 2025-03-27 ENCOUNTER — HOME CARE VISIT (OUTPATIENT)
Dept: HOME HEALTH SERVICES | Facility: HOME HEALTHCARE | Age: OVER 89
End: 2025-03-27
Payer: COMMERCIAL

## 2025-03-27 VITALS
OXYGEN SATURATION: 94 % | SYSTOLIC BLOOD PRESSURE: 116 MMHG | HEART RATE: 72 BPM | DIASTOLIC BLOOD PRESSURE: 50 MMHG | RESPIRATION RATE: 24 BRPM | TEMPERATURE: 98.4 F

## 2025-03-27 VITALS — SYSTOLIC BLOOD PRESSURE: 112 MMHG | DIASTOLIC BLOOD PRESSURE: 68 MMHG | OXYGEN SATURATION: 97 % | HEART RATE: 97 BPM

## 2025-03-27 PROCEDURE — G0152 HHCP-SERV OF OT,EA 15 MIN: HCPCS

## 2025-03-27 PROCEDURE — G0299 HHS/HOSPICE OF RN EA 15 MIN: HCPCS

## 2025-03-29 DIAGNOSIS — R41.82 ALTERED MENTAL STATUS: ICD-10-CM

## 2025-03-31 ENCOUNTER — HOME CARE VISIT (OUTPATIENT)
Dept: HOME HEALTH SERVICES | Facility: HOME HEALTHCARE | Age: OVER 89
End: 2025-03-31
Payer: COMMERCIAL

## 2025-03-31 VITALS
HEART RATE: 64 BPM | OXYGEN SATURATION: 94 % | SYSTOLIC BLOOD PRESSURE: 134 MMHG | TEMPERATURE: 98.4 F | DIASTOLIC BLOOD PRESSURE: 54 MMHG | RESPIRATION RATE: 26 BRPM

## 2025-03-31 PROCEDURE — G0299 HHS/HOSPICE OF RN EA 15 MIN: HCPCS

## 2025-03-31 PROCEDURE — G0180 MD CERTIFICATION HHA PATIENT: HCPCS | Performed by: HOSPITALIST

## 2025-03-31 RX ORDER — MIRTAZAPINE 30 MG/1
30 TABLET, FILM COATED ORAL
Qty: 90 TABLET | Refills: 1 | Status: SHIPPED | OUTPATIENT
Start: 2025-03-31 | End: 2025-04-10

## 2025-04-01 ENCOUNTER — HOME CARE VISIT (OUTPATIENT)
Dept: HOME HEALTH SERVICES | Facility: HOME HEALTHCARE | Age: OVER 89
End: 2025-04-01
Payer: COMMERCIAL

## 2025-04-01 VITALS
HEART RATE: 60 BPM | BODY MASS INDEX: 33.01 KG/M2 | DIASTOLIC BLOOD PRESSURE: 60 MMHG | SYSTOLIC BLOOD PRESSURE: 104 MMHG | WEIGHT: 180.5 LBS | OXYGEN SATURATION: 96 %

## 2025-04-01 DIAGNOSIS — R05.8 NOCTURNAL COUGH: Primary | ICD-10-CM

## 2025-04-01 PROCEDURE — G0152 HHCP-SERV OF OT,EA 15 MIN: HCPCS

## 2025-04-01 RX ORDER — BENZONATATE 100 MG/1
100 CAPSULE ORAL 3 TIMES DAILY PRN
Qty: 20 CAPSULE | Refills: 0 | Status: ON HOLD | OUTPATIENT
Start: 2025-04-01

## 2025-04-01 NOTE — CASE COMMUNICATION
I called and spoke with the patient's daughter Izzy and she stated somewhat it has helped she stated she is sleeping a little bit longer through the night, she has been trying to get her to use her CPAP more and she did use it the entire night the other night but she is also coughing up some yellow mucus.

## 2025-04-01 NOTE — CASE COMMUNICATION
Home care note from 1 week ago reviewed.  Patient is having difficulty at bedtime requested increased dose of Remeron 30 mg.  Daughter adjusted dose by giving an extra half to make it 45 mg total.  Has this been helping her?

## 2025-04-01 NOTE — CASE COMMUNICATION
I will send in some Tessalon Perles.  They can be used during the day if the cough is during the day but definitely take 1 at bedtime

## 2025-04-01 NOTE — CASE COMMUNICATION
I spoke with the patient's daughter and she stated they did not send her home with any nebulizer treatments or machine. She does not have any Tessalon Perles, she can  some Mucinex and give that a try. She will continue the Remeron at the current dose.

## 2025-04-01 NOTE — CASE COMMUNICATION
Monitor for fever and any increasing respiratory distress.  I am assuming that they are doing a nebulizer treatment prior to bed?  Using anything like Mucinex as well prior to bedtime?  Do they have any Tessalon Perles?  Head of the bed slightly elevated?  I would keep the Remeron at 45 mg and hopefully sleep will continue to improve and cough improved.

## 2025-04-02 ENCOUNTER — HOME CARE VISIT (OUTPATIENT)
Dept: HOME HEALTH SERVICES | Facility: HOME HEALTHCARE | Age: OVER 89
End: 2025-04-02
Payer: COMMERCIAL

## 2025-04-03 ENCOUNTER — HOME CARE VISIT (OUTPATIENT)
Dept: HOME HEALTH SERVICES | Facility: HOME HEALTHCARE | Age: OVER 89
End: 2025-04-03
Payer: COMMERCIAL

## 2025-04-03 VITALS
BODY MASS INDEX: 33.01 KG/M2 | TEMPERATURE: 97.2 F | OXYGEN SATURATION: 95 % | WEIGHT: 180.5 LBS | HEART RATE: 75 BPM | DIASTOLIC BLOOD PRESSURE: 60 MMHG | SYSTOLIC BLOOD PRESSURE: 104 MMHG

## 2025-04-03 VITALS — DIASTOLIC BLOOD PRESSURE: 64 MMHG | SYSTOLIC BLOOD PRESSURE: 118 MMHG | OXYGEN SATURATION: 94 %

## 2025-04-03 PROCEDURE — G0151 HHCP-SERV OF PT,EA 15 MIN: HCPCS

## 2025-04-03 PROCEDURE — G0152 HHCP-SERV OF OT,EA 15 MIN: HCPCS

## 2025-04-04 ENCOUNTER — HOME CARE VISIT (OUTPATIENT)
Dept: HOME HEALTH SERVICES | Facility: HOME HEALTHCARE | Age: OVER 89
End: 2025-04-04
Payer: COMMERCIAL

## 2025-04-04 VITALS
OXYGEN SATURATION: 94 % | RESPIRATION RATE: 26 BRPM | HEART RATE: 68 BPM | TEMPERATURE: 99.6 F | DIASTOLIC BLOOD PRESSURE: 42 MMHG | SYSTOLIC BLOOD PRESSURE: 110 MMHG

## 2025-04-04 PROCEDURE — G0299 HHS/HOSPICE OF RN EA 15 MIN: HCPCS

## 2025-04-08 ENCOUNTER — NURSE TRIAGE (OUTPATIENT)
Age: OVER 89
End: 2025-04-08

## 2025-04-08 ENCOUNTER — HOME CARE VISIT (OUTPATIENT)
Dept: HOME HEALTH SERVICES | Facility: HOME HEALTHCARE | Age: OVER 89
End: 2025-04-08
Payer: COMMERCIAL

## 2025-04-08 VITALS — OXYGEN SATURATION: 93 %

## 2025-04-08 DIAGNOSIS — R30.9 PAIN WITH URINATION: Primary | ICD-10-CM

## 2025-04-08 DIAGNOSIS — R35.0 FREQUENCY OF URINATION: ICD-10-CM

## 2025-04-08 PROCEDURE — G0152 HHCP-SERV OF OT,EA 15 MIN: HCPCS

## 2025-04-08 NOTE — TELEPHONE ENCOUNTER
Since I am not in the office today, did we ask about urine protocol?  Is the daughter/family able to possibly collect a urine specimen and bring to the lab?  For the patient's respiratory symptoms of the cough increasing with regards to shortness of breath fever etc. that we should bring her into the office for evaluation?  Or is she more symptomatic that she should be seen in urgent care or ED if we do not have the availability?

## 2025-04-08 NOTE — TELEPHONE ENCOUNTER
Patient called with UTI symptoms, current symptoms: burning with urination, foul smelling urine, pain and frequency   Symptoms onset date: 4/5/25    Has the patient taken AZO in the last 24 hours:  no  *if yes only a urine culture can be completed     Patient was offered to have a visit with the provider, Urgent Care visit, Nurse visit to collect urine or, orders placed for the lab.   Patient selected: lab    Appointment schedule with Physician:  no  Nurse visit scheduled:  no  Lab orders placed for completion at the lab:  yes       *office collection: POCT urine dip auto non-scope and Urine Culture   *lab orders: Urinalysis with reflex to culture and Urine Culture

## 2025-04-08 NOTE — TELEPHONE ENCOUNTER
Spoke with the patient's daughter, urine orders placed for lab collect she will drop a specimen off at the lab today

## 2025-04-08 NOTE — TELEPHONE ENCOUNTER
"FOLLOW UP: Callback with PCP advice    REASON FOR CONVERSATION: Medication Problem    SYMPTOMS: Yellow mucus production, cough, burning with urination, foul smelling urine     OTHER: Patient's daughter reports patient still has a cough and is also bringing up yellow mucus. She also has burning with urination and strong smelling urine. Patient's daughter would like a call back to advise.     DISPOSITION: Callback by PCP Today      Reason for Disposition   Caller wants to use a complementary or alternative medicine    Answer Assessment - Initial Assessment Questions  1. NAME of MEDICINE: \"What medicine(s) are you calling about?\"      Tessalon Perles   2. QUESTION: \"What is your question?\" (e.g., double dose of medicine, side effect)      Not working   3. PRESCRIBER: \"Who prescribed the medicine?\" Reason: if prescribed by specialist, call should be referred to that group.      PCP  4. SYMPTOMS: \"Do you have any symptoms?\" If Yes, ask: \"What symptoms are you having?\"  \"How bad are the symptoms (e.g., mild, moderate, severe)      Cough, yellow mucus production   5. PREGNANCY:  \"Is there any chance that you are pregnant?\" \"When was your last menstrual period?\"      N/A    Protocols used: Medication Question Call-Adult-[place]    "

## 2025-04-08 NOTE — TELEPHONE ENCOUNTER
Izzy contacted the office this afternoon in regards to earlier message left. Relayed provider response. Agreeable to getting urine sample and bringing to the lab, asking if order can be placed and let Izzy know.     She wanted to make pcp aware that patient has increased pain/discomfort with urination along with a strong odor.

## 2025-04-09 ENCOUNTER — APPOINTMENT (OUTPATIENT)
Dept: LAB | Facility: CLINIC | Age: OVER 89
DRG: 071 | End: 2025-04-09
Payer: COMMERCIAL

## 2025-04-09 ENCOUNTER — HOME CARE VISIT (OUTPATIENT)
Dept: HOME HEALTH SERVICES | Facility: HOME HEALTHCARE | Age: OVER 89
End: 2025-04-09
Payer: COMMERCIAL

## 2025-04-09 DIAGNOSIS — R35.0 FREQUENCY OF URINATION: ICD-10-CM

## 2025-04-09 DIAGNOSIS — N18.32 STAGE 3B CHRONIC KIDNEY DISEASE (HCC): ICD-10-CM

## 2025-04-09 DIAGNOSIS — R82.991 HYPOCITRATURIA: ICD-10-CM

## 2025-04-09 DIAGNOSIS — R30.9 PAIN WITH URINATION: ICD-10-CM

## 2025-04-09 DIAGNOSIS — R39.9 UTI SYMPTOMS: Primary | ICD-10-CM

## 2025-04-09 LAB
BACTERIA UR QL AUTO: ABNORMAL /HPF
BILIRUB UR QL STRIP: NEGATIVE
CLARITY UR: ABNORMAL
COLOR UR: YELLOW
CREAT UR-MCNC: 85 MG/DL
GLUCOSE UR STRIP-MCNC: NEGATIVE MG/DL
HGB UR QL STRIP.AUTO: ABNORMAL
KETONES UR STRIP-MCNC: NEGATIVE MG/DL
LEUKOCYTE ESTERASE UR QL STRIP: ABNORMAL
MICROALBUMIN UR-MCNC: 216.9 MG/L
MICROALBUMIN/CREAT 24H UR: 255 MG/G CREATININE (ref 0–30)
NITRITE UR QL STRIP: POSITIVE
NON-SQ EPI CELLS URNS QL MICRO: ABNORMAL /HPF
PH UR STRIP.AUTO: 6.5 [PH]
PROT UR STRIP-MCNC: ABNORMAL MG/DL
RBC #/AREA URNS AUTO: ABNORMAL /HPF
SP GR UR STRIP.AUTO: 1.01 (ref 1–1.03)
UROBILINOGEN UR STRIP-ACNC: <2 MG/DL
WBC #/AREA URNS AUTO: ABNORMAL /HPF

## 2025-04-09 PROCEDURE — 87077 CULTURE AEROBIC IDENTIFY: CPT

## 2025-04-09 PROCEDURE — 87086 URINE CULTURE/COLONY COUNT: CPT

## 2025-04-09 PROCEDURE — 87186 SC STD MICRODIL/AGAR DIL: CPT

## 2025-04-09 PROCEDURE — 81001 URINALYSIS AUTO W/SCOPE: CPT

## 2025-04-09 RX ORDER — NITROFURANTOIN 25; 75 MG/1; MG/1
100 CAPSULE ORAL 2 TIMES DAILY
Qty: 14 CAPSULE | Refills: 0 | Status: SHIPPED | OUTPATIENT
Start: 2025-04-09 | End: 2025-04-15

## 2025-04-09 NOTE — TELEPHONE ENCOUNTER
Patient's daughter called for an update on her results and was made aware some are still pending. She would like a call back to advise once labs are complete.

## 2025-04-09 NOTE — CASE COMMUNICATION
Patient's daughter called to cancel. She is suspecting a UTI. Awaiting call back from doctor's office. Next visit is tomorrow. Will attempt to see patient Thursday and Friday this week if patient is able.

## 2025-04-09 NOTE — CASE COMMUNICATION
Hi, In reviewing patient's meds with daughter, daughter reported that she has been giving patient Remeron 45mg at bedtime instead of ordered 30mg daily at bedtime. Daughter stated she had discussed this with you and that you were ok with her giving her 45mg daily at bedtime if the 30mg was ineffective. If this is correct can you please update her med list. If any concerns or she should not be taking the 45mg at bedtime please let me carlos douglass Thank you.

## 2025-04-10 ENCOUNTER — HOME CARE VISIT (OUTPATIENT)
Dept: HOME HEALTH SERVICES | Facility: HOME HEALTHCARE | Age: OVER 89
End: 2025-04-10
Payer: COMMERCIAL

## 2025-04-10 ENCOUNTER — HOSPITAL ENCOUNTER (INPATIENT)
Facility: HOSPITAL | Age: OVER 89
LOS: 5 days | Discharge: NURSING, REHAB OR LONG-TERM CARE | DRG: 071 | End: 2025-04-15
Attending: EMERGENCY MEDICINE | Admitting: INTERNAL MEDICINE
Payer: COMMERCIAL

## 2025-04-10 ENCOUNTER — APPOINTMENT (EMERGENCY)
Dept: RADIOLOGY | Facility: HOSPITAL | Age: OVER 89
DRG: 071 | End: 2025-04-10
Payer: COMMERCIAL

## 2025-04-10 VITALS
OXYGEN SATURATION: 94 % | DIASTOLIC BLOOD PRESSURE: 50 MMHG | RESPIRATION RATE: 24 BRPM | HEART RATE: 68 BPM | SYSTOLIC BLOOD PRESSURE: 106 MMHG | TEMPERATURE: 98 F

## 2025-04-10 VITALS — DIASTOLIC BLOOD PRESSURE: 42 MMHG | OXYGEN SATURATION: 94 % | HEART RATE: 62 BPM | SYSTOLIC BLOOD PRESSURE: 84 MMHG

## 2025-04-10 DIAGNOSIS — J44.1 COPD EXACERBATION (HCC): ICD-10-CM

## 2025-04-10 DIAGNOSIS — R41.82 ALTERED MENTAL STATUS: ICD-10-CM

## 2025-04-10 DIAGNOSIS — J96.92 HYPERCAPNIC RESPIRATORY FAILURE (HCC): Primary | ICD-10-CM

## 2025-04-10 DIAGNOSIS — I50.9 CHF (CONGESTIVE HEART FAILURE) (HCC): ICD-10-CM

## 2025-04-10 PROBLEM — G93.40 ENCEPHALOPATHY: Status: ACTIVE | Noted: 2025-04-10

## 2025-04-10 PROBLEM — N30.00 ACUTE CYSTITIS: Status: ACTIVE | Noted: 2025-04-10

## 2025-04-10 LAB
2HR DELTA HS TROPONIN: -2 NG/L
ALBUMIN SERPL BCG-MCNC: 3.3 G/DL (ref 3.5–5)
ALP SERPL-CCNC: 67 U/L (ref 34–104)
ALT SERPL W P-5'-P-CCNC: 5 U/L (ref 7–52)
ANION GAP SERPL CALCULATED.3IONS-SCNC: 2 MMOL/L (ref 4–13)
APTT PPP: 25 SECONDS (ref 23–34)
AST SERPL W P-5'-P-CCNC: 10 U/L (ref 13–39)
BASE EX.OXY STD BLDV CALC-SCNC: 45.9 % (ref 60–80)
BASE EXCESS BLDV CALC-SCNC: 16.4 MMOL/L
BASOPHILS # BLD AUTO: 0.05 THOUSANDS/ÂΜL (ref 0–0.1)
BASOPHILS NFR BLD AUTO: 1 % (ref 0–1)
BILIRUB SERPL-MCNC: 0.4 MG/DL (ref 0.2–1)
BNP SERPL-MCNC: 75 PG/ML (ref 0–100)
BUN SERPL-MCNC: 37 MG/DL (ref 5–25)
CALCIUM ALBUM COR SERPL-MCNC: 9.5 MG/DL (ref 8.3–10.1)
CALCIUM SERPL-MCNC: 8.9 MG/DL (ref 8.4–10.2)
CARDIAC TROPONIN I PNL SERPL HS: 13 NG/L (ref ?–50)
CARDIAC TROPONIN I PNL SERPL HS: 15 NG/L (ref ?–50)
CHLORIDE SERPL-SCNC: 97 MMOL/L (ref 96–108)
CO2 SERPL-SCNC: 45 MMOL/L (ref 21–32)
CREAT SERPL-MCNC: 1.26 MG/DL (ref 0.6–1.3)
EOSINOPHIL # BLD AUTO: 0.24 THOUSAND/ÂΜL (ref 0–0.61)
EOSINOPHIL NFR BLD AUTO: 3 % (ref 0–6)
ERYTHROCYTE [DISTWIDTH] IN BLOOD BY AUTOMATED COUNT: 12.5 % (ref 11.6–15.1)
FLUAV RNA RESP QL NAA+PROBE: NEGATIVE
FLUBV RNA RESP QL NAA+PROBE: NEGATIVE
GFR SERPL CREATININE-BSD FRML MDRD: 37 ML/MIN/1.73SQ M
GLUCOSE SERPL-MCNC: 90 MG/DL (ref 65–140)
HCO3 BLDV-SCNC: 47.2 MMOL/L (ref 24–30)
HCT VFR BLD AUTO: 41.2 % (ref 34.8–46.1)
HGB BLD-MCNC: 11.9 G/DL (ref 11.5–15.4)
IMM GRANULOCYTES # BLD AUTO: 0.04 THOUSAND/UL (ref 0–0.2)
IMM GRANULOCYTES NFR BLD AUTO: 1 % (ref 0–2)
INR PPP: 0.9 (ref 0.85–1.19)
LACTATE SERPL-SCNC: 0.8 MMOL/L (ref 0.5–2)
LYMPHOCYTES # BLD AUTO: 0.94 THOUSANDS/ÂΜL (ref 0.6–4.47)
LYMPHOCYTES NFR BLD AUTO: 13 % (ref 14–44)
MCH RBC QN AUTO: 29.1 PG (ref 26.8–34.3)
MCHC RBC AUTO-ENTMCNC: 28.9 G/DL (ref 31.4–37.4)
MCV RBC AUTO: 101 FL (ref 82–98)
MONOCYTES # BLD AUTO: 0.73 THOUSAND/ÂΜL (ref 0.17–1.22)
MONOCYTES NFR BLD AUTO: 10 % (ref 4–12)
NEUTROPHILS # BLD AUTO: 5.43 THOUSANDS/ÂΜL (ref 1.85–7.62)
NEUTS SEG NFR BLD AUTO: 72 % (ref 43–75)
NRBC BLD AUTO-RTO: 0 /100 WBCS
O2 CT BLDV-SCNC: 8.1 ML/DL
PCO2 BLDV: 97.9 MM HG (ref 42–50)
PH BLDV: 7.3 [PH] (ref 7.3–7.4)
PLATELET # BLD AUTO: 193 THOUSANDS/UL (ref 149–390)
PMV BLD AUTO: 9.8 FL (ref 8.9–12.7)
PO2 BLDV: 25.7 MM HG (ref 35–45)
POTASSIUM SERPL-SCNC: 4.4 MMOL/L (ref 3.5–5.3)
PROCALCITONIN SERPL-MCNC: 0.08 NG/ML
PROT SERPL-MCNC: 6.3 G/DL (ref 6.4–8.4)
PROTHROMBIN TIME: 12.5 SECONDS (ref 12.3–15)
RBC # BLD AUTO: 4.09 MILLION/UL (ref 3.81–5.12)
RSV RNA RESP QL NAA+PROBE: NEGATIVE
SARS-COV-2 RNA RESP QL NAA+PROBE: NEGATIVE
SODIUM SERPL-SCNC: 144 MMOL/L (ref 135–147)
WBC # BLD AUTO: 7.43 THOUSAND/UL (ref 4.31–10.16)

## 2025-04-10 PROCEDURE — 85610 PROTHROMBIN TIME: CPT

## 2025-04-10 PROCEDURE — 80053 COMPREHEN METABOLIC PANEL: CPT

## 2025-04-10 PROCEDURE — 84145 PROCALCITONIN (PCT): CPT

## 2025-04-10 PROCEDURE — G0299 HHS/HOSPICE OF RN EA 15 MIN: HCPCS

## 2025-04-10 PROCEDURE — 36415 COLL VENOUS BLD VENIPUNCTURE: CPT

## 2025-04-10 PROCEDURE — 84484 ASSAY OF TROPONIN QUANT: CPT

## 2025-04-10 PROCEDURE — 99223 1ST HOSP IP/OBS HIGH 75: CPT | Performed by: INTERNAL MEDICINE

## 2025-04-10 PROCEDURE — 87040 BLOOD CULTURE FOR BACTERIA: CPT

## 2025-04-10 PROCEDURE — 93005 ELECTROCARDIOGRAM TRACING: CPT

## 2025-04-10 PROCEDURE — 83605 ASSAY OF LACTIC ACID: CPT

## 2025-04-10 PROCEDURE — 85025 COMPLETE CBC W/AUTO DIFF WBC: CPT

## 2025-04-10 PROCEDURE — 0241U HB NFCT DS VIR RESP RNA 4 TRGT: CPT

## 2025-04-10 PROCEDURE — 85730 THROMBOPLASTIN TIME PARTIAL: CPT

## 2025-04-10 PROCEDURE — 96374 THER/PROPH/DIAG INJ IV PUSH: CPT

## 2025-04-10 PROCEDURE — 70450 CT HEAD/BRAIN W/O DYE: CPT

## 2025-04-10 PROCEDURE — 94640 AIRWAY INHALATION TREATMENT: CPT

## 2025-04-10 PROCEDURE — 83880 ASSAY OF NATRIURETIC PEPTIDE: CPT

## 2025-04-10 PROCEDURE — 82805 BLOOD GASES W/O2 SATURATION: CPT

## 2025-04-10 PROCEDURE — 99285 EMERGENCY DEPT VISIT HI MDM: CPT

## 2025-04-10 PROCEDURE — 71045 X-RAY EXAM CHEST 1 VIEW: CPT

## 2025-04-10 PROCEDURE — 99285 EMERGENCY DEPT VISIT HI MDM: CPT | Performed by: EMERGENCY MEDICINE

## 2025-04-10 PROCEDURE — G0152 HHCP-SERV OF OT,EA 15 MIN: HCPCS

## 2025-04-10 RX ORDER — ACETAMINOPHEN 325 MG/1
650 TABLET ORAL EVERY 6 HOURS PRN
Status: DISCONTINUED | OUTPATIENT
Start: 2025-04-10 | End: 2025-04-15 | Stop reason: HOSPADM

## 2025-04-10 RX ORDER — FAMOTIDINE 20 MG/1
20 TABLET, FILM COATED ORAL 2 TIMES DAILY
Status: DISCONTINUED | OUTPATIENT
Start: 2025-04-10 | End: 2025-04-15 | Stop reason: HOSPADM

## 2025-04-10 RX ORDER — ASPIRIN 81 MG/1
81 TABLET ORAL DAILY
Status: DISCONTINUED | OUTPATIENT
Start: 2025-04-11 | End: 2025-04-15 | Stop reason: HOSPADM

## 2025-04-10 RX ORDER — PRAVASTATIN SODIUM 80 MG/1
80 TABLET ORAL
Status: DISCONTINUED | OUTPATIENT
Start: 2025-04-10 | End: 2025-04-15 | Stop reason: HOSPADM

## 2025-04-10 RX ORDER — CALCITRIOL 0.25 UG/1
0.25 CAPSULE, LIQUID FILLED ORAL 3 TIMES WEEKLY
Status: DISCONTINUED | OUTPATIENT
Start: 2025-04-11 | End: 2025-04-15 | Stop reason: HOSPADM

## 2025-04-10 RX ORDER — FUROSEMIDE 20 MG/1
20 TABLET ORAL DAILY
Status: DISCONTINUED | OUTPATIENT
Start: 2025-04-11 | End: 2025-04-15 | Stop reason: HOSPADM

## 2025-04-10 RX ORDER — BIMATOPROST 0.3 MG/ML
1 SOLUTION/ DROPS OPHTHALMIC DAILY
Status: DISCONTINUED | OUTPATIENT
Start: 2025-04-10 | End: 2025-04-15 | Stop reason: HOSPADM

## 2025-04-10 RX ORDER — LEVALBUTEROL INHALATION SOLUTION 1.25 MG/3ML
1.25 SOLUTION RESPIRATORY (INHALATION)
Status: DISCONTINUED | OUTPATIENT
Start: 2025-04-10 | End: 2025-04-10

## 2025-04-10 RX ORDER — LEVOTHYROXINE SODIUM 75 UG/1
75 TABLET ORAL
Status: DISCONTINUED | OUTPATIENT
Start: 2025-04-11 | End: 2025-04-15 | Stop reason: HOSPADM

## 2025-04-10 RX ORDER — MIRTAZAPINE 30 MG/1
45 TABLET, FILM COATED ORAL
Start: 2025-04-10

## 2025-04-10 RX ORDER — ALBUTEROL SULFATE 0.83 MG/ML
2.5 SOLUTION RESPIRATORY (INHALATION) EVERY 4 HOURS PRN
Status: DISCONTINUED | OUTPATIENT
Start: 2025-04-10 | End: 2025-04-14

## 2025-04-10 RX ORDER — ONDANSETRON 2 MG/ML
4 INJECTION INTRAMUSCULAR; INTRAVENOUS EVERY 6 HOURS PRN
Status: DISCONTINUED | OUTPATIENT
Start: 2025-04-10 | End: 2025-04-15 | Stop reason: HOSPADM

## 2025-04-10 RX ORDER — LOSARTAN POTASSIUM 50 MG/1
50 TABLET ORAL DAILY
Status: DISCONTINUED | OUTPATIENT
Start: 2025-04-11 | End: 2025-04-15 | Stop reason: HOSPADM

## 2025-04-10 RX ORDER — METOPROLOL TARTRATE 25 MG/1
25 TABLET, FILM COATED ORAL EVERY 12 HOURS SCHEDULED
Status: DISCONTINUED | OUTPATIENT
Start: 2025-04-10 | End: 2025-04-15 | Stop reason: HOSPADM

## 2025-04-10 RX ORDER — BISACODYL 5 MG/1
5 TABLET, DELAYED RELEASE ORAL DAILY PRN
Status: DISCONTINUED | OUTPATIENT
Start: 2025-04-10 | End: 2025-04-15 | Stop reason: HOSPADM

## 2025-04-10 RX ORDER — ALBUTEROL SULFATE 0.83 MG/ML
5 SOLUTION RESPIRATORY (INHALATION) ONCE
Status: COMPLETED | OUTPATIENT
Start: 2025-04-10 | End: 2025-04-10

## 2025-04-10 RX ORDER — HEPARIN SODIUM 5000 [USP'U]/ML
5000 INJECTION, SOLUTION INTRAVENOUS; SUBCUTANEOUS EVERY 8 HOURS SCHEDULED
Status: DISCONTINUED | OUTPATIENT
Start: 2025-04-10 | End: 2025-04-15 | Stop reason: HOSPADM

## 2025-04-10 RX ORDER — METHYLPREDNISOLONE SODIUM SUCCINATE 40 MG/ML
40 INJECTION, POWDER, LYOPHILIZED, FOR SOLUTION INTRAMUSCULAR; INTRAVENOUS EVERY 8 HOURS SCHEDULED
Status: DISCONTINUED | OUTPATIENT
Start: 2025-04-11 | End: 2025-04-11

## 2025-04-10 RX ORDER — AZITHROMYCIN 250 MG/1
500 TABLET, FILM COATED ORAL EVERY 24 HOURS
Status: COMPLETED | OUTPATIENT
Start: 2025-04-11 | End: 2025-04-12

## 2025-04-10 RX ORDER — METHYLPREDNISOLONE SODIUM SUCCINATE 125 MG/2ML
125 INJECTION, POWDER, LYOPHILIZED, FOR SOLUTION INTRAMUSCULAR; INTRAVENOUS ONCE
Status: COMPLETED | OUTPATIENT
Start: 2025-04-10 | End: 2025-04-10

## 2025-04-10 RX ADMIN — LEVALBUTEROL HYDROCHLORIDE 1.25 MG: 1.25 SOLUTION RESPIRATORY (INHALATION) at 19:30

## 2025-04-10 RX ADMIN — PRAVASTATIN SODIUM 80 MG: 80 TABLET ORAL at 20:00

## 2025-04-10 RX ADMIN — ALBUTEROL SULFATE 5 MG: 2.5 SOLUTION RESPIRATORY (INHALATION) at 18:38

## 2025-04-10 RX ADMIN — HEPARIN SODIUM 5000 UNITS: 5000 INJECTION INTRAVENOUS; SUBCUTANEOUS at 21:02

## 2025-04-10 RX ADMIN — METHYLPREDNISOLONE SODIUM SUCCINATE 125 MG: 125 INJECTION, POWDER, FOR SOLUTION INTRAMUSCULAR; INTRAVENOUS at 18:37

## 2025-04-10 RX ADMIN — AZITHROMYCIN MONOHYDRATE 500 MG: 500 INJECTION, POWDER, LYOPHILIZED, FOR SOLUTION INTRAVENOUS at 18:57

## 2025-04-10 RX ADMIN — CEFTRIAXONE SODIUM 1000 MG: 10 INJECTION, POWDER, FOR SOLUTION INTRAVENOUS at 21:53

## 2025-04-10 RX ADMIN — FAMOTIDINE 20 MG: 20 TABLET, FILM COATED ORAL at 19:29

## 2025-04-10 RX ADMIN — IPRATROPIUM BROMIDE 0.5 MG: 0.5 SOLUTION RESPIRATORY (INHALATION) at 19:30

## 2025-04-10 RX ADMIN — IPRATROPIUM BROMIDE 0.5 MG: 0.5 SOLUTION RESPIRATORY (INHALATION) at 18:38

## 2025-04-10 NOTE — CASE COMMUNICATION
Pt to ED due to hypotension, confusion, and change in mental status.    Ambulance transport to [facility].

## 2025-04-10 NOTE — CASE COMMUNICATION
Patient's daughter is requesting holding off on PT for this week as the patient is still dealing with UTI symptoms. She stated that she might take her in to the ER for observation.     Per the missed visit initiatives, should I wait to genny these down as missed visits in case she goes to the ER?

## 2025-04-10 NOTE — ASSESSMENT & PLAN NOTE
Presents with fatigue, breath, altered mental status since earlier this morning  Recently hospitalized in March with similar symptoms and COPD exacerbation  Also currently being treated with Macrobid as an outpatient for UTI    Continue on IV Solu-Medrol 40 mg 3 times daily  Xopenex/ipratropium nebulized breathing treatments  Bipap QHS

## 2025-04-10 NOTE — ASSESSMENT & PLAN NOTE
Diagnosis as an outpatient 4/9; was started on Macrobid at that time  Placed on IV ceftriaxone while inpatient  Follow-up final urine culture

## 2025-04-11 ENCOUNTER — HOME CARE VISIT (OUTPATIENT)
Dept: HOME HEALTH SERVICES | Facility: HOME HEALTHCARE | Age: OVER 89
End: 2025-04-11
Payer: COMMERCIAL

## 2025-04-11 LAB
ANION GAP SERPL CALCULATED.3IONS-SCNC: 7 MMOL/L (ref 4–13)
ATRIAL RATE: 69 BPM
BACTERIA UR CULT: ABNORMAL
BUN SERPL-MCNC: 40 MG/DL (ref 5–25)
CALCIUM SERPL-MCNC: 8.8 MG/DL (ref 8.4–10.2)
CHLORIDE SERPL-SCNC: 96 MMOL/L (ref 96–108)
CO2 SERPL-SCNC: 37 MMOL/L (ref 21–32)
CREAT SERPL-MCNC: 1.29 MG/DL (ref 0.6–1.3)
ERYTHROCYTE [DISTWIDTH] IN BLOOD BY AUTOMATED COUNT: 12.7 % (ref 11.6–15.1)
GFR SERPL CREATININE-BSD FRML MDRD: 36 ML/MIN/1.73SQ M
GLUCOSE SERPL-MCNC: 227 MG/DL (ref 65–140)
HCT VFR BLD AUTO: 37.5 % (ref 34.8–46.1)
HGB BLD-MCNC: 10.5 G/DL (ref 11.5–15.4)
MCH RBC QN AUTO: 28.2 PG (ref 26.8–34.3)
MCHC RBC AUTO-ENTMCNC: 28 G/DL (ref 31.4–37.4)
MCV RBC AUTO: 101 FL (ref 82–98)
P AXIS: 111 DEGREES
PLATELET # BLD AUTO: 193 THOUSANDS/UL (ref 149–390)
PMV BLD AUTO: 10.6 FL (ref 8.9–12.7)
POTASSIUM SERPL-SCNC: 4.2 MMOL/L (ref 3.5–5.3)
PR INTERVAL: 264 MS
QRS AXIS: 99 DEGREES
QRSD INTERVAL: 150 MS
QT INTERVAL: 412 MS
QTC INTERVAL: 441 MS
RBC # BLD AUTO: 3.73 MILLION/UL (ref 3.81–5.12)
SODIUM SERPL-SCNC: 140 MMOL/L (ref 135–147)
T WAVE AXIS: 53 DEGREES
VENTRICULAR RATE: 69 BPM
WBC # BLD AUTO: 8.11 THOUSAND/UL (ref 4.31–10.16)

## 2025-04-11 PROCEDURE — 85027 COMPLETE CBC AUTOMATED: CPT | Performed by: INTERNAL MEDICINE

## 2025-04-11 PROCEDURE — 97167 OT EVAL HIGH COMPLEX 60 MIN: CPT

## 2025-04-11 PROCEDURE — 99232 SBSQ HOSP IP/OBS MODERATE 35: CPT | Performed by: INTERNAL MEDICINE

## 2025-04-11 PROCEDURE — 93010 ELECTROCARDIOGRAM REPORT: CPT | Performed by: INTERNAL MEDICINE

## 2025-04-11 PROCEDURE — 80048 BASIC METABOLIC PNL TOTAL CA: CPT | Performed by: INTERNAL MEDICINE

## 2025-04-11 PROCEDURE — 94760 N-INVAS EAR/PLS OXIMETRY 1: CPT

## 2025-04-11 PROCEDURE — 94660 CPAP INITIATION&MGMT: CPT

## 2025-04-11 PROCEDURE — 97163 PT EVAL HIGH COMPLEX 45 MIN: CPT

## 2025-04-11 RX ORDER — NYSTATIN 100000 [USP'U]/G
POWDER TOPICAL 3 TIMES DAILY
Status: DISCONTINUED | OUTPATIENT
Start: 2025-04-11 | End: 2025-04-15 | Stop reason: HOSPADM

## 2025-04-11 RX ORDER — GUAIFENESIN 600 MG/1
600 TABLET, EXTENDED RELEASE ORAL EVERY 12 HOURS SCHEDULED
Status: DISCONTINUED | OUTPATIENT
Start: 2025-04-11 | End: 2025-04-15 | Stop reason: HOSPADM

## 2025-04-11 RX ORDER — METHYLPREDNISOLONE SODIUM SUCCINATE 40 MG/ML
40 INJECTION, POWDER, LYOPHILIZED, FOR SOLUTION INTRAMUSCULAR; INTRAVENOUS EVERY 12 HOURS SCHEDULED
Status: DISCONTINUED | OUTPATIENT
Start: 2025-04-11 | End: 2025-04-12

## 2025-04-11 RX ORDER — BENZONATATE 100 MG/1
200 CAPSULE ORAL 3 TIMES DAILY
Status: DISCONTINUED | OUTPATIENT
Start: 2025-04-11 | End: 2025-04-15 | Stop reason: HOSPADM

## 2025-04-11 RX ADMIN — METHYLPREDNISOLONE SODIUM SUCCINATE 40 MG: 40 INJECTION, POWDER, FOR SOLUTION INTRAMUSCULAR; INTRAVENOUS at 21:05

## 2025-04-11 RX ADMIN — ASPIRIN 81 MG: 81 TABLET, COATED ORAL at 08:38

## 2025-04-11 RX ADMIN — LOSARTAN POTASSIUM 50 MG: 50 TABLET, FILM COATED ORAL at 08:38

## 2025-04-11 RX ADMIN — BIMATOPROST 1 DROP: 0.3 SOLUTION/ DROPS OPHTHALMIC at 01:17

## 2025-04-11 RX ADMIN — CEFTRIAXONE SODIUM 1000 MG: 10 INJECTION, POWDER, FOR SOLUTION INTRAVENOUS at 21:05

## 2025-04-11 RX ADMIN — CYANOCOBALAMIN TAB 500 MCG 500 MCG: 500 TAB at 08:38

## 2025-04-11 RX ADMIN — HEPARIN SODIUM 5000 UNITS: 5000 INJECTION INTRAVENOUS; SUBCUTANEOUS at 21:05

## 2025-04-11 RX ADMIN — PRAVASTATIN SODIUM 80 MG: 80 TABLET ORAL at 16:59

## 2025-04-11 RX ADMIN — FAMOTIDINE 20 MG: 20 TABLET, FILM COATED ORAL at 08:38

## 2025-04-11 RX ADMIN — LEVOTHYROXINE SODIUM 75 MCG: 75 TABLET ORAL at 05:15

## 2025-04-11 RX ADMIN — FUROSEMIDE 20 MG: 20 TABLET ORAL at 08:38

## 2025-04-11 RX ADMIN — BIMATOPROST 1 DROP: 0.3 SOLUTION/ DROPS OPHTHALMIC at 21:06

## 2025-04-11 RX ADMIN — HEPARIN SODIUM 5000 UNITS: 5000 INJECTION INTRAVENOUS; SUBCUTANEOUS at 05:14

## 2025-04-11 RX ADMIN — GUAIFENESIN 600 MG: 600 TABLET, EXTENDED RELEASE ORAL at 21:05

## 2025-04-11 RX ADMIN — METOPROLOL TARTRATE 25 MG: 25 TABLET, FILM COATED ORAL at 08:38

## 2025-04-11 RX ADMIN — HEPARIN SODIUM 5000 UNITS: 5000 INJECTION INTRAVENOUS; SUBCUTANEOUS at 13:12

## 2025-04-11 RX ADMIN — NYSTATIN: 100000 POWDER TOPICAL at 21:06

## 2025-04-11 RX ADMIN — BENZONATATE 200 MG: 100 CAPSULE ORAL at 13:11

## 2025-04-11 RX ADMIN — METOPROLOL TARTRATE 25 MG: 25 TABLET, FILM COATED ORAL at 21:05

## 2025-04-11 RX ADMIN — AZITHROMYCIN DIHYDRATE 500 MG: 250 TABLET ORAL at 16:59

## 2025-04-11 RX ADMIN — BENZONATATE 200 MG: 100 CAPSULE ORAL at 21:05

## 2025-04-11 RX ADMIN — NYSTATIN: 100000 POWDER TOPICAL at 08:39

## 2025-04-11 RX ADMIN — CALCITRIOL CAPSULES 0.25 MCG 0.25 MCG: 0.25 CAPSULE ORAL at 08:38

## 2025-04-11 RX ADMIN — NYSTATIN: 100000 POWDER TOPICAL at 05:13

## 2025-04-11 RX ADMIN — GUAIFENESIN 600 MG: 600 TABLET, EXTENDED RELEASE ORAL at 13:11

## 2025-04-11 RX ADMIN — METHYLPREDNISOLONE SODIUM SUCCINATE 40 MG: 40 INJECTION, POWDER, FOR SOLUTION INTRAMUSCULAR; INTRAVENOUS at 13:12

## 2025-04-11 RX ADMIN — FAMOTIDINE 20 MG: 20 TABLET, FILM COATED ORAL at 17:00

## 2025-04-11 RX ADMIN — METHYLPREDNISOLONE SODIUM SUCCINATE 40 MG: 40 INJECTION, POWDER, FOR SOLUTION INTRAMUSCULAR; INTRAVENOUS at 05:13

## 2025-04-11 NOTE — ASSESSMENT & PLAN NOTE
Diagnosis as an outpatient 4/9; was started on Macrobid at that time  Possible UTI  Continue IV ceftriaxone  follow-up on urine culture studies

## 2025-04-11 NOTE — ASSESSMENT & PLAN NOTE
Probably multifactorial secondary to UTI and hypercapnia  See plans for COPD exacerbation and UTI  Monitor for clinical improvement

## 2025-04-11 NOTE — RESPIRATORY THERAPY NOTE
RT Protocol Note  Ashley Gonzales 89 y.o. female MRN: [id number]  Unit/Bed#: [facility] 429-01 Encounter: [id number]    Assessment    Principal Problem:    COPD exacerbation (HCC)  Active Problems:    JAMEEL (obstructive sleep apnea)    Obesity (BMI 30-39.9)    Stage 3 chronic kidney disease (HCC)    Chronic respiratory failure with hypoxia (HCC)    Mild vascular dementia without behavioral disturbance, psychotic disturbance, mood disturbance, or anxiety (HCC)    Acute cystitis      Home Pulmonary Medications:  None       Past Medical History:   Diagnosis Date    Anxiety     Cataract, bilateral     Last Assessed:3/19/2014    COPD (chronic obstructive pulmonary disease) (HCC)     Coronary artery disease     Hx of arterial ischemic stroke     Hypertension     Stroke (HCC)     Wears glasses      Social History     Socioeconomic History    Marital status:      Spouse name: None    Number of children: None    Years of education: 10    Highest education level: None   Occupational History    None   Tobacco Use    Smoking status: Former     Average packs/day: 1 pack/day for 24.0 years (24.0 ttl pk-yrs)     Types: Cigarettes     Start date: 1951     Passive exposure: Past    Smokeless tobacco: Never   Vaping Use    Vaping status: Never Used   Substance and Sexual Activity    Alcohol use: Never    Drug use: Never    Sexual activity: Not Currently   Other Topics Concern    None   Social History Narrative    ** Merged History Encounter **         No caffeine use     Social Drivers of Health     Financial Resource Strain: Low Risk  (5/10/2023)    Overall Financial Resource Strain (CARDIA)     Difficulty of Paying Living Expenses: Not hard at all   Food Insecurity: No Food Insecurity (3/18/2025)    Nursing - Inadequate Food Risk Classification     Worried About Running Out of Food in the Last Year: Never true     Ran Out of Food in the Last Year: Never true     Ran Out of Food in the Last Year: Never true   Transportation Needs: No  Transportation Needs (3/24/2025)    OASIS : Transportation     Lack of Transportation (Medical): No     Lack of Transportation (Non-Medical): No     Patient Unable or Declines to Respond: No   Physical Activity: Not on file   Stress: Not on file   Social Connections: Not on file   Intimate Partner Violence: Patient Unable To Answer (3/18/2025)    Nursing IPS     Feels Physically and Emotionally Safe: Not on file     Physically Hurt by Someone: Not on file     Humiliated or Emotionally Abused by Someone: Not on file     Physically Hurt by Someone: Patient unable to answer     Hurt or Threatened by Someone: Patient unable to answer   Housing Stability: Unknown (3/18/2025)    Nursing: Inadequate Housing Risk Classification     Has Housing: Not on file     Worried About Losing Housing: Not on file     Unable to Get Utilities: Not on file     Unable to Pay for Housing in the Last Year: No     Has Housin       Subjective         Objective    Physical Exam:   Assessment Type: Assess only  General Appearance: Awake  Respiratory Pattern: Normal  Chest Assessment: Chest expansion symmetrical  Bilateral Breath Sounds: Diminished    Vitals:  Blood pressure 119/54, pulse 90, temperature 98.2 °F (36.8 °C), temperature source Oral, resp. rate 20, SpO2 93%, not currently breastfeeding.          Imaging and other studies: Results Review Statement: I personally reviewed the following image studies in PACS and associated radiology reports: chest xray. My interpretation of the radiology images/reports is:  . I reviewed radiology reports from this admission including: chest xray.          Plan    Respiratory Plan: (P) No distress/Pulmonary history        Resp Comments: pt assessed per respiratory protocol at this time, pt admitted for hypotension and change in mental status. pt has history of COPD and does not take any pulm meds at home for it, bs diminished and spo2 wnl on nebulizer tx, CXR pending and no further respiratory  complaints at this time. will change pt to prn and continue to monitor per protocol.

## 2025-04-11 NOTE — PHYSICAL THERAPY NOTE
Physical Therapy Evaluation     Patient's Name: Ashley Gonzales    Admitting Diagnosis  CHF (congestive heart failure) (Formerly [facility]) [I50.9]  Altered mental status [R41.82]  COPD exacerbation (Formerly [facility]) [J44.1]  Hypercapnic respiratory failure (Formerly [facility]) [J96.92]    Problem List  Patient Active Problem List   Diagnosis    COPD (chronic obstructive pulmonary disease) (HCC)    Benign familial tremor    Hx of arterial ischemic stroke    Clinical depression    Esophageal reflux    Female stress incontinence    Generalized osteoarthritis    Insomnia    JAMEEL (obstructive sleep apnea)    Osteoarthritis of knee    Postural imbalance    History of total knee replacement    Pleural effusion    Acute on chronic respiratory failure with hypoxia and hypercapnia (HCC)    Obesity (BMI 30-39.9)    Dizziness    Hematuria    Stage 3 chronic kidney disease (HCC)    Nephrolithiasis    Metabolic encephalopathy    Fall    Lung nodule    Chronic respiratory failure with hypoxia (HCC)    Low serum vitamin B12    Persistent proteinuria    Secondary hyperparathyroidism of renal origin (HCC)    Acquired hypothyroidism    Depression, recurrent (HCC)    Mild vascular dementia without behavioral disturbance, psychotic disturbance, mood disturbance, or anxiety (HCC)    Anxiety and depression    ACP (advance care planning)    Peripheral edema    Diastolic dysfunction with acute on chronic heart failure (HCC)    Obesity hypoventilation syndrome (HCC)    COPD exacerbation (HCC)    Acute cystitis    Encephalopathy       Past Medical History  Past Medical History:   Diagnosis Date    Anxiety     Cataract, bilateral     Last Assessed:3/19/2014    COPD (chronic obstructive pulmonary disease) (HCC)     Coronary artery disease     Hx of arterial ischemic stroke     Hypertension     Stroke (HCC)     Wears glasses        Past Surgical History  Past Surgical History:   Procedure Laterality Date    CAROTID ENDARTERECTOMY Right     CATARACT EXTRACTION W/ INTRAOCULAR LENS   IMPLANT, BILATERAL      COLONOSCOPY N/A 9/30/2017    Procedure: COLONOSCOPY FOR CONTROL OF BLEEDING;  Surgeon: Jeb Gaxiola MD;  Location: [facility];  Service: Colorectal    CORONARY ARTERY BYPASS GRAFT      CABG X3 with LIMA to LAD,SVG to OM,SVG to distal  RCA ; Last Assessed:7/13/2015    JOINT REPLACEMENT      left TKR    KIDNEY STONE SURGERY      NEPHROSTOMY Left     with Drainage Irrigation ;Onset:1974    NM ARTHRP KNE CONDYLE&PLATU MEDIAL&LAT COMPARTMENTS Right 2/25/2016    Procedure: ARTHROPLASTY KNEE TOTAL;  Surgeon: Jeb Figueroa MD;  Location: [facility];  Service: Orthopedics          04/11/25 0849   PT Last Visit   PT Visit Date 04/11/25   Note Type   Note type Evaluation   Additional Comments 89 y.o. female admitted to [facility] on 4/10/2025 with COPD exacerbation (HCC).   Pain Assessment   Pain Assessment Tool 0-10   Pain Score 10 - Worst Possible Pain   Pain Location/Orientation Orientation: Bilateral;Location: Foot   Pain Onset/Description Onset: Ongoing   Effect of Pain on Daily Activities limits functional mobility   Patient's Stated Pain Goal No pain   Hospital Pain Intervention(s) Repositioned;Ambulation/increased activity;Emotional support;Rest   Restrictions/Precautions   Weight Bearing Precautions Per Order No   Other Precautions Cognitive;Chair Alarm;Bed Alarm;Multiple lines;Telemetry;O2;Fall Risk;Pain   Home Living   Type of Home House   Home Layout Two level;Stairs to enter with rails;Able to live on main level with bedroom/bathroom;1/2 bath on main level;Bed/bath upstairs  (1 DIAN, reports she crawls up the stairs to shower)   Bathroom Shower/Tub Tub/shower unit   Bathroom Toilet Standard   Bathroom Equipment Grab bars in shower;Shower chair;Grab bars around toilet   Bathroom Accessibility Accessible   Home Equipment Walker;Cane;Other (Comment)  (rollator)   Additional Comments At baseline, pt resides with daughter and granddaughter in 2SH with [employer] (sleeps on couch with 1/2  "bath) and was independent ADLs and requires assistance with IADLs prior to hospital admission.   Prior Function   Level of [place] Independent with ADLs;Independent with functional mobility;Needs assistance with IADLS   Lives With Daughter  (granddaughter)   Receives Help From Family   IADLs Family/Friend/Other provides transportation;Family/Friend/Other provides meals;Family/Friend/Other provides medication management   Falls in the last 6 months 0   Vocational Retired   General   Additional Pertinent History Patient  has a past medical history of Anxiety, Cataract, bilateral, COPD (chronic obstructive pulmonary disease) ([facility]), Coronary artery disease, arterial ischemic stroke, Hypertension, Stroke ([facility]), and Wears glasses.   Family/Caregiver Present No   Cognition   Overall Cognitive Status Impaired   Arousal/Participation Alert   Orientation Level Oriented to person;Oriented to place;Oriented to time;Disoriented to situation   Memory Decreased recall of recent events;Decreased recall of precautions   Following Commands Follows one step commands without difficulty   Comments patient pleasant and cooperative, fair insight of functional deficits and safety awareness   Subjective   Subjective \"My feet hurt pretty bad\"   RLE Assessment   RLE Assessment   (4-/5 grossly)   LLE Assessment   LLE Assessment   (4-/5 grossly)   Bed Mobility   Supine to Sit 4  Minimal assistance   Additional items Assist x 1;HOB elevated;Bedrails;Increased time required;Verbal cues;LE management   Sit to Supine Unable to assess   Additional Comments patient left OOB in bedside chair with alarm and all needs met   Transfers   Sit to Stand 4  Minimal assistance   Additional items Assist x 1;Increased time required;Verbal cues   Stand to Sit 4  Minimal assistance   Additional items Assist x 1;Increased time required;Verbal cues   Additional Comments rw   Ambulation/Elevation   Gait pattern Improper Weight shift;Forward Flexion;Wide " MEHDI;Decreased foot clearance;Inconsistent fuentes;Short stride;Step to;Excessively slow;Decreased heel strike   Gait Assistance 4  Minimal assist   Additional items Assist x 1;Verbal cues;Tactile cues   Assistive Device Rolling walker   Distance 5'   Stair Management Assistance Not tested   Ambulation/Elevation Additional Comments Patient ambulated 5' with RW, requiring occasional verbal cues for RW management, hallway navigation, and improving step/stride length.  (patient deferred further ambulation due to fatigue and pain)   Balance   Static Sitting Fair +   Dynamic Sitting Fair   Static Standing Fair -   Dynamic Standing Poor +   Ambulatory Poor +   Endurance Deficit   Endurance Deficit Yes   Endurance Deficit Description generalized weakness, fatigue   Activity Tolerance   Activity Tolerance Patient limited by fatigue;Patient limited by pain   Medical Staff Made Aware LORRIE Atkins; This high complexity evaluation was performed with an occupational therapist due to the patient's co-morbidities, clinically unstable presentation, and present impairments which are a regression from the patient's baseline.   Nurse Made Aware RN cleared and updated   Assessment   Prognosis Good   Problem List Decreased strength;Decreased endurance;Impaired balance;Decreased mobility;Impaired judgement;Decreased safety awareness;Decreased skin integrity;Pain   Assessment PT completed evaluation of 89 y.o. female admitted to [facility] on 4/10/2025 with COPD exacerbation (HCC). Patient's current status instabilities include multiple lines, O2, ongoing pain, continuous O2/HR monitoring, regression in function from baseline. Patient  has a past medical history of Anxiety, Cataract, bilateral, COPD (chronic obstructive pulmonary disease) (HCC), Coronary artery disease, arterial ischemic stroke, Hypertension, Stroke (HCC), and Wears glasses. At baseline, pt resides with daughter and granddaughter in 2SH with [employer]P (sleeps on couch  with 1/2 bath) and was independent ADLs and requires assistance with IADLs prior to hospital admission.       Patient presents at time of PT evaluation functioning below baseline and currently w/ overall mobility deficits due to: impaired balance, decreased endurance, gait dysfunction, decreased activity tolerance and fall risk. During PT evaluation, patient currently is requiring min assist x1 for bed mobility skills;  min assist x1 for functional transfers and  min assist x1 for ambulation with RW. Patient performed supine to sit to edge of bed requiring HOB elevated, verbal cues for set up, increased time, use of bed rails and trunk/LE management. Patient ambulated 5' with RW, requiring occasional verbal cues for RW management, hallway navigation, and improving step/stride length. Pt left OOB in bedside chair with alarm and all needs met.       This patient is functioning below their baseline and is recommended for level II. Patient will benefit from continued skilled PT this admission to achieve maximal function and safety. The patients AM-PAC Basic Mobility Inpatient Short From Raw Score is 15 . Based on AM-PAC scoring and patient presentation, PT currently recommending Level II (Moderate Resource Intensity). Please also refer to the recommendation of the Physical Therapist for safe discharge planning.   Barriers to Discharge Inaccessible home environment   Goals   Patient Goals to reduce pain   STG Expiration Date 04/25/25   Short Term Goal #1 1) Perform bed mobility mod-I to participate in frequent repositioning and improve skin integrity; 2) Perform functional transfers mod-I to promote I with toileting and OOB mobility; 3) Ambulate 200 feet mod-I with least restrictive device to participate in household and community level mobility; 4) Improve b/l LE strength by 1/2 grade in order to improve efficiency of transfers; 5) Improve balance by 1 grade to reduce risk for falls; 6) Improve overall activity  tolerance to 60 minutes in order to increase patient's ability to engage in mobility tasks; 7) Navigate 12 steps S level in order to safely navigate multiple floors at home   PT Treatment Day 0   Plan   Treatment/Interventions ADL retraining;Functional transfer training;LE strengthening/ROM;Elevations;Therapeutic exercise;Endurance training;Patient/family training;Bed mobility;Gait training;Spoke to nursing;Spoke to case management;OT   PT Frequency 3-5x/wk   Discharge Recommendation   Rehab Resource Intensity Level, PT II (Moderate Resource Intensity)   Equipment Recommended Walker   Walker Package Recommended Wheeled walker   Change/add to Walker Package? No   AM-PAC Basic Mobility Inpatient   Turning in Flat Bed Without Bedrails 3   Lying on Back to Sitting on Edge of Flat Bed Without Bedrails 3   Moving Bed to Chair 3   Standing Up From Chair Using Arms 2   Walk in Room 2   Climb 3-5 Stairs With Railing 2   Basic Mobility Inpatient Raw Score 15   Basic Mobility Standardized Score 36.97   [facility] Highest Level Of Mobility   -HLM Goal 4: Move to chair/commode   -HLM Achieved 6: Walk 10 steps or more   End of Consult   Patient Position at End of Consult Bedside chair;Bed/Chair alarm activated;All needs within reach         Susan Gonzales, PT, DPT

## 2025-04-11 NOTE — UTILIZATION REVIEW
Initial Clinical Review    Admission: Date/Time/Statement:   Admission Orders (From admission, onward)       Ordered        04/10/25 1907  Inpatient Admission  Once                          Orders Placed This Encounter   Procedures    Inpatient Admission     Standing Status:   Standing     Number of Occurrences:   1     Level of Care:   Med Surg [16]     Estimated length of stay:   More than 2 Midnights     Certification:   I certify that inpatient services are medically necessary for this patient for a duration of greater than two midnights. See H&P and MD Progress Notes for additional information about the patient's course of treatment.     ED Arrival Information       Expected   -    Arrival   4/10/2025 17:03    Acuity   Urgent              Means of arrival   Ambulance    Escorted by   [employer] ([place])    Service   Hospitalist    Admission type   Emergency              Arrival complaint   Altered Mental Status             Chief Complaint   Patient presents with    Altered Mental Status     Pt comes from home, had PT come this afternoon and roughly 30 min into their session PT called because she was very confused and couldn't answer basic questions. GCS 15 on arrival.       Initial Presentation: 89 y.o. female  to ED via EMS from home.    Admitted to inpatient with Dx: COPD exacerbation/Encephalopathy/Acute Cystitis.  Presented to ED with increased confusion on day of morning of arrival and during home PT, confusion worse.  Has been staying with daughter since recent dc for COPD 3/21.  For last few days with productive cough.  Recently diagnosed with UTI and on Macrobid.   PMHx:  COPD, chronic respiratory failure on 2 L O2, hypertension, chronic CHF, JAMEEL, mild vascular dementia, . On exam: appears ill.  Tachycardia. Tachypnea.  Irritation of skin of bilateral inframamillary folds.  Appears tired but alert.  Bun 37. Creatinine 1.26.  PCO2 - 97.9.    Imaging shows no acute finding on ct head.   ED treatment:   Duo Neb x 2, IV solu medrol, Azithromycin.  Placed on oxygen 2 liters    Plan includes to continue IV Solu medrol,  scheduled nebs.  Bipap at HS.  Monitor oxygen,  baseline 2 liters.  Continue IV ceftriaxone.  Hold home Macrobid and follow cultures.  Monitor mental status, supportive care for dementia..     Anticipated Length of Stay/Certification Statement: Patient will be admitted on an inpatient basis with an anticipated length of stay of greater than 2 midnights secondary to COPD exacerbation, UTI, lab monitoring, medication titration, dispo planning.     Date: 4/11/25   Day 2: shortness of breath slightly better.  Cough non productive.  Improved urinary symptoms.  Appetite fair.   On exam:  lungs diminished breath sounds.  Heart sounds distant.  Awake.  Follows commands.  H&H 10.5/37.5.  Co2 - 37.  Bun 40. Creatinine 1.29.  glucose 227.   Urine culture from 4/9 shows E coli.   Continue IV Solu medrol and decrease dose to twice daily.  Nebs.  On chronic oxygen 2 liters.  Encourage incentive spirometry.  Check ambulatory sats.  Encourage [facility] [place] [facility] [facility] [place] [facility] [place], need sensitivities.    Delirium precautions.     ED Treatment-Medication Administration from 04/10/2025 1703 to 04/10/2025 1958         Date/Time Order Dose Route Action     04/10/2025 1838 albuterol inhalation solution 5 mg 5 mg Nebulization Given     04/10/2025 1838 ipratropium (ATROVENT) 0.02 % inhalation solution 0.5 mg 0.5 mg Nebulization Given     04/10/2025 1837 methylPREDNISolone sodium succinate (Solu-MEDROL) injection 125 mg 125 mg Intravenous Given     04/10/2025 1857 azithromycin (ZITHROMAX) 500 mg in sodium chloride 0.9% 250mL IVPB 500 mg 500 mg Intravenous New Bag     04/10/2025 1930 levalbuterol (XOPENEX) inhalation solution 1.25 mg 1.25 mg Nebulization Given     04/10/2025 1930 ipratropium (ATROVENT) 0.02 % inhalation solution 0.5 mg 0.5 mg Nebulization Given     04/10/2025 1929 famotidine (PEPCID) tablet  20 mg 20 mg Oral Given          Scheduled Medications:  aspirin, 81 mg, Oral, Daily  azithromycin, 500 mg, Oral, Q24H  bimatoprost, 1 drop, Ophthalmic, Daily  calcitriol, 0.25 mcg, Oral, Once per day on Monday Wednesday Friday  cefTRIAXone, 1,000 mg, Intravenous, Q24H  vitamin B-12, 500 mcg, Oral, Daily  famotidine, 20 mg, Oral, BID  furosemide, 20 mg, Oral, Daily  heparin (porcine), 5,000 Units, Subcutaneous, Q8H [place]  levothyroxine, 75 mcg, Oral, Early Morning  losartan, 50 mg, Oral, Daily  methylPREDNISolone sodium succinate, 40 mg, Intravenous, Q8H [place]  metoprolol tartrate, 25 mg, Oral, Q12H [place]  nystatin, , Topical, TID  pravastatin, 80 mg, Oral, Daily With Dinner    ipratropium (ATROVENT) 0.02 % inhalation solution 0.5 mg  Dose: 0.5 mg  Freq: 3 times daily (RESP) Route: NEBULIZATION  Start: 04/10/25 2000 End: 04/10/25 2001     levalbuterol (XOPENEX) inhalation solution 1.25 mg  Dose: 1.25 mg  Freq: 3 times daily (RESP) Route: NEBULIZATION  Start: 04/10/25 2000 End: 04/10/25 2001     benzonatate (TESSALON PERLES) capsule 200 mg  Dose: 200 mg  Freq: 3 times daily Route: PO  Start: 04/11/25 1230  guaiFENesin (MUCINEX) 12 hr tablet 600 mg  Dose: 600 mg  Freq: Every 12 hours scheduled Route: PO  Start: 04/11/25 1230  methylPREDNISolone sodium succinate (Solu-MEDROL) injection 40 mg  Dose: 40 mg  Freq: Every 12 hours scheduled Route: IV  Start: 04/11/25 1230     Continuous IV Infusions:     PRN Meds: not used.   acetaminophen, 650 mg, Oral, Q6H PRN  albuterol, 2.5 mg, Nebulization, Q4H PRN  bisacodyl, 5 mg, Oral, Daily PRN  ondansetron, 4 mg, Intravenous, Q6H PRN      ED Triage Vitals   Temperature Pulse Respirations Blood Pressure SpO2 Pain Score   04/10/25 1712 04/10/25 1712 04/10/25 1712 04/10/25 1714 04/10/25 1712 04/10/25 1712   98.2 °F (36.8 °C) 73 16 141/67 (!) 84 % No Pain     Weight (last 2 days)       Date/Time Weight    04/10/25 1930 81.2 (179)          Date and Time Eye Opening Best Verbal Response Best  Motor Response [place] Coma Scale Score User   04/10/25 2000 4 5 6 15      Date and Time Eye Opening Best Verbal Response Best Motor Response [place] Coma Scale Score   04/11/25 0959 4 5 6 15   04/10/25 2000 4 5 6 15     Vital Signs (last 3 days)       Date/Time Temp Pulse Resp BP MAP (mmHg) SpO2 Calculated FIO2 (%) - Nasal Cannula Nasal Cannula O2 Flow Rate (L/min) O2 Device O2 Interface Device Patient Position - Orthostatic VS Madhuri Coma Scale Score Pain    04/11/25 0959 -- -- -- -- -- -- -- -- None (Room air) -- -- 15 No Pain    04/11/25 0856 -- -- -- -- -- -- -- -- -- -- -- -- 10 - Worst Possible Pain    04/11/25 0849 -- -- -- -- -- -- -- -- -- -- -- -- 10 - Worst Possible Pain    04/11/25 08:37:38 98.8 °F (37.1 °C) 84 -- 157/67 97 91 % -- -- Nasal cannula -- Lying -- --    04/11/25 0739 -- -- -- -- -- -- 32 3 L/min Nasal cannula -- -- -- --    04/11/25 0201 -- -- -- -- -- -- -- -- -- Face mask -- -- --    04/11/25 0100 -- -- -- -- -- -- -- -- BiPAP -- -- -- --    04/10/25 2300 -- -- -- -- -- -- 28 2 L/min Nasal cannula -- -- -- --    04/10/25 22:24:03 97.2 °F (36.2 °C) 107 17 103/54 70 89 % 28 2 L/min Nasal cannula -- Lying -- --    04/10/25 20:09:52 98.3 °F (36.8 °C) 97 -- 104/54 71 95 % 28 2 L/min Nasal cannula -- -- -- --    04/10/25 2000 -- -- -- -- -- -- -- -- -- -- -- 15 No Pain    04/10/25 1930 -- 90 20 119/54 78 93 % -- -- Other (comment) -- Lying -- No Pain    04/10/25 1908 -- -- -- -- -- -- -- -- Other (comment) -- -- -- --    04/10/25 1845 -- 74 -- -- -- 96 % -- -- -- -- -- -- --    04/10/25 1714 -- -- 20 141/67 97 84 % -- -- -- -- -- -- --    04/10/25 1712 98.2 °F (36.8 °C) 73 16 -- -- 84 % -- -- None (Room air) -- -- -- No Pain          Pertinent Labs/Diagnostic Test Results:   Radiology:  CT head wo contrast   Final Interpretation by Rain Elizondo MD (04/10 1839)      No acute intracranial abnormality. In particular, no acute territorial infarct or intracranial hemorrhage.                      Workstation performed: [id number]         XR chest portable   Final Interpretation by Sergio Lui MD (04/11 0701)      Stable findings as detailed above without acute cardiopulmonary abnormalities.            Workstation performed: [id number]           Cardiology:  No orders to display     GI:  No orders to display       Results from last 7 days   Lab Units 04/10/25  1832   SARS-COV-2  Negative     Results from last 7 days   Lab Units 04/11/25  0444 04/10/25  1750   WBC Thousand/uL 8.11 7.43   HEMOGLOBIN g/dL 10.5* 11.9   HEMATOCRIT % 37.5 41.2   PLATELETS Thousands/uL 193 193   TOTAL NEUT ABS Thousands/µL  --  5.43     Results from last 7 days   Lab Units 04/11/25  0444 04/10/25  1750   SODIUM mmol/L 140 144   POTASSIUM mmol/L 4.2 4.4   CHLORIDE mmol/L 96 97   CO2 mmol/L 37* 45*   ANION GAP mmol/L 7 2*   BUN mg/dL 40* 37*   CREATININE mg/dL 1.29 1.26   EGFR ml/min/1.73sq m 36 37   CALCIUM mg/dL 8.8 8.9     Results from last 7 days   Lab Units 04/10/25  1750   AST U/L 10*   ALT U/L 5*   ALK PHOS U/L 67   TOTAL PROTEIN g/dL 6.3*   ALBUMIN g/dL 3.3*   TOTAL BILIRUBIN mg/dL 0.40     Results from last 7 days   Lab Units 04/11/25  0444 04/10/25  1750   GLUCOSE RANDOM mg/dL 227* 90     Results from last 7 days   Lab Units 04/10/25  1750   PH SHENA  7.301   PCO2 SHENA mm Hg 97.9*   PO2 SHENA mm Hg 25.7*   HCO3 SHENA mmol/L 47.2*   BASE EXC SHENA mmol/L 16.4   O2 CONTENT SHENA ml/dL 8.1   O2 HGB, VENOUS % 45.9*     Results from last 7 days   Lab Units 04/10/25  1938 04/10/25  1750   HS TNI 0HR ng/L  --  15   HS TNI 2HR ng/L 13  --    HSTNI D2 ng/L -2  --      Results from last 7 days   Lab Units 04/10/25  1750   PROTIME seconds 12.5   INR  0.90   PTT seconds 25     Results from last 7 days   Lab Units 04/10/25  1750   PROCALCITONIN ng/ml 0.08     Results from last 7 days   Lab Units 04/10/25  1750   LACTIC ACID mmol/L 0.8     Results from last 7 days   Lab Units 04/10/25  1750   BNP pg/mL 75     Results from last 7 days   Lab Units  04/09/25  0818   CLARITY UA  Turbid   COLOR UA  Yellow   SPEC GRAV UA  1.015   PH UA  6.5   GLUCOSE UA mg/dl Negative   KETONES UA mg/dl Negative   BLOOD UA  Large*   PROTEIN UA mg/dl 70 (1+)*   NITRITE UA  Positive*   BILIRUBIN UA  Negative   UROBILINOGEN UA (BE) mg/dl <2.0   LEUKOCYTES UA  Large*   WBC UA /hpf Innumerable*   RBC UA /hpf Innumerable*   BACTERIA UA /hpf Moderate*   EPITHELIAL CELLS WET PREP /hpf None Seen   CREATININE UR mg/dL 85.0     Results from last 7 days   Lab Units 04/10/25  1832   INFLUENZA A PCR  Negative   INFLUENZA B PCR  Negative   RSV PCR  Negative     Results from last 7 days   Lab Units 04/10/25  1832 04/10/25  1750 04/09/25  0818   BLOOD CULTURE  Received in Microbiology Lab. Culture in Progress. Received in Microbiology Lab. Culture in Progress.  --    URINE CULTURE   --   --  >100,000 cfu/ml Escherichia coli*  >100,000 cfu/ml Escherichia coli*     Past Medical History:   Diagnosis Date    Anxiety     Cataract, bilateral     Last Assessed:3/19/2014    COPD (chronic obstructive pulmonary disease) ([facility])     Coronary artery disease     Hx of arterial ischemic stroke     Hypertension     Stroke ([facility])     Wears glasses      Present on Admission:   JAMEEL (obstructive sleep apnea)   Obesity (BMI 30-39.9)   Stage 3 chronic kidney disease ([facility])   Mild vascular dementia without behavioral disturbance, psychotic disturbance, mood disturbance, or anxiety ([facility])    Admitting Diagnosis: CHF (congestive heart failure) ([facility]) [I50.9]  Altered mental status [R41.82]  COPD exacerbation ([facility]) [J44.1]  Hypercapnic respiratory failure ([facility]) [J96.92]  Age/Sex: 89 y.o. female    Network Utilization Review Department  ATTENTION: Please call with any questions or concerns to [phone] and carefully listen to the prompts so that you are directed to the right person. All voicemails are confidential.   For Discharge needs, contact Care Management DC Support Team at [phone] opt. [phone]  Send all requests for  admission clinical reviews, approved or denied determinations and any other requests to dedicated fax number below belonging to the campus where the patient is receiving treatment. List of dedicated fax numbers for the Facilities:  FACILITY NAME UR FAX NUMBER   ADMISSION DENIALS (Administrative/Medical Necessity) [phone]   DISCHARGE SUPPORT TEAM (NETWORK) [phone]   PARENT CHILD HEALTH (Maternity/NICU/Pediatrics) [phone]   [facility] [phone]   [facility] [phone]   [facility] [phone]   [facility] [phone]   [facility] [phone]   [facility] [phone]   [facility] [phone]   [facility] [phone]   [facility] [phone]   [facility] [phone]   [facility] [phone]   [facility] [phone]

## 2025-04-11 NOTE — PLAN OF CARE
Problem: PHYSICAL THERAPY ADULT  Goal: Performs mobility at highest level of function for planned discharge setting.  See evaluation for individualized goals.  Description: Treatment/Interventions: ADL retraining, Functional transfer training, LE strengthening/ROM, Elevations, Therapeutic exercise, Endurance training, Patient/family training, Bed mobility, Gait training, Spoke to nursing, Spoke to case management, OT  Equipment Recommended: Walker       See flowsheet documentation for full assessment, interventions and recommendations.  Note: Prognosis: Good  Problem List: Decreased strength, Decreased endurance, Impaired balance, Decreased mobility, Impaired judgement, Decreased safety awareness, Decreased skin integrity, Pain  Assessment: PT completed evaluation of 89 y.o. female admitted to [facility] on 4/10/2025 with COPD exacerbation ([facility]). Patient's current status instabilities include multiple lines, O2, ongoing pain, continuous O2/HR monitoring, regression in function from baseline. Patient  has a past medical history of Anxiety, Cataract, bilateral, COPD (chronic obstructive pulmonary disease) ([facility]), Coronary artery disease, arterial ischemic stroke, Hypertension, Stroke ([facility]), and Wears glasses. At baseline, pt resides with daughter and granddaughter in 2SH with FFSUP (sleeps on couch with 1/2 bath) and was independent ADLs and requires assistance with IADLs prior to hospital admission. Patient presents at time of PT evaluation functioning below baseline and currently w/ overall mobility deficits due to: impaired balance, decreased endurance, gait dysfunction, decreased activity tolerance and fall risk. During PT evaluation, patient currently is requiring min assist x1 for bed mobility skills;  min assist x1 for functional transfers and  min assist x1 for ambulation with RW. Patient performed supine to sit to edge of bed requiring HOB elevated, verbal cues for set up, increased time, use of  bed rails and trunk/LE management. Patient ambulated 5' with RW, requiring occasional verbal cues for RW management, hallway navigation, and improving step/stride length. Pt left OOB in bedside chair with alarm and all needs met. This patient is functioning below their baseline and is recommended for level II. Patient will benefit from continued skilled PT this admission to achieve maximal function and safety. The patients AM-PAC Basic Mobility Inpatient Short From Raw Score is 15 . Based on AM-PAC scoring and patient presentation, PT currently recommending Level II (Moderate Resource Intensity). Please also refer to the recommendation of the Physical Therapist for safe discharge planning.  Barriers to Discharge: Inaccessible home environment     Rehab Resource Intensity Level, PT: II (Moderate Resource Intensity)    See flowsheet documentation for full assessment.

## 2025-04-11 NOTE — CASE MANAGEMENT
Case Management Assessment & Discharge Planning Note    Patient name Ashley Gonzales  Location [facility] 429/[facility] 429-01 MRN [id number]  : 1935 Date 2025       Current Admission Date: 4/10/2025  Current Admission Diagnosis:COPD exacerbation (HCC)   Patient Active Problem List    Diagnosis Date Noted Date Diagnosed    COPD exacerbation (HCC) 04/10/2025     Acute cystitis 04/10/2025     Encephalopathy 04/10/2025     Obesity hypoventilation syndrome (HCC) 2025     Diastolic dysfunction with acute on chronic heart failure (HCC) 2025     Peripheral edema 2025     Anxiety and depression 2024     ACP (advance care planning) 2024     Mild vascular dementia without behavioral disturbance, psychotic disturbance, mood disturbance, or anxiety ([facility]) 2024     Depression, recurrent ([facility]) 2024     Acquired hypothyroidism 2024     Persistent proteinuria 2024     Secondary hyperparathyroidism of renal origin ([facility]) 2024     Low serum vitamin B12 2024     Lung nodule 2024     Chronic respiratory failure with hypoxia ([facility]) 2024     Metabolic encephalopathy 08/15/2024     Fall 08/15/2024     Stage 3 chronic kidney disease ([facility]) 2024     Nephrolithiasis 2024     Hematuria 2024     Dizziness 2024     Obesity (BMI 30-39.9) 05/10/2023     Pleural effusion 2021     Acute on chronic respiratory failure with hypoxia and hypercapnia ([facility]) 2021     History of total knee replacement 2019     JAMEEL (obstructive sleep apnea) 2017     COPD (chronic obstructive pulmonary disease) ([facility])      Benign familial tremor      Hx of arterial ischemic stroke      Esophageal reflux 2015     Clinical depression 2015     Osteoarthritis of knee 2015     Postural imbalance 10/30/2014     Insomnia 10/01/2012     Generalized osteoarthritis 2012     Female stress incontinence 2012       LOS (days):  1  Geometric Mean LOS (GMLOS) (days): 3.3  Days to GMLOS:2.5     OBJECTIVE:  PATIENT READMITTED TO HOSPITAL  Risk of Unplanned Readmission Score: 25.25         Current admission status: Inpatient       Preferred Pharmacy:   [id number] - [place] - [address]  [address] [place] [address]  Phone: [phone] Fax: [phone]    Primary Care Provider: Paz Maharaj DO    Primary Insurance: [employer] REP  Secondary Insurance:     ASSESSMENT:  Active Health Care Proxies       Izzy Gonzales Alternate Health Care Agent - Daughter   Primary Phone: [phone] (Mobile)                    Readmission Root Cause  30 Day Readmission: Yes  During your hospital stay, did someone (provider, nurse, ) explain your care to you in a way you could understand?: Yes  Did you feel medically stable to leave the hospital?: Yes  Were you able to pay for your medication at the pharmacy?: Yes  Did you have reliable transportation to take you to your appointments?: Yes  During previous admission, was a post-acute recommendation made?: Yes  What post-acute resources were offered?: [facility]  Patient was readmitted due to: COPD exacerbation    Patient Information  Admitted from:: Home  Mental Status: Alert  During Assessment patient was accompanied by: Daughter  Assessment information provided by:: Patient, Daughter  Primary Caregiver: Self  Support Systems: Self, Family members  Home entry access options. Select all that apply.: Stairs  Number of steps to enter home.: 1  Type of Current Residence: [address] [place] [address]  Upon entering residence, is there a bedroom on the main floor (no further steps)?: Yes  Upon entering residence, is there a bathroom on the main floor (no further steps)?: Yes  Living Arrangements: Lives w/ Daughter    Activities of Daily Living Prior to Admission  Functional Status: Independent  Completes ADLs independently?: Yes  Ambulates independently?: Yes  Does patient use assisted devices?:  Yes  Assisted Devices (DME) used: Walker, Home Oxygen concentrator, Portable Oxygen tanks, Shower Chair, Bedside Commode  DME Company Name (respiratory supplies): [employer]  O2 Rate(s): 2 L  Does patient have a history of Outpatient Therapy (PT/OT)?: No  Does the patient have a history of Short-Term Rehab?: Yes  Does patient have a history of HHC?: Yes  Does patient currently have HHC?: Yes    Current Home Health Care  Type of Current Home Care Services: Home PT, Home OT, Nurse visit  Current Home Health Follow-Up Provider:: PCP    Patient Information Continued  Income Source: Pension/group home  Does patient have prescription coverage?: Yes  Can the patient afford their medications and any related supplies (such as glucometers or test strips)?: Yes  Does patient receive dialysis treatments?: No  Does patient have a history of substance abuse?: No  Does patient have a history of Mental Health Diagnosis?: Yes (anxiety, depression)         Means of Transportation  [facility]          DISCHARGE DETAILS:    Discharge planning discussed with:: Patient and patient's daughterIzzy  Freedom of Choice: Yes  Comments - Freedom of Choice: Patient informed CM that she is connected with [facility] and would like to resume HHC services at discharge. CM placed resumption of care referral in [facility].  CM contacted family/caregiver?: Yes       Requested Home Health Care         Is the patient interested in HHC at discharge?: Yes  Home Health Discipline requested:: Nursing, Physical Therapy  Home Health Follow-Up Provider:: PCP  Home Health Services Needed:: Restore Joint Function Post Joint Replacement, Strengthening/Theraputic Exercises to Improve Function, Evaluate Functional Status and Safety  Homebound Criteria Met:: Uses an Assist Device (i.e. cane, walker, etc)  Supporting Clincal Findings:: Requires Oxygen      Update: Patient has level 2 PT/OT recommendations. CM went to patient's room to  discuss recommendations and patient was alseep. CM called patient's daughter, Izzy, and informed of recommendation. Izzy informed CM that she thinks patient would be okay at rehab or at home. CM will discuss with patient when awake.       Update: CM met with patient at bedside and discussed PT/OT recommendations. She is agreeable to blanket referral being placed for STR.

## 2025-04-11 NOTE — H&P
H&P - Hospitalist   Name: Ashley Gonzales 89 y.o. female I MRN: [id number]  Unit/Bed#: [facility] 429-01 I Date of Admission: 4/10/2025   Date of Service: 4/10/2025 I Hospital Day: 0     Assessment & Plan  COPD exacerbation (HCC)  Presents with fatigue, breath, altered mental status since earlier this morning  Recently hospitalized in March with similar symptoms and COPD exacerbation  Also currently being treated with Macrobid as an outpatient for UTI    Continue on IV Solu-Medrol 40 mg 3 times daily  Xopenex/ipratropium nebulized breathing treatments  Bipap QHS  Encephalopathy  Probably multifactorial secondary to UTI and hypercapnia  See plans for COPD exacerbation and UTI  Monitor for clinical improvement  Chronic respiratory failure with hypoxia (HCC)  Chronically on 2 L; currently at baseline  Monitor O2  Acute cystitis  Diagnosis as an outpatient 4/9; was started on Macrobid at that time  Placed on IV ceftriaxone while inpatient  Follow-up final urine culture  JAMEEL (obstructive sleep apnea)  Bipap QHS  Obesity (BMI 30-39.9)  BMI 33; affects all levels of care,  diet and lifestyle is recommended  Stage 3 chronic kidney disease (HCC)  Renal function at baseline with creatinine of 1.1-1.2  Continue to monitor  Mild vascular dementia without behavioral disturbance, psychotic disturbance, mood disturbance, or anxiety (HCC)  History noted  Supportive care  Maintain weight sleep cycle sleep-wake cycle      VTE Pharmacologic Prophylaxis: VTE Score: 5 High Risk (Score >/= 5) - Pharmacological DVT Prophylaxis Ordered: heparin. Sequential Compression Devices Ordered.  Code Status: Level 3 - DNAR and DNI   Discussion with family: Updated  (daughter) via phone.    Anticipated Length of Stay: Patient will be admitted on an inpatient basis with an anticipated length of stay of greater than 2 midnights secondary to COPD exacerbation, UTI, lab monitoring, medication titration, dispo planning.    History of Present  Illness   Chief Complaint: Fatigue, altered mental status, hypertension    Ashley Gonzales is a 89 y.o. female with a PMH of COPD, chronic respiratory failure on 2 L O2, hypertension, chronic CHF, JAMEEL, mild vascular dementia, who presents with fatigue, altered mentation, low blood pressure, and dyspnea.  Patient had been in her usual state of health until this morning.  However of note she was diagnosed with a UTI yesterday and started on nitrofurantoin as an outpatient.  In the emergency room she was found to be hypercapnic and started on IV steroids, breathing treatments for COPD exacerbation.  She had mild improvement, and was referred to the hospitalist for ongoing management.  At the time my evaluation patient is still confused and anxious.  She appears in mild distress secondary to labored breathing but otherwise was pleasant and conversive.  Discussed plan of care with the daughter via phone call.  All questions and concerns addressed.    REVIEW OF SYSTEMS  General Denies fevers or chills.  Positive for fatigue and generalized weakness.   HEENT Denies hearing or vision changes.   Cardiovascular Denies chest pain. Denies LE swelling. Denies palpitations. Denies dyspnea on exertion.   Respiratory Denies cough.  Positive for difficulty breathing. Denies shortness of breath.   Genitourinary Denies hematuria. Denies dysuria. Denies difficulty voiding.Denies incontinence.   Gastrointestinal Denies nausea, vomiting, or diarrhea. Denies hematochezia, melena, or hematemesis.    Musculoskeletal Denies arthralgias or myalgias. Denies joint swelling.   Psychiatric  Denies changes in mood. Denies anxiety or depression.   Neurologic Denies headache. Denies lightheadedness/dizziness.Denies numbness/tingling. Denies weakness.   Endocrine Denies weight loss or weight gain. Denies excessive thirst, sweating, urination.         Historical Information   Past Medical History:   Diagnosis Date    Anxiety     Cataract, bilateral      Last Assessed:3/19/2014    COPD (chronic obstructive pulmonary disease) (HCC)     Coronary artery disease     Hx of arterial ischemic stroke     Hypertension     Stroke (HCC)     Wears glasses      Past Surgical History:   Procedure Laterality Date    CAROTID ENDARTERECTOMY Right     CATARACT EXTRACTION W/ INTRAOCULAR LENS  IMPLANT, BILATERAL      COLONOSCOPY N/A 9/30/2017    Procedure: COLONOSCOPY FOR CONTROL OF BLEEDING;  Surgeon: Jeb Gaxiola MD;  Location: [facility];  Service: Colorectal    CORONARY ARTERY BYPASS GRAFT      CABG X3 with LIMA to LAD,SVG to OM,SVG to distal  RCA ; Last Assessed:7/13/2015    JOINT REPLACEMENT      left TKR    KIDNEY STONE SURGERY      NEPHROSTOMY Left     with Drainage Irrigation ;Onset:1974    [place] ARTHRP KNE CONDYLE&PLATU MEDIAL&LAT COMPARTMENTS Right 2/25/2016    Procedure: ARTHROPLASTY KNEE TOTAL;  Surgeon: Jeb Figueroa MD;  Location: [facility];  Service: Orthopedics     Social History     Tobacco Use    Smoking status: Former     Average packs/day: 1 pack/day for 24.0 years (24.0 ttl pk-yrs)     Types: Cigarettes     Start date: 1951     Passive exposure: Past    Smokeless tobacco: Never   Vaping Use    Vaping status: Never Used   Substance and Sexual Activity    Alcohol use: Never    Drug use: Never    Sexual activity: Not Currently     E-Cigarette/Vaping    E-Cigarette Use Never User      E-Cigarette/Vaping Substances    Nicotine No     THC No     CBD No      Family History   Problem Relation Age of Onset    Cancer Mother         malignant neoplasm    Heart attack Father     Aneurysm Other         Aortic    Cancer Other         malignant neoplasm     Social History:  Marital Status:    Occupation: does not work  Patient Pre-hospital Living Situation: Home, Long Term Care Facility  Patient Pre-hospital Level of Mobility: unable to be assessed at time of evaluation  Patient Pre-hospital Diet Restrictions: none    Meds/Allergies   I have reviewed home medications  using recent Epic encounter.  Prior to Admission medications    Medication Sig Start Date End Date Taking? Authorizing Provider   acetaminophen (TYLENOL) 325 mg tablet Take 2 tablets (650 mg total) by mouth every 6 (six) hours as needed for mild pain 2/29/24   Lissa Rodriguez PA-C   aspirin (ECOTRIN LOW STRENGTH) 81 mg EC tablet Take 81 mg by mouth daily    Historical Provider, MD   benzonatate (TESSALON PERLES) 100 mg capsule Take 1 capsule (100 mg total) by mouth 3 (three) times a day as needed for cough 4/1/25   Paz Pryblick, DO   bimatoprost (LUMIGAN) 0.01 % ophthalmic drops Administer 1 drop to both eyes daily at bedtime 10/24/24   Paz Romanyblick, DO   bisacodyl 5 mg EC tablet Take 5 mg by mouth daily as needed for constipation    Historical Provider, MD   calcitriol (ROCALTROL) 0.25 mcg capsule Take 1 capsule (0.25 mcg total) by mouth 3 (three) times a week 9/4/24   Rosibel Mehta MD   famotidine (PEPCID) 20 mg tablet TAKE 1 TABLET BY MOUTH TWICE A DAY 1/5/25   Paz Pryblick, DO   furosemide (LASIX) 20 mg tablet TAKE 1 TABLET BY MOUTH EVERY DAY 2/11/25   Paz Romanyblicsara, DO   ketoconazole (NIZORAL) 2 % cream Apply topically daily 3/20/25   Paz Pryblick, DO   levothyroxine 75 mcg tablet TAKE 1 TABLET BY MOUTH EVERY DAY IN THE MORNING 2/13/25   Paz Baumanlicsara, DO   losartan (COZAAR) 50 mg tablet Take 1 tablet (50 mg total) by mouth daily 9/4/24   Rosibel Mehta MD   metoprolol tartrate (LOPRESSOR) 25 mg tablet TAKE 1 TABLET (25 MG TOTAL) BY MOUTH EVERY 12 (TWELVE) HOURS 2/13/25   Paz Romanyblicsara, DO   mirtazapine (REMERON) 30 mg tablet Take 1.5 tablets (45 mg total) by mouth daily at bedtime 4/10/25   Paz Romanyblicsara, DO   nitrofurantoin (MACROBID) 100 mg capsule Take 1 capsule (100 mg total) by mouth 2 (two) times a day for 7 days 4/9/25 4/16/25  Paz Maharaj,    oxygen gas Inhale 2 L/min continuous. nasal cannula  Indications: .    Paz Maharaj, DO   Pediatric Multiple Vit-C-FA  (PEDIATRIC MULTIVITAMIN) chewable tablet Chew 1 tablet daily by mouth    Historical Provider, MD   predniSONE 10 mg tablet 6 tabs daily for 2 days, then 5 tabs for 2 days then 4 tabs for 2 days then 3 tabs for 2 days then 2 tab for 2 days then 1 tab for 2 days then stop stop  Patient taking differently: Take  by mouth. 6 tabs daily for 2 days, then 5 tabs for 2 days then 4 tabs for 2 days then 3 tabs for 2 days then 2 tab for 2 days then 1 tab for 2 days then stop stop. PO.  +++Completed +++ 3/21/25   Alvaro Ortez,    rosuvastatin (CRESTOR) 10 MG tablet TAKE 1 TABLET BY MOUTH EVERY DAY 11/6/24   Paz Maharaj DO   vitamin B-12 (VITAMIN B-12) 500 mcg tablet Take 1 tablet (500 mcg total) by mouth daily 1/15/25   FLAKO Yang   mirtazapine (REMERON) 30 mg tablet TAKE 1 TABLET BY MOUTH EVERYDAY AT BEDTIME  Patient taking differently: Take 1 tablet by mouth daily at bedtime. Dgt giving 45mg at bedtime   3/31/25 4/10/25  Paz Maharaj DO     Allergies   Allergen Reactions    Penicillins Itching    Penicillins Rash       Objective :  Temp:  [98 °F (36.7 °C)-98.3 °F (36.8 °C)] 98.3 °F (36.8 °C)  HR:  [62-97] 97  BP: ()/(42-67) 104/54  Resp:  [16-24] 20  SpO2:  [84 %-96 %] 95 %  O2 Device: Other (comment)    PHYSICAL EXAM:    Vitals signs reviewed  Constitutional   Awake and cooperative.  Mildly distressed and anxious.   Head/Neck   Normocephalic. Atraumatic.   HEENT   No scleral icterus. EOMI.   Heart   Regular rate and rhythm. No murmurs.   Lungs   Clear to auscultation bilaterally.  Respirations mildly labored.   Abdomen   Soft. Nontender. Nondistended.    Skin   Skin color normal. No rashes.   Extremities   No deformities. No peripheral edema.   Neuro Alert.  Confused.  No focal deficits noted.   Psych   Mood stable. Affect normal.                 Lab Results: I have reviewed the following results:  Results from last 7 days   Lab Units 04/10/25  1750   WBC Thousand/uL 7.43   HEMOGLOBIN g/dL  11.9   HEMATOCRIT % 41.2   PLATELETS Thousands/uL 193   SEGS PCT % 72   LYMPHO PCT % 13*   MONO PCT % 10   EOS PCT % 3     Results from last 7 days   Lab Units 04/10/25  1750   SODIUM mmol/L 144   POTASSIUM mmol/L 4.4   CHLORIDE mmol/L 97   CO2 mmol/L 45*   BUN mg/dL 37*   CREATININE mg/dL 1.26   ANION GAP mmol/L 2*   CALCIUM mg/dL 8.9   ALBUMIN g/dL 3.3*   TOTAL BILIRUBIN mg/dL 0.40   ALK PHOS U/L 67   ALT U/L 5*   AST U/L 10*   GLUCOSE RANDOM mg/dL 90     Results from last 7 days   Lab Units 04/10/25  1750   INR  0.90         Lab Results   Component Value Date    HGBA1C 6.0 (H) 02/21/2025    HGBA1C 6.1 02/21/2024    HGBA1C 6.1 (H) 09/23/2023     Results from last 7 days   Lab Units 04/10/25  1750   LACTIC ACID mmol/L 0.8   PROCALCITONIN ng/ml 0.08       Imaging Results Review: I reviewed radiology reports from this admission including: chest xray and CT chest.  Other Study Results Review: No additional pertinent studies reviewed.    Administrative Statements     ** Please Note: This note has been constructed using a voice recognition system. **

## 2025-04-11 NOTE — ASSESSMENT & PLAN NOTE
Chronically on 2 L; currently at baseline  Encourage incentive spirometry  Monitor ambulatory O2 sats

## 2025-04-11 NOTE — OCCUPATIONAL THERAPY NOTE
Occupational Therapy Evaluation     Patient Name: Ashley Gonzales  Today's Date: 4/11/2025  Problem List  Principal Problem:    COPD exacerbation (HCC)  Active Problems:    JAMEEL (obstructive sleep apnea)    Obesity (BMI 30-39.9)    Stage 3 chronic kidney disease (HCC)    Chronic respiratory failure with hypoxia (HCC)    Mild vascular dementia without behavioral disturbance, psychotic disturbance, mood disturbance, or anxiety (HCC)    Acute cystitis    Encephalopathy    Past Medical History  Past Medical History:   Diagnosis Date    Anxiety     Cataract, bilateral     Last Assessed:3/19/2014    COPD (chronic obstructive pulmonary disease) (HCC)     Coronary artery disease     Hx of arterial ischemic stroke     Hypertension     Stroke (HCC)     Wears glasses      Past Surgical History  Past Surgical History:   Procedure Laterality Date    CAROTID ENDARTERECTOMY Right     CATARACT EXTRACTION W/ INTRAOCULAR LENS  IMPLANT, BILATERAL      COLONOSCOPY N/A 9/30/2017    Procedure: COLONOSCOPY FOR CONTROL OF BLEEDING;  Surgeon: Jeb Gaxiola MD;  Location: [facility];  Service: Colorectal    CORONARY ARTERY BYPASS GRAFT      CABG X3 with LIMA to LAD,SVG to OM,SVG to distal  RCA ; Last Assessed:7/13/2015    JOINT REPLACEMENT      left TKR    KIDNEY STONE SURGERY      NEPHROSTOMY Left     with Drainage Irrigation ;Onset:1974    [place] ARTHRP KNE CONDYLE&PLATU MEDIAL&LAT COMPARTMENTS Right 2/25/2016    Procedure: ARTHROPLASTY KNEE TOTAL;  Surgeon: Jeb Figueroa MD;  Location: [facility];  Service: Orthopedics         04/11/25 0856   OT Last Visit   OT Visit Date 04/11/25   Note Type   Note type Evaluation   Pain Assessment   Pain Assessment Tool 0-10   Pain Score 10 - Worst Possible Pain   Pain Location/Orientation Orientation: Bilateral;Location: Foot  (numbness)   Hospital Pain Intervention(s) Repositioned;Ambulation/increased activity   Restrictions/Precautions   Weight Bearing Precautions Per Order No   Other Precautions  Cognitive;Chair Alarm;Bed Alarm;Multiple lines;Telemetry;Fall Risk;Pain;O2   Home Living   Type of Home House   Home Layout Two level;Stairs to enter with rails  (1 DIAN)   Bathroom Shower/Tub Tub/shower unit   Bathroom Toilet Standard   Bathroom Equipment Grab bars in shower;Shower chair   Home Equipment Walker;Cane  (rollator)   Additional Comments Pt reports crawling upstairs and going downstairs steated on buttocks   Prior Function   Level of [place] Needs assistance with ADLs;Needs assistance with functional mobility;Needs assistance with IADLS  (Assistance for tub transfers)   Lives With Daughter  (granddaughter)   Receives Help From Family   IADLs Family/Friend/Other provides transportation;Family/Friend/Other provides meals;Family/Friend/Other provides medication management   Falls in the last 6 months 0   Vocational Retired   Lifestyle   Autonomy Pt reports she requires assistance for ADLs, IADLs, and functional mobility with rollator use. Pt -  and retired   Reciprocal Relationships family   Service to Others retired   ADL   Where Assessed Edge of bed   Eating Assistance 5  Supervision/Setup   Grooming Assistance 5  Supervision/Setup   UB Bathing Assistance 4  Minimal Assistance   LB Bathing Assistance 3  Moderate Assistance   UB Dressing Assistance 4  Minimal Assistance   LB Dressing Assistance 3  Moderate Assistance   Toileting Assistance  3  Moderate Assistance   Functional Assistance 4  Minimal Assistance   Bed Mobility   Supine to Sit 4  Minimal assistance   Additional items Assist x 1;Increased time required;Verbal cues;LE management   Transfers   Sit to Stand 4  Minimal assistance   Additional items Assist x 1;Increased time required;Verbal cues   Stand to Sit 4  Minimal assistance   Additional items Assist x 1;Increased time required;Verbal cues   Additional Comments with rw   Functional Mobility   Functional Mobility 4  Minimal assistance   Additional Comments Pt requires MIN Ax1 to take  steps to chair with rw   Additional items Rolling walker   Balance   Static Sitting Fair +   Dynamic Sitting Fair   Static Standing Fair -   Dynamic Standing Poor +   Ambulatory Poor +   Activity Tolerance   Activity Tolerance Patient limited by fatigue   Medical Staff Made Aware PT   Nurse Made Aware RN Cleared   RUE Assessment   RUE Assessment WFL   LUE Assessment   LUE Assessment WFL   Hand Function   Gross Motor Coordination Functional   Fine Motor Coordination Functional   Cognition   Overall Cognitive Status Impaired   Arousal/Participation Alert;Responsive;Cooperative   Attention Attends with cues to redirect   Orientation Level Oriented to person;Oriented to place;Oriented to time;Disoriented to situation   Memory Decreased recall of precautions;Decreased recall of recent events;Decreased short term memory   Following Commands Follows one step commands without difficulty   Comments Pt agreeable to therapy. Pt with mild dementia, requires occasional cues for safety   Assessment   Limitation Decreased ADL status;Decreased Safe judgement during ADL;Decreased cognition;Decreased endurance;Decreased self-care trans;Decreased high-level ADLs   Prognosis Good   Assessment Pt is a 89 y/o female that was admitted to [facility] 4/10/2025 with COPD exacerbation. Pt with active OT orders and activity orders. Pt  has a past medical history of Anxiety, Cataract, bilateral, COPD (chronic obstructive pulmonary disease) ([facility]), Coronary artery disease, arterial ischemic stroke, Hypertension, Stroke ([facility]), and Wears glasses. Pt lives with daughter and granddaughter in a two level house with 1 DIAN, standard toilet with grab bars, and tub shower unit with shower chair and grab bars. Pt reports using rollator for functional mobility. Prior to admission pt requires assistance for ADLs, IADLs, and functional mobility. Pt currently requires MIN A to complete UB ADLs, functional transfers and to take steps to chair with rw.  Pt requires MOD A to complete LB ADLs and toileting. Pt limited by decreased ADL status, functional transfers, functional mobility, and activity tolerance. Pt supine in bed at begning of session, pt seated in bedside chair at end of session with alarm set and items within reach. The patient's raw score on the AM-PAC Daily Activity Inpatient Short Form is 15. A raw score of less than 19 suggests the patient may benefit from discharge to post-acute rehabilitation services. Please refer to the recommendation of the Occupational Therapist for safe discharge planning. Recommend Level II moderate intensity OT services at d/c to maximize pt function.   Goals   Patient Goals to feel better   LTG Time Frame 10-14   Plan   Treatment Interventions ADL retraining;Functional transfer training;UE strengthening/ROM;Endurance training;Cognitive reorientation;Patient/family training;Equipment evaluation/education;Neuromuscular reeducation;Compensatory technique education;Continued evaluation;Energy conservation;Activityengagement   Goal Expiration Date 04/25/25   OT Frequency 2-3x/wk   Discharge Recommendation   Rehab Resource Intensity Level, OT II (Moderate Resource Intensity)   -PAC Daily Activity Inpatient   Lower Body Dressing 2   Bathing 2   Toileting 2   Upper Body Dressing 3   Grooming 3   Eating 3   Daily Activity Raw Score 15   Daily Activity Standardized Score (Calc for Raw Score >=11) 34.69   AM-PAC Applied Cognition Inpatient   Following a Speech/Presentation 3   Understanding Ordinary Conversation 4   Taking Medications 2   Remembering Where Things Are Placed or Put Away 3   Remembering List of 4-5 Errands 2   Taking Care of Complicated Tasks 2   Applied Cognition Raw Score 16   Applied Cognition Standardized Score 35.03   End of Consult   Education Provided Yes   Patient Position at End of Consult Bedside chair;Bed/Chair alarm activated;All needs within reach   Nurse Communication Nurse aware of consult      Goals:    Pt will complete functional transfers with MOD IND and appropriate AD to maximize pt safety.    Pt will complete bed mobility with MOD IND  to maximize pt safety.    Pt will complete grooming tasks with MOD IND to maximize pt independence.    Pt will complete LB ADLs with supervision  to maximize pt independence.    Pt will complete UB ADLs with MOD IND to maximize pt independence.    Pt will complete toileting with supervision to maximize pt independence.    Pt will complete functional household distance mobility with MOD IND and appropriate AD to maximize pt safety.    Pt will complete simulated IADL tasks with MIN A to maximize pt independence.     Pt will be able to tolerate 30 minutes of functional activity during therapy session.    Pt will follow multistep directions with increased time or repeating directions 2X to maximize pt safety.     Pt will participate in continued cognitive assessment for safe discharge planning.        DENIA Brannon, OTR/L

## 2025-04-11 NOTE — ASSESSMENT & PLAN NOTE
Probably multifactorial secondary to UTI and hypercapnia  CT head no acute intracranial abnormality  Optimize metabolic parameters  Avoid neurotoxins

## 2025-04-11 NOTE — ASSESSMENT & PLAN NOTE
Presents with shortness of breath nonproductive cough fatigability  Likely COPD exacerbation  Continue IV Solu-Medrol 40 mg, transition to twice daily dosing with plans to transition to p.o. prednisone over the next 24 to 48 hours  Continue respiratory treatments  Supportive cares

## 2025-04-11 NOTE — ED PROVIDER NOTES
"Time reflects when diagnosis was documented in both MDM as applicable and the Disposition within this note       Time User Action Codes Description Comment    4/10/2025  7:40 PM Óscar Chou Add [J96.92] Hypercapnic respiratory failure (HCC)     4/10/2025  7:40 PM Óscar Chou Add [J44.1] COPD exacerbation (HCC)     4/10/2025  7:40 PM Óscar Chou Add [I50.9] CHF (congestive heart failure) ([facility])           ED Disposition       ED Disposition   Admit    Condition   Stable    Date/Time   u Apr 10, 2025  7:40 PM    Comment                  Assessment & Plan       Medical Decision Making  89-year-old female with history of COPD on 2 L nasal cannula, JAMEEL, CAD, hypertension, stroke, and mild dementia presenting today for evaluation of acute altered mental status starting at home today during physical therapy.  Patient's daughter at bedside states that she seemed to be \"off\" this morning but during her physical therapy session at home she became acutely confused and could not answer base questions.  Patient seemed to improve upon hospital arrival though she is tired appearing and will only answer simple questions overall.  Patient is currently being treated for UTI with Macrobid, has been on this for the past 2 days.  Patient's daughter also states that she has been recent been coughing more with production of yellowish sputum which is not her baseline.  Patient currently denies any complaints.    Differential: COPD exacerbation, UTI, ICH, electrolyte abnormality, pneumonia    Plan: Sepsis workup, CT head, VBG.  Will plan to treat with DuoNeb, methylprednisolone, and azithromycin.    Patient's labs notable for hypercapnia with VBG showing pCO2 of 97.9.  Remaining laboratory analysis is grossly unremarkable otherwise.  Patient CT head showing no acute intracranial abnormality.  Likely hypercapnic respiratory failure versus worsening UTI.  Sensitivities pending for urine culture.  Patient's case discussed with " "My doctor said there's something wrong with my carotid artery" - denies CP/SOB/HA/N/V. the hospitalist who agrees admit the patient for hypercapnic respiratory failure and acute altered mental status.    Amount and/or Complexity of Data Reviewed  Labs: ordered. Decision-making details documented in ED Course.  Radiology: ordered.    Risk  Prescription drug management.  Decision regarding hospitalization.        ED Course as of 04/11/25 0040   u Apr 10, 2025   1821 hs TnI 0hr: 15   1821 pCO2, Silver(!!): 97.9   1821 HCO3, Silver(!): 47.2   1829 Carbon Dioxide(!): 45       Medications   methylPREDNISolone sodium succinate (Solu-MEDROL) injection 40 mg (has no administration in time range)   azithromycin (ZITHROMAX) tablet 500 mg (has no administration in time range)   acetaminophen (TYLENOL) tablet 650 mg (has no administration in time range)   ondansetron (ZOFRAN) injection 4 mg (has no administration in time range)   heparin (porcine) subcutaneous injection 5,000 Units (has no administration in time range)   aspirin (ECOTRIN LOW STRENGTH) EC tablet 81 mg (has no administration in time range)   bimatoprost (LUMIGAN) 0.03 % ophthalmic drops 1 drop (has no administration in time range)   bisacodyl (DULCOLAX) EC tablet 5 mg (has no administration in time range)   calcitriol (ROCALTROL) capsule 0.25 mcg (has no administration in time range)   famotidine (PEPCID) tablet 20 mg (20 mg Oral Given 4/10/25 1929)   furosemide (LASIX) tablet 20 mg (has no administration in time range)   levothyroxine tablet 75 mcg (has no administration in time range)   losartan (COZAAR) tablet 50 mg (has no administration in time range)   metoprolol tartrate (LOPRESSOR) tablet 25 mg (has no administration in time range)   pravastatin (PRAVACHOL) tablet 80 mg (has no administration in time range)   cyanocobalamin (VITAMIN B-12) tablet 500 mcg (has no administration in time range)   ceftriaxone (ROCEPHIN) 1 g/50 mL in dextrose IVPB (has no administration in time range)   albuterol inhalation solution 5 mg (5 mg Nebulization Given  4/10/25 1838)   ipratropium (ATROVENT) 0.02 % inhalation solution 0.5 mg (0.5 mg Nebulization Given 4/10/25 1838)   methylPREDNISolone sodium succinate (Solu-MEDROL) injection 125 mg (125 mg Intravenous Given 4/10/25 1837)   azithromycin (ZITHROMAX) 500 mg in sodium chloride 0.9% 250mL IVPB 500 mg (500 mg Intravenous New Bag 4/10/25 1857)       ED Risk Strat Scores                    No data recorded        SBIRT 20yo+      Flowsheet Row Most Recent Value   Initial Alcohol Screen: US AUDIT-C     1. How often do you have a drink containing alcohol? 0 Filed at: 04/10/2025 1714   2. How many drinks containing alcohol do you have on a typical day you are drinking?  0 Filed at: 04/10/2025 1714   3b. FEMALE Any Age, or MALE 65+: How often do you have 4 or more drinks on one occassion? 0 Filed at: 04/10/2025 1714   Audit-C Score 0 Filed at: 04/10/2025 1714   LUDIVINA: How many times in the past year have you...    Used an illegal drug or used a prescription medication for non-medical reasons? Never Filed at: 04/10/2025 1714                            History of Present Illness       Chief Complaint   Patient presents with    Altered Mental Status     Pt comes from home, had PT come this afternoon and roughly 30 min into their session PT called because she was very confused and couldn't answer basic questions. GCS 15 on arrival.       Past Medical History:   Diagnosis Date    Anxiety     Cataract, bilateral     Last Assessed:3/19/2014    COPD (chronic obstructive pulmonary disease) (HCC)     Coronary artery disease     Hx of arterial ischemic stroke     Hypertension     Stroke (HCC)     Wears glasses       Past Surgical History:   Procedure Laterality Date    CAROTID ENDARTERECTOMY Right     CATARACT EXTRACTION W/ INTRAOCULAR LENS  IMPLANT, BILATERAL      COLONOSCOPY N/A 9/30/2017    Procedure: COLONOSCOPY FOR CONTROL OF BLEEDING;  Surgeon: Jeb Gaxiola MD;  Location: [facility];  Service: Colorectal    CORONARY ARTERY BYPASS  GRAFT      CABG X3 with LIMA to LAD,SVG to OM,SVG to distal  RCA ; Last Assessed:7/13/2015    JOINT REPLACEMENT      left TKR    KIDNEY STONE SURGERY      NEPHROSTOMY Left     with Drainage Irrigation ;Onset:1974    AL ARTHRP KNE CONDYLE&PLATU MEDIAL&LAT COMPARTMENTS Right 2/25/2016    Procedure: ARTHROPLASTY KNEE TOTAL;  Surgeon: Jeb Figueroa MD;  Location: [facility];  Service: Orthopedics      Family History   Problem Relation Age of Onset    Cancer Mother         malignant neoplasm    Heart attack Father     Aneurysm Other         Aortic    Cancer Other         malignant neoplasm      Social History     Tobacco Use    Smoking status: Former     Average packs/day: 1 pack/day for 24.0 years (24.0 ttl pk-yrs)     Types: Cigarettes     Start date: 1951     Passive exposure: Past    Smokeless tobacco: Never   Vaping Use    Vaping status: Never Used   Substance Use Topics    Alcohol use: Never    Drug use: Never      E-Cigarette/Vaping    E-Cigarette Use Never User       E-Cigarette/Vaping Substances    Nicotine No     THC No     CBD No       I have reviewed and agree with the history as documented.     Patient is an 89-year-old female with history of COPD on 2 L nasal cannula, JAMEEL, CAD, hypertension, stroke, and mild dementia presenting today for evaluation of altered mental status.  Patient is accompanied by her daughter who aids in history.  Patient was recently discharged from this hospital 3/21 for acute COPD exacerbation.  Patient has been living at home with her daughter who cares for her.  She states that for the past several days patient is been having a cough productive of yellowish sputum.  She states that this morning she seemed little bit more confused than normal in the morning but patient's daughter was not super concerned.  She states that during her PT appointment home today that she developed sudden worsening confusion and was unable to answer basic questions.  On arrival to the hospital, patient  does appear to be having improvement of her mental status and currently denies any complaints.  Of note, patient was recently diagnosed with UTI and is being treated with Macrobid.  Urine cultures pending sensitivity analysis.        Review of Systems   Constitutional:  Negative for chills and fever.   HENT:  Negative for congestion, rhinorrhea and sore throat.    Eyes:  Negative for pain and visual disturbance.   Respiratory:  Positive for cough. Negative for shortness of breath.    Cardiovascular:  Negative for chest pain and palpitations.   Gastrointestinal:  Negative for abdominal pain, constipation, diarrhea, nausea and vomiting.   Genitourinary:  Negative for dysuria and hematuria.   Musculoskeletal:  Negative for back pain and neck pain.   Skin:  Negative for color change and rash.   Neurological:  Negative for weakness and numbness.   All other systems reviewed and are negative.          Objective       ED Triage Vitals   Temperature Pulse Blood Pressure Respirations SpO2 Patient Position - Orthostatic VS   04/10/25 1712 04/10/25 1712 04/10/25 1714 04/10/25 1712 04/10/25 1712 04/10/25 1930   98.2 °F (36.8 °C) 73 141/67 16 (!) 84 % Lying      Temp Source Heart Rate Source BP Location FiO2 (%) Pain Score    04/10/25 1712 04/10/25 1712 04/10/25 1714 -- 04/10/25 1712    Oral Monitor Left arm  No Pain      Vitals      Date and Time Temp Pulse SpO2 Resp BP Pain Score FACES Pain Rating User   04/10/25 2224 97.2 °F (36.2 °C) -- -- -- -- -- -- PK   04/10/25 2224 -- 107 89 % 17 103/54 -- -- DII   04/10/25 2009 98.3 °F (36.8 °C) 97 95 % -- 104/54 -- -- DII   04/10/25 2000 -- -- -- -- -- No Pain -- KM   04/10/25 1930 -- -- -- -- -- No Pain -- KM   04/10/25 1930 -- 90 93 % 20 119/54 -- -- ED   04/10/25 1845 -- 74 96 % -- -- -- -- JK   04/10/25 1714 -- -- 84 % 20 141/67 -- -- JK   04/10/25 1712 98.2 °F (36.8 °C) 73 84 % 16 -- No Pain -- GAGAN            Physical Exam  Vitals and nursing note reviewed.   Constitutional:        General: She is not in acute distress.     Appearance: Normal appearance. She is well-developed. She is ill-appearing. She is not toxic-appearing or diaphoretic.   HENT:      Head: Normocephalic and atraumatic.      Right Ear: External ear normal.      Left Ear: External ear normal.      Nose: Nose normal. No congestion or rhinorrhea.      Mouth/Throat:      Mouth: Mucous membranes are moist.   Eyes:      General: No scleral icterus.        Right eye: No discharge.         Left eye: No discharge.      Extraocular Movements:      Right eye: Normal extraocular motion and no nystagmus.      Left eye: Normal extraocular motion and no nystagmus.      Conjunctiva/sclera: Conjunctivae normal.   Cardiovascular:      Rate and Rhythm: Regular rhythm. Tachycardia present.      Pulses: Normal pulses.      Heart sounds: Normal heart sounds. No murmur heard.     No friction rub. No gallop.   Pulmonary:      Effort: Pulmonary effort is normal. No respiratory distress.      Breath sounds: Normal breath sounds. No stridor. No wheezing, rhonchi or rales.      Comments: Tachypneic without evidence of respiratory distress  Abdominal:      General: Abdomen is flat. There is no distension.      Palpations: Abdomen is soft.      Tenderness: There is no abdominal tenderness. There is no guarding.   Musculoskeletal:         General: No swelling or tenderness. Normal range of motion.      Cervical back: Normal range of motion and neck supple.   Skin:     General: Skin is warm and dry.      Capillary Refill: Capillary refill takes less than 2 seconds.      Coloration: Skin is not jaundiced or pale.      Comments: No evidence of skin breakdown or ulcerations.  There is irritation of the skin of the bilateral inframamillary folds.   Neurological:      General: No focal deficit present.      Mental Status: She is alert and oriented to person, place, and time.      GCS: GCS eye subscore is 4. GCS verbal subscore is 5. GCS motor subscore is 6.       Cranial Nerves: No cranial nerve deficit, dysarthria or facial asymmetry.      Sensory: No sensory deficit.      Motor: No weakness.      Comments: Tired appearing but alert   Psychiatric:         Mood and Affect: Mood normal.         Behavior: Behavior normal.         Results Reviewed       Procedure Component Value Units Date/Time    Blood culture #1 [[id number]] Collected: 04/10/25 1832    Lab Status: Preliminary result Specimen: Blood from Hand, Right Updated: 04/10/25 2301     Blood Culture Received in Microbiology Lab. Culture in Progress.    Blood culture #2 [[id number]] Collected: 04/10/25 1750    Lab Status: Preliminary result Specimen: Blood from Arm, Right Updated: 04/10/25 2301     Blood Culture Received in Microbiology Lab. Culture in Progress.    HS Troponin I 2hr [[id number]]  (Normal) Collected: 04/10/25 1938    Lab Status: Final result Specimen: Blood from Hand, Left Updated: 04/10/25 2016     hs TnI 2hr 13 ng/L      Delta 2hr hsTnI -2 ng/L     HS Troponin I 4hr [[id number]]     Lab Status: No result Specimen: Blood     FLU/RSV/COVID - if FLU/RSV clinically relevant [[id number]]  (Normal) Collected: 04/10/25 1832    Lab Status: Final result Specimen: Nares from Nose Updated: 04/10/25 1931     SARS-CoV-2 Negative     INFLUENZA A PCR Negative     INFLUENZA B PCR Negative     RSV PCR Negative    Narrative:      This test has been performed using the CoV-2/Flu/RSV plus assay on the [employer] GeneXpert platform. This test has been validated by the  and verified by the performing laboratory.     This test is designed to amplify and detect the following: nucleocapsid (N), envelope (E), and RNA-dependent RNA polymerase (RdRP) genes of the SARS-CoV-2 genome; matrix (M), basic polymerase (PB2), and acidic protein (PA) segments of the influenza A genome; matrix (M) and non-structural protein (NS) segments of the influenza B genome, and the nucleocapsid genes of RSV A and RSV B.     Positive results are  indicative of the presence of Flu A, Flu B, RSV, and/or SARS-CoV-2 RNA. Positive results for SARS-CoV-2 or suspected novel influenza should be reported to state, local, or federal health departments according to local reporting requirements.      All results should be assessed in conjunction with clinical presentation and other laboratory markers for clinical management.     FOR PEDIATRIC PATIENTS - copy/paste COVID Guidelines URL to browser: [email or web address]       Procalcitonin [[id number]]  (Normal) Collected: 04/10/25 1750    Lab Status: Final result Specimen: Blood from Arm, Right Updated: 04/10/25 1836     Procalcitonin 0.08 ng/ml     B-Type Natriuretic Peptide(BNP) [[id number]]  (Normal) Collected: 04/10/25 1750    Lab Status: Final result Specimen: Blood from Arm, Right Updated: 04/10/25 1833     BNP 75 pg/mL     Lactic acid [[id number]]  (Normal) Collected: 04/10/25 1750    Lab Status: Final result Specimen: Blood from Arm, Right Updated: 04/10/25 1828     LACTIC ACID 0.8 mmol/L     Narrative:      Result may be elevated if tourniquet was used during collection.    Comprehensive metabolic panel [[id number]]  (Abnormal) Collected: 04/10/25 1750    Lab Status: Final result Specimen: Blood from Arm, Right Updated: 04/10/25 1828     Sodium 144 mmol/L      Potassium 4.4 mmol/L      Chloride 97 mmol/L      CO2 45 mmol/L      ANION GAP 2 mmol/L      BUN 37 mg/dL      Creatinine 1.26 mg/dL      Glucose 90 mg/dL      Calcium 8.9 mg/dL      Corrected Calcium 9.5 mg/dL      AST 10 U/L      ALT 5 U/L      Alkaline Phosphatase 67 U/L      Total Protein 6.3 g/dL      Albumin 3.3 g/dL      Total Bilirubin 0.40 mg/dL      eGFR 37 ml/min/1.73sq m     Narrative:      National Kidney Disease Foundation guidelines for Chronic Kidney Disease (CKD):     Stage 1 with normal or high GFR (GFR > 90 mL/min/1.73 square meters)    Stage 2 Mild CKD (GFR = 60-89 mL/min/1.73 square  meters)    Stage 3A Moderate CKD (GFR = 45-59 mL/min/1.73 square meters)    Stage 3B Moderate CKD (GFR = 30-44 mL/min/1.73 square meters)    Stage 4 Severe CKD (GFR = 15-29 mL/min/1.73 square meters)    Stage 5 End Stage CKD (GFR <15 mL/min/1.73 square meters)  Note: GFR calculation is accurate only with a steady state creatinine    HS Troponin 0hr (reflex protocol) [[id number]]  (Normal) Collected: 04/10/25 1750    Lab Status: Final result Specimen: Blood from Arm, Right Updated: 04/10/25 1820     hs TnI 0hr 15 ng/L     Protime-INR [[id number]]  (Normal) Collected: 04/10/25 1750    Lab Status: Final result Specimen: Blood from Arm, Right Updated: 04/10/25 1819     Protime 12.5 seconds      INR 0.90    Narrative:      INR Therapeutic Range    Indication                                             INR Range      Atrial Fibrillation                                               2.0-3.0  Hypercoagulable State                                    2.0.2.3  Left Ventricular Asist Device                            2.0-3.0  Mechanical Heart Valve                                  -    Aortic(with afib, MI, embolism, HF, LA enlargement,    and/or coagulopathy)                                     2.0-3.0 (2.5-3.5)     Mitral                                                             2.5-3.5  Prosthetic/Bioprosthetic Heart Valve               2.0-3.0  Venous thromboembolism (VTE: VT, PE        2.0-3.0    APTT [[id number]]  (Normal) Collected: 04/10/25 1750    Lab Status: Final result Specimen: Blood from Arm, Right Updated: 04/10/25 1819     PTT 25 seconds     CBC and differential [[id number]]  (Abnormal) Collected: 04/10/25 1750    Lab Status: Final result Specimen: Blood from Arm, Right Updated: 04/10/25 1806     WBC 7.43 Thousand/uL      RBC 4.09 Million/uL      Hemoglobin 11.9 g/dL      Hematocrit 41.2 %       fL      MCH 29.1 pg      MCHC 28.9 g/dL      RDW 12.5 %      MPV 9.8 fL      Platelets 193 Thousands/uL      nRBC  0 /100 WBCs      Segmented % 72 %      Immature Grans % 1 %      Lymphocytes % 13 %      Monocytes % 10 %      Eosinophils Relative 3 %      Basophils Relative 1 %      Absolute Neutrophils 5.43 Thousands/µL      Absolute Immature Grans 0.04 Thousand/uL      Absolute Lymphocytes 0.94 Thousands/µL      Absolute Monocytes 0.73 Thousand/µL      Eosinophils Absolute 0.24 Thousand/µL      Basophils Absolute 0.05 Thousands/µL     Blood gas, Venous [[id number]]  (Abnormal) Collected: 04/10/25 1750    Lab Status: Final result Specimen: Blood from Arm, Right Updated: 04/10/25 1805     pH, Silver 7.301     pCO2, Silver 97.9 mm Hg      pO2, Silver 25.7 mm Hg      HCO3, Silver 47.2 mmol/L      Base Excess, Silver 16.4 mmol/L      O2 Content, Silver 8.1 ml/dL      O2 HGB, VENOUS 45.9 %             CT head wo contrast   Final Interpretation by Rain Elizondo MD (04/10 1839)      No acute intracranial abnormality. In particular, no acute territorial infarct or intracranial hemorrhage.                     Workstation performed: [employer]         XR chest portable    (Results Pending)       Procedures    ED Medication and Procedure Management   Prior to Admission Medications   Prescriptions Last Dose Informant Patient Reported? Taking?   Pediatric Multiple Vit-C-FA (PEDIATRIC MULTIVITAMIN) chewable tablet  Family Member, Self Yes No   Sig: Chew 1 tablet daily by mouth   acetaminophen (TYLENOL) 325 mg tablet  Family Member, Self No No   Sig: Take 2 tablets (650 mg total) by mouth every 6 (six) hours as needed for mild pain   aspirin (ECOTRIN LOW STRENGTH) 81 mg EC tablet  Family Member, Self Yes No   Sig: Take 81 mg by mouth daily   benzonatate (TESSALON PERLES) 100 mg capsule   No No   Sig: Take 1 capsule (100 mg total) by mouth 3 (three) times a day as needed for cough   bimatoprost (LUMIGAN) 0.01 % ophthalmic drops   No No   Sig: Administer 1 drop to both eyes daily at bedtime   bisacodyl 5 mg EC tablet  Family Member, Self Yes No   Sig: Take 5 mg  by mouth daily as needed for constipation   calcitriol (ROCALTROL) 0.25 mcg capsule   No No   Sig: Take 1 capsule (0.25 mcg total) by mouth 3 (three) times a week   famotidine (PEPCID) 20 mg tablet   No No   Sig: TAKE 1 TABLET BY MOUTH TWICE A DAY   furosemide (LASIX) 20 mg tablet   No No   Sig: TAKE 1 TABLET BY MOUTH EVERY DAY   ketoconazole (NIZORAL) 2 % cream   No No   Sig: Apply topically daily   levothyroxine 75 mcg tablet   No No   Sig: TAKE 1 TABLET BY MOUTH EVERY DAY IN THE MORNING   losartan (COZAAR) 50 mg tablet   No No   Sig: Take 1 tablet (50 mg total) by mouth daily   metoprolol tartrate (LOPRESSOR) 25 mg tablet   No No   Sig: TAKE 1 TABLET (25 MG TOTAL) BY MOUTH EVERY 12 (TWELVE) HOURS   mirtazapine (REMERON) 30 mg tablet   No No   Sig: Take 1.5 tablets (45 mg total) by mouth daily at bedtime   nitrofurantoin (MACROBID) 100 mg capsule   No No   Sig: Take 1 capsule (100 mg total) by mouth 2 (two) times a day for 7 days   oxygen gas  Family Member, Self Yes No   Sig: Inhale 2 L/min continuous. nasal cannula  Indications: .   predniSONE 10 mg tablet   No No   Si tabs daily for 2 days, then 5 tabs for 2 days then 4 tabs for 2 days then 3 tabs for 2 days then 2 tab for 2 days then 1 tab for 2 days then stop stop   Patient taking differently: Take  by mouth. 6 tabs daily for 2 days, then 5 tabs for 2 days then 4 tabs for 2 days then 3 tabs for 2 days then 2 tab for 2 days then 1 tab for 2 days then stop stop. PO.  +++Completed +++   rosuvastatin (CRESTOR) 10 MG tablet   No No   Sig: TAKE 1 TABLET BY MOUTH EVERY DAY   vitamin B-12 (VITAMIN B-12) 500 mcg tablet   No No   Sig: Take 1 tablet (500 mcg total) by mouth daily      Facility-Administered Medications: None     Current Discharge Medication List        CONTINUE these medications which have NOT CHANGED    Details   acetaminophen (TYLENOL) 325 mg tablet Take 2 tablets (650 mg total) by mouth every 6 (six) hours as needed for mild pain    Associated  Diagnoses: Generalized weakness      aspirin (ECOTRIN LOW STRENGTH) 81 mg EC tablet Take 81 mg by mouth daily      benzonatate (TESSALON PERLES) 100 mg capsule Take 1 capsule (100 mg total) by mouth 3 (three) times a day as needed for cough  Qty: 20 capsule, Refills: 0    Comments: USE GOOD RX  if not covered  Associated Diagnoses: Nocturnal cough      bimatoprost (LUMIGAN) 0.01 % ophthalmic drops Administer 1 drop to both eyes daily at bedtime  Qty: 5 mL, Refills: 0    Associated Diagnoses: Glaucoma of both eyes, unspecified glaucoma type      bisacodyl 5 mg EC tablet Take 5 mg by mouth daily as needed for constipation      calcitriol (ROCALTROL) 0.25 mcg capsule Take 1 capsule (0.25 mcg total) by mouth 3 (three) times a week  Qty: 45 capsule, Refills: 3    Associated Diagnoses: Nephrolithiasis; Stage 3b chronic kidney disease ([facility]); Hematuria, unspecified type; Obesity (BMI 30-39.9); Persistent proteinuria; Secondary hyperparathyroidism of renal origin ([facility]); Hypertension      famotidine (PEPCID) 20 mg tablet TAKE 1 TABLET BY MOUTH TWICE A DAY  Qty: 180 tablet, Refills: 1    Associated Diagnoses: Gastroesophageal reflux disease without esophagitis      furosemide (LASIX) 20 mg tablet TAKE 1 TABLET BY MOUTH EVERY DAY  Qty: 100 tablet, Refills: 1    Associated Diagnoses: Acute on chronic respiratory failure with hypoxia and hypercapnia ([facility])      ketoconazole (NIZORAL) 2 % cream Apply topically daily  Qty: 60 g, Refills: 0    Associated Diagnoses: Candidal intertrigo      levothyroxine 75 mcg tablet TAKE 1 TABLET BY MOUTH EVERY DAY IN THE MORNING  Qty: 90 tablet, Refills: 1    Associated Diagnoses: Acquired hypothyroidism      losartan (COZAAR) 50 mg tablet Take 1 tablet (50 mg total) by mouth daily  Qty: 90 tablet, Refills: 3    Associated Diagnoses: Hypertension      metoprolol tartrate (LOPRESSOR) 25 mg tablet TAKE 1 TABLET (25 MG TOTAL) BY MOUTH EVERY 12 (TWELVE) HOURS  Qty: 180 tablet, Refills: 1    Associated  Diagnoses: Essential hypertension      mirtazapine (REMERON) 30 mg tablet Take 1.5 tablets (45 mg total) by mouth daily at bedtime    Associated Diagnoses: Altered mental status      nitrofurantoin (MACROBID) 100 mg capsule Take 1 capsule (100 mg total) by mouth 2 (two) times a day for 7 days  Qty: 14 capsule, Refills: 0    Associated Diagnoses: UTI symptoms      oxygen gas Inhale 2 L/min continuous. nasal cannula  Indications: .      Pediatric Multiple Vit-C-FA (PEDIATRIC MULTIVITAMIN) chewable tablet Chew 1 tablet daily by mouth      predniSONE 10 mg tablet 6 tabs daily for 2 days, then 5 tabs for 2 days then 4 tabs for 2 days then 3 tabs for 2 days then 2 tab for 2 days then 1 tab for 2 days then stop stop  Qty: 42 tablet, Refills: 0    Associated Diagnoses: Chronic respiratory failure with hypoxia and hypercapnia (HCC)      rosuvastatin (CRESTOR) 10 MG tablet TAKE 1 TABLET BY MOUTH EVERY DAY  Qty: 90 tablet, Refills: 1    Associated Diagnoses: Coronary artery disease involving native coronary artery of native heart without angina pectoris      vitamin B-12 (VITAMIN B-12) 500 mcg tablet Take 1 tablet (500 mcg total) by mouth daily  Qty: 90 tablet, Refills: 1    Associated Diagnoses: Memory change; Low serum vitamin B12           No discharge procedures on file.  ED SEPSIS DOCUMENTATION   Time reflects when diagnosis was documented in both MDM as applicable and the Disposition within this note       Time User Action Codes Description Comment    4/10/2025  7:40 PM Óscar Chou Add [J96.92] Hypercapnic respiratory failure (HCC)     4/10/2025  7:40 PM Óscar Chou Add [J44.1] COPD exacerbation ([facility])     4/10/2025  7:40 PM Óscar Chou Add [I50.9] CHF (congestive heart failure) ([facility])                  Óscar Chou MD  04/11/25 0040

## 2025-04-11 NOTE — PLAN OF CARE
Problem: OCCUPATIONAL THERAPY ADULT  Goal: Performs self-care activities at highest level of function for planned discharge setting.  See evaluation for individualized goals.  Description: Treatment Interventions: ADL retraining, Functional transfer training, UE strengthening/ROM, Endurance training, Cognitive reorientation, Patient/family training, Equipment evaluation/education, Neuromuscular reeducation, Compensatory technique education, Continued evaluation, Energy conservation, Activityengagement          See flowsheet documentation for full assessment, interventions and recommendations.   Outcome: Progressing  Note: Limitation: Decreased ADL status, Decreased Safe judgement during ADL, Decreased cognition, Decreased endurance, Decreased self-care trans, Decreased high-level ADLs  Prognosis: Good  Assessment: Pt is a 88 y/o female that was admitted to [facility] 4/10/2025 with COPD exacerbation. Pt with active OT orders and activity orders. Pt  has a past medical history of Anxiety, Cataract, bilateral, COPD (chronic obstructive pulmonary disease) ([facility]), Coronary artery disease, arterial ischemic stroke, Hypertension, Stroke ([facility]), and Wears glasses. Pt lives with daughter and granddaughter in a two level house with 1 DIAN, standard toilet with grab bars, and tub shower unit with shower chair and grab bars. Pt reports using rollator for functional mobility. Prior to admission pt requires assistance for ADLs, IADLs, and functional mobility. Pt currently requires MIN A to complete UB ADLs, functional transfers and to take steps to chair with rw. Pt requires MOD A to complete LB ADLs and toileting. Pt limited by decreased ADL status, functional transfers, functional mobility, and activity tolerance. Pt supine in bed at begning of session, pt seated in bedside chair at end of session with alarm set and items within reach. The patient's raw score on the -PAC Daily Activity Inpatient Short Form is 15. A  raw score of less than 19 suggests the patient may benefit from discharge to post-acute rehabilitation services. Please refer to the recommendation of the Occupational Therapist for safe discharge planning. Recommend Level II moderate intensity OT services at d/c to maximize pt function.     Rehab Resource Intensity Level, OT: II (Moderate Resource Intensity)

## 2025-04-11 NOTE — PROGRESS NOTES
04/11/25 1500   Clinical Encounter Type   Visited With Patient  (Fr Nieto)   Sacramental Encounters   Sacrament Other Other (Comment)  (blessing given)

## 2025-04-11 NOTE — ED ATTENDING ATTESTATION
4/10/2025  I, Wang Graff MD, saw and evaluated the patient. I have discussed the patient with the resident/non-physician practitioner and agree with the resident's/non-physician practitioner's findings, Plan of Care, and MDM as documented in the resident's/non-physician practitioner's note, except where noted. All available labs and Radiology studies were reviewed.  I was present for key portions of any procedure(s) performed by the resident/non-physician practitioner and I was immediately available to provide assistance.       At this point I agree with the current assessment done in the Emergency Department.  I have conducted an independent evaluation of this patient a history and physical is as follows:    ED Course     Impression: Acute encephalopathy, chronic respiratory failure with hypoxia and hypercapnia, history of COPD, history of JAMEEL on nighttime CPAP    Differential diagnosis: At risk for toxic metabolic encephalopathy, at risk for acute hypercapnic respiratory failure, at risk for pneumonia, at risk for urosepsis,    Plan to check labs, chest x-ray, VBG, CT brain sepsis labs blood cultures troponins ECG reassess anticipate admission    Labs reviewed: CBC remarkable for elevated MCV low lymphocytes.  CMP remarkable for elevated CO2 of anion gap elevated BUN elevated creatinine lactic acid within normal limits.  Procalcitonin level within normal limits PT/INR within normal limits BMP within normal limits.  VBG remarkable for low normal pH with elevated pCO2 from baseline concerning for acute hypercapnia initial troponin elevated 15 will perform delta flu COVID RSV unremarkable 2-hour troponin 13 with a delta of -2.    Chest x-ray independently interpreted by me no acute cardiopulmonary disease.    CT head without contrast reviewed report: No acute intracranial abnormality    Case discussed with medicine service will meet the medicine.  Further evaluation management.    Critical Care  Time  Procedures

## 2025-04-12 ENCOUNTER — HOME CARE VISIT (OUTPATIENT)
Dept: HOME HEALTH SERVICES | Facility: HOME HEALTHCARE | Age: OVER 89
End: 2025-04-12
Payer: COMMERCIAL

## 2025-04-12 ENCOUNTER — RESULTS FOLLOW-UP (OUTPATIENT)
Dept: FAMILY MEDICINE CLINIC | Facility: CLINIC | Age: OVER 89
End: 2025-04-12

## 2025-04-12 PROBLEM — R26.2 AMBULATORY DYSFUNCTION: Status: ACTIVE | Noted: 2025-04-12

## 2025-04-12 LAB — BACTERIA UR CULT: ABNORMAL

## 2025-04-12 PROCEDURE — 94640 AIRWAY INHALATION TREATMENT: CPT

## 2025-04-12 PROCEDURE — 99232 SBSQ HOSP IP/OBS MODERATE 35: CPT | Performed by: INTERNAL MEDICINE

## 2025-04-12 PROCEDURE — 94660 CPAP INITIATION&MGMT: CPT

## 2025-04-12 PROCEDURE — 94760 N-INVAS EAR/PLS OXIMETRY 1: CPT

## 2025-04-12 RX ORDER — METHYLPREDNISOLONE SODIUM SUCCINATE 40 MG/ML
40 INJECTION, POWDER, LYOPHILIZED, FOR SOLUTION INTRAMUSCULAR; INTRAVENOUS DAILY
Status: DISCONTINUED | OUTPATIENT
Start: 2025-04-13 | End: 2025-04-13

## 2025-04-12 RX ADMIN — ASPIRIN 81 MG: 81 TABLET, COATED ORAL at 08:59

## 2025-04-12 RX ADMIN — NYSTATIN: 100000 POWDER TOPICAL at 17:05

## 2025-04-12 RX ADMIN — FUROSEMIDE 20 MG: 20 TABLET ORAL at 08:59

## 2025-04-12 RX ADMIN — BENZONATATE 200 MG: 100 CAPSULE ORAL at 17:05

## 2025-04-12 RX ADMIN — FAMOTIDINE 20 MG: 20 TABLET, FILM COATED ORAL at 17:05

## 2025-04-12 RX ADMIN — PRAVASTATIN SODIUM 80 MG: 80 TABLET ORAL at 17:05

## 2025-04-12 RX ADMIN — BIMATOPROST 1 DROP: 0.3 SOLUTION/ DROPS OPHTHALMIC at 20:16

## 2025-04-12 RX ADMIN — METOPROLOL TARTRATE 25 MG: 25 TABLET, FILM COATED ORAL at 08:59

## 2025-04-12 RX ADMIN — AZITHROMYCIN DIHYDRATE 500 MG: 250 TABLET ORAL at 17:05

## 2025-04-12 RX ADMIN — NYSTATIN: 100000 POWDER TOPICAL at 20:26

## 2025-04-12 RX ADMIN — HEPARIN SODIUM 5000 UNITS: 5000 INJECTION INTRAVENOUS; SUBCUTANEOUS at 21:09

## 2025-04-12 RX ADMIN — GUAIFENESIN 600 MG: 600 TABLET, EXTENDED RELEASE ORAL at 20:16

## 2025-04-12 RX ADMIN — ALBUTEROL SULFATE 2.5 MG: 2.5 SOLUTION RESPIRATORY (INHALATION) at 07:27

## 2025-04-12 RX ADMIN — FAMOTIDINE 20 MG: 20 TABLET, FILM COATED ORAL at 08:59

## 2025-04-12 RX ADMIN — METHYLPREDNISOLONE SODIUM SUCCINATE 40 MG: 40 INJECTION, POWDER, FOR SOLUTION INTRAMUSCULAR; INTRAVENOUS at 08:59

## 2025-04-12 RX ADMIN — CEFTRIAXONE SODIUM 1000 MG: 10 INJECTION, POWDER, FOR SOLUTION INTRAVENOUS at 20:18

## 2025-04-12 RX ADMIN — METOPROLOL TARTRATE 25 MG: 25 TABLET, FILM COATED ORAL at 20:16

## 2025-04-12 RX ADMIN — NYSTATIN: 100000 POWDER TOPICAL at 09:00

## 2025-04-12 RX ADMIN — CYANOCOBALAMIN TAB 500 MCG 500 MCG: 500 TAB at 08:59

## 2025-04-12 RX ADMIN — LOSARTAN POTASSIUM 50 MG: 50 TABLET, FILM COATED ORAL at 08:59

## 2025-04-12 RX ADMIN — GUAIFENESIN 600 MG: 600 TABLET, EXTENDED RELEASE ORAL at 08:59

## 2025-04-12 RX ADMIN — BENZONATATE 200 MG: 100 CAPSULE ORAL at 20:16

## 2025-04-12 RX ADMIN — BENZONATATE 200 MG: 100 CAPSULE ORAL at 08:59

## 2025-04-12 RX ADMIN — HEPARIN SODIUM 5000 UNITS: 5000 INJECTION INTRAVENOUS; SUBCUTANEOUS at 13:02

## 2025-04-12 NOTE — ASSESSMENT & PLAN NOTE
Presents with shortness of breath nonproductive cough fatigability  Likely COPD exacerbation  Clinically and symptomatically improving  Continue IV Solu-Medrol transition to once daily dosing with plans to transition to p.o. prednisone over the next 24 to 48 hours  Continue respiratory treatments  Supportive cares

## 2025-04-12 NOTE — UTILIZATION REVIEW
Initial Clinical Review - Continued    SEE INITIAL REVIEW AT BOTTOM    Date: 04/12/25                      Day 3    Current Patient Class: Inpatient  Current Level of Care: Med/Surg    HPI:89 y.o. female initially admitted on 4/10/2025 for COPD exacerbation (HCC).      Assessment/Plan:   Day 3: Has surpassed a 2nd midnight with active treatments and services. Pt w/ cough and mucopurulent sputum. Notes some improvement in chest tightness and breathing. Exam: diminished breath sounds, rhonchi. Plan: continue IV solu-medrol, nebs, continue supplemental O2, encourage incentive spirometry, IV ABX - urine culture positive E coli. Trend labs, replete electrolytes as needed.      Medications:   Scheduled Medications:  aspirin, 81 mg, Oral, Daily  azithromycin, 500 mg, Oral, Q24H  benzonatate, 200 mg, Oral, TID  bimatoprost, 1 drop, Ophthalmic, Daily  calcitriol, 0.25 mcg, Oral, Once per day on Monday Wednesday Friday  cefTRIAXone, 1,000 mg, Intravenous, Q24H  vitamin B-12, 500 mcg, Oral, Daily  famotidine, 20 mg, Oral, BID  furosemide, 20 mg, Oral, Daily  guaiFENesin, 600 mg, Oral, Q12H MODESTO  heparin (porcine), 5,000 Units, Subcutaneous, Q8H MODESTO  levothyroxine, 75 mcg, Oral, Early Morning  losartan, 50 mg, Oral, Daily  methylPREDNISolone sodium succinate, 40 mg, Intravenous, Q12H MODESTO  metoprolol tartrate, 25 mg, Oral, Q12H MODESTO  nystatin, , Topical, TID  pravastatin, 80 mg, Oral, Daily With Dinner    Continuous IV Infusions: none    PRN Meds:  acetaminophen, 650 mg, Oral, Q6H PRN  albuterol, 2.5 mg, Nebulization, Q4H PRN; 4/12 x1  bisacodyl, 5 mg, Oral, Daily PRN  ondansetron, 4 mg, Intravenous, Q6H PRN        Vital Signs:   Vital Signs (last 3 days)       Date/Time Temp Pulse Resp BP MAP (mmHg) SpO2 Calculated FIO2 (%) - Nasal Cannula Nasal Cannula O2 Flow Rate (L/min) O2 Device O2 Interface Device Patient Position - Orthostatic VS Madhuri Coma Scale Score Pain    04/12/25 07:55:53 97.7 °F (36.5 °C) 60 -- 168/68 101 95 % --  -- None (Room air) -- Lying -- --    04/12/25 0745 -- -- -- -- -- -- 36 4 L/min Nasal cannula -- -- 15 No Pain    04/12/25 0742 -- -- -- -- -- 92 % -- -- -- Face mask -- -- --    04/12/25 0310 -- -- -- -- -- -- -- -- -- Face mask -- -- --    04/12/25 02:27:53 98.2 °F (36.8 °C) 59 18 132/58 83 96 % -- -- -- -- -- -- --    04/12/25 0100 -- -- -- -- -- -- -- -- -- -- -- -- No Pain    04/11/25 2342 -- -- -- -- -- 95 % -- -- -- Face mask -- -- --    04/11/25 22:27:14 99.2 °F (37.3 °C) 70 16 126/64 85 93 % -- -- -- -- -- -- --    04/11/25 2000 -- -- -- -- -- -- -- -- -- -- -- 15 --    04/11/25 17:03:29 -- 83 -- 132/60 84 93 % -- -- -- -- -- -- --    04/11/25 15:27:30 -- 96 -- 169/59 96 84 % -- -- -- -- -- -- --    04/11/25 0959 -- -- -- -- -- -- -- -- None (Room air) -- -- 15 No Pain    04/11/25 0856 -- -- -- -- -- -- -- -- -- -- -- -- 10 - Worst Possible Pain    04/11/25 0849 -- -- -- -- -- -- -- -- -- -- -- -- 10 - Worst Possible Pain    04/11/25 08:37:38 98.8 °F (37.1 °C) 84 -- 157/67 97 91 % -- -- Nasal cannula -- Lying -- --    04/11/25 0739 -- -- -- -- -- -- 32 3 L/min Nasal cannula -- -- -- --    04/11/25 0201 -- -- -- -- -- -- -- -- -- Face mask -- -- --    04/11/25 0100 -- -- -- -- -- -- -- -- BiPAP -- -- -- --    04/10/25 2300 -- -- -- -- -- -- 28 2 L/min Nasal cannula -- -- -- --    04/10/25 22:24:03 97.2 °F (36.2 °C) 107 17 103/54 70 89 % 28 2 L/min Nasal cannula -- Lying -- --    04/10/25 20:09:52 98.3 °F (36.8 °C) 97 -- 104/54 71 95 % 28 2 L/min Nasal cannula -- -- -- --    04/10/25 2000 -- -- -- -- -- -- -- -- -- -- -- 15 No Pain    04/10/25 1930 -- 90 20 119/54 78 93 % -- -- Other (comment) -- Lying -- No Pain    04/10/25 1908 -- -- -- -- -- -- -- -- Other (comment) -- -- -- --    04/10/25 1845 -- 74 -- -- -- 96 % -- -- -- -- -- -- --    04/10/25 1714 -- -- 20 141/67 97 84 % -- -- -- -- -- -- --    04/10/25 1712 98.2 °F (36.8 °C) 73 16 -- -- 84 % -- -- None (Room air) -- -- -- No Pain            Pertinent  Labs/Diagnostic Results:   Cardiology:  ECG 12 lead   Final Result by Edgar Rivero MD (04/11 1446)   Sinus rhythm with 1st degree A-V block   Right bundle branch block   Abnormal ECG   When compared with ECG of 18-Mar-2025 17:05,   Premature atrial complexes are no longer Present   NJ interval has increased   QT has shortened   Confirmed by Edgar Rivero [id number]) on 4/11/2025 2:46:11 PM            Results from last 7 days   Lab Units 04/11/25  0444 04/10/25  1750   WBC Thousand/uL 8.11 7.43   HEMOGLOBIN g/dL 10.5* 11.9   HEMATOCRIT % 37.5 41.2   PLATELETS Thousands/uL 193 193   TOTAL NEUT ABS Thousands/µL  --  5.43         Results from last 7 days   Lab Units 04/11/25  0444 04/10/25  1750   SODIUM mmol/L 140 144   POTASSIUM mmol/L 4.2 4.4   CHLORIDE mmol/L 96 97   CO2 mmol/L 37* 45*   ANION GAP mmol/L 7 2*   BUN mg/dL 40* 37*   CREATININE mg/dL 1.29 1.26   EGFR ml/min/1.73sq m 36 37   CALCIUM mg/dL 8.8 8.9     Results from last 7 days   Lab Units 04/10/25  1750   AST U/L 10*   ALT U/L 5*   ALK PHOS U/L 67   TOTAL PROTEIN g/dL 6.3*   ALBUMIN g/dL 3.3*   TOTAL BILIRUBIN mg/dL 0.40         Results from last 7 days   Lab Units 04/11/25  0444 04/10/25  1750   GLUCOSE RANDOM mg/dL 227* 90        Results from last 7 days   Lab Units 04/10/25  1832 04/10/25  1750 04/09/25  0818   BLOOD CULTURE  No Growth at 24 hrs. No Growth at 24 hrs.  --    URINE CULTURE   --   --  >100,000 cfu/ml Escherichia coli*  >100,000 cfu/ml Escherichia coli*          Network Utilization Review Department  ATTENTION: Please call with any questions or concerns to [phone] and carefully listen to the prompts so that you are directed to the right person. All voicemails are confidential.   For Discharge needs, contact Care Management DC Support Team at [phone] opt. [phone]  Send all requests for admission clinical reviews, approved or denied determinations and any other requests to dedicated fax number below belonging to the campus where  the patient is receiving treatment. List of dedicated fax numbers for the Facilities:  FACILITY NAME UR FAX NUMBER   ADMISSION DENIALS (Administrative/Medical Necessity) [phone]   DISCHARGE SUPPORT TEAM (NETWORK) [phone]   PARENT CHILD HEALTH (Maternity/NICU/Pediatrics) [phone]   [facility] [phone]   [facility] [phone]   formerly [facility] [phone]   [facility] [phone]   [facility] [phone]   [facility] [phone]   [facility] [phone]   [facility] [phone]   [facility] [phone]   [facility] [phone]   [facility] [phone]   [facility] [phone]

## 2025-04-12 NOTE — ASSESSMENT & PLAN NOTE
Diagnosis as an outpatient 4/9; was started on Macrobid at that time  Possible UTI  Urine culture studies reviewed  Continue ceftriaxone to complete 3-day course of antibiotics

## 2025-04-12 NOTE — RESPIRATORY THERAPY NOTE
Resp Therapy   04/12/25 0742   Respiratory Assessment   Chest Assessment Chest expansion symmetrical   Bilateral Breath Sounds Diminished   Resp Comments Checked pt for prn neb need, pt was tachypneic and working to breathe, bs diminished. Pt in hospital for COPD exacerbation. Gave pt prn albuterol neb which did not improve WOB. Pt had bipap on standby for bedtime use. Asked pt if she would be agreeable to wear bipap to help with breathing, pt said that she was. Pt placed on bipap, she appears more comfortable at this time, will continue to monitor pt per resp protocol.   Non-Invasive Information   O2 Interface Device Face mask   Non-Invasive Ventilation Mode BiPAP   SpO2 92 %   $ Pulse Oximetry Spot Check Charge Completed   Non-Invasive Settings   IPAP (cm) 10 cm   EPAP (cm) 5 cm   Rate (Set) 10   FiO2 (%) 30   Pressure Support (cm H2O) 5   Rise Time 3   Inspiratory Time (Set) 0.9   Humidification 31   Temperature (Set) 31   Non-Invasive Readings   Total Rate 25   MV (Mech) 10.7   Peak Pressure (Obs) 12   Spontaneous Vt (mL) 422   Heater Temperature (Obs) 31   Leak (lpm) 19   Skin Intervention Skin intact   Non-Invasive Alarms   Insp Pressure High (cm H20) 40   Insp Pressure Low (cm H20) 4   Low Insp Pressure Time (sec) 20 sec   MV Low (L/min) 2   Vt High (mL) 1250   Vt Low (mL) 100   High Resp Rate (BPM) 50 BPM   Low Resp Rate (BPM) 8 BPM

## 2025-04-12 NOTE — ASSESSMENT & PLAN NOTE
Probably multifactorial secondary to UTI and hypercapnia  Improving  CT head no acute intracranial abnormality  Optimize metabolic parameters  Avoid neurotoxins

## 2025-04-12 NOTE — ASSESSMENT & PLAN NOTE
Chronically on 2 L; currently at baseline  Monitor ambulatory O2 sats  Encourage incentive spirometry

## 2025-04-12 NOTE — CASE MANAGEMENT
Case Management Discharge Planning Note    Patient name Ashley Gonzales  Location [facility] 429/[facility] 429-01 MRN [id number]  : 1935 Date 2025       Current Admission Date: 4/10/2025  Current Admission Diagnosis:COPD exacerbation (HCC)   Patient Active Problem List    Diagnosis Date Noted Date Diagnosed    Ambulatory dysfunction 2025     COPD exacerbation (HCC) 04/10/2025     Acute cystitis 04/10/2025     Encephalopathy 04/10/2025     Obesity hypoventilation syndrome ([facility]) 2025     Diastolic dysfunction with acute on chronic heart failure ([facility]) 2025     Peripheral edema 2025     Anxiety and depression 2024     ACP (advance care planning) 2024     Mild vascular dementia without behavioral disturbance, psychotic disturbance, mood disturbance, or anxiety ([facility]) 2024     Depression, recurrent ([facility]) 2024     Acquired hypothyroidism 2024     Persistent proteinuria 2024     Secondary hyperparathyroidism of renal origin ([facility]) 2024     Low serum vitamin B12 2024     Lung nodule 2024     Chronic respiratory failure with hypoxia ([facility]) 2024     Metabolic encephalopathy 08/15/2024     Fall 08/15/2024     Stage 3 chronic kidney disease ([facility]) 2024     Nephrolithiasis 2024     Hematuria 2024     Dizziness 2024     Obesity (BMI 30-39.9) 05/10/2023     Pleural effusion 2021     Acute on chronic respiratory failure with hypoxia and hypercapnia ([facility]) 2021     History of total knee replacement 2019     JAMEEL (obstructive sleep apnea) 2017     COPD (chronic obstructive pulmonary disease) ([facility])      Benign familial tremor      Hx of arterial ischemic stroke      Esophageal reflux 2015     Clinical depression 2015     Osteoarthritis of knee 2015     Postural imbalance 10/30/2014     Insomnia 10/01/2012     Generalized osteoarthritis 2012     Female stress incontinence 2012        LOS (days): 2  Geometric Mean LOS (GMLOS) (days): 3.3  Days to GMLOS:1.4     OBJECTIVE:  Risk of Unplanned Readmission Score: 25.16         Current admission status: Inpatient   Preferred Pharmacy:   [id number] - [place] - [address]  [address]  [place] [address]  Phone: [phone] Fax: [phone]    Primary Care Provider: Paz Maharaj DO    Primary Insurance: [employer] [facility] REP  Secondary Insurance:     DISCHARGE DETAILS:    Discharge planning discussed with:: Patient and patient's daughterIzzy  Freedom of Choice: Yes  Comments - Freedom of Choice: CM informed that patient may be cleared for discharge tomorrow. CM met with patient at bedside and provided facility choice list. José Miguel requested that CM follow up with her daughter to help make a decision. CM called patient's daughter, Izzy. zIzy requested that list be emailed to her. CM emailed accepting facilities to Izzy at [email or web address].

## 2025-04-12 NOTE — PROGRESS NOTES
Progress Note - Hospitalist   Name: Ashley Gonzales 89 y.o. female I MRN: [id number]  Unit/Bed#: [facility] 429-01 I Date of Admission: 4/10/2025   Date of Service: 4/12/2025 I Hospital Day: 2     Assessment & Plan  COPD exacerbation (HCC)  Presents with shortness of breath nonproductive cough fatigability  Likely COPD exacerbation  Clinically and symptomatically improving  Continue IV Solu-Medrol transition to once daily dosing with plans to transition to p.o. prednisone over the next 24 to 48 hours  Continue respiratory treatments  Supportive cares  Encephalopathy  Probably multifactorial secondary to UTI and hypercapnia  Improving  CT head no acute intracranial abnormality  Optimize metabolic parameters  Avoid neurotoxins  Chronic respiratory failure with hypoxia (HCC)  Chronically on 2 L; currently at baseline  Monitor ambulatory O2 sats  Encourage incentive spirometry  Acute cystitis  Diagnosis as an outpatient 4/9; was started on Macrobid at that time  Possible UTI  Urine culture studies reviewed  Continue ceftriaxone to complete 3-day course of antibiotics  JAMEEL (obstructive sleep apnea)  Bipap QHS  BiPAP compliance discussed and encouraged  Obesity (BMI 30-39.9)  BMI 32  Therapeutic left modification encouraged  Stage 3 chronic kidney disease (HCC)  Renal function at baseline with creatinine of 1.1-1.2  Monitor kidney function  Mild vascular dementia without behavioral disturbance, psychotic disturbance, mood disturbance, or anxiety (HCC)  Delirium precautions  Reorientation, sleep hygiene  Supportive cares  Ambulatory dysfunction  Ambulatory dysfunction multifactorial  Safe ambulation  Fall precautions  Physical therapy          VTE Pharmacologic Prophylaxis: VTE Score: 5 High Risk (Score >/= 5) - Pharmacological DVT Prophylaxis Ordered: enoxaparin (Lovenox). Sequential Compression Devices Ordered.    Mobility:   Basic Mobility Inpatient Raw Score: 15  -[facility] Goal: 4: Move to chair/commode  -[facility] Achieved: 1:  Laying in bed  JH-HLM Goal NOT achieved. Continue with multidisciplinary rounding and encourage appropriate mobility to improve upon JH-HLM goals.    Patient Centered Rounds: I performed bedside rounds with nursing staff today.   Discussions with Specialists or Other Care Team Provider: Case management    Education and Discussions with Family / Patient:  Discussed with the patient, daughter at bedside discussed in detail questions answered.     Current Length of Stay: 2 day(s)  Current Patient Status: Inpatient   Certification Statement: The patient will continue to require additional inpatient hospital stay due to as outlined  Discharge Plan:  Awaiting clinical and symptomatic improvement, disposition planning case management following    Code Status: Level 3 - DNAR and DNI    Subjective     Comfortably in bed  Discussed with RN at bedside  Patient reports cough with mucopurulent sputum  Reports some improvement in her chest tightness and breathing  Discussed with daughter at bedside  Appetite fair  Encourage incentive spirometry      Objective :  Temp:  [97.7 °F (36.5 °C)-99.2 °F (37.3 °C)] 97.7 °F (36.5 °C)  HR:  [59-96] 60  BP: (126-169)/(58-68) 168/68  Resp:  [16-18] 18  SpO2:  [84 %-96 %] 95 %  O2 Device: Nasal cannula  Nasal Cannula O2 Flow Rate (L/min):  [4 L/min] 4 L/min    Body mass index is 32.74 kg/m².     Input and Output Summary (last 24 hours):     Intake/Output Summary (Last 24 hours) at 4/12/2025 1326  Last data filed at 4/12/2025 1001  Gross per 24 hour   Intake 120 ml   Output 600 ml   Net -480 ml       Physical Exam    Comfortably in bed  Obese  Short thick neck  Lungs diminished breath sounds bilateral  Occasional rhonchi  Heart sounds S1-S2 noted  Heart sounds are distant  Abdomen soft, nontender  Awake follows commands  No pedal edema  No rash    Lines/Drains:  Lines/Drains/Airways       Active Status       Name Placement date Placement time Site Days    External Urinary Catheter 04/10/25   2100  -- 1                            Lab Results: I have reviewed the following results:   Results from last 7 days   Lab Units 04/11/25  0444 04/10/25  1750   WBC Thousand/uL 8.11 7.43   HEMOGLOBIN g/dL 10.5* 11.9   HEMATOCRIT % 37.5 41.2   PLATELETS Thousands/uL 193 193   SEGS PCT %  --  72   LYMPHO PCT %  --  13*   MONO PCT %  --  10   EOS PCT %  --  3     Results from last 7 days   Lab Units 04/11/25  0444 04/10/25  1750   SODIUM mmol/L 140 144   POTASSIUM mmol/L 4.2 4.4   CHLORIDE mmol/L 96 97   CO2 mmol/L 37* 45*   BUN mg/dL 40* 37*   CREATININE mg/dL 1.29 1.26   ANION GAP mmol/L 7 2*   CALCIUM mg/dL 8.8 8.9   ALBUMIN g/dL  --  3.3*   TOTAL BILIRUBIN mg/dL  --  0.40   ALK PHOS U/L  --  67   ALT U/L  --  5*   AST U/L  --  10*   GLUCOSE RANDOM mg/dL 227* 90     Results from last 7 days   Lab Units 04/10/25  1750   INR  0.90             Results from last 7 days   Lab Units 04/10/25  1750   LACTIC ACID mmol/L 0.8   PROCALCITONIN ng/ml 0.08       Recent Cultures (last 7 days):   Results from last 7 days   Lab Units 04/10/25  1832 04/10/25  1750 04/09/25  0818   BLOOD CULTURE  No Growth at 24 hrs. No Growth at 24 hrs.  --    URINE CULTURE   --   --  >100,000 cfu/ml Escherichia coli*  >100,000 cfu/ml Escherichia coli*       Imaging Results Review: I personally reviewed the following image studies/reports in PACS and discussed pertinent findings with Radiology: chest xray and CT head. My interpretation of the radiology images/reports is: Lab results reviewed.      Last 24 Hours Medication List:     Current Facility-Administered Medications:     acetaminophen (TYLENOL) tablet 650 mg, Q6H PRN    albuterol inhalation solution 2.5 mg, Q4H PRN    aspirin (ECOTRIN LOW STRENGTH) EC tablet 81 mg, Daily    azithromycin (ZITHROMAX) tablet 500 mg, Q24H    benzonatate (TESSALON PERLES) capsule 200 mg, TID    bimatoprost (LUMIGAN) 0.03 % ophthalmic drops 1 drop, Daily    bisacodyl (DULCOLAX) EC tablet 5 mg, Daily PRN     calcitriol (ROCALTROL) capsule 0.25 mcg, Once per day on Monday Wednesday Friday    ceftriaxone (ROCEPHIN) 1 g/50 mL in dextrose IVPB, Q24H, Last Rate: 1,000 mg (04/11/25 2105)    cyanocobalamin (VITAMIN B-12) tablet 500 mcg, Daily    famotidine (PEPCID) tablet 20 mg, BID    furosemide (LASIX) tablet 20 mg, Daily    guaiFENesin (MUCINEX) 12 hr tablet 600 mg, Q12H MODESTO    heparin (porcine) subcutaneous injection 5,000 Units, Q8H MODESTO    levothyroxine tablet 75 mcg, Early Morning    losartan (COZAAR) tablet 50 mg, Daily    [START ON 4/13/2025] methylPREDNISolone sodium succinate (Solu-MEDROL) injection 40 mg, Daily    metoprolol tartrate (LOPRESSOR) tablet 25 mg, Q12H MODESTO    nystatin (MYCOSTATIN) powder, TID    ondansetron (ZOFRAN) injection 4 mg, Q6H PRN    pravastatin (PRAVACHOL) tablet 80 mg, Daily With Dinner    Administrative Statements   Today, Patient Was Seen By: Yessica Arroyo MD  I have spent a total time of 33 minutes in caring for this patient on the day of the visit/encounter including Diagnostic results, Risks and benefits of tx options, Instructions for management, Patient and family education, Importance of tx compliance, Risk factor reductions, Impressions, Counseling / Coordination of care, Documenting in the medical record, Reviewing/placing orders in the medical record (including tests, medications, and/or procedures), Obtaining or reviewing history  , and Communicating with other healthcare professionals .    **Please Note: This note may have been constructed using a voice recognition system.**

## 2025-04-13 PROCEDURE — 94760 N-INVAS EAR/PLS OXIMETRY 1: CPT

## 2025-04-13 PROCEDURE — 94664 DEMO&/EVAL PT USE INHALER: CPT

## 2025-04-13 PROCEDURE — 99232 SBSQ HOSP IP/OBS MODERATE 35: CPT | Performed by: INTERNAL MEDICINE

## 2025-04-13 RX ORDER — PREDNISONE 20 MG/1
40 TABLET ORAL DAILY
Status: DISCONTINUED | OUTPATIENT
Start: 2025-04-14 | End: 2025-04-15 | Stop reason: HOSPADM

## 2025-04-13 RX ADMIN — NYSTATIN: 100000 POWDER TOPICAL at 15:34

## 2025-04-13 RX ADMIN — LOSARTAN POTASSIUM 50 MG: 50 TABLET, FILM COATED ORAL at 09:11

## 2025-04-13 RX ADMIN — FUROSEMIDE 20 MG: 20 TABLET ORAL at 09:11

## 2025-04-13 RX ADMIN — BENZONATATE 200 MG: 100 CAPSULE ORAL at 09:11

## 2025-04-13 RX ADMIN — FAMOTIDINE 20 MG: 20 TABLET, FILM COATED ORAL at 09:11

## 2025-04-13 RX ADMIN — HEPARIN SODIUM 5000 UNITS: 5000 INJECTION INTRAVENOUS; SUBCUTANEOUS at 13:20

## 2025-04-13 RX ADMIN — BENZONATATE 200 MG: 100 CAPSULE ORAL at 20:41

## 2025-04-13 RX ADMIN — ASPIRIN 81 MG: 81 TABLET, COATED ORAL at 09:11

## 2025-04-13 RX ADMIN — LEVOTHYROXINE SODIUM 75 MCG: 75 TABLET ORAL at 05:55

## 2025-04-13 RX ADMIN — GUAIFENESIN 600 MG: 600 TABLET, EXTENDED RELEASE ORAL at 20:41

## 2025-04-13 RX ADMIN — GUAIFENESIN 600 MG: 600 TABLET, EXTENDED RELEASE ORAL at 09:11

## 2025-04-13 RX ADMIN — METHYLPREDNISOLONE SODIUM SUCCINATE 40 MG: 40 INJECTION, POWDER, FOR SOLUTION INTRAMUSCULAR; INTRAVENOUS at 09:11

## 2025-04-13 RX ADMIN — FAMOTIDINE 20 MG: 20 TABLET, FILM COATED ORAL at 17:14

## 2025-04-13 RX ADMIN — NYSTATIN: 100000 POWDER TOPICAL at 20:42

## 2025-04-13 RX ADMIN — PRAVASTATIN SODIUM 80 MG: 80 TABLET ORAL at 15:31

## 2025-04-13 RX ADMIN — METOPROLOL TARTRATE 25 MG: 25 TABLET, FILM COATED ORAL at 09:11

## 2025-04-13 RX ADMIN — CYANOCOBALAMIN TAB 500 MCG 500 MCG: 500 TAB at 09:11

## 2025-04-13 RX ADMIN — BIMATOPROST 1 DROP: 0.3 SOLUTION/ DROPS OPHTHALMIC at 20:44

## 2025-04-13 RX ADMIN — HEPARIN SODIUM 5000 UNITS: 5000 INJECTION INTRAVENOUS; SUBCUTANEOUS at 21:14

## 2025-04-13 RX ADMIN — HEPARIN SODIUM 5000 UNITS: 5000 INJECTION INTRAVENOUS; SUBCUTANEOUS at 05:54

## 2025-04-13 RX ADMIN — BENZONATATE 200 MG: 100 CAPSULE ORAL at 15:31

## 2025-04-13 RX ADMIN — NYSTATIN: 100000 POWDER TOPICAL at 09:12

## 2025-04-13 NOTE — PLAN OF CARE
Problem: PAIN - ADULT  Goal: Verbalizes/displays adequate comfort level or baseline comfort level  Description: Interventions:- Encourage patient to monitor pain and request assistance- Assess pain using appropriate pain scale- Administer analgesics based on type and severity of pain and evaluate response- Implement non-pharmacological measures as appropriate and evaluate response- Consider cultural and social influences on pain and pain management- Notify physician/advanced practitioner if interventions unsuccessful or patient reports new pain  Outcome: Progressing     Problem: INFECTION - ADULT  Goal: Absence or prevention of progression during hospitalization  Description: INTERVENTIONS:- Assess and monitor for signs and symptoms of infection- Monitor lab/diagnostic results- Monitor all insertion sites, i.e. indwelling lines, tubes, and drains- Monitor endotracheal if appropriate and nasal secretions for changes in amount and color- [place] appropriate cooling/warming therapies per order- Administer medications as ordered- Instruct and encourage patient and family to use good hand hygiene technique- Identify and instruct in appropriate isolation precautions for identified infection/condition  Outcome: Progressing

## 2025-04-13 NOTE — ASSESSMENT & PLAN NOTE
Diagnosis as an outpatient 4/9; was started on Macrobid at that time  Possible UTI  Culture studies reviewed  This was and completed IV ceftriaxone 3-day course

## 2025-04-13 NOTE — PLAN OF CARE
Problem: PAIN - ADULT  Goal: Verbalizes/displays adequate comfort level or baseline comfort level  Description: Interventions:- Encourage patient to monitor pain and request assistance- Assess pain using appropriate pain scale- Administer analgesics based on type and severity of pain and evaluate response- Implement non-pharmacological measures as appropriate and evaluate response- Consider cultural and social influences on pain and pain management- Notify physician/advanced practitioner if interventions unsuccessful or patient reports new pain  Outcome: Progressing     Problem: INFECTION - ADULT  Goal: Absence or prevention of progression during hospitalization  Description: INTERVENTIONS:- Assess and monitor for signs and symptoms of infection- Monitor lab/diagnostic results- Monitor all insertion sites, i.e. indwelling lines, tubes, and drains- Monitor endotracheal if appropriate and nasal secretions for changes in amount and color- [place] appropriate cooling/warming therapies per order- Administer medications as ordered- Instruct and encourage patient and family to use good hand hygiene technique- Identify and instruct in appropriate isolation precautions for identified infection/condition  Outcome: Progressing  Goal: Absence of fever/infection during neutropenic period  Description: INTERVENTIONS:- Monitor WBC  Outcome: Progressing     Problem: DISCHARGE PLANNING  Goal: Discharge to home or other facility with appropriate resources  Description: INTERVENTIONS:- Identify barriers to discharge w/patient and caregiver- Arrange for needed discharge resources and transportation as appropriate- Identify discharge learning needs (meds, wound care, etc.)- Arrange for interpretive services to assist at discharge as needed- Refer to Case Management Department for coordinating discharge planning if the patient needs post-hospital services based on physician/advanced practitioner order or complex needs related to  functional status, cognitive ability, or social support system  Outcome: Progressing     Problem: Knowledge Deficit  Goal: Patient/family/caregiver demonstrates understanding of disease process, treatment plan, medications, and discharge instructions  Description: Complete learning assessment and assess knowledge base.Interventions:- Provide teaching at level of understanding- Provide teaching via preferred learning methods  Outcome: Progressing     Problem: Decreased Cardiac Output  Goal: Cardiac output adequate for individual needs  Description: INTERVENTIONS: Monitor for signs and symptoms of decreased cardiac output - Monitor for dyspnea with exertion and at rest- Monitor for orthopnea- Monitor for signs of tachycardia. Place patient on telemetry monitoring.- Assess patient for jugular vein distention- Assess patient for lower extremity edema and poor peripheral perfusion - Auscultate lung sound for Fine bibasilar crackles - Monitor for cardiac arrythmias - Administer beta blockers, antiarrhythmic, and blood pressure medications as ordered  Outcome: Progressing

## 2025-04-13 NOTE — ASSESSMENT & PLAN NOTE
Probably multifactorial secondary to UTI and hypercapnia  Improved  CT head no acute intracranial abnormality  Monitor  Avoid neurotoxins

## 2025-04-13 NOTE — PROGRESS NOTES
Progress Note - Hospitalist   Name: Ashley Gonzales 89 y.o. female I MRN: [id number]  Unit/Bed#: [facility] 429-01 I Date of Admission: 4/10/2025   Date of Service: 4/13/2025 I Hospital Day: 3     Assessment & Plan  COPD exacerbation (HCC)  Presents with shortness of breath nonproductive cough fatigability  Likely COPD exacerbation  Clinically and symptomatically improving  Received IV Solu-Medrol transition to p.o. prednisone 40 mg for 5 days  Continue respiratory treatments  Outpatient pulmonary follow-up recommended  Supportive cares  Encephalopathy  Probably multifactorial secondary to UTI and hypercapnia  Improved  CT head no acute intracranial abnormality  Monitor  Avoid neurotoxins  Chronic respiratory failure with hypoxia (HCC)  Chronically on 2 L; currently at baseline  Monitor ambulatory O2 sats  Encourage incentive spirometry  Acute cystitis  Diagnosis as an outpatient 4/9; was started on Macrobid at that time  Possible UTI  Culture studies reviewed  This was and completed IV ceftriaxone 3-day course  JAMEEL (obstructive sleep apnea)  Bipap QHS  BiPAP compliance discussed and encouraged  Obesity (BMI 30-39.9)  BMI 32  Therapeutic lifestyle modification encouraged  Stage 3 chronic kidney disease (HCC)  Renal function at baseline with creatinine of 1.1-1.2  Monitor kidney function  Mild vascular dementia without behavioral disturbance, psychotic disturbance, mood disturbance, or anxiety (HCC)  Delirium precautions  Sleep patient, reorientation  Supportive cares  Ambulatory dysfunction  Ambulatory dysfunction multifactorial  Safe ambulation, fall precautions  Physical therapy          VTE Pharmacologic Prophylaxis: VTE Score: 5 High Risk (Score >/= 5) - Pharmacological DVT Prophylaxis Ordered: heparin. Sequential Compression Devices Ordered.    Mobility:   Basic Mobility Inpatient Raw Score: 16  JH-HLM Goal: 5: Stand one or more mins  JH-HLM Achieved: 1: Laying in bed  JH-HLM Goal NOT achieved. Continue with  multidisciplinary rounding and encourage appropriate mobility to improve upon [facility] goals.    Patient Centered Rounds: I performed bedside rounds with nursing staff today.   Discussions with Specialists or Other Care Team Provider: Case management    Education and Discussions with Family / Patient:  Discussed with the patient, daughter at bedside updated in detail questions answered.     Current Length of Stay: 3 day(s)  Current Patient Status: Inpatient   Certification Statement: The patient will continue to require additional inpatient hospital stay due to as outlined  Discharge Plan:  Disposition planning case management following    Code Status: Level 3 - DNAR and DNI    Subjective     Reports feeling better  Cough and shortness of breath slightly better  Encourage incentive spirometry  Discussed with daughter at bedside      Objective :  Temp:  [97.8 °F (36.6 °C)-98 °F (36.7 °C)] 98 °F (36.7 °C)  HR:  [55-73] 55  BP: (114-133)/(36-78) 133/78  Resp:  [16-18] 18  SpO2:  [95 %-98 %] 98 %  O2 Device: Nasal cannula  Nasal Cannula O2 Flow Rate (L/min):  [2 L/min-4 L/min] 3 L/min    Body mass index is 32.74 kg/m².     Input and Output Summary (last 24 hours):     Intake/Output Summary (Last 24 hours) at 4/13/2025 1254  Last data filed at 4/13/2025 0900  Gross per 24 hour   Intake 460 ml   Output 700 ml   Net -240 ml       Physical Exam    Comfortably in bed  Obese  Short thick neck  Lungs diminished breath sounds bilateral  Occasional rhonchi  Heart sounds S1-S2 noted  Heart sounds are distant  Abdomen soft nontender  Abdominal obesity noted  Awake follows commands  No pedal edema  No rash    Lines/Drains:  Lines/Drains/Airways       Active Status       Name Placement date Placement time Site Days    External Urinary Catheter 04/10/25  2100  -- 2                            Lab Results: I have reviewed the following results:   Results from last 7 days   Lab Units 04/11/25  0444 04/10/25  1750   WBC Thousand/uL 8.11  7.43   HEMOGLOBIN g/dL 10.5* 11.9   HEMATOCRIT % 37.5 41.2   PLATELETS Thousands/uL 193 193   SEGS PCT %  --  72   LYMPHO PCT %  --  13*   MONO PCT %  --  10   EOS PCT %  --  3     Results from last 7 days   Lab Units 04/11/25  0444 04/10/25  1750   SODIUM mmol/L 140 144   POTASSIUM mmol/L 4.2 4.4   CHLORIDE mmol/L 96 97   CO2 mmol/L 37* 45*   BUN mg/dL 40* 37*   CREATININE mg/dL 1.29 1.26   ANION GAP mmol/L 7 2*   CALCIUM mg/dL 8.8 8.9   ALBUMIN g/dL  --  3.3*   TOTAL BILIRUBIN mg/dL  --  0.40   ALK PHOS U/L  --  67   ALT U/L  --  5*   AST U/L  --  10*   GLUCOSE RANDOM mg/dL 227* 90     Results from last 7 days   Lab Units 04/10/25  1750   INR  0.90             Results from last 7 days   Lab Units 04/10/25  1750   LACTIC ACID mmol/L 0.8   PROCALCITONIN ng/ml 0.08       Recent Cultures (last 7 days):   Results from last 7 days   Lab Units 04/10/25  1832 04/10/25  1750 04/09/25  0818   BLOOD CULTURE  No Growth at 48 hrs. No Growth at 48 hrs.  --    URINE CULTURE   --   --  >100,000 cfu/ml Escherichia coli*  >100,000 cfu/ml Escherichia coli*       Imaging Results Review: I personally reviewed the following image studies/reports in PACS and discussed pertinent findings with Radiology: chest xray and CT head. My interpretation of the radiology images/reports is: Lab results reviewed.  Other Study Results Review: Other studies reviewed include: Culture studies reviewed    Last 24 Hours Medication List:     Current Facility-Administered Medications:     acetaminophen (TYLENOL) tablet 650 mg, Q6H PRN    albuterol inhalation solution 2.5 mg, Q4H PRN    aspirin (ECOTRIN LOW STRENGTH) EC tablet 81 mg, Daily    benzonatate (TESSALON PERLES) capsule 200 mg, TID    bimatoprost (LUMIGAN) 0.03 % ophthalmic drops 1 drop, Daily    bisacodyl (DULCOLAX) EC tablet 5 mg, Daily PRN    calcitriol (ROCALTROL) capsule 0.25 mcg, Once per day on Monday Wednesday Friday    cyanocobalamin (VITAMIN B-12) tablet 500 mcg, Daily    famotidine  (PEPCID) tablet 20 mg, BID    furosemide (LASIX) tablet 20 mg, Daily    guaiFENesin (MUCINEX) 12 hr tablet 600 mg, Q12H MODESTO    heparin (porcine) subcutaneous injection 5,000 Units, Q8H MODESTO    levothyroxine tablet 75 mcg, Early Morning    losartan (COZAAR) tablet 50 mg, Daily    metoprolol tartrate (LOPRESSOR) tablet 25 mg, Q12H MODESTO    nystatin (MYCOSTATIN) powder, TID    ondansetron (ZOFRAN) injection 4 mg, Q6H PRN    pravastatin (PRAVACHOL) tablet 80 mg, Daily With Dinner    [START ON 4/14/2025] predniSONE tablet 40 mg, Daily    Administrative Statements   Today, Patient Was Seen By: Yessica Arroyo MD  I have spent a total time of 31 minutes in caring for this patient on the day of the visit/encounter including Diagnostic results, Risks and benefits of tx options, Instructions for management, Patient and family education, Importance of tx compliance, Risk factor reductions, Impressions, Counseling / Coordination of care, Documenting in the medical record, Reviewing/placing orders in the medical record (including tests, medications, and/or procedures), Obtaining or reviewing history  , and Communicating with other healthcare professionals .    **Please Note: This note may have been constructed using a voice recognition system.**

## 2025-04-13 NOTE — ASSESSMENT & PLAN NOTE
Presents with shortness of breath nonproductive cough fatigability  Likely COPD exacerbation  Clinically and symptomatically improving  Received IV Solu-Medrol transition to p.o. prednisone 40 mg for 5 days  Continue respiratory treatments  Outpatient pulmonary follow-up recommended  Supportive cares

## 2025-04-13 NOTE — RESPIRATORY THERAPY NOTE
Resp care   04/13/25 0843   Respiratory Assessment   Assessment Type Assess only   General Appearance Awake;Alert   Respiratory Pattern Spontaneous   Chest Assessment Chest expansion symmetrical   Bilateral Breath Sounds Diminished   Resp Comments pt found on 5L nc, spo2 is 97%, pt titrated to 2L nc,  bs are diminished, no prn tx needed, will cont to monitor per resp protocol.   Oxygen Therapy/Pulse Ox   O2 Device Nasal cannula   O2 Therapy Oxygen   Nasal Cannula O2 Flow Rate (L/min) 2 L/min   Calculated FIO2 (%) - Nasal Cannula 28   SpO2 Activity At Rest   $ Pulse Oximetry Spot Check Charge Completed

## 2025-04-14 ENCOUNTER — TELEPHONE (OUTPATIENT)
Age: OVER 89
End: 2025-04-14

## 2025-04-14 PROCEDURE — 94664 DEMO&/EVAL PT USE INHALER: CPT

## 2025-04-14 PROCEDURE — 97530 THERAPEUTIC ACTIVITIES: CPT

## 2025-04-14 PROCEDURE — 99232 SBSQ HOSP IP/OBS MODERATE 35: CPT | Performed by: INTERNAL MEDICINE

## 2025-04-14 PROCEDURE — 97116 GAIT TRAINING THERAPY: CPT

## 2025-04-14 PROCEDURE — 97535 SELF CARE MNGMENT TRAINING: CPT

## 2025-04-14 PROCEDURE — 94760 N-INVAS EAR/PLS OXIMETRY 1: CPT

## 2025-04-14 RX ORDER — ALBUTEROL SULFATE 90 UG/1
2 INHALANT RESPIRATORY (INHALATION) EVERY 6 HOURS PRN
Status: DISCONTINUED | OUTPATIENT
Start: 2025-04-14 | End: 2025-04-15 | Stop reason: HOSPADM

## 2025-04-14 RX ADMIN — CALCITRIOL CAPSULES 0.25 MCG 0.25 MCG: 0.25 CAPSULE ORAL at 08:35

## 2025-04-14 RX ADMIN — BENZONATATE 200 MG: 100 CAPSULE ORAL at 16:59

## 2025-04-14 RX ADMIN — FAMOTIDINE 20 MG: 20 TABLET, FILM COATED ORAL at 17:00

## 2025-04-14 RX ADMIN — FUROSEMIDE 20 MG: 20 TABLET ORAL at 08:36

## 2025-04-14 RX ADMIN — HEPARIN SODIUM 5000 UNITS: 5000 INJECTION INTRAVENOUS; SUBCUTANEOUS at 21:20

## 2025-04-14 RX ADMIN — FAMOTIDINE 20 MG: 20 TABLET, FILM COATED ORAL at 08:36

## 2025-04-14 RX ADMIN — LEVOTHYROXINE SODIUM 75 MCG: 75 TABLET ORAL at 05:22

## 2025-04-14 RX ADMIN — GUAIFENESIN 600 MG: 600 TABLET, EXTENDED RELEASE ORAL at 21:20

## 2025-04-14 RX ADMIN — BENZONATATE 200 MG: 100 CAPSULE ORAL at 21:20

## 2025-04-14 RX ADMIN — GUAIFENESIN 600 MG: 600 TABLET, EXTENDED RELEASE ORAL at 08:36

## 2025-04-14 RX ADMIN — LOSARTAN POTASSIUM 50 MG: 50 TABLET, FILM COATED ORAL at 08:36

## 2025-04-14 RX ADMIN — METOPROLOL TARTRATE 25 MG: 25 TABLET, FILM COATED ORAL at 08:36

## 2025-04-14 RX ADMIN — PRAVASTATIN SODIUM 80 MG: 80 TABLET ORAL at 16:59

## 2025-04-14 RX ADMIN — BIMATOPROST 1 DROP: 0.3 SOLUTION/ DROPS OPHTHALMIC at 21:23

## 2025-04-14 RX ADMIN — HEPARIN SODIUM 5000 UNITS: 5000 INJECTION INTRAVENOUS; SUBCUTANEOUS at 05:22

## 2025-04-14 RX ADMIN — METOPROLOL TARTRATE 25 MG: 25 TABLET, FILM COATED ORAL at 21:20

## 2025-04-14 RX ADMIN — ASPIRIN 81 MG: 81 TABLET, COATED ORAL at 08:35

## 2025-04-14 RX ADMIN — CYANOCOBALAMIN TAB 500 MCG 500 MCG: 500 TAB at 08:35

## 2025-04-14 RX ADMIN — NYSTATIN: 100000 POWDER TOPICAL at 21:24

## 2025-04-14 RX ADMIN — PREDNISONE 40 MG: 20 TABLET ORAL at 08:36

## 2025-04-14 RX ADMIN — NYSTATIN: 100000 POWDER TOPICAL at 17:00

## 2025-04-14 RX ADMIN — HEPARIN SODIUM 5000 UNITS: 5000 INJECTION INTRAVENOUS; SUBCUTANEOUS at 13:15

## 2025-04-14 RX ADMIN — BENZONATATE 200 MG: 100 CAPSULE ORAL at 08:36

## 2025-04-14 NOTE — TELEPHONE ENCOUNTER
Pt daughter called in to cancel her mother appointment . Patient has been admitted in the hospital . Daughter will call to r/s once her mom  is discharge . SHAUN

## 2025-04-14 NOTE — PROGRESS NOTES
Progress Note - Hospitalist   Name: Ashley Gonzales 89 y.o. female I MRN: [id number]  Unit/Bed#: [facility] 429-01 I Date of Admission: 4/10/2025   Date of Service: 4/14/2025 I Hospital Day: 4     Assessment & Plan  COPD exacerbation (HCC)  Presents with shortness of breath nonproductive cough fatigability  Likely COPD exacerbation  Clinically and symptomatic improving  Received IV Solu-Medrol now transition to p.o. prednisone 40 mg to complete 5-day course  Continue respiratory treatments  Outpatient pulmonary follow-up recommended  Supportive cares  Encephalopathy  Probably multifactorial secondary to UTI and hypercapnia  CT head no acute intracranial abnormality  Improved  Monitor  Avoid neurotoxins  Chronic respiratory failure with hypoxia (HCC)  Chronically on 2 L; currently at baseline  Monitor ambulatory O2 sats  Encourage incentive spirometry  Acute cystitis  Diagnosis as an outpatient 4/9; was started on Macrobid at that time  Possible UTI  Culture studies reviewed  Received and completed IV ceftriaxone 3-day course  JAMEEL (obstructive sleep apnea)  Bipap QHS  BiPAP compliance discussed and encouraged  Obesity (BMI 30-39.9)  BMI 32  Therapeutic lifestyle modification encouraged  Stage 3 chronic kidney disease (HCC)  Renal function at baseline with creatinine of 1.1-1.2  Monitor kidney function  Mild vascular dementia without behavioral disturbance, psychotic disturbance, mood disturbance, or anxiety (HCC)  Reorientation, sleep hygiene  Delirium precautions  Supportive cares  Ambulatory dysfunction  Ambulatory dysfunction multifactorial  Fall precautions, safe ambulation  Physical therapy          VTE Pharmacologic Prophylaxis: VTE Score: 5 High Risk (Score >/= 5) - Pharmacological DVT Prophylaxis Ordered: heparin. Sequential Compression Devices Ordered.    Mobility:   Basic Mobility Inpatient Raw Score: 16  JH-HLM Goal: 5: Stand one or more mins  JH-HLM Achieved: 4: Move to chair/commode  JH-HLM Goal achieved.  Continue to encourage appropriate mobility.    Patient Centered Rounds: I performed bedside rounds with nursing staff today.   Discussions with Specialists or Other Care Team Provider: Case management    Education and Discussions with Family / Patient:  Discussed with the patient, updated daughter at bedside questions answered.     Current Length of Stay: 4 day(s)  Current Patient Status: Inpatient   Certification Statement: The patient will continue to require additional inpatient hospital stay due to as outlined  Discharge Plan:  Disposition planning case management following    Code Status: Level 3 - DNAR and DNI    Subjective       Comfortably in chair  Reports feeling okay  Encourage incentive spirometry  Discussed with daughter at bedside      Objective :  Temp:  [97.6 °F (36.4 °C)] 97.6 °F (36.4 °C)  HR:  [60-65] 60  BP: (106-116)/(45-69) 116/69  Resp:  [18] 18  SpO2:  [94 %-96 %] 96 %  O2 Device: Nasal cannula  Nasal Cannula O2 Flow Rate (L/min):  [3 L/min] 3 L/min    Body mass index is 32.74 kg/m².     Input and Output Summary (last 24 hours):     Intake/Output Summary (Last 24 hours) at 4/14/2025 1321  Last data filed at 4/14/2025 1257  Gross per 24 hour   Intake 1019 ml   Output 650 ml   Net 369 ml       Physical Exam    Comfortably in chair  Obese  Short thick neck  Lungs emphysematous  Diminished breath sounds bilateral  Heart sounds S1-S2 noted  Heart sounds are distant  Abdomen soft  Nontender  Abdominal obesity noted  Awake follows commands  No pedal edema  No rash    Lines/Drains:  Lines/Drains/Airways       Active Status       Name Placement date Placement time Site Days    External Urinary Catheter 04/10/25  2100  -- 3                            Lab Results: I have reviewed the following results:   Results from last 7 days   Lab Units 04/11/25  0444 04/10/25  1750   WBC Thousand/uL 8.11 7.43   HEMOGLOBIN g/dL 10.5* 11.9   HEMATOCRIT % 37.5 41.2   PLATELETS Thousands/uL 193 193   SEGS PCT %  --  72    LYMPHO PCT %  --  13*   MONO PCT %  --  10   EOS PCT %  --  3     Results from last 7 days   Lab Units 04/11/25  0444 04/10/25  1750   SODIUM mmol/L 140 144   POTASSIUM mmol/L 4.2 4.4   CHLORIDE mmol/L 96 97   CO2 mmol/L 37* 45*   BUN mg/dL 40* 37*   CREATININE mg/dL 1.29 1.26   ANION GAP mmol/L 7 2*   CALCIUM mg/dL 8.8 8.9   ALBUMIN g/dL  --  3.3*   TOTAL BILIRUBIN mg/dL  --  0.40   ALK PHOS U/L  --  67   ALT U/L  --  5*   AST U/L  --  10*   GLUCOSE RANDOM mg/dL 227* 90     Results from last 7 days   Lab Units 04/10/25  1750   INR  0.90             Results from last 7 days   Lab Units 04/10/25  1750   LACTIC ACID mmol/L 0.8   PROCALCITONIN ng/ml 0.08       Recent Cultures (last 7 days):   Results from last 7 days   Lab Units 04/10/25  1832 04/10/25  1750 04/09/25  0818   BLOOD CULTURE  No Growth at 72 hrs. No Growth at 72 hrs.  --    URINE CULTURE   --   --  >100,000 cfu/ml Escherichia coli*  >100,000 cfu/ml Escherichia coli*       Imaging Results Review: I personally reviewed the following image studies/reports in PACS and discussed pertinent findings with Radiology: chest xray and CT head. My interpretation of the radiology images/reports is: Lab results reviewed.      Last 24 Hours Medication List:     Current Facility-Administered Medications:     acetaminophen (TYLENOL) tablet 650 mg, Q6H PRN    albuterol (PROVENTIL HFA,VENTOLIN HFA) inhaler 2 puff, Q6H PRN    aspirin (ECOTRIN LOW STRENGTH) EC tablet 81 mg, Daily    benzonatate (TESSALON PERLES) capsule 200 mg, TID    bimatoprost (LUMIGAN) 0.03 % ophthalmic drops 1 drop, Daily    bisacodyl (DULCOLAX) EC tablet 5 mg, Daily PRN    calcitriol (ROCALTROL) capsule 0.25 mcg, Once per day on Monday Wednesday Friday    cyanocobalamin (VITAMIN B-12) tablet 500 mcg, Daily    famotidine (PEPCID) tablet 20 mg, BID    furosemide (LASIX) tablet 20 mg, Daily    guaiFENesin (MUCINEX) 12 hr tablet 600 mg, Q12H MODESTO    heparin (porcine) subcutaneous injection 5,000 Units, Q8H  MODESTO    levothyroxine tablet 75 mcg, Early Morning    losartan (COZAAR) tablet 50 mg, Daily    metoprolol tartrate (LOPRESSOR) tablet 25 mg, Q12H MODESTO    nystatin (MYCOSTATIN) powder, TID    ondansetron (ZOFRAN) injection 4 mg, Q6H PRN    pravastatin (PRAVACHOL) tablet 80 mg, Daily With Dinner    predniSONE tablet 40 mg, Daily    Administrative Statements   Today, Patient Was Seen By: Yessica Arroyo MD  I have spent a total time of 31 minutes in caring for this patient on the day of the visit/encounter including Diagnostic results, Risks and benefits of tx options, Instructions for management, Patient and family education, Importance of tx compliance, Risk factor reductions, Impressions, Counseling / Coordination of care, Documenting in the medical record, Reviewing/placing orders in the medical record (including tests, medications, and/or procedures), Obtaining or reviewing history  , and Communicating with other healthcare professionals .    **Please Note: This note may have been constructed using a voice recognition system.**

## 2025-04-14 NOTE — RESPIRATORY THERAPY NOTE
Resp care   04/14/25 0812   Respiratory Assessment   Assessment Type Assess only   General Appearance Awake;Alert   Respiratory Pattern Normal   Chest Assessment Chest expansion symmetrical   Bilateral Breath Sounds Diminished   Resp Comments pt found on 3l nc, spo2 is 97%, bs are diminished, no prn tx needed, will changes to prn inhaler.   Oxygen Therapy/Pulse Ox   O2 Device Nasal cannula   O2 Therapy Oxygen   Nasal Cannula O2 Flow Rate (L/min) 3 L/min   Calculated FIO2 (%) - Nasal Cannula 32   SpO2 Activity At Rest   $ Pulse Oximetry Spot Check Charge Completed

## 2025-04-14 NOTE — OCCUPATIONAL THERAPY NOTE
Occupational Therapy Progress Note     Patient Name: Ashley Gonzales  Today's Date: 4/14/2025  Problem List  Principal Problem:    COPD exacerbation (HCC)  Active Problems:    JAMEEL (obstructive sleep apnea)    Obesity (BMI 30-39.9)    Stage 3 chronic kidney disease (HCC)    Chronic respiratory failure with hypoxia (HCC)    Mild vascular dementia without behavioral disturbance, psychotic disturbance, mood disturbance, or anxiety (HCC)    Acute cystitis    Encephalopathy    Ambulatory dysfunction        04/14/25 1345   OT Last Visit   OT Visit Date 04/14/25   Note Type   Note Type Treatment for insurance authorization   Pain Assessment   Pain Assessment Tool 0-10   Pain Score No Pain  (at rest)   Restrictions/Precautions   Weight Bearing Precautions Per Order No   Other Precautions Cognitive;Chair Alarm;Bed Alarm;Multiple lines;Telemetry;O2;Fall Risk;Pain   Lifestyle   Autonomy Pt reports she requires assistance for ADLs, IADLs, and functional mobility with rollator use. Pt -  and retired   Reciprocal Relationships family   Service to Others retired   ADL   Grooming Assistance 5  Supervision/Setup   Grooming Deficit Setup;Supervision/safety;Wash/dry face   Grooming Comments pt washes face w/ set up/S seated in chair   LB Dressing Assistance 2  Maximal Assistance   LB Dressing Deficit Setup;Verbal cueing;Requires assistive device for steadying;Don/doff R sock;Don/doff L sock;Thread RLE into underwear;Thread LLE into underwear;Pull up over hips   LB Dressing Comments Pt requires Max A to adjust socks and don underwear seated in chair. Pt stands w/ RW as therapist assists w/ pulling underwear over hips   Toileting Assistance  2  Maximal Assistance   Toileting Deficit Increased time to complete;Verbal cueing;Clothing management up;Clothing management down;Perineal hygiene   Toileting Comments Pt becomes incontinent of urine. Requires Max A for clothing management and perineal hygiene in stance w/ RW for support.  "Pads changed.   Functional Standing Tolerance   Time 2 minutes   Comments Pt tolerates standing for ~2 minutes w/ RW and Mod A for postural support/balance as another therapist assists w/ dyanmic tasks during toileting.   Bed Mobility   Additional Comments Pt greeted and left OOB in chair w/ alarm on and all needs within reach.   Transfers   Sit to Stand 3  Moderate assistance   Additional items Assist x 1;Increased time required;Verbal cues   Stand to Sit 3  Moderate assistance   Additional items Assist x 1;Increased time required;Verbal cues   Additional Comments w/ RW. Initially Mod Ax2 progressing to Mod Ax1. VC for hand placement.   Functional Mobility   Functional Mobility 3  Moderate assistance   Additional Comments Initially Mod Ax2 progressing to Mod Ax1. Short household distance using RW + assist of another for chair follow. Seated rest breaks prn.  (c/o knee pain during ambulation)   Additional items Rolling walker  (chair follow)   Subjective   Subjective \"It is very scary to get up and walk, I don't want to fall\"   Cognition   Overall Cognitive Status Impaired   Arousal/Participation Alert;Cooperative   Attention Attends with cues to redirect   Orientation Level Oriented X4   Memory Decreased recall of precautions;Decreased recall of recent events   Following Commands Follows one step commands without difficulty   Comments Pt is pleasant and cooperative. Anxious t/o session due to fear of falling, emotional support provided, responds well to verbal encouragement.   Activity Tolerance   Activity Tolerance Patient limited by fatigue;Patient limited by pain   Assessment   Assessment Pt seen for skilled OT treatment session on this date w/ interventions focusing on ADL participation, transfer skills, and fxnl mobility. Pt was agreeable and willing to participate in session. Pt engaged in the following tasks: Supervision grooming; Mod A transfers, functional mobility; Max A toileting, LB dressing. In " comparison to previous session, pt demonstrated improvements in functional mobility. Pt required frequent re direction, verbal cues for correct technique, and frequent rest periods. Pt continues to be functioning below baseline level as occupational performance remains limited by decreased ADL status, decreased activity tolerance, decreased endurance, decreased standing tolerance, decreased standing balance, decreased transfer skills, decreased fxnl mobility, decreased safety awareness, pain, and generalized weakness .  From OT standpoint, recommend Level II (Moderate Resource Intensity) at time of d/c. Pt will benefit from continued OT treatment while in acute care to address deficits as defined above and maximize level of functional independence with ADLs and functional mobility. Pt left OOB in chair w/ alarm on and all needs within reach at end of session.   Plan   Treatment Interventions ADL retraining;Functional transfer training;UE strengthening/ROM;Cognitive reorientation;Endurance training;Patient/family training;Equipment evaluation/education;Compensatory technique education;Continued evaluation;Activityengagement;Energy conservation   Goal Expiration Date 04/25/25   OT Treatment Day 1   OT Frequency 2-3x/wk   Discharge Recommendation   Rehab Resource Intensity Level, OT II (Moderate Resource Intensity)   Additional Comments  The patient's raw score on the AM-PAC Daily Activity Inpatient Short Form is 15. A raw score of less than 19 suggests the patient may benefit from discharge to post-acute rehabilitation services. Please refer to the recommendation of the Occupational Therapist for safe discharge planning.   AM-PAC Daily Activity Inpatient   Lower Body Dressing 2   Bathing 2   Toileting 2   Upper Body Dressing 2   Grooming 3   Eating 4   Daily Activity Raw Score 15   Daily Activity Standardized Score (Calc for Raw Score >=11) 34.69   AM-PAC Applied Cognition Inpatient   Following a Speech/Presentation 3    Understanding Ordinary Conversation 4   Taking Medications 3   Remembering Where Things Are Placed or Put Away 3   Remembering List of 4-5 Errands 2   Taking Care of Complicated Tasks 2   Applied Cognition Raw Score 17   Applied Cognition Standardized Score 36.52   End of Consult   Education Provided Yes   Patient Position at End of Consult Bedside chair;Bed/Chair alarm activated;All needs within reach   Nurse Communication Nurse aware of consult     AZEB Arnold, OTR/L

## 2025-04-14 NOTE — ASSESSMENT & PLAN NOTE
Presents with shortness of breath nonproductive cough fatigability  Likely COPD exacerbation  Clinically and symptomatic improving  Received IV Solu-Medrol now transition to p.o. prednisone 40 mg to complete 5-day course  Continue respiratory treatments  Outpatient pulmonary follow-up recommended  Supportive cares

## 2025-04-14 NOTE — PLAN OF CARE
Problem: PHYSICAL THERAPY ADULT  Goal: Performs mobility at highest level of function for planned discharge setting.  See evaluation for individualized goals.  Description: Treatment/Interventions: ADL retraining, Functional transfer training, LE strengthening/ROM, Elevations, Therapeutic exercise, Endurance training, Patient/family training, Bed mobility, Gait training, Spoke to nursing, Spoke to case management, OT  Equipment Recommended: Walker       See flowsheet documentation for full assessment, interventions and recommendations.  Outcome: Progressing  Note: Prognosis: Good  Problem List: Decreased strength, Decreased endurance, Impaired balance, Decreased mobility, Impaired judgement, Decreased safety awareness, Decreased skin integrity, Pain  Assessment: Patient presents pleasantly and agreeable to therapy session today. Session consisted of functional mobility and ambulation. Patient initially required mod assist x 2 for for both sit to stand transfers as well as ambulation with RW and chair follow/. However, as session progressed, she then required only mod assist x 1 for both sit to stand transfers and ambulation with RW and chair follow. Pt is significantly limited by fear of falling, therefore the longer and further she ambulated with increased encouragement, the more her ambulation progressed. Patient reports 0/10 pain, which did not change by the end of session. Patient demonstrates limited endurance, decreased LE strength, decreased functional mobility, and limited ambulation. Therefore, pt will benefit from skilled therapy in order to improve confidence during ambulation, increase BLE strength, and foster further independence for ADLs. Based on these findings and the pt's AM-PAC score of 14, we recommend level II to allow for optimal recovery and return to baseline function ADLs. As long as pt is admitted, therapy will follow for 3-5 days/week to address the aforementioned deficits to allow for  optimal re-integration into household and community ADLs and improved quality of life.  Barriers to Discharge: Inaccessible home environment     Rehab Resource Intensity Level, PT: II (Moderate Resource Intensity)    See flowsheet documentation for full assessment.

## 2025-04-14 NOTE — ASSESSMENT & PLAN NOTE
Probably multifactorial secondary to UTI and hypercapnia  CT head no acute intracranial abnormality  Improved  Monitor  Avoid neurotoxins

## 2025-04-14 NOTE — CASE MANAGEMENT
Case Management Discharge Planning Note    Patient name Ashley Gonzales  Location [facility] 429/[facility] 429-01 MRN [id number]  : 1935 Date 2025       Current Admission Date: 4/10/2025  Current Admission Diagnosis:COPD exacerbation (HCC)   Patient Active Problem List    Diagnosis Date Noted Date Diagnosed    Ambulatory dysfunction 2025     COPD exacerbation (HCC) 04/10/2025     Acute cystitis 04/10/2025     Encephalopathy 04/10/2025     Obesity hypoventilation syndrome ([facility]) 2025     Diastolic dysfunction with acute on chronic heart failure ([facility]) 2025     Peripheral edema 2025     Anxiety and depression 2024     ACP (advance care planning) 2024     Mild vascular dementia without behavioral disturbance, psychotic disturbance, mood disturbance, or anxiety ([facility]) 2024     Depression, recurrent ([facility]) 2024     Acquired hypothyroidism 2024     Persistent proteinuria 2024     Secondary hyperparathyroidism of renal origin ([facility]) 2024     Low serum vitamin B12 2024     Lung nodule 2024     Chronic respiratory failure with hypoxia ([facility]) 2024     Metabolic encephalopathy 08/15/2024     Fall 08/15/2024     Stage 3 chronic kidney disease ([facility]) 2024     Nephrolithiasis 2024     Hematuria 2024     Dizziness 2024     Obesity (BMI 30-39.9) 05/10/2023     Pleural effusion 2021     Acute on chronic respiratory failure with hypoxia and hypercapnia ([facility]) 2021     History of total knee replacement 2019     JAMEEL (obstructive sleep apnea) 2017     COPD (chronic obstructive pulmonary disease) ([facility])      Benign familial tremor      Hx of arterial ischemic stroke      Esophageal reflux 2015     Clinical depression 2015     Osteoarthritis of knee 2015     Postural imbalance 10/30/2014     Insomnia 10/01/2012     Generalized osteoarthritis 2012     Female stress incontinence 2012        LOS (days): 4  Geometric Mean LOS (GMLOS) (days): 3.3  Days to GMLOS:-0.6     OBJECTIVE:  Risk of Unplanned Readmission Score: 20.88         Current admission status: Inpatient   Preferred Pharmacy:   [id number] - [place] - [address]  [address]  [place] [address]  Phone: [phone] Fax: [phone]    Primary Care Provider: Paz Maharaj DO    Primary Insurance: [employer] REP  Secondary Insurance:     DISCHARGE DETAILS:    Discharge planning discussed with:: Patient and patient's daughterIzzy, at bedside  [place] of Choice: Yes  Comments - Freedom of Choice: CM met with patient and patient's daughterIzzy, and discussed facility choice. Their first choice is [facility] if [facility] have beds and second choice is [facility]. CM sent message to [facility] and they confirmed bed for tomorrow. CM requested updated PT/OT notes and sent auth info to discharge support.  CM contacted family/caregiver?: Yes  Were Treatment Team discharge recommendations reviewed with patient/caregiver?: Yes  Did patient/caregiver verbalize understanding of patient care needs?: N/A- going to facility  Were patient/caregiver advised of the risks associated with not following Treatment Team discharge recommendations?: Yes                   Other Referral/Resources/Interventions Provided:  Interventions: Short Term Rehab  Referral Comments: [employer] reserved

## 2025-04-14 NOTE — PROGRESS NOTES
Patient:  ANA PAULA MIGUEL    MRN:  [id number]    Aidin Request ID:  [id number]    Level of care reserved:    Partner Reserved:    Clinical needs requested:    Geography searched:  10 miles around [id number]    Start of Service:    Request sent:  3:51pm EDT on 4/11/2025 by Jody Low    Partner reserved:  by Shruthi Rogers    Choice list shared:  12:27pm EDT on 4/14/2025 by Shruthi Rogres

## 2025-04-14 NOTE — PHYSICAL THERAPY NOTE
PHYSICAL THERAPY NOTE          Patient Name: Ashley Gonzales  Today's Date: 4/14/2025 04/14/25 1344   PT Last Visit   PT Visit Date 04/14/25   Note Type   Note Type Treatment   Pain Assessment   Pain Assessment Tool 0-10   Pain Score No Pain   Restrictions/Precautions   Weight Bearing Precautions Per Order No   Other Precautions Cognitive;Chair Alarm;Bed Alarm;Telemetry;O2;Fall Risk;Pain   General   Chart Reviewed Yes   Response to Previous Treatment Patient with no complaints from previous session.   Family/Caregiver Present Yes  (Daughter)   Cognition   Overall Cognitive Status Impaired   Arousal/Participation Alert;Responsive;Cooperative   Attention Attends with cues to redirect   Orientation Level Oriented X4   Memory Decreased recall of precautions;Decreased recall of recent events;Decreased short term memory   Following Commands Follows one step commands without difficulty   Comments pt demonstrated anxiety and fear of falling during session, she responded very well to words of encouragement and postive reinforcement   Subjective   Subjective pt is pleasant and cooperative throughout therapy session. pt recieved seated in recliner.   Bed Mobility   Supine to Sit Unable to assess   Sit to Supine Unable to assess   Additional Comments pt recieved OOB in recliner and returned to recliner at end of session.   Transfers   Sit to Stand 3  Moderate assistance   Additional items Assist x 2;Increased time required;Verbal cues;Armrests  (Progressed to mod assist x 1 by end of session)   Stand to Sit 3  Moderate assistance   Additional items Assist x 2;Increased time required;Verbal cues;Armrests  (Progressed to mod assist x 1 by end of session)   Additional Comments Transfers with RW   Ambulation/Elevation   Gait pattern Improper Weight shift;Narrow MEHDI;Forward Flexion;Decreased foot clearance;Shuffling;Excessively slow;Decreased  hip extension;Decreased heel strike;Decreased toe off;Short stride   Gait Assistance 3  Moderate assist   Additional items Assist x 2;Verbal cues;Tactile cues  (Progressed to mod assist x 1 by end of session)   Assistive Device Rolling walker   Distance 40' total  (Required two seated rest breaks)   Stair Management Assistance Not tested   Balance   Static Sitting Fair +   Dynamic Sitting Fair   Static Standing Poor   Dynamic Standing Poor   Ambulatory Poor   Endurance Deficit   Endurance Deficit Yes   Endurance Deficit Description Generalized weakness & deconditioning   Activity Tolerance   Activity Tolerance Patient limited by fatigue   Medical Staff Made Aware PT LORRIE Brunner, co-treat due to medical complexity   Nurse Made Aware RN cleared & updated   Assessment   Prognosis Good   Problem List Decreased strength;Decreased endurance;Impaired balance;Decreased mobility;Impaired judgement;Decreased safety awareness;Decreased skin integrity;Pain   Assessment Patient presents pleasantly and agreeable to therapy session today. Session consisted of functional mobility and ambulation. Patient initially required mod assist x 2 for for both sit to stand transfers as well as ambulation with RW and chair follow/. However, as session progressed, she then required only mod assist x 1 for both sit to stand transfers and ambulation with RW and chair follow. Pt is significantly limited by fear of falling, therefore the longer and further she ambulated with increased encouragement, the more her ambulation progressed. Patient reports 0/10 pain, which did not change by the end of session. Patient demonstrates limited endurance, decreased LE strength, decreased functional mobility, and limited ambulation. Therefore, pt will benefit from skilled therapy in order to improve confidence during ambulation, increase BLE strength, and foster further independence for ADLs. Based on these findings and the pt's AM-PAC score of 14, we  recommend level II to allow for optimal recovery and return to baseline function ADLs. As long as pt is admitted, therapy will follow for 3-5 days/week to address the aforementioned deficits to allow for optimal re-integration into household and community ADLs and improved quality of life.   Barriers to Discharge Inaccessible home environment   Goals   Patient Goals To feel safer   [facility] [[email or web address] Expiration Date 04/25/25   PT Treatment Day 1   Plan   Treatment/Interventions ADL retraining;Functional transfer training;LE strengthening/ROM;Elevations;Therapeutic exercise;Endurance training;Bed mobility;Gait training;Spoke to nursing;OT   Progress Slow progress, decreased activity tolerance   PT Frequency 3-5x/wk   Discharge Recommendation   Rehab Resource Intensity Level, PT II (Moderate Resource Intensity)   AM-PAC Basic Mobility Inpatient   Turning in Flat Bed Without Bedrails 3   Lying on Back to Sitting on Edge of Flat Bed Without Bedrails 3   Moving Bed to Chair 2   Standing Up From Chair Using Arms 2   Walk in Room 2   Climb 3-5 Stairs With Railing 2   Basic Mobility Inpatient Raw Score 14   Basic Mobility Standardized Score 35.55   [facility] Highest Level Of Mobility   -[facility] Goal 4: Move to chair/commode   -HL Achieved 7: Walk 25 feet or more   Education   Education Provided Mobility training   Patient Demonstrates acceptance/verbal understanding   End of Consult   Patient Position at End of Consult Bedside chair;Bed/Chair alarm activated;All needs within reach     Yusef Saldaña, SPT

## 2025-04-14 NOTE — PLAN OF CARE
Problem: OCCUPATIONAL THERAPY ADULT  Goal: Performs self-care activities at highest level of function for planned discharge setting.  See evaluation for individualized goals.  Description: Treatment Interventions: ADL retraining, Functional transfer training, UE strengthening/ROM, Endurance training, Cognitive reorientation, Patient/family training, Equipment evaluation/education, Neuromuscular reeducation, Compensatory technique education, Continued evaluation, Energy conservation, Activityengagement          See flowsheet documentation for full assessment, interventions and recommendations.   Outcome: Progressing  Note: Limitation: Decreased ADL status, Decreased Safe judgement during ADL, Decreased cognition, Decreased endurance, Decreased self-care trans, Decreased high-level ADLs  Prognosis: Good  Assessment: Pt seen for skilled OT treatment session on this date w/ interventions focusing on ADL participation, transfer skills, and fxnl mobility. Pt was agreeable and willing to participate in session. Pt engaged in the following tasks: Supervision grooming; Mod A transfers, functional mobility; Max A toileting, LB dressing. In comparison to previous session, pt demonstrated improvements in functional mobility. Pt required frequent re direction, verbal cues for correct technique, and frequent rest periods. Pt continues to be functioning below baseline level as occupational performance remains limited by decreased ADL status, decreased activity tolerance, decreased endurance, decreased standing tolerance, decreased standing balance, decreased transfer skills, decreased fxnl mobility, decreased safety awareness, pain, and generalized weakness .  From OT standpoint, recommend Level II (Moderate Resource Intensity) at time of d/c. Pt will benefit from continued OT treatment while in acute care to address deficits as defined above and maximize level of functional independence with ADLs and functional mobility. Pt  left OOB in chair w/ alarm on and all needs within reach at end of session.     Rehab Resource Intensity Level, OT: II (Moderate Resource Intensity)

## 2025-04-14 NOTE — ASSESSMENT & PLAN NOTE
Diagnosis as an outpatient 4/9; was started on Macrobid at that time  Possible UTI  Culture studies reviewed  Received and completed IV ceftriaxone 3-day course

## 2025-04-15 VITALS
OXYGEN SATURATION: 95 % | BODY MASS INDEX: 32.94 KG/M2 | TEMPERATURE: 98.1 F | WEIGHT: 179 LBS | DIASTOLIC BLOOD PRESSURE: 55 MMHG | HEIGHT: 62 IN | RESPIRATION RATE: 18 BRPM | SYSTOLIC BLOOD PRESSURE: 112 MMHG | HEART RATE: 58 BPM

## 2025-04-15 LAB
BACTERIA BLD CULT: NORMAL
BACTERIA BLD CULT: NORMAL

## 2025-04-15 PROCEDURE — 94664 DEMO&/EVAL PT USE INHALER: CPT

## 2025-04-15 PROCEDURE — 99239 HOSP IP/OBS DSCHRG MGMT >30: CPT | Performed by: INTERNAL MEDICINE

## 2025-04-15 PROCEDURE — 94760 N-INVAS EAR/PLS OXIMETRY 1: CPT

## 2025-04-15 RX ORDER — GUAIFENESIN 600 MG/1
600 TABLET, EXTENDED RELEASE ORAL EVERY 12 HOURS SCHEDULED
Qty: 60 TABLET | Refills: 0 | Status: SHIPPED | OUTPATIENT
Start: 2025-04-15

## 2025-04-15 RX ORDER — ALBUTEROL SULFATE 90 UG/1
2 INHALANT RESPIRATORY (INHALATION) EVERY 6 HOURS PRN
Qty: 18 G | Refills: 0 | Status: SHIPPED | OUTPATIENT
Start: 2025-04-15

## 2025-04-15 RX ORDER — PREDNISONE 20 MG/1
40 TABLET ORAL DAILY
Qty: 6 TABLET | Refills: 0 | Status: SHIPPED | OUTPATIENT
Start: 2025-04-16 | End: 2025-04-19

## 2025-04-15 RX ADMIN — LEVOTHYROXINE SODIUM 75 MCG: 75 TABLET ORAL at 06:17

## 2025-04-15 RX ADMIN — NYSTATIN: 100000 POWDER TOPICAL at 08:53

## 2025-04-15 RX ADMIN — FAMOTIDINE 20 MG: 20 TABLET, FILM COATED ORAL at 08:52

## 2025-04-15 RX ADMIN — PREDNISONE 40 MG: 20 TABLET ORAL at 08:52

## 2025-04-15 RX ADMIN — ASPIRIN 81 MG: 81 TABLET, COATED ORAL at 08:52

## 2025-04-15 RX ADMIN — HEPARIN SODIUM 5000 UNITS: 5000 INJECTION INTRAVENOUS; SUBCUTANEOUS at 06:17

## 2025-04-15 RX ADMIN — FUROSEMIDE 20 MG: 20 TABLET ORAL at 08:52

## 2025-04-15 RX ADMIN — GUAIFENESIN 600 MG: 600 TABLET, EXTENDED RELEASE ORAL at 08:52

## 2025-04-15 RX ADMIN — CYANOCOBALAMIN TAB 500 MCG 500 MCG: 500 TAB at 08:52

## 2025-04-15 RX ADMIN — BENZONATATE 200 MG: 100 CAPSULE ORAL at 08:52

## 2025-04-15 NOTE — DISCHARGE SUPPORT
Case Management Assessment & Discharge Planning Note    Patient name Ashley Gonzales  Location [facility] 429/[facility] 429-01 MRN [id number]  : 1935 Date 4/15/2025       Current Admission Date: 4/10/2025  Current Admission Diagnosis:COPD exacerbation (HCC)   Patient Active Problem List    Diagnosis Date Noted Date Diagnosed    Ambulatory dysfunction 2025     COPD exacerbation (HCC) 04/10/2025     Acute cystitis 04/10/2025     Encephalopathy 04/10/2025     Obesity hypoventilation syndrome (HCC) 2025     Diastolic dysfunction with acute on chronic heart failure ([facility]) 2025     Peripheral edema 2025     Anxiety and depression 2024     ACP (advance care planning) 2024     Mild vascular dementia without behavioral disturbance, psychotic disturbance, mood disturbance, or anxiety ([facility]) 2024     Depression, recurrent ([facility]) 2024     Acquired hypothyroidism 2024     Persistent proteinuria 2024     Secondary hyperparathyroidism of renal origin ([facility]) 2024     Low serum vitamin B12 2024     Lung nodule 2024     Chronic respiratory failure with hypoxia ([facility]) 2024     Metabolic encephalopathy 08/15/2024     Fall 08/15/2024     Stage 3 chronic kidney disease ([facility]) 2024     Nephrolithiasis 2024     Hematuria 2024     Dizziness 2024     Obesity (BMI 30-39.9) 05/10/2023     Pleural effusion 2021     Acute on chronic respiratory failure with hypoxia and hypercapnia ([facility]) 2021     History of total knee replacement 2019     JAMEEL (obstructive sleep apnea) 2017     COPD (chronic obstructive pulmonary disease) ([facility])      Benign familial tremor      Hx of arterial ischemic stroke      Esophageal reflux 2015     Clinical depression 2015     Osteoarthritis of knee 2015     Postural imbalance 10/30/2014     Insomnia 10/01/2012     Generalized osteoarthritis 2012     Female stress  incontinence 06/07/2012       LOS (days): 5  Geometric Mean LOS (GMLOS) (days): 3.3  Days to GMLOS:-1.2   Facility Authorization Initiated  [facility] received request for auth from : Marc ROGERS  Authorization Request Submitted for: SNF  Requested Start of Care Date: 04/15/25  Facility Name: [facility] NPI: [id number]  Facility MD: Dr. Manasa Alvarez  [facility] MD NPI: [id number]  Authorization initiated by contacting insurance: [employer] [facility]  Via: H&CC Portal  Clinicals submitted via: Portal Attachment  Pending reference #: [id number]         and Facility Authorization Approved  [facility] received approved auth for: SNF  Insurance: [employer] [facility]  Facility Name: [facility]  Approved Authorization Number: [id number]  Start of Care Date: 04/15/25  Next Review Date: 04/17/25  Submit Next Review to: fax [phone]   notified: marc rogers     Updates to authorization status will be noted in chart.    Please reach out to CM for updates on any clinical information.

## 2025-04-15 NOTE — RESPIRATORY THERAPY NOTE
Resp care   04/15/25 0842   Respiratory Assessment   Assessment Type Assess only   General Appearance Awake;Alert   Respiratory Pattern Normal   Chest Assessment Chest expansion symmetrical   Bilateral Breath Sounds Diminished   Resp Comments pt found on 3l nc, spo2 was 86%, pt titrated to 4L nc, spo2 is 92%, bs are diminished, will cont to monitor per resp protocol.   Oxygen Therapy/Pulse Ox   O2 Device Nasal cannula   O2 Therapy Oxygen humidified   Nasal Cannula O2 Flow Rate (L/min) 4 L/min   Calculated FIO2 (%) - Nasal Cannula 36   SpO2 Activity At Rest   $ Pulse Oximetry Spot Check Charge Completed

## 2025-04-15 NOTE — DISCHARGE SUMMARY
Discharge Summary - Hospitalist   Name: Ashley Gonzales 89 y.o. female I MRN: [id number]  Unit/Bed#: [facility] 429-01 I Date of Admission: 4/10/2025   Date of Service: 4/15/2025 I Hospital Day: 5     Assessment & Plan  COPD exacerbation (HCC)  Presents with shortness of breath nonproductive cough fatigability  Likely COPD exacerbation  Clinically and symptomatic improving  Received IV Solu-Medrol now transition to p.o. prednisone 40 mg to complete 5-day course  Continue respiratory treatments  Outpatient pulmonary follow-up recommended  Supportive cares  Encephalopathy  Probably multifactorial secondary to UTI and hypercapnia  CT head no acute intracranial abnormality  Improved  Monitor  Avoid neurotoxins  Chronic respiratory failure with hypoxia (HCC)  Chronically on 2 L; currently at baseline  Monitor ambulatory O2 sats  Encourage incentive spirometry  Acute cystitis  Diagnosis as an outpatient 4/9; was started on Macrobid at that time  Possible UTI  Culture studies reviewed  Received and completed IV ceftriaxone 3-day course  JAMEEL (obstructive sleep apnea)  Bipap QHS  BiPAP compliance discussed and encouraged  Obesity (BMI 30-39.9)  BMI 32  Therapeutic lifestyle modification encouraged  Stage 3 chronic kidney disease (HCC)  Renal function at baseline with creatinine of 1.1-1.2  Monitor kidney function  Mild vascular dementia without behavioral disturbance, psychotic disturbance, mood disturbance, or anxiety (HCC)  Reorientation, sleep hygiene  Delirium precautions  Supportive cares  Ambulatory dysfunction  Ambulatory dysfunction multifactorial  Fall precautions, safe ambulation  Physical therapy     Medical Problems       Resolved Problems  Date Reviewed: 3/18/2025   None       Discharging Physician / Practitioner: Carolyn Tineo DO  PCP: Paz Maharaj DO  Admission Date:   Admission Orders (From admission, onward)       Ordered        04/10/25 1907  Inpatient Admission  Once                          Discharge Date:  04/15/25        Hospital Course:   Ashley Gonzales is a 89 y.o. female patient who originally presented to the hospital on 4/10/2025 due to COPD exacerbation.  Patient was treated with intravenous Solu-Medrol and subsequently transition to prednisone.  Hospital course was complicated by urinary tract infection for which IV Rocephin was given for 3 days.  Dc to [facility].        Discharge Day Visit / Exam:   * Please refer to separate progress note for these details *    Discussion with Family: Updated  (daughter) at bedside.    Discharge instructions/Information to patient and family:   See after visit summary for information provided to patient and family.      Provisions for Follow-Up Care:  See after visit summary for information related to follow-up care and any pertinent home health orders.      Mobility at time of Discharge:   Basic Mobility Inpatient Raw Score: 14  JH-HLM Goal: 4: Move to chair/commode  JH-HLM Achieved: 1: Laying in bed  HLM Goal NOT achieved. Continue to encourage mobility in post discharge setting.     Disposition:   Other: [place]    Planned Readmission: no    Discharge Medications:  See after visit summary for reconciled discharge medications provided to patient and/or family.      Administrative Statements   Discharge Statement:  I have spent a total time of 85 minutes in caring for this patient on the day of the visit/encounter. >30 minutes of time was spent on: Impressions.    **Please Note: This note may have been constructed using a voice recognition system**

## 2025-04-15 NOTE — CASE MANAGEMENT
Case Management Discharge Planning Note    Patient name Ashley Gonzales  Location [facility] 429/[facility] 429-01 MRN [id number]  : 1935 Date 4/15/2025       Current Admission Date: 4/10/2025  Current Admission Diagnosis:COPD exacerbation (HCC)   Patient Active Problem List    Diagnosis Date Noted Date Diagnosed    Ambulatory dysfunction 2025     COPD exacerbation (HCC) 04/10/2025     Acute cystitis 04/10/2025     Encephalopathy 04/10/2025     Obesity hypoventilation syndrome ([facility]) 2025     Diastolic dysfunction with acute on chronic heart failure ([facility]) 2025     Peripheral edema 2025     Anxiety and depression 2024     ACP (advance care planning) 2024     Mild vascular dementia without behavioral disturbance, psychotic disturbance, mood disturbance, or anxiety ([facility]) 2024     Depression, recurrent ([facility]) 2024     Acquired hypothyroidism 2024     Persistent proteinuria 2024     Secondary hyperparathyroidism of renal origin ([facility]) 2024     Low serum vitamin B12 2024     Lung nodule 2024     Chronic respiratory failure with hypoxia ([facility]) 2024     Metabolic encephalopathy 08/15/2024     Fall 08/15/2024     Stage 3 chronic kidney disease ([facility]) 2024     Nephrolithiasis 2024     Hematuria 2024     Dizziness 2024     Obesity (BMI 30-39.9) 05/10/2023     Pleural effusion 2021     Acute on chronic respiratory failure with hypoxia and hypercapnia ([facility]) 2021     History of total knee replacement 2019     JAMEEL (obstructive sleep apnea) 2017     COPD (chronic obstructive pulmonary disease) ([facility])      Benign familial tremor      Hx of arterial ischemic stroke      Esophageal reflux 2015     Clinical depression 2015     Osteoarthritis of knee 2015     Postural imbalance 10/30/2014     Insomnia 10/01/2012     Generalized osteoarthritis 2012     Female stress incontinence 2012        LOS (days): 5  Geometric Mean LOS (GMLOS) (days): 3.3  Days to GMLOS:-1.3     OBJECTIVE:  Risk of Unplanned Readmission Score: 21.07         Current admission status: Inpatient   Preferred Pharmacy:   [id number] - [place] - [address]  [address] [place] [address]  Phone: [phone] Fax: [phone]    Primary Care Provider: Paz Maharaj DO    Primary Insurance: [employer] [facility] REP  Secondary Insurance:     DISCHARGE DETAILS:    Discharge planning discussed with:: Patient and patient's daughter, Izzy, at bedside  [place] of Choice: Yes  Comments - Freedom of Choice: Auth recieved for patient for [facility]. CM sent message in [employer] to [facility] to confirm if there is a bed today. CM awaiting response. CM met with patient and patient's daughter at bedside to inform that auth was recieved and waiting to hear back from [facility].  CM contacted family/caregiver?: Yes  Were Treatment Team discharge recommendations reviewed with patient/caregiver?: Yes  Did patient/caregiver verbalize understanding of patient care needs?: N/A- going to facility  Were patient/caregiver advised of the risks associated with not following Treatment Team discharge recommendations?: Yes       IMM Given (Date):: 04/15/25  IMM Given to:: Patient     Additional Comments: CM explained IMM to patient and patient's daughter. Patient requested that her daughter sign the IMM form.    Update:      Treatment Team Recommendation: Short Term Rehab  Discharge Destination Plan:: Short Term Rehab  Transport at Discharge : [facility]        Transported by (Company and Unit #): [employer]  ETA of Transport (Date): 04/15/25  ETA of Transport (Time): 1530       Additional Comments: [facility] confirmed that there is a bed available for today. CM set up transport and update patient and patient's daughter. CM sent message to [facility] to inform of  time. CM updated patient's nurse and provider of  time.    Accepting Facility  Name, City & State : [facility]  Receiving Facility/Agency Phone Number: [phone]  Facility/Agency Fax Number: [phone]

## 2025-04-15 NOTE — PLAN OF CARE
Problem: PAIN - ADULT  Goal: Verbalizes/displays adequate comfort level or baseline comfort level  Description: Interventions:- Encourage patient to monitor pain and request assistance- Assess pain using appropriate pain scale- Administer analgesics based on type and severity of pain and evaluate response- Implement non-pharmacological measures as appropriate and evaluate response- Consider cultural and social influences on pain and pain management- Notify physician/advanced practitioner if interventions unsuccessful or patient reports new pain  Outcome: Progressing     Problem: INFECTION - ADULT  Goal: Absence or prevention of progression during hospitalization  Description: INTERVENTIONS:- Assess and monitor for signs and symptoms of infection- Monitor lab/diagnostic results- Monitor all insertion sites, i.e. indwelling lines, tubes, and drains- Monitor endotracheal if appropriate and nasal secretions for changes in amount and color- [place] appropriate cooling/warming therapies per order- Administer medications as ordered- Instruct and encourage patient and family to use good hand hygiene technique- Identify and instruct in appropriate isolation precautions for identified infection/condition  Outcome: Progressing  Goal: Absence of fever/infection during neutropenic period  Description: INTERVENTIONS:- Monitor WBC  Outcome: Progressing     Problem: SAFETY ADULT  Goal: Patient will remain free of falls  Description: INTERVENTIONS:- Educate patient/family on patient safety including physical limitations- Instruct patient to call for assistance with activity - Consult OT/PT to assist with strengthening/mobility - Keep Call bell within reach- Keep bed low and locked with side rails adjusted as appropriate- Keep care items and personal belongings within reach- Initiate and maintain comfort rounds- Make Fall Risk Sign visible to staff- Offer Toileting every 2 Hours, in advance of need- Initiate/Maintain alarm-  Obtain necessary fall risk management equipment: - Apply yellow socks and bracelet for high fall risk patients- Consider moving patient to room near nurses station  Outcome: Progressing  Goal: Maintain or return to baseline ADL function  Description: INTERVENTIONS:-  Assess patient's ability to carry out ADLs; assess patient's baseline for ADL function and identify physical deficits which impact ability to perform ADLs (bathing, care of mouth/teeth, toileting, grooming, dressing, etc.)- Assess/evaluate cause of self-care deficits - Assess range of motion- Assess patient's mobility; develop plan if impaired- Assess patient's need for assistive devices and provide as appropriate- Encourage maximum independence but intervene and supervise when necessary- Involve family in performance of ADLs- Assess for home care needs following discharge - Consider OT consult to assist with ADL evaluation and planning for discharge- Provide patient education as appropriate  Outcome: Progressing  Goal: Maintains/Returns to pre admission functional level  Description: INTERVENTIONS:- Perform AM-PAC 6 Click Basic Mobility/ Daily Activity assessment daily.- Set and communicate daily mobility goal to care team and patient/family/caregiver. - Collaborate with rehabilitation services on mobility goals if consulted- Perform Range of Motion - Reposition patient every 2 hours.- D- Out of bed to chair  - Out of bed for meals - Out of bed for toileting- Record patient progress and toleration of activity level   Outcome: Progressing     Problem: DISCHARGE PLANNING  Goal: Discharge to home or other facility with appropriate resources  Description: INTERVENTIONS:- Identify barriers to discharge w/patient and caregiver- Arrange for needed discharge resources and transportation as appropriate- Identify discharge learning needs (meds, wound care, etc.)- Arrange for interpretive services to assist at discharge as needed- Refer to Case Management  Department for coordinating discharge planning if the patient needs post-hospital services based on physician/advanced practitioner order or complex needs related to functional status, cognitive ability, or social support system  Outcome: Progressing     Problem: Knowledge Deficit  Goal: Patient/family/caregiver demonstrates understanding of disease process, treatment plan, medications, and discharge instructions  Description: Complete learning assessment and assess knowledge base.Interventions:- Provide teaching at level of understanding- Provide teaching via preferred learning methods  Outcome: Progressing

## 2025-04-16 NOTE — UTILIZATION REVIEW
NOTIFICATION OF ADMISSION DISCHARGE   This is a Notification of Discharge from [facility]. Please be advised that this patient has been discharge from our facility. Below you will find the admission and discharge date and time including the patient’s disposition.   UTILIZATION REVIEW CONTACT:  Utilization Review Assistants  Network Utilization Review Department  Phone: [phone] x carefully listen to the prompts. All voicemails are confidential.  Email: [email or web address]     ADMISSION INFORMATION  PRESENTATION DATE: 4/10/2025  5:03 PM  OBERVATION ADMISSION DATE: N/A  INPATIENT ADMISSION DATE: 4/10/25  7:08 PM   DISCHARGE DATE: 4/15/2025  5:35 PM   DISPOSITION:Released to SNF/TCU/SNU Facility    Network Utilization Review Department  ATTENTION: Please call with any questions or concerns to [phone] and carefully listen to the prompts so that you are directed to the right person. All voicemails are confidential.   For Discharge needs, contact Care Management DC Support Team at [phone] opt. 2  Send all requests for admission clinical reviews, approved or denied determinations and any other requests to dedicated fax number below belonging to the campus where the patient is receiving treatment. List of dedicated fax numbers for the Facilities:  FACILITY NAME UR FAX NUMBER   ADMISSION DENIALS (Administrative/Medical Necessity) [phone]   DISCHARGE SUPPORT TEAM ([facility]) [phone]   PARENT CHILD HEALTH (Maternity/NICU/Pediatrics) [phone]   [facility] [phone]   [facility] [phone]   [facility] [phone]   [facility] [phone]   [facility] [phone]   [facility] [phone]   [facility] [phone]   [facility]  [phone]   [facility] [phone]   [facility] [phone]   [facility] [phone]   [facility] [phone]

## 2025-04-18 ENCOUNTER — TELEPHONE (OUTPATIENT)
Dept: FAMILY MEDICINE CLINIC | Facility: CLINIC | Age: OVER 89
End: 2025-04-18

## 2025-04-18 NOTE — TELEPHONE ENCOUNTER
Type of form: physicians order via fax from [employer].    Forms have been placed in [place], DO folder to be completed for clinical staff.     Routing to clinical pool.

## 2025-04-24 NOTE — TELEPHONE ENCOUNTER
Pt is at [facility].  When dgt goes to see her today she will check the PAP pressure and will c/b so form can be completed and faxed.

## 2025-04-25 DIAGNOSIS — I25.10 CORONARY ARTERY DISEASE INVOLVING NATIVE CORONARY ARTERY OF NATIVE HEART WITHOUT ANGINA PECTORIS: ICD-10-CM

## 2025-04-25 RX ORDER — ROSUVASTATIN CALCIUM 10 MG/1
10 TABLET, COATED ORAL DAILY
Qty: 90 TABLET | Refills: 1 | Status: SHIPPED | OUTPATIENT
Start: 2025-04-25

## 2025-04-25 NOTE — TELEPHONE ENCOUNTER
Patients daughter calling back with pressure at 6.0 for her mom. She states when she turned it on no numbers popped up except that one.

## 2025-04-28 ENCOUNTER — TELEPHONE (OUTPATIENT)
Dept: FAMILY MEDICINE CLINIC | Facility: CLINIC | Age: OVER 89
End: 2025-04-28

## 2025-04-28 NOTE — TELEPHONE ENCOUNTER
Type of form: Physicians orders via fax from [employer].    Forms have been placed in [employer], DO folder to be completed for clinical staff.     Routing to clinical pool.

## 2025-04-30 ENCOUNTER — HOME HEALTH ADMISSION (OUTPATIENT)
Dept: HOME HEALTH SERVICES | Facility: HOME HEALTHCARE | Age: OVER 89
End: 2025-04-30
Payer: COMMERCIAL

## 2025-05-03 ENCOUNTER — HOME CARE VISIT (OUTPATIENT)
Dept: HOME HEALTH SERVICES | Facility: HOME HEALTHCARE | Age: OVER 89
End: 2025-05-03
Payer: COMMERCIAL

## 2025-05-03 PROCEDURE — 400013 VN SOC

## 2025-05-03 PROCEDURE — G0299 HHS/HOSPICE OF RN EA 15 MIN: HCPCS

## 2025-05-04 VITALS
OXYGEN SATURATION: 93 % | BODY MASS INDEX: 32.91 KG/M2 | DIASTOLIC BLOOD PRESSURE: 56 MMHG | HEIGHT: 64 IN | WEIGHT: 192.8 LBS | SYSTOLIC BLOOD PRESSURE: 112 MMHG | TEMPERATURE: 97.4 F | RESPIRATION RATE: 20 BRPM | HEART RATE: 76 BPM

## 2025-05-04 NOTE — CASE COMMUNICATION
Medication discrepancies or Major drug interactions: Pt's daughter needs to  Mucinex.  Abnormal clinical findings: N/A  This report is informational only, no response is needed  [facility] has Admitted your patient to Home Health service with the following disciplines: SN, PT and OT  Patient stated goals of care: To breathe more easily  Potential barriers to goal achievement: N/A  Primary focus of home health care:Respiratory   Anticipated visit pattern and next visit date: 2w3, 5/6/25  Thank you for allowing us to participate in the care of your patient.      Charis Boyce RN

## 2025-05-05 ENCOUNTER — TELEPHONE (OUTPATIENT)
Dept: LAB | Facility: HOSPITAL | Age: OVER 89
End: 2025-05-05

## 2025-05-05 ENCOUNTER — HOME CARE VISIT (OUTPATIENT)
Dept: HOME HEALTH SERVICES | Facility: HOME HEALTHCARE | Age: OVER 89
End: 2025-05-05
Payer: COMMERCIAL

## 2025-05-05 VITALS — DIASTOLIC BLOOD PRESSURE: 48 MMHG | HEART RATE: 73 BPM | OXYGEN SATURATION: 95 % | SYSTOLIC BLOOD PRESSURE: 108 MMHG

## 2025-05-05 PROCEDURE — G0151 HHCP-SERV OF PT,EA 15 MIN: HCPCS

## 2025-05-06 ENCOUNTER — HOME CARE VISIT (OUTPATIENT)
Dept: HOME HEALTH SERVICES | Facility: HOME HEALTHCARE | Age: OVER 89
End: 2025-05-06
Payer: COMMERCIAL

## 2025-05-06 VITALS
RESPIRATION RATE: 20 BRPM | TEMPERATURE: 96.7 F | HEART RATE: 68 BPM | DIASTOLIC BLOOD PRESSURE: 90 MMHG | OXYGEN SATURATION: 95 % | SYSTOLIC BLOOD PRESSURE: 140 MMHG

## 2025-05-06 PROCEDURE — G0300 HHS/HOSPICE OF LPN EA 15 MIN: HCPCS

## 2025-05-07 ENCOUNTER — HOME CARE VISIT (OUTPATIENT)
Dept: HOME HEALTH SERVICES | Facility: HOME HEALTHCARE | Age: OVER 89
End: 2025-05-07
Payer: COMMERCIAL

## 2025-05-07 VITALS — OXYGEN SATURATION: 97 % | HEART RATE: 67 BPM

## 2025-05-07 PROCEDURE — G0151 HHCP-SERV OF PT,EA 15 MIN: HCPCS

## 2025-05-08 ENCOUNTER — HOME CARE VISIT (OUTPATIENT)
Dept: HOME HEALTH SERVICES | Facility: HOME HEALTHCARE | Age: OVER 89
End: 2025-05-08
Payer: COMMERCIAL

## 2025-05-08 ENCOUNTER — HOME CARE VISIT (OUTPATIENT)
Dept: HOME HEALTH SERVICES | Facility: HOME HEALTHCARE | Age: OVER 89
End: 2025-05-08
Attending: INTERNAL MEDICINE
Payer: COMMERCIAL

## 2025-05-08 VITALS
DIASTOLIC BLOOD PRESSURE: 48 MMHG | WEIGHT: 189 LBS | BODY MASS INDEX: 32.44 KG/M2 | OXYGEN SATURATION: 96 % | HEART RATE: 64 BPM | SYSTOLIC BLOOD PRESSURE: 94 MMHG

## 2025-05-08 VITALS
RESPIRATION RATE: 20 BRPM | HEART RATE: 68 BPM | TEMPERATURE: 98.6 F | DIASTOLIC BLOOD PRESSURE: 50 MMHG | OXYGEN SATURATION: 95 % | SYSTOLIC BLOOD PRESSURE: 106 MMHG

## 2025-05-08 PROCEDURE — G0299 HHS/HOSPICE OF RN EA 15 MIN: HCPCS

## 2025-05-08 PROCEDURE — G0152 HHCP-SERV OF OT,EA 15 MIN: HCPCS

## 2025-05-09 ENCOUNTER — TELEPHONE (OUTPATIENT)
Age: OVER 89
End: 2025-05-09

## 2025-05-09 DIAGNOSIS — N18.30 STAGE 3 CHRONIC KIDNEY DISEASE, UNSPECIFIED WHETHER STAGE 3A OR 3B CKD (HCC): Primary | ICD-10-CM

## 2025-05-09 NOTE — TELEPHONE ENCOUNTER
Patients daughter calling in regard to lab orders for upcoming appointment    Please see issued labs from 25    Renal Function Panel is .    Requesting new order be placed and any additional labs be placed.    Please advise patients daughter when labs are placed.

## 2025-05-09 NOTE — TELEPHONE ENCOUNTER
Spoke to pt's daughter letting her know Atiya has placed new lab orders for pt to have done prior to her upcoming appointment on 6/23. Pt's daughter verbalized understanding.

## 2025-05-10 PROBLEM — N30.00 ACUTE CYSTITIS: Status: RESOLVED | Noted: 2025-04-10 | Resolved: 2025-05-10

## 2025-05-12 ENCOUNTER — HOME CARE VISIT (OUTPATIENT)
Dept: HOME HEALTH SERVICES | Facility: HOME HEALTHCARE | Age: OVER 89
End: 2025-05-12
Payer: COMMERCIAL

## 2025-05-12 ENCOUNTER — OFFICE VISIT (OUTPATIENT)
Dept: FAMILY MEDICINE CLINIC | Facility: CLINIC | Age: OVER 89
End: 2025-05-12
Payer: COMMERCIAL

## 2025-05-12 VITALS
HEART RATE: 57 BPM | TEMPERATURE: 97.8 F | DIASTOLIC BLOOD PRESSURE: 78 MMHG | OXYGEN SATURATION: 91 % | WEIGHT: 189.6 LBS | BODY MASS INDEX: 32.54 KG/M2 | SYSTOLIC BLOOD PRESSURE: 122 MMHG

## 2025-05-12 VITALS
HEART RATE: 62 BPM | SYSTOLIC BLOOD PRESSURE: 92 MMHG | WEIGHT: 189.6 LBS | OXYGEN SATURATION: 100 % | DIASTOLIC BLOOD PRESSURE: 56 MMHG | BODY MASS INDEX: 32.54 KG/M2

## 2025-05-12 DIAGNOSIS — J96.11 CHRONIC RESPIRATORY FAILURE WITH HYPOXIA (HCC): ICD-10-CM

## 2025-05-12 DIAGNOSIS — F01.A0 MILD VASCULAR DEMENTIA WITHOUT BEHAVIORAL DISTURBANCE, PSYCHOTIC DISTURBANCE, MOOD DISTURBANCE, OR ANXIETY (HCC): ICD-10-CM

## 2025-05-12 DIAGNOSIS — E03.9 ACQUIRED HYPOTHYROIDISM: ICD-10-CM

## 2025-05-12 DIAGNOSIS — M15.9 GENERALIZED OSTEOARTHRITIS: ICD-10-CM

## 2025-05-12 DIAGNOSIS — F32.A ANXIETY AND DEPRESSION: ICD-10-CM

## 2025-05-12 DIAGNOSIS — N18.32 STAGE 3B CHRONIC KIDNEY DISEASE (HCC): ICD-10-CM

## 2025-05-12 DIAGNOSIS — F51.01 PRIMARY INSOMNIA: ICD-10-CM

## 2025-05-12 DIAGNOSIS — G25.0 BENIGN FAMILIAL TREMOR: ICD-10-CM

## 2025-05-12 DIAGNOSIS — J43.1 PANLOBULAR EMPHYSEMA (HCC): ICD-10-CM

## 2025-05-12 DIAGNOSIS — F41.9 ANXIETY AND DEPRESSION: ICD-10-CM

## 2025-05-12 DIAGNOSIS — I50.33 DIASTOLIC DYSFUNCTION WITH ACUTE ON CHRONIC HEART FAILURE (HCC): Primary | ICD-10-CM

## 2025-05-12 PROCEDURE — G0152 HHCP-SERV OF OT,EA 15 MIN: HCPCS

## 2025-05-12 PROCEDURE — 99214 OFFICE O/P EST MOD 30 MIN: CPT | Performed by: FAMILY MEDICINE

## 2025-05-12 NOTE — PROGRESS NOTES
Name: Ashley Gonzales      : 1935      MRN: [id number]  Encounter Provider: Paz Maharaj DO  Encounter Date: 2025   Encounter department: [facility] PRIMARY CARE  :Return for Recheck will have follow-up with internal medicine as I am retiring in .   Assessment & Plan  Diastolic dysfunction with acute on chronic heart failure (HCC)  Wt Readings from Last 3 Encounters:   25 86.3 kg (190 lb 3.2 oz)   25 86.3 kg (190 lb 3.2 oz)   25 86 kg (189 lb 9.6 oz)   Patient daughter aware to weigh daily, watch fluid and salt.  Continue current medical regimen, furosemide, losartan, metoprolol.  Last echocardiogram   Does have upcoming cardiology visit in October           Panlobular emphysema (HCC)  Oxygen dependent follows with pulmonology medicine.  Patient has rescue nebulizer including albuterol with also daily nebulized steroid       Chronic respiratory failure with hypoxia (HCC)  As above       Acquired hypothyroidism  TSH and T4 followed regularly and currently euthyroid on levothyroxine 75 mcg.       Mild vascular dementia without behavioral disturbance, psychotic disturbance, mood disturbance, or anxiety (HCC)  Currently following with Senior care/geriatric medicine.  Overdue for recheck.  Visit scheduled in July.  Continue compliance with vitamin B12 and D       Generalized osteoarthritis  Generalized pain is significantly disabling for patient.  Remains on Tylenol only which is marginally helpful.  No NSAIDs secondary to CKD.  Wheelchair bound when out of house and remains restricted to first floor living as she can no longer get up the steps.       Benign familial tremor    Orders:  •  mirtazapine (REMERON) 30 mg tablet; Take 1.5 tablets (45 mg total) by mouth daily at bedtime    Stage 3b chronic kidney disease (HCC)  Component      Latest Ref Rng 2024   GFR, Calculated      ml/min/1.73sq m 32  29        Patient's numbers have generally been  running in the mid to lower 3B range.  Follows with nephrology.  Upcoming visit scheduled for July.       Primary insomnia  Chronic condition with marginal improvement on Remeron  Orders:  •  mirtazapine (REMERON) 30 mg tablet; Take 1.5 tablets (45 mg total) by mouth daily at bedtime    Anxiety and depression  Lifelong diagnosis.  Has been on multiple medication for years.  Currently condition is stable.  Good support from caregiver, daughter Izzy    Orders:  •  mirtazapine (REMERON) 30 mg tablet; Take 1.5 tablets (45 mg total) by mouth daily at bedtime        Continue vascular surgical follow-up.  Continue follow-up with Senior care medicine for cognitive issues.  Continue pulmonary follow-up  Continue nephrology follow-up.  Patient will transfer to internal medicine, [facility] internal medicine [address] as I am retiring at the end of June.  Falls Plan of Care: balance, strength, and gait training instructions were provided. Home safety education provided.       History of Present Illness   89-year-old female known to me now for 30+ years follow-up today for ongoing medical diagnoses and recent hospitalization.  She is with her caregiver and daughter Izzy.      Chief Complaint   Patient presents with   • Follow-up     4 month ov- Htn, Hypothyroid, Mild vascular dementia   Last admission to [facility] ED April 10 through April 15 secondary to COPD exacerbation with acute encephalopathy exacerbated by UTI and hypercapnia.  Received IV Solu-Medrol transition to prednisone taper.  Patient with JAMEEL and BiPAP  CKD 3 stable  Mild vascular dementia stable    Sleeps in a recliner.  On chronic O2  Home health services coming on a regular basis including PT OT  Good family support-primary caregiver her daughter Izzy  Review of Systems   Neurological:  Negative for tremors (Tremor is about the same.  No longer on benzo).       Objective   /78   Pulse 57   Temp 97.8 °F (36.6 °C) (Temporal)   Wt 86 kg (189 lb  9.6 oz)   SpO2 91% Comment: with O2  BMI 32.54 kg/m²      Physical Exam  Vitals reviewed.   Constitutional:       Appearance: Normal appearance.      Comments: 89-year-old female appears younger than stated age with elevated BMI.   HENT:      Mouth/Throat:      Mouth: Mucous membranes are moist.     Cardiovascular:      Rate and Rhythm: Normal rate and regular rhythm.      Heart sounds: Murmur heard.   Pulmonary:      Effort: Pulmonary effort is normal.      Breath sounds: Decreased breath sounds present. No wheezing or rhonchi.   Abdominal:      Palpations: Abdomen is soft.      Comments: Protuberant     Musculoskeletal:      Right lower leg: No edema (Nonpitting).      Left lower leg: No edema (Nonpitting).      Comments: Examined in wheelchair.     Neurological:      Mental Status: She is alert and oriented to person, place, and time.      Motor: Tremor present.     Psychiatric:         Attention and Perception: Attention normal.         Mood and Affect: Mood is depressed (Slight).         Speech: Speech normal.         Behavior: Behavior normal.         Cognition and Memory: Cognition is impaired (Short-term.).      Comments: Reminiscing today about our 30+ years together

## 2025-05-13 ENCOUNTER — HOME CARE VISIT (OUTPATIENT)
Dept: HOME HEALTH SERVICES | Facility: HOME HEALTHCARE | Age: OVER 89
End: 2025-05-13
Payer: COMMERCIAL

## 2025-05-13 VITALS
SYSTOLIC BLOOD PRESSURE: 112 MMHG | HEART RATE: 58 BPM | RESPIRATION RATE: 20 BRPM | OXYGEN SATURATION: 96 % | DIASTOLIC BLOOD PRESSURE: 78 MMHG | TEMPERATURE: 96.8 F

## 2025-05-13 VITALS — HEART RATE: 58 BPM | OXYGEN SATURATION: 96 %

## 2025-05-13 PROCEDURE — G0151 HHCP-SERV OF PT,EA 15 MIN: HCPCS

## 2025-05-13 PROCEDURE — G0300 HHS/HOSPICE OF LPN EA 15 MIN: HCPCS

## 2025-05-14 ENCOUNTER — HOME CARE VISIT (OUTPATIENT)
Dept: HOME HEALTH SERVICES | Facility: HOME HEALTHCARE | Age: OVER 89
End: 2025-05-14
Payer: COMMERCIAL

## 2025-05-14 VITALS — HEART RATE: 52 BPM | SYSTOLIC BLOOD PRESSURE: 110 MMHG | DIASTOLIC BLOOD PRESSURE: 64 MMHG | OXYGEN SATURATION: 99 %

## 2025-05-14 PROCEDURE — G0152 HHCP-SERV OF OT,EA 15 MIN: HCPCS

## 2025-05-15 ENCOUNTER — HOME CARE VISIT (OUTPATIENT)
Dept: HOME HEALTH SERVICES | Facility: HOME HEALTHCARE | Age: OVER 89
End: 2025-05-15
Payer: COMMERCIAL

## 2025-05-16 ENCOUNTER — HOME CARE VISIT (OUTPATIENT)
Dept: HOME HEALTH SERVICES | Facility: HOME HEALTHCARE | Age: OVER 89
End: 2025-05-16
Payer: COMMERCIAL

## 2025-05-16 VITALS
DIASTOLIC BLOOD PRESSURE: 50 MMHG | SYSTOLIC BLOOD PRESSURE: 100 MMHG | RESPIRATION RATE: 24 BRPM | OXYGEN SATURATION: 96 % | HEART RATE: 64 BPM | TEMPERATURE: 98.6 F

## 2025-05-16 VITALS — OXYGEN SATURATION: 97 % | HEART RATE: 60 BPM

## 2025-05-16 PROCEDURE — G0151 HHCP-SERV OF PT,EA 15 MIN: HCPCS

## 2025-05-16 PROCEDURE — G0299 HHS/HOSPICE OF RN EA 15 MIN: HCPCS

## 2025-05-20 ENCOUNTER — HOME CARE VISIT (OUTPATIENT)
Dept: HOME HEALTH SERVICES | Facility: HOME HEALTHCARE | Age: OVER 89
End: 2025-05-20
Payer: COMMERCIAL

## 2025-05-20 VITALS — BODY MASS INDEX: 32.65 KG/M2 | WEIGHT: 190.2 LBS | HEART RATE: 61 BPM | OXYGEN SATURATION: 96 %

## 2025-05-20 VITALS
RESPIRATION RATE: 24 BRPM | DIASTOLIC BLOOD PRESSURE: 50 MMHG | TEMPERATURE: 98.1 F | SYSTOLIC BLOOD PRESSURE: 100 MMHG | HEART RATE: 68 BPM | OXYGEN SATURATION: 96 %

## 2025-05-20 PROCEDURE — G0299 HHS/HOSPICE OF RN EA 15 MIN: HCPCS

## 2025-05-20 PROCEDURE — G0152 HHCP-SERV OF OT,EA 15 MIN: HCPCS

## 2025-05-22 ENCOUNTER — HOME CARE VISIT (OUTPATIENT)
Dept: HOME HEALTH SERVICES | Facility: HOME HEALTHCARE | Age: OVER 89
End: 2025-05-22
Payer: COMMERCIAL

## 2025-05-22 VITALS
SYSTOLIC BLOOD PRESSURE: 120 MMHG | HEART RATE: 60 BPM | BODY MASS INDEX: 32.65 KG/M2 | DIASTOLIC BLOOD PRESSURE: 56 MMHG | WEIGHT: 190.2 LBS | OXYGEN SATURATION: 99 %

## 2025-05-22 VITALS
SYSTOLIC BLOOD PRESSURE: 120 MMHG | DIASTOLIC BLOOD PRESSURE: 56 MMHG | TEMPERATURE: 98.2 F | RESPIRATION RATE: 24 BRPM | HEART RATE: 60 BPM | OXYGEN SATURATION: 99 %

## 2025-05-22 PROCEDURE — G0299 HHS/HOSPICE OF RN EA 15 MIN: HCPCS

## 2025-05-22 PROCEDURE — G0152 HHCP-SERV OF OT,EA 15 MIN: HCPCS

## 2025-05-28 ENCOUNTER — HOSPITAL ENCOUNTER (OUTPATIENT)
Dept: NON INVASIVE DIAGNOSTICS | Facility: HOSPITAL | Age: OVER 89
Discharge: HOME | End: 2025-05-28
Payer: COMMERCIAL

## 2025-05-28 DIAGNOSIS — I71.43 INFRARENAL ABDOMINAL AORTIC ANEURYSM (AAA) WITHOUT RUPTURE (HCC): ICD-10-CM

## 2025-05-28 PROCEDURE — 93978 VASCULAR STUDY: CPT

## 2025-05-29 PROCEDURE — 93978 VASCULAR STUDY: CPT | Performed by: SURGERY

## 2025-06-04 ENCOUNTER — TELEPHONE (OUTPATIENT)
Dept: LAB | Facility: HOSPITAL | Age: OVER 89
End: 2025-06-04

## 2025-06-04 ENCOUNTER — TELEPHONE (OUTPATIENT)
Dept: PULMONOLOGY | Facility: CLINIC | Age: OVER 89
End: 2025-06-04

## 2025-06-04 ENCOUNTER — CONSULT (OUTPATIENT)
Dept: PULMONOLOGY | Facility: CLINIC | Age: OVER 89
End: 2025-06-04
Payer: COMMERCIAL

## 2025-06-04 VITALS
HEIGHT: 62 IN | DIASTOLIC BLOOD PRESSURE: 64 MMHG | BODY MASS INDEX: 34.79 KG/M2 | TEMPERATURE: 99.2 F | OXYGEN SATURATION: 91 % | SYSTOLIC BLOOD PRESSURE: 126 MMHG | HEART RATE: 69 BPM

## 2025-06-04 DIAGNOSIS — J96.11 CHRONIC RESPIRATORY FAILURE WITH HYPOXIA (HCC): ICD-10-CM

## 2025-06-04 DIAGNOSIS — E66.2 OBESITY HYPOVENTILATION SYNDROME (HCC): ICD-10-CM

## 2025-06-04 DIAGNOSIS — J44.9 CHRONIC OBSTRUCTIVE PULMONARY DISEASE, UNSPECIFIED COPD TYPE (HCC): Primary | ICD-10-CM

## 2025-06-04 DIAGNOSIS — K59.01 SLOW TRANSIT CONSTIPATION: Primary | ICD-10-CM

## 2025-06-04 DIAGNOSIS — J96.92 HYPERCAPNIC RESPIRATORY FAILURE (HCC): ICD-10-CM

## 2025-06-04 DIAGNOSIS — R91.1 LUNG NODULE: ICD-10-CM

## 2025-06-04 DIAGNOSIS — G47.33 OSA (OBSTRUCTIVE SLEEP APNEA): ICD-10-CM

## 2025-06-04 PROCEDURE — 99214 OFFICE O/P EST MOD 30 MIN: CPT | Performed by: INTERNAL MEDICINE

## 2025-06-04 PROCEDURE — 94010 BREATHING CAPACITY TEST: CPT | Performed by: INTERNAL MEDICINE

## 2025-06-04 RX ORDER — FORMOTEROL FUMARATE 20 UG/2ML
20 SOLUTION RESPIRATORY (INHALATION) 2 TIMES DAILY
Qty: 120 ML | Refills: 2 | Status: SHIPPED | OUTPATIENT
Start: 2025-06-04 | End: 2025-09-02

## 2025-06-04 RX ORDER — BUDESONIDE 0.5 MG/2ML
0.5 INHALANT ORAL 2 TIMES DAILY
Qty: 120 ML | Refills: 2 | Status: SHIPPED | OUTPATIENT
Start: 2025-06-04 | End: 2025-09-02

## 2025-06-04 RX ORDER — ALBUTEROL SULFATE 90 UG/1
2 INHALANT RESPIRATORY (INHALATION) EVERY 6 HOURS PRN
Qty: 18 G | Refills: 0 | Status: SHIPPED | OUTPATIENT
Start: 2025-06-04

## 2025-06-04 NOTE — PATIENT INSTRUCTIONS
Will order a nebulizer machine.   Will prescribe nebulizers for you to use every day and as needed.   Please contact the [employer] company about the sizing for the BIPAP machine.   Please use the BIPAP as tolerated

## 2025-06-04 NOTE — ASSESSMENT & PLAN NOTE
CT imaging of August 2024 demonstrates new 9 mm groundglass nodule in the right upper lobe (new since May 2021)  Will obtain repeat imaging for August 2025    Orders:    CT chest wo contrast; Future

## 2025-06-04 NOTE — ASSESSMENT & PLAN NOTE
Mild JAMEEL based on sleep study of 2017 - AHI 9.8  On BIPAP   Unable to recover compliance report

## 2025-06-04 NOTE — PROGRESS NOTES
Follow-up  Visit - Pulmonary Medicine   Name: Ashley Gonzales      : 1935      MRN: [id number]  Encounter Provider: Leslie Souza MD  Encounter Date: 2025   [facility]  :  Assessment & Plan  Chronic respiratory failure with hypoxia (HCC)  Secondary to JAMEEL/OHS and chronic diastolic CHF  On 2L currently   Will defer O2 titration study as patient unable to ambulate.   Hypercapnic respiratory failure (HCC)  In setting of Obesity Hypoventilation syndrome   ABG 7.3/65 < 7.3/76.2. VBG 7.3/97.9  Will continue with BIPAP therapy.   Patient and daughter encouraged to contact [employer] about mask fitting   Will contact [employer] as well to determine if patient would qualify for alternative NIV.   Orders:    albuterol (PROVENTIL HFA,VENTOLIN HFA) 90 mcg/act inhaler; Inhale 2 puffs every 6 (six) hours as needed for shortness of breath    JAMEEL (obstructive sleep apnea)  Mild JAMEEL based on sleep study of 2017 - AHI 9.8  On BIPAP   Unable to recover compliance report       Obesity hypoventilation syndrome (HCC)  Mngt as above       Chronic obstructive pulmonary disease, unspecified COPD type (HCC)  Mild COPD  PFTs 3/2015 as documented - Ratio 69%, FVC 78%, FEV1 72%, TLC 76%, RV 79%, DLCO 50% - mild obstructive airflow defect, mildly reduced TLC, moderately reduced diffusion  Home O2 2L   In office spirometry demonstrate moderate obstructive impairment FEV1 32.7%, z -score 3.39  Will prescribe Nebulized regimen with Formoterol and Budesonide.   Orders:    formoterol (PERFOROMIST) 20 MCG/2ML nebulizer solution; Take 2 mL (20 mcg total) by nebulization 2 (two) times a day    budesonide (Pulmicort) 0.5 mg/2 mL nebulizer solution; Take 2 mL (0.5 mg total) by nebulization 2 (two) times a day Rinse mouth after use.    Lung nodule  CT imaging of 2024 demonstrates new 9 mm groundglass nodule in the right upper lobe (new since May 2021)  Will obtain repeat imaging  for August 2025    Orders:    CT chest wo contrast; Future      Return in about 3 months (around 9/4/2025).    History of Present Illness   HPI:  Ashley Gonzales is a 89 y.o. female former smoker (unsure of quantity/duration, quit at 40 years) w/ h/o chronic hypoxic and hypercapnic respiratory failure on 2-3L via NC, documented COPD, JAMEEL on CPAP/BIPAP, chronic diastolic heart failure who presents as new visit.    Patient was previously following with pulmonary service however was lost to follow up. Last seen in 2018 at which time prescribed Spiriva and nebulizer regimen for COPD.     Has had a few admissions recently for COPD exacerbation & acute on chronic hypercapniec respiratory failure in setting of BIPAP non compliance with PCO2 noted as high as 97 (baseline 69.3) for which patient treated with BIPAP. Most recent admission from 4/10-4/15 for COPD exacerbation for which treated with IV steroid course discharged on Albuterol MDI.   Since discharge reports feeling overall stable. States that she has not been compliant with bipap due to face mask discomfort. Reports at baseline she is mostly non ambulatory and wheelchair bound and is assisted with ADLs by daughter. She denies cough with phlegm, sob at rest. Not currently using Albuterol inh. Denies chest tightness or wheezing.     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 Denies exposure to chemicals/dust  Tobacco use former as above   Pets - dogs, non allergic  Allergies: denies   Down pillows/comforters - denies  Family Hx: Lung Ca/COPD/Asthma/pulmonary fibrosis    Review of systems:  12 point review of systems was completed and was otherwise negative except as listed in HPI.    Historical Information   Past Medical History[1]  Past Surgical History[2]  Family History[3]  Tobacco Use History[4]    Meds/Allergies   Current Medications[5]  Allergies[6]    Vitals: Blood pressure 126/64, pulse 69, temperature 99.2 °F (37.3 °C), temperature  "source Tympanic Core, height 5' 2\" (1.575 m), SpO2 91%, not currently breastfeeding. Body mass index is 34.79 kg/m². Oxygen Therapy  SpO2: 91 %  Oxygen Therapy: Supplemental oxygen  O2 Delivery Method: Nasal cannula  O2 Flow Rate (L/min): 2 L/min      Physical Exam  General: No acute distress  HENT: Normocephalic, atraumatic.  PERRL, EOMI, Moist mucous membranes.  Neck: Supple  Lungs: Clear to auscultation bilaterally, no wheezes, rales, rhonchi.  On 2L via NC  Heart: Regular rate and rhythm, S1-S2 present, no murmurs rubs or gallops  Abdomen: Soft, nontender, nondistended, no organomegaly  Extremities: Warm and well perfused, no cyanosis, clubbing, or edema  Pulses: 2+ and symmetric  Skin: Warm, dry, no rashes or lesions  Neurologic: No focal neurologic deficits    Labs: I have personally reviewed pertinent lab results.  Lab Results   Component Value Date    WBC 8.11 04/11/2025    HGB 10.5 (L) 04/11/2025    HCT 37.5 04/11/2025     (H) 04/11/2025     04/11/2025     Lab Results   Component Value Date    GLUCOSE 100 05/01/2019    CALCIUM 8.8 04/11/2025     07/16/2015    K 4.2 04/11/2025    CO2 37 (H) 04/11/2025    CL 96 04/11/2025    BUN 40 (H) 04/11/2025    CREATININE 1.29 04/11/2025     No results found for: \"IGE\"  Lab Results   Component Value Date    ALT 5 (L) 04/10/2025    AST 10 (L) 04/10/2025    ALKPHOS 67 04/10/2025    BILITOT 0.37 07/05/2015     Preventive   Influenza vaccine: 2024  COVID-19 vaccination:2023  Pneumonia vaccine: 2015  Lung Cancer screening: N/A    Imaging and other studies: Results Review Statement: I personally reviewed the following image studies/reports in PACS and discussed pertinent findings with Radiology: CT chest. My interpretation of the radiology images/reports is:  .    Pulmonary function testing:   PFTs 3/2015 as documented - Ratio 69%, FVC 78%, FEV1 72%, TLC 76%, RV 79%, DLCO 50% - mild obstructive airflow defect, mildly reduced TLC, moderately reduced " "diffusion    EKG, Pathology, and Other Studies: Results Review Statement: No pertinent imaging studies reviewed.    Disclaimer: Portions of the record may have been created with voice recognition software. Occasional wrong word or \"sound a like\" substitutions may have occurred due to the inherent limitations of voice recognition software. Careful consideration should be taken to recognize, using context, where substitutions have occurred.    Leslie Souza MD  Pulmonary & Critical Care Medicine Fellow PGY-IV  [facility]       [1]   Past Medical History:  Diagnosis Date    Anxiety     Cataract, bilateral     Last Assessed:3/19/2014    CHF (congestive heart failure) (HCC)     COPD (chronic obstructive pulmonary disease) (HCC) 2016    Coronary artery disease     GERD (gastroesophageal reflux disease)     Hx of arterial ischemic stroke     Hypertension     Rheumatoid arthritis (HCC)     Sleep apnea, obstructive     Stroke (HCC)     Wears glasses    [2]   Past Surgical History:  Procedure Laterality Date    CAROTID ENDARTERECTOMY Right     CATARACT EXTRACTION W/ INTRAOCULAR LENS  IMPLANT, BILATERAL      COLONOSCOPY N/A 9/30/2017    Procedure: COLONOSCOPY FOR CONTROL OF BLEEDING;  Surgeon: Jeb Gaxiola MD;  Location:  MAIN OR;  Service: Colorectal    CORONARY ARTERY BYPASS GRAFT      CABG X3 with LIMA to LAD,SVG to OM,SVG to distal  RCA ; Last Assessed:7/13/2015    JOINT REPLACEMENT      left TKR    KIDNEY STONE SURGERY      NEPHROSTOMY Left     with Drainage Irrigation ;Onset:1974    WI ARTHRP KNE CONDYLE&PLATU MEDIAL&LAT COMPARTMENTS Right 2/25/2016    Procedure: ARTHROPLASTY KNEE TOTAL;  Surgeon: Jeb Figueroa MD;  Location: [facility] Main OR;  Service: Orthopedics   [3]   Family History  Problem Relation Name Age of Onset    Cancer Mother Kathi Berger         malignant neoplasm    Heart attack Father Michael Chavezs Sr     Coronary artery disease Father Michael Chavezs Sr     " Raynaud syndrome Son      Dementia Daughter      Aneurysm Other sibling         Aortic    Cancer Other sibling         malignant neoplasm   [4]   Social History  Tobacco Use   Smoking Status Former    Average packs/day: 1 pack/day for 24.0 years (24.0 ttl pk-yrs)    Types: Cigarettes    Start date: 1/1/1951    Passive exposure: Past   Smokeless Tobacco Never   [5]   Current Outpatient Medications:     albuterol (PROVENTIL HFA,VENTOLIN HFA) 90 mcg/act inhaler, Inhale 2 puffs every 6 (six) hours as needed for shortness of breath, Disp: 18 g, Rfl: 0    aspirin (ECOTRIN LOW STRENGTH) 81 mg EC tablet, Take 81 mg by mouth in the morning., Disp: , Rfl:     benzonatate (TESSALON PERLES) 100 mg capsule, Take 1 capsule (100 mg total) by mouth 3 (three) times a day as needed for cough, Disp: 20 capsule, Rfl: 0    bimatoprost (LUMIGAN) 0.01 % ophthalmic drops, Administer 1 drop to both eyes daily at bedtime, Disp: 5 mL, Rfl: 0    bisacodyl 5 mg EC tablet, Take 5 mg by mouth daily as needed for constipation, Disp: , Rfl:     calcitriol (ROCALTROL) 0.25 mcg capsule, Take 1 capsule (0.25 mcg total) by mouth 3 (three) times a week, Disp: 45 capsule, Rfl: 3    dicyclomine (BENTYL) 10 mg capsule, Take 10 mg by mouth in the morning and 10 mg at noon and 10 mg in the evening. Take before meals., Disp: , Rfl:     famotidine (PEPCID) 20 mg tablet, TAKE 1 TABLET BY MOUTH TWICE A DAY, Disp: 180 tablet, Rfl: 1    furosemide (LASIX) 20 mg tablet, TAKE 1 TABLET BY MOUTH EVERY DAY, Disp: 100 tablet, Rfl: 1    guaiFENesin (MUCINEX) 600 mg 12 hr tablet, Take 1 tablet (600 mg total) by mouth every 12 (twelve) hours, Disp: 60 tablet, Rfl: 0    ketoconazole (NIZORAL) 2 % cream, Apply topically daily (Patient taking differently: Apply 60 Applications topically in the morning. Pt is applying cream to abdominal folds. .), Disp: 60 g, Rfl: 0    levothyroxine 75 mcg tablet, TAKE 1 TABLET BY MOUTH EVERY DAY IN THE MORNING, Disp: 90 tablet, Rfl: 1     losartan (COZAAR) 50 mg tablet, Take 1 tablet (50 mg total) by mouth daily, Disp: 90 tablet, Rfl: 3    metoprolol tartrate (LOPRESSOR) 25 mg tablet, TAKE 1 TABLET (25 MG TOTAL) BY MOUTH EVERY 12 (TWELVE) HOURS, Disp: 180 tablet, Rfl: 1    mirtazapine (REMERON) 30 mg tablet, Take 1.5 tablets (45 mg total) by mouth daily at bedtime, Disp: , Rfl:     oxygen gas, Inhale 2 L/min continuous nasal cannula, Disp: , Rfl:     rosuvastatin (CRESTOR) 10 MG tablet, TAKE 1 TABLET BY MOUTH EVERY DAY, Disp: 90 tablet, Rfl: 1    senna (QC Senna) 8.6 MG tablet, Take 1 tablet by mouth in the morning., Disp: , Rfl:     vitamin B-12 (VITAMIN B-12) 500 mcg tablet, Take 1 tablet (500 mcg total) by mouth daily, Disp: 90 tablet, Rfl: 1    acetaminophen (TYLENOL) 325 mg tablet, Take 2 tablets (650 mg total) by mouth every 6 (six) hours as needed for mild pain (Patient not taking: Reported on 5/3/2025), Disp: , Rfl:   [6]   Allergies  Allergen Reactions    Penicillins Itching    Penicillins Rash

## 2025-06-04 NOTE — ASSESSMENT & PLAN NOTE
Secondary to JAMEEL/OHS and chronic diastolic CHF  On 2L currently   Will defer O2 titration study as patient unable to ambulate.

## 2025-06-04 NOTE — ASSESSMENT & PLAN NOTE
Mild COPD  PFTs 3/2015 as documented - Ratio 69%, FVC 78%, FEV1 72%, TLC 76%, RV 79%, DLCO 50% - mild obstructive airflow defect, mildly reduced TLC, moderately reduced diffusion  Home O2 2L   In office spirometry demonstrate moderate obstructive impairment FEV1 32.7%, z -score 3.39  Will prescribe Nebulized regimen with Formoterol and Budesonide.   Orders:    formoterol (PERFOROMIST) 20 MCG/2ML nebulizer solution; Take 2 mL (20 mcg total) by nebulization 2 (two) times a day    budesonide (Pulmicort) 0.5 mg/2 mL nebulizer solution; Take 2 mL (0.5 mg total) by nebulization 2 (two) times a day Rinse mouth after use.

## 2025-06-05 ENCOUNTER — TELEPHONE (OUTPATIENT)
Dept: LAB | Facility: HOSPITAL | Age: OVER 89
End: 2025-06-05

## 2025-06-05 RX ORDER — SENNOSIDES 8.6 MG/1
1 TABLET ORAL DAILY
Qty: 90 TABLET | Refills: 0 | Status: SHIPPED | OUTPATIENT
Start: 2025-06-05

## 2025-06-06 ENCOUNTER — APPOINTMENT (OUTPATIENT)
Dept: LAB | Facility: CLINIC | Age: OVER 89
End: 2025-06-06
Attending: INTERNAL MEDICINE
Payer: COMMERCIAL

## 2025-06-06 PROCEDURE — 82436 ASSAY OF URINE CHLORIDE: CPT

## 2025-06-06 PROCEDURE — 81003 URINALYSIS AUTO W/O SCOPE: CPT

## 2025-06-06 PROCEDURE — 84560 ASSAY OF URINE/URIC ACID: CPT

## 2025-06-06 PROCEDURE — 82340 ASSAY OF CALCIUM IN URINE: CPT

## 2025-06-06 PROCEDURE — 84300 ASSAY OF URINE SODIUM: CPT

## 2025-06-06 PROCEDURE — 83935 ASSAY OF URINE OSMOLALITY: CPT

## 2025-06-06 PROCEDURE — 82570 ASSAY OF URINE CREATININE: CPT

## 2025-06-06 PROCEDURE — 83735 ASSAY OF MAGNESIUM: CPT

## 2025-06-06 PROCEDURE — 82140 ASSAY OF AMMONIA: CPT

## 2025-06-06 PROCEDURE — 82507 ASSAY OF CITRATE: CPT

## 2025-06-06 PROCEDURE — 84392 ASSAY OF URINE SULFATE: CPT

## 2025-06-06 PROCEDURE — 82131 AMINO ACIDS SINGLE QUANT: CPT

## 2025-06-06 PROCEDURE — 84133 ASSAY OF URINE POTASSIUM: CPT

## 2025-06-06 PROCEDURE — 83945 ASSAY OF OXALATE: CPT

## 2025-06-06 PROCEDURE — 84105 ASSAY OF URINE PHOSPHORUS: CPT

## 2025-06-07 LAB
DME PARACHUTE DELIVERY DATE ACTUAL: NORMAL
DME PARACHUTE DELIVERY DATE REQUESTED: NORMAL
DME PARACHUTE ITEM DESCRIPTION: NORMAL
DME PARACHUTE ITEM DESCRIPTION: NORMAL
DME PARACHUTE ORDER STATUS: NORMAL
DME PARACHUTE SUPPLIER NAME: NORMAL
DME PARACHUTE SUPPLIER PHONE: NORMAL

## 2025-06-12 ENCOUNTER — TELEPHONE (OUTPATIENT)
Age: OVER 89
End: 2025-06-12

## 2025-06-12 NOTE — TELEPHONE ENCOUNTER
Call from the patient's daughter, stating that they did not receive the nebulizer. They received the tubing and mouthpiece only.    She states they have not heard anything back from the [employer] as of this time for the other things that they spoke with Dr. Souza about.

## 2025-06-13 LAB
AMMONIA UR-SCNC: 19 MMOL/24 HR (ref 15–60)
CA H2 PHOS DIHYD 24H SATFR UR: 0.07 (ref 0.5–2)
CALCIUM 24H UR-MRATE: 53 MG/24 HR
CALCIUM/CREAT UR: 87 MG/G CREAT (ref 51–262)
CALCIUM/KG BODY WEIGHT: 0.6 MG/24 HR/KG
CAOX INDEX 24H UR-RTO: 4.09 (ref 6–10)
CHLORIDE 24H UR-SRATE: 116 MMOL/24 HR (ref 70–250)
CITRATE 24H UR-MCNC: <17 MG/24 HR
COMMENT: ABNORMAL
CREAT UR-MCNC: 609 MG/24 HR
CREAT/BW 24H UR-RELMRAT: 7.1 MG/24 HR/KG (ref 8.7–20.3)
CYSTINE 24H UR-MCNC: ABNORMAL MG/DL
MAGNESIUM 24H UR-MRATE: 28 MG/24 HR (ref 30–120)
OXALATE UR-MCNC: 22 MG/24 HR (ref 20–40)
PH 24H UR: 5.26 [PH] (ref 5.8–6.2)
PHOSPHATE 24H UR-MRATE: 354 MG/24 HR (ref 600–1200)
POTASSIUM 24H UR-SCNC: 30 MMOL/24 HR (ref 20–100)
PROTEIN CATABOLIC RATE 24H UR-MRATE: 0.6 G/KG/24 HR (ref 0.8–1.4)
SODIUM 24H UR-SRATE: 106 MMOL/24 HR (ref 50–150)
SPECIMEN VOL 24H UR: 1120 ML/24 HR (ref 500–4000)
SULFATE 24H UR-SRATE: 16 MEQ/24 HR (ref 20–80)
URATE 24H SATFR UR: 1.5
URATE 24H UR-MRATE: 275 MG/24 HR
UUN 24H UR-MRATE: 5.17 G/24 HR (ref 6–14)

## 2025-06-18 ENCOUNTER — APPOINTMENT (OUTPATIENT)
Dept: LAB | Facility: HOSPITAL | Age: OVER 89
End: 2025-06-18
Payer: COMMERCIAL

## 2025-06-18 ENCOUNTER — RESULTS FOLLOW-UP (OUTPATIENT)
Dept: OTHER | Facility: HOSPITAL | Age: OVER 89
End: 2025-06-18

## 2025-06-18 DIAGNOSIS — E53.8 LOW SERUM VITAMIN B12: ICD-10-CM

## 2025-06-18 DIAGNOSIS — R41.3 MEMORY CHANGE: ICD-10-CM

## 2025-06-18 DIAGNOSIS — N18.30 STAGE 3 CHRONIC KIDNEY DISEASE, UNSPECIFIED WHETHER STAGE 3A OR 3B CKD (HCC): ICD-10-CM

## 2025-06-18 LAB
ALBUMIN SERPL BCG-MCNC: 3.3 G/DL (ref 3.5–5)
ANION GAP SERPL CALCULATED.3IONS-SCNC: 6 MMOL/L (ref 4–13)
BUN SERPL-MCNC: 43 MG/DL (ref 5–25)
CALCIUM ALBUM COR SERPL-MCNC: 9.7 MG/DL (ref 8.3–10.1)
CALCIUM SERPL-MCNC: 9.1 MG/DL (ref 8.4–10.2)
CHLORIDE SERPL-SCNC: 98 MMOL/L (ref 96–108)
CO2 SERPL-SCNC: 43 MMOL/L (ref 21–32)
CREAT SERPL-MCNC: 1.6 MG/DL (ref 0.6–1.3)
GFR SERPL CREATININE-BSD FRML MDRD: 28 ML/MIN/1.73SQ M
GLUCOSE SERPL-MCNC: 123 MG/DL (ref 65–140)
PHOSPHATE SERPL-MCNC: 2.5 MG/DL (ref 2.3–4.1)
POTASSIUM SERPL-SCNC: 4.9 MMOL/L (ref 3.5–5.3)
SODIUM SERPL-SCNC: 147 MMOL/L (ref 135–147)
VIT B12 SERPL-MCNC: 657 PG/ML (ref 180–914)

## 2025-06-18 PROCEDURE — 80069 RENAL FUNCTION PANEL: CPT

## 2025-06-18 PROCEDURE — 82607 VITAMIN B-12: CPT

## 2025-06-18 PROCEDURE — 36415 COLL VENOUS BLD VENIPUNCTURE: CPT

## 2025-06-22 DIAGNOSIS — K59.01 SLOW TRANSIT CONSTIPATION: Primary | ICD-10-CM

## 2025-06-23 NOTE — TELEPHONE ENCOUNTER
Requested medication(s) are due for refill today: If no, explain: prescribed by another provider  **If antibiotic or given during sick visit, contact patient to discuss current symptoms.   **Confirm prescribing provider    LOV:  5/12/25  **If longer then 1 year, contact patient to schedule annual PRIOR to refilling. Once scheduled, adjust refill for 30 days, no refills.  **Update CareEverywhere to confirm not being seen elsewhere    NOV:  unknown date    Is patient due for annual visit: No  **If future appointment, adjust to annual/follow up.  ** No appointment call to schedule annual/follow up.    Route to PCP, unless PCP no longer here, then physician they are seeing next.

## 2025-06-23 NOTE — TELEPHONE ENCOUNTER
This medication was ordered probably by someone in the hospital?  Does the patient really needed.  This is a GI medication for abdominal cramping.  Does she needed?
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
## 2025-06-24 PROBLEM — R60.0 PERIPHERAL EDEMA: Status: RESOLVED | Noted: 2025-03-18 | Resolved: 2025-06-24

## 2025-06-24 PROBLEM — F32.A ANXIETY AND DEPRESSION: Status: ACTIVE | Noted: 2024-11-12

## 2025-06-24 PROBLEM — F41.9 ANXIETY AND DEPRESSION: Status: ACTIVE | Noted: 2024-11-12

## 2025-06-24 PROBLEM — E66.811 OBESITY (BMI 30.0-34.9): Status: ACTIVE | Noted: 2023-05-10

## 2025-06-24 PROBLEM — G93.41 METABOLIC ENCEPHALOPATHY: Status: RESOLVED | Noted: 2024-08-15 | Resolved: 2025-06-24

## 2025-06-24 PROBLEM — E53.8 LOW SERUM VITAMIN B12: Status: RESOLVED | Noted: 2024-08-17 | Resolved: 2025-06-24

## 2025-06-24 RX ORDER — MIRTAZAPINE 30 MG/1
45 TABLET, FILM COATED ORAL
Start: 2025-06-24

## 2025-06-24 NOTE — ASSESSMENT & PLAN NOTE
Currently following with Senior care/geriatric medicine.  Overdue for recheck.  Visit scheduled in July.  Continue compliance with vitamin B12 and D

## 2025-06-24 NOTE — ASSESSMENT & PLAN NOTE
Lifelong diagnosis.  Has been on multiple medication for years.  Currently condition is stable.  Good support from caregiver, daughter Izzy    Orders:  •  mirtazapine (REMERON) 30 mg tablet; Take 1.5 tablets (45 mg total) by mouth daily at bedtime

## 2025-06-24 NOTE — ASSESSMENT & PLAN NOTE
Arrives approx 8 weeks pregnant reports severe N/V x 2 days with inability to tolerate PO intake. Told by OB to come to ED. Denies PMH Wt Readings from Last 3 Encounters:   05/22/25 86.3 kg (190 lb 3.2 oz)   05/20/25 86.3 kg (190 lb 3.2 oz)   05/12/25 86 kg (189 lb 9.6 oz)   Patient daughter aware to weigh daily, watch fluid and salt.  Continue current medical regimen, furosemide, losartan, metoprolol.  Last echocardiogram 2021  Does have upcoming cardiology visit in October

## 2025-06-24 NOTE — ASSESSMENT & PLAN NOTE
Chronic condition with marginal improvement on Remeron  Orders:  •  mirtazapine (REMERON) 30 mg tablet; Take 1.5 tablets (45 mg total) by mouth daily at bedtime

## 2025-06-24 NOTE — ASSESSMENT & PLAN NOTE
Generalized pain is significantly disabling for patient.  Remains on Tylenol only which is marginally helpful.  No NSAIDs secondary to CKD.  Wheelchair bound when out of house and remains restricted to first floor living as she can no longer get up the steps.

## 2025-06-24 NOTE — ASSESSMENT & PLAN NOTE
Orders:  •  mirtazapine (REMERON) 30 mg tablet; Take 1.5 tablets (45 mg total) by mouth daily at bedtime

## 2025-06-24 NOTE — ASSESSMENT & PLAN NOTE
Component      Latest Ref Rng 11/20/2024 2/21/2025   GFR, Calculated      ml/min/1.73sq m 32  29        Patient's numbers have generally been running in the mid to lower 3B range.  Follows with nephrology.  Upcoming visit scheduled for July.

## 2025-06-24 NOTE — ASSESSMENT & PLAN NOTE
Oxygen dependent follows with pulmonology medicine.  Patient has rescue nebulizer including albuterol with also daily nebulized steroid

## 2025-06-25 RX ORDER — DICYCLOMINE HYDROCHLORIDE 10 MG/1
10 CAPSULE ORAL
Qty: 90 CAPSULE | Refills: 2 | Status: SHIPPED | OUTPATIENT
Start: 2025-06-25

## 2025-06-25 NOTE — TELEPHONE ENCOUNTER
Daughter called to check the status. Daughter states that the medication was prescribed at [facility] for IBS. Daughter states that patient has been moving her bowels better while on the medication. Daughter states that patient takes the medication Take 10 mg by mouth in the morning and 10 mg at noon and 10 mg in the evening.   Patient is not seeing a GI doctor.   Patient is out of the medication.

## 2025-07-07 ENCOUNTER — TELEMEDICINE (OUTPATIENT)
Age: OVER 89
End: 2025-07-07
Payer: COMMERCIAL

## 2025-07-07 DIAGNOSIS — F32.A ANXIETY AND DEPRESSION: ICD-10-CM

## 2025-07-07 DIAGNOSIS — M15.9 GENERALIZED OSTEOARTHRITIS: ICD-10-CM

## 2025-07-07 DIAGNOSIS — F01.A0 MILD VASCULAR DEMENTIA WITHOUT BEHAVIORAL DISTURBANCE, PSYCHOTIC DISTURBANCE, MOOD DISTURBANCE, OR ANXIETY (HCC): Primary | ICD-10-CM

## 2025-07-07 DIAGNOSIS — F51.01 PRIMARY INSOMNIA: ICD-10-CM

## 2025-07-07 DIAGNOSIS — F41.9 ANXIETY AND DEPRESSION: ICD-10-CM

## 2025-07-07 DIAGNOSIS — R26.2 AMBULATORY DYSFUNCTION: ICD-10-CM

## 2025-07-07 PROCEDURE — 99214 OFFICE O/P EST MOD 30 MIN: CPT | Performed by: NURSE PRACTITIONER

## 2025-07-07 NOTE — ASSESSMENT & PLAN NOTE
Patient reporting chronic insomnia  Currently taking remeron  Encourage good sleep hygiene techniques:  decreased noise distractions at night, quiet/calm environment, limit day time sleep.  Discourage hs use of caffeine.    Avoid over the counter sleep aides that contain diphenhydramine - they can cause confusion in some patients.  Avoid prescription sleep aides that are from then benzodiazapine class as they can increase cognitive decline over time and increase fall risk.  .

## 2025-07-07 NOTE — ASSESSMENT & PLAN NOTE
Staging: Pt's memory most consistent with Mild Stage Dementia  Cognition update: stable, no concerns at present  MOCA-b:  13/22  Deficits in:  all domains but orientation  Memory medications: none  Weight change in 6 mo:  stated weight up 3 pounds since last visit  Decision-making capacity: May have limitations in complex medical or financial decisions.  Consider neuropsychology evaluation for further determination  Pt is physical assist adl/dependent iadls.  Lives with her daughter  Level of care:  24/7 assist for adl/iadls and safety, dgt provides care  Caregiver Review: doing ok  Safety:  Medication Review: seem appropriate for current conditions.  Beer's list meds:  none.  Dgt assists with medication administration.  Financial/Scam safety:  no concerns  Home/wandering:  no concerns  Driving: Not driving.  Fall Risk: Medium fall risk  Continue to stay active.  Current activity level:  good.  Participates in social, cognitive, physical activity.  Monitor for changes in mood, sleep, pain control.  Notify provider if any concerns  Optimize all acute and chronic conditions.  Continue to follow-up with PCP and specialist as directed for management  Ensure adequate hydration, good nutrition  ACP Review: has docs  Recommended next follow up:  1 year, dgt will reach out if needed sooner

## 2025-07-07 NOTE — ASSESSMENT & PLAN NOTE
Chronic OA pain  Cont prn tylenol/topical analgesics.  Cont nonpharm methods of pain control.  Avoid nsaids  F/u with pcp for increased or changes in pain type

## 2025-07-07 NOTE — ASSESSMENT & PLAN NOTE
Baseline ambulation rollator.  Fall type none recent  PT OT needs identified:  no current needs  Fall precautions

## 2025-07-07 NOTE — PROGRESS NOTES
Virtual Regular Visit  Name: Ashley Gonzales      : 1935      MRN: [id number]  Encounter Provider: FLAKO Yang  Encounter Date: 2025   Encounter department: [facility]  :  Assessment & Plan  Mild vascular dementia without behavioral disturbance, psychotic disturbance, mood disturbance, or anxiety (HCC)  Staging: Pt's memory most consistent with Mild Stage Dementia  Cognition update: stable, no concerns at present  MOCA-b:    Deficits in:  all domains but orientation  Memory medications: none  Weight change in 6 mo:  stated weight up 3 pounds since last visit  Decision-making capacity: May have limitations in complex medical or financial decisions.  Consider neuropsychology evaluation for further determination  Pt is physical assist adl/dependent iadls.  Lives with her daughter  Level of care:   assist for adl/iadls and safety, dgt provides care  Caregiver Review: doing ok  Safety:  Medication Review: seem appropriate for current conditions.  Beer's list meds:  none.  Dgt assists with medication administration.  Financial/Scam safety:  no concerns  Home/wandering:  no concerns  Driving: Not driving.  Fall Risk: Medium fall risk  Continue to stay active.  Current activity level:  good.  Participates in social, cognitive, physical activity.  Monitor for changes in mood, sleep, pain control.  Notify provider if any concerns  Optimize all acute and chronic conditions.  Continue to follow-up with PCP and specialist as directed for management  Ensure adequate hydration, good nutrition  ACP Review: has docs  Recommended next follow up:  1 year, dgt will reach out if needed sooner         Ambulatory dysfunction  Baseline ambulation rollator.  Fall type none recent  PT OT needs identified:  no current needs  Fall precautions         Anxiety and depression  Baseline mood:  chronic stable anxiety.  Continue:  mirtazapine.  Pt trial'd lexapro and buspar but didn't  tolerate  Encourage relaxation techniques, emotional support         Generalized osteoarthritis  Chronic OA pain  Cont prn tylenol/topical analgesics.  Cont nonpharm methods of pain control.  Avoid nsaids  F/u with pcp for increased or changes in pain type         Primary insomnia  Patient reporting chronic insomnia  Currently taking remeron  Encourage good sleep hygiene techniques:  decreased noise distractions at night, quiet/calm environment, limit day time sleep.  Discourage hs use of caffeine.    Avoid over the counter sleep aides that contain diphenhydramine - they can cause confusion in some patients.  Avoid prescription sleep aides that are from then benzodiazapine class as they can increase cognitive decline over time and increase fall risk.  .             History of Present Illness     HPI:    We had the pleasure of evaluating Ashley Gonzales who is a 89 y.o. female in Geriatric follow up today, along with her daughter to provide further history.  Patient has medical history including:  mild dementia, ambulatory dysfunction, anxiety/depression.  Pt lives with her daughter    COGNITION UPDATE:  Per Patient:  She has been in the hospital.  No major changes with memory.  No forgetting convo more often, not losing things more often.  Wears O2 24/7.    Activity:  Cognitive:  solitaire   Physical:  sits in the sun   Social:  goes out    Per Care Partner:  She has been stable.  She lives with daughter now.      FUNCTIONAL STATUS:  BADLs  Does patient require assistance with:  Bathing: Yes - physical.  Dressing: Yes - physical assist  Transferring: No  Continence:   Toileting: No  Feeding: No    IADLs  Does patient require assistance with:  Telephone: No  Shopping: No - reminds dgt  Food Preparation: Yes  Housekeeping: Yes  Laundry: Yes  Transportation: Yes  Medications: Yes  Finances: Yes    MOOD:  Changes in mood or personality: No  Seems anxious: No  Seems depressed: No  With suicidal ideation: No  Chronic  Pain:  No    NEUROPSYCH SYMPTOMS:  Is patient less interested in his or her usual activities or in the activities and plans of others? No  Does patient get angry or hostile?  Resist care from others? No  Does patient act impatient and cranky? Does mood frequently change for no apparent reason? No  Does patient have trouble sleeping at night? No -BR break 1-2/night.  Stays awake most of the day  Does patient see or hear things that no one else can see or hear? No  Does patient act suspicious without good reason (example: believes that others are stealing from him or her, or planning to harm him or her in some way)? No    SAFETY:  Hearing issue: Yes - no HAs, mild hearing loss  Vision issue: No  Any gait or balance disorder: Yes  Uses: Rollator  Any falls in the last year: No  Weight loss:  185 pounds  Any history of wandering: No  Can patient be home alone:  Yes  Does patient drive: No  POA papers completed: Yes    MoCA-b: 13/22- delayed recall      [Allergies]    [Allergies]  Allergen Reactions    Penicillins Itching    Penicillins Rash       Medications:    [Medications Ordered Prior to Encounter]    [Medications Ordered Prior to Encounter]  Current Outpatient Medications on File Prior to Visit   Medication Sig Dispense Refill    acetaminophen (TYLENOL) 325 mg tablet Take 2 tablets (650 mg total) by mouth every 6 (six) hours as needed for mild pain      albuterol (PROVENTIL HFA,VENTOLIN HFA) 90 mcg/act inhaler Inhale 2 puffs every 6 (six) hours as needed for shortness of breath 18 g 0    aspirin (ECOTRIN LOW STRENGTH) 81 mg EC tablet Take 81 mg by mouth in the morning.      benzonatate (TESSALON PERLES) 100 mg capsule Take 1 capsule (100 mg total) by mouth 3 (three) times a day as needed for cough 20 capsule 0    bimatoprost (LUMIGAN) 0.01 % ophthalmic drops Administer 1 drop to both eyes daily at bedtime 5 mL 0    bisacodyl 5 mg EC tablet Take 5 mg by mouth daily as needed for constipation      budesonide  (Pulmicort) 0.5 mg/2 mL nebulizer solution Take 2 mL (0.5 mg total) by nebulization 2 (two) times a day Rinse mouth after use. 120 mL 2    calcitriol (ROCALTROL) 0.25 mcg capsule Take 1 capsule (0.25 mcg total) by mouth 3 (three) times a week 45 capsule 3    dicyclomine (BENTYL) 10 mg capsule Take 1 capsule (10 mg total) by mouth in the morning and 1 capsule (10 mg total) at noon and 1 capsule (10 mg total) in the evening. Take before meals. 90 capsule 2    famotidine (PEPCID) 20 mg tablet TAKE 1 TABLET BY MOUTH TWICE A  tablet 1    formoterol (PERFOROMIST) 20 MCG/2ML nebulizer solution Take 2 mL (20 mcg total) by nebulization 2 (two) times a day 120 mL 2    furosemide (LASIX) 20 mg tablet TAKE 1 TABLET BY MOUTH EVERY  tablet 1    guaiFENesin (MUCINEX) 600 mg 12 hr tablet Take 1 tablet (600 mg total) by mouth every 12 (twelve) hours 60 tablet 0    ketoconazole (NIZORAL) 2 % cream Apply topically daily 60 g 0    levothyroxine 75 mcg tablet TAKE 1 TABLET BY MOUTH EVERY DAY IN THE MORNING 90 tablet 1    losartan (COZAAR) 50 mg tablet Take 1 tablet (50 mg total) by mouth daily 90 tablet 3    metoprolol tartrate (LOPRESSOR) 25 mg tablet TAKE 1 TABLET (25 MG TOTAL) BY MOUTH EVERY 12 (TWELVE) HOURS 180 tablet 1    mirtazapine (REMERON) 30 mg tablet Take 1.5 tablets (45 mg total) by mouth daily at bedtime      oxygen gas Inhale 2 L/min continuous nasal cannula      rosuvastatin (CRESTOR) 10 MG tablet TAKE 1 TABLET BY MOUTH EVERY DAY 90 tablet 1    senna (QC Senna) 8.6 MG tablet Take 1 tablet (8.6 mg total) by mouth in the morning. 90 tablet 0    vitamin B-12 (VITAMIN B-12) 500 mcg tablet Take 1 tablet (500 mcg total) by mouth daily 90 tablet 1     No current facility-administered medications on file prior to visit.       History:  [Past Medical History]    [Past Medical History]  Diagnosis Date    Anxiety     Cataract, bilateral     Last Assessed:3/19/2014    CHF (congestive heart failure) (HCC)     COPD  (chronic obstructive pulmonary disease) (HCC) 2016    Coronary artery disease     GERD (gastroesophageal reflux disease)     Hx of arterial ischemic stroke     Hypertension     Rheumatoid arthritis (HCC)     Sleep apnea, obstructive     Stroke (HCC)     Wears glasses      [Past Surgical History]    [Past Surgical History]  Procedure Laterality Date    CAROTID ENDARTERECTOMY Right     CATARACT EXTRACTION W/ INTRAOCULAR LENS  IMPLANT, BILATERAL      COLONOSCOPY N/A 9/30/2017    Procedure: COLONOSCOPY FOR CONTROL OF BLEEDING;  Surgeon: Jeb Gaxiola MD;  Location: [facility];  Service: Colorectal    CORONARY ARTERY BYPASS GRAFT      CABG X3 with LIMA to LAD,SVG to OM,SVG to distal  RCA ; Last Assessed:7/13/2015    JOINT REPLACEMENT      left TKR    KIDNEY STONE SURGERY      NEPHROSTOMY Left     with Drainage Irrigation ;Onset:1974    [place] ARTHRP KNE CONDYLE&PLATU MEDIAL&LAT COMPARTMENTS Right 2/25/2016    Procedure: ARTHROPLASTY KNEE TOTAL;  Surgeon: Jeb Figueroa MD;  Location: [facility];  Service: Orthopedics     [Family History]    [Family History]  Problem Relation Name Age of Onset    Cancer Mother Kathi Berger         malignant neoplasm    Heart attack Father Michael Berger Sr     Coronary artery disease Father Michael Berger Sr     Raynaud syndrome Son      Dementia Daughter      Aneurysm Other sibling         Aortic    Cancer Other sibling         malignant neoplasm     Social History     Socioeconomic History    Marital status:      Spouse name: Not on file    Number of children: Not on file    Years of education: 10    Highest education level: Not on file   Occupational History    Not on file   Tobacco Use    Smoking status: Former     Average packs/day: 1 pack/day for 24.0 years (24.0 ttl pk-yrs)     Types: Cigarettes     Start date: 1/1/1951     Passive exposure: Past    Smokeless tobacco: Never   Vaping Use    Vaping status: Never Used   Substance and Sexual Activity    Alcohol use: Never     Drug use: Never    Sexual activity: Not Currently   Other Topics Concern    Not on file   Social History Narrative    ** Merged History Encounter **         No caffeine use     Social Drivers of Health     Financial Resource Strain: Low Risk  (5/10/2023)    Overall Financial Resource Strain (CARDIA)     Difficulty of Paying Living Expenses: Not hard at all   Food Insecurity: No Food Insecurity (4/10/2025)    Nursing - Inadequate Food Risk Classification     Worried About Running Out of Food in the Last Year: Never true     Ran Out of Food in the Last Year: Never true     Ran Out of Food in the Last Year: Never true   Transportation Needs: No Transportation Needs (5/22/2025)    OASIS : Transportation     Lack of Transportation (Medical): No     Lack of Transportation (Non-Medical): No     Patient Unable or Declines to Respond: No   Physical Activity: Not on file   Stress: Not on file   Social Connections: Not on file   Intimate Partner Violence: Unknown (4/10/2025)    Nursing IPS     Feels Physically and Emotionally Safe: Not on file     Physically Hurt by Someone: Not on file     Humiliated or Emotionally Abused by Someone: Not on file     Physically Hurt by Someone: No     Hurt or Threatened by Someone: No   Housing Stability: Unknown (4/10/2025)    Nursing: Inadequate Housing Risk Classification     Has Housing: Not on file     Worried About Losing Housing: Not on file     Unable to Get Utilities: Not on file     Unable to Pay for Housing in the Last Year: No     Has Housing: No     [Past Surgical History]    [Past Surgical History]  Procedure Laterality Date    CAROTID ENDARTERECTOMY Right     CATARACT EXTRACTION W/ INTRAOCULAR LENS  IMPLANT, BILATERAL      COLONOSCOPY N/A 9/30/2017    Procedure: COLONOSCOPY FOR CONTROL OF BLEEDING;  Surgeon: Jeb Gaxiola MD;  Location: [facility];  Service: Colorectal    CORONARY ARTERY BYPASS GRAFT      CABG X3 with LIMA to LAD,SVG to OM,SVG to distal  RCA ; Last  "Assessed:7/13/2015    JOINT REPLACEMENT      left TKR    KIDNEY STONE SURGERY      NEPHROSTOMY Left     with Drainage Irrigation ;Onset:1974    [place] ARTHRP KNE CONDYLE&PLATU MEDIAL&LAT COMPARTMENTS Right 2/25/2016    Procedure: ARTHROPLASTY KNEE TOTAL;  Surgeon: Jeb Figueroa MD;  Location: [facility] OR;  Service: Orthopedics        Objective   There were no vitals taken for this visit.      Labs & Imaging:  Lab Results   Component Value Date    WBC 8.11 04/11/2025    HGB 10.5 (L) 04/11/2025    HCT 37.5 04/11/2025     (H) 04/11/2025     04/11/2025     Lab Results   Component Value Date     07/16/2015    SODIUM 147 06/18/2025    K 4.9 06/18/2025    CL 98 06/18/2025    CO2 43 (H) 06/18/2025    ANIONGAP 4 07/16/2015    AGAP 6 06/18/2025    BUN 43 (H) 06/18/2025    CREATININE 1.60 (H) 06/18/2025    GLUC 123 06/18/2025    GLUF 100 (H) 01/17/2025    CALCIUM 9.1 06/18/2025    AST 10 (L) 04/10/2025    ALT 5 (L) 04/10/2025    ALKPHOS 67 04/10/2025    PROT 5.4 (L) 07/05/2015    TP 6.3 (L) 04/10/2025    BILITOT 0.37 07/05/2015    TBILI 0.40 04/10/2025    EGFR 28 06/18/2025     Lab Results   Component Value Date    HGBA1C 6.0 (H) 02/21/2025     Lab Results   Component Value Date    CHOLESTEROL 140 08/26/2024    CHOLESTEROL 146 09/23/2023    CHOLESTEROL 125 10/24/2022     Lab Results   Component Value Date    HDL 42 (L) 08/26/2024    HDL 47 (L) 09/23/2023    HDL 50 10/24/2022     Lab Results   Component Value Date    TRIG 107 08/26/2024    TRIG 128 09/23/2023    TRIG 74 10/24/2022     Lab Results   Component Value Date    NONHDLC 98 08/26/2024    NONHDLC 100 07/01/2020     Lab Results   Component Value Date    LDLCALC 77 08/26/2024    LDLCALC 73 09/23/2023    LDLDIRECT 148 02/07/2015     Lab Results   Component Value Date    ULBSGTIS31 657 06/18/2025     Lab Results   Component Value Date    EMH9IMZJBDHK 1.474 03/18/2025     No results found for: \"SYPHILISAB\"  No results found for: \"RPR\"      Lab Results "   Component Value Date    JXZA94OASDFK 50.5 02/21/2025      Results for orders placed during the hospital encounter of 04/10/25    CT head wo contrast    Narrative  CT HEAD WITHOUT CONTRAST    INDICATION: ams.    COMPARISON: CT of the head from 12/26/2024 and 8/20/2024.    TECHNIQUE: CT examination of the brain was performed. Multiplanar 2D reformatted images were created from the source data.    Radiation dose length product (DLP) for this visit: 885 mGy-cm . This examination, like all CT scans performed in the [facility], was performed utilizing techniques to minimize radiation dose exposure, including the use of iterative  reconstruction and automated exposure control.    IMAGE QUALITY: Diagnostic.    FINDINGS:    PARENCHYMA: Decreased attenuation is noted in periventricular and subcortical white matter, statistically most likely representing mild microangiopathic changes, unchanged from prior.    No evidence of acute infarction. There is an area of decreased attenuation and encephalomalacia in the right parietal lobe and a 2nd, smaller similar area of decreased attenuation and encephalomalacia in the right occipital lobe, compatible with chronic  infarcts. No mass effect or midline shift. No acute parenchymal hemorrhage.    EXTRA-AXIAL SPACES AND VENTRICLES: Within normal limits for the patient's age.    No extra-axial fluid collections.    CALVARIUM AND EXTRACRANIAL SOFT TISSUES: No calvarial fracture. 2 adjacent calcified lesions within the scalp in the right parietal region as on the prior studies, likely pilar cysts.    ORBITS AND PARANASAL SINUSES: The patient is status post bilateral cataract surgery. Clear paranasal sinuses.    Impression  No acute intracranial abnormality. In particular, no acute territorial infarct or intracranial hemorrhage.    Review of Systems   Constitutional:  Negative for activity change and appetite change.   HENT:  Positive for hearing loss (mild).    Eyes:   Negative for visual disturbance.   Respiratory:  Negative for cough and shortness of breath.    Cardiovascular:  Negative for chest pain.   Gastrointestinal:  Negative for abdominal pain.   Musculoskeletal:  Positive for gait problem.   Psychiatric/Behavioral:  Positive for decreased concentration (forgetful) and dysphoric mood (chronic stable). Negative for sleep disturbance and suicidal ideas. The patient is nervous/anxious (chronic stable).        Objective   There were no vitals taken for this visit.    Physical Exam  Vitals and nursing note reviewed.   Constitutional:       General: She is not in acute distress.     Appearance: Normal appearance. She is not ill-appearing.   HENT:      Head: Normocephalic.   Pulmonary:      Effort: Pulmonary effort is normal. No respiratory distress.      Breath sounds: No wheezing (none audible).      Comments: Supplemental nasal O2 intact    Musculoskeletal:         General: Normal range of motion.     Skin:     General: Skin is dry.     Neurological:      General: No focal deficit present.      Mental Status: She is alert and oriented to person, place, and time. Mental status is at baseline.     Psychiatric:         Mood and Affect: Mood normal.         Behavior: Behavior normal.         Administrative Statements   Encounter provider FLAKO Yang    The Patient is located at Home and in the following state in which I hold an active license [place].    The patient was identified by name and date of birth. Ashley Gonzales was informed that this is a telemedicine visit and that the visit is being conducted through the Epic Embedded platform. She agrees to proceed..  My office door was closed. No one else was in the room.  She acknowledged consent and understanding of privacy and security of the video platform. The patient has agreed to participate and understands they can discontinue the visit at any time.    I have spent a total time of 30 minutes in caring for this patient  on the day of the visit/encounter including Diagnostic results, Patient and family education, Importance of tx compliance, Risk factor reductions, Documenting in the medical record, and Obtaining or reviewing history  , not including the time spent for establishing the audio/video connection.

## 2025-07-07 NOTE — ASSESSMENT & PLAN NOTE
Baseline mood:  chronic stable anxiety.  Continue:  mirtazapine.  Pt trial'd lexapro and buspar but didn't tolerate  Encourage relaxation techniques, emotional support

## 2025-07-23 ENCOUNTER — TELEPHONE (OUTPATIENT)
Dept: NEPHROLOGY | Facility: CLINIC | Age: OVER 89
End: 2025-07-23

## 2025-07-23 DIAGNOSIS — N20.0 NEPHROLITHIASIS: ICD-10-CM

## 2025-07-23 DIAGNOSIS — I10 PRIMARY HYPERTENSION: ICD-10-CM

## 2025-07-23 DIAGNOSIS — N18.30 STAGE 3 CHRONIC KIDNEY DISEASE, UNSPECIFIED WHETHER STAGE 3A OR 3B CKD (HCC): Primary | ICD-10-CM

## 2025-07-23 DIAGNOSIS — R80.1 PERSISTENT PROTEINURIA: ICD-10-CM

## 2025-07-25 ENCOUNTER — TELEPHONE (OUTPATIENT)
Age: OVER 89
End: 2025-07-25

## 2025-07-25 NOTE — TELEPHONE ENCOUNTER
Contacted patient off of Medication Management  wait list to verify needs of services in attempts to update list with patient preferences. LVM for patient to contact intake dept  in regards to wait list maintenance.     1st attempt    Upon call back, verify needs of services, information, and preferences (location, provider pref, open to virtual).

## 2025-07-28 DIAGNOSIS — I10 HYPERTENSION: ICD-10-CM

## 2025-07-29 RX ORDER — LOSARTAN POTASSIUM 50 MG/1
50 TABLET ORAL DAILY
Qty: 30 TABLET | Refills: 0 | Status: SHIPPED | OUTPATIENT
Start: 2025-07-29

## 2025-08-13 ENCOUNTER — TELEPHONE (OUTPATIENT)
Age: OVER 89
End: 2025-08-13

## 2025-08-14 ENCOUNTER — TELEPHONE (OUTPATIENT)
Dept: NEPHROLOGY | Facility: CLINIC | Age: OVER 89
End: 2025-08-14

## 2025-08-18 ENCOUNTER — TELEMEDICINE (OUTPATIENT)
Dept: NEPHROLOGY | Facility: CLINIC | Age: OVER 89
End: 2025-08-18
Payer: COMMERCIAL

## 2025-08-18 VITALS
BODY MASS INDEX: 35.51 KG/M2 | SYSTOLIC BLOOD PRESSURE: 120 MMHG | HEART RATE: 50 BPM | OXYGEN SATURATION: 98 % | HEIGHT: 62 IN | DIASTOLIC BLOOD PRESSURE: 52 MMHG | WEIGHT: 193 LBS

## 2025-08-18 DIAGNOSIS — R79.89 ELEVATED SERUM CREATININE: Primary | ICD-10-CM

## 2025-08-18 DIAGNOSIS — N18.32 CKD STAGE 3B, GFR 30-44 ML/MIN (HCC): ICD-10-CM

## 2025-08-18 DIAGNOSIS — N18.30 BENIGN HYPERTENSION WITH CHRONIC KIDNEY DISEASE, STAGE III (HCC): ICD-10-CM

## 2025-08-18 DIAGNOSIS — E83.9 CHRONIC KIDNEY DISEASE-MINERAL AND BONE DISORDER (CKD-MBD): ICD-10-CM

## 2025-08-18 DIAGNOSIS — N18.9 CHRONIC KIDNEY DISEASE-MINERAL AND BONE DISORDER (CKD-MBD): ICD-10-CM

## 2025-08-18 DIAGNOSIS — M89.9 CHRONIC KIDNEY DISEASE-MINERAL AND BONE DISORDER (CKD-MBD): ICD-10-CM

## 2025-08-18 DIAGNOSIS — N20.0 NEPHROLITHIASIS: ICD-10-CM

## 2025-08-18 DIAGNOSIS — I12.9 BENIGN HYPERTENSION WITH CHRONIC KIDNEY DISEASE, STAGE III (HCC): ICD-10-CM

## 2025-08-18 PROCEDURE — 99214 OFFICE O/P EST MOD 30 MIN: CPT

## 2025-08-20 ENCOUNTER — HOSPITAL ENCOUNTER (OUTPATIENT)
Dept: CT IMAGING | Facility: HOSPITAL | Age: OVER 89
Discharge: HOME | End: 2025-08-20
Attending: STUDENT IN AN ORGANIZED HEALTH CARE EDUCATION/TRAINING PROGRAM
Payer: COMMERCIAL

## 2025-08-20 DIAGNOSIS — R91.1 LUNG NODULE: ICD-10-CM

## 2025-08-20 PROCEDURE — 71250 CT THORAX DX C-: CPT

## 2025-08-21 DIAGNOSIS — I10 HYPERTENSION: ICD-10-CM

## 2025-08-21 RX ORDER — LOSARTAN POTASSIUM 50 MG/1
50 TABLET ORAL DAILY
Qty: 90 TABLET | Refills: 1 | Status: SHIPPED | OUTPATIENT
Start: 2025-08-21

## 2025-08-22 ENCOUNTER — OFFICE VISIT (OUTPATIENT)
Dept: INTERNAL MEDICINE CLINIC | Facility: CLINIC | Age: OVER 89
End: 2025-08-22
Payer: COMMERCIAL

## 2025-08-22 VITALS
RESPIRATION RATE: 18 BRPM | OXYGEN SATURATION: 93 % | HEART RATE: 76 BPM | BODY MASS INDEX: 35.7 KG/M2 | HEIGHT: 62 IN | TEMPERATURE: 97.4 F | SYSTOLIC BLOOD PRESSURE: 124 MMHG | WEIGHT: 194 LBS | DIASTOLIC BLOOD PRESSURE: 60 MMHG

## 2025-08-22 DIAGNOSIS — F01.A0 MILD VASCULAR DEMENTIA WITHOUT BEHAVIORAL DISTURBANCE, PSYCHOTIC DISTURBANCE, MOOD DISTURBANCE, OR ANXIETY (HCC): ICD-10-CM

## 2025-08-22 DIAGNOSIS — I50.32 CHRONIC DIASTOLIC CHF (CONGESTIVE HEART FAILURE) (HCC): ICD-10-CM

## 2025-08-22 DIAGNOSIS — B37.2 CANDIDAL INTERTRIGO: ICD-10-CM

## 2025-08-22 DIAGNOSIS — E03.9 ACQUIRED HYPOTHYROIDISM: ICD-10-CM

## 2025-08-22 DIAGNOSIS — J43.1 PANLOBULAR EMPHYSEMA (HCC): ICD-10-CM

## 2025-08-22 DIAGNOSIS — F51.01 PRIMARY INSOMNIA: ICD-10-CM

## 2025-08-22 DIAGNOSIS — R91.1 LUNG NODULE: ICD-10-CM

## 2025-08-22 DIAGNOSIS — G47.33 OSA (OBSTRUCTIVE SLEEP APNEA): Primary | ICD-10-CM

## 2025-08-22 DIAGNOSIS — J96.11 CHRONIC RESPIRATORY FAILURE WITH HYPOXIA (HCC): ICD-10-CM

## 2025-08-22 DIAGNOSIS — N18.32 STAGE 3B CHRONIC KIDNEY DISEASE (HCC): ICD-10-CM

## 2025-08-22 PROCEDURE — 99214 OFFICE O/P EST MOD 30 MIN: CPT | Performed by: INTERNAL MEDICINE

## 2025-08-22 RX ORDER — KETOCONAZOLE 20 MG/G
CREAM TOPICAL DAILY
Qty: 60 G | Refills: 0 | Status: SHIPPED | OUTPATIENT
Start: 2025-08-22

## (undated) DEVICE — INTERJECT NEEDLE 23G 240MM

## (undated) DEVICE — TUBING SUCTION 5MM X 12 FT

## (undated) DEVICE — WORKING LENGTH 235CM, WORKING CHANNEL 2.8MM: Brand: RESOLUTION 360 CLIP

## (undated) DEVICE — GLOVE INDICATOR PI UNDERGLOVE SZ 7.5 BLUE

## (undated) DEVICE — TRAVELKIT CONTAINS FIRST STEP KIT (200ML EP-4 KIT) AND SOILED SCOPE BAG - 1 KIT: Brand: TRAVELKIT CONTAINS FIRST STEP KIT AND SOILED SCOPE BAG